# Patient Record
Sex: MALE | Race: WHITE | NOT HISPANIC OR LATINO | Employment: OTHER | URBAN - METROPOLITAN AREA
[De-identification: names, ages, dates, MRNs, and addresses within clinical notes are randomized per-mention and may not be internally consistent; named-entity substitution may affect disease eponyms.]

---

## 2017-01-11 RX ORDER — LISINOPRIL 10 MG/1
TABLET ORAL
Qty: 90 TAB | Refills: 0 | Status: SHIPPED | OUTPATIENT
Start: 2017-01-11 | End: 2017-01-13 | Stop reason: SDUPTHER

## 2017-01-11 NOTE — TELEPHONE ENCOUNTER
Last seen: 06/13/16 by Dr. Spears  Next appt: 04/16/17 with Dr. Spears    Was the patient seen in the last year in this department? Yes   Does patient have an active prescription for medications requested? No   Received Request Via: Pharmacy

## 2017-01-13 RX ORDER — LISINOPRIL 10 MG/1
TABLET ORAL
Qty: 90 TAB | Refills: 0 | Status: SHIPPED | OUTPATIENT
Start: 2017-01-13 | End: 2017-07-24 | Stop reason: SDUPTHER

## 2017-01-13 RX ORDER — LEVOTHYROXINE SODIUM 0.05 MG/1
TABLET ORAL
Qty: 90 TAB | Refills: 0 | Status: SHIPPED | OUTPATIENT
Start: 2017-01-13 | End: 2017-04-24 | Stop reason: SDUPTHER

## 2017-01-13 NOTE — TELEPHONE ENCOUNTER
Was the patient seen in the last year in this department? Yes 6/13/2016, next appt 4/6/2017    Does patient have an active prescription for medications requested? Yes     Received Request Via: Pharmacy

## 2017-01-23 RX ORDER — ATORVASTATIN CALCIUM 10 MG/1
TABLET, FILM COATED ORAL
Qty: 90 TAB | Refills: 0 | Status: SHIPPED | OUTPATIENT
Start: 2017-01-23 | End: 2017-04-25

## 2017-01-23 NOTE — TELEPHONE ENCOUNTER
Last seen: 06/13/16 by Dr. Spears  Next appt: 04/06/17 with Dr. Spears    Was the patient seen in the last year in this department? Yes   Does patient have an active prescription for medications requested? No   Received Request Via: Pharmacy

## 2017-03-13 ENCOUNTER — HOSPITAL ENCOUNTER (OUTPATIENT)
Dept: RADIOLOGY | Facility: MEDICAL CENTER | Age: 72
End: 2017-03-13
Attending: NEUROLOGICAL SURGERY | Admitting: NEUROLOGICAL SURGERY
Payer: MEDICARE

## 2017-03-13 DIAGNOSIS — Z01.810 PRE-OPERATIVE CARDIOVASCULAR EXAMINATION: ICD-10-CM

## 2017-03-13 DIAGNOSIS — Z01.812 PRE-PROCEDURAL LABORATORY EXAMINATION: ICD-10-CM

## 2017-03-13 DIAGNOSIS — Z01.811 PRE-OPERATIVE RESPIRATORY EXAMINATION: ICD-10-CM

## 2017-03-13 LAB
ANION GAP SERPL CALC-SCNC: 10 MMOL/L (ref 0–11.9)
APTT PPP: 27.4 SEC (ref 24.7–36)
BASOPHILS # BLD AUTO: 0.7 % (ref 0–1.8)
BASOPHILS # BLD: 0.04 K/UL (ref 0–0.12)
BUN SERPL-MCNC: 11 MG/DL (ref 8–22)
CALCIUM SERPL-MCNC: 9.8 MG/DL (ref 8.5–10.5)
CHLORIDE SERPL-SCNC: 103 MMOL/L (ref 96–112)
CO2 SERPL-SCNC: 27 MMOL/L (ref 20–33)
CREAT SERPL-MCNC: 0.91 MG/DL (ref 0.5–1.4)
EKG IMPRESSION: NORMAL
EOSINOPHIL # BLD AUTO: 0.45 K/UL (ref 0–0.51)
EOSINOPHIL NFR BLD: 7.9 % (ref 0–6.9)
ERYTHROCYTE [DISTWIDTH] IN BLOOD BY AUTOMATED COUNT: 43 FL (ref 35.9–50)
GFR SERPL CREATININE-BSD FRML MDRD: >60 ML/MIN/1.73 M 2
GLUCOSE SERPL-MCNC: 97 MG/DL (ref 65–99)
HCT VFR BLD AUTO: 45 % (ref 42–52)
HGB BLD-MCNC: 14.8 G/DL (ref 14–18)
IMM GRANULOCYTES # BLD AUTO: 0.03 K/UL (ref 0–0.11)
IMM GRANULOCYTES NFR BLD AUTO: 0.5 % (ref 0–0.9)
INR PPP: 0.99 (ref 0.87–1.13)
LYMPHOCYTES # BLD AUTO: 1.48 K/UL (ref 1–4.8)
LYMPHOCYTES NFR BLD: 26.1 % (ref 22–41)
MCH RBC QN AUTO: 29.2 PG (ref 27–33)
MCHC RBC AUTO-ENTMCNC: 32.9 G/DL (ref 33.7–35.3)
MCV RBC AUTO: 88.8 FL (ref 81.4–97.8)
MONOCYTES # BLD AUTO: 0.4 K/UL (ref 0–0.85)
MONOCYTES NFR BLD AUTO: 7 % (ref 0–13.4)
NEUTROPHILS # BLD AUTO: 3.28 K/UL (ref 1.82–7.42)
NEUTROPHILS NFR BLD: 57.8 % (ref 44–72)
NRBC # BLD AUTO: 0 K/UL
NRBC BLD AUTO-RTO: 0 /100 WBC
PLATELET # BLD AUTO: 182 K/UL (ref 164–446)
PMV BLD AUTO: 10.2 FL (ref 9–12.9)
POTASSIUM SERPL-SCNC: 4.4 MMOL/L (ref 3.6–5.5)
PROTHROMBIN TIME: 13.4 SEC (ref 12–14.6)
RBC # BLD AUTO: 5.07 M/UL (ref 4.7–6.1)
SODIUM SERPL-SCNC: 140 MMOL/L (ref 135–145)
WBC # BLD AUTO: 5.7 K/UL (ref 4.8–10.8)

## 2017-03-13 PROCEDURE — 36415 COLL VENOUS BLD VENIPUNCTURE: CPT

## 2017-03-13 PROCEDURE — 85730 THROMBOPLASTIN TIME PARTIAL: CPT

## 2017-03-13 PROCEDURE — 80048 BASIC METABOLIC PNL TOTAL CA: CPT

## 2017-03-13 PROCEDURE — 85610 PROTHROMBIN TIME: CPT

## 2017-03-13 PROCEDURE — 85025 COMPLETE CBC W/AUTO DIFF WBC: CPT

## 2017-03-13 PROCEDURE — 71020 DX-CHEST-2 VIEWS: CPT

## 2017-03-17 ENCOUNTER — APPOINTMENT (OUTPATIENT)
Dept: RADIOLOGY | Facility: MEDICAL CENTER | Age: 72
End: 2017-03-17
Attending: NEUROLOGICAL SURGERY
Payer: MEDICARE

## 2017-03-17 ENCOUNTER — HOSPITAL ENCOUNTER (OUTPATIENT)
Facility: MEDICAL CENTER | Age: 72
End: 2017-03-20
Attending: NEUROLOGICAL SURGERY | Admitting: NEUROLOGICAL SURGERY
Payer: MEDICARE

## 2017-03-17 PROCEDURE — 700102 HCHG RX REV CODE 250 W/ 637 OVERRIDE(OP)

## 2017-03-17 PROCEDURE — 502626 HCHG SURGIFLO HEMOSTATIC MATRIX 6ML: Performed by: NEUROLOGICAL SURGERY

## 2017-03-17 PROCEDURE — 500331 HCHG COTTONOID, SURG PATTIE: Performed by: NEUROLOGICAL SURGERY

## 2017-03-17 PROCEDURE — A6404 STERILE GAUZE > 48 SQ IN: HCPCS | Performed by: NEUROLOGICAL SURGERY

## 2017-03-17 PROCEDURE — A6222 GAUZE <=16 IN NO W/SAL W/O B: HCPCS | Performed by: NEUROLOGICAL SURGERY

## 2017-03-17 PROCEDURE — 160036 HCHG PACU - EA ADDL 30 MINS PHASE I: Performed by: NEUROLOGICAL SURGERY

## 2017-03-17 PROCEDURE — 500364 HCHG DISSECT TOOL, MIDAS: Performed by: NEUROLOGICAL SURGERY

## 2017-03-17 PROCEDURE — 700101 HCHG RX REV CODE 250

## 2017-03-17 PROCEDURE — 500367 HCHG DRAIN KIT, HEMOVAC: Performed by: NEUROLOGICAL SURGERY

## 2017-03-17 PROCEDURE — 500445 HCHG HEMOSTAT, SURGICEL 4X8: Performed by: NEUROLOGICAL SURGERY

## 2017-03-17 PROCEDURE — 502240 HCHG MISC OR SUPPLY RC 0272: Performed by: NEUROLOGICAL SURGERY

## 2017-03-17 PROCEDURE — 110371 HCHG SHELL REV 272: Performed by: NEUROLOGICAL SURGERY

## 2017-03-17 PROCEDURE — 501899 HCHG DURASEAL SEALANT SYSTEM 5ML: Performed by: NEUROLOGICAL SURGERY

## 2017-03-17 PROCEDURE — A9270 NON-COVERED ITEM OR SERVICE: HCPCS | Performed by: NEUROLOGICAL SURGERY

## 2017-03-17 PROCEDURE — 502000 HCHG MISC OR IMPLANTS RC 0278: Performed by: NEUROLOGICAL SURGERY

## 2017-03-17 PROCEDURE — 160002 HCHG RECOVERY MINUTES (STAT): Performed by: NEUROLOGICAL SURGERY

## 2017-03-17 PROCEDURE — 700102 HCHG RX REV CODE 250 W/ 637 OVERRIDE(OP): Performed by: NEUROLOGICAL SURGERY

## 2017-03-17 PROCEDURE — 700101 HCHG RX REV CODE 250: Performed by: NEUROLOGICAL SURGERY

## 2017-03-17 PROCEDURE — 72100 X-RAY EXAM L-S SPINE 2/3 VWS: CPT

## 2017-03-17 PROCEDURE — 160009 HCHG ANES TIME/MIN: Performed by: NEUROLOGICAL SURGERY

## 2017-03-17 PROCEDURE — 700111 HCHG RX REV CODE 636 W/ 250 OVERRIDE (IP)

## 2017-03-17 PROCEDURE — A4606 OXYGEN PROBE USED W OXIMETER: HCPCS | Performed by: NEUROLOGICAL SURGERY

## 2017-03-17 PROCEDURE — 500885 HCHG PACK, JACKSON TABLE: Performed by: NEUROLOGICAL SURGERY

## 2017-03-17 PROCEDURE — G0378 HOSPITAL OBSERVATION PER HR: HCPCS

## 2017-03-17 PROCEDURE — 96375 TX/PRO/DX INJ NEW DRUG ADDON: CPT | Mod: XU

## 2017-03-17 PROCEDURE — 110382 HCHG SHELL REV 271: Performed by: NEUROLOGICAL SURGERY

## 2017-03-17 PROCEDURE — 700112 HCHG RX REV CODE 229: Performed by: NEUROLOGICAL SURGERY

## 2017-03-17 PROCEDURE — C1713 ANCHOR/SCREW BN/BN,TIS/BN: HCPCS | Performed by: NEUROLOGICAL SURGERY

## 2017-03-17 PROCEDURE — 160035 HCHG PACU - 1ST 60 MINS PHASE I: Performed by: NEUROLOGICAL SURGERY

## 2017-03-17 PROCEDURE — 700111 HCHG RX REV CODE 636 W/ 250 OVERRIDE (IP): Performed by: NEUROLOGICAL SURGERY

## 2017-03-17 PROCEDURE — 501423 HCHG SPONGE, SURGIFOAM 12X7: Performed by: NEUROLOGICAL SURGERY

## 2017-03-17 PROCEDURE — 160042 HCHG SURGERY MINUTES - EA ADDL 1 MIN LEVEL 5: Performed by: NEUROLOGICAL SURGERY

## 2017-03-17 PROCEDURE — A9270 NON-COVERED ITEM OR SERVICE: HCPCS

## 2017-03-17 PROCEDURE — 500105 HCHG BONE MILL BM200: Performed by: NEUROLOGICAL SURGERY

## 2017-03-17 PROCEDURE — 96374 THER/PROPH/DIAG INJ IV PUSH: CPT | Mod: XU

## 2017-03-17 PROCEDURE — 501838 HCHG SUTURE GENERAL: Performed by: NEUROLOGICAL SURGERY

## 2017-03-17 PROCEDURE — 160048 HCHG OR STATISTICAL LEVEL 1-5: Performed by: NEUROLOGICAL SURGERY

## 2017-03-17 PROCEDURE — C1763 CONN TISS, NON-HUMAN: HCPCS | Performed by: NEUROLOGICAL SURGERY

## 2017-03-17 PROCEDURE — 160031 HCHG SURGERY MINUTES - 1ST 30 MINS LEVEL 5: Performed by: NEUROLOGICAL SURGERY

## 2017-03-17 DEVICE — MAGNIFUSE 1X5CM: Type: IMPLANTABLE DEVICE | Status: FUNCTIONAL

## 2017-03-17 DEVICE — SCREW LGCY SET - (3TX30=90): Type: IMPLANTABLE DEVICE | Status: FUNCTIONAL

## 2017-03-17 DEVICE — IMPLANTABLE DEVICE: Type: IMPLANTABLE DEVICE | Status: FUNCTIONAL

## 2017-03-17 DEVICE — DURASEAL SEALANT SYSTEM 5ML - (5/BX): Type: IMPLANTABLE DEVICE | Status: FUNCTIONAL

## 2017-03-17 RX ORDER — DOXYCYCLINE HYCLATE 100 MG
100 TABLET ORAL
COMMUNITY
End: 2017-04-06

## 2017-03-17 RX ORDER — ENEMA 19; 7 G/133ML; G/133ML
1 ENEMA RECTAL
Status: DISCONTINUED | OUTPATIENT
Start: 2017-03-17 | End: 2017-03-20 | Stop reason: HOSPADM

## 2017-03-17 RX ORDER — POLYVINYL ALCOHOL 14 MG/ML
1 SOLUTION/ DROPS OPHTHALMIC PRN
Status: DISCONTINUED | OUTPATIENT
Start: 2017-03-17 | End: 2017-03-20 | Stop reason: HOSPADM

## 2017-03-17 RX ORDER — POLYETHYLENE GLYCOL 3350 17 G/17G
1 POWDER, FOR SOLUTION ORAL 2 TIMES DAILY PRN
Status: DISCONTINUED | OUTPATIENT
Start: 2017-03-17 | End: 2017-03-20 | Stop reason: HOSPADM

## 2017-03-17 RX ORDER — FLUTICASONE PROPIONATE 50 MCG
1 SPRAY, SUSPENSION (ML) NASAL
Status: DISCONTINUED | OUTPATIENT
Start: 2017-03-17 | End: 2017-03-20 | Stop reason: HOSPADM

## 2017-03-17 RX ORDER — DIAZEPAM 5 MG/ML
5 INJECTION, SOLUTION INTRAMUSCULAR; INTRAVENOUS EVERY 6 HOURS PRN
Status: DISCONTINUED | OUTPATIENT
Start: 2017-03-17 | End: 2017-03-20 | Stop reason: HOSPADM

## 2017-03-17 RX ORDER — BUPIVACAINE HYDROCHLORIDE AND EPINEPHRINE 5; 5 MG/ML; UG/ML
INJECTION, SOLUTION EPIDURAL; INTRACAUDAL; PERINEURAL
Status: DISCONTINUED | OUTPATIENT
Start: 2017-03-17 | End: 2017-03-17 | Stop reason: HOSPADM

## 2017-03-17 RX ORDER — ONDANSETRON 2 MG/ML
4 INJECTION INTRAMUSCULAR; INTRAVENOUS EVERY 4 HOURS PRN
Status: DISCONTINUED | OUTPATIENT
Start: 2017-03-17 | End: 2017-03-20 | Stop reason: HOSPADM

## 2017-03-17 RX ORDER — BACITRACIN 50000 [IU]/1
INJECTION, POWDER, FOR SOLUTION INTRAMUSCULAR
Status: DISCONTINUED | OUTPATIENT
Start: 2017-03-17 | End: 2017-03-17 | Stop reason: HOSPADM

## 2017-03-17 RX ORDER — CETIRIZINE HYDROCHLORIDE 10 MG/1
5 TABLET ORAL DAILY
Status: DISCONTINUED | OUTPATIENT
Start: 2017-03-18 | End: 2017-03-20 | Stop reason: HOSPADM

## 2017-03-17 RX ORDER — ACETAMINOPHEN 500 MG
1000 TABLET ORAL EVERY 6 HOURS
Status: DISCONTINUED | OUTPATIENT
Start: 2017-03-17 | End: 2017-03-20 | Stop reason: HOSPADM

## 2017-03-17 RX ORDER — CETIRIZINE HYDROCHLORIDE, PSEUDOEPHEDRINE HYDROCHLORIDE 5; 120 MG/1; MG/1
1 TABLET, FILM COATED, EXTENDED RELEASE ORAL DAILY
Status: DISCONTINUED | OUTPATIENT
Start: 2017-03-17 | End: 2017-03-17

## 2017-03-17 RX ORDER — LISINOPRIL 10 MG/1
10 TABLET ORAL
Status: DISCONTINUED | OUTPATIENT
Start: 2017-03-17 | End: 2017-03-20 | Stop reason: HOSPADM

## 2017-03-17 RX ORDER — MAGNESIUM HYDROXIDE 1200 MG/15ML
LIQUID ORAL
Status: DISCONTINUED | OUTPATIENT
Start: 2017-03-17 | End: 2017-03-17 | Stop reason: HOSPADM

## 2017-03-17 RX ORDER — DOXYCYCLINE 100 MG/1
100 TABLET ORAL EVERY 12 HOURS
Status: DISCONTINUED | OUTPATIENT
Start: 2017-03-17 | End: 2017-03-20 | Stop reason: HOSPADM

## 2017-03-17 RX ORDER — LEVOTHYROXINE SODIUM 0.05 MG/1
50 TABLET ORAL
Status: DISCONTINUED | OUTPATIENT
Start: 2017-03-18 | End: 2017-03-20 | Stop reason: HOSPADM

## 2017-03-17 RX ORDER — DEXTROSE MONOHYDRATE, SODIUM CHLORIDE, AND POTASSIUM CHLORIDE 50; 1.49; 9 G/1000ML; G/1000ML; G/1000ML
INJECTION, SOLUTION INTRAVENOUS
Status: DISPENSED
Start: 2017-03-17 | End: 2017-03-18

## 2017-03-17 RX ORDER — ONDANSETRON 4 MG/1
4 TABLET, ORALLY DISINTEGRATING ORAL EVERY 4 HOURS PRN
Status: DISCONTINUED | OUTPATIENT
Start: 2017-03-17 | End: 2017-03-20 | Stop reason: HOSPADM

## 2017-03-17 RX ORDER — BISACODYL 10 MG
10 SUPPOSITORY, RECTAL RECTAL
Status: DISCONTINUED | OUTPATIENT
Start: 2017-03-17 | End: 2017-03-20 | Stop reason: HOSPADM

## 2017-03-17 RX ORDER — DIPHENHYDRAMINE HCL 25 MG
25 TABLET ORAL EVERY 6 HOURS PRN
Status: DISCONTINUED | OUTPATIENT
Start: 2017-03-17 | End: 2017-03-20 | Stop reason: HOSPADM

## 2017-03-17 RX ORDER — PSEUDOEPHEDRINE HCL 120 MG/1
120 TABLET, FILM COATED, EXTENDED RELEASE ORAL DAILY
Status: DISCONTINUED | OUTPATIENT
Start: 2017-03-18 | End: 2017-03-20 | Stop reason: HOSPADM

## 2017-03-17 RX ORDER — DIAZEPAM 2 MG/1
5 TABLET ORAL EVERY 6 HOURS PRN
Status: DISCONTINUED | OUTPATIENT
Start: 2017-03-17 | End: 2017-03-20 | Stop reason: HOSPADM

## 2017-03-17 RX ORDER — OMEPRAZOLE 20 MG/1
20 CAPSULE, DELAYED RELEASE ORAL DAILY
Status: DISCONTINUED | OUTPATIENT
Start: 2017-03-18 | End: 2017-03-20 | Stop reason: HOSPADM

## 2017-03-17 RX ORDER — AMOXICILLIN 250 MG
1 CAPSULE ORAL NIGHTLY
Status: DISCONTINUED | OUTPATIENT
Start: 2017-03-17 | End: 2017-03-20 | Stop reason: HOSPADM

## 2017-03-17 RX ORDER — OXYCODONE HCL 5 MG/5 ML
SOLUTION, ORAL ORAL
Status: COMPLETED
Start: 2017-03-17 | End: 2017-03-17

## 2017-03-17 RX ORDER — FLUTICASONE PROPIONATE 50 MCG
1 SPRAY, SUSPENSION (ML) NASAL
COMMUNITY
End: 2018-07-08 | Stop reason: SDUPTHER

## 2017-03-17 RX ORDER — DEXTROSE MONOHYDRATE, SODIUM CHLORIDE, AND POTASSIUM CHLORIDE 50; 1.49; 9 G/1000ML; G/1000ML; G/1000ML
INJECTION, SOLUTION INTRAVENOUS CONTINUOUS
Status: DISCONTINUED | OUTPATIENT
Start: 2017-03-17 | End: 2017-03-18

## 2017-03-17 RX ORDER — SODIUM CHLORIDE, SODIUM LACTATE, POTASSIUM CHLORIDE, CALCIUM CHLORIDE 600; 310; 30; 20 MG/100ML; MG/100ML; MG/100ML; MG/100ML
1000 INJECTION, SOLUTION INTRAVENOUS
Status: DISCONTINUED | OUTPATIENT
Start: 2017-03-17 | End: 2017-03-20 | Stop reason: HOSPADM

## 2017-03-17 RX ORDER — CEFAZOLIN SODIUM 2 G/100ML
2 INJECTION, SOLUTION INTRAVENOUS EVERY 8 HOURS
Status: COMPLETED | OUTPATIENT
Start: 2017-03-17 | End: 2017-03-18

## 2017-03-17 RX ORDER — DIPHENHYDRAMINE HYDROCHLORIDE 50 MG/ML
25 INJECTION INTRAMUSCULAR; INTRAVENOUS EVERY 6 HOURS PRN
Status: DISCONTINUED | OUTPATIENT
Start: 2017-03-17 | End: 2017-03-20 | Stop reason: HOSPADM

## 2017-03-17 RX ORDER — BUDESONIDE AND FORMOTEROL FUMARATE DIHYDRATE 80; 4.5 UG/1; UG/1
2 AEROSOL RESPIRATORY (INHALATION)
Status: DISCONTINUED | OUTPATIENT
Start: 2017-03-17 | End: 2017-03-20 | Stop reason: HOSPADM

## 2017-03-17 RX ORDER — ATORVASTATIN CALCIUM 10 MG/1
10 TABLET, FILM COATED ORAL DAILY
Status: DISCONTINUED | OUTPATIENT
Start: 2017-03-17 | End: 2017-03-20 | Stop reason: HOSPADM

## 2017-03-17 RX ORDER — DOCUSATE SODIUM 100 MG/1
100 CAPSULE, LIQUID FILLED ORAL 2 TIMES DAILY
Status: DISCONTINUED | OUTPATIENT
Start: 2017-03-17 | End: 2017-03-20 | Stop reason: HOSPADM

## 2017-03-17 RX ORDER — HYDROMORPHONE HYDROCHLORIDE 2 MG/ML
INJECTION, SOLUTION INTRAMUSCULAR; INTRAVENOUS; SUBCUTANEOUS
Status: COMPLETED
Start: 2017-03-17 | End: 2017-03-17

## 2017-03-17 RX ORDER — AMOXICILLIN 250 MG
1 CAPSULE ORAL
Status: DISCONTINUED | OUTPATIENT
Start: 2017-03-17 | End: 2017-03-20 | Stop reason: HOSPADM

## 2017-03-17 RX ADMIN — CEFAZOLIN SODIUM 2 G: 2 INJECTION, SOLUTION INTRAVENOUS at 20:57

## 2017-03-17 RX ADMIN — POTASSIUM CHLORIDE, DEXTROSE MONOHYDRATE AND SODIUM CHLORIDE: 150; 5; 900 INJECTION, SOLUTION INTRAVENOUS at 17:54

## 2017-03-17 RX ADMIN — FENTANYL CITRATE 50 MCG: 50 INJECTION, SOLUTION INTRAMUSCULAR; INTRAVENOUS at 14:59

## 2017-03-17 RX ADMIN — DOXYCYCLINE 100 MG: 100 TABLET ORAL at 20:28

## 2017-03-17 RX ADMIN — STANDARDIZED SENNA CONCENTRATE AND DOCUSATE SODIUM 1 TABLET: 8.6; 5 TABLET, FILM COATED ORAL at 20:28

## 2017-03-17 RX ADMIN — FENTANYL CITRATE 25 MCG: 50 INJECTION, SOLUTION INTRAMUSCULAR; INTRAVENOUS at 15:08

## 2017-03-17 RX ADMIN — ATORVASTATIN CALCIUM 10 MG: 10 TABLET, FILM COATED ORAL at 18:36

## 2017-03-17 RX ADMIN — DOCUSATE SODIUM 100 MG: 100 CAPSULE ORAL at 20:28

## 2017-03-17 RX ADMIN — ACETAMINOPHEN 1000 MG: 500 TABLET, FILM COATED ORAL at 18:35

## 2017-03-17 RX ADMIN — HYDROMORPHONE HYDROCHLORIDE 0.5 MG: 2 INJECTION INTRAMUSCULAR; INTRAVENOUS; SUBCUTANEOUS at 15:47

## 2017-03-17 RX ADMIN — THERA TABS 1 TABLET: TAB at 18:35

## 2017-03-17 RX ADMIN — POLYVINYL ALCOHOL 1 DROP: 14 SOLUTION/ DROPS OPHTHALMIC at 20:26

## 2017-03-17 RX ADMIN — HYDROMORPHONE HYDROCHLORIDE: 2 INJECTION INTRAMUSCULAR; INTRAVENOUS; SUBCUTANEOUS at 15:30

## 2017-03-17 RX ADMIN — BUDESONIDE AND FORMOTEROL FUMARATE DIHYDRATE 2 PUFF: 80; 4.5 AEROSOL RESPIRATORY (INHALATION) at 20:56

## 2017-03-17 RX ADMIN — FENTANYL CITRATE 25 MCG: 50 INJECTION, SOLUTION INTRAMUSCULAR; INTRAVENOUS at 14:55

## 2017-03-17 RX ADMIN — OXYCODONE HYDROCHLORIDE 10 MG: 5 SOLUTION ORAL at 15:00

## 2017-03-17 ASSESSMENT — LIFESTYLE VARIABLES
ON A TYPICAL DAY WHEN YOU DRINK ALCOHOL HOW MANY DRINKS DO YOU HAVE: 1
EVER_SMOKED: NEVER
HOW MANY TIMES IN THE PAST YEAR HAVE YOU HAD 5 OR MORE DRINKS IN A DAY: 0
HAVE YOU EVER FELT YOU SHOULD CUT DOWN ON YOUR DRINKING: NO
HAVE PEOPLE ANNOYED YOU BY CRITICIZING YOUR DRINKING: NO
TOTAL SCORE: 0
TOTAL SCORE: 0
DO YOU DRINK ALCOHOL: YES
EVER HAD A DRINK FIRST THING IN THE MORNING TO STEADY YOUR NERVES TO GET RID OF A HANGOVER: NO
CONSUMPTION TOTAL: NEGATIVE
TOTAL SCORE: 0
AVERAGE NUMBER OF DAYS PER WEEK YOU HAVE A DRINK CONTAINING ALCOHOL: 1
EVER FELT BAD OR GUILTY ABOUT YOUR DRINKING: NO

## 2017-03-17 ASSESSMENT — PAIN SCALES - GENERAL
PAINLEVEL_OUTOF10: 2
PAINLEVEL_OUTOF10: 3
PAINLEVEL_OUTOF10: 3
PAINLEVEL_OUTOF10: 4
PAINLEVEL_OUTOF10: 5
PAINLEVEL_OUTOF10: 5
PAINLEVEL_OUTOF10: 2
PAINLEVEL_OUTOF10: 4
PAINLEVEL_OUTOF10: 1
PAINLEVEL_OUTOF10: 3
PAINLEVEL_OUTOF10: 3
PAINLEVEL_OUTOF10: 4
PAINLEVEL_OUTOF10: 3
PAINLEVEL_OUTOF10: 7
PAINLEVEL_OUTOF10: 0
PAINLEVEL_OUTOF10: 4
PAINLEVEL_OUTOF10: 7

## 2017-03-17 NOTE — IP AVS SNAPSHOT
3/20/2017          Angelo Magaña  [address] [place] [id number]    Dear Angelo:    [facility] wants to ensure your discharge home is safe and you or your loved ones have had all your questions answered regarding your care after you leave the hospital.    You may receive a telephone call within two days of your discharge.  This call is to make certain you understand your discharge instructions as well as ensure we provided you with the best care possible during your stay with us.     The call will only last approximately 3-5 minutes and will be done by a nurse.    Once again, we want to ensure your discharge home is safe and that you have a clear understanding of any next steps in your care.  If you have any questions or concerns, please do not hesitate to contact us, we are here for you.  Thank you for choosing [facility] for your healthcare needs.    Sincerely,    John Fabian    [facility]

## 2017-03-17 NOTE — IP AVS SNAPSHOT
" <p align=\"LEFT\"><IMG SRC=\"//[email or web address]$/[email or web address]\" alt=\"Image\" WIDTH=\"50%\" HEIGHT=\"200\" BORDER=\"\"></p>                   Name:Angelo Magaña  Medical Record Number:[id number]  CSN: [id number]    YOB: 1945   Age: 72 y.o.  Sex: male  HT:1.727 m (5' 8\") WT: 115.5 kg (254 lb 10.1 oz)          Admit Date: 3/17/2017     Discharge Date:   Today's Date: 3/20/2017  Attending Doctor:  Juan Miguel Chakraborty M.D.                  Allergies:  Review of patient's allergies indicates no known allergies.          Your appointments     Apr 06, 2017  9:20 AM   Established Patient with Gale Spears M.D.   [facility] Med - Internal Medicine (--)    [facility] [address] [place] [address]  [address]  [facility] [address] [phone]           You will be receiving a confirmation call a few days before your appointment from our automated call confirmation system.              Follow-up Information     1. Follow up with Juan Miguel Chakraborty M.D.. Schedule an appointment as soon as possible for a visit in 2 weeks.    Specialty:  Neurosurgery    Contact information    [address] [facility]  [address]  [facility] [id number] [phone]           Medication List      Take these Medications        Instructions    ADVAIR DISKUS 100-50 MCG/DOSE Aepb   Generic drug:  fluticasone-salmeterol    TAKE 1 PUFF BY MOUTH TWICE A DAY       atorvastatin 10 MG Tabs   Commonly known as:  LIPITOR    TAKE 1 TABLET BY MOUTH EVERY DAY       doxycycline 100 MG Tabs   Commonly known as:  VIBRAMYCIN    Take 100 mg by mouth every 48 hours.   Dose:  100 mg       fluticasone 50 MCG/ACT nasal spray   Commonly known as:  FLONASE    Spray 1 Spray in nose 1 time daily as needed.   Dose:  1 Spray       levothyroxine 50 MCG Tabs   Commonly known as:  SYNTHROID    Doctor's comments:  Labs due   TAKE 1 TABLET BY MOUTH DAILY       lisinopril 10 MG Tabs   Commonly known as:  PRINIVIL    Doctor's comments:  Labs due   TAKE 1 TABLET BY MOUTH DAILY       MULTIVITAMIN PO    Take 2 Tabs " by mouth every day. Takes daily   Dose:  2 Tab       oxycodone-acetaminophen 5-325 MG Tabs   Commonly known as:  PERCOCET    Take 1-2 Tabs by mouth every 6 hours as needed.   Dose:  1-2 Tab       PRILOSEC 20 MG delayed-release capsule   Generic drug:  omeprazole    Take 20 mg by mouth every day. Takes daily   Dose:  20 mg       VITAMIN D (CHOLECALCIFEROL) PO    Take 3,000 Units by mouth every day.   Dose:  3000 Units       ZYRTEC-D 5-120 MG per tablet   Generic drug:  cetirizine-psuedoephedrine    Take 1 Tab by mouth every day. Takes daily   Dose:  1 Tab

## 2017-03-17 NOTE — IP AVS SNAPSHOT
" Home Care Instructions                                                                                                                  Name:Angelo Magaña  Medical Record Number:[id number]  CSN: [id number]    YOB: 1945   Age: 72 y.o.  Sex: male  HT:1.727 m (5' 8\") WT: 115.5 kg (254 lb 10.1 oz)          Admit Date: 3/17/2017     Discharge Date:   Today's Date: 3/20/2017  Attending Doctor:  Juan Miguel Chakraborty M.D.                  Allergies:  Review of patient's allergies indicates no known allergies.            Discharge Instructions       Discharge Instructions    Discharged to home by car with relative. Discharged via wheelchair, hospital escort: Yes.  Special equipment needed: TLSO and Walker    Be sure to schedule a follow-up appointment with your primary care doctor or any specialists as instructed.     Discharge Plan:   Influenza Vaccine Indication: Not indicated: Previously immunized this influenza season and > 8 years of age    I understand that a diet low in cholesterol, fat, and sodium is recommended for good health. Unless I have been given specific instructions below for another diet, I accept this instruction as my diet prescription.   Other diet: Regular    Special Instructions:   Ok to shower, pat incision dry, leave open to air- no dressing   No Aspirin or non-steroidal anti-inflammatory medications (aleve, motrin, ibuprofen, celebrex)   No driving for 2 weeks/ No driving while on narcotic medication   Over the counter stool softeners daily while on narcotics   No lifting greater than 15 pounds, no repetitive bending or twisting   Follow up at [facility] 2 weeks after surgery    · Is patient discharged on Warfarin / Coumadin?   No     · Is patient Post Blood Transfusion?  No    Depression / Suicide Risk    As you are discharged from this [facility] facility, it is important to learn how to keep safe from harming yourself.    Recognize the warning signs:  · Abrupt changes in " personality, positive or negative- including increase in energy   · Giving away possessions  · Change in eating patterns- significant weight changes-  positive or negative  · Change in sleeping patterns- unable to sleep or sleeping all the time   · Unwillingness or inability to communicate  · Depression  · Unusual sadness, discouragement and loneliness  · Talk of wanting to die  · Neglect of personal appearance   · Rebelliousness- reckless behavior  · Withdrawal from people/activities they love  · Confusion- inability to concentrate     If you or a loved one observes any of these behaviors or has concerns about self-harm, here's what you can do:  · Talk about it- your feelings and reasons for harming yourself  · Remove any means that you might use to hurt yourself (examples: pills, rope, extension cords, firearm)  · Get professional help from the community (Mental Health, Substance Abuse, psychological counseling)  · Do not be alone:Call your Safe Contact- someone whom you trust who will be there for you.  · Call your local CRISIS HOTLINE 606-7613 or 365-844-3911  · Call your local Children's Mobile Crisis Response Team [place] (394) 302-4532 or www.Ready Solar  · Call the toll free National Suicide Prevention Hotlines   · National Suicide Prevention Lifeline 695-026-QTEX (6107)  · National Hope Line Network 800-SUICIDE (618-0402)        Your appointments     Apr 06, 2017  9:20 AM   Established Patient with Gale Spears M.D.   [facility] Med - Internal Medicine (--)    [address] [place] [id number] [phone]           You will be receiving a confirmation call a few days before your appointment from our automated call confirmation system.              Follow-up Information     1. Follow up with Juan Miguel Chakraborty M.D.. Schedule an appointment as soon as possible for a visit in 2 weeks.    Specialty:  Neurosurgery    Contact information    [address]  [address]  [place] [address]  [phone]           Discharge Medication Instructions:    Below are the medications your physician expects you to take upon discharge:    Review all your home medications and newly ordered medications with your doctor and/or pharmacist. Follow medication instructions as directed by your doctor and/or pharmacist.    Please keep your medication list with you and share with your physician.               Medication List      START taking these medications        Instructions    oxycodone-acetaminophen 5-325 MG Tabs   Commonly known as:  PERCOCET    Take 1-2 Tabs by mouth every 6 hours as needed.   Dose:  1-2 Tab         CONTINUE taking these medications        Instructions    ADVAIR DISKUS 100-50 MCG/DOSE Aepb   Generic drug:  fluticasone-salmeterol    TAKE 1 PUFF BY MOUTH TWICE A DAY       atorvastatin 10 MG Tabs   Last time this was given:  10 mg on 3/20/2017  7:56 AM   Commonly known as:  LIPITOR    TAKE 1 TABLET BY MOUTH EVERY DAY       doxycycline 100 MG Tabs   Commonly known as:  VIBRAMYCIN    Take 100 mg by mouth every 48 hours.   Dose:  100 mg       fluticasone 50 MCG/ACT nasal spray   Commonly known as:  FLONASE    Spray 1 Spray in nose 1 time daily as needed.   Dose:  1 Spray       levothyroxine 50 MCG Tabs   Last time this was given:  50 mcg on 3/20/2017  5:57 AM   Commonly known as:  SYNTHROID    Doctor's comments:  Labs due   TAKE 1 TABLET BY MOUTH DAILY       lisinopril 10 MG Tabs   Last time this was given:  10 mg on 3/20/2017  7:56 AM   Commonly known as:  PRINIVIL    Doctor's comments:  Labs due   TAKE 1 TABLET BY MOUTH DAILY       MULTIVITAMIN PO    Take 2 Tabs by mouth every day. Takes daily   Dose:  2 Tab       PRILOSEC 20 MG delayed-release capsule   Last time this was given:  20 mg on 3/20/2017  7:56 AM   Generic drug:  omeprazole    Take 20 mg by mouth every day. Takes daily   Dose:  20 mg       VITAMIN D (CHOLECALCIFEROL) PO   Last time this was given:  3,000 Units on  3/20/2017  7:56 AM    Take 3,000 Units by mouth every day.   Dose:  3000 Units       ZYRTEC-D 5-120 MG per tablet   Generic drug:  cetirizine-psuedoephedrine    Take 1 Tab by mouth every day. Takes daily   Dose:  1 Tab         STOP taking these medications     aspirin 81 MG tablet               Instructions           Diet / Nutrition:    Follow any diet instructions given to you by your doctor or the dietician, including how much salt (sodium) you are allowed each day.    If you are overweight, talk to your doctor about a weight reduction plan.    Activity:    Remain physically active following your doctor's instructions about exercise and activity.    Rest often.     Any time you become even a little tired or short of breath, SIT DOWN and rest.    Worsening Symptoms:    Report any of the following signs and symptoms to the doctor's office immediately:    *Pain of jaw, arm, or neck  *Chest pain not relieved by medication                               *Dizziness or loss of consciousness  *Difficulty breathing even when at rest   *More tired than usual                                       *Bleeding drainage or swelling of surgical site  *Swelling of feet, ankles, legs or stomach                 *Fever (>100ºF)  *Pink or blood tinged sputum  *Weight gain (3lbs/day or 5lbs /week)           *Shock from internal defibrillator (if applicable)  *Palpitations or irregular heartbeats                *Cool and/or numb extremities    Stroke Awareness    Common Risk Factors for Stroke include:    Age  Atrial Fibrillation  Carotid Artery Stenosis  Diabetes Mellitus  Excessive alcohol consumption  High blood pressure  Overweight   Physical inactivity  Smoking    Warning signs and symptoms of a stroke include:    *Sudden numbness or weakness of the face, arm or leg (especially on one side of the body).  *Sudden confusion, trouble speaking or understanding.  *Sudden trouble seeing in one or both eyes.  *Sudden trouble walking,  dizziness, loss of balance or coordination.Sudden severe headache with no known cause.    It is very important to get treatment quickly when a stroke occurs. If you experience any of the above warning signs, call 911 immediately.                   Disclaimer         Quit Smoking / Tobacco Use:    I understand the use of any tobacco products increases my chance of suffering from future heart disease or stroke and could cause other illnesses which may shorten my life. Quitting the use of tobacco products is the single most important thing I can do to improve my health. For further information on smoking / tobacco cessation call a Toll Free Quit Line at 1-839.112.7939 (*National Cancer [place]) or 1-845.595.9720 (American Lung Association) or you can access the web based program at www.lungDealdrive.org.    [place] Tobacco Users Help Line:  (637) 164-9275       Toll Free: 1-463.149.5175  Quit Tobacco Program [facility] (941)632-8492    Crisis Hotline:    [place] Crisis Hotline:  3-576-CJAVOYX or 1-418.389.2131    [place] Crisis Hotline:    1-457.397.9307 or 538-340-0806    Discharge Survey:   Thank you for choosing [facility]. We hope we did everything we could to make your hospital stay a pleasant one. You may be receiving a phone survey and we would appreciate your time and participation in answering the questions. Your input is very valuable to us in our efforts to improve our service to our patients and their families.        My signature on this form indicates that:    1. I have reviewed and understand the above information.  2. My questions regarding this information have been answered to my satisfaction.  3. I have formulated a plan with my discharge nurse to obtain my prescribed medications for home.                  Disclaimer         __________________________________                     __________       ________                       Patient Signature                                                  Date                    Time

## 2017-03-17 NOTE — IP AVS SNAPSHOT
On Networks Access Code: Activation code not generated  Current On Networks Status: Patient Declined    Your email address is not on file at [facility].  Email Addresses are required for you to sign up for On Networks, please contact [phone] to verify your personal information and to provide your email address prior to attempting to register for On Networks.    Angelo Magaña  [address] [place] [address]    On Networks  A secure, online tool to manage your health information     [employer]’s On Networks® is a secure, online tool that connects you to your personalized health information from the privacy of your home -- day or night - making it very easy for you to manage your healthcare. Once the activation process is completed, you can even access your medical information using the On Networks danitza, which is available for free in the Apple Danitza store or Google Play store.     To learn more about On Networks, visit [email or web address]    There are two levels of access available (as shown below):   My Chart Features  [facility] Primary Care Doctor [facility]  Specialists [facility]  Urgent  Care Non-[facility] Primary Care Doctor   Email your healthcare team securely and privately 24/7 X X X    Manage appointments: schedule your next appointment; view details of past/upcoming appointments X      Request prescription refills. X      View recent personal medical records, including lab and immunizations X X X X   View health record, including health history, allergies, medications X X X X   Read reports about your outpatient visits, procedures, consult and ER notes X X X X   See your discharge summary, which is a recap of your hospital and/or ER visit that includes your diagnosis, lab results, and care plan X X  X     How to register for [employer]:  Once your e-mail address has been verified, follow the following steps to sign up for Wid[employer].     1. Go to  [email or web address]  2. Click on the Sign Up Now box, which takes you to the New  Member Sign Up page. You will need to provide the following information:  a. Enter your [employer] Access Code exactly as it appears at the top of this page. (You will not need to use this code after you’ve completed the sign-up process. If you do not sign up before the expiration date, you must request a new code.)   b. Enter your date of birth.   c. Enter your home email address.   d. Click Submit, and follow the next screen’s instructions.  3. Create a Done.t ID. This will be your Aventones login ID and cannot be changed, so think of one that is secure and easy to remember.  4. Create a A[employer] password. You can change your password at any time.  5. Enter your Password Reset Question and Answer. This can be used at a later time if you forget your password.   6. Enter your e-mail address. This allows you to receive e-mail notifications when new information is available in [employer].  7. Click Sign Up. You can now view your health information.    For assistance activating your [employer] account, call [phone]

## 2017-03-17 NOTE — OR NURSING
[facility]. Family at bedside.

## 2017-03-18 PROCEDURE — G8978 MOBILITY CURRENT STATUS: HCPCS | Mod: CI

## 2017-03-18 PROCEDURE — A9270 NON-COVERED ITEM OR SERVICE: HCPCS | Performed by: NEUROLOGICAL SURGERY

## 2017-03-18 PROCEDURE — G8980 MOBILITY D/C STATUS: HCPCS | Mod: CI

## 2017-03-18 PROCEDURE — G8987 SELF CARE CURRENT STATUS: HCPCS | Mod: CI

## 2017-03-18 PROCEDURE — 700102 HCHG RX REV CODE 250 W/ 637 OVERRIDE(OP): Performed by: PHYSICIAN ASSISTANT

## 2017-03-18 PROCEDURE — G8989 SELF CARE D/C STATUS: HCPCS | Mod: CI

## 2017-03-18 PROCEDURE — G8988 SELF CARE GOAL STATUS: HCPCS | Mod: CI

## 2017-03-18 PROCEDURE — 97165 OT EVAL LOW COMPLEX 30 MIN: CPT | Mod: XE

## 2017-03-18 PROCEDURE — 700111 HCHG RX REV CODE 636 W/ 250 OVERRIDE (IP): Performed by: NEUROLOGICAL SURGERY

## 2017-03-18 PROCEDURE — G0378 HOSPITAL OBSERVATION PER HR: HCPCS

## 2017-03-18 PROCEDURE — 97161 PT EVAL LOW COMPLEX 20 MIN: CPT

## 2017-03-18 PROCEDURE — 96376 TX/PRO/DX INJ SAME DRUG ADON: CPT

## 2017-03-18 PROCEDURE — 700102 HCHG RX REV CODE 250 W/ 637 OVERRIDE(OP): Performed by: NEUROLOGICAL SURGERY

## 2017-03-18 PROCEDURE — 700101 HCHG RX REV CODE 250: Performed by: NEUROLOGICAL SURGERY

## 2017-03-18 PROCEDURE — 700112 HCHG RX REV CODE 229: Performed by: NEUROLOGICAL SURGERY

## 2017-03-18 PROCEDURE — A9270 NON-COVERED ITEM OR SERVICE: HCPCS | Performed by: PHYSICIAN ASSISTANT

## 2017-03-18 PROCEDURE — G8979 MOBILITY GOAL STATUS: HCPCS | Mod: CI

## 2017-03-18 RX ORDER — OXYCODONE HYDROCHLORIDE 10 MG/1
10 TABLET ORAL EVERY 4 HOURS PRN
Status: DISCONTINUED | OUTPATIENT
Start: 2017-03-18 | End: 2017-03-20 | Stop reason: HOSPADM

## 2017-03-18 RX ORDER — OXYCODONE HYDROCHLORIDE 10 MG/1
5-10 TABLET ORAL EVERY 4 HOURS PRN
Status: DISCONTINUED | OUTPATIENT
Start: 2017-03-18 | End: 2017-03-18

## 2017-03-18 RX ORDER — OXYCODONE HYDROCHLORIDE 10 MG/1
5 TABLET ORAL EVERY 4 HOURS PRN
Status: DISCONTINUED | OUTPATIENT
Start: 2017-03-18 | End: 2017-03-20 | Stop reason: HOSPADM

## 2017-03-18 RX ADMIN — DOXYCYCLINE 100 MG: 100 TABLET ORAL at 21:01

## 2017-03-18 RX ADMIN — POLYETHYLENE GLYCOL 3350 1 PACKET: 17 POWDER, FOR SOLUTION ORAL at 10:44

## 2017-03-18 RX ADMIN — OMEPRAZOLE 20 MG: 20 CAPSULE, DELAYED RELEASE ORAL at 10:47

## 2017-03-18 RX ADMIN — POLYVINYL ALCOHOL 1 DROP: 14 SOLUTION/ DROPS OPHTHALMIC at 03:52

## 2017-03-18 RX ADMIN — THERA TABS 1 TABLET: TAB at 10:45

## 2017-03-18 RX ADMIN — BUDESONIDE AND FORMOTEROL FUMARATE DIHYDRATE 2 PUFF: 80; 4.5 AEROSOL RESPIRATORY (INHALATION) at 19:29

## 2017-03-18 RX ADMIN — OXYCODONE HYDROCHLORIDE 5 MG: 10 TABLET ORAL at 10:48

## 2017-03-18 RX ADMIN — BUDESONIDE AND FORMOTEROL FUMARATE DIHYDRATE 2 PUFF: 80; 4.5 AEROSOL RESPIRATORY (INHALATION) at 12:31

## 2017-03-18 RX ADMIN — CETIRIZINE HYDROCHLORIDE 5 MG: 10 TABLET, FILM COATED ORAL at 10:46

## 2017-03-18 RX ADMIN — CEFAZOLIN SODIUM 2 G: 2 INJECTION, SOLUTION INTRAVENOUS at 03:50

## 2017-03-18 RX ADMIN — STANDARDIZED SENNA CONCENTRATE AND DOCUSATE SODIUM 1 TABLET: 8.6; 5 TABLET, FILM COATED ORAL at 21:01

## 2017-03-18 RX ADMIN — LISINOPRIL 10 MG: 10 TABLET ORAL at 10:45

## 2017-03-18 RX ADMIN — ACETAMINOPHEN 1000 MG: 500 TABLET, FILM COATED ORAL at 05:10

## 2017-03-18 RX ADMIN — LEVOTHYROXINE SODIUM 50 MCG: 50 TABLET ORAL at 05:10

## 2017-03-18 RX ADMIN — ATORVASTATIN CALCIUM 10 MG: 10 TABLET, FILM COATED ORAL at 10:47

## 2017-03-18 RX ADMIN — POTASSIUM CHLORIDE, DEXTROSE MONOHYDRATE AND SODIUM CHLORIDE: 150; 5; 900 INJECTION, SOLUTION INTRAVENOUS at 03:54

## 2017-03-18 RX ADMIN — MAGNESIUM HYDROXIDE 30 ML: 400 SUSPENSION ORAL at 21:01

## 2017-03-18 RX ADMIN — DOCUSATE SODIUM 100 MG: 100 CAPSULE ORAL at 21:01

## 2017-03-18 RX ADMIN — CHOLECALCIFEROL TAB 25 MCG (1000 UNIT) 3000 UNITS: 25 TAB at 10:44

## 2017-03-18 RX ADMIN — ACETAMINOPHEN 1000 MG: 500 TABLET, FILM COATED ORAL at 01:27

## 2017-03-18 RX ADMIN — DOCUSATE SODIUM 100 MG: 100 CAPSULE ORAL at 10:46

## 2017-03-18 RX ADMIN — ACETAMINOPHEN 1000 MG: 500 TABLET, FILM COATED ORAL at 19:29

## 2017-03-18 RX ADMIN — ACETAMINOPHEN 1000 MG: 500 TABLET, FILM COATED ORAL at 12:30

## 2017-03-18 RX ADMIN — OXYCODONE HYDROCHLORIDE 5 MG: 10 TABLET ORAL at 16:31

## 2017-03-18 RX ADMIN — DOXYCYCLINE 100 MG: 100 TABLET ORAL at 10:46

## 2017-03-18 ASSESSMENT — PATIENT HEALTH QUESTIONNAIRE - PHQ9
SUM OF ALL RESPONSES TO PHQ9 QUESTIONS 1 AND 2: 0
2. FEELING DOWN, DEPRESSED, IRRITABLE, OR HOPELESS: NOT AT ALL
SUM OF ALL RESPONSES TO PHQ QUESTIONS 1-9: 0
1. LITTLE INTEREST OR PLEASURE IN DOING THINGS: NOT AT ALL

## 2017-03-18 ASSESSMENT — PAIN SCALES - GENERAL
PAINLEVEL_OUTOF10: 2
PAINLEVEL_OUTOF10: 0
PAINLEVEL_OUTOF10: 2
PAINLEVEL_OUTOF10: 1
PAINLEVEL_OUTOF10: 7
PAINLEVEL_OUTOF10: 2

## 2017-03-18 ASSESSMENT — GAIT ASSESSMENTS
DISTANCE (FEET): 150
ASSISTIVE DEVICE: OTHER (COMMENTS)
GAIT LEVEL OF ASSIST: SUPERVISED
DEVIATION: BRADYKINETIC

## 2017-03-18 ASSESSMENT — ACTIVITIES OF DAILY LIVING (ADL): TOILETING: INDEPENDENT

## 2017-03-18 NOTE — PROGRESS NOTES
"Angelo Magaña assessed after assuming care at beginning of shift . no signs of acute distress and appears to be in stable condition. Last documented VS: /74 mmHg  Pulse 89  Temp(Src) 37.1 °C (98.7 °F)  Resp 18  Ht 1.727 m (5' 8\")  Wt 115.5 kg (254 lb 10.1 oz)  BMI 38.73 kg/m2  SpO2 95%      Mobility: Patient in bed at this time    Treaded slippers on bed alarm on. Hourly rounding in place and explained to Angelo. Educated Angelo on call light use as well as phone instructions to call RN or CNA directly. Possessions within patient's reach, fall precautions in place.     Angelo educated on Pain, Position, Potty, Priorities and instructed to notify RN if anything additional can be done to make stay more comfortable including linen change and toiletries.     *addendum to follow below at end or throughout shift if significant events occurred: if blank n/a    OVERNIGHT SIGNIFICANT EVENTS:  n/a      "

## 2017-03-18 NOTE — THERAPY
"Occupational Therapy Evaluation completed.   Functional Status:  Pt s/p lumbar sx, demonstrates fair knowledge of spinal precautions and direct care during TLSO managment. Pt trained on TLSO s/u and techniques to prevent twisting, stated understanding. Pt performed toileting and h/g standing sink side with supervision, able to manage clothing over hips, requires asssistance with threading at baseline. Pt reports spouse can assist as needed, and verbalizes no further question's or concerns regarding basic self care at this time. Pt does not present the need for further acute skilled services.   Plan of Care: Patient with no further skilled OT needs in the acute care setting at this time  Discharge Recommendations:  Equipment: No Equipment Needed. Post-acute therapy recommended after discharged home.    See \"Rehab Therapy-Acute\" Patient Summary Report for complete documentation.    "

## 2017-03-18 NOTE — PROGRESS NOTES
"Neurosurgery  POD# 1  Ambulating  Voiding  Tolerating oral medications  Denies nausea, vomiting  Low grade headache, not positional  Pain controlled on current medication regimen  Leg symptoms improved    Objective:  Blood pressure 117/74, pulse 89, temperature 37.1 °C (98.7 °F), resp. rate 18, height 1.727 m (5' 8\"), weight 115.5 kg (254 lb 10.1 oz), SpO2 95 %.    Intake/Output Summary (Last 24 hours) at 03/18/17 0921  Last data filed at 03/18/17 0708   Gross per 24 hour   Intake    254 ml   Output    370 ml   Net   -116 ml           No results for input(s): SODIUM, POTASSIUM, CHLORIDE, CO2, GLUCOSE, BUN, CPKTOTAL in the last 72 hours.      VSS  Hemovac 25cc/8hr am, dark  Surgical dressing trace drainage at inferior portion, otherwise dry  Strength:  Lower extremities are 5/5 grossly  Otherwise neurologically intact    Assessment:  Active Hospital Problems    Diagnosis   • Low back pain [M54.5]         Plan:  1. Ambulate with PT/OT as tolerated  2. Advance diet as tolerated  3. D/c PCA, advance oral pain medications  4. Watch Hemovac if output remains low, may discontinue at 1400 if less than 40cc/8hr  5. Valium for muscle spasm  6. D/c IV fluids  7. Try for home Sunday/Monday    "

## 2017-03-18 NOTE — THERAPY
"Physical Therapy Evaluation completed.   Bed Mobility:  Supine to Sit: Supervised  Transfers: Sit to Stand: Supervised  Gait: Level Of Assist: Supervised with IV pole.      Plan of Care: Patient with no further skilled PT needs in the acute care setting at this time  Discharge Recommendations: Equipment: No Equipment Needed. Post-acute therapy recommended after discharged home.    See \"Rehab Therapy-Acute\" Patient Summary Report for complete documentation.   Pt presents with no significant functional limitations since his recent surgery. He had no acute losses of balance while ambulating on level ground or stairs. He is not likely to benefit from acute physical therapy at this time, however may benefit from outpatient physical therapy for higher level balance and spinal stability exercises when cleared by surgeon.  "

## 2017-03-18 NOTE — OP REPORT
DATE OF SERVICE:  03/17/2017    PRINCIPAL SURGEON:  Juan Miguel Chakraborty MD    FIRST SURGICAL ASSISTANT:  Johnson Weaver MD    PREOPERATIVE DIAGNOSES:  Severe lumbar stenosis L3-L4 with previous L3-4, L4-5   lumbar laminectomy and instrumented fusion L4-L5.    POSTOPERATIVE DIAGNOSES:  Severe lumbar stenosis L4 with previous L3-4, L4-5   lumbar laminectomy and instrumented fusion L4-L5.    PRINCIPAL PROCEDURES:  1.  Redo L3 and redo L4 lumbar laminectomy.  2.  Removal of posterior hardware, L4-L5.  3.  Placement of new pedicle screws bilaterally at L3 using the [employer]   Solera 6.5x45 mm screws.  4.  Arthrodesis, L3-L4.  5.  Intraoperative fluoroscopy.  6.  Bilateral L3-L4 foraminotomies.    ANESTHESIA:  The case was done under general anesthesia.    ESTIMATED BLOOD LOSS:  Less than 200 mL.    COMPLICATIONS:  There were no intraoperative complications.    BRIEF HISTORY:  The patient is a 72-year-old man who I operated on back in   1998 performing an L3-L4 and L4-L5 lumbar laminectomy and L4-L5 instrumented   fusion due to grade I spondylolisthesis.  He represented to my office about   3-4 months ago with complaints of bilateral leg pain.  He had undergone an MRI   scan of the lumbar spine, which demonstrated severe stenosis above the   terminus of the previous fusion and laminectomized segment that is to say at   L3-L4.    He returned to my office about a month ago complaining of now worsening leg   pain to the point that it was difficult for him to get around.  Risks and   benefits of the proposed operation as well as realistic expectations and   outcome of surgery discussed at length with the patient prior to operation.    OPERATIVE REPORT:  Informed consent was obtained.  The patient was taken to   the operating room where he underwent endotracheal intubation general   anesthesia.  Using a skin marker, I incorporated the previous scar and then   extended the khoa with a skin marker rostrally about 8 cm.  Approximately  10   mL of 0.5% Marcaine with epinephrine was instilled into the skin.  Linear skin   incision made with a 10-bladed knife.  Dissection was carried down the level   of the thoracolumbar fascia through a scar tissue and native tissue.  I could   palpate the existing spinous process of L2.  Then, using Bovie cautery took   down the paraspinal muscles overlying the lamina of L2 extending it laterally   to identify the lateral margins of the facet of L3 through L4 and L5.    We then dissected off adherent scar tissue from the pedicle screws and   connecting whitney at L4-L5.    We then removed the setscrews at L4-L5 and then removed the existing   connecting whitney between L4-L5.    I replaced the setscrew at L5 and left the pedicle screw at L5 in case he   should require another fusion at L5-S1.    Using Bovie cautery took down densely adherent scar tissue overlying the   previously laminectomized segment at L3 through L4.  I then used a high-speed   drill, cut a trough in the lamina farther out lateral from the mid position of   the pedicle of L3 through the mid position of the pedicle of L4.  We worked   very judiciously trying to peel off adherent scar tissue from the dura.  There   was a small durotomy made on the patient's right side at approximately the   level of the disk space between L3-L4.    We worked out lateral using #2 and #3 Kerrison punch, creating a virgin   dissection plane freeing up the dura from adherent scar tissue and then   performed bilateral foraminotomy at L3-L4, unroofing the exiting L3 nerve root   with a 2 Kerrison punch and then completed the decompression along the   exiting L4 nerve root as it traversed below the pedicle of L4.    A small durotomy was repaired primarily with 2 interrupted 4-0 Nurolon   sutures.  At the conclusion of the repair, the dura was tense without any   evidence of CSF leak.  I did make an application of Tisseel blue glue over the   durotomy.    We then worked out  lateral and took down the soft tissue lateral to the facets   and exposed the transverse process of L3 and L4 respectively.  I then used   the high-speed drill to decorticate the transverse processes as well as the   lateral margins of facet from the transverse process of L3 through L4.    Then, using the fluoroscopic imager for guidance, I placed bilateral pedicle   screws in the pedicle of L3, first by entering the pedicle with the high-speed   drill, then advancing through the pedicle into the vertebral body using the   gearshift tool.  We checked the pedicle with the pedicle probe.    I tapped the pedicle with a 5.5 mm tap and then inserted the Solera system   screws.  There were 6.5x45 mm screws bilaterally.  After placing the new pair   of screws, we obtained an AP projection was demonstrated good position of the   screws and good trajectory of the screws.    I then selected a 40 mm whitney bent in a lordotic fashion, dropped it into the   new pedicle screw at L3 and the existing pedicle screws at L4 and then secured   the rods with new set screws at L3 and L4 respectively.    Arthrodesis:  This previously described, we decorticated the transverse   processes from L3-L4 bilaterally.    All of the harvested bone from the lamina and facets that was taken down   during the bony decompression was denuded of soft tissue, morselized, and used   for autograft arthrodesis.  We packed the harvested bone from the transverse   process of L3-L4 on the patient's right side.  We had minimal bone to work   with, so I supplemented the autograft arthrodesis with allograft bone chips in   a _____ bag.  They were packed over the prepared area from L3-L4 for   arthrodesis.    The wound was reirrigated with normal saline, treated with antibiotics.  As   previous mentioned, there was no evidence of a CSF leak.  Hemostasis was   achieved with bipolar electrocautery.  A drain was placed in the epidural   space, tunneled through a  separate stab incision and externalized.    Thoracolumbar fascia was reapproximated with interrupted #1 Vicryl,   superficial fascia was closed with interrupted #1 Vicryl, skin was closed with   interrupted 2-0 Vicryl followed by staples and a sterile dressing.  Sponge   and needle count were correct at the end of the case.  Intraoperative   complication is durotomy.       ____________________________________     MD SONJA MURGUIA / BEA    DD:  03/17/2017 14:48:47  DT:  03/17/2017 17:10:36    D#:  [id number]  Job#:  [id number]

## 2017-03-18 NOTE — CARE PLAN
Problem: Safety  Goal: Will remain free from injury  Outcome: PROGRESSING AS EXPECTED  Pt able to turn self from side to side - no new skin breakdown noted.  HE ambulated about 200 feet with RN in afternoon/  He was much steadier using the walker.  He demonstrated place,ment of the TLSO brace with some assist from RN.  Using call light appropriately and bed alarm in place.    Problem: Pain Management  Goal: Pain level will decrease to patient’s comfort goal  Outcome: PROGRESSING AS EXPECTED  Requiring 5 mg oxycodone q 4-5 hours for pain.  Although he has minimal pain at rest, RN provides insight about need for maintenance dosing of pain medication to ensure increased activity and tolerance for ambulation.  Pt verbalized understanding and is very cooperative with plan of care.

## 2017-03-19 PROCEDURE — A9270 NON-COVERED ITEM OR SERVICE: HCPCS | Performed by: NEUROLOGICAL SURGERY

## 2017-03-19 PROCEDURE — G0378 HOSPITAL OBSERVATION PER HR: HCPCS

## 2017-03-19 PROCEDURE — 700102 HCHG RX REV CODE 250 W/ 637 OVERRIDE(OP): Performed by: NEUROLOGICAL SURGERY

## 2017-03-19 PROCEDURE — 700112 HCHG RX REV CODE 229: Performed by: NEUROLOGICAL SURGERY

## 2017-03-19 RX ADMIN — MAGNESIUM HYDROXIDE 30 ML: 400 SUSPENSION ORAL at 21:20

## 2017-03-19 RX ADMIN — OMEPRAZOLE 20 MG: 20 CAPSULE, DELAYED RELEASE ORAL at 10:00

## 2017-03-19 RX ADMIN — STANDARDIZED SENNA CONCENTRATE AND DOCUSATE SODIUM 1 TABLET: 8.6; 5 TABLET, FILM COATED ORAL at 21:20

## 2017-03-19 RX ADMIN — LISINOPRIL 10 MG: 10 TABLET ORAL at 10:01

## 2017-03-19 RX ADMIN — ACETAMINOPHEN 1000 MG: 500 TABLET, FILM COATED ORAL at 01:27

## 2017-03-19 RX ADMIN — BUDESONIDE AND FORMOTEROL FUMARATE DIHYDRATE 2 PUFF: 80; 4.5 AEROSOL RESPIRATORY (INHALATION) at 20:05

## 2017-03-19 RX ADMIN — CHOLECALCIFEROL TAB 25 MCG (1000 UNIT) 3000 UNITS: 25 TAB at 10:00

## 2017-03-19 RX ADMIN — DOXYCYCLINE 100 MG: 100 TABLET ORAL at 21:20

## 2017-03-19 RX ADMIN — BUDESONIDE AND FORMOTEROL FUMARATE DIHYDRATE 2 PUFF: 80; 4.5 AEROSOL RESPIRATORY (INHALATION) at 05:32

## 2017-03-19 RX ADMIN — PSEUDOEPHEDRINE HYDROCHLORIDE 120 MG: 120 TABLET, FILM COATED, EXTENDED RELEASE ORAL at 10:02

## 2017-03-19 RX ADMIN — ACETAMINOPHEN 1000 MG: 500 TABLET, FILM COATED ORAL at 05:32

## 2017-03-19 RX ADMIN — DOCUSATE SODIUM 100 MG: 100 CAPSULE ORAL at 10:01

## 2017-03-19 RX ADMIN — ACETAMINOPHEN 1000 MG: 500 TABLET, FILM COATED ORAL at 17:49

## 2017-03-19 RX ADMIN — THERA TABS 1 TABLET: TAB at 10:00

## 2017-03-19 RX ADMIN — DOXYCYCLINE 100 MG: 100 TABLET ORAL at 10:01

## 2017-03-19 RX ADMIN — DOCUSATE SODIUM 100 MG: 100 CAPSULE ORAL at 21:20

## 2017-03-19 RX ADMIN — CETIRIZINE HYDROCHLORIDE 5 MG: 10 TABLET, FILM COATED ORAL at 10:01

## 2017-03-19 RX ADMIN — ATORVASTATIN CALCIUM 10 MG: 10 TABLET, FILM COATED ORAL at 10:01

## 2017-03-19 RX ADMIN — ACETAMINOPHEN 1000 MG: 500 TABLET, FILM COATED ORAL at 13:18

## 2017-03-19 RX ADMIN — LEVOTHYROXINE SODIUM 50 MCG: 50 TABLET ORAL at 05:32

## 2017-03-19 ASSESSMENT — PAIN SCALES - GENERAL
PAINLEVEL_OUTOF10: 4
PAINLEVEL_OUTOF10: 4
PAINLEVEL_OUTOF10: 2
PAINLEVEL_OUTOF10: 4

## 2017-03-19 ASSESSMENT — COPD QUESTIONNAIRES: DURING THE PAST 4 WEEKS HOW MUCH DID YOU FEEL SHORT OF BREATH: NONE/LITTLE OF THE TIME

## 2017-03-19 ASSESSMENT — PATIENT HEALTH QUESTIONNAIRE - PHQ9
SUM OF ALL RESPONSES TO PHQ QUESTIONS 1-9: 0
SUM OF ALL RESPONSES TO PHQ9 QUESTIONS 1 AND 2: 0
2. FEELING DOWN, DEPRESSED, IRRITABLE, OR HOPELESS: NOT AT ALL
1. LITTLE INTEREST OR PLEASURE IN DOING THINGS: NOT AT ALL

## 2017-03-19 NOTE — PROGRESS NOTES
"Neurosurgery :   Pt states he developed fever last night and also is with a dull constant h/a, back sore, legs good.  Takes po, voids, drain 135cc last 12 hrs (total 185), dressing c&d, df/pf 5/5.         Blood pressure 104/60, pulse 83, temperature 37.4 °C (99.3 °F), resp. rate 18, height 1.727 m (5' 8\"), weight 115.5 kg (254 lb 10.1 oz), SpO2 95 %.        No results for input(s): SODIUM, POTASSIUM, CHLORIDE, CO2, GLUCOSE, BUN, CPKTOTAL in the last 72 hours.          Date 03/19/17 0700 - 03/20/17 0659   Shift [id number] [id number] [id number] 24 Hour Total   I  N  T  A  K  E   Shift Total       O  U  T  P  U  T   Urine 350   350      Void (ml) 350   350    Shift Total 350   350   NET -350   -350       Intake/Output Summary (Last 24 hours) at 03/19/17 0914  Last data filed at 03/19/17 0720   Gross per 24 hour   Intake    306 ml   Output   3485 ml   Net  -3179 ml         • oxycodone immediate-release  5 mg      Or   • oxycodone immediate-release  10 mg     • LR  1,000 mL     • budesonide-formoterol  2 Puff     • atorvastatin  10 mg     • doxycycline monohydrate  100 mg     • fluticasone  1 Spray     • levothyroxine  50 mcg     • lisinopril  10 mg     • multivitamin  1 Tab     • omeprazole  20 mg     • vitamin D  3,000 Units     • Pharmacy Consult Request  1 Each     • MD ALERT...Do not administer NSAIDS or ASPIRIN unless ORDERED By Neurosurgery  1 Each     • docusate sodium  100 mg     • senna-docusate  1 Tab     • senna-docusate  1 Tab     • polyethylene glycol/lytes  1 Packet     • magnesium hydroxide  30 mL     • bisacodyl  10 mg     • fleet  1 Each     • acetaminophen  1,000 mg     • diphenhydrAMINE  25 mg      Or   • diphenhydrAMINE  25 mg     • ondansetron  4 mg     • ondansetron  4 mg     • diazepam  5 mg      Or   • diazepam  5 mg     • cetirizine  5 mg      And   • pseudoephedrine SR  120 mg     • artificial tears  1 Drop           Assessment and Plan:  POD # 2 Redo L3/ 4 laminectomies with Instrumentation  NM " as above  Plan to continue to increase activity  Drain with fair amount out - monitor  Monitor for h/a's  Disposition - possibly home in am      Imaging results  [unfilled]

## 2017-03-19 NOTE — PROGRESS NOTES
Received patient at change of shift resting in bed awake, alert, oriented X 4.  IV to left wrist in place, clean, dry, intact.  SCD's, ewelina hose to bilateral lower extremities in use.  Hemovac in place, draining moderate amount serosanguineous fluid.  TLSO brace to use while out of bed.  Dressing to back clean, dry, intact.  Bed alarm activated.  Safety and comfort measures maintained.  Call light within reach.

## 2017-03-19 NOTE — CARE PLAN
Problem: Safety  Goal: Will remain free from falls  Intervention: Implement fall precautions  Safety and fall precautions in place.  Bed in low position, upper side rails X 2.  Bed alarm activated.  Call light within reach.

## 2017-03-19 NOTE — CARE PLAN
Problem: Pain Management  Goal: Pain level will decrease to patient’s comfort goal  Intervention: Follow pain managment plan developed in collaboration with patient and Interdisciplinary Team  Pain medicated with tylenol 1000 mg as scheduled. Ice pack given per request.  Observed on rounds resting comfortable.

## 2017-03-20 VITALS
BODY MASS INDEX: 38.59 KG/M2 | SYSTOLIC BLOOD PRESSURE: 131 MMHG | OXYGEN SATURATION: 94 % | DIASTOLIC BLOOD PRESSURE: 98 MMHG | HEIGHT: 68 IN | WEIGHT: 254.63 LBS | TEMPERATURE: 97.7 F | HEART RATE: 73 BPM | RESPIRATION RATE: 16 BRPM

## 2017-03-20 PROCEDURE — A9270 NON-COVERED ITEM OR SERVICE: HCPCS | Performed by: NEUROLOGICAL SURGERY

## 2017-03-20 PROCEDURE — 700112 HCHG RX REV CODE 229: Performed by: NEUROLOGICAL SURGERY

## 2017-03-20 PROCEDURE — A9270 NON-COVERED ITEM OR SERVICE: HCPCS | Performed by: PHYSICIAN ASSISTANT

## 2017-03-20 PROCEDURE — G0378 HOSPITAL OBSERVATION PER HR: HCPCS

## 2017-03-20 PROCEDURE — 700102 HCHG RX REV CODE 250 W/ 637 OVERRIDE(OP): Performed by: NEUROLOGICAL SURGERY

## 2017-03-20 PROCEDURE — 700102 HCHG RX REV CODE 250 W/ 637 OVERRIDE(OP): Performed by: PHYSICIAN ASSISTANT

## 2017-03-20 RX ORDER — OXYCODONE HYDROCHLORIDE AND ACETAMINOPHEN 5; 325 MG/1; MG/1
1-2 TABLET ORAL EVERY 6 HOURS PRN
Qty: 60 TAB | Refills: 0 | Status: SHIPPED | OUTPATIENT
Start: 2017-03-20 | End: 2017-10-11

## 2017-03-20 RX ADMIN — OMEPRAZOLE 20 MG: 20 CAPSULE, DELAYED RELEASE ORAL at 07:56

## 2017-03-20 RX ADMIN — CHOLECALCIFEROL TAB 25 MCG (1000 UNIT) 3000 UNITS: 25 TAB at 07:56

## 2017-03-20 RX ADMIN — LEVOTHYROXINE SODIUM 50 MCG: 50 TABLET ORAL at 05:57

## 2017-03-20 RX ADMIN — PSEUDOEPHEDRINE HYDROCHLORIDE 120 MG: 120 TABLET, FILM COATED, EXTENDED RELEASE ORAL at 09:11

## 2017-03-20 RX ADMIN — DOCUSATE SODIUM 100 MG: 100 CAPSULE ORAL at 07:56

## 2017-03-20 RX ADMIN — ACETAMINOPHEN 1000 MG: 500 TABLET, FILM COATED ORAL at 05:56

## 2017-03-20 RX ADMIN — OXYCODONE HYDROCHLORIDE 10 MG: 10 TABLET ORAL at 11:20

## 2017-03-20 RX ADMIN — DOXYCYCLINE 100 MG: 100 TABLET ORAL at 07:57

## 2017-03-20 RX ADMIN — POLYETHYLENE GLYCOL 3350 1 PACKET: 17 POWDER, FOR SOLUTION ORAL at 07:56

## 2017-03-20 RX ADMIN — ACETAMINOPHEN 1000 MG: 500 TABLET, FILM COATED ORAL at 11:20

## 2017-03-20 RX ADMIN — ATORVASTATIN CALCIUM 10 MG: 10 TABLET, FILM COATED ORAL at 07:56

## 2017-03-20 RX ADMIN — BUDESONIDE AND FORMOTEROL FUMARATE DIHYDRATE 2 PUFF: 80; 4.5 AEROSOL RESPIRATORY (INHALATION) at 05:56

## 2017-03-20 RX ADMIN — OXYCODONE HYDROCHLORIDE 10 MG: 10 TABLET ORAL at 06:33

## 2017-03-20 RX ADMIN — CETIRIZINE HYDROCHLORIDE 5 MG: 10 TABLET, FILM COATED ORAL at 07:56

## 2017-03-20 RX ADMIN — LISINOPRIL 10 MG: 10 TABLET ORAL at 07:56

## 2017-03-20 RX ADMIN — THERA TABS 1 TABLET: TAB at 07:56

## 2017-03-20 ASSESSMENT — PAIN SCALES - GENERAL
PAINLEVEL_OUTOF10: 3
PAINLEVEL_OUTOF10: 5

## 2017-03-20 NOTE — PROGRESS NOTES
Went over discharge instructions w/ pt, when to call the doctor, f/u appointments, medications, spinal precautions. Prescription and copy of discharge paperwork given to pt. Pt had no further questions, pt waiting for family to come pick him up.

## 2017-03-20 NOTE — CARE PLAN
Problem: Safety  Goal: Will remain free from falls  Intervention: Implement fall precautions  Safety and fall precautions in place. Bed in low position, upper side rails X 2, treaded socks applied.  Bed alarm activated.  Call light within reach.      Problem: Respiratory:  Goal: Respiratory status will improve  Intervention: Assess and monitor pulmonary status  Oxygen 2 liters via nasal cannula in use.  Patient is in full compliance with deep breathing and coughing exercises.

## 2017-03-20 NOTE — CARE PLAN
Problem: Discharge Barriers/Planning  Goal: Patient’s continuum of care needs will be met  Intervention: Explain discharge instructions and medication reconcilliation to patient and significant other/support system  Went over d/c instructions with pt      Problem: Mobility  Goal: Risk for activity intolerance will decrease  Intervention: Encourage patient to increase activity level in collaboration with Interdisciplinary Team  ambulated with steady gait this AM

## 2017-03-20 NOTE — PROGRESS NOTES
Received report from Sylvia MIGUEL. Pt is A&Ox4. Denies any n/v or n/t. States back pain of 2/10. Medicated appropriately per MAR w/positive results. Pt states he would only like to take scheduled doses of Tylenol to control pain. Pt updated on POC. All needs met at this time. Pt is instructed to call when in need of assistance. Bed in lowest and locked position. Call light within reach. Bed alarm and hourly rounding in place.

## 2017-03-20 NOTE — DISCHARGE INSTRUCTIONS
Discharge Instructions    Discharged to home by car with relative. Discharged via wheelchair, hospital escort: Yes.  Special equipment needed: TLSO and Walker    Be sure to schedule a follow-up appointment with your primary care doctor or any specialists as instructed.     Discharge Plan:   Influenza Vaccine Indication: Not indicated: Previously immunized this influenza season and > 8 years of age    I understand that a diet low in cholesterol, fat, and sodium is recommended for good health. Unless I have been given specific instructions below for another diet, I accept this instruction as my diet prescription.   Other diet: Regular    Special Instructions:   Ok to shower, pat incision dry, leave open to air- no dressing   No Aspirin or non-steroidal anti-inflammatory medications (aleve, motrin, ibuprofen, celebrex)   No driving for 2 weeks/ No driving while on narcotic medication   Over the counter stool softeners daily while on narcotics   No lifting greater than 15 pounds, no repetitive bending or twisting   Follow up at [facility] 2 weeks after surgery    · Is patient discharged on Warfarin / Coumadin?   No     · Is patient Post Blood Transfusion?  No    Depression / Suicide Risk    As you are discharged from this [facility], it is important to learn how to keep safe from harming yourself.    Recognize the warning signs:  · Abrupt changes in personality, positive or negative- including increase in energy   · Giving away possessions  · Change in eating patterns- significant weight changes-  positive or negative  · Change in sleeping patterns- unable to sleep or sleeping all the time   · Unwillingness or inability to communicate  · Depression  · Unusual sadness, discouragement and loneliness  · Talk of wanting to die  · Neglect of personal appearance   · Rebelliousness- reckless behavior  · Withdrawal from people/activities they love  · Confusion- inability to concentrate     If you or a loved  one observes any of these behaviors or has concerns about self-harm, here's what you can do:  · Talk about it- your feelings and reasons for harming yourself  · Remove any means that you might use to hurt yourself (examples: pills, rope, extension cords, firearm)  · Get professional help from the community (Mental Health, Substance Abuse, psychological counseling)  · Do not be alone:Call your Safe Contact- someone whom you trust who will be there for you.  · Call your local CRISIS HOTLINE 265-2737 or 050-701-8985  · Call your local Children's Mobile Crisis Response Team [place] (519) 833-3466 or www.[employer]  · Call the toll free National Suicide Prevention Hotlines   · National Suicide Prevention Lifeline 470-746-FKRV (4649)  · National Hope Line Network 800-SUICIDE (434-6804)

## 2017-03-20 NOTE — PROGRESS NOTES
"Neurosurgery :   HA improved. Low grade fever, no SS infection (cough, urinary difficulty).  Ambulating. Voiding. Eating.    Obj:  BLE strengthintact- incision not visualized. Upin brace        Blood pressure 131/98, pulse 73, temperature 36.5 °C (97.7 °F), resp. rate 16, height 1.727 m (5' 8\"), weight 115.5 kg (254 lb 10.1 oz), SpO2 94 %.        No results for input(s): SODIUM, POTASSIUM, CHLORIDE, CO2, GLUCOSE, BUN, CPKTOTAL in the last 72 hours.             Intake/Output Summary (Last 24 hours) at 03/20/17 0917  Last data filed at 03/20/17 0600   Gross per 24 hour   Intake      0 ml   Output   2500 ml   Net  -2500 ml         • oxycodone immediate-release  5 mg      Or   • oxycodone immediate-release  10 mg     • LR  1,000 mL     • budesonide-formoterol  2 Puff     • atorvastatin  10 mg     • doxycycline monohydrate  100 mg     • fluticasone  1 Spray     • levothyroxine  50 mcg     • lisinopril  10 mg     • multivitamin  1 Tab     • omeprazole  20 mg     • vitamin D  3,000 Units     • Pharmacy Consult Request  1 Each     • MD ALERT...Do not administer NSAIDS or ASPIRIN unless ORDERED By Neurosurgery  1 Each     • docusate sodium  100 mg     • senna-docusate  1 Tab     • senna-docusate  1 Tab     • polyethylene glycol/lytes  1 Packet     • magnesium hydroxide  30 mL     • bisacodyl  10 mg     • fleet  1 Each     • acetaminophen  1,000 mg     • diphenhydrAMINE  25 mg      Or   • diphenhydrAMINE  25 mg     • ondansetron  4 mg     • ondansetron  4 mg     • diazepam  5 mg      Or   • diazepam  5 mg     • cetirizine  5 mg      And   • pseudoephedrine SR  120 mg     • artificial tears  1 Drop           Assessment and Plan:  POD # 3 Redo L3/ 4 laminectomies with Instrumentation  DC home    "

## 2017-03-20 NOTE — PROGRESS NOTES
Pt aaox4. CORDON 5/5. Up w/ SBA, brace on when OOB, steady gait with FWW. Pt reports pain well controlled with PO pain meds PRN. +BS, +flatus. Miralax given for constipation. Voiding w/o difficulty. Reviewed poc with pt-verbalized understanding. Pt afebrile this AM- encouraged pt to use IS. Pt to d/c home today- pts wife will be here around noon to pick him up. Bed alarm in use. Call light in reach.

## 2017-03-20 NOTE — DISCHARGE SUMMARY
DATE OF ADMISSION:  03/17/2017    DATE OF DISCHARGE:  03/20/2017    OPERATION PERFORMED:  L3-4 laminectomies with fusion on 03/17/2017.    HOSPITAL COURSE:  On date of admission, patient was brought to the OR.  He was   prepped and draped in sterile fashion.  Operation was performed.  He did have   a CSF leak and was laid flat.  He did have a headache for a little while.  It   has improved today.  He is eating.  He is ambulating.  He is voiding.  He has   had a low-grade fever; however, no signs or symptoms of infection.  He would   like to discharge home.  His bilateral lower extremity strength is intact.    Incision was not visualized.  The patient was up in brace.    DISCHARGE INSTRUCTIONS:  Discharge instructions were given to patient in paper   format.    DISCHARGE MEDICATIONS:  1.  Percocet 5/325 one to two p.o. q. 4-6 hours p.r.n. pain, #60, no refills.  2.  Over-the-counter stool softener daily while taking pain pills.    ASSESSMENT AND PLAN:  1.  Status post above-delineated surgery with Dr. Chakraborty on 03/17/2017.  2.  Patient will wear TLSO when out of bed.  3.  Patient will follow up in our office at 2 and 6 weeks postoperatively.    Should the patient have further questions or concerns, they will not hesitate   to give our office a call.       ____________________________________     ASHLEIGH Grace / BEA    DD:  03/20/2017 09:25:04  DT:  03/20/2017 10:12:26    D#:  [id number]  Job#:  [id number]

## 2017-03-29 ENCOUNTER — APPOINTMENT (OUTPATIENT)
Dept: RADIOLOGY | Facility: MEDICAL CENTER | Age: 72
DRG: 857 | End: 2017-03-29
Attending: EMERGENCY MEDICINE
Payer: MEDICARE

## 2017-03-29 ENCOUNTER — HOSPITAL ENCOUNTER (OUTPATIENT)
Dept: RADIOLOGY | Facility: MEDICAL CENTER | Age: 72
DRG: 857 | End: 2017-03-29
Attending: PHYSICIAN ASSISTANT
Payer: MEDICARE

## 2017-03-29 ENCOUNTER — OFFICE VISIT (OUTPATIENT)
Dept: URGENT CARE | Facility: PHYSICIAN GROUP | Age: 72
End: 2017-03-29
Payer: MEDICARE

## 2017-03-29 ENCOUNTER — HOSPITAL ENCOUNTER (INPATIENT)
Facility: MEDICAL CENTER | Age: 72
LOS: 5 days | DRG: 857 | End: 2017-04-04
Attending: EMERGENCY MEDICINE | Admitting: HOSPITALIST
Payer: MEDICARE

## 2017-03-29 VITALS
TEMPERATURE: 100.1 F | RESPIRATION RATE: 16 BRPM | DIASTOLIC BLOOD PRESSURE: 72 MMHG | HEART RATE: 115 BPM | SYSTOLIC BLOOD PRESSURE: 112 MMHG | HEIGHT: 67 IN | OXYGEN SATURATION: 96 %

## 2017-03-29 DIAGNOSIS — R50.9 FEVER, UNSPECIFIED FEVER CAUSE: ICD-10-CM

## 2017-03-29 DIAGNOSIS — R93.89 ABNORMAL CT OF THE CHEST: ICD-10-CM

## 2017-03-29 DIAGNOSIS — D64.9 ANEMIA, UNSPECIFIED TYPE: ICD-10-CM

## 2017-03-29 DIAGNOSIS — M54.50 ACUTE MIDLINE LOW BACK PAIN WITHOUT SCIATICA: ICD-10-CM

## 2017-03-29 DIAGNOSIS — R65.10 SIRS (SYSTEMIC INFLAMMATORY RESPONSE SYNDROME) (HCC): ICD-10-CM

## 2017-03-29 DIAGNOSIS — Z98.890 PREVIOUS BACK SURGERY: ICD-10-CM

## 2017-03-29 DIAGNOSIS — D72.829 LEUKOCYTOSIS, UNSPECIFIED TYPE: ICD-10-CM

## 2017-03-29 DIAGNOSIS — Z98.890 S/P LUMBAR LAMINECTOMY: ICD-10-CM

## 2017-03-29 LAB
ALBUMIN SERPL BCP-MCNC: 4 G/DL (ref 3.2–4.9)
ALBUMIN/GLOB SERPL: 1.3 G/DL
ALP SERPL-CCNC: 77 U/L (ref 30–99)
ALT SERPL-CCNC: 24 U/L (ref 2–50)
ANION GAP SERPL CALC-SCNC: 12 MMOL/L (ref 0–11.9)
APPEARANCE UR: NORMAL
AST SERPL-CCNC: 25 U/L (ref 12–45)
BASOPHILS # BLD AUTO: 0.3 % (ref 0–1.8)
BASOPHILS # BLD: 0.04 K/UL (ref 0–0.12)
BILIRUB SERPL-MCNC: 1.1 MG/DL (ref 0.1–1.5)
BILIRUB UR STRIP-MCNC: NORMAL MG/DL
BUN SERPL-MCNC: 12 MG/DL (ref 8–22)
CALCIUM SERPL-MCNC: 9.9 MG/DL (ref 8.5–10.5)
CHLORIDE SERPL-SCNC: 98 MMOL/L (ref 96–112)
CO2 SERPL-SCNC: 25 MMOL/L (ref 20–33)
COLOR UR AUTO: NORMAL
CREAT SERPL-MCNC: 1.1 MG/DL (ref 0.5–1.4)
CRP SERPL HS-MCNC: 21.85 MG/DL (ref 0–0.75)
EOSINOPHIL # BLD AUTO: 0.08 K/UL (ref 0–0.51)
EOSINOPHIL NFR BLD: 0.5 % (ref 0–6.9)
ERYTHROCYTE [DISTWIDTH] IN BLOOD BY AUTOMATED COUNT: 42.5 FL (ref 35.9–50)
ERYTHROCYTE [SEDIMENTATION RATE] IN BLOOD BY WESTERGREN METHOD: 60 MM/HOUR (ref 0–20)
GFR SERPL CREATININE-BSD FRML MDRD: >60 ML/MIN/1.73 M 2
GLOBULIN SER CALC-MCNC: 3.1 G/DL (ref 1.9–3.5)
GLUCOSE SERPL-MCNC: 111 MG/DL (ref 65–99)
GLUCOSE UR STRIP.AUTO-MCNC: NORMAL MG/DL
HCT VFR BLD AUTO: 37.3 % (ref 42–52)
HGB BLD-MCNC: 12.2 G/DL (ref 14–18)
IMM GRANULOCYTES # BLD AUTO: 0.06 K/UL (ref 0–0.11)
IMM GRANULOCYTES NFR BLD AUTO: 0.4 % (ref 0–0.9)
KETONES UR STRIP.AUTO-MCNC: 15 MG/DL
LACTATE BLD-SCNC: 1.5 MMOL/L (ref 0.5–2)
LEUKOCYTE ESTERASE UR QL STRIP.AUTO: NORMAL
LYMPHOCYTES # BLD AUTO: 1.37 K/UL (ref 1–4.8)
LYMPHOCYTES NFR BLD: 8.9 % (ref 22–41)
MCH RBC QN AUTO: 28.8 PG (ref 27–33)
MCHC RBC AUTO-ENTMCNC: 32.7 G/DL (ref 33.7–35.3)
MCV RBC AUTO: 88.2 FL (ref 81.4–97.8)
MONOCYTES # BLD AUTO: 0.79 K/UL (ref 0–0.85)
MONOCYTES NFR BLD AUTO: 5.1 % (ref 0–13.4)
NEUTROPHILS # BLD AUTO: 13.04 K/UL (ref 1.82–7.42)
NEUTROPHILS NFR BLD: 84.8 % (ref 44–72)
NITRITE UR QL STRIP.AUTO: NORMAL
NRBC # BLD AUTO: 0 K/UL
NRBC BLD AUTO-RTO: 0 /100 WBC
PH UR STRIP.AUTO: 5 [PH] (ref 5–8)
PLATELET # BLD AUTO: 393 K/UL (ref 164–446)
PMV BLD AUTO: 9.3 FL (ref 9–12.9)
POTASSIUM SERPL-SCNC: 3.8 MMOL/L (ref 3.6–5.5)
PROT SERPL-MCNC: 7.1 G/DL (ref 6–8.2)
PROT UR QL STRIP: 30 MG/DL
RBC # BLD AUTO: 4.23 M/UL (ref 4.7–6.1)
RBC UR QL AUTO: NORMAL
SODIUM SERPL-SCNC: 135 MMOL/L (ref 135–145)
SP GR UR STRIP.AUTO: 1.2
UROBILINOGEN UR STRIP-MCNC: NORMAL MG/DL
WBC # BLD AUTO: 15.4 K/UL (ref 4.8–10.8)

## 2017-03-29 PROCEDURE — 80053 COMPREHEN METABOLIC PANEL: CPT

## 2017-03-29 PROCEDURE — 4040F PNEUMOC VAC/ADMIN/RCVD: CPT | Performed by: PHYSICIAN ASSISTANT

## 2017-03-29 PROCEDURE — G8432 DEP SCR NOT DOC, RNG: HCPCS | Performed by: PHYSICIAN ASSISTANT

## 2017-03-29 PROCEDURE — 700117 HCHG RX CONTRAST REV CODE 255: Performed by: EMERGENCY MEDICINE

## 2017-03-29 PROCEDURE — 99285 EMERGENCY DEPT VISIT HI MDM: CPT

## 2017-03-29 PROCEDURE — 86140 C-REACTIVE PROTEIN: CPT

## 2017-03-29 PROCEDURE — G8419 CALC BMI OUT NRM PARAM NOF/U: HCPCS | Performed by: PHYSICIAN ASSISTANT

## 2017-03-29 PROCEDURE — 700101 HCHG RX REV CODE 250

## 2017-03-29 PROCEDURE — 99214 OFFICE O/P EST MOD 30 MIN: CPT | Performed by: PHYSICIAN ASSISTANT

## 2017-03-29 PROCEDURE — 94760 N-INVAS EAR/PLS OXIMETRY 1: CPT

## 2017-03-29 PROCEDURE — 96375 TX/PRO/DX INJ NEW DRUG ADDON: CPT

## 2017-03-29 PROCEDURE — 81002 URINALYSIS NONAUTO W/O SCOPE: CPT | Performed by: PHYSICIAN ASSISTANT

## 2017-03-29 PROCEDURE — 3017F COLORECTAL CA SCREEN DOC REV: CPT | Mod: 8P | Performed by: PHYSICIAN ASSISTANT

## 2017-03-29 PROCEDURE — 72100 X-RAY EXAM L-S SPINE 2/3 VWS: CPT

## 2017-03-29 PROCEDURE — 700111 HCHG RX REV CODE 636 W/ 250 OVERRIDE (IP)

## 2017-03-29 PROCEDURE — 87040 BLOOD CULTURE FOR BACTERIA: CPT

## 2017-03-29 PROCEDURE — 1101F PT FALLS ASSESS-DOCD LE1/YR: CPT | Mod: 8P | Performed by: PHYSICIAN ASSISTANT

## 2017-03-29 PROCEDURE — 71275 CT ANGIOGRAPHY CHEST: CPT

## 2017-03-29 PROCEDURE — 85652 RBC SED RATE AUTOMATED: CPT

## 2017-03-29 PROCEDURE — 85025 COMPLETE CBC W/AUTO DIFF WBC: CPT

## 2017-03-29 PROCEDURE — 96361 HYDRATE IV INFUSION ADD-ON: CPT

## 2017-03-29 PROCEDURE — 96374 THER/PROPH/DIAG INJ IV PUSH: CPT

## 2017-03-29 PROCEDURE — 1036F TOBACCO NON-USER: CPT | Performed by: PHYSICIAN ASSISTANT

## 2017-03-29 PROCEDURE — 700111 HCHG RX REV CODE 636 W/ 250 OVERRIDE (IP): Performed by: EMERGENCY MEDICINE

## 2017-03-29 PROCEDURE — 83605 ASSAY OF LACTIC ACID: CPT

## 2017-03-29 PROCEDURE — G8482 FLU IMMUNIZE ORDER/ADMIN: HCPCS | Performed by: PHYSICIAN ASSISTANT

## 2017-03-29 RX ORDER — SODIUM CHLORIDE, SODIUM LACTATE, POTASSIUM CHLORIDE, CALCIUM CHLORIDE 600; 310; 30; 20 MG/100ML; MG/100ML; MG/100ML; MG/100ML
1000 INJECTION, SOLUTION INTRAVENOUS ONCE
Status: COMPLETED | OUTPATIENT
Start: 2017-03-29 | End: 2017-03-29

## 2017-03-29 RX ADMIN — IOHEXOL 110 ML: 350 INJECTION, SOLUTION INTRAVENOUS at 23:04

## 2017-03-29 RX ADMIN — HYDROMORPHONE HYDROCHLORIDE 1 MG: 1 INJECTION, SOLUTION INTRAMUSCULAR; INTRAVENOUS; SUBCUTANEOUS at 20:39

## 2017-03-29 RX ADMIN — SODIUM CHLORIDE, POTASSIUM CHLORIDE, SODIUM LACTATE AND CALCIUM CHLORIDE 1000 ML: 600; 310; 30; 20 INJECTION, SOLUTION INTRAVENOUS at 20:28

## 2017-03-29 ASSESSMENT — ENCOUNTER SYMPTOMS
CHILLS: 1
HEADACHES: 1
COUGH: 1
MYALGIAS: 1
FEVER: 1
WEIGHT LOSS: 0
WEAKNESS: 1

## 2017-03-29 ASSESSMENT — PAIN SCALES - GENERAL
PAINLEVEL_OUTOF10: 5
PAINLEVEL_OUTOF10: 5

## 2017-03-29 NOTE — IP AVS SNAPSHOT
" Home Care Instructions                                                                                                                  Name:Angelo Magaña  Medical Record Number:[id number]  CSN: [id number]    YOB: 1945   Age: 72 y.o.  Sex: male  HT:1.727 m (5' 8\") WT: 115.4 kg (254 lb 6.6 oz)          Admit Date: 3/29/2017     Discharge Date:   Today's Date: 4/4/2017  Attending Doctor:  Juan Miguel Dangelo M.D.                  Allergies:  Review of patient's allergies indicates no known allergies.            Discharge Instructions       Discharge Instructions    Discharged to home by car with relative. Discharged via wheelchair, hospital escort: Yes.  Special equipment needed: TLSO and Wheelchair    Be sure to schedule a follow-up appointment with your primary care doctor or any specialists as instructed.     Discharge Plan:   Diet Plan: Discussed  Activity Level: Discussed  Confirmed Follow up Appointment: Patient to Call and Schedule Appointment  Confirmed Symptoms Management: Discussed  Medication Reconciliation Updated: Yes  Influenza Vaccine Indication: Not indicated: Previously immunized this influenza season and > 8 years of age    I understand that a diet low in cholesterol, fat, and sodium is recommended for good health. Unless I have been given specific instructions below for another diet, I accept this instruction as my diet prescription.   Other diet: regular    Special Instructions: None    · Is patient discharged on Warfarin / Coumadin?   No     · Is patient Post Blood Transfusion?  No    Depression / Suicide Risk    As you are discharged from this [facility] facility, it is important to learn how to keep safe from harming yourself.    Recognize the warning signs:  · Abrupt changes in personality, positive or negative- including increase in energy   · Giving away possessions  · Change in eating patterns- significant weight changes-  positive or negative  · Change in sleeping patterns- " unable to sleep or sleeping all the time   · Unwillingness or inability to communicate  · Depression  · Unusual sadness, discouragement and loneliness  · Talk of wanting to die  · Neglect of personal appearance   · Rebelliousness- reckless behavior  · Withdrawal from people/activities they love  · Confusion- inability to concentrate     If you or a loved one observes any of these behaviors or has concerns about self-harm, here's what you can do:  · Talk about it- your feelings and reasons for harming yourself  · Remove any means that you might use to hurt yourself (examples: pills, rope, extension cords, firearm)  · Get professional help from the community (Mental Health, Substance Abuse, psychological counseling)  · Do not be alone:Call your Safe Contact- someone whom you trust who will be there for you.  · Call your local CRISIS HOTLINE 979-3782 or 685-451-5203  · Call your local Children's Mobile Crisis Response Team [place] (843) 716-8764 or www.Movli  · Call the toll free National Suicide Prevention Hotlines   · National Suicide Prevention Lifeline 622-261-DJAZ (2056)  · National Hope Line Network 800-SUICIDE (883-1342)        Your appointments     Apr 06, 2017  9:20 AM   Established Patient with Gale Spears M.D.   [facility] - Internal Medicine (--)    [address] [place] [id number] [phone]           You will be receiving a confirmation call a few days before your appointment from our automated call confirmation system.              Follow-up Information     1. Follow up with Gale Spears M.D.. Schedule an appointment as soon as possible for a visit in 1 week.    Specialty:  Internal Medicine    Contact information    [address] [address] [phone]          2. Schedule an appointment as soon as possible for a visit with Vascular surgery consult.    Why:  Discuss with PCP for referral, to discuss aortic  dilation on CT scan         Discharge Medication Instructions:    Below are the medications your physician expects you to take upon discharge:    Review all your home medications and newly ordered medications with your doctor and/or pharmacist. Follow medication instructions as directed by your doctor and/or pharmacist.    Please keep your medication list with you and share with your physician.               Medication List      START taking these medications        Instructions    NS SOLN 50 mL with daptomycin 500 MG SOLR 700 mg   Last time this was given:  700 mg on 4/4/2017  8:59 AM    700 mg by Intravenous route every 24 hours.   Dose:  700 mg         CONTINUE taking these medications        Instructions    ADVAIR DISKUS 100-50 MCG/DOSE Aepb   Generic drug:  fluticasone-salmeterol    TAKE 1 PUFF BY MOUTH TWICE A DAY       atorvastatin 10 MG Tabs   Last time this was given:  10 mg on 4/4/2017  8:43 AM   Commonly known as:  LIPITOR    TAKE 1 TABLET BY MOUTH EVERY DAY       doxycycline 100 MG Tabs   Commonly known as:  VIBRAMYCIN    Take 100 mg by mouth every 48 hours.   Dose:  100 mg       fluticasone 50 MCG/ACT nasal spray   Last time this was given:  50 mcg on 4/3/2017 10:24 PM   Commonly known as:  FLONASE    Spray 1 Spray in nose 1 time daily as needed.   Dose:  1 Spray       levothyroxine 50 MCG Tabs   Last time this was given:  50 mcg on 4/4/2017  6:39 AM   Commonly known as:  SYNTHROID    Doctor's comments:  Labs due   TAKE 1 TABLET BY MOUTH DAILY       lisinopril 10 MG Tabs   Last time this was given:  10 mg on 4/4/2017  8:42 AM   Commonly known as:  PRINIVIL    Doctor's comments:  Labs due   TAKE 1 TABLET BY MOUTH DAILY       MULTIVITAMIN PO    Take 2 Tabs by mouth every day. Takes daily   Dose:  2 Tab       oxycodone-acetaminophen 5-325 MG Tabs   Last time this was given:  2 Tabs on 3/30/2017  7:39 AM   Commonly known as:  PERCOCET    Take 1-2 Tabs by mouth every 6 hours as needed.   Dose:  1-2 Tab        PRILOSEC 20 MG delayed-release capsule   Last time this was given:  20 mg on 4/4/2017  8:43 AM   Generic drug:  omeprazole    Take 20 mg by mouth every day. Takes daily   Dose:  20 mg       VITAMIN D (CHOLECALCIFEROL) PO    Take 3,000 Units by mouth every day.   Dose:  3000 Units       ZYRTEC-D 5-120 MG per tablet   Generic drug:  cetirizine-psuedoephedrine    Take 1 Tab by mouth every day. Takes daily   Dose:  1 Tab               Instructions           Diet / Nutrition:    Follow any diet instructions given to you by your doctor or the dietician, including how much salt (sodium) you are allowed each day.    If you are overweight, talk to your doctor about a weight reduction plan.    Activity:    Remain physically active following your doctor's instructions about exercise and activity.    Rest often.     Any time you become even a little tired or short of breath, SIT DOWN and rest.    Worsening Symptoms:    Report any of the following signs and symptoms to the doctor's office immediately:    *Pain of jaw, arm, or neck  *Chest pain not relieved by medication                               *Dizziness or loss of consciousness  *Difficulty breathing even when at rest   *More tired than usual                                       *Bleeding drainage or swelling of surgical site  *Swelling of feet, ankles, legs or stomach                 *Fever (>100ºF)  *Pink or blood tinged sputum  *Weight gain (3lbs/day or 5lbs /week)           *Shock from internal defibrillator (if applicable)  *Palpitations or irregular heartbeats                *Cool and/or numb extremities    Stroke Awareness    Common Risk Factors for Stroke include:    Age  Atrial Fibrillation  Carotid Artery Stenosis  Diabetes Mellitus  Excessive alcohol consumption  High blood pressure  Overweight   Physical inactivity  Smoking    Warning signs and symptoms of a stroke include:    *Sudden numbness or weakness of the face, arm or leg (especially on one side of the  body).  *Sudden confusion, trouble speaking or understanding.  *Sudden trouble seeing in one or both eyes.  *Sudden trouble walking, dizziness, loss of balance or coordination.Sudden severe headache with no known cause.    It is very important to get treatment quickly when a stroke occurs. If you experience any of the above warning signs, call 911 immediately.                   Disclaimer         Quit Smoking / Tobacco Use:    I understand the use of any tobacco products increases my chance of suffering from future heart disease or stroke and could cause other illnesses which may shorten my life. Quitting the use of tobacco products is the single most important thing I can do to improve my health. For further information on smoking / tobacco cessation call a Toll Free Quit Line at 1-188.574.5659 (*National [employer] [place]) or 1-548.774.1007 (American Lung Association) or you can access the web based program at www.lungusa.org.    [place] Tobacco Users Help Line:  (528) 413-7918       Toll Free: 1-700.553.3519  Quit Tobacco Program [facility] (015)536-2213    Crisis Hotline:    [place] Crisis Hotline:  2-639-RJDFQVY or 1-962.223.6287    [place] Crisis Hotline:    1-296.721.5446 or 104-048-0257    Discharge Survey:   Thank you for choosing [facility]. We hope we did everything we could to make your hospital stay a pleasant one. You may be receiving a phone survey and we would appreciate your time and participation in answering the questions. Your input is very valuable to us in our efforts to improve our service to our patients and their families.        My signature on this form indicates that:    1. I have reviewed and understand the above information.  2. My questions regarding this information have been answered to my satisfaction.  3. I have formulated a plan with my discharge nurse to obtain my prescribed medications for home.                  Disclaimer              __________________________________                     __________       ________                       Patient Signature                                                 Date                    Time

## 2017-03-29 NOTE — PROGRESS NOTES
"Subjective:      Angelo Magaña is a 72 y.o. male who presents with Back Pain            HPI Comments: Subjective: Patient is a 72-year-old male who comes in for evaluation of fever, chills for the last 2 weeks. 2 weeks ago he was in the hospital for an L3-L5 fusion. He states at that time he had fevers has 102° in the hospital. He denies any history of knowing that he's been on antibiotics in the hospital. However patient is taking doxycycline currently. Patient was seen by his orthopedist today. Wound check was done and patient states was told the wound looks fine. Patient did relay his symptoms to physician who told him to come to the urgent care. Patient states she's had a slight cough over the last few weeks but nothing significant. He denies shortness of breath, chest pain chest pain and nasal congestion, sore throat. Patient denies abdominal pain, nauseousness or vomiting. He denies any urinary symptoms such as increased frequency, urge or hesitancy. Patient states that most the time his fever is low-grade around 100.4-100.6. He denies known sick contacts      Review of Systems   Constitutional: Positive for fever and chills. Negative for weight loss.   HENT: Negative for congestion.    Respiratory: Positive for cough.    Cardiovascular: Negative for chest pain and leg swelling.   Genitourinary: Negative for dysuria, urgency, frequency and hematuria.   Musculoskeletal: Positive for myalgias.   Skin: Negative for itching and rash.   Neurological: Positive for weakness and headaches.          Objective:     /72 mmHg  Pulse 116  Temp(Src) 38 °C (100.4 °F)  Resp 16  Ht 1.702 m (5' 7.01\")  SpO2 96%     Physical Exam       Gen.: Patient is A and O ×3, no acute distress, well-nourished well-hydrated  Vitals: Are listed and unremarkable  HEENT: Heads normocephalic, atraumatic, PERRLA, extraocular movements intact, TMs and oropharynx clear  Neck: Soft supple without cervical " lymphadenopathy  Cardiovascular: Tachycardic but regular rhythm  Lungs are clear to auscultation bilaterally. no wheezes rales or rhonchi  Abdomen is soft, nontender, nondistended with good bowel sounds, no hepatosplenomegaly  Skin: Shows a long vertical incision in the L3-L5 region with no surrounding erythema or tenderness. It is closed and there is no discharge or pus formation   Neurological:  cranial nerves II through XII were assessed and intact.  Musculoskeletal: Full range of motion, 5 out of 5 strength against resistance  Neurovascularly: Intact with a 2 second cap refill, good distal pulses    Urgent care course: Urinalysis was done and showed small amount ketones and trace protein. There were no leukocytes, nitrates or glucose. Bili was also negative. Chest x-ray was negative. Patient has 2 out of 4 criteria positive for Sirs. Patient's Wells score was a 3     Assessment/Plan:     1. Fever, unspecified fever cause    - POCT Urinalysis  - DX-CHEST-2 VIEWS; Future    2. Previous back surgery    - POCT Urinalysis  - DX-CHEST-2 VIEWS; Future    3.SIRS    Patient has 2 out of 4 criteria positive for Sirs. He has elevated heart rate at 116 and a temperature of 38°C. I am not able to determine if he has an elevated white blood count at this time. A CBC would need to be drawn for this. Because patient has negative urinalysis and a normal chest x-ray, one must also consider given his elevated heart rate and previous surgery less than one month ago risk factors for pulmonary embolus. This is also high on the differential. Patient's clinical Wells score is a 3. Therefore he would need to go straight to CT angiogram to rule this out. Because of the time of the day here at [place], I did discuss the case with Dr. Ward who agrees with current assessment plan, patient is going to be sent to [facility] for further evaluation. He needs CBC and chemistry as well as a CT angiogram to rule out a PE. Patient states he  understands and will drive himself there

## 2017-03-29 NOTE — PATIENT INSTRUCTIONS
Patient sent to the urgent care by his surgeon. He had recent L3-L5 fusion 2 weeks ago. Wound is healing nicely. However, patient's had chills and fevers. Today in the clinic he is at 100.4°. Urinalysis done here was unremarkable for signs of urinary tract infection. Chest x-ray was also unremarkable. However patient's Wells score was a 3 given his heart rate of 115 and recent surgery less than one month ago. Patient needs CT angiogram of the chest to rule out a PE. Patient being sent to [facility]. Recent kidney function as of March 13 was within normal limits. Patient states he understands directions and need to go to the hospital and will drive right there.

## 2017-03-29 NOTE — IP AVS SNAPSHOT
[email or web address] Access Code: Activation code not generated  Current [email or web address] Status: Patient Declined    Your email address is not on file at [facility].  Email Addresses are required for you to sign up for [email or web address], please contact [phone] to verify your personal information and to provide your email address prior to attempting to register for [email or web address].    Angelo Magaña  [address] [place] [address]    [email or web address]  A secure, online tool to manage your health information     [employer]’s ARTENCY.COM® is a secure, online tool that connects you to your personalized health information from the privacy of your home -- day or night - making it very easy for you to manage your healthcare. Once the activation process is completed, you can even access your medical information using the ARTENCY.COM danitza, which is available for free in the Apple Danitza store or Google Play store.     To learn more about [email or web address], visit [email or web address]    There are two levels of access available (as shown below):   My Chart Features  [facility] Primary Care Doctor [facility]  Specialists [facility]  Urgent  Care Non-[facility] Primary Care Doctor   Email your healthcare team securely and privately 24/7 X X X    Manage appointments: schedule your next appointment; view details of past/upcoming appointments X      Request prescription refills. X      View recent personal medical records, including lab and immunizations X X X X   View health record, including health history, allergies, medications X X X X   Read reports about your outpatient visits, procedures, consult and ER notes X X X X   See your discharge summary, which is a recap of your hospital and/or ER visit that includes your diagnosis, lab results, and care plan X X  X     How to register for Influitivet:  Once your e-mail address has been verified, follow the following steps to sign up for Influitivet.     1. Go to  [email or web address]  2. Click on the Sign Up Now box, which takes you to the New  Member Sign Up page. You will need to provide the following information:  a. Enter your [email or web address] Access Code exactly as it appears at the top of this page. (You will not need to use this code after you’ve completed the sign-up process. If you do not sign up before the expiration date, you must request a new code.)   b. Enter your date of birth.   c. Enter your home email address.   d. Click Submit, and follow the next screen’s instructions.  3. Create a [employer] ID. This will be your ivi.ru login ID and cannot be changed, so think of one that is secure and easy to remember.  4. Create a ivi.ru password. You can change your password at any time.  5. Enter your Password Reset Question and Answer. This can be used at a later time if you forget your password.   6. Enter your e-mail address. This allows you to receive e-mail notifications when new information is available in [email or web address].  7. Click Sign Up. You can now view your health information.    For assistance activating your ivi.ru account, call [phone]

## 2017-03-29 NOTE — MR AVS SNAPSHOT
"        Angelo Magaña   3/29/2017 2:45 PM   Office Visit   MRN: [id number]    Department:  [facility]   Dept Phone:  [phone]    Description:  Male : 1945   Provider:  Amber Jones PA-C           Reason for Visit     Back Pain back surgery x 2 weeks ago, running fevers up to 102. Dr. Wilkerson sent him here after his appointment today       Allergies as of 3/29/2017     No Known Allergies      You were diagnosed with     Fever, unspecified fever cause   [3905633]       Previous back surgery   [731355]       SIRS (systemic inflammatory response syndrome) (CMS-[facility])   [910365]         Vital Signs     Blood Pressure Pulse Temperature Respirations Height Oxygen Saturation    112/72 mmHg 115 37.8 °C (100.1 °F) 16 1.702 m (5' 7.01\") 96%    Smoking Status                   Never Smoker            Basic Information     Date Of Birth Sex Race Ethnicity Preferred Language    1945 Male White Non- English      Your appointments     2017  9:20 AM   Established Patient with Gale Spears M.D.   [facility] - Internal Medicine (--)    [address] [address] [phone]           You will be receiving a confirmation call a few days before your appointment from our automated call confirmation system.              Problem List              ICD-10-CM Priority Class Noted - Resolved    Preventative health care Z00.00   2016 - Present    Hypothyroidism E03.9   2016 - Present    Erectile dysfunction N52.9   2016 - Present    History of skin cancer Z85.828   2016 - Present    Right hip pain M25.551   2016 - Present    Rosacea L71.9   2016 - Present    Low back pain M54.5   2016 - Present    Asthma J45.909   2016 - Present    Seasonal allergies J30.2   2016 - Present    GERD (gastroesophageal reflux disease) K21.9   2016 - Present    Obesity E66.9   2016 - Present    Hyperlipidemia E78.5   2016 " - Present    HTN (hypertension) I10   6/8/2016 - Present      Health Maintenance        Date Due Completion Dates    IMM DTaP/Tdap/Td Vaccine (1 - Tdap) 2/27/1966 ---    COLONOSCOPY 2/27/1997 ---    IMM ZOSTER VACCINE 2/27/2007 ---    IMM PNEUMOCOCCAL 65+ (ADULT) LOW/MEDIUM RISK SERIES (2 of 2 - PPSV23) 10/1/2016 10/1/2015, 10/1/2010            Results     POCT Urinalysis      Component Value Standard Range & Units    POC Color orange Negative    POC Appearance cloudy Negative    POC Leukocyte Esterase neg Negative    POC Nitrites neg Negative    POC Urobiligen neg Negative (0.2) mg/dL    POC Protein 30 Negative mg/dL    POC Urine PH 5.0 5.0 - 8.0    POC Blood neg Negative    POC Specific Gravity 1.20 <1.005 - >1.030    POC Ketones 15 Negative mg/dL    POC Biliruben neg Negative mg/dL    POC Glucose neg Negative mg/dL                        Current Immunizations     13-VALENT PCV PREVNAR 10/1/2015, 10/1/2010    Influenza TIV (IM) 10/1/2016, 10/22/2012    Tuberculin Skin Test 1/14/2013  7:00 AM, 2/13/2012  8:24 AM, 2/6/2012  8:00 AM, 1/3/2011      Below and/or attached are the medications your provider expects you to take. Review all of your home medications and newly ordered medications with your provider and/or pharmacist. Follow medication instructions as directed by your provider and/or pharmacist. Please keep your medication list with you and share with your provider. Update the information when medications are discontinued, doses are changed, or new medications (including over-the-counter products) are added; and carry medication information at all times in the event of emergency situations     Allergies:  No Known Allergies          Medications  Valid as of: March 29, 2017 -  4:13 PM    Generic Name Brand Name Tablet Size Instructions for use    Atorvastatin Calcium (Tab) LIPITOR 10 MG TAKE 1 TABLET BY MOUTH EVERY DAY        Cetirizine-Pseudoephedrine (TABLET SR 12 HR) ZYRTEC-D 5-120 MG Take 1 Tab by mouth  every day. Takes daily         Cholecalciferol   Take 3,000 Units by mouth every day.        Doxycycline Hyclate (Tab) VIBRAMYCIN 100 MG Take 100 mg by mouth every 48 hours.        Fluticasone Propionate (Suspension) FLONASE 50 MCG/ACT Spray 1 Spray in nose 1 time daily as needed.        Fluticasone-Salmeterol (AEROSOL POWDER, BREATH ACTIVATED) ADVAIR DISKUS 100-50 MCG/DOSE TAKE 1 PUFF BY MOUTH TWICE A DAY        Levothyroxine Sodium (Tab) SYNTHROID 50 MCG TAKE 1 TABLET BY MOUTH DAILY        Lisinopril (Tab) PRINIVIL 10 MG TAKE 1 TABLET BY MOUTH DAILY        Multiple Vitamins-Minerals   Take 2 Tabs by mouth every day. Takes daily        Omeprazole (CAPSULE DELAYED RELEASE) PRILOSEC 20 MG Take 20 mg by mouth every day. Takes daily        Oxycodone-Acetaminophen (Tab) PERCOCET 5-325 MG Take 1-2 Tabs by mouth every 6 hours as needed.        .                 Medicines prescribed today were sent to:     [id number] - [place], [place] - [address]    [address] [place] [address]    Phone: [phone] Fax: [phone]    Open 24 Hours?: No      Medication refill instructions:       If your prescription bottle indicates you have medication refills left, it is not necessary to call your provider’s office. Please contact your pharmacy and they will refill your medication.    If your prescription bottle indicates you do not have any refills left, you may request refills at any time through one of the following ways: The online [employer] system (except Urgent Care), by calling your provider’s office, or by asking your pharmacy to contact your provider’s office with a refill request. Medication refills are processed only during regular business hours and may not be available until the next business day. Your provider may request additional information or to have a follow-up visit with you prior to refilling your medication.   *Please Note: Medication refills are assigned a new Rx number when refilled  electronically. Your pharmacy may indicate that no refills were authorized even though a new prescription for the same medication is available at the pharmacy. Please request the medicine by name with the pharmacy before contacting your provider for a refill.        Your To Do List     Future Labs/Procedures Complete By Expires    DX-CHEST-2 VIEWS  As directed 3/29/2018      Instructions    Patient sent to the urgent care by his surgeon. He had recent L3-L5 fusion 2 weeks ago. Wound is healing nicely. However, patient's had chills and fevers. Today in the clinic he is at 100.4°. Urinalysis done here was unremarkable for signs of urinary tract infection. Chest x-ray was also unremarkable. However patient's Wells score was a 3 given his heart rate of 115 and recent surgery less than one month ago. Patient needs CT angiogram of the chest to rule out a PE. Patient being sent to [facility]. Recent kidney function as of March 13 was within normal limits. Patient states he understands directions and need to go to the hospital and will drive right there.           MyChart Status: Patient Declined

## 2017-03-29 NOTE — IP AVS SNAPSHOT
" <p align=\"LEFT\"><IMG SRC=\"//[email or web address]$/[email or web address]\" alt=\"Image\" WIDTH=\"50%\" HEIGHT=\"200\" BORDER=\"\"></p>                   Name:Angelo Magaña  Medical Record Number:[id number]  CSN: [id number]    YOB: 1945   Age: 72 y.o.  Sex: male  HT:1.727 m (5' 8\") WT: 115.4 kg (254 lb 6.6 oz)          Admit Date: 3/29/2017     Discharge Date:   Today's Date: 4/4/2017  Attending Doctor:  Juan Miguel Dangelo M.D.                  Allergies:  Review of patient's allergies indicates no known allergies.          Your appointments     Apr 06, 2017  9:20 AM   Established Patient with Gale Spears M.D.   [facility] / [facility] - Internal Medicine (--)    [address] [place] [address] [phone]           You will be receiving a confirmation call a few days before your appointment from our automated call confirmation system.              Follow-up Information     1. Follow up with Gale Spears M.D.. Schedule an appointment as soon as possible for a visit in 1 week.    Specialty:  Internal Medicine    Contact information    [address] [place] [address] [phone]          2. Schedule an appointment as soon as possible for a visit with Vascular surgery consult.    Why:  Discuss with PCP for referral, to discuss aortic dilation on CT scan         Medication List      Take these Medications        Instructions    ADVAIR DISKUS 100-50 MCG/DOSE Aepb   Generic drug:  fluticasone-salmeterol    TAKE 1 PUFF BY MOUTH TWICE A DAY       atorvastatin 10 MG Tabs   Commonly known as:  LIPITOR    TAKE 1 TABLET BY MOUTH EVERY DAY       doxycycline 100 MG Tabs   Commonly known as:  VIBRAMYCIN    Take 100 mg by mouth every 48 hours.   Dose:  100 mg       fluticasone 50 MCG/ACT nasal spray   Commonly known as:  FLONASE    Spray 1 Spray in nose 1 time daily as needed.   Dose:  1 Spray       levothyroxine 50 MCG Tabs   Commonly known as:  SYNTHROID    Doctor's comments:  Labs due   "   TAKE 1 TABLET BY MOUTH DAILY       lisinopril 10 MG Tabs   Commonly known as:  PRINIVIL    Doctor's comments:  Labs due   TAKE 1 TABLET BY MOUTH DAILY       MULTIVITAMIN PO    Take 2 Tabs by mouth every day. Takes daily   Dose:  2 Tab       NS SOLN 50 mL with daptomycin 500 MG SOLR 700 mg    700 mg by Intravenous route every 24 hours.   Dose:  700 mg       oxycodone-acetaminophen 5-325 MG Tabs   Commonly known as:  PERCOCET    Take 1-2 Tabs by mouth every 6 hours as needed.   Dose:  1-2 Tab       PRILOSEC 20 MG delayed-release capsule   Generic drug:  omeprazole    Take 20 mg by mouth every day. Takes daily   Dose:  20 mg       VITAMIN D (CHOLECALCIFEROL) PO    Take 3,000 Units by mouth every day.   Dose:  3000 Units       ZYRTEC-D 5-120 MG per tablet   Generic drug:  cetirizine-psuedoephedrine    Take 1 Tab by mouth every day. Takes daily   Dose:  1 Tab

## 2017-03-29 NOTE — IP AVS SNAPSHOT
4/4/2017          Angelo Magaña  [address] [place] [id number]    Dear Angelo:    [facility] wants to ensure your discharge home is safe and you or your loved ones have had all your questions answered regarding your care after you leave the hospital.    You may receive a telephone call within two days of your discharge.  This call is to make certain you understand your discharge instructions as well as ensure we provided you with the best care possible during your stay with us.     The call will only last approximately 3-5 minutes and will be done by a nurse.    Once again, we want to ensure your discharge home is safe and that you have a clear understanding of any next steps in your care.  If you have any questions or concerns, please do not hesitate to contact us, we are here for you.  Thank you for choosing [facility] for your healthcare needs.    Sincerely,    John Fabian    [facility]

## 2017-03-30 ENCOUNTER — TELEPHONE (OUTPATIENT)
Dept: INTERNAL MEDICINE | Facility: MEDICAL CENTER | Age: 72
End: 2017-03-30

## 2017-03-30 ENCOUNTER — APPOINTMENT (OUTPATIENT)
Dept: RADIOLOGY | Facility: MEDICAL CENTER | Age: 72
DRG: 857 | End: 2017-03-30
Attending: EMERGENCY MEDICINE
Payer: MEDICARE

## 2017-03-30 ENCOUNTER — RESOLUTE PROFESSIONAL BILLING HOSPITAL PROF FEE (OUTPATIENT)
Dept: HOSPITALIST | Facility: MEDICAL CENTER | Age: 72
End: 2017-03-30
Payer: MEDICARE

## 2017-03-30 PROBLEM — R65.10 SIRS (SYSTEMIC INFLAMMATORY RESPONSE SYNDROME) (HCC): Status: ACTIVE | Noted: 2017-03-30

## 2017-03-30 PROBLEM — I77.810 ASCENDING AORTA DILATION (HCC): Status: ACTIVE | Noted: 2017-03-30

## 2017-03-30 PROBLEM — A41.9 SEPSIS (HCC): Status: ACTIVE | Noted: 2017-03-30

## 2017-03-30 PROBLEM — D64.9 NORMOCYTIC ANEMIA: Status: ACTIVE | Noted: 2017-03-30

## 2017-03-30 PROBLEM — T88.8XXA FLUID COLLECTION AT SURGICAL SITE: Status: ACTIVE | Noted: 2017-03-30

## 2017-03-30 LAB
ANION GAP SERPL CALC-SCNC: 9 MMOL/L (ref 0–11.9)
BUN SERPL-MCNC: 9 MG/DL (ref 8–22)
CALCIUM SERPL-MCNC: 9.3 MG/DL (ref 8.5–10.5)
CHLORIDE SERPL-SCNC: 103 MMOL/L (ref 96–112)
CO2 SERPL-SCNC: 25 MMOL/L (ref 20–33)
CREAT SERPL-MCNC: 0.87 MG/DL (ref 0.5–1.4)
ERYTHROCYTE [DISTWIDTH] IN BLOOD BY AUTOMATED COUNT: 42 FL (ref 35.9–50)
GFR SERPL CREATININE-BSD FRML MDRD: >60 ML/MIN/1.73 M 2
GLUCOSE SERPL-MCNC: 112 MG/DL (ref 65–99)
GRAM STN SPEC: NORMAL
GRAM STN SPEC: NORMAL
HCT VFR BLD AUTO: 28.9 % (ref 42–52)
HGB BLD-MCNC: 9.7 G/DL (ref 14–18)
MCH RBC QN AUTO: 29.4 PG (ref 27–33)
MCHC RBC AUTO-ENTMCNC: 33.6 G/DL (ref 33.7–35.3)
MCV RBC AUTO: 87.6 FL (ref 81.4–97.8)
PLATELET # BLD AUTO: 251 K/UL (ref 164–446)
PMV BLD AUTO: 9.3 FL (ref 9–12.9)
POTASSIUM SERPL-SCNC: 3.8 MMOL/L (ref 3.6–5.5)
RBC # BLD AUTO: 3.3 M/UL (ref 4.7–6.1)
SIGNIFICANT IND 70042: NORMAL
SIGNIFICANT IND 70042: NORMAL
SITE SITE: NORMAL
SITE SITE: NORMAL
SODIUM SERPL-SCNC: 137 MMOL/L (ref 135–145)
SOURCE SOURCE: NORMAL
SOURCE SOURCE: NORMAL
VANCOMYCIN TROUGH SERPL-MCNC: 2.6 UG/ML (ref 10–20)
WBC # BLD AUTO: 7.9 K/UL (ref 4.8–10.8)

## 2017-03-30 PROCEDURE — 87205 SMEAR GRAM STAIN: CPT

## 2017-03-30 PROCEDURE — 85027 COMPLETE CBC AUTOMATED: CPT

## 2017-03-30 PROCEDURE — A6404 STERILE GAUZE > 48 SQ IN: HCPCS | Performed by: NEUROLOGICAL SURGERY

## 2017-03-30 PROCEDURE — 700102 HCHG RX REV CODE 250 W/ 637 OVERRIDE(OP): Performed by: HOSPITALIST

## 2017-03-30 PROCEDURE — 700102 HCHG RX REV CODE 250 W/ 637 OVERRIDE(OP): Performed by: CLINICAL NURSE SPECIALIST

## 2017-03-30 PROCEDURE — 36415 COLL VENOUS BLD VENIPUNCTURE: CPT

## 2017-03-30 PROCEDURE — 700112 HCHG RX REV CODE 229: Performed by: CLINICAL NURSE SPECIALIST

## 2017-03-30 PROCEDURE — A9270 NON-COVERED ITEM OR SERVICE: HCPCS

## 2017-03-30 PROCEDURE — 700105 HCHG RX REV CODE 258: Performed by: HOSPITALIST

## 2017-03-30 PROCEDURE — A9270 NON-COVERED ITEM OR SERVICE: HCPCS | Performed by: CLINICAL NURSE SPECIALIST

## 2017-03-30 PROCEDURE — 110371 HCHG SHELL REV 272: Performed by: NEUROLOGICAL SURGERY

## 2017-03-30 PROCEDURE — 87015 SPECIMEN INFECT AGNT CONCNTJ: CPT

## 2017-03-30 PROCEDURE — A9270 NON-COVERED ITEM OR SERVICE: HCPCS | Performed by: HOSPITALIST

## 2017-03-30 PROCEDURE — 80202 ASSAY OF VANCOMYCIN: CPT

## 2017-03-30 PROCEDURE — 87186 SC STD MICRODIL/AGAR DIL: CPT | Mod: 91

## 2017-03-30 PROCEDURE — 87075 CULTR BACTERIA EXCEPT BLOOD: CPT

## 2017-03-30 PROCEDURE — 700111 HCHG RX REV CODE 636 W/ 250 OVERRIDE (IP): Performed by: HOSPITALIST

## 2017-03-30 PROCEDURE — 700105 HCHG RX REV CODE 258

## 2017-03-30 PROCEDURE — 700111 HCHG RX REV CODE 636 W/ 250 OVERRIDE (IP): Performed by: EMERGENCY MEDICINE

## 2017-03-30 PROCEDURE — 80048 BASIC METABOLIC PNL TOTAL CA: CPT

## 2017-03-30 PROCEDURE — A4606 OXYGEN PROBE USED W OXIMETER: HCPCS | Performed by: NEUROLOGICAL SURGERY

## 2017-03-30 PROCEDURE — 160035 HCHG PACU - 1ST 60 MINS PHASE I: Performed by: NEUROLOGICAL SURGERY

## 2017-03-30 PROCEDURE — 700101 HCHG RX REV CODE 250: Performed by: CLINICAL NURSE SPECIALIST

## 2017-03-30 PROCEDURE — 96365 THER/PROPH/DIAG IV INF INIT: CPT

## 2017-03-30 PROCEDURE — 96375 TX/PRO/DX INJ NEW DRUG ADDON: CPT

## 2017-03-30 PROCEDURE — 110382 HCHG SHELL REV 271: Performed by: NEUROLOGICAL SURGERY

## 2017-03-30 PROCEDURE — 160048 HCHG OR STATISTICAL LEVEL 1-5: Performed by: NEUROLOGICAL SURGERY

## 2017-03-30 PROCEDURE — 501423 HCHG SPONGE, SURGIFOAM 12X7: Performed by: NEUROLOGICAL SURGERY

## 2017-03-30 PROCEDURE — 160009 HCHG ANES TIME/MIN: Performed by: NEUROLOGICAL SURGERY

## 2017-03-30 PROCEDURE — 87070 CULTURE OTHR SPECIMN AEROBIC: CPT | Mod: 91

## 2017-03-30 PROCEDURE — 160002 HCHG RECOVERY MINUTES (STAT): Performed by: NEUROLOGICAL SURGERY

## 2017-03-30 PROCEDURE — 501838 HCHG SUTURE GENERAL: Performed by: NEUROLOGICAL SURGERY

## 2017-03-30 PROCEDURE — 770001 HCHG ROOM/CARE - MED/SURG/GYN PRIV*

## 2017-03-30 PROCEDURE — 700102 HCHG RX REV CODE 250 W/ 637 OVERRIDE(OP)

## 2017-03-30 PROCEDURE — 87077 CULTURE AEROBIC IDENTIFY: CPT

## 2017-03-30 PROCEDURE — 700111 HCHG RX REV CODE 636 W/ 250 OVERRIDE (IP)

## 2017-03-30 PROCEDURE — 96361 HYDRATE IV INFUSION ADD-ON: CPT

## 2017-03-30 PROCEDURE — 160029 HCHG SURGERY MINUTES - 1ST 30 MINS LEVEL 4: Performed by: NEUROLOGICAL SURGERY

## 2017-03-30 PROCEDURE — 700117 HCHG RX CONTRAST REV CODE 255: Performed by: EMERGENCY MEDICINE

## 2017-03-30 PROCEDURE — A9579 GAD-BASE MR CONTRAST NOS,1ML: HCPCS | Performed by: EMERGENCY MEDICINE

## 2017-03-30 PROCEDURE — 72158 MRI LUMBAR SPINE W/O & W/DYE: CPT

## 2017-03-30 PROCEDURE — 501486 HCHG STRYKER IRRIG SET HC W/TUBING: Performed by: NEUROLOGICAL SURGERY

## 2017-03-30 PROCEDURE — 700101 HCHG RX REV CODE 250

## 2017-03-30 PROCEDURE — 502240 HCHG MISC OR SUPPLY RC 0272: Performed by: NEUROLOGICAL SURGERY

## 2017-03-30 PROCEDURE — 99223 1ST HOSP IP/OBS HIGH 75: CPT | Performed by: HOSPITALIST

## 2017-03-30 PROCEDURE — 96376 TX/PRO/DX INJ SAME DRUG ADON: CPT

## 2017-03-30 PROCEDURE — 009U0ZX DRAINAGE OF SPINAL CANAL, OPEN APPROACH, DIAGNOSTIC: ICD-10-PCS | Performed by: NEUROLOGICAL SURGERY

## 2017-03-30 PROCEDURE — 500885 HCHG PACK, JACKSON TABLE: Performed by: NEUROLOGICAL SURGERY

## 2017-03-30 PROCEDURE — 500367 HCHG DRAIN KIT, HEMOVAC: Performed by: NEUROLOGICAL SURGERY

## 2017-03-30 PROCEDURE — 94760 N-INVAS EAR/PLS OXIMETRY 1: CPT

## 2017-03-30 PROCEDURE — 160041 HCHG SURGERY MINUTES - EA ADDL 1 MIN LEVEL 4: Performed by: NEUROLOGICAL SURGERY

## 2017-03-30 PROCEDURE — A4450 NON-WATERPROOF TAPE: HCPCS | Performed by: NEUROLOGICAL SURGERY

## 2017-03-30 PROCEDURE — 160036 HCHG PACU - EA ADDL 30 MINS PHASE I: Performed by: NEUROLOGICAL SURGERY

## 2017-03-30 RX ORDER — LISINOPRIL 10 MG/1
10 TABLET ORAL
Status: DISCONTINUED | OUTPATIENT
Start: 2017-03-30 | End: 2017-04-04 | Stop reason: HOSPADM

## 2017-03-30 RX ORDER — POLYETHYLENE GLYCOL 3350 17 G/17G
1 POWDER, FOR SOLUTION ORAL 2 TIMES DAILY PRN
Status: DISCONTINUED | OUTPATIENT
Start: 2017-03-30 | End: 2017-04-04 | Stop reason: HOSPADM

## 2017-03-30 RX ORDER — AMOXICILLIN 250 MG
1 CAPSULE ORAL
Status: DISCONTINUED | OUTPATIENT
Start: 2017-03-30 | End: 2017-04-04 | Stop reason: HOSPADM

## 2017-03-30 RX ORDER — MIDAZOLAM HYDROCHLORIDE 1 MG/ML
INJECTION INTRAMUSCULAR; INTRAVENOUS
Status: COMPLETED
Start: 2017-03-30 | End: 2017-03-30

## 2017-03-30 RX ORDER — ATORVASTATIN CALCIUM 10 MG/1
10 TABLET, FILM COATED ORAL
Status: DISCONTINUED | OUTPATIENT
Start: 2017-03-30 | End: 2017-04-04 | Stop reason: HOSPADM

## 2017-03-30 RX ORDER — BISACODYL 10 MG
10 SUPPOSITORY, RECTAL RECTAL
Status: DISCONTINUED | OUTPATIENT
Start: 2017-03-30 | End: 2017-04-04 | Stop reason: HOSPADM

## 2017-03-30 RX ORDER — LEVOTHYROXINE SODIUM 0.05 MG/1
50 TABLET ORAL
Status: DISCONTINUED | OUTPATIENT
Start: 2017-03-30 | End: 2017-04-04 | Stop reason: HOSPADM

## 2017-03-30 RX ORDER — SODIUM CHLORIDE AND POTASSIUM CHLORIDE 150; 900 MG/100ML; MG/100ML
INJECTION, SOLUTION INTRAVENOUS
Status: COMPLETED
Start: 2017-03-30 | End: 2017-03-30

## 2017-03-30 RX ORDER — L. ACIDOPHILUS/L.BULGARICUS 100MM CELL
1 GRANULES IN PACKET (EA) ORAL
Status: DISCONTINUED | OUTPATIENT
Start: 2017-03-30 | End: 2017-04-04 | Stop reason: HOSPADM

## 2017-03-30 RX ORDER — MEPERIDINE HYDROCHLORIDE 50 MG/ML
INJECTION INTRAMUSCULAR; INTRAVENOUS; SUBCUTANEOUS
Status: COMPLETED
Start: 2017-03-30 | End: 2017-03-30

## 2017-03-30 RX ORDER — DOCUSATE SODIUM 100 MG/1
100 CAPSULE, LIQUID FILLED ORAL 2 TIMES DAILY
Status: DISCONTINUED | OUTPATIENT
Start: 2017-03-30 | End: 2017-04-04 | Stop reason: HOSPADM

## 2017-03-30 RX ORDER — BUDESONIDE AND FORMOTEROL FUMARATE DIHYDRATE 80; 4.5 UG/1; UG/1
2 AEROSOL RESPIRATORY (INHALATION) 2 TIMES DAILY
Status: DISCONTINUED | OUTPATIENT
Start: 2017-03-30 | End: 2017-04-04 | Stop reason: HOSPADM

## 2017-03-30 RX ORDER — BISACODYL 10 MG
10 SUPPOSITORY, RECTAL RECTAL
Status: DISCONTINUED | OUTPATIENT
Start: 2017-03-30 | End: 2017-03-30

## 2017-03-30 RX ORDER — ACETAMINOPHEN 325 MG/1
650 TABLET ORAL EVERY 6 HOURS PRN
Status: DISCONTINUED | OUTPATIENT
Start: 2017-03-30 | End: 2017-03-30

## 2017-03-30 RX ORDER — FLUTICASONE PROPIONATE 50 MCG
1 SPRAY, SUSPENSION (ML) NASAL
Status: DISCONTINUED | OUTPATIENT
Start: 2017-03-30 | End: 2017-04-04 | Stop reason: HOSPADM

## 2017-03-30 RX ORDER — OXYCODONE HYDROCHLORIDE AND ACETAMINOPHEN 5; 325 MG/1; MG/1
1-2 TABLET ORAL EVERY 6 HOURS PRN
Status: DISCONTINUED | OUTPATIENT
Start: 2017-03-30 | End: 2017-03-30

## 2017-03-30 RX ORDER — SODIUM CHLORIDE 9 MG/ML
1000 INJECTION, SOLUTION INTRAVENOUS ONCE
Status: COMPLETED | OUTPATIENT
Start: 2017-03-30 | End: 2017-03-30

## 2017-03-30 RX ORDER — AMOXICILLIN 250 MG
2 CAPSULE ORAL 2 TIMES DAILY
Status: DISCONTINUED | OUTPATIENT
Start: 2017-03-30 | End: 2017-03-30

## 2017-03-30 RX ORDER — POLYETHYLENE GLYCOL 3350 17 G/17G
1 POWDER, FOR SOLUTION ORAL
Status: DISCONTINUED | OUTPATIENT
Start: 2017-03-30 | End: 2017-03-30

## 2017-03-30 RX ORDER — DIAZEPAM 5 MG/ML
5 INJECTION, SOLUTION INTRAMUSCULAR; INTRAVENOUS ONCE
Status: COMPLETED | OUTPATIENT
Start: 2017-03-30 | End: 2017-03-30

## 2017-03-30 RX ORDER — ENEMA 19; 7 G/133ML; G/133ML
1 ENEMA RECTAL
Status: DISCONTINUED | OUTPATIENT
Start: 2017-03-30 | End: 2017-04-04 | Stop reason: HOSPADM

## 2017-03-30 RX ORDER — AMOXICILLIN 250 MG
1 CAPSULE ORAL NIGHTLY
Status: DISCONTINUED | OUTPATIENT
Start: 2017-03-30 | End: 2017-04-04 | Stop reason: HOSPADM

## 2017-03-30 RX ORDER — SODIUM CHLORIDE AND POTASSIUM CHLORIDE 150; 900 MG/100ML; MG/100ML
INJECTION, SOLUTION INTRAVENOUS CONTINUOUS
Status: DISCONTINUED | OUTPATIENT
Start: 2017-03-30 | End: 2017-04-04 | Stop reason: HOSPADM

## 2017-03-30 RX ORDER — SODIUM CHLORIDE 9 MG/ML
500 INJECTION, SOLUTION INTRAVENOUS
Status: DISCONTINUED | OUTPATIENT
Start: 2017-03-30 | End: 2017-04-04 | Stop reason: HOSPADM

## 2017-03-30 RX ORDER — OMEPRAZOLE 20 MG/1
20 CAPSULE, DELAYED RELEASE ORAL DAILY
Status: DISCONTINUED | OUTPATIENT
Start: 2017-03-30 | End: 2017-04-04 | Stop reason: HOSPADM

## 2017-03-30 RX ORDER — TIZANIDINE 4 MG/1
2 TABLET ORAL 3 TIMES DAILY PRN
Status: DISCONTINUED | OUTPATIENT
Start: 2017-03-30 | End: 2017-04-04 | Stop reason: HOSPADM

## 2017-03-30 RX ORDER — OXYCODONE HCL 5 MG/5 ML
SOLUTION, ORAL ORAL
Status: COMPLETED
Start: 2017-03-30 | End: 2017-03-30

## 2017-03-30 RX ORDER — ACETAMINOPHEN 325 MG/1
650 TABLET ORAL EVERY 6 HOURS
Status: DISCONTINUED | OUTPATIENT
Start: 2017-03-30 | End: 2017-03-31

## 2017-03-30 RX ADMIN — STANDARDIZED SENNA CONCENTRATE AND DOCUSATE SODIUM 1 TABLET: 8.6; 5 TABLET, FILM COATED ORAL at 20:13

## 2017-03-30 RX ADMIN — BUDESONIDE AND FORMOTEROL FUMARATE DIHYDRATE 2 PUFF: 80; 4.5 AEROSOL RESPIRATORY (INHALATION) at 09:46

## 2017-03-30 RX ADMIN — SODIUM CHLORIDE 1000 ML: 9 INJECTION, SOLUTION INTRAVENOUS at 02:24

## 2017-03-30 RX ADMIN — VANCOMYCIN HYDROCHLORIDE 1700 MG: 100 INJECTION, POWDER, LYOPHILIZED, FOR SOLUTION INTRAVENOUS at 23:59

## 2017-03-30 RX ADMIN — LACTOBACILLUS ACIDOPHILUS / LACTOBACILLUS BULGARICUS 1 PACKET: 100 MILLION CFU STRENGTH GRANULES at 18:49

## 2017-03-30 RX ADMIN — BUDESONIDE AND FORMOTEROL FUMARATE DIHYDRATE 2 PUFF: 80; 4.5 AEROSOL RESPIRATORY (INHALATION) at 20:13

## 2017-03-30 RX ADMIN — LEVOTHYROXINE SODIUM 50 MCG: 50 TABLET ORAL at 06:51

## 2017-03-30 RX ADMIN — ACETAMINOPHEN 650 MG: 325 TABLET, FILM COATED ORAL at 18:49

## 2017-03-30 RX ADMIN — MIDAZOLAM 1 MG: 1 INJECTION INTRAMUSCULAR; INTRAVENOUS at 14:59

## 2017-03-30 RX ADMIN — OXYCODONE HYDROCHLORIDE 10 MG: 5 SOLUTION ORAL at 15:14

## 2017-03-30 RX ADMIN — HYDROMORPHONE HYDROCHLORIDE: 2 INJECTION INTRAMUSCULAR; INTRAVENOUS; SUBCUTANEOUS at 17:00

## 2017-03-30 RX ADMIN — HYDROMORPHONE HYDROCHLORIDE 0.5 MG: 1 INJECTION, SOLUTION INTRAMUSCULAR; INTRAVENOUS; SUBCUTANEOUS at 00:34

## 2017-03-30 RX ADMIN — MEPERIDINE HYDROCHLORIDE 25 MG: 50 INJECTION INTRAMUSCULAR; INTRAVENOUS; SUBCUTANEOUS at 13:22

## 2017-03-30 RX ADMIN — OXYCODONE HYDROCHLORIDE AND ACETAMINOPHEN 2 TABLET: 5; 325 TABLET ORAL at 07:39

## 2017-03-30 RX ADMIN — DIAZEPAM 5 MG: 5 INJECTION, SOLUTION INTRAMUSCULAR; INTRAVENOUS at 01:03

## 2017-03-30 RX ADMIN — MIDAZOLAM 1 MG: 1 INJECTION INTRAMUSCULAR; INTRAVENOUS at 13:22

## 2017-03-30 RX ADMIN — POTASSIUM CHLORIDE AND SODIUM CHLORIDE: 900; 150 INJECTION, SOLUTION INTRAVENOUS at 18:49

## 2017-03-30 RX ADMIN — MEPERIDINE HYDROCHLORIDE 25 MG: 50 INJECTION INTRAMUSCULAR; INTRAVENOUS; SUBCUTANEOUS at 14:58

## 2017-03-30 RX ADMIN — DOCUSATE SODIUM 100 MG: 100 CAPSULE ORAL at 20:12

## 2017-03-30 RX ADMIN — PIPERACILLIN AND TAZOBACTAM 4.5 G: 4; .5 INJECTION, POWDER, LYOPHILIZED, FOR SOLUTION INTRAVENOUS; PARENTERAL at 09:46

## 2017-03-30 RX ADMIN — POTASSIUM CHLORIDE AND SODIUM CHLORIDE 1000 ML: 900; 150 INJECTION, SOLUTION INTRAVENOUS at 17:00

## 2017-03-30 RX ADMIN — VANCOMYCIN HYDROCHLORIDE 2900 MG: 100 INJECTION, POWDER, LYOPHILIZED, FOR SOLUTION INTRAVENOUS at 11:15

## 2017-03-30 RX ADMIN — PIPERACILLIN AND TAZOBACTAM 4.5 G: 4; .5 INJECTION, POWDER, LYOPHILIZED, FOR SOLUTION INTRAVENOUS; PARENTERAL at 18:49

## 2017-03-30 RX ADMIN — GADODIAMIDE 20 ML: 287 INJECTION INTRAVENOUS at 01:46

## 2017-03-30 ASSESSMENT — LIFESTYLE VARIABLES
EVER HAD A DRINK FIRST THING IN THE MORNING TO STEADY YOUR NERVES TO GET RID OF A HANGOVER: NO
ON A TYPICAL DAY WHEN YOU DRINK ALCOHOL HOW MANY DRINKS DO YOU HAVE: 2
TOTAL SCORE: 0
HAVE YOU EVER FELT YOU SHOULD CUT DOWN ON YOUR DRINKING: NO
EVER_SMOKED: NEVER
EVER_SMOKED: NEVER
TOTAL SCORE: 0
HOW MANY TIMES IN THE PAST YEAR HAVE YOU HAD 5 OR MORE DRINKS IN A DAY: 0
AVERAGE NUMBER OF DAYS PER WEEK YOU HAVE A DRINK CONTAINING ALCOHOL: 1
ALCOHOL_USE: YES
HAVE PEOPLE ANNOYED YOU BY CRITICIZING YOUR DRINKING: NO
CONSUMPTION TOTAL: NEGATIVE
EVER FELT BAD OR GUILTY ABOUT YOUR DRINKING: NO
TOTAL SCORE: 0

## 2017-03-30 ASSESSMENT — PAIN SCALES - GENERAL
PAINLEVEL_OUTOF10: 5
PAINLEVEL_OUTOF10: 3
PAINLEVEL_OUTOF10: 4
PAINLEVEL_OUTOF10: 0
PAINLEVEL_OUTOF10: 3
PAINLEVEL_OUTOF10: ASSUMED PAIN PRESENT
PAINLEVEL_OUTOF10: 3
PAINLEVEL_OUTOF10: 7

## 2017-03-30 ASSESSMENT — COPD QUESTIONNAIRES
DURING THE PAST 4 WEEKS HOW MUCH DID YOU FEEL SHORT OF BREATH: NONE/LITTLE OF THE TIME
DO YOU EVER COUGH UP ANY MUCUS OR PHLEGM?: NO/ONLY WITH OCCASIONAL COLDS OR INFECTIONS
COPD SCREENING SCORE: 2
DO YOU EVER COUGH UP ANY MUCUS OR PHLEGM?: NO/ONLY WITH OCCASIONAL COLDS OR INFECTIONS
HAVE YOU SMOKED AT LEAST 100 CIGARETTES IN YOUR ENTIRE LIFE: NO/DON'T KNOW
COPD SCREENING SCORE: 2
DURING THE PAST 4 WEEKS HOW MUCH DID YOU FEEL SHORT OF BREATH: NONE/LITTLE OF THE TIME
HAVE YOU SMOKED AT LEAST 100 CIGARETTES IN YOUR ENTIRE LIFE: NO/DON'T KNOW

## 2017-03-30 NOTE — PROGRESS NOTES
Assessment done, Pt educated on plan of care. Patient resting in bed, no signs of distress,  no complains of pain at this time. Call light within reach,  side rails up, will monitor. Condition stable npo for possible surgery later today waiting on dr crooks

## 2017-03-30 NOTE — PROGRESS NOTES
Assessment completed per CDU,poc discussed verbalized understanding,back dressing cdi,cms intact denies n/t guero le,,tlso at bedside,rates pain 4/10,denies need for pain meds at his time,call button within reach.

## 2017-03-30 NOTE — OP REPORT
DATE OF SERVICE:  03/30/2017    PRINCIPAL SURGEON:  Juan Miguel Chakraborty MD    FIRST SURGICAL ASSISTANT:  ASHLEIGH Fernández, certified first surgical   assistant    PREOPERATIVE DIAGNOSIS:  Lumbar wound seroma versus hematoma versus infection.    POSTOPERATIVE DIAGNOSIS:  Lumbar wound seroma versus hematoma versus   infection.    PRINCIPAL PROCEDURE:  Incision and drainage of lumbar wound.    ANESTHESIA:  The case is done under general anesthesia.    ESTIMATED BLOOD LOSS:  Less than 25 mL    COMPLICATIONS:  There were no intraoperative complications.    BRIEF HISTORY:  The patient is a 72-year-old man who I operated on   approximately 2 weeks ago performing a L3-L4 lumbar laminectomy and extension   of fusion from L4-L3.    He was in my office yesterday for routine followup.  He was complaining of a   low-grade fever and in our office, his temperature was 100.4.  He told me that   several days before he had been febrile.    His lumbar incision looked fine.  I removed the staples.  There is no evidence   of purulent drainage or fluctuance under the wound.  I sent him to urgent   care.  He was ultimately sent to [facility] for further evaluation.  He was   admitted to the hospitalist service.  Overnight, he has been running a   low-grade fever of approximately 99.    His white count is 15,000.  He underwent an MRI scan of the lumbar spine,   which demonstrates a large fluid collection compressing the thecal sac over   the area at L3-L4.  Out of concerns of a wound infection, we decided to   proceed with an incision and drainage.    Risks and benefits of the proposed operation as well as realistic expectations   and outcome were discussed at length with the patient prior to surgery.    OPERATIVE REPORT:  Informed consent was obtained.  Patient was taken to the   operating room where he underwent endotracheal intubation general anesthesia   on the operating room cart.  He was then carefully rolled into the prone    position on the OSI spine table.  Pressure points including his face, elbows,   hands, knees, ankle, and feet were padded and checked.  The area of the lumbar   spine was shaved, prepped and draped in usual sterile fashion.  I first used   a surgical scrub prep and then gel prep on top of that.    The middle portion of the incision was opened.  Suture material was removed.    There was serosanguineous fluid that we collected in a Lukens trap that was   superficial to the thoracolumbar fascia.  Thoracolumbar fascia was opened.  We   found a combination of serosanguineous fluid as well as organized clot.  A   second Lukens trap was used to collect fluid and clot in the deep tissue plane   that was identified as a separate specimen.    I did not see any frankly purulent material at the time of the operation.    We used the Pulsavac wound irrigation system and irrigated out using a 1 liter   bag spiked with a bottle of bacitracin.    Small bleeders were coagulated.    Thoracolumbar fascia was reapproximated with interrupted #1 Vicryl,   superficial fascia was closed with interrupted #1 Vicryl, the skin was closed   with interrupted 2-0 Vicryl followed by staples and a sterile dressing.  The   sponge and needle counts were correct at the end of the case.  There were no   intraoperative complications.       ____________________________________     MD SONJA MURGUIA / BEA    DD:  03/30/2017 12:59:34  DT:  03/30/2017 14:19:58    D#:  [id number]  Job#:  [id number]

## 2017-03-30 NOTE — H&P
CHIEF COMPLAINT:  Fever.      PRIMARY MEDICAL PHYSICIAN:  Gale Spears MD    NEUROSURGEON:  Dr. Chakraborty.      HISTORY OF PRESENT ILLNESS:  This is a 72-year-old gentleman who underwent   lumbar surgery 2 weeks ago with Dr. Chakraborty and comes in with complaints of   persistent fevers since his discharge.  The patient states that he feels that   he was having fevers while he was admitted to the hospital.  These have   continued while at home.  He was unable to take his temperature at home   because he has an old thermometer from the 1940s, which was not working.  He has   had shaking chills and has woken up at night drenched in sweat.  He has had a   poor appetite, which is very unusual for him.  He denies any nausea, vomiting,   diarrhea, or dysuria.  He has been trying to ambulate as recommended by his   surgeon during his postoperative recovery period.  He was seen by Dr. Chakraborty   today for followup and he was referred to an urgent care who then directed him   to the ER for further treatment.      REVIEW OF SYSTEMS:  A full review of systems was completed and all pertinent   positives and negatives are included in the HPI above.      PAST MEDICAL HISTORY:    1.  Hypertension.    2.  Hypothyroidism.    3.  Hyperlipidemia.    4.  Back pain.      PAST SURGICAL HISTORY:    1.  Lumbar laminectomy, fusion and diskectomy.    2.  Total hip.    3.  Hernia repair.      SOCIAL HISTORY:  No tobacco or illicit drugs.  Moderate alcohol intake.      FAMILY HISTORY:  Mother and father both with hypertension.      ALLERGIES:  No known drug allergies.      HOME MEDICATIONS:    1.  Percocet 5/325 mg p.o. q. 6 hours p.r.n. severe pain.    2.  Doxycycline 100 mg p.o. q. 48 hours.    3.  Lipitor 10 mg p.o. daily.    4.  Synthroid 50 mcg p.o. daily.    5.  Advair Diskus 1 puff b.i.d.    6.  Vitamin D.    7.  Zyrtec over-the-counter.    8.  Multivitamin.    9.  Prilosec 20 mg p.o. daily.      PHYSICAL EXAMINATION:    VITAL SIGNS:   Temperature 100.2, heart rate 110, respirations 16, blood   pressure 113/64.  Patient is saturating at 95% on room air.    GENERAL:  This is a well-appearing older white male who is in no acute   distress.    HEENT:  Normocephalic, atraumatic.  EOMI, moist mucous membranes.    NECK:  Supple, there is no JVD.  There is no supraclavicular adenopathy.    CARDIOVASCULAR:  Positive S1, S2, regular rate and rhythm.  No murmurs, rubs,   or gallops appreciated.    PULMONARY:  Clear to auscultation bilaterally.  No wheezes, rubs, or rhonchi   heard.    GASTROINTESTINAL:  Abdomen is obese, but otherwise soft, nontender,   nondistended.  Positive bowel sounds.  No hepatosplenomegaly appreciated;   however, difficult to assess secondary to habitus.    EXTREMITIES:  Warm, well-perfused.  No clubbing, cyanosis, or edema.    Capillary refill less than 2 seconds.  Distal pulses are intact.    NEUROLOGIC:  A and O x3.  Cranial nerves II-XII grossly intact.      LABORATORY DATA:  WBC is 15.4, hemoglobin 12.2, hematocrit 37.3 and platelets   393.  Sodium 135, potassium 3.8, chloride 98, CO2 of 25, glucose is 111, BUN   12, creatinine 1.1.  GFR is greater than 60.  ESR is 60.  CRP is 21.8.  Lactic   acid is 1.5.      IMAGING:  CTA of the chest:  No CT evidence of pulmonary emboli, patchy left   basilar opacity, could represent atelectasis or consolidation.      ASSESSMENT AND PLAN:  This is a 72-year-old gentleman who is approximately 2   weeks postop from a lumbar surgery and comes in with complaints of persistent   fevers.    1.  Systemic inflammatory response syndrome:  The patient has mild fever as   well as leukocytosis.  He also has an elevated ESR and CRP.  At this point,   etiology would possibly be secondary to post-surgical infection or abnormality   seen on the CT of the lung.  His UA is otherwise negative.  Dr. Humphreys of   neurosurgery has been consulted and he has recommended that we withhold   antibiotic therapies for now  until he sees the patient.  He has also   recommended an MRI which has been ordered.  I will start him for now on   respiratory therapy protocol and symptomatic management.  I will keep him   n.p.o. in the event that neurosurgery would like to proceed to the OR for any   reason in the morning.    2.  Hypertension.  Continue home lisinopril.    3.  Hyperlipidemia.  Continue home Lipitor.    4.  Hypothyroidism.  Continue home Synthroid.      DISPOSITION:  Neurology.    PROPHYLAXIS:  Sequential compression devices for DVT prophylaxis, no PPI   indicated at this time, stool softeners ordered.      CODE:  Full.       ____________________________________     SARITA COLEMAN MD    DTC / NTS    DD:  03/30/2017 02:21:12  DT:  03/30/2017 04:32:27    D#:  [id number]  Job#:  [id number]

## 2017-03-30 NOTE — PROGRESS NOTES
NEUROSURGERY:    2 weeks post op lumbar fusion  Fever, leukocytosis  Large fluid collection on MRI compressing the thecal sac  No leg s/s at all  Does c/o urinary hesitancy    PLAN: OR today for I&D  NPO  Ok to start abx now

## 2017-03-30 NOTE — DISCHARGE PLANNING
Care Transition Team Assessment    Information Source  Orientation : Oriented x 4  Information Given By: Patient  Who is responsible for making decisions for patient? : Patient    Readmission Evaluation  Is this a readmission?: No    Elopement Risk  Legal Hold: No  Ambulatory or Self Mobile in Wheelchair: No-Not an Elopement Risk  Elopement Risk: Not at Risk for Elopement    Interdisciplinary Discharge Planning  Does Admitting Nurse Feel This Could be a Complex Discharge?: No  Primary Care Physician: DR DEAL  Lives with - Patient's Self Care Capacity: Spouse  Support Systems: Family Member(s)  Housing / Facility: [address]  Do You Take your Prescribed Medications Regularly: Yes  Able to Return to Previous ADL's: Other  Mobility Issues: Yes  Prior Services: Other (Comments)  Patient Expects to be Discharged to:: home  Assistance Needed: Unknown at this Time  Durable Medical Equipment: Walker    Discharge Preparedness  What is your plan after discharge?:  (Home)  What are your discharge supports?: Spouse  Prior Functional Level: Ambulatory, Uses Walker  Difficulity with ADLs: None  Difficulity with IADLs: None    Functional Assesment  Prior Functional Level: Ambulatory, Uses Walker    Finances  Financial Barriers to Discharge: No  Prescription Coverage: Yes    Vision / Hearing Impairment  Vision Impairment : Yes  Right Eye Vision: Wears Glasses  Left Eye Vision: Wears Glasses  Hearing Impairment : No         Advance Directive  Advance Directive?: POLST    Domestic Abuse  Have you ever been the victim of abuse or violence?: No    Psychological Assessment  History of Substance Abuse: None  History of Psychiatric Problems: No  Non-compliant with Treatment: No  Newly Diagnosed Illness: No    Discharge Risks or Barriers  Discharge risks or barriers?: No    Anticipated Discharge Information  Anticipated discharge disposition: Home  Discharge Address:  ([address] [place] [address])  Discharge Contact Phone Number:   (Emily Magaña  ([facility]) [phone])

## 2017-03-30 NOTE — TELEPHONE ENCOUNTER
1. Caller Name: Itz                      Call Back Number: [phone] (home)       2. Message: 03/29/2017-Pt called and l/m. Pt wanted to inform Dr Spears, he had back surgery 2 weeks ago with Dr Hennessy. Been Having a fever since surgery. Just saw Dr Hennessy today.  That he suggested pt to go to . Pt just wanted to let you know.     3. Patient approves office to leave a detailed voicemail/MyChart message: N\A

## 2017-03-30 NOTE — PROGRESS NOTES
"Pharmacy Kinetics 72 y.o. male on vancomycin day # 1 3/30/2017    Currently on Vancomycin new start    Indication for Treatment: SSTI/paraspinal fluid collection    Pertinent history per medical record: Admitted on 3/29/2017 for post-operative fevers.  Patient had recently been admitted for a L3-L5 fusion. After discharge, he continued to have night sweats and shaking chills.  MRI demonstrated \"large posterior paraspinal fluid collection at the level of the L3-L4 laminectomy casing severe compromise of the thecal sac.\"  Antibiotics initiated and neurosurgery consulted.     Other antibiotics: Zosyn 4.5 g IV q6h    Allergies: Review of patient's allergies indicates no known allergies.     List concerns for renal function: age, BMI 38, SIRS    Pertinent cultures to date:   3/29 PBC x 2: NGTD    Recent Labs      17   1818   WBC  15.4*   NEUTSPOLYS  84.80*     Recent Labs      17   1818   BUN  12   CREATININE  1.10   ALBUMIN  4.0     Blood pressure 117/60, pulse 83, temperature 37.3 °C (99.1 °F), temperature source Temporal, resp. rate 16, height 1.727 m (5' 8\"), weight 115.4 kg (254 lb 6.6 oz), SpO2 95 %. Temp (24hrs), Av.3 °C (99.2 °F), Min:36.7 °C (98 °F), Max:37.9 °C (100.2 °F)      A/P   1. Vancomycin dose change: initiate 15 mg/kg q12h   2. Next vancomycin level: 3/31 @ 1030  3. Goal trough: 18-22 mcg/mL  4. Comments: new start vancomycin for large paraspinal fluid collection Plan for OR with I&D today.  Would highly recommend cultures of fluid and antibiotic management by ID.  Will aim for higher trough for now and check a level prior to the 3rd total dose given significant concerns for renal insult and accumulation.  Renal function currently at baseline. Will continue to follow.    Elmira Cooper, PHARMD      "

## 2017-03-30 NOTE — ED NOTES
Pt to triage via wheelchair. Pt was sent from  for further testing    Pt had back surgery 2 weeks ago. Been spiking fevers up to 102 at home. Has been taking his oxycodone    Pt to senior lounge with wife in [place], informed of triage process, encouraged to let nurse know if symptoms worsen or change

## 2017-03-30 NOTE — ED PROVIDER NOTES
ED Provider Note    Scribed for Juan Davison D.O. by Rebecca Osborn. 3/29/2017  7:49 PM    Primary care provider: Gale Spears M.D.   History obtained from: Patient   History limited by: None     CHIEF COMPLAINT  Chief Complaint   Patient presents with   • Fever     states it was 102        HPI    Angelo Magaña is a 72 y.o. male who presents to the ED for an intermittent fever, onset two weeks ago. His fever has resolved. The patient states that his fever was 102F at home. Per patient, he has had intermittent fevers since he had back surgery two weeks ago. He states that he had a fever while he was in the hospital and was discharged home. Per patient, he has been taking his oxycodone at home with slight alleviation of his fever. His last dose was at 4:00PM. He states that the fevers occur more often at night. He endorses back pain since his surgery. He is able to ambulate with his walker but stated that he was unable to get out of bed yesterday. He has a mild cough. He complains of urinary urgency but denies any dysuria or hematuria. He denies any chest pain, shortness of breath, abdominal pain, diarrhea, or vomiting. Per patient, he has had normal bowel movements but they have been less often because he has not been eating. The patient states that he woke up last night and was diaphoretic. He was seen by his surgeon this morning and had his staples removed. Per patient, there were no issues with the incision site but he was sent to urgent care, who told him to go to the ED. The patient has a history of hypertension, asthma, and hyperlipidemia.    Patient had lumbar fusion and states that he started having a fever while he was in the hospital but was discharged anyway. He states that he continues to have on and off fever since discharge. He also continues to have pain in his back at the surgical site. He denies pain anywhere else or radiation of his pain. There is been no sensory change or weakness.  He states that he saw Dr. Chakraborty, his surgeon, this morning and he removed the staples and stated that everything looked fine. Patient states that he was sent to urgent care where they did a chest x-ray and UA, both were normal, so they sent pt to the ED for evaluation.      REVIEW OF SYSTEMS  Please see HPI for pertinent positives/negatives.  All other systems reviewed and are negative. C.    PAST MEDICAL HISTORY  Past Medical History   Diagnosis Date   • Heart burn    • Pain    • Hypertension    • ASTHMA    • Arthritis    • Preventative health care 6/8/2016   • Hypothyroidism 6/8/2016   • Erectile dysfunction 6/8/2016   • History of skin cancer 6/8/2016   • Right hip pain 6/8/2016   • Rosacea 6/8/2016   • Low back pain 6/8/2016   • Asthma 6/8/2016   • Seasonal allergies 6/8/2016   • GERD (gastroesophageal reflux disease) 6/8/2016   • Obesity 6/8/2016   • Hyperlipidemia 6/8/2016   • Indigestion    • Urinary bladder disorder    • Bronchitis         SURGICAL HISTORY  Past Surgical History   Procedure Laterality Date   • Hip arthroplasty total  1/21/2009     Performed by NAREN DANIELSON at SURGERY [facility]   • Knee arthroplasty total  5/26/2009     Performed by NAREN DANIELSON at SURGERY [facility]   • Inguinal hernia repair  6/10/08     Performed by MERLYN BECKER at SURGERY SAME DAY [facility]   • Inguinal hernia repair  9/2/08     Performed by EMRLYN BECKER at SURGERY SAME DAY [facility]   • Lumbar fusion posterior  9/9/2009     Performed by JUAN MIGUEL CHAKRABORTY at SURGERY [facility]   • Lumbar laminectomy diskectomy  9/9/2009     Performed by JUAN MIGUEL CHAKRABORTY at SURGERY [facility]   • Hip arthroplasty total  09   • Other       DISCECTOMY WITH HARWWARE   • Hernia repair  2008     HERNIA    • Other       GASTRIC BYPASS AT 28 YEARS   • Lumbar fusion posterior N/A 3/17/2017     Procedure: LUMBAR FUSION POSTERIOR L3-4 EXTENSION;  Surgeon: Juan Miguel Chakraborty M.D.;  Location: [facility];   Service:    • Lumbar laminectomy diskectomy N/A 3/17/2017     Procedure: LUMBAR LAMINECTOMY DISKECTOMY L3 & REDO L4;  Surgeon: Juan Miguel Chakraborty M.D.;  Location: SURGERY [facility];  Service:    • Hardware removal neuro N/A 3/17/2017     Procedure: HARDWARE REMOVAL NEURO L4-5;  Surgeon: Juan Miguel Chakraborty M.D.;  Location: SURGERY [facility];  Service:         SOCIAL HISTORY  Social History     Social History Main Topics   • Smoking status: Never Smoker    • Smokeless tobacco: Never Used   • Alcohol Use: Yes      Comment: 2 -3 drinks per week   • Drug Use: No        FAMILY HISTORY  Family History   Problem Relation Age of Onset   • Hypertension Mother    • Hypertension Father    • Lung Disease Father      asthma        CURRENT MEDICATIONS  No current facility-administered medications on file prior to encounter.     Current Outpatient Prescriptions on File Prior to Encounter   Medication Sig Dispense Refill   • oxycodone-acetaminophen (PERCOCET) 5-325 MG Tab Take 1-2 Tabs by mouth every 6 hours as needed. 60 Tab 0   • doxycycline (VIBRAMYCIN) 100 MG Tab Take 100 mg by mouth every 48 hours.     • fluticasone (FLONASE) 50 MCG/ACT nasal spray Spray 1 Spray in nose 1 time daily as needed.     • atorvastatin (LIPITOR) 10 MG Tab TAKE 1 TABLET BY MOUTH EVERY DAY 90 Tab 0   • levothyroxine (SYNTHROID) 50 MCG Tab TAKE 1 TABLET BY MOUTH DAILY 90 Tab 0   • lisinopril (PRINIVIL) 10 MG Tab TAKE 1 TABLET BY MOUTH DAILY 90 Tab 0   • ADVAIR DISKUS 100-50 MCG/DOSE AEROSOL POWDER, BREATH ACTIVATED TAKE 1 PUFF BY MOUTH TWICE A DAY 1 Inhaler 5   • VITAMIN D, CHOLECALCIFEROL, PO Take 3,000 Units by mouth every day.     • ZYRTEC-D 5-120 MG PO TB12 Take 1 Tab by mouth every day. Takes daily      • MULTIVITAMIN PO Take 2 Tabs by mouth every day. Takes daily     • PRILOSEC 20 MG PO CPDR Take 20 mg by mouth every day. Takes daily         ALLERGIES  No Known Allergies     PHYSICAL EXAM  VITAL SIGNS: /64 mmHg  Pulse 106  Temp(Src)  "37.9 °C (100.2 °F) (Temporal)  Resp 26  Ht 1.727 m (5' 8\")  Wt 113.399 kg (250 lb)  BMI 38.02 kg/m2  SpO2 98%     Pulse ox interpretation: 97% I interpret this pulse ox as normal     Constitutional: Well developed, well nourished, alert in no apparent distress, nontoxic appearance    HENT: No external signs of trauma, normocephalic, bilateral external ears normal, oropharynx moist and clear, nose normal    Eyes: PERRL, conjunctiva without erythema, no discharge, no icterus    Neck: Soft and supple, trachea midline, no stridor, no tenderness, no LAD, no JVD, good ROM    Cardiovascular: Regular rate and rhythm, no murmurs/rubs/gallops, strong distal pulses and good perfusion    Thorax & Lungs: No respiratory distress, CTAB, no chest tenderness    Abdomen: Soft, nontender, nondistended, no guarding, no rebound, normal BS    Back: Lumbar midline surgical site appears clean without any signs of infection including erythema/drainage/crepitus/warmth/fluctuance/streaking, tenderness along the surgical site, no CVA tenderness  Extremities: No clubbing, no cyanosis, no edema, no gross deformity, good ROM, no tenderness, intact distal pulses with brisk cap refill    Skin: Warm, dry, no pallor/cyanosis, no rash noted    Lymphatic: No lymphadenopathy noted    Neuro: A/O times 3, no focal deficits noted, 5 over 5 strength bilateral lower extremity, 0 out of 4 DTR bilateral lower extremity, sensation intact bilateral lower extremity  Psychiatric: Cooperative, normal mood and affect, normal judgement, appropriate for clinical situation          DIAGNOSTIC STUDIES / PROCEDURES    LABS  Results for orders placed or performed during the hospital encounter of 03/29/17   CBC WITH DIFFERENTIAL   Result Value Ref Range    WBC 15.4 (H) 4.8 - 10.8 K/uL    RBC 4.23 (L) 4.70 - 6.10 M/uL    Hemoglobin 12.2 (L) 14.0 - 18.0 g/dL    Hematocrit 37.3 (L) 42.0 - 52.0 %    MCV 88.2 81.4 - 97.8 fL    MCH 28.8 27.0 - 33.0 pg    MCHC 32.7 (L) 33.7 " - 35.3 g/dL    RDW 42.5 35.9 - 50.0 fL    Platelet Count 393 164 - 446 K/uL    MPV 9.3 9.0 - 12.9 fL    Neutrophils-Polys 84.80 (H) 44.00 - 72.00 %    Lymphocytes 8.90 (L) 22.00 - 41.00 %    Monocytes 5.10 0.00 - 13.40 %    Eosinophils 0.50 0.00 - 6.90 %    Basophils 0.30 0.00 - 1.80 %    Immature Granulocytes 0.40 0.00 - 0.90 %    Nucleated RBC 0.00 /100 WBC    Neutrophils (Absolute) 13.04 (H) 1.82 - 7.42 K/uL    Lymphs (Absolute) 1.37 1.00 - 4.80 K/uL    Monos (Absolute) 0.79 0.00 - 0.85 K/uL    Eos (Absolute) 0.08 0.00 - 0.51 K/uL    Baso (Absolute) 0.04 0.00 - 0.12 K/uL    Immature Granulocytes (abs) 0.06 0.00 - 0.11 K/uL    NRBC (Absolute) 0.00 K/uL   COMP METABOLIC PANEL   Result Value Ref Range    Sodium 135 135 - 145 mmol/L    Potassium 3.8 3.6 - 5.5 mmol/L    Chloride 98 96 - 112 mmol/L    Co2 25 20 - 33 mmol/L    Anion Gap 12.0 (H) 0.0 - 11.9    Glucose 111 (H) 65 - 99 mg/dL    Bun 12 8 - 22 mg/dL    Creatinine 1.10 0.50 - 1.40 mg/dL    Calcium 9.9 8.5 - 10.5 mg/dL    AST(SGOT) 25 12 - 45 U/L    ALT(SGPT) 24 2 - 50 U/L    Alkaline Phosphatase 77 30 - 99 U/L    Total Bilirubin 1.1 0.1 - 1.5 mg/dL    Albumin 4.0 3.2 - 4.9 g/dL    Total Protein 7.1 6.0 - 8.2 g/dL    Globulin 3.1 1.9 - 3.5 g/dL    A-G Ratio 1.3 g/dL   CRP QUANTITIVE (NON-CARDIAC)   Result Value Ref Range    Stat C-Reactive Protein 21.85 (H) 0.00 - 0.75 mg/dL   WESTERGREN SED RATE   Result Value Ref Range    Sed Rate Westergren 60 (H) 0 - 20 mm/hour   LACTIC ACID   Result Value Ref Range    Lactic Acid 1.5 0.5 - 2.0 mmol/L   ESTIMATED GFR   Result Value Ref Range    GFR If African American >60 >60 mL/min/1.73 m 2    GFR If Non African American >60 >60 mL/min/1.73 m 2   All labs reviewed by me.      RADIOLOGY  CT-CTA CHEST PULMONARY ARTERY W/ RECONS   Final Result         1. No CT evidence of pulmonary embolism.      2. Mild dilatation of the ascending aorta, measuring up to 4.0 cm. No evidence of dissection.      3. Patchy left basilar opacity  could relate to atelectasis or consolidation.      4. Unchanged although right basilar scarring      DX-LUMBAR SPINE-2 OR 3 VIEWS   Final Result         Postsurgical change from fixation and laminectomy at L3-4 as well as retaining screws at L5.      Unchanged moderate compression deformity of L2.      MR-LUMBAR SPINE-WITH & W/O    (Results Pending)   The radiologist's interpretation of all radiological studies have been reviewed by me.        COURSE & MEDICAL DECISION MAKING  Nursing notes, VS, PMSFHx reviewed in chart.     Review of past medical records shows the patient was seen in urgent care today where a chest x-ray was performed and was unremarkable and a UA did not show any signs of infection. There was concern for a possible PE so patient was sent to the ED for evaluation.    Differential diagnoses considered include but are not limited to: Strain/sprain, dissection/AAA, cauda equina syndrome, DDD, herniated disc, compression Fx, epidural abscess/mass, spinal hematoma, radiculopathy, sciatica, KS/renal colic, UTI/pyelo, cholecystitis/biliary colic, retrocecal appendicitis, postsurgical pain, pneumonia, atelectasis     7:50 PM Patient evaluated at bedside. DX lumbar spine, CBC with differential, CMP, blood culture, urinalysis, crp quantitive, and westergren sed rate was ordered.  Patient was treated with lactated ringers infusion. It was discussed with the patient that an x-ray of the back will be ordered to evaluate. He was informed that general lab work will be ordered to evaluate. He understood and verbalized agreement.     8:35 PM Ordered 1mg Dilaudid.    2350: D/W Dr. Humphreys, on call for Dr. Chakraborty, he would like us to order a MRI of the lumbar spine with and without contrast. He also would like patient admitted to hospitalist service. Discussed with him whether we should start antibiotics and he would like to hold off on starting antibiotics until they evaluate patient tomorrow.    Findings discussed  with patient. Patient reports pain was improved after Dilaudid but now is starting to wear off. Patient agreed to have MRI performed and also to be admitted. He requested that we give him Valium prior to his MRI since he is claustrophobic. Valium will be ordered.    0030: D/W Dr. Medrano, hospitalist, who agreed to admit pt. She is aware that MRI of the lumbar spine is pending.      FINAL IMPRESSION  1. Fever, unspecified fever cause    2. Acute midline low back pain without sciatica    3. S/P lumbar laminectomy    4. Leukocytosis, unspecified type    5. Anemia, unspecified type    6. Abnormal CT of the chest         DISPOSITION  Patient will be admitted to hospitalist service       IRebecca (Diana), am scribing for, and in the presence of, Juan Davison D.O..    Electronically signed by: Rebecca Osborn (Diana), 3/29/2017    Juan MAGALLON D.O. personally performed the services described in this documentation, as scribed by Rebecca Osborn in my presence, and it is both accurate and complete.      Portions of this record were made with voice recognition software and by scribes.  Despite my review, spelling/grammar/context errors may still remain.  Interpretation of this chart should be taken in this context.

## 2017-03-30 NOTE — PROGRESS NOTES
Neurosurgery    73 y/o s/p L3L4 laminectomy and fusion two weeks ago.  Presented to the office yesterday with a low grade fever.  Was sent to urgent care then admitted to [facility].  WBC = 15K, suspicious for infection.  MRI = large fluid collection in the operative site.  Discussed risks and benefits of an I&D with Itz, he wishes to proceed with operation.

## 2017-03-30 NOTE — ED NOTES
Pt resting on gurjax, states pain improved. Updated on POC, call light within reach. Awaiting CT.

## 2017-03-31 PROBLEM — B95.8 STAPH INFECTION: Status: ACTIVE | Noted: 2017-03-31

## 2017-03-31 LAB
ANION GAP SERPL CALC-SCNC: 5 MMOL/L (ref 0–11.9)
BASOPHILS # BLD AUTO: 0.2 % (ref 0–1.8)
BASOPHILS # BLD: 0.01 K/UL (ref 0–0.12)
BUN SERPL-MCNC: 8 MG/DL (ref 8–22)
CALCIUM SERPL-MCNC: 8.8 MG/DL (ref 8.5–10.5)
CHLORIDE SERPL-SCNC: 101 MMOL/L (ref 96–112)
CO2 SERPL-SCNC: 25 MMOL/L (ref 20–33)
CREAT SERPL-MCNC: 0.95 MG/DL (ref 0.5–1.4)
EOSINOPHIL # BLD AUTO: 0.33 K/UL (ref 0–0.51)
EOSINOPHIL NFR BLD: 5.6 % (ref 0–6.9)
ERYTHROCYTE [DISTWIDTH] IN BLOOD BY AUTOMATED COUNT: 42.6 FL (ref 35.9–50)
GFR SERPL CREATININE-BSD FRML MDRD: >60 ML/MIN/1.73 M 2
GLUCOSE SERPL-MCNC: 116 MG/DL (ref 65–99)
HCT VFR BLD AUTO: 28.4 % (ref 42–52)
HGB BLD-MCNC: 9.2 G/DL (ref 14–18)
IMM GRANULOCYTES # BLD AUTO: 0.02 K/UL (ref 0–0.11)
IMM GRANULOCYTES NFR BLD AUTO: 0.3 % (ref 0–0.9)
LYMPHOCYTES # BLD AUTO: 0.81 K/UL (ref 1–4.8)
LYMPHOCYTES NFR BLD: 13.8 % (ref 22–41)
MCH RBC QN AUTO: 29 PG (ref 27–33)
MCHC RBC AUTO-ENTMCNC: 32.4 G/DL (ref 33.7–35.3)
MCV RBC AUTO: 89.6 FL (ref 81.4–97.8)
MONOCYTES # BLD AUTO: 0.41 K/UL (ref 0–0.85)
MONOCYTES NFR BLD AUTO: 7 % (ref 0–13.4)
NEUTROPHILS # BLD AUTO: 4.31 K/UL (ref 1.82–7.42)
NEUTROPHILS NFR BLD: 73.1 % (ref 44–72)
NRBC # BLD AUTO: 0 K/UL
NRBC BLD AUTO-RTO: 0 /100 WBC
PLATELET # BLD AUTO: 247 K/UL (ref 164–446)
PMV BLD AUTO: 9.5 FL (ref 9–12.9)
POTASSIUM SERPL-SCNC: 4 MMOL/L (ref 3.6–5.5)
RBC # BLD AUTO: 3.17 M/UL (ref 4.7–6.1)
SODIUM SERPL-SCNC: 131 MMOL/L (ref 135–145)
VANCOMYCIN TROUGH SERPL-MCNC: 16.9 UG/ML (ref 10–20)
WBC # BLD AUTO: 5.9 K/UL (ref 4.8–10.8)

## 2017-03-31 PROCEDURE — A9270 NON-COVERED ITEM OR SERVICE: HCPCS | Performed by: HOSPITALIST

## 2017-03-31 PROCEDURE — 85025 COMPLETE CBC W/AUTO DIFF WBC: CPT

## 2017-03-31 PROCEDURE — 700102 HCHG RX REV CODE 250 W/ 637 OVERRIDE(OP): Performed by: HOSPITALIST

## 2017-03-31 PROCEDURE — A9270 NON-COVERED ITEM OR SERVICE: HCPCS | Performed by: CLINICAL NURSE SPECIALIST

## 2017-03-31 PROCEDURE — 99233 SBSQ HOSP IP/OBS HIGH 50: CPT | Performed by: INTERNAL MEDICINE

## 2017-03-31 PROCEDURE — 770001 HCHG ROOM/CARE - MED/SURG/GYN PRIV*

## 2017-03-31 PROCEDURE — 700105 HCHG RX REV CODE 258: Performed by: HOSPITALIST

## 2017-03-31 PROCEDURE — 700111 HCHG RX REV CODE 636 W/ 250 OVERRIDE (IP)

## 2017-03-31 PROCEDURE — 700102 HCHG RX REV CODE 250 W/ 637 OVERRIDE(OP): Performed by: CLINICAL NURSE SPECIALIST

## 2017-03-31 PROCEDURE — 700105 HCHG RX REV CODE 258

## 2017-03-31 PROCEDURE — 700111 HCHG RX REV CODE 636 W/ 250 OVERRIDE (IP): Performed by: HOSPITALIST

## 2017-03-31 PROCEDURE — 80048 BASIC METABOLIC PNL TOTAL CA: CPT

## 2017-03-31 PROCEDURE — 36415 COLL VENOUS BLD VENIPUNCTURE: CPT

## 2017-03-31 PROCEDURE — 700112 HCHG RX REV CODE 229: Performed by: CLINICAL NURSE SPECIALIST

## 2017-03-31 PROCEDURE — 80202 ASSAY OF VANCOMYCIN: CPT

## 2017-03-31 PROCEDURE — A9270 NON-COVERED ITEM OR SERVICE: HCPCS | Performed by: INTERNAL MEDICINE

## 2017-03-31 PROCEDURE — 700102 HCHG RX REV CODE 250 W/ 637 OVERRIDE(OP): Performed by: INTERNAL MEDICINE

## 2017-03-31 PROCEDURE — 700101 HCHG RX REV CODE 250: Performed by: CLINICAL NURSE SPECIALIST

## 2017-03-31 RX ORDER — OXYCODONE AND ACETAMINOPHEN 10; 325 MG/1; MG/1
1-2 TABLET ORAL EVERY 4 HOURS PRN
Status: DISCONTINUED | OUTPATIENT
Start: 2017-03-31 | End: 2017-04-04 | Stop reason: HOSPADM

## 2017-03-31 RX ORDER — CALCIUM CARBONATE 500 MG/1
1000 TABLET, CHEWABLE ORAL EVERY 4 HOURS PRN
Status: DISCONTINUED | OUTPATIENT
Start: 2017-03-31 | End: 2017-04-04 | Stop reason: HOSPADM

## 2017-03-31 RX ADMIN — PIPERACILLIN AND TAZOBACTAM 4.5 G: 4; .5 INJECTION, POWDER, LYOPHILIZED, FOR SOLUTION INTRAVENOUS; PARENTERAL at 02:00

## 2017-03-31 RX ADMIN — DOCUSATE SODIUM 100 MG: 100 CAPSULE ORAL at 07:54

## 2017-03-31 RX ADMIN — OXYCODONE HYDROCHLORIDE AND ACETAMINOPHEN 2 TABLET: 10; 325 TABLET ORAL at 12:57

## 2017-03-31 RX ADMIN — OXYCODONE HYDROCHLORIDE AND ACETAMINOPHEN 2 TABLET: 10; 325 TABLET ORAL at 22:08

## 2017-03-31 RX ADMIN — LACTOBACILLUS ACIDOPHILUS / LACTOBACILLUS BULGARICUS 1 PACKET: 100 MILLION CFU STRENGTH GRANULES at 07:54

## 2017-03-31 RX ADMIN — POTASSIUM CHLORIDE AND SODIUM CHLORIDE: 900; 150 INJECTION, SOLUTION INTRAVENOUS at 05:18

## 2017-03-31 RX ADMIN — OXYCODONE HYDROCHLORIDE AND ACETAMINOPHEN 2 TABLET: 10; 325 TABLET ORAL at 17:07

## 2017-03-31 RX ADMIN — BUDESONIDE AND FORMOTEROL FUMARATE DIHYDRATE 2 PUFF: 80; 4.5 AEROSOL RESPIRATORY (INHALATION) at 07:57

## 2017-03-31 RX ADMIN — LACTOBACILLUS ACIDOPHILUS / LACTOBACILLUS BULGARICUS 1 PACKET: 100 MILLION CFU STRENGTH GRANULES at 11:57

## 2017-03-31 RX ADMIN — ACETAMINOPHEN 650 MG: 325 TABLET, FILM COATED ORAL at 06:31

## 2017-03-31 RX ADMIN — STANDARDIZED SENNA CONCENTRATE AND DOCUSATE SODIUM 1 TABLET: 8.6; 5 TABLET, FILM COATED ORAL at 20:19

## 2017-03-31 RX ADMIN — DOCUSATE SODIUM 100 MG: 100 CAPSULE ORAL at 20:19

## 2017-03-31 RX ADMIN — PIPERACILLIN AND TAZOBACTAM 4.5 G: 4; .5 INJECTION, POWDER, LYOPHILIZED, FOR SOLUTION INTRAVENOUS; PARENTERAL at 11:08

## 2017-03-31 RX ADMIN — LISINOPRIL 10 MG: 10 TABLET ORAL at 07:55

## 2017-03-31 RX ADMIN — PIPERACILLIN AND TAZOBACTAM 4.5 G: 4; .5 INJECTION, POWDER, LYOPHILIZED, FOR SOLUTION INTRAVENOUS; PARENTERAL at 17:07

## 2017-03-31 RX ADMIN — BUDESONIDE AND FORMOTEROL FUMARATE DIHYDRATE 2 PUFF: 80; 4.5 AEROSOL RESPIRATORY (INHALATION) at 20:21

## 2017-03-31 RX ADMIN — LACTOBACILLUS ACIDOPHILUS / LACTOBACILLUS BULGARICUS 1 PACKET: 100 MILLION CFU STRENGTH GRANULES at 17:07

## 2017-03-31 RX ADMIN — ATORVASTATIN CALCIUM 10 MG: 10 TABLET, FILM COATED ORAL at 07:54

## 2017-03-31 RX ADMIN — ACETAMINOPHEN 650 MG: 325 TABLET, FILM COATED ORAL at 01:07

## 2017-03-31 RX ADMIN — ANTACID TABLETS 1000 MG: 500 TABLET, CHEWABLE ORAL at 10:21

## 2017-03-31 RX ADMIN — LEVOTHYROXINE SODIUM 50 MCG: 50 TABLET ORAL at 06:31

## 2017-03-31 RX ADMIN — PIPERACILLIN AND TAZOBACTAM 4.5 G: 4; .5 INJECTION, POWDER, LYOPHILIZED, FOR SOLUTION INTRAVENOUS; PARENTERAL at 22:42

## 2017-03-31 RX ADMIN — OMEPRAZOLE 20 MG: 20 CAPSULE, DELAYED RELEASE ORAL at 07:55

## 2017-03-31 RX ADMIN — VANCOMYCIN HYDROCHLORIDE 1700 MG: 100 INJECTION, POWDER, LYOPHILIZED, FOR SOLUTION INTRAVENOUS at 11:57

## 2017-03-31 ASSESSMENT — PAIN SCALES - GENERAL
PAINLEVEL_OUTOF10: 4
PAINLEVEL_OUTOF10: 4
PAINLEVEL_OUTOF10: 5
PAINLEVEL_OUTOF10: 6
PAINLEVEL_OUTOF10: 6

## 2017-03-31 ASSESSMENT — ENCOUNTER SYMPTOMS
HEARTBURN: 0
WEAKNESS: 0
FOCAL WEAKNESS: 0
COUGH: 0
DIARRHEA: 0
CLAUDICATION: 0
NERVOUS/ANXIOUS: 0
DIZZINESS: 0
DEPRESSION: 0
HEADACHES: 0
BACK PAIN: 1
SEIZURES: 0
PHOTOPHOBIA: 0
MYALGIAS: 0
ABDOMINAL PAIN: 0
INSOMNIA: 0
BLURRED VISION: 0
SHORTNESS OF BREATH: 0
FEVER: 0

## 2017-03-31 NOTE — THERAPY
"PT adrienne attempted. Pt reporting this is not his first surgery and has spouse to assist with brace/activity prn. Did not have questions regarding mobility and reported he had been up earlier with nursing walking in hallway. PT adrienne will be \"completed\".  "

## 2017-03-31 NOTE — PROGRESS NOTES
Patient arrived to floor via hospital bed, PCA running per MAR, hemovac to suction, bed alarm in place, call light within reach, bed locked and in lowest position. No complaints of n/t, pain 5/10. Updated on POC, patient agreeable, family at bedside, no other needs at this time.

## 2017-03-31 NOTE — PROGRESS NOTES
"Neurosurgery :     Exam:   C/o LBP, using PCA but forgot in noc, no leg s/s except some mild left quad which was present pre-op, str 5/5, amb in hw's, voiding, eating, inc c/d flat w/ [id number]    Blood pressure 111/63, pulse 84, temperature 36.9 °C (98.4 °F), temperature source Temporal, resp. rate 16, height 1.727 m (5' 8\"), weight 115.4 kg (254 lb 6.6 oz), SpO2 94 %.    Recent Labs      03/29/17   1818  03/30/17   0940  03/31/17   0352   WBC  15.4*  7.9  5.9   RBC  4.23*  3.30*  3.17*   HEMOGLOBIN  12.2*  9.7*  9.2*   HEMATOCRIT  37.3*  28.9*  28.4*   MCV  88.2  87.6  89.6   MCH  28.8  29.4  29.0   MCHC  32.7*  33.6*  32.4*   RDW  42.5  42.0  42.6   PLATELETCT  393  251  247   MPV  9.3  9.3  9.5     Recent Labs      03/29/17   1818 03/30/17   0940  03/31/17   0352   SODIUM  135  137  131*   POTASSIUM  3.8  3.8  4.0   CHLORIDE  98  103  101   CO2  25  25  25   GLUCOSE  111*  112*  116*   BUN  12  9  8                Intake/Output Summary (Last 24 hours) at 03/31/17 0807  Last data filed at 03/31/17 0616   Gross per 24 hour   Intake   2002 ml   Output   1630 ml   Net    372 ml         • omeprazole  20 mg     • lisinopril  10 mg     • levothyroxine  50 mcg     • fluticasone  1 Spray     • atorvastatin  10 mg     • Respiratory Care per Protocol       • budesonide-formoterol  2 Puff     • NS  500 mL     • MD ALERT... vancomycin       • piperacillin-tazobactam  4.5 g Stopped (03/31/17 0300)   • lactobacillus granules  1 Packet     • vancomycin (VANCOCIN) IVPB (maintenance dose)  15 mg/kg Stopped (03/31/17 0129)   • MD ALERT...Do not administer NSAIDS or ASPIRIN unless ORDERED By Neurosurgery  1 Each     • docusate sodium  100 mg     • senna-docusate  1 Tab     • senna-docusate  1 Tab     • polyethylene glycol/lytes  1 Packet     • magnesium hydroxide  30 mL     • bisacodyl  10 mg     • fleet  1 Each     • 0.9 % NaCl with KCl 20 mEq 1,000 mL   100 mL/hr at 03/31/17 0518   • HYDROmorphone       • tizanidine  2 mg     • " acetaminophen  650 mg     • Pharmacy Consult Request  1 Each           Assessment and Plan:  POD # 1 Incision and drainage of lumbar wound    S/p approx 2 weeks from lumbar fusion, admit w/ fever     Gram stain- few wbc's, final cx pending  Cont abx for now  Likely pt will need to remain in-house pending final cx  Afebrile  Keep hvac for now  D/c pca, begin percocet 10's  ambulate

## 2017-03-31 NOTE — THERAPY
Pt reports back to baseline, comfortable with brace management and spinal precautions with assist from spouse. Reports no OT needs, have been up with nursing w/o difficulties. Will complete the order for now, no eval indicated.    Keiry Saldaña MS OTR/L, pager # [phone]

## 2017-03-31 NOTE — PROGRESS NOTES
Hospital Medicine Progress Note, Adult, Complex               Author: Brittnee Finley Date & Time created: 3/31/2017  4:31 PM     Interval History:  Patient feeling okay this am, still on PCA when evaluated and planning to transition to oral pain medication later today.  He states the fevers have resolved and he is starting to feel stronger post op.  One of two cultures growing staph and ID consulted for recommendations.  Vanco/zosyn to continue until changed by ID.    Review of Systems:  Review of Systems   Constitutional: Negative for fever and malaise/fatigue.   HENT: Negative for congestion and nosebleeds.    Eyes: Negative for blurred vision and photophobia.   Respiratory: Negative for cough and shortness of breath.    Cardiovascular: Negative for chest pain, claudication and leg swelling.   Gastrointestinal: Negative for heartburn, abdominal pain and diarrhea.   Genitourinary: Positive for hematuria. Negative for dysuria.   Musculoskeletal: Positive for back pain (controlled currently). Negative for myalgias.   Skin: Negative for itching and rash.   Neurological: Negative for dizziness, focal weakness, seizures, weakness and headaches.   Psychiatric/Behavioral: Negative for depression. The patient is not nervous/anxious and does not have insomnia.        Physical Exam:  Physical Exam   Constitutional: He is oriented to person, place, and time. He appears well-developed and well-nourished. No distress.   HENT:   Head: Normocephalic and atraumatic.   Eyes: Conjunctivae are normal. No scleral icterus.   Neck: Neck supple. No JVD present.   Cardiovascular: Normal rate and regular rhythm.  Exam reveals no gallop and no friction rub.    No murmur heard.  Pulmonary/Chest: Effort normal and breath sounds normal. No respiratory distress. He exhibits no tenderness.   Abdominal: Soft. Bowel sounds are normal. He exhibits no distension and no mass. There is no tenderness.   Musculoskeletal: He exhibits no edema or  tenderness.   hemovac in place this am.     Neurological: He is alert and oriented to person, place, and time. No cranial nerve deficit.   Skin: Skin is warm and dry. He is not diaphoretic. No erythema.   Psychiatric: He has a normal mood and affect. His behavior is normal.   Nursing note and vitals reviewed.      Labs:        Invalid input(s): [id number]      Recent Labs      1740  17   0352   SODIUM  135  137  131*   POTASSIUM  3.8  3.8  4.0   CHLORIDE  98  103  101   CO2  25  25  25   BUN  12  9  8   CREATININE  1.10  0.87  0.95   CALCIUM  9.9  9.3  8.8     Recent Labs      1740  17   0352   ALTSGPT  24   --    --    ASTSGOT  25   --    --    ALKPHOSPHAT  77   --    --    TBILIRUBIN  1.1   --    --    GLUCOSE  111*  112*  116*     Recent Labs      1740  17   0352   RBC  4.23*  3.30*  3.17*   HEMOGLOBIN  12.2*  9.7*  9.2*   HEMATOCRIT  37.3*  28.9*  28.4*   PLATELETCT  393  251  247     Recent Labs      1740  17   0352   WBC  15.4*  7.9  5.9   NEUTSPOLYS  84.80*   --   73.10*   LYMPHOCYTES  8.90*   --   13.80*   MONOCYTES  5.10   --   7.00   EOSINOPHILS  0.50   --   5.60   BASOPHILS  0.30   --   0.20   ASTSGOT  25   --    --    ALTSGPT  24   --    --    ALKPHOSPHAT  77   --    --    TBILIRUBIN  1.1   --    --            Hemodynamics:  Temp (24hrs), Av °C (98.6 °F), Min:36.4 °C (97.6 °F), Max:37.6 °C (99.6 °F)  Temperature: 37.6 °C (99.6 °F)  Pulse  Av  Min: 64  Max: 112Heart Rate (Monitored): 89  Blood Pressure : 100/66 mmHg     Respiratory:    Respiration: 18, Pulse Oximetry: 95 %     Work Of Breathing / Effort: Mild  RUL Breath Sounds: Clear, RML Breath Sounds: Diminished, RLL Breath Sounds: Diminished, COLE Breath Sounds: Clear, LLL Breath Sounds: Diminished  Fluids:    Intake/Output Summary (Last 24 hours) at 17 1631  Last data filed at 17 1400   Gross  per 24 hour   Intake   1402 ml   Output   1685 ml   Net   -283 ml        GI/Nutrition:  Orders Placed This Encounter   Procedures   • DIET ORDER     Standing Status: Standing      Number of Occurrences: 1      Standing Expiration Date:      Order Specific Question:  Diet:     Answer:  Regular [1]     Medical Decision Making, by Problem:  Active Hospital Problems    Diagnosis   • *Sepsis (CMS-[facility]) [A41.9]resolved     • Fluid collection at surgical site, lumbar  [T88.8XXA]s/p I&D with Dr Chakraborty, cultures pending     • Normocytic anemia [D64.9]monitor     • Ascending aorta dilation (CMS-[facility]), mild, needs OP followup [I77.810]   • Staph infection [B95.8]ID consultation, vanco until MRSA proven negative     • SIRS (systemic inflammatory response syndrome) (CMS-[facility]) [R65.10]resolved     • GERD (gastroesophageal reflux disease) [K21.9]Tums per patient request, continue prilosec     • HTN (hypertension) [I10]lisinopril     • Hyperlipidemia [E78.5]statin     • Hypothyroidism [E03.9]synthroid       Labs reviewed, Radiology images reviewed and Medications reviewed  Gomes catheter: No Gomes        DVT prophylaxis - mechanical: SCDs      Assessed for rehab: Patient was assess for and/or received rehabilitation services during this hospitalization

## 2017-03-31 NOTE — H&P
REASON FOR STATUS CHANGE:  Concerned for post-surgical infection with sepsis.    HISTORY OF PRESENT ILLNESS:  The patient is a 72-year-old gentleman with   history of recent L3-L4 laminectomy.  Patient now presents with complaints of   back pain and fever.  Upon admission, the patient was evaluated and found to   have tachycardia along with leukocytosis and fever.  Because of recent   procedure.  The patient will be rolled into inpatient status and started on   antibiotics.  Surgical consultation from neurosurgery has been requested.    PAST MEDICAL HISTORY:  1.  Recent L3-L4 laminectomy.  2.  Obesity with BMI greater than 35.  3.  Hypertension.  4.  Hypothyroidism.  5.  Chronic back pain.    PAST SURGICAL HISTORY:  L spine laminectomy, total hip arthroplasty and   herniorrhaphy.    FAMILY HISTORY, SOCIAL HISTORY, ALLERGIES:  Unchanged from 03/29/2017.    MEDICATIONS:  1.  Acetaminophen 650 mg q. 6 hours p.r.n. for mild pain.  2.  Atorvastatin 10 mg daily.  3.  Symbicort 2 puffs b.i.d.  4.  Fluticasone 1 spray daily p.r.n.  5.  Dilaudid PCA.  6.  Lactobacillus granules 1 packet 3 times a day.  7.  Levothyroxine 50 mcg daily.  8.   Lisinopril 10 mg daily.  9.   Vancomycin per pharmacy.  10.  Meperidine per surgery protocol.  11.  Midazolam 500 mL p.r.n. per surgery protocol.  12.  Omeprazole 20 mg daily.  13.  Oxycodone 5 mL per surgery protocol.  14.   Zosyn 4.5 g every 6 hours, RT protocol regimen.          REVIEW OF SYSTEMS:  Unchanged from 03/29/2017.    PHYSICAL EXAMINATION:  GENERAL:  Obese white male in mild distress.  VITAL SIGNS:  Temp 37.3, heart rate 87, respiration 18, blood pressure 102/58,   saturating 93% on room air.  HEENT:  Normocephalic, atraumatic, pupils are equal, round and reactive to   light, anicteric sclerae, extraocular muscles intact.  NECK:  No cervical lymphadenopathy appreciated.  Oropharynx is small with   Mallampati score of 3-4.   Mucosa is moist, clear without ulcerations or    exudates.  PULMONARY:  Clear to auscultation bilaterally.  CARDIOVASCULAR:  Regular rate and rhythm without murmurs, rubs or gallops.  ABDOMEN:  Positive bowel sounds, soft, nontender, nondistended.  EXTREMITIES:  No clubbing, cyanosis or edema.  NEUROLOGIC:  Cranial nerves II-XII intact.  Nonfocal finding.  SKIN:  Patient has well healing L spine scar with some dried blood in his   shirt with slight inflammation in the distal portion.    LABORATORY:  WBC of 7.9, hemoglobin 9.7, platelets 251.  Sodium 137, potassium   3.9, chloride 103, bicarbonate 25, BUN 9, creatinine 0.87, glucose of 112.    ASSESSMENT AND PLAN:  A 72-year-old gentleman with recent L-spine surgery, now   with fevers and leukocytosis concerning for post-surgery infection.  1.  Presumptive postsurgery infection:  Continue with the vancomycin and   Zosyn.  We will follow fluid culture as well as blood cultures.  Neurosurgery   is on the case.  2.  Snoring history:  Recommend outpatient followup.  3.  Obesity:  Encourage dietary kilocalorie restrictions.  4.  Chronic back pain:  Provide judicious use of narcotics.  5.  Sepsis:  Patient's symptoms of tachycardia and leukocytosis seem to be   decreasing.  Continue with the treatment.  6.  Stable issues:  Medical history including hypothyroidism.  7.  Preventives:  Provide incentive spirometry, Pneumovax and Fluvax as   appropriate, stool softener and SCDs.    DISPOSITION:  Complex and guarded.       ____________________________________     MD MARISELA HERZOG / BEA    DD:  03/30/2017 14:40:03  DT:  03/30/2017 18:09:17    D#:  [id number]  Job#:  [id number]    cc: VARGHESE BRITT MD

## 2017-03-31 NOTE — PROGRESS NOTES
Patient updated on POC. Patient is AAOx4 and pleasant. Patient reports feeling improvement and hopes to go home soon. Patient used call light appropriately for assistance to bathroom; pt had first BM since 3/28. Wife is at bedside. Patient reports lower back pain at 4-5/10; describes it as throbbing and burning at the incision site. Incision site shows no signs of infection. Body systems are WDL except for diminished lower lungs sounds; patient instructed to use incentive spirometer 5-10 times per hour.

## 2017-03-31 NOTE — PROGRESS NOTES
"Pharmacy Kinetics 72 y.o. male on vancomycin day # 2 3/31/2017    Currently on Vancomycin 1700 mg iv q12hr (00, 12)    Indication for Treatment: SSTI/paraspinal fluid collection    Pertinent history per medical record: Admitted on 3/29/2017 for post-operative fevers.  Patient had recently been admitted for a L3-L5 fusion. After discharge, he continued to have night sweats and shaking chills.  MRI demonstrated \"large posterior paraspinal fluid collection at the level of the L3-L4 laminectomy casing severe compromise of the thecal sac.\"  Antibiotics initiated and neurosurgery consulted. 3/30/2017 I&D of operative site, removal of serosanguinous fluid and clot.  Cultures taken.    Other antibiotics: Pip/Tazo 4.5 g IV q6h    Allergies: Review of patient's allergies indicates no known allergies.     List concerns for renal function: age, BMI 38, SIRS    Pertinent cultures to date:   Results for AMOR RIVAS (MRN [id number]) as of 3/31/2017 12:08   3/29/2017 18:18 3/29/2017 20:32 3/30/2017 12:23 3/30/2017 12:49   Source BLD BLD WND TISS   Site PERIPHERAL PERIPHERAL Lumbar wound Deep Tissue Space   Gram Stain Result   Few WBCs.... Rare WBCs....   Significant Indicator NEG NEG . .       Recent Labs      17   0940  17   0352   WBC  15.4*  7.9  5.9   NEUTSPOLYS  84.80*   --   73.10*     Recent Labs      17   0940  17   0352   BUN  12  9  8   CREATININE  1.10  0.87  0.95   ALBUMIN  4.0   --    --      Recent Labs      17   0940  17   1038   [facility]  [facility]  16.9     Intake/Output Summary (Last 24 hours) at 17 1213  Last data filed at 17 0616   Gross per 24 hour   Intake   2002 ml   Output   1630 ml   Net    372 ml      Blood pressure 112/74, pulse 89, temperature 36.4 °C (97.6 °F), temperature source Temporal, resp. rate 18, height 1.727 m (5' 8\"), weight 115.4 kg (254 lb 6.6 oz), SpO2 95 %. Temp (24hrs), Av.8 °C (98.3 °F), Min:36.4 °C " (97.5 °F), Max:37.2 °C (99 °F)      A/P   1. Vancomycin dose change: not indicated  2. Next vancomycin level: will recheck in ~ 2 days  3. Goal trough: 16-20 mcg/mL  4. Comments: Urine output ok.  Renal labs ok.  Gram stain noted from wound.  No organisms seen.  Trough not at steady state.  Will continue with current dose for now.  Follow closely to avoid accumulation.    LAINE Abebe, PharmD, BCPS

## 2017-04-01 ENCOUNTER — APPOINTMENT (OUTPATIENT)
Dept: RADIOLOGY | Facility: MEDICAL CENTER | Age: 72
DRG: 857 | End: 2017-04-01
Attending: INTERNAL MEDICINE
Payer: MEDICARE

## 2017-04-01 PROCEDURE — A9270 NON-COVERED ITEM OR SERVICE: HCPCS | Performed by: CLINICAL NURSE SPECIALIST

## 2017-04-01 PROCEDURE — 700102 HCHG RX REV CODE 250 W/ 637 OVERRIDE(OP): Performed by: CLINICAL NURSE SPECIALIST

## 2017-04-01 PROCEDURE — 700102 HCHG RX REV CODE 250 W/ 637 OVERRIDE(OP): Performed by: HOSPITALIST

## 2017-04-01 PROCEDURE — 700101 HCHG RX REV CODE 250: Performed by: CLINICAL NURSE SPECIALIST

## 2017-04-01 PROCEDURE — 700105 HCHG RX REV CODE 258

## 2017-04-01 PROCEDURE — 36569 INSJ PICC 5 YR+ W/O IMAGING: CPT

## 2017-04-01 PROCEDURE — 700111 HCHG RX REV CODE 636 W/ 250 OVERRIDE (IP): Performed by: HOSPITALIST

## 2017-04-01 PROCEDURE — A9270 NON-COVERED ITEM OR SERVICE: HCPCS | Performed by: HOSPITALIST

## 2017-04-01 PROCEDURE — 700111 HCHG RX REV CODE 636 W/ 250 OVERRIDE (IP)

## 2017-04-01 PROCEDURE — 770001 HCHG ROOM/CARE - MED/SURG/GYN PRIV*

## 2017-04-01 PROCEDURE — 99233 SBSQ HOSP IP/OBS HIGH 50: CPT | Performed by: INTERNAL MEDICINE

## 2017-04-01 PROCEDURE — C1751 CATH, INF, PER/CENT/MIDLINE: HCPCS

## 2017-04-01 PROCEDURE — 700105 HCHG RX REV CODE 258: Performed by: HOSPITALIST

## 2017-04-01 PROCEDURE — 700112 HCHG RX REV CODE 229: Performed by: CLINICAL NURSE SPECIALIST

## 2017-04-01 RX ADMIN — VANCOMYCIN HYDROCHLORIDE 1700 MG: 100 INJECTION, POWDER, LYOPHILIZED, FOR SOLUTION INTRAVENOUS at 00:18

## 2017-04-01 RX ADMIN — OXYCODONE HYDROCHLORIDE AND ACETAMINOPHEN 1 TABLET: 10; 325 TABLET ORAL at 18:43

## 2017-04-01 RX ADMIN — BUDESONIDE AND FORMOTEROL FUMARATE DIHYDRATE 2 PUFF: 80; 4.5 AEROSOL RESPIRATORY (INHALATION) at 20:32

## 2017-04-01 RX ADMIN — DOCUSATE SODIUM 100 MG: 100 CAPSULE ORAL at 20:32

## 2017-04-01 RX ADMIN — OMEPRAZOLE 20 MG: 20 CAPSULE, DELAYED RELEASE ORAL at 10:04

## 2017-04-01 RX ADMIN — PIPERACILLIN AND TAZOBACTAM 4.5 G: 4; .5 INJECTION, POWDER, LYOPHILIZED, FOR SOLUTION INTRAVENOUS; PARENTERAL at 04:06

## 2017-04-01 RX ADMIN — LISINOPRIL 10 MG: 10 TABLET ORAL at 10:04

## 2017-04-01 RX ADMIN — LACTOBACILLUS ACIDOPHILUS / LACTOBACILLUS BULGARICUS 1 PACKET: 100 MILLION CFU STRENGTH GRANULES at 10:02

## 2017-04-01 RX ADMIN — OXYCODONE HYDROCHLORIDE AND ACETAMINOPHEN 2 TABLET: 10; 325 TABLET ORAL at 02:17

## 2017-04-01 RX ADMIN — VANCOMYCIN HYDROCHLORIDE 1700 MG: 100 INJECTION, POWDER, LYOPHILIZED, FOR SOLUTION INTRAVENOUS at 10:13

## 2017-04-01 RX ADMIN — TIZANIDINE 2 MG: 4 TABLET ORAL at 10:13

## 2017-04-01 RX ADMIN — LACTOBACILLUS ACIDOPHILUS / LACTOBACILLUS BULGARICUS 1 PACKET: 100 MILLION CFU STRENGTH GRANULES at 18:38

## 2017-04-01 RX ADMIN — LEVOTHYROXINE SODIUM 50 MCG: 50 TABLET ORAL at 05:50

## 2017-04-01 RX ADMIN — STANDARDIZED SENNA CONCENTRATE AND DOCUSATE SODIUM 1 TABLET: 8.6; 5 TABLET, FILM COATED ORAL at 20:32

## 2017-04-01 RX ADMIN — FLUTICASONE PROPIONATE 50 MCG: 50 SPRAY, METERED NASAL at 21:00

## 2017-04-01 RX ADMIN — DOCUSATE SODIUM 100 MG: 100 CAPSULE ORAL at 10:04

## 2017-04-01 RX ADMIN — OXYCODONE HYDROCHLORIDE AND ACETAMINOPHEN 1 TABLET: 10; 325 TABLET ORAL at 10:14

## 2017-04-01 RX ADMIN — ATORVASTATIN CALCIUM 10 MG: 10 TABLET, FILM COATED ORAL at 10:02

## 2017-04-01 RX ADMIN — POTASSIUM CHLORIDE AND SODIUM CHLORIDE: 900; 150 INJECTION, SOLUTION INTRAVENOUS at 15:50

## 2017-04-01 ASSESSMENT — ENCOUNTER SYMPTOMS
DIARRHEA: 0
BLURRED VISION: 0
DEPRESSION: 0
FEVER: 0
CLAUDICATION: 0
WEAKNESS: 0
COUGH: 0
FOCAL WEAKNESS: 0
ABDOMINAL PAIN: 0
DIZZINESS: 0
INSOMNIA: 0
MYALGIAS: 0
HEARTBURN: 0
HEADACHES: 0
NERVOUS/ANXIOUS: 0
PHOTOPHOBIA: 0
SHORTNESS OF BREATH: 0
SEIZURES: 0
BACK PAIN: 1

## 2017-04-01 ASSESSMENT — PAIN SCALES - GENERAL
PAINLEVEL_OUTOF10: 5
PAINLEVEL_OUTOF10: 4
PAINLEVEL_OUTOF10: 5
PAINLEVEL_OUTOF10: 4
PAINLEVEL_OUTOF10: 4

## 2017-04-01 NOTE — PROGRESS NOTES
PICC Insertion Final 3CG    Consents confirmed, vessel patency confirmed with ultrasound. Risks and benefits of procedure explained to patient/family and education regarding central line associated bloodstream infections provided. Questions answered.     PICC placed in RUE per MD order with ultrasound guidance. 4 Fr, single lumen PICC placed in basilic vein after one attempt(s). 3 cc's of 1% lidocaine injected intradermally, 21 gauge microintroducer needle and modified Seldinger technique used. 46 cm catheter inserted with good blood return. Secured at 0cm marker. Each lumen flushed without resistance with 10 mL 0.9% normal saline. PICC line secured with Biopatch and Tegaderm.    PICC placement is confirmed by nurse using 3CG technology. PICC line is appropriate for use at this time. Pt tolerated procedure well.  Patient condition relayed to unit RN or ordering physician via this post procedure note in the EMR.     PSafe Power PICC ref # [id number], Lot # [id number]

## 2017-04-01 NOTE — CONSULTS
DATE OF SERVICE:  04/01/2017    Patient of Dr. Chakraborty, consult requested by Dr. Chakraborty.    REASON FOR CONSULTATION:  Patient with lumbar wound.    HISTORY OF PRESENT ILLNESS:  The patient is a 72-year-old male who was   admitted to [facility] on March 29.  History is obtained   from chart review as well as discussed the case with the patient and KWESI Lou for Dr. Chakraborty.    The patient's history begins on March 17 when he underwent a redo operation by   Dr. Chakraborty.  The patient had previous lumbar surgery at L3-L4 with hardware.    Dr. Chakraborty took the patient back to surgery on March 17, where he performed a   redo L3-L4 lumbar laminectomy, removal of posterior hardware at L4-L5,   placement of pedicle screws bilaterally at L3, arthrodesis at L3-4 and   bilateral foraminotomies at L3-L4.  The patient does have hardware and whitney   placed at this time.    The patient states that he did have fever while in the hospital, but no other   problems.  The patient was discharged uneventfully.  While at home, the   patient continued to run problems with some low-grade fever.  Two days prior   to admission, the patient began to develop some problems with increasing back   pain.  On the day prior to admission, the patient developed drenching sweats   along with increasing back pain.  The patient followed with Dr. Chakraborty at a   scheduled visit that day in his office and was admitted to the hospital.    The patient was taken to surgery by Dr. Chakraborty on March 30, where he performed   an incision and drainage of the patient's lumbar wound.  Cultures were   obtained, both superficial and deep.  Blood cultures have grown coagulase   negative staph and for that reason, an infectious disease consult has been   requested.    The patient's history is as stated above.  The patient states his back pain is   improving at this time.  His fever are now gone.  The patient denies any   drainage from his back wound any time  following his surgery.  The patient   states he did well, at first following surgery was active, increasing his   activity until just a few days prior to surgery.  The patient denies any   significant trauma to his back.    The patient at this time denies any headache.  He did have slight headache   following the operation, but that is now gone.  He denies any earache or sore   throat.  He denies any new cough.  He does have chronic bronchiectasis and has   had this for years.  He does treat himself for that.  He has been doing   increasing pulmonary toilet for some plugs that he has had following the   surgery, but the patient denies any increased sputum production or chest pain.    He denies any shortness of breath.  He denies any nausea or vomiting.  He   denies any abdominal pain.  He denies any diarrhea.  He denies any frequency,   urgency or dysuria.  He denies any other sore joints, muscles, rashes or other   problems at this time.    PAST MEDICAL HISTORY:  ALLERGIES:  None.    ILLNESSES:  Patient carries the diagnoses of bronchiectasis, hypertension,   hypothyroidism, dyslipidemia, chronic back pain, and obesity with a BMI   reported greater than 35.    SURGERIES:  The patient is status post lumbar surgery in 1998 and surgeries   mentioned above.  He has also had a right hip arthroplasty performed in   01/21/2009.  He had a left knee arthroplasty on 05/26/2009.  He has had hernia   repair.  He has had cholecystectomy, tonsils and adenoids removed, and   appendectomy.    MEDICATIONS:  On admission, the patient was on acetaminophen.  He is on   atorvastatin 10 mg daily, Symbicort 2 puffs b.i.d., ____ 1 spray p.r.n.,   levothyroxine 50 mcg daily, lisinopril 10 mg daily, Advair Diskus 100/50,   doxycycline 100 mg every 48 hours, multivitamin, oxycodone for pain following   the surgery, Prilosec 20 mg daily, vitamin D ____ daily, and Zyrtec-D 5/120   daily.    FAMILY HISTORY:  Positive for coronary artery disease  and hypertension in   patient's father.    SOCIAL HISTORY:  The patient is .  He is a lifelong nonsmoker.  Uses   alcohol only socially.  He denies any IV drug or recreational drug use.  The   patient is a retired respiratory therapist.    REVIEW OF SYSTEMS:  Negative other than those listed above and below.  CENTRAL NERVOUS SYSTEM:  No history of head trauma, seizures, migraines,   cerebrovascular accidents, or TIAs.  PULMONARY:  Patient does have a history of bronchiectasis.  No history of   pulmonary emboli, hospitalization for bronchitis, pneumonia, tuberculosis or   other pulmonary disease.  No history of hematochezia or hematemesis.  CARDIAC:  No history of myocardial infarction or vascular disease.  He does   have history of hypertension.  GASTROINTESTINAL:  No history of peptic ulcer disease, gastroesophageal reflux   disease, hematochezia, hematemesis, irritable bowel, diverticulosis,   hepatitis or unexplained weight loss.  GENITOURINARY:  No history of kidney stones or bladder infection.  MUSCULOSKELETAL:  No history of gout, other arthropathies, or skin conditions.    PHYSICAL EXAMINATION:  VITAL SIGNS:  T-max past 24 hours is 99.6, most recent temperature is 98, even   respirations 18, pulse 87, blood pressure is 118/71.  The patient is 1.27   meters tall.  He weighs 115.4 kilograms.  His BMI is 38.68 kilograms/m2.  He   has BSA is 2.4 m2.  GENERAL:  The patient is normally developed, normally nourished appearing   obese male who appears in no acute distress at this time.  HEENT:  Head appears normocephalic.  Eyes:  Pupils are equal and round.  Nose   and mouth:  No acute sores or lesions are noted.  NECK:  Supple.  LUNGS:  Breath sounds are clear to auscultation bilaterally.  HEART:  Regular rate and rhythm without murmur.  BACK:  No spine or CVA tenderness other than for in his lower back, in the   area of this recent surgery.  The surgical incision is intact with staples at   this time.  There  was minimal drainage with Hemovac at this time.  The   drainage is serosanguineous.  ABDOMEN:  Soft without significant tenderness, guarding, rigidity or rebound.  GENITOURINARY:  No acute sores or lesions are noted.  EXTREMITIES:  No acute sores or lesions are seen.  CENTRAL NERVOUS SYSTEM:  Mental status:  The patient has been oriented x3.    Motor and sensory are grossly intact.    LABORATORY DATA:  From admission, the patient's CBC showed a white blood count   of 15.4, 84.8% segs, 8.9% lymphs, 5.1% monos, 0.5% eosinophils and ____%   basophils.  From yesterday, the patient's CBC shows a white blood cell count   of 5.9, 73.1% segs, 13.8% lymphs, 7.0% monos, 5.6% eosinophils and 0.2%   basophils.  Also, from yesterday, hemoglobin and hematocrit of 9.2 and 28.4   and platelet count is 247,000.  A Westergren sed rate from the 29th was 60   with 20 being the upper limit of normal.  From yesterday, the patient's sodium   is 131, potassium 4.0, chloride 101, bicarb 25, glucose 116, BUN 8,   creatinine 0.9 and calcium 8.8.  From the 29th, the chem panel shows similar   results with SGOT 25, SGPT 24, alkaline phosphatase 77, total bilirubin 1.1,   total protein 7.1, albumin 4.0 and globulin 3.1.  Lactic acid from the 29th   was 1.5.  A C-reactive protein from the March 29 was 21.85 with 0.75 being the   upper limit of normal.  Vancomycin trough from this morning was 16.9.    Urinalysis on admission was unremarkable.    IMAGING STUDY:  Imaging studies of this hospitalization, MRI of the lumbar   spine from the 30th shows a large posterior paraspinal fluid collection at   L3-L4 measuring 10 x 7 cm.  Other changes noted from surgery were present.    There is minimal epidural enhancement.  Also, from the 29th, the patient had   CT scan of his chest done, which showed no evidence for pulmonary emboli.  He   does have a 4 cm ascending aortic aneurysm.  There is some patchy left basilar   lung changes consistent with  atelectasis consolidation.  There was scarring   seen in the right base, which is unchanged as compared to a CT scan done from   08/01/2007.  Culture from the patient's deep and superficial wounds have grown   a coagulase negative staph, sensitivities will be available later today.    IMPRESSION:  1.  Lumbar wound infection with coagulase negative staph.  2.  Status post lumbar surgery with hardware placement on 03/17/2017.  3.  Leukocytosis.  4.  Essential hypertension.  5.  Bronchiectasis.  6.  Hypothyroidism.  7.  Dyslipidemia.  8.  Obesity.    DISCUSSION:  This patient appears unfortunately to havedevelop a deep   wound infection with coagulase negative staph.  He has hardware in place.    Dr. Chakraborty has appropriately I and D'd the patient's wound.  The patient is   on vancomycin and Zosyn.    The patient is only growing coagulase negative staph at this time.  I think it   is safe to go ahead and discontinue the patient's Zosyn.  We will leave the   patient on vancomycin for the time being.  His vancomycin trough is good at   this time.  We will begin the process of preparing this patient for outpatient   IV antibiotic therapy.  We will contact his insurance company on Monday for   approval to treat him as an outpatient.  The patient will ultimately be treated   with daptomycin as an outpatient as he will require 6 weeks of therapy.    The patient appears to be doing well at this time.  We will continue to   observe him with his neurosurgeons.  I will arrange for him to have a PICC   line placed this weekend.  The patient can increase his activity as directed   by neurosurgery.    PLAN:  1.  Discontinue Zosyn.  2.  Continue vancomycin at current dosing.  3.  Pharmacokinetic dosing vancomycin.  4.  PICC line.  5.  Close observation of patient's clinical course and progress.  6.  Increase activity as tolerated.  7.  Contact the patient's insurance company on Monday to obtain approval for   outpatient antibiotic  therapy.  8.  Encourage diet as appropriate.    Thank you for allowing me to see this interesting and complicated patient in   consultation and assist in his care.  I reviewed his current records as well   as old hospital records, discussed the case with neurosurgery as well as with   the patient.  I spent over 65 minutes performing exam on the patient,   preparing his report and review of these records.       ____________________________________     MD MARGIE ZAIDI / BEA    DD:  04/01/2017 09:17:54  DT:  04/01/2017 11:50:09    D#:  [id number]  Job#:  [id number]    cc: JOSIAH SOLITARIO MD

## 2017-04-01 NOTE — PROGRESS NOTES
Hospital Medicine Progress Note, Adult, Complex               Author: Brittnee Finley Date & Time created: 4/1/2017  10:46 AM     Interval History:  3/31 Patient feeling okay this am, still on PCA when evaluated and planning to transition to oral pain medication later today.  He states the fevers have resolved and he is starting to feel stronger post op.  One of two cultures growing staph and ID consulted for recommendations.  Vanco/zosyn to continue until changed by ID.  4/1 Patient feeling okay, anxious to get out of the hospital.  PICC pending placement today, OP abx infusion being coordinated on Monday by Dr Vasquez.  Coag [place] on cultures, final sensitivities pending.  Hemovac in place.  Patient in good spirits and pain well controlled.  Patient ambulating well.    Review of Systems:  Review of Systems   Constitutional: Negative for fever and malaise/fatigue.   HENT: Negative for congestion and nosebleeds.    Eyes: Negative for blurred vision and photophobia.   Respiratory: Negative for cough and shortness of breath.    Cardiovascular: Negative for chest pain, claudication and leg swelling.   Gastrointestinal: Negative for heartburn, abdominal pain and diarrhea.   Genitourinary: Negative for dysuria and hematuria.   Musculoskeletal: Positive for back pain (controlled currently). Negative for myalgias.   Skin: Negative for itching and rash.   Neurological: Negative for dizziness, focal weakness, seizures, weakness and headaches.   Psychiatric/Behavioral: Negative for depression. The patient is not nervous/anxious and does not have insomnia.        Physical Exam:  Physical Exam   Constitutional: He is oriented to person, place, and time. He appears well-developed and well-nourished. No distress.   HENT:   Head: Normocephalic and atraumatic.   Eyes: Conjunctivae are normal. No scleral icterus.   Neck: Neck supple. No JVD present.   Cardiovascular: Normal rate and regular rhythm.  Exam reveals no gallop and no  friction rub.    No murmur heard.  Pulmonary/Chest: Effort normal and breath sounds normal. No respiratory distress. He exhibits no tenderness.   Abdominal: Soft. Bowel sounds are normal. He exhibits no distension and no mass. There is no tenderness.   Musculoskeletal: He exhibits no edema or tenderness.   hemovac in place    Neurological: He is alert and oriented to person, place, and time. No cranial nerve deficit.   Skin: Skin is warm and dry. He is not diaphoretic. No erythema.   Psychiatric: He has a normal mood and affect. His behavior is normal.   Nursing note and vitals reviewed.      Labs:        Invalid input(s): [id number]      Recent Labs      17   0940  17   0352   SODIUM  135  137  131*   POTASSIUM  3.8  3.8  4.0   CHLORIDE  98  103  101   CO2  25  25  25   BUN  12  9  8   CREATININE  1.10  0.87  0.95   CALCIUM  9.9  9.3  8.8     Recent Labs      17   0940  17   0352   ALTSGPT  24   --    --    ASTSGOT  25   --    --    ALKPHOSPHAT  77   --    --    TBILIRUBIN  1.1   --    --    GLUCOSE  111*  112*  116*     Recent Labs      17   0940  17   0352   RBC  4.23*  3.30*  3.17*   HEMOGLOBIN  12.2*  9.7*  9.2*   HEMATOCRIT  37.3*  28.9*  28.4*   PLATELETCT  393  251  247     Recent Labs      1740  17   0352   WBC  15.4*  7.9  5.9   NEUTSPOLYS  84.80*   --   73.10*   LYMPHOCYTES  8.90*   --   13.80*   MONOCYTES  5.10   --   7.00   EOSINOPHILS  0.50   --   5.60   BASOPHILS  0.30   --   0.20   ASTSGOT  25   --    --    ALTSGPT  24   --    --    ALKPHOSPHAT  77   --    --    TBILIRUBIN  1.1   --    --            Hemodynamics:  Temp (24hrs), Av.9 °C (98.5 °F), Min:36.4 °C (97.6 °F), Max:37.6 °C (99.6 °F)  Temperature: 36.7 °C (98 °F)  Pulse  Av.7  Min: 64  Max: 112   Blood Pressure : 118/71 mmHg     Respiratory:    Respiration: 18, Pulse Oximetry: 97 %        RUL Breath Sounds:  Clear, RML Breath Sounds: Clear, RLL Breath Sounds: Diminished, COLE Breath Sounds: Clear, LLL Breath Sounds: Diminished  Fluids:    Intake/Output Summary (Last 24 hours) at 04/01/17 1046  Last data filed at 04/01/17 0600   Gross per 24 hour   Intake    500 ml   Output    630 ml   Net   -130 ml        GI/Nutrition:  Orders Placed This Encounter   Procedures   • DIET ORDER     Standing Status: Standing      Number of Occurrences: 1      Standing Expiration Date:      Order Specific Question:  Diet:     Answer:  Regular [1]     Medical Decision Making, by Problem:  Active Hospital Problems    Diagnosis   • *Sepsis (CMS-HCC) [A41.9]resolved     • Fluid collection at surgical site, lumbar  [T88.8XXA]s/p I&D with Dr Chakraborty, cultures coag neg staph     • Normocytic anemia [D64.9]monitor     • Ascending aorta dilation (CMS-HCC), mild, needs OP followup [I77.810]     • Staph infection [B95.8]ID consultation, vanco until MRSA proven negative, PICC 4/1/17     • SIRS (systemic inflammatory response syndrome) (CMS-HCC) [R65.10]resolved     • GERD (gastroesophageal reflux disease) [K21.9]Tums per patient request, continue prilosec     • HTN (hypertension) [I10]lisinopril     • Hyperlipidemia [E78.5]statin     • Hypothyroidism [E03.9]synthroid       Labs reviewed, Radiology images reviewed and Medications reviewed  Gomes catheter: No Gomes        DVT prophylaxis - mechanical: SCDs

## 2017-04-01 NOTE — CARE PLAN
Problem: Safety  Goal: Will remain free from injury  Intervention: Educate patient and significant other/support system about adaptive mobility strategies and safe transfers  Pt. Educated on using call light to call for assistance when getting up out of bed.       Problem: Venous Thromboembolism (VTW)/Deep Vein Thrombosis (DVT) Prevention:  Goal: Patient will participate in Venous Thrombosis (VTE)/Deep Vein Thrombosis (DVT)Prevention Measures  Intervention: Ensure patient wears graduated elastic stockings (SARAH hose) and/or SCDs, if ordered, when in bed or chair (Remove at least once per shift for skin check)  Pt. Was assessed to ensure that SCD's were in place and powered on. Pt. Was assessed for s/s of VTE/DVT, none were found at this time.

## 2017-04-01 NOTE — PROGRESS NOTES
Received report from  Pepper MIGUEL. Pt is A&Ox4. Denies any n/v or n/t. States back pain of 6/10. Medicated appropriately per MAR w/positive results. Pt ambulated w/standy by assist and the use of the front wheel walker. Pt updated on POC. All needs met at this time. Pt is instructed to call when in need of assistance. Bed in lowest and locked position. Call light within reach. Bed alarm and hourly rounding in place.

## 2017-04-01 NOTE — PROGRESS NOTES
"Neurosurgery :     Exam:   Pt awake alert, ambulated in hallways  LE str 5/5  Sens equal to light touch  Inc c/d/i with drsg/ hvac      Blood pressure 118/71, pulse 87, temperature 36.7 °C (98 °F), temperature source Temporal, resp. rate 18, height 1.727 m (5' 8\"), weight 115.4 kg (254 lb 6.6 oz), SpO2 97 %.    Recent Labs      03/29/17 1818 03/30/17   0940  03/31/17   0352   WBC  15.4*  7.9  5.9   RBC  4.23*  3.30*  3.17*   HEMOGLOBIN  12.2*  9.7*  9.2*   HEMATOCRIT  37.3*  28.9*  28.4*   MCV  88.2  87.6  89.6   MCH  28.8  29.4  29.0   MCHC  32.7*  33.6*  32.4*   RDW  42.5  42.0  42.6   PLATELETCT  393  251  247   MPV  9.3  9.3  9.5     Recent Labs      03/29/17 1818 03/30/17 0940  03/31/17   0352   SODIUM  135  137  131*   POTASSIUM  3.8  3.8  4.0   CHLORIDE  98  103  101   CO2  25  25  25   GLUCOSE  111*  112*  116*   BUN  12  9  8                Intake/Output Summary (Last 24 hours) at 04/01/17 1004  Last data filed at 04/01/17 0600   Gross per 24 hour   Intake    500 ml   Output    630 ml   Net   -130 ml         • oxycodone-acetaminophen  1-2 Tab     • calcium carbonate  1,000 mg     • omeprazole  20 mg     • lisinopril  10 mg     • levothyroxine  50 mcg     • fluticasone  1 Spray     • atorvastatin  10 mg     • Respiratory Care per Protocol       • budesonide-formoterol  2 Puff     • NS  500 mL     • MD ALERT... vancomycin       • lactobacillus granules  1 Packet     • vancomycin (VANCOCIN) IVPB (maintenance dose)  15 mg/kg Stopped (04/01/17 0148)   • MD ALERT...Do not administer NSAIDS or ASPIRIN unless ORDERED By Neurosurgery  1 Each     • docusate sodium  100 mg     • senna-docusate  1 Tab     • senna-docusate  1 Tab     • polyethylene glycol/lytes  1 Packet     • magnesium hydroxide  30 mL     • bisacodyl  10 mg     • fleet  1 Each     • 0.9 % NaCl with KCl 20 mEq 1,000 mL   100 mL/hr at 03/31/17 0518   • tizanidine  2 mg     • Pharmacy Consult Request  1 Each           Assessment and Plan:  POD # " 2 Incision and drainage of lumbar wound    S/p approx 2 weeks from lumbar fusion, admit w/ fever     ABX per ID - PICC pending today  Final cx pending +coag neg staph  Keep hvac for now - 30 cc/8hrs  Cont pain control  Ambulate in hallways at least 4x/day

## 2017-04-01 NOTE — CONSULTS
[facility] Infectious Diseases Consult    Patient seen and examined, note dictated. We will work on obtaining outpatient therapy for the patient on Monday.

## 2017-04-01 NOTE — PROGRESS NOTES
Pt A&Ox4, complaining of mild numbness in L upper thigh and 4/10 low back pain, RN helped reposition and will give Percocet when available. Normal S1 and S2 heart sounds, respirations are even and unlabored with clear/diminished breath sounds on RA, encouraging pt to use IS 10/hr. Abdomen is soft, nontender with positive bowel sounds. Skin is warm and dry with no breakdown and no edema noted. Hemovac to suction, dressing intact with old dry drainage noted. Pt voiding on own, ambulating in room and to bathroom, standby assist, tolerating well. No s/s of distress, bed alarm on, call light within reach, will continue to monitor.

## 2017-04-02 LAB
ANION GAP SERPL CALC-SCNC: 5 MMOL/L (ref 0–11.9)
BACTERIA SPEC ANAEROBE CULT: NORMAL
BACTERIA SPEC ANAEROBE CULT: NORMAL
BACTERIA WND AEROBE CULT: ABNORMAL
BACTERIA WND AEROBE CULT: ABNORMAL
BASOPHILS # BLD AUTO: 0.5 % (ref 0–1.8)
BASOPHILS # BLD: 0.02 K/UL (ref 0–0.12)
BUN SERPL-MCNC: 4 MG/DL (ref 8–22)
CALCIUM SERPL-MCNC: 9.1 MG/DL (ref 8.5–10.5)
CHLORIDE SERPL-SCNC: 108 MMOL/L (ref 96–112)
CO2 SERPL-SCNC: 25 MMOL/L (ref 20–33)
CREAT SERPL-MCNC: 0.72 MG/DL (ref 0.5–1.4)
EOSINOPHIL # BLD AUTO: 0.26 K/UL (ref 0–0.51)
EOSINOPHIL NFR BLD: 6.4 % (ref 0–6.9)
ERYTHROCYTE [DISTWIDTH] IN BLOOD BY AUTOMATED COUNT: 40.8 FL (ref 35.9–50)
GFR SERPL CREATININE-BSD FRML MDRD: >60 ML/MIN/1.73 M 2
GLUCOSE SERPL-MCNC: 110 MG/DL (ref 65–99)
GRAM STN SPEC: ABNORMAL
HCT VFR BLD AUTO: 29.4 % (ref 42–52)
HGB BLD-MCNC: 9.7 G/DL (ref 14–18)
IMM GRANULOCYTES # BLD AUTO: 0.01 K/UL (ref 0–0.11)
IMM GRANULOCYTES NFR BLD AUTO: 0.2 % (ref 0–0.9)
LYMPHOCYTES # BLD AUTO: 0.79 K/UL (ref 1–4.8)
LYMPHOCYTES NFR BLD: 19.6 % (ref 22–41)
MCH RBC QN AUTO: 28.7 PG (ref 27–33)
MCHC RBC AUTO-ENTMCNC: 33 G/DL (ref 33.7–35.3)
MCV RBC AUTO: 87 FL (ref 81.4–97.8)
MONOCYTES # BLD AUTO: 0.4 K/UL (ref 0–0.85)
MONOCYTES NFR BLD AUTO: 9.9 % (ref 0–13.4)
NEUTROPHILS # BLD AUTO: 2.56 K/UL (ref 1.82–7.42)
NEUTROPHILS NFR BLD: 63.4 % (ref 44–72)
NRBC # BLD AUTO: 0 K/UL
NRBC BLD AUTO-RTO: 0 /100 WBC
PLATELET # BLD AUTO: 277 K/UL (ref 164–446)
PMV BLD AUTO: 9.2 FL (ref 9–12.9)
POTASSIUM SERPL-SCNC: 3.9 MMOL/L (ref 3.6–5.5)
RBC # BLD AUTO: 3.38 M/UL (ref 4.7–6.1)
SIGNIFICANT IND 70042: ABNORMAL
SIGNIFICANT IND 70042: NORMAL
SIGNIFICANT IND 70042: NORMAL
SITE SITE: ABNORMAL
SITE SITE: NORMAL
SITE SITE: NORMAL
SODIUM SERPL-SCNC: 138 MMOL/L (ref 135–145)
SOURCE SOURCE: ABNORMAL
SOURCE SOURCE: NORMAL
SOURCE SOURCE: NORMAL
WBC # BLD AUTO: 4 K/UL (ref 4.8–10.8)

## 2017-04-02 PROCEDURE — 700105 HCHG RX REV CODE 258

## 2017-04-02 PROCEDURE — 700111 HCHG RX REV CODE 636 W/ 250 OVERRIDE (IP)

## 2017-04-02 PROCEDURE — 85025 COMPLETE CBC W/AUTO DIFF WBC: CPT

## 2017-04-02 PROCEDURE — 700111 HCHG RX REV CODE 636 W/ 250 OVERRIDE (IP): Performed by: INTERNAL MEDICINE

## 2017-04-02 PROCEDURE — A9270 NON-COVERED ITEM OR SERVICE: HCPCS | Performed by: HOSPITALIST

## 2017-04-02 PROCEDURE — 700102 HCHG RX REV CODE 250 W/ 637 OVERRIDE(OP): Performed by: HOSPITALIST

## 2017-04-02 PROCEDURE — 700101 HCHG RX REV CODE 250: Performed by: CLINICAL NURSE SPECIALIST

## 2017-04-02 PROCEDURE — 80048 BASIC METABOLIC PNL TOTAL CA: CPT

## 2017-04-02 PROCEDURE — 700102 HCHG RX REV CODE 250 W/ 637 OVERRIDE(OP): Performed by: CLINICAL NURSE SPECIALIST

## 2017-04-02 PROCEDURE — 770001 HCHG ROOM/CARE - MED/SURG/GYN PRIV*

## 2017-04-02 PROCEDURE — 99232 SBSQ HOSP IP/OBS MODERATE 35: CPT | Performed by: INTERNAL MEDICINE

## 2017-04-02 PROCEDURE — A9270 NON-COVERED ITEM OR SERVICE: HCPCS | Performed by: CLINICAL NURSE SPECIALIST

## 2017-04-02 PROCEDURE — 700105 HCHG RX REV CODE 258: Performed by: INTERNAL MEDICINE

## 2017-04-02 PROCEDURE — 700112 HCHG RX REV CODE 229: Performed by: CLINICAL NURSE SPECIALIST

## 2017-04-02 RX ADMIN — DOCUSATE SODIUM 100 MG: 100 CAPSULE ORAL at 08:05

## 2017-04-02 RX ADMIN — POTASSIUM CHLORIDE AND SODIUM CHLORIDE: 900; 150 INJECTION, SOLUTION INTRAVENOUS at 23:43

## 2017-04-02 RX ADMIN — OMEPRAZOLE 20 MG: 20 CAPSULE, DELAYED RELEASE ORAL at 08:05

## 2017-04-02 RX ADMIN — BUDESONIDE AND FORMOTEROL FUMARATE DIHYDRATE 2 PUFF: 80; 4.5 AEROSOL RESPIRATORY (INHALATION) at 08:06

## 2017-04-02 RX ADMIN — OXYCODONE HYDROCHLORIDE AND ACETAMINOPHEN 1 TABLET: 10; 325 TABLET ORAL at 00:49

## 2017-04-02 RX ADMIN — VANCOMYCIN HYDROCHLORIDE 1700 MG: 100 INJECTION, POWDER, LYOPHILIZED, FOR SOLUTION INTRAVENOUS at 23:41

## 2017-04-02 RX ADMIN — STANDARDIZED SENNA CONCENTRATE AND DOCUSATE SODIUM 1 TABLET: 8.6; 5 TABLET, FILM COATED ORAL at 20:31

## 2017-04-02 RX ADMIN — LISINOPRIL 10 MG: 10 TABLET ORAL at 08:05

## 2017-04-02 RX ADMIN — LACTOBACILLUS ACIDOPHILUS / LACTOBACILLUS BULGARICUS 1 PACKET: 100 MILLION CFU STRENGTH GRANULES at 12:19

## 2017-04-02 RX ADMIN — POTASSIUM CHLORIDE AND SODIUM CHLORIDE: 900; 150 INJECTION, SOLUTION INTRAVENOUS at 00:45

## 2017-04-02 RX ADMIN — OXYCODONE HYDROCHLORIDE AND ACETAMINOPHEN 1 TABLET: 10; 325 TABLET ORAL at 06:08

## 2017-04-02 RX ADMIN — DOCUSATE SODIUM 100 MG: 100 CAPSULE ORAL at 20:31

## 2017-04-02 RX ADMIN — BUDESONIDE AND FORMOTEROL FUMARATE DIHYDRATE 2 PUFF: 80; 4.5 AEROSOL RESPIRATORY (INHALATION) at 20:31

## 2017-04-02 RX ADMIN — VANCOMYCIN HYDROCHLORIDE 1700 MG: 100 INJECTION, POWDER, LYOPHILIZED, FOR SOLUTION INTRAVENOUS at 00:34

## 2017-04-02 RX ADMIN — ATORVASTATIN CALCIUM 10 MG: 10 TABLET, FILM COATED ORAL at 08:05

## 2017-04-02 RX ADMIN — LEVOTHYROXINE SODIUM 50 MCG: 50 TABLET ORAL at 06:08

## 2017-04-02 RX ADMIN — LACTOBACILLUS ACIDOPHILUS / LACTOBACILLUS BULGARICUS 1 PACKET: 100 MILLION CFU STRENGTH GRANULES at 08:05

## 2017-04-02 RX ADMIN — TIZANIDINE 2 MG: 4 TABLET ORAL at 08:05

## 2017-04-02 RX ADMIN — OXYCODONE HYDROCHLORIDE AND ACETAMINOPHEN 2 TABLET: 10; 325 TABLET ORAL at 17:07

## 2017-04-02 RX ADMIN — VANCOMYCIN HYDROCHLORIDE 1700 MG: 100 INJECTION, POWDER, LYOPHILIZED, FOR SOLUTION INTRAVENOUS at 12:19

## 2017-04-02 RX ADMIN — POTASSIUM CHLORIDE AND SODIUM CHLORIDE: 900; 150 INJECTION, SOLUTION INTRAVENOUS at 13:12

## 2017-04-02 ASSESSMENT — ENCOUNTER SYMPTOMS
DIARRHEA: 0
NAUSEA: 0
DIZZINESS: 0
HEADACHES: 0
CLAUDICATION: 0
WEAKNESS: 0
NERVOUS/ANXIOUS: 0
CHILLS: 0
BLURRED VISION: 0
FEVER: 0
BACK PAIN: 1
INSOMNIA: 0
SEIZURES: 0
ABDOMINAL PAIN: 0
DEPRESSION: 0
HEARTBURN: 0
MYALGIAS: 0
PHOTOPHOBIA: 0
FOCAL WEAKNESS: 0
SHORTNESS OF BREATH: 0
CONSTIPATION: 0
VOMITING: 0
COUGH: 0

## 2017-04-02 ASSESSMENT — PAIN SCALES - GENERAL
PAINLEVEL_OUTOF10: 5
PAINLEVEL_OUTOF10: 0
PAINLEVEL_OUTOF10: 5
PAINLEVEL_OUTOF10: 4
PAINLEVEL_OUTOF10: 5
PAINLEVEL_OUTOF10: 5

## 2017-04-02 NOTE — PROGRESS NOTES
Infectious Disease Progress Note    Author: Bandar Vasquez Date & Time created: 4/2/2017  7:53 AM  P/O Day #3    Interval History:  Past 24 hrs reviewed with RN.    Labs Reviewed, Medications Reviewed, Radiology Reviewed, Wound Reviewed, Fluids Reviewed and GI Nutrition Reviewed.    Review of Systems:  Review of Systems   Constitutional: Negative for fever and chills.   Respiratory: Negative for cough and shortness of breath.    Cardiovascular: Negative for chest pain.   Gastrointestinal: Negative for nausea, vomiting, abdominal pain, diarrhea and constipation.   Genitourinary: Negative for dysuria, urgency and frequency.   Musculoskeletal:        Back OK.   Skin: Negative for itching and rash.       Physical Exam:  Physical Exam   Constitutional: He appears well-developed and well-nourished.   HENT:   Head: Normocephalic and atraumatic.   Cardiovascular: Normal rate and regular rhythm.    Pulmonary/Chest: Effort normal. No respiratory distress. He has no wheezes.   Abdominal: Soft. He exhibits no distension. There is no tenderness. There is no guarding.   Musculoskeletal:   Back incision without erythema or external drainage. Drain with 45cc serosanguinous drainage over past 24 hrs.   Neurological: He is alert.   Skin: Skin is warm and dry.   Psychiatric: He has a normal mood and affect. His behavior is normal.   Nursing note and vitals reviewed.      Labs:  Recent Results (from the past 24 hour(s))   BASIC METABOLIC PANEL    Collection Time: 04/02/17 12:45 AM   Result Value Ref Range    Sodium 138 135 - 145 mmol/L    Potassium 3.9 3.6 - 5.5 mmol/L    Chloride 108 96 - 112 mmol/L    Co2 25 20 - 33 mmol/L    Glucose 110 (H) 65 - 99 mg/dL    Bun 4 (L) 8 - 22 mg/dL    Creatinine 0.72 0.50 - 1.40 mg/dL    Calcium 9.1 8.5 - 10.5 mg/dL    Anion Gap 5.0 0.0 - 11.9   CBC WITH DIFFERENTIAL    Collection Time: 04/02/17 12:45 AM   Result Value Ref Range    WBC 4.0 (L) 4.8 - 10.8 K/uL    RBC 3.38 (L) 4.70 - 6.10 M/uL     Hemoglobin 9.7 (L) 14.0 - 18.0 g/dL    Hematocrit 29.4 (L) 42.0 - 52.0 %    MCV 87.0 81.4 - 97.8 fL    MCH 28.7 27.0 - 33.0 pg    MCHC 33.0 (L) 33.7 - 35.3 g/dL    RDW 40.8 35.9 - 50.0 fL    Platelet Count 277 164 - 446 K/uL    MPV 9.2 9.0 - 12.9 fL    Neutrophils-Polys 63.40 44.00 - 72.00 %    Lymphocytes 19.60 (L) 22.00 - 41.00 %    Monocytes 9.90 0.00 - 13.40 %    Eosinophils 6.40 0.00 - 6.90 %    Basophils 0.50 0.00 - 1.80 %    Immature Granulocytes 0.20 0.00 - 0.90 %    Nucleated RBC 0.00 /100 WBC    Neutrophils (Absolute) 2.56 1.82 - 7.42 K/uL    Lymphs (Absolute) 0.79 (L) 1.00 - 4.80 K/uL    Monos (Absolute) 0.40 0.00 - 0.85 K/uL    Eos (Absolute) 0.26 0.00 - 0.51 K/uL    Baso (Absolute) 0.02 0.00 - 0.12 K/uL    Immature Granulocytes (abs) 0.01 0.00 - 0.11 K/uL    NRBC (Absolute) 0.00 K/uL   ESTIMATED GFR    Collection Time: 04/02/17 12:45 AM   Result Value Ref Range    GFR If African American >60 >60 mL/min/1.73 m 2    GFR If Non African American >60 >60 mL/min/1.73 m 2     Results     Procedure Component Value Units Date/Time    CULTURE TISSUE W/ GRM STAIN [[id number]]  (Abnormal) Collected:  03/30/17 1249    Order Status:  Completed Specimen Information:  Tissue Updated:  04/01/17 1438     Significant Indicator POS (POS)      Source TISS      Site Deep Tissue Space      Tissue Culture -- (A)      Gram Stain Result --      Result:        Rare WBCs.  No organisms seen.       Tissue Culture -- (A)      Result:        Coagulase-negative Staphylococcus species  Rare growth      ANAEROBIC CULTURE [[id number]] Collected:  03/30/17 1249    Order Status:  Completed Specimen Information:  Tissue Updated:  04/01/17 1438     Significant Indicator NEG      Source TISS      Site Deep Tissue Space      Anaerobic Culture, Culture Res Culture in progress.     CULTURE WOUND W/ GRAM STAIN [[id number]]  (Abnormal) Collected:  03/30/17 1223    Order Status:  Completed Specimen Information:  Wound Updated:  03/31/17 3007      "Significant Indicator POS (POS)      Source WND      Site Lumbar wound      Culture Result Wound -- (A)      Gram Stain Result --      Result:        Few WBCs.  No organisms seen.       Culture Result Wound -- (A)      Result:        Coagulase-negative Staphylococcus species  Light growth      ANAEROBIC CULTURE [[id number]] Collected:  03/30/17 1223    Order Status:  Completed Specimen Information:  Wound Updated:  03/31/17 1603     Significant Indicator NEG      Source WND      Site Lumbar wound      Anaerobic Culture, Culture Res Culture in progress.     GRAM STAIN [[id number]] Collected:  03/30/17 1249    Order Status:  Completed Specimen Information:  Tissue Updated:  03/30/17 1406     Significant Indicator .      Source TISS      Site Deep Tissue Space      Gram Stain Result --      Result:        Rare WBCs.  No organisms seen.      GRAM STAIN [[id number]] Collected:  03/30/17 1223    Order Status:  Completed Specimen Information:  Wound Updated:  03/30/17 1406     Significant Indicator .      Source WND      Site Lumbar wound      Gram Stain Result --      Result:        Few WBCs.  No organisms seen.      BLOOD CULTURE [[id number]] Collected:  03/29/17 1818    Order Status:  Completed Specimen Information:  Blood from Peripheral Updated:  03/30/17 0652     Significant Indicator NEG      Source BLD      Site PERIPHERAL      Blood Culture --      Result:        No Growth    Note: Blood cultures are incubated for 5 days and  are monitored continuously.Positive blood cultures  are called to the RN and reported as soon as  they are identified.      Narrative:      Per Hospital Policy: Only change Specimen Src: to \"Line\" if  specified by physician order.    BLOOD CULTURE [[id number]] Collected:  03/29/17 2032    Order Status:  Completed Specimen Information:  Blood from Peripheral Updated:  03/30/17 0652     Significant Indicator NEG      Source BLD      Site PERIPHERAL      Blood Culture --      Result:        No " "Growth    Note: Blood cultures are incubated for 5 days and  are monitored continuously.Positive blood cultures  are called to the RN and reported as soon as  they are identified.      Narrative:      Per Hospital Policy: Only change Specimen Src: to \"Line\" if  specified by physician order.    URINALYSIS CULTURE, IF INDICATED [[id number]]     Order Status:  Canceled Specimen Information:  Urine from Urine, Clean Catch             Hemodynamics:  Temp (24hrs), Av.1 °C (98.7 °F), Min:36.7 °C (98 °F), Max:37.3 °C (99.2 °F)  Temperature: 37.1 °C (98.8 °F)  Pulse  Av.7  Min: 64  Max: 112   Blood Pressure : 127/79 mmHg     Central Line Group Right;Basilic Single Lumen;PICC;Power Injection Catheter 4 (Active)   Line Length (cm) 46 cm 2017 12:00 PM   Line Secured Transparent 2017  8:00 PM   Patency and Function Check Performed at Beginning of Shift 2017  8:00 PM   Line Necessity Assessed Antibiotic Therapy Greater than 7 Days 2017  8:00 PM   Consider Removal of Femoral Line Not Applicable 2017  8:00 PM   Hand Washing / Gloves Prior to Every Access Yes 2017  8:00 PM   Next Daily Chlorhexidine Bath Due (Regional ONLY) 17  9:00 PM   Port Access  Scrub the Hub Prior to Access;Port Accessed;Blood Return  2017  8:00 PM   Site Condition / Description Assessed;Patent;Clean;Dry;Intact 2017  8:00 PM   Signs and Symptoms of Infection None Apparent at this Time 2017  8:00 PM   Dressing Type / Description Antimicrobial Patch (BioPatch);Transparent;Clean;Dry;Intact 2017  8:00 PM   Dressing Status Observed 2017  8:00 PM   Next Dressing Change  17  8:00 PM   Date Primary Tubing Changed 17  3:00 AM   Date Secondary Tubing Changed 17  3:00 AM   NEXT Primary Tubing Change  17  3:00 AM   NEXT Secondary Tubing Change  17  3:00 AM   Date IV Connector(s) Changed 17  3:00 AM   NEXT IV " Connector(s) Change Date 04/04/17 4/2/2017  3:00 AM       Wound:  Surgical Incision  Incision N/A Back (Active)   Wound Bed Pink 4/1/2017  8:00 PM   Drainage  None 4/1/2017  8:00 PM   Periwound Skin Pink 4/1/2017  8:00 PM   Daily - Wound Closure Staples 4/1/2017  8:00 PM   Dressing Options Island Dressing;Dry Gauze;Tape 4/1/2017  8:00 PM   Dressing Status / Change Clean;Dry;Intact;Changed 4/1/2017  8:00 PM   Daily - Dressing Change Changed 4/1/2017  8:00 PM   Dressing Change Frequency As Needed 3/17/2017 11:00 PM       Surgical Incision  Incision Back (Active)   Wound Bed Pink 4/1/2017  8:00 PM   Drainage  None 4/1/2017  8:00 PM   Periwound Skin Pink 4/1/2017  8:00 PM   Daily - Wound Closure Staples 4/1/2017  8:00 PM   Dressing Options Island Dressing 4/1/2017  8:00 PM   Dressing Status / Change Clean;Dry;Intact;Changed 4/1/2017  8:00 PM   Daily - Dressing Change Changed 4/1/2017  8:00 PM          Fluids:  Intake/Output       03/31/17 0700 - 04/01/17 0659 04/01/17 0700 - 04/02/17 0659 04/02/17 0700 - 04/03/17 0659      [id number] 8077-5608 Total 8324-2959 2886-9938 Total 1579-4333 2668-0026 Total       Intake    P.O.  500  -- 500  --  -- --  --  -- --    P.O. 500 -- 500 -- -- -- -- -- --    I.V.  --  -- --  --  443 913  --  -- --    IV Volume -- -- -- -- 913 913 -- -- --    Total Intake 500 -- 500 -- 913 913 -- -- --       Output    Urine  --  500 500  --  1650 1650  --  -- --    Number of Times Voided 1 x 5 x 6 x -- 1 x 1 x -- -- --    Void (ml) -- 500 500 -- 1650 1650 -- -- --    Drains  70  60 130  30  15 45  --  -- --    Hemovac 1 70 60 130 30 15 45 -- -- --    Stool  --  -- --  --  -- --  --  -- --    Number of Times Stooled 1 x 0 x 1 x -- -- -- -- -- --    Total Output 70 560 381 41 2547 1695 -- -- --       Net I/O     430 -560 -130 -30 -752 -782 -- -- --             GI/Nutrition:  Orders Placed This Encounter   Procedures   • DIET ORDER     Standing Status: Standing      Number of Occurrences: 1      Standing  Expiration Date:      Order Specific Question:  Diet:     Answer:  Regular [1]       Medications:  Current Facility-Administered Medications   Medication Last Dose   • MD ALERT... vancomycin per MD     • oxycodone-acetaminophen (PERCOCET-10)  MG per tablet 1-2 Tab 1 Tab at 04/02/17 0608   • calcium carbonate (TUMS) chewable tab 1,000 mg 1,000 mg at 03/31/17 1021   • omeprazole (PRILOSEC) capsule 20 mg 20 mg at 04/01/17 1004   • lisinopril (PRINIVIL) 10 MG tablet 10 mg 10 mg at 04/01/17 1004   • levothyroxine (SYNTHROID) tablet 50 mcg 50 mcg at 04/02/17 0608   • fluticasone (FLONASE) nasal spray 50 mcg 50 mcg at 04/01/17 2100   • atorvastatin (LIPITOR) tablet 10 mg 10 mg at 04/01/17 1002   • Respiratory Care per Protocol     • budesonide-formoterol (SYMBICORT) 80-4.5 MCG/ACT inhaler 2 Puff 2 Puff at 04/01/17 2032   • NS (BOLUS) infusion 500 mL     • lactobacillus granules (LACTINEX/FLORANEX) packet 1 Packet 1 Packet at 04/01/17 1838   • vancomycin 1,700 mg in  mL IVPB Stopped at 04/02/17 0204   • MD ALERT...Do not administer NSAIDS or ASPIRIN unless ORDERED By Neurosurgery 1 Each     • docusate sodium (COLACE) capsule 100 mg 100 mg at 04/01/17 2032   • senna-docusate (PERICOLACE or SENOKOT S) 8.6-50 MG per tablet 1 Tab 1 Tab at 04/01/17 2032   • senna-docusate (PERICOLACE or SENOKOT S) 8.6-50 MG per tablet 1 Tab     • polyethylene glycol/lytes (MIRALAX) PACKET 1 Packet     • magnesium hydroxide (MILK OF MAGNESIA) suspension 30 mL     • bisacodyl (DULCOLAX) suppository 10 mg     • fleet enema 133 mL     • 0.9 % NaCl with KCl 20 mEq infusion     • tizanidine (ZANAFLEX) tablet 2 mg 2 mg at 04/01/17 1013   • Pharmacy Consult Request ...Pain Management Review 1 Each         Medical Decision Making, by Problem:  Active Hospital Problems    Diagnosis   • *Sepsis (CMS-HCC) [A41.9]   • Fluid collection at surgical site, lumbar  [T88.8XXA]   • Normocytic anemia [D64.9]   • Ascending aorta dilation (CMS-HCC), mild,  needs OP followup [I77.810]   • Staph infection [B95.8]   • SIRS (systemic inflammatory response syndrome) (CMS-HCC) [R65.10]   • GERD (gastroesophageal reflux disease) [K21.9]   • HTN (hypertension) [I10]   • Hyperlipidemia [E78.5]   • Hypothyroidism [E03.9]       Plan:  He seems to be improving. If the trend continues and OK with NS then we will try to get him out of the hospital tomorrow for outpatient IV therapy. Case and treatment reviewed with patient, micro, office and RN ~ 30 min on floor in direct patient care/counseling and D/C planning today.

## 2017-04-02 NOTE — PROGRESS NOTES
Pt up walking around floor during first part of shift, vss, callbell within reach, family in to visit, PICC line in place and working great. No needs at this time, will monitor

## 2017-04-02 NOTE — CARE PLAN
Problem: Safety  Goal: Will remain free from falls  Intervention: Assess risk factors for falls  Pt. Refusing bed alarm but call light is within reach and pt. Calls appropriately.       Problem: Venous Thromboembolism (VTW)/Deep Vein Thrombosis (DVT) Prevention:  Goal: Patient will participate in Venous Thrombosis (VTE)/Deep Vein Thrombosis (DVT)Prevention Measures  Intervention: Ensure patient wears graduated elastic stockings (SARAH hose) and/or SCDs, if ordered, when in bed or chair (Remove at least once per shift for skin check)  Pt. Offered SCD's, pt. Refused, pt. Aware that they are for VTE prophylaxis.

## 2017-04-02 NOTE — PROGRESS NOTES
Pt. Is AAOx4  Pt. Moves all extremeties,  Denies numbness and tingling  Complains of pain at back  Prn medication given for pain  Pt. Up with standby assistance  PICC in Right arm Patent, site CDI  Bed alarm refused  SCD's refused  Treaded socks in place  Call light within reach

## 2017-04-02 NOTE — PROGRESS NOTES
Hospital Medicine Progress Note, Adult, Complex               Author: Brittnee Finley Date & Time created: 4/2/2017  10:35 AM     Interval History:  3/31 Patient feeling okay this am, still on PCA when evaluated and planning to transition to oral pain medication later today.  He states the fevers have resolved and he is starting to feel stronger post op.  One of two cultures growing staph and ID consulted for recommendations.  Vanco/zosyn to continue until changed by ID.  4/1 Patient feeling okay, anxious to get out of the hospital.  PICC pending placement today, OP abx infusion being coordinated on Monday by Dr Vasquez.  Coag ky hills on cultures, final sensitivities pending.  Hemovac in place.  Patient in good spirits and pain well controlled.  Patient ambulating well.  4/2 Patient states he is ready to leave at any time, ID coordinating OP infusion of abx starting tomorrow, anticipated dc tomorrow afternoon, once this coordinated.      Review of Systems:  Review of Systems   Constitutional: Negative for fever and malaise/fatigue.   HENT: Negative for congestion and nosebleeds.    Eyes: Negative for blurred vision and photophobia.   Respiratory: Negative for cough and shortness of breath.    Cardiovascular: Negative for chest pain, claudication and leg swelling.   Gastrointestinal: Negative for heartburn, abdominal pain and diarrhea.   Genitourinary: Negative for dysuria and hematuria.   Musculoskeletal: Positive for back pain (controlled currently). Negative for myalgias.   Skin: Negative for itching and rash.   Neurological: Negative for dizziness, focal weakness, seizures, weakness and headaches.   Psychiatric/Behavioral: Negative for depression. The patient is not nervous/anxious and does not have insomnia.        Physical Exam:  Physical Exam   Constitutional: He is oriented to person, place, and time. He appears well-developed and well-nourished. No distress.   HENT:   Head: Normocephalic and atraumatic.    Eyes: Conjunctivae are normal. No scleral icterus.   Neck: Neck supple. No JVD present.   Cardiovascular: Normal rate, regular rhythm, normal heart sounds and intact distal pulses.  Exam reveals no gallop and no friction rub.    No murmur heard.  Pulmonary/Chest: Effort normal and breath sounds normal. No respiratory distress. He exhibits no tenderness.   Abdominal: Soft. Bowel sounds are normal. He exhibits no distension and no mass. There is no tenderness.   Musculoskeletal: He exhibits no edema or tenderness.   hemovac in place    Neurological: He is alert and oriented to person, place, and time. No cranial nerve deficit.   Skin: Skin is warm and dry. He is not diaphoretic. No erythema.   Psychiatric: He has a normal mood and affect. His behavior is normal.   Nursing note and vitals reviewed.      Labs:        Invalid input(s): [id number]      Recent Labs      17   0045   SODIUM  131*  138   POTASSIUM  4.0  3.9   CHLORIDE  101  108   CO2  25  25   BUN  8  4*   CREATININE  0.95  0.72   CALCIUM  8.8  9.1     Recent Labs      17   0045   GLUCOSE  116*  110*     Recent Labs      17   0045   RBC  3.17*  3.38*   HEMOGLOBIN  9.2*  9.7*   HEMATOCRIT  28.4*  29.4*   PLATELETCT  247  277     Recent Labs      17   0045   WBC  5.9  4.0*   NEUTSPOLYS  73.10*  63.40   LYMPHOCYTES  13.80*  19.60*   MONOCYTES  7.00  9.90   EOSINOPHILS  5.60  6.40   BASOPHILS  0.20  0.50           Hemodynamics:  Temp (24hrs), Av.1 °C (98.7 °F), Min:36.8 °C (98.3 °F), Max:37.3 °C (99.2 °F)  Temperature: 36.8 °C (98.3 °F)  Pulse  Av.6  Min: 64  Max: 112   Blood Pressure : 140/82 mmHg     Respiratory:    Respiration: 18, Pulse Oximetry: 94 %        RUL Breath Sounds: Clear, RML Breath Sounds: Clear, RLL Breath Sounds: Diminished, COLE Breath Sounds: Clear, LLL Breath Sounds: Diminished  Fluids:    Intake/Output Summary (Last 24 hours) at  04/02/17 1035  Last data filed at 04/02/17 0600   Gross per 24 hour   Intake    913 ml   Output   1695 ml   Net   -782 ml        GI/Nutrition:  Orders Placed This Encounter   Procedures   • DIET ORDER     Standing Status: Standing      Number of Occurrences: 1      Standing Expiration Date:      Order Specific Question:  Diet:     Answer:  Regular [1]     Medical Decision Making, by Problem:  Active Hospital Problems    Diagnosis   • *Sepsis (CMS-HCC) [A41.9]resolved     • Fluid collection at surgical site, lumbar  [T88.8XXA]s/p I&D with Dr Chakraborty, tissue culture coag neg staph, staph epi on wound culture     • Normocytic anemia [D64.9]monitor     • Ascending aorta dilation (CMS-HCC), mild, needs OP followup [I77.810]     • Staph infection [B95.8]ID consultation, vanco until MRSA proven negative, PICC 4/1/17     • SIRS (systemic inflammatory response syndrome) (CMS-HCC) [R65.10]resolved     • GERD (gastroesophageal reflux disease) [K21.9]Tums per patient request, continue prilosec     • HTN (hypertension) [I10]lisinopril     • Hyperlipidemia [E78.5]statin     • Hypothyroidism [E03.9]synthroid       Labs reviewed, Radiology images reviewed and Medications reviewed  Gomes catheter: No Gomes        DVT prophylaxis - mechanical: SCDs

## 2017-04-02 NOTE — PROGRESS NOTES
"Neurosurgery :     Exam:   Pt awake alert, ambulated in hallways  LE str 5/5  Inc c/d/i with drsg, staples/ hvac-5ml  PICC placed      Blood pressure 140/82, pulse 84, temperature 36.8 °C (98.3 °F), temperature source Temporal, resp. rate 18, height 1.727 m (5' 8\"), weight 115.4 kg (254 lb 6.6 oz), SpO2 94 %.    Recent Labs      03/31/17   0352  04/02/17   0045   WBC  5.9  4.0*   RBC  3.17*  3.38*   HEMOGLOBIN  9.2*  9.7*   HEMATOCRIT  28.4*  29.4*   MCV  89.6  87.0   MCH  29.0  28.7   MCHC  32.4*  33.0*   RDW  42.6  40.8   PLATELETCT  247  277   MPV  9.5  9.2     Recent Labs      03/31/17   0352  04/02/17   0045   SODIUM  131*  138   POTASSIUM  4.0  3.9   CHLORIDE  101  108   CO2  25  25   GLUCOSE  116*  110*   BUN  8  4*                Intake/Output Summary (Last 24 hours) at 04/02/17 1023  Last data filed at 04/02/17 0600   Gross per 24 hour   Intake    913 ml   Output   1695 ml   Net   -782 ml         • [START ON 4/3/2017] DAPTOmycin  700 mg     • MD ALERT... vancomycin       • oxycodone-acetaminophen  1-2 Tab     • calcium carbonate  1,000 mg     • omeprazole  20 mg     • lisinopril  10 mg     • levothyroxine  50 mcg     • fluticasone  1 Spray     • atorvastatin  10 mg     • Respiratory Care per Protocol       • budesonide-formoterol  2 Puff     • NS  500 mL     • lactobacillus granules  1 Packet     • vancomycin (VANCOCIN) IVPB (maintenance dose)  15 mg/kg Stopped (04/02/17 0204)   • MD ALERT...Do not administer NSAIDS or ASPIRIN unless ORDERED By Neurosurgery  1 Each     • docusate sodium  100 mg     • senna-docusate  1 Tab     • senna-docusate  1 Tab     • polyethylene glycol/lytes  1 Packet     • magnesium hydroxide  30 mL     • bisacodyl  10 mg     • fleet  1 Each     • 0.9 % NaCl with KCl 20 mEq 1,000 mL   100 mL/hr at 04/02/17 0045   • tizanidine  2 mg     • Pharmacy Consult Request  1 Each           Assessment and Plan:  POD # 3 Incision and drainage of lumbar wound    S/p approx 2 weeks from lumbar " fusion, admit w/ fever     ABX per ID - PICC placed  Final cx pending +coag neg staph  Dc hvac  Cont pain control  Ambulate in hallways at least 4x/day  Ok to dc from Nsx perspective  Follow up in office in 2 weeks

## 2017-04-03 LAB
ALBUMIN SERPL BCP-MCNC: 2.9 G/DL (ref 3.2–4.9)
ALBUMIN/GLOB SERPL: 1.1 G/DL
ALP SERPL-CCNC: 57 U/L (ref 30–99)
ALT SERPL-CCNC: 23 U/L (ref 2–50)
ANION GAP SERPL CALC-SCNC: 6 MMOL/L (ref 0–11.9)
AST SERPL-CCNC: 25 U/L (ref 12–45)
BACTERIA BLD CULT: NORMAL
BACTERIA BLD CULT: NORMAL
BACTERIA TISS AEROBE CULT: ABNORMAL
BACTERIA TISS AEROBE CULT: ABNORMAL
BASOPHILS # BLD AUTO: 0.5 % (ref 0–1.8)
BASOPHILS # BLD: 0.02 K/UL (ref 0–0.12)
BILIRUB SERPL-MCNC: 0.5 MG/DL (ref 0.1–1.5)
BUN SERPL-MCNC: 4 MG/DL (ref 8–22)
CALCIUM SERPL-MCNC: 8.9 MG/DL (ref 8.5–10.5)
CHLORIDE SERPL-SCNC: 108 MMOL/L (ref 96–112)
CK SERPL-CCNC: 52 U/L (ref 0–154)
CO2 SERPL-SCNC: 25 MMOL/L (ref 20–33)
CREAT SERPL-MCNC: 0.69 MG/DL (ref 0.5–1.4)
EOSINOPHIL # BLD AUTO: 0.28 K/UL (ref 0–0.51)
EOSINOPHIL NFR BLD: 7.1 % (ref 0–6.9)
ERYTHROCYTE [DISTWIDTH] IN BLOOD BY AUTOMATED COUNT: 41.6 FL (ref 35.9–50)
GFR SERPL CREATININE-BSD FRML MDRD: >60 ML/MIN/1.73 M 2
GLOBULIN SER CALC-MCNC: 2.6 G/DL (ref 1.9–3.5)
GLUCOSE SERPL-MCNC: 101 MG/DL (ref 65–99)
GRAM STN SPEC: ABNORMAL
HCT VFR BLD AUTO: 28.7 % (ref 42–52)
HGB BLD-MCNC: 9.4 G/DL (ref 14–18)
IMM GRANULOCYTES # BLD AUTO: 0.03 K/UL (ref 0–0.11)
IMM GRANULOCYTES NFR BLD AUTO: 0.8 % (ref 0–0.9)
LYMPHOCYTES # BLD AUTO: 0.8 K/UL (ref 1–4.8)
LYMPHOCYTES NFR BLD: 20.4 % (ref 22–41)
MCH RBC QN AUTO: 28.7 PG (ref 27–33)
MCHC RBC AUTO-ENTMCNC: 32.8 G/DL (ref 33.7–35.3)
MCV RBC AUTO: 87.5 FL (ref 81.4–97.8)
MONOCYTES # BLD AUTO: 0.38 K/UL (ref 0–0.85)
MONOCYTES NFR BLD AUTO: 9.7 % (ref 0–13.4)
NEUTROPHILS # BLD AUTO: 2.41 K/UL (ref 1.82–7.42)
NEUTROPHILS NFR BLD: 61.5 % (ref 44–72)
NRBC # BLD AUTO: 0 K/UL
NRBC BLD AUTO-RTO: 0 /100 WBC
PLATELET # BLD AUTO: 288 K/UL (ref 164–446)
PMV BLD AUTO: 9.3 FL (ref 9–12.9)
POTASSIUM SERPL-SCNC: 3.9 MMOL/L (ref 3.6–5.5)
PROT SERPL-MCNC: 5.5 G/DL (ref 6–8.2)
RBC # BLD AUTO: 3.28 M/UL (ref 4.7–6.1)
SIGNIFICANT IND 70042: ABNORMAL
SIGNIFICANT IND 70042: NORMAL
SIGNIFICANT IND 70042: NORMAL
SITE SITE: ABNORMAL
SITE SITE: NORMAL
SITE SITE: NORMAL
SODIUM SERPL-SCNC: 139 MMOL/L (ref 135–145)
SOURCE SOURCE: ABNORMAL
SOURCE SOURCE: NORMAL
SOURCE SOURCE: NORMAL
WBC # BLD AUTO: 3.9 K/UL (ref 4.8–10.8)

## 2017-04-03 PROCEDURE — A9270 NON-COVERED ITEM OR SERVICE: HCPCS | Performed by: HOSPITALIST

## 2017-04-03 PROCEDURE — 85025 COMPLETE CBC W/AUTO DIFF WBC: CPT

## 2017-04-03 PROCEDURE — 80053 COMPREHEN METABOLIC PANEL: CPT

## 2017-04-03 PROCEDURE — 700102 HCHG RX REV CODE 250 W/ 637 OVERRIDE(OP): Performed by: CLINICAL NURSE SPECIALIST

## 2017-04-03 PROCEDURE — 99232 SBSQ HOSP IP/OBS MODERATE 35: CPT | Performed by: INTERNAL MEDICINE

## 2017-04-03 PROCEDURE — 700101 HCHG RX REV CODE 250: Performed by: CLINICAL NURSE SPECIALIST

## 2017-04-03 PROCEDURE — 770001 HCHG ROOM/CARE - MED/SURG/GYN PRIV*

## 2017-04-03 PROCEDURE — 700111 HCHG RX REV CODE 636 W/ 250 OVERRIDE (IP): Performed by: INTERNAL MEDICINE

## 2017-04-03 PROCEDURE — 700105 HCHG RX REV CODE 258: Performed by: INTERNAL MEDICINE

## 2017-04-03 PROCEDURE — A9270 NON-COVERED ITEM OR SERVICE: HCPCS | Performed by: CLINICAL NURSE SPECIALIST

## 2017-04-03 PROCEDURE — 700102 HCHG RX REV CODE 250 W/ 637 OVERRIDE(OP): Performed by: HOSPITALIST

## 2017-04-03 PROCEDURE — 82550 ASSAY OF CK (CPK): CPT

## 2017-04-03 PROCEDURE — 700112 HCHG RX REV CODE 229: Performed by: CLINICAL NURSE SPECIALIST

## 2017-04-03 RX ADMIN — BUDESONIDE AND FORMOTEROL FUMARATE DIHYDRATE 2 PUFF: 80; 4.5 AEROSOL RESPIRATORY (INHALATION) at 21:16

## 2017-04-03 RX ADMIN — OXYCODONE HYDROCHLORIDE AND ACETAMINOPHEN 2 TABLET: 10; 325 TABLET ORAL at 22:21

## 2017-04-03 RX ADMIN — ATORVASTATIN CALCIUM 10 MG: 10 TABLET, FILM COATED ORAL at 08:22

## 2017-04-03 RX ADMIN — DAPTOMYCIN 700 MG: 500 INJECTION, POWDER, LYOPHILIZED, FOR SOLUTION INTRAVENOUS at 10:00

## 2017-04-03 RX ADMIN — LISINOPRIL 10 MG: 10 TABLET ORAL at 08:22

## 2017-04-03 RX ADMIN — BUDESONIDE AND FORMOTEROL FUMARATE DIHYDRATE 2 PUFF: 80; 4.5 AEROSOL RESPIRATORY (INHALATION) at 08:22

## 2017-04-03 RX ADMIN — OMEPRAZOLE 20 MG: 20 CAPSULE, DELAYED RELEASE ORAL at 08:22

## 2017-04-03 RX ADMIN — FLUTICASONE PROPIONATE 50 MCG: 50 SPRAY, METERED NASAL at 22:24

## 2017-04-03 RX ADMIN — POTASSIUM CHLORIDE AND SODIUM CHLORIDE: 900; 150 INJECTION, SOLUTION INTRAVENOUS at 17:05

## 2017-04-03 RX ADMIN — DOCUSATE SODIUM 100 MG: 100 CAPSULE ORAL at 08:22

## 2017-04-03 RX ADMIN — OXYCODONE HYDROCHLORIDE AND ACETAMINOPHEN 2 TABLET: 10; 325 TABLET ORAL at 05:49

## 2017-04-03 RX ADMIN — LEVOTHYROXINE SODIUM 50 MCG: 50 TABLET ORAL at 05:44

## 2017-04-03 ASSESSMENT — ENCOUNTER SYMPTOMS
HEARTBURN: 0
FOCAL WEAKNESS: 0
NERVOUS/ANXIOUS: 0
CLAUDICATION: 0
VOMITING: 0
WEAKNESS: 0
SEIZURES: 0
PHOTOPHOBIA: 0
BLURRED VISION: 0
INSOMNIA: 0
FEVER: 0
COUGH: 0
DEPRESSION: 0
MYALGIAS: 0
HEADACHES: 0
CHILLS: 0
CONSTIPATION: 0
BACK PAIN: 0
DIARRHEA: 0
DIZZINESS: 0
ABDOMINAL PAIN: 0
SHORTNESS OF BREATH: 0
NAUSEA: 0

## 2017-04-03 ASSESSMENT — LIFESTYLE VARIABLES
CONSUMPTION TOTAL: NEGATIVE
TOTAL SCORE: 0
AVERAGE NUMBER OF DAYS PER WEEK YOU HAVE A DRINK CONTAINING ALCOHOL: 1
EVER HAD A DRINK FIRST THING IN THE MORNING TO STEADY YOUR NERVES TO GET RID OF A HANGOVER: NO
EVER HAD A DRINK FIRST THING IN THE MORNING TO STEADY YOUR NERVES TO GET RID OF A HANGOVER: NO
TOTAL SCORE: 0
AVERAGE NUMBER OF DAYS PER WEEK YOU HAVE A DRINK CONTAINING ALCOHOL: 1
TOTAL SCORE: 0
HAVE PEOPLE ANNOYED YOU BY CRITICIZING YOUR DRINKING: NO
ON A TYPICAL DAY WHEN YOU DRINK ALCOHOL HOW MANY DRINKS DO YOU HAVE: 2
EVER FELT BAD OR GUILTY ABOUT YOUR DRINKING: NO
HAVE YOU EVER FELT YOU SHOULD CUT DOWN ON YOUR DRINKING: NO
HAVE PEOPLE ANNOYED YOU BY CRITICIZING YOUR DRINKING: NO
CONSUMPTION TOTAL: NEGATIVE
TOTAL SCORE: 0
ON A TYPICAL DAY WHEN YOU DRINK ALCOHOL HOW MANY DRINKS DO YOU HAVE: 2
DO YOU DRINK ALCOHOL: YES
HOW MANY TIMES IN THE PAST YEAR HAVE YOU HAD 5 OR MORE DRINKS IN A DAY: 0
HOW MANY TIMES IN THE PAST YEAR HAVE YOU HAD 5 OR MORE DRINKS IN A DAY: 0
DO YOU DRINK ALCOHOL: YES
HAVE YOU EVER FELT YOU SHOULD CUT DOWN ON YOUR DRINKING: NO
EVER FELT BAD OR GUILTY ABOUT YOUR DRINKING: NO

## 2017-04-03 ASSESSMENT — COPD QUESTIONNAIRES
DURING THE PAST 4 WEEKS HOW MUCH DID YOU FEEL SHORT OF BREATH: NONE/LITTLE OF THE TIME
COPD SCREENING SCORE: 3
HAVE YOU SMOKED AT LEAST 100 CIGARETTES IN YOUR ENTIRE LIFE: NO/DON'T KNOW
DO YOU EVER COUGH UP ANY MUCUS OR PHLEGM?: YES, A FEW DAYS A WEEK OR MONTH

## 2017-04-03 ASSESSMENT — PAIN SCALES - GENERAL
PAINLEVEL_OUTOF10: 0
PAINLEVEL_OUTOF10: 0

## 2017-04-03 NOTE — PROGRESS NOTES
19:15 Received patient at change of shift sitting up in bed awake, alert, oriented X4. No complaints of pain voiced at this time.PICC line to right upper arm patent, infusing NS +20 KCL at 100 ml/hr.  Small dressing to right side of back clean, dry, intact, staples to lower back clean, dry, no drainage noted.  Patient ambulates in hallway and to bathroom with stand by assist using frontwheel walker.  Safety and comfort measures maintained.  Call light within reach.    Patient educated on fall precautions, despite education given refused bed alarm.

## 2017-04-03 NOTE — PROGRESS NOTES
Patient sitting on the edge of the bed at the time of assessment. VSS. Patient denied having any pain at this time. Patient alert and oriented x 4. Bed locked and in lowest position. Callbell w/in reach of patient at all times. Bed alarm not on. Patient educated on safety and patient verbalized understanding. Patient calls for staff assistance appropriately. Incision to midline back with staples ANNIE. No s/sx of infection noted. No drainage noted. Small gauze bandage covering old drain site. No drainage noted to dressing. TLSO when OOB. Plan of care discussed with patient and patient verbalized understanding. Will continue to monitor patient closely for any changes in condition.

## 2017-04-03 NOTE — PROGRESS NOTES
"Neurosurgery :     Exam:   Pain controlled, no leg s/s, str good, inc c/d flat w/ st, amb, voiding, eating    Blood pressure 143/83, pulse 82, temperature 36.6 °C (97.9 °F), temperature source Temporal, resp. rate 18, height 1.727 m (5' 8\"), weight 115.4 kg (254 lb 6.6 oz), SpO2 95 %.    Recent Labs      04/02/17   0045  04/03/17   0250   WBC  4.0*  3.9*   RBC  3.38*  3.28*   HEMOGLOBIN  9.7*  9.4*   HEMATOCRIT  29.4*  28.7*   MCV  87.0  87.5   MCH  28.7  28.7   MCHC  33.0*  32.8*   RDW  40.8  41.6   PLATELETCT  277  288   MPV  9.2  9.3     Recent Labs      04/02/17   0045  04/03/17   0250   SODIUM  138  139   POTASSIUM  3.9  3.9   CHLORIDE  108  108   CO2  25  25   GLUCOSE  110*  101*   BUN  4*  4*   CPKTOTAL   --   52             Date 04/03/17 0700 - 04/04/17 0659   Shift [id number] 24 Hour Total   I  N  T  A  K  E   P.O. 240   240      P.O. 240   240    Shift Total 240   240   O  U  T  P  U  T   Urine 525   525      Void (ml) 525   525    Stool          Number of Times Stooled 1 x   1 x    Shift Total 525   525   NET -285   -285       Intake/Output Summary (Last 24 hours) at 04/03/17 1338  Last data filed at 04/03/17 1000   Gross per 24 hour   Intake   1640 ml   Output    975 ml   Net    665 ml         • DAPTOmycin  700 mg 700 mg (04/03/17 1000)   • oxycodone-acetaminophen  1-2 Tab     • calcium carbonate  1,000 mg     • omeprazole  20 mg     • lisinopril  10 mg     • levothyroxine  50 mcg     • fluticasone  1 Spray     • atorvastatin  10 mg     • Respiratory Care per Protocol       • budesonide-formoterol  2 Puff     • NS  500 mL     • lactobacillus granules  1 Packet     • MD ALERT...Do not administer NSAIDS or ASPIRIN unless ORDERED By Neurosurgery  1 Each     • docusate sodium  100 mg     • senna-docusate  1 Tab     • senna-docusate  1 Tab     • polyethylene glycol/lytes  1 Packet     • magnesium hydroxide  30 mL     • bisacodyl  10 mg     • fleet  1 Each     • 0.9 % NaCl with KCl 20 mEq " 1,000 mL   100 mL/hr at 04/02/17 2264   • tizanidine  2 mg     • Pharmacy Consult Request  1 Each           Assessment and Plan:  POD # 4 Incision and drainage of lumbar wound    S/p approx 2 weeks from lumbar fusion, admit w/ fever   Staph epi lumbar infection    Doing well    Home when abx arranged

## 2017-04-03 NOTE — CARE PLAN
Problem: Safety  Goal: Will remain free from falls  Intervention: Implement fall precautions  Safety and fall precautions in place.  Bed in low position, upper side rails X 2, treaded socks applied.  Call light within reach.      Problem: Respiratory:  Goal: Respiratory status will improve  Intervention: Assess and monitor pulmonary status  Patient is on room air.  Deep breathing and coughing performed correctly.  Uses incentive spirometer while awake.

## 2017-04-04 VITALS
RESPIRATION RATE: 16 BRPM | SYSTOLIC BLOOD PRESSURE: 136 MMHG | WEIGHT: 254.41 LBS | HEART RATE: 81 BPM | BODY MASS INDEX: 38.56 KG/M2 | TEMPERATURE: 98.1 F | OXYGEN SATURATION: 94 % | DIASTOLIC BLOOD PRESSURE: 90 MMHG | HEIGHT: 68 IN

## 2017-04-04 PROCEDURE — 700101 HCHG RX REV CODE 250: Performed by: CLINICAL NURSE SPECIALIST

## 2017-04-04 PROCEDURE — A9270 NON-COVERED ITEM OR SERVICE: HCPCS | Performed by: CLINICAL NURSE SPECIALIST

## 2017-04-04 PROCEDURE — 700112 HCHG RX REV CODE 229: Performed by: CLINICAL NURSE SPECIALIST

## 2017-04-04 PROCEDURE — A9270 NON-COVERED ITEM OR SERVICE: HCPCS | Performed by: HOSPITALIST

## 2017-04-04 PROCEDURE — 700102 HCHG RX REV CODE 250 W/ 637 OVERRIDE(OP): Performed by: HOSPITALIST

## 2017-04-04 PROCEDURE — 99239 HOSP IP/OBS DSCHRG MGMT >30: CPT | Performed by: INTERNAL MEDICINE

## 2017-04-04 PROCEDURE — 700111 HCHG RX REV CODE 636 W/ 250 OVERRIDE (IP): Performed by: INTERNAL MEDICINE

## 2017-04-04 PROCEDURE — 700102 HCHG RX REV CODE 250 W/ 637 OVERRIDE(OP): Performed by: CLINICAL NURSE SPECIALIST

## 2017-04-04 PROCEDURE — 700105 HCHG RX REV CODE 258: Performed by: INTERNAL MEDICINE

## 2017-04-04 RX ADMIN — TIZANIDINE 2 MG: 4 TABLET ORAL at 04:10

## 2017-04-04 RX ADMIN — OXYCODONE HYDROCHLORIDE AND ACETAMINOPHEN 1 TABLET: 10; 325 TABLET ORAL at 08:59

## 2017-04-04 RX ADMIN — OXYCODONE HYDROCHLORIDE AND ACETAMINOPHEN 2 TABLET: 10; 325 TABLET ORAL at 13:45

## 2017-04-04 RX ADMIN — LISINOPRIL 10 MG: 10 TABLET ORAL at 08:42

## 2017-04-04 RX ADMIN — ATORVASTATIN CALCIUM 10 MG: 10 TABLET, FILM COATED ORAL at 08:43

## 2017-04-04 RX ADMIN — OMEPRAZOLE 20 MG: 20 CAPSULE, DELAYED RELEASE ORAL at 08:43

## 2017-04-04 RX ADMIN — POTASSIUM CHLORIDE AND SODIUM CHLORIDE: 900; 150 INJECTION, SOLUTION INTRAVENOUS at 02:49

## 2017-04-04 RX ADMIN — BUDESONIDE AND FORMOTEROL FUMARATE DIHYDRATE 2 PUFF: 80; 4.5 AEROSOL RESPIRATORY (INHALATION) at 08:43

## 2017-04-04 RX ADMIN — DAPTOMYCIN 700 MG: 500 INJECTION, POWDER, LYOPHILIZED, FOR SOLUTION INTRAVENOUS at 08:59

## 2017-04-04 RX ADMIN — LEVOTHYROXINE SODIUM 50 MCG: 50 TABLET ORAL at 06:39

## 2017-04-04 ASSESSMENT — COPD QUESTIONNAIRES
COPD SCREENING SCORE: 3
DO YOU EVER COUGH UP ANY MUCUS OR PHLEGM?: YES, A FEW DAYS A WEEK OR MONTH
DURING THE PAST 4 WEEKS HOW MUCH DID YOU FEEL SHORT OF BREATH: NONE/LITTLE OF THE TIME
HAVE YOU SMOKED AT LEAST 100 CIGARETTES IN YOUR ENTIRE LIFE: NO/DON'T KNOW

## 2017-04-04 ASSESSMENT — PAIN SCALES - GENERAL: PAINLEVEL_OUTOF10: 0

## 2017-04-04 ASSESSMENT — ENCOUNTER SYMPTOMS
VOMITING: 0
COUGH: 0
CHILLS: 0
ABDOMINAL PAIN: 0
MYALGIAS: 0
SHORTNESS OF BREATH: 0
CONSTIPATION: 0
FEVER: 0
DIARRHEA: 0
NAUSEA: 0

## 2017-04-04 ASSESSMENT — LIFESTYLE VARIABLES: EVER_SMOKED: NEVER

## 2017-04-04 NOTE — DISCHARGE PLANNING
Referral: Follow Up    Intervention: CATRACHO, Charge Nurse informed SW, pt is to discharge home with Outpatient Antibiotics arranged by Dr. Schulz's Office.  DOMINGA met with pt at bedside.  Pt states he prefers to discharge home with Home Infusion.  DOMINGA explained, the process for home infusion can take 24-48 hours.  DOMINGA requested RN clarifies discharge needs, DOMINGA explained, IV ABX and HHC Order are required to initiate referrals.    Plan: As Above.  Await RN Update.

## 2017-04-04 NOTE — PROGRESS NOTES
Hospital Medicine Progress Note, Adult, Complex               Author: Brittnee Finley Date & Time created: 4/3/2017  5:38 PM     Interval History:  3/31 Patient feeling okay this am, still on PCA when evaluated and planning to transition to oral pain medication later today.  He states the fevers have resolved and he is starting to feel stronger post op.  One of two cultures growing staph and ID consulted for recommendations.  Vanco/zosyn to continue until changed by ID.  4/1 Patient feeling okay, anxious to get out of the hospital.  PICC pending placement today, OP abx infusion being coordinated on Monday by Dr Vasquez.  Coag ky hills on cultures, final sensitivities pending.  Hemovac in place.  Patient in good spirits and pain well controlled.  Patient ambulating well.  4/2 Patient states he is ready to leave at any time, ID coordinating OP infusion of abx starting tomorrow, anticipated dc tomorrow afternoon, once this coordinated.    4/3 Patient feeling well, hoping to dc home today, ID working to get authorization for outpatient infusion for antibiotic either through clinic or at home.  Hemovac discontinued yesterday.  Patient cleared for dc by nsg and medicine once abx organized.    Review of Systems:  Review of Systems   Constitutional: Negative for fever and malaise/fatigue.   HENT: Negative for congestion and nosebleeds.    Eyes: Negative for blurred vision and photophobia.   Respiratory: Negative for cough and shortness of breath.    Cardiovascular: Negative for chest pain, claudication and leg swelling.   Gastrointestinal: Negative for heartburn, abdominal pain and diarrhea.   Genitourinary: Negative for dysuria and hematuria.   Musculoskeletal: Negative for myalgias and back pain.   Skin: Negative for itching and rash.   Neurological: Negative for dizziness, focal weakness, seizures, weakness and headaches.   Psychiatric/Behavioral: Negative for depression. The patient is not nervous/anxious and does not  have insomnia.        Physical Exam:  Physical Exam   Constitutional: He is oriented to person, place, and time. He appears well-developed and well-nourished. No distress.   HENT:   Head: Normocephalic and atraumatic.   Eyes: Conjunctivae are normal. No scleral icterus.   Neck: Neck supple. No JVD present.   Cardiovascular: Normal rate, regular rhythm, normal heart sounds and intact distal pulses.  Exam reveals no gallop and no friction rub.    No murmur heard.  Pulmonary/Chest: Effort normal and breath sounds normal. No respiratory distress. He exhibits no tenderness.   Abdominal: Soft. Bowel sounds are normal. He exhibits no distension and no mass. There is no tenderness.   Musculoskeletal: He exhibits no edema or tenderness.   hemovac in place    Neurological: He is alert and oriented to person, place, and time. No cranial nerve deficit.   Skin: Skin is warm and dry. He is not diaphoretic. No erythema.   Psychiatric: He has a normal mood and affect. His behavior is normal.   Nursing note and vitals reviewed.      Labs:        Invalid input(s): [id number]  Recent Labs      04/03/17   0250   CPKTOTAL  52     Recent Labs      04/02/17 0045 04/03/17   0250   SODIUM  138  139   POTASSIUM  3.9  3.9   CHLORIDE  108  108   CO2  25  25   BUN  4*  4*   CREATININE  0.72  0.69   CALCIUM  9.1  8.9     Recent Labs      04/02/17 0045 04/03/17   0250   ALTSGPT   --   23   ASTSGOT   --   25   ALKPHOSPHAT   --   57   TBILIRUBIN   --   0.5   GLUCOSE  110*  101*     Recent Labs      04/02/17 0045 04/03/17   0250   RBC  3.38*  3.28*   HEMOGLOBIN  9.7*  9.4*   HEMATOCRIT  29.4*  28.7*   PLATELETCT  277  288     Recent Labs      04/02/17 0045 04/03/17   0250   WBC  4.0*  3.9*   NEUTSPOLYS  63.40  61.50   LYMPHOCYTES  19.60*  20.40*   MONOCYTES  9.90  9.70   EOSINOPHILS  6.40  7.10*   BASOPHILS  0.50  0.50   ASTSGOT   --   25   ALTSGPT   --   23   ALKPHOSPHAT   --   57   TBILIRUBIN   --   0.5            Hemodynamics:  Temp (24hrs), Av.9 °C (98.4 °F), Min:36.6 °C (97.9 °F), Max:37.1 °C (98.8 °F)  Temperature: 37.1 °C (98.8 °F)  Pulse  Av.5  Min: 64  Max: 112   Blood Pressure : 134/83 mmHg     Respiratory:    Respiration: 18, Pulse Oximetry: 98 %     Work Of Breathing / Effort: Mild  RUL Breath Sounds: Clear, RML Breath Sounds: Clear, RLL Breath Sounds: Diminished, COLE Breath Sounds: Clear, LLL Breath Sounds: Diminished  Fluids:    Intake/Output Summary (Last 24 hours) at 17 1738  Last data filed at 17 1000   Gross per 24 hour   Intake   1640 ml   Output    975 ml   Net    665 ml        GI/Nutrition:  Orders Placed This Encounter   Procedures   • DIET ORDER     Standing Status: Standing      Number of Occurrences: 1      Standing Expiration Date:      Order Specific Question:  Diet:     Answer:  Regular [1]     Medical Decision Making, by Problem:  Active Hospital Problems    Diagnosis   • *Sepsis (CMS-[facility]) [A41.9]resolved     • Fluid collection at surgical site, lumbar  [T88.8XXA]s/p I&D with Dr Chakraborty, cultures show staph epi, sn to daptomycin     • Normocytic anemia [D64.9]monitor     • Ascending aorta dilation (CMS-[facility]), mild, needs OP followup [I77.810]     • Staph infection [B95.8]ID consultation, MRSA proven negative, PICC 17, Daptomycin     • SIRS (systemic inflammatory response syndrome) (CMS-[facility]) [R65.10]resolved     • GERD (gastroesophageal reflux disease) [K21.9]Tums per patient request, continue prilosec     • HTN (hypertension) [I10]lisinopril     • Hyperlipidemia [E78.5]statin     • Hypothyroidism [E03.9]synthroid       Labs reviewed, Radiology images reviewed and Medications reviewed  Gomes catheter: No Gomes        DVT prophylaxis - mechanical: SCDs

## 2017-04-04 NOTE — PROGRESS NOTES
"Infectious Disease Progress Note    Author: William Dotson M.D. Date & Time created: 4/4/2017  10:55 AM     P/O Day #5 on daptomycin now    Interval History:  Past 24 hrs reviewed with RN. Anxious to go home.    Labs Reviewed, Medications Reviewed, Radiology Reviewed, Wound Reviewed, Fluids Reviewed and GI Nutrition Reviewed.    Review of Systems:  Review of Systems   Constitutional: Negative for fever and chills.   Respiratory: Negative for cough and shortness of breath.    Cardiovascular: Negative for chest pain.   Gastrointestinal: Negative for nausea, vomiting, abdominal pain, diarrhea and constipation.   Genitourinary: Negative for dysuria, urgency and frequency.   Musculoskeletal: Negative for myalgias.   Skin: Negative for itching and rash.       Physical Exam:  Physical Exam   Constitutional: He is oriented to person, place, and time. He appears well-developed.   HENT:   Head: Normocephalic and atraumatic.   Cardiovascular: Normal rate and regular rhythm.    Pulmonary/Chest: Effort normal. No respiratory distress. He has no wheezes.   Abdominal: Soft. He exhibits no distension. There is no tenderness. There is no guarding.   Musculoskeletal:   Incision without erythema or drainage over his back.    Neurological: He is alert and oriented to person, place, and time.   Skin: Skin is warm and dry.   Psychiatric: He has a normal mood and affect. His behavior is normal.   Nursing note and vitals reviewed.      Labs:  No results found for this or any previous visit (from the past 24 hour(s)).  Results     Procedure Component Value Units Date/Time    BLOOD CULTURE [[id number]] Collected:  03/29/17 2032    Order Status:  Completed Specimen Information:  Blood from Peripheral Updated:  04/03/17 2300     Significant Indicator NEG      Source BLD      Site PERIPHERAL      Blood Culture No growth after 5 days of incubation.     Narrative:      Per Hospital Policy: Only change Specimen Src: to \"Line\" if  specified by " "physician order.    BLOOD CULTURE [[id number]] Collected:  03/29/17 1818    Order Status:  Completed Specimen Information:  Blood from Peripheral Updated:  04/03/17 2100     Significant Indicator NEG      Source BLD      Site PERIPHERAL      Blood Culture No growth after 5 days of incubation.     Narrative:      Per Hospital Policy: Only change Specimen Src: to \"Line\" if  specified by physician order.    ANAEROBIC CULTURE [[id number]] Collected:  03/30/17 1249    Order Status:  Completed Specimen Information:  Tissue Updated:  04/03/17 0956     Significant Indicator NEG      Source TISS      Site Deep Tissue Space      Anaerobic Culture, Culture Res No Anaerobes isolated.     CULTURE TISSUE W/ GRM STAIN [[id number]]  (Abnormal)  (Susceptibility) Collected:  03/30/17 1249    Order Status:  Completed Specimen Information:  Tissue Updated:  04/03/17 0956     Gram Stain Result --      Result:        Rare WBCs.  No organisms seen.       Significant Indicator POS (POS)      Source TISS      Site Deep Tissue Space      Tissue Culture -- (A)      Tissue Culture -- (A)      Result:        Staphylococcus epidermidis  Rare growth      Culture & Susceptibility     STAPHYLOCOCCUS EPIDERMIDIS     Antibiotic Sensitivity Microscan Unit Status    Ampicillin/sulbactam Sensitive <=8/4 mcg/mL Final    Clindamycin Sensitive <=0.5 mcg/mL Final    Daptomycin Sensitive <=0.5 mcg/mL Final    Erythromycin Sensitive <=0.5 mcg/mL Final    Moxifloxacin Sensitive <=0.5 mcg/mL Final    Oxacillin Sensitive <=0.25 mcg/mL Final    Penicillin Resistant 8 mcg/mL Final    Tetracycline Resistant >8 mcg/mL Final    Trimeth/Sulfa Sensitive <=0.5/9.5 mcg/mL Final    Vancomycin Sensitive 2 mcg/mL Final                       ANAEROBIC CULTURE [[id number]] Collected:  03/30/17 1223    Order Status:  Completed Specimen Information:  Wound Updated:  04/02/17 1011     Significant Indicator NEG      Source WND      Site Lumbar wound      Anaerobic Culture, Culture " Res No Anaerobes isolated.     CULTURE WOUND W/ GRAM STAIN [[id number]]  (Abnormal)  (Susceptibility) Collected:  17 1223    Order Status:  Completed Specimen Information:  Wound Updated:  17 1011     Significant Indicator POS (POS)      Source WND      Site Lumbar wound      Culture Result Wound -- (A)      Gram Stain Result --      Result:        Few WBCs.  No organisms seen.       Culture Result Wound -- (A)      Result:        Staphylococcus epidermidis  Light growth      Culture & Susceptibility     STAPHYLOCOCCUS EPIDERMIDIS     Antibiotic Sensitivity Microscan Unit Status    Ampicillin/sulbactam Sensitive <=8/4 mcg/mL Final    Clindamycin Sensitive <=0.5 mcg/mL Final    Daptomycin Sensitive <=0.5 mcg/mL Final    Erythromycin Sensitive <=0.5 mcg/mL Final    Moxifloxacin Sensitive <=0.5 mcg/mL Final    Oxacillin Sensitive <=0.25 mcg/mL Final    Penicillin Resistant 8 mcg/mL Final    Tetracycline Resistant >8 mcg/mL Final    Trimeth/Sulfa Sensitive <=0.5/9.5 mcg/mL Final    Vancomycin Sensitive 2 mcg/mL Final                       GRAM STAIN [[id number]] Collected:  17 1249    Order Status:  Completed Specimen Information:  Tissue Updated:  17 1406     Significant Indicator .      Source TISS      Site Deep Tissue Space      Gram Stain Result --      Result:        Rare WBCs.  No organisms seen.      GRAM STAIN [[id number]] Collected:  17 1223    Order Status:  Completed Specimen Information:  Wound Updated:  17 1406     Significant Indicator .      Source WND      Site Lumbar wound      Gram Stain Result --      Result:        Few WBCs.  No organisms seen.      URINALYSIS CULTURE, IF INDICATED [[id number]]     Order Status:  Canceled Specimen Information:  Urine from Urine, Clean Catch             Hemodynamics:  Temp (24hrs), Av.9 °C (98.4 °F), Min:36.4 °C (97.6 °F), Max:37.2 °C (98.9 °F)  Temperature: 36.7 °C (98.1 °F)  Pulse  Av.1  Min: 64  Max: 112   Blood Pressure :  136/90 mmHg     Central Line Group Right;Basilic Single Lumen;PICC;Power Injection Catheter 4 (Active)   Line Length (cm) 46 cm 4/1/2017 12:00 PM   Line Secured Transparent 4/4/2017  7:29 AM   Patency and Function Check Performed at Beginning of Shift 4/3/2017  8:00 PM   Line Necessity Assessed Antibiotic Therapy Greater than 7 Days 4/3/2017  8:00 PM   Consider Removal of Femoral Line Not Applicable 4/3/2017  8:00 PM   Hand Washing / Gloves Prior to Every Access Yes 4/3/2017  8:00 PM   Next Daily Chlorhexidine Bath Due (Regional ONLY) 04/04/17 4/3/2017  8:00 PM   Port Access  Scrub the Hub Prior to Access 4/3/2017  8:00 PM   Site Condition / Description Assessed;Patent;Clean;Dry;Intact 4/4/2017  7:29 AM   Signs and Symptoms of Infection None Apparent at this Time 4/3/2017  8:00 PM   Dressing Type / Description Antimicrobial Patch (BioPatch);Transparent;Clean;Dry;Intact 4/4/2017  7:29 AM   Dressing Status Observed 4/4/2017  7:29 AM   Next Dressing Change  04/08/17 4/3/2017  8:00 PM   Date Primary Tubing Changed 04/02/17 4/2/2017  3:00 AM   Date Secondary Tubing Changed 04/02/17 4/2/2017  3:00 AM   NEXT Primary Tubing Change  04/04/17 4/3/2017  8:00 PM   NEXT Secondary Tubing Change  04/04/17 4/3/2017  8:00 PM   Date IV Connector(s) Changed 04/02/17 4/2/2017  3:00 AM   NEXT IV Connector(s) Change Date 04/08/17 4/3/2017  8:00 PM       Wound:  Surgical Incision  Incision N/A Back (Active)   Wound Bed Pink 4/4/2017  7:29 AM   Drainage  None 4/4/2017  7:29 AM   Periwound Skin Pink 4/4/2017  7:29 AM   Daily - Wound Closure Staples 4/4/2017  7:29 AM   Dressing Options Island Dressing;Dry Gauze;Tape 4/4/2017  7:29 AM   Dressing Status / Change Clean;Dry;Intact;Changed 4/4/2017  7:29 AM   Daily - Dressing Change Changed 4/4/2017  7:29 AM   Dressing Change Frequency As Needed 4/4/2017  7:29 AM       Surgical Incision  Incision Back (Active)   Wound Bed Pink 4/4/2017  7:29 AM   Drainage  None 4/4/2017  7:29 AM   Periwound Skin  Pleasure Point 4/4/2017  7:29 AM   Daily - Wound Closure Staples 4/4/2017  7:29 AM   Dressing Options Dry Gauze 4/4/2017  7:29 AM   Dressing Status / Change Clean;Dry;Intact 4/4/2017  7:29 AM   Daily - Dressing Change Observed 4/4/2017  7:29 AM          Fluids:  Intake/Output       04/02/17 0700 - 04/03/17 0659 04/03/17 0700 - 04/04/17 0659 04/04/17 0700 - 04/05/17 0659      0700-1859 4694-5528 Total 0700-1859 9249-1996 Total 3437-0189 2036-7144 Total       Intake    P.O.  --  150 150  240  280 520  --  -- --    P.O. -- 150 150 240 280 520 -- -- --    I.V.  --  1250 1250  --  1200 1200  --  -- --    IV Volume -- 950 950 -- 1200 1200 -- -- --    IV Piggyback Volume -- 300 300 -- -- -- -- -- --    Total Intake -- 1400 3081 380 8628 1720 -- -- --       Output    Urine  --  450 450  525  1700 2225  400  -- 400    Number of Times Voided -- -- -- -- 1 x 1 x -- -- --    Void (ml) -- 450  2225 400 -- 400    Stool  --  -- --  --  -- --  --  -- --    Number of Times Stooled -- -- -- 1 x -- 1 x 0 x -- 0 x    Total Output -- 450  2225 400 -- 400       Net I/O     -- 950 950 -285 -220 -505 -400 -- -400             GI/Nutrition:  Orders Placed This Encounter   Procedures   • DIET ORDER     Standing Status: Standing      Number of Occurrences: 1      Standing Expiration Date:      Order Specific Question:  Diet:     Answer:  Regular [1]       Medications:  Current Facility-Administered Medications   Medication Last Dose   • daptomycin (CUBICIN) 700 mg in NS 50 mL IVPB 700 mg at 04/04/17 0859   • oxycodone-acetaminophen (PERCOCET-10)  MG per tablet 1-2 Tab 1 Tab at 04/04/17 0859   • calcium carbonate (TUMS) chewable tab 1,000 mg 1,000 mg at 03/31/17 1021   • omeprazole (PRILOSEC) capsule 20 mg 20 mg at 04/04/17 0843   • lisinopril (PRINIVIL) 10 MG tablet 10 mg 10 mg at 04/04/17 0842   • levothyroxine (SYNTHROID) tablet 50 mcg 50 mcg at 04/04/17 0639   • fluticasone (FLONASE) nasal spray 50 mcg 50 mcg at 04/03/17  2224   • atorvastatin (LIPITOR) tablet 10 mg 10 mg at 04/04/17 0843   • Respiratory Care per Protocol     • budesonide-formoterol (SYMBICORT) 80-4.5 MCG/ACT inhaler 2 Puff 2 Puff at 04/04/17 0843   • NS (BOLUS) infusion 500 mL     • lactobacillus granules (LACTINEX/FLORANEX) packet 1 Packet 1 Packet at 04/02/17 1219   • MD ALERT...Do not administer NSAIDS or ASPIRIN unless ORDERED By Neurosurgery 1 Each     • docusate sodium (COLACE) capsule 100 mg 100 mg at 04/03/17 0822   • senna-docusate (PERICOLACE or SENOKOT S) 8.6-50 MG per tablet 1 Tab 1 Tab at 04/02/17 2031   • senna-docusate (PERICOLACE or SENOKOT S) 8.6-50 MG per tablet 1 Tab     • polyethylene glycol/lytes (MIRALAX) PACKET 1 Packet     • magnesium hydroxide (MILK OF MAGNESIA) suspension 30 mL     • bisacodyl (DULCOLAX) suppository 10 mg     • fleet enema 133 mL     • 0.9 % NaCl with KCl 20 mEq infusion     • tizanidine (ZANAFLEX) tablet 2 mg 2 mg at 04/04/17 0410   • Pharmacy Consult Request ...Pain Management Review 1 Each         Medical Decision Making, by Problem:  Active Hospital Problems    Diagnosis   • *Sepsis (CMS-HCC) [A41.9]   • Fluid collection at surgical site, lumbar  [T88.8XXA]   • Normocytic anemia [D64.9]   • Ascending aorta dilation (CMS-HCC), mild, needs OP followup [I77.810]   • Staph infection [B95.8]   • SIRS (systemic inflammatory response syndrome) (CMS-[facility], located within [facility]) [R65.10]   • GERD (gastroesophageal reflux disease) [K21.9]   • HTN (hypertension) [I10]   • Hyperlipidemia [E78.5]   • Hypothyroidism [E03.9]       Plan:  The patient has been authorized to obtain OPAT through my office. He was given directions to my office to obtain teaching for home IV abx infusion and to collection Abx. My office will call him to [facility].   He is doing well ID wise and has his PICC. He is ok to discharge from an ID perspective with follow up today in my office for abx and teaching.   I have reviewed D/C instructions with him today.     Case reviewed with  patient, RN, office, his insurance company.     >/= 30+ min thus far in direct patient care/counseling/consulting and D/C planning today.    Dr Dotson

## 2017-04-04 NOTE — CARE PLAN
Problem: Safety  Goal: Will remain free from falls  Outcome: PROGRESSING AS EXPECTED  Safety and fall precautions in place.  Patient aware to use call light for assistance.  Call light within reach.    Problem: Venous Thromboembolism (VTW)/Deep Vein Thrombosis (DVT) Prevention:  Goal: Patient will participate in Venous Thrombosis (VTE)/Deep Vein Thrombosis (DVT)Prevention Measures  Outcome: PROGRESSING AS EXPECTED

## 2017-04-04 NOTE — DISCHARGE PLANNING
Referral: Update    Intervention: CATRACHO, Charge RN informed, per Dr. Greenberg, pt will discharge home today and follow up at their office for IV ABX, pt aware.    Plan: As Above.

## 2017-04-04 NOTE — PROGRESS NOTES
Received patient at change of shift resting in bed awake, alert, oriented X 4.  No complaints of pain voiced at this time. Surgical incision to mid back with staples ANNIE, clean, dry.  Dry gauze to drain site, clean, dry. Patient ambulates with stand by assist to bathroom and hallway, TLSO applied.  Safety and comfort measures maintained.  Call light within reach.    Patient educated on fall precautions, despite education given refused bed alarm.

## 2017-04-04 NOTE — PROGRESS NOTES
"Neurosurgery :     Exam:   Frustrated, wants to go home, otherwise Pain controlled, no leg s/s, str good, inc c/d flat w/ st, amb, voiding, eating    Blood pressure 136/90, pulse 81, temperature 36.7 °C (98.1 °F), temperature source Temporal, resp. rate 16, height 1.727 m (5' 8\"), weight 115.4 kg (254 lb 6.6 oz), SpO2 94 %.    Recent Labs      04/02/17   0045  04/03/17   0250   WBC  4.0*  3.9*   RBC  3.38*  3.28*   HEMOGLOBIN  9.7*  9.4*   HEMATOCRIT  29.4*  28.7*   MCV  87.0  87.5   MCH  28.7  28.7   MCHC  33.0*  32.8*   RDW  40.8  41.6   PLATELETCT  277  288   MPV  9.2  9.3     Recent Labs      04/02/17   0045  04/03/17   0250   SODIUM  138  139   POTASSIUM  3.9  3.9   CHLORIDE  108  108   CO2  25  25   GLUCOSE  110*  101*   BUN  4*  4*   CPKTOTAL   --   52             Date 04/04/17 0700 - 04/05/17 0659   Shift 0093-8438 1105-3921 9202-9665 24 Hour Total   I  N  T  A  K  E   Shift Total       O  U  T  P  U  T   Urine 400   400      Void (ml) 400   400    Stool          Number of Times Stooled 0 x   0 x    Shift Total 400   400   NET -400   -400       Intake/Output Summary (Last 24 hours) at 04/04/17 0836  Last data filed at 04/04/17 0800   Gross per 24 hour   Intake   1480 ml   Output   2325 ml   Net   -845 ml         • DAPTOmycin  700 mg 700 mg (04/03/17 1000)   • oxycodone-acetaminophen  1-2 Tab     • calcium carbonate  1,000 mg     • omeprazole  20 mg     • lisinopril  10 mg     • levothyroxine  50 mcg     • fluticasone  1 Spray     • atorvastatin  10 mg     • Respiratory Care per Protocol       • budesonide-formoterol  2 Puff     • NS  500 mL     • lactobacillus granules  1 Packet     • MD ALERT...Do not administer NSAIDS or ASPIRIN unless ORDERED By Neurosurgery  1 Each     • docusate sodium  100 mg     • senna-docusate  1 Tab     • senna-docusate  1 Tab     • polyethylene glycol/lytes  1 Packet     • magnesium hydroxide  30 mL     • bisacodyl  10 mg     • fleet  1 Each     • 0.9 % NaCl with KCl 20 mEq 1,000 " mL   100 mL/hr at 04/04/17 0249   • tizanidine  2 mg     • Pharmacy Consult Request  1 Each           Assessment and Plan:  POD # 5 Incision and drainage of lumbar wound    S/p approx 2 weeks from lumbar fusion, admit w/ fever   Staph epi lumbar infection    Doing well    Home when abx arranged

## 2017-04-04 NOTE — PROGRESS NOTES
Patient sitting up in the bed eating dinner. Patient denied any pain at this time. VSS. Patient watching television and stated he will be glad when he can go home. Will monitor patient for any changes.

## 2017-04-04 NOTE — DISCHARGE INSTRUCTIONS
Discharge Instructions    Discharged to home by car with relative. Discharged via wheelchair, hospital escort: Yes.  Special equipment needed: TLSO and Wheelchair    Be sure to schedule a follow-up appointment with your primary care doctor or any specialists as instructed.     Discharge Plan:   Diet Plan: Discussed  Activity Level: Discussed  Confirmed Follow up Appointment: Patient to Call and Schedule Appointment  Confirmed Symptoms Management: Discussed  Medication Reconciliation Updated: Yes  Influenza Vaccine Indication: Not indicated: Previously immunized this influenza season and > 8 years of age    I understand that a diet low in cholesterol, fat, and sodium is recommended for good health. Unless I have been given specific instructions below for another diet, I accept this instruction as my diet prescription.   Other diet: regular    Special Instructions: None    · Is patient discharged on Warfarin / Coumadin?   No     · Is patient Post Blood Transfusion?  No    Depression / Suicide Risk    As you are discharged from this [facility] facility, it is important to learn how to keep safe from harming yourself.    Recognize the warning signs:  · Abrupt changes in personality, positive or negative- including increase in energy   · Giving away possessions  · Change in eating patterns- significant weight changes-  positive or negative  · Change in sleeping patterns- unable to sleep or sleeping all the time   · Unwillingness or inability to communicate  · Depression  · Unusual sadness, discouragement and loneliness  · Talk of wanting to die  · Neglect of personal appearance   · Rebelliousness- reckless behavior  · Withdrawal from people/activities they love  · Confusion- inability to concentrate     If you or a loved one observes any of these behaviors or has concerns about self-harm, here's what you can do:  · Talk about it- your feelings and reasons for harming yourself  · Remove any means that you might use to  hurt yourself (examples: pills, rope, extension cords, firearm)  · Get professional help from the community (Mental Health, Substance Abuse, psychological counseling)  · Do not be alone:Call your Safe Contact- someone whom you trust who will be there for you.  · Call your local CRISIS HOTLINE 473-9093 or 446-583-1366  · Call your local Children's Mobile Crisis Response Team [place] (051) 732-2771 or www.Instapagar  · Call the toll free National Suicide Prevention Hotlines   · National Suicide Prevention Lifeline 764-193-LXRE (9268)  · National Hope Line Network 800-SUICIDE (744-3730)

## 2017-04-05 ENCOUNTER — HOSPITAL ENCOUNTER (OUTPATIENT)
Dept: LAB | Facility: MEDICAL CENTER | Age: 72
End: 2017-04-05
Attending: INTERNAL MEDICINE
Payer: MEDICARE

## 2017-04-05 DIAGNOSIS — Z00.00 PREVENTATIVE HEALTH CARE: ICD-10-CM

## 2017-04-05 PROBLEM — M46.20 PARASPINAL ABSCESS (HCC): Status: ACTIVE | Noted: 2017-03-30

## 2017-04-05 LAB
ALBUMIN SERPL BCP-MCNC: 3.9 G/DL (ref 3.2–4.9)
ALBUMIN/GLOB SERPL: 1.1 G/DL
ALP SERPL-CCNC: 79 U/L (ref 30–99)
ALT SERPL-CCNC: 37 U/L (ref 2–50)
ANION GAP SERPL CALC-SCNC: 11 MMOL/L (ref 0–11.9)
AST SERPL-CCNC: 32 U/L (ref 12–45)
BASOPHILS # BLD AUTO: 0.4 % (ref 0–1.8)
BASOPHILS # BLD: 0.03 K/UL (ref 0–0.12)
BILIRUB SERPL-MCNC: 0.7 MG/DL (ref 0.1–1.5)
BUN SERPL-MCNC: 6 MG/DL (ref 8–22)
CALCIUM SERPL-MCNC: 10 MG/DL (ref 8.5–10.5)
CHLORIDE SERPL-SCNC: 105 MMOL/L (ref 96–112)
CO2 SERPL-SCNC: 23 MMOL/L (ref 20–33)
CREAT SERPL-MCNC: 0.8 MG/DL (ref 0.5–1.4)
EOSINOPHIL # BLD AUTO: 0.22 K/UL (ref 0–0.51)
EOSINOPHIL NFR BLD: 3.2 % (ref 0–6.9)
ERYTHROCYTE [DISTWIDTH] IN BLOOD BY AUTOMATED COUNT: 43.1 FL (ref 35.9–50)
GFR SERPL CREATININE-BSD FRML MDRD: >60 ML/MIN/1.73 M 2
GLOBULIN SER CALC-MCNC: 3.4 G/DL (ref 1.9–3.5)
GLUCOSE SERPL-MCNC: 93 MG/DL (ref 65–99)
HCT VFR BLD AUTO: 38 % (ref 42–52)
HGB BLD-MCNC: 12.1 G/DL (ref 14–18)
IMM GRANULOCYTES # BLD AUTO: 0.02 K/UL (ref 0–0.11)
IMM GRANULOCYTES NFR BLD AUTO: 0.3 % (ref 0–0.9)
LYMPHOCYTES # BLD AUTO: 0.66 K/UL (ref 1–4.8)
LYMPHOCYTES NFR BLD: 9.6 % (ref 22–41)
MCH RBC QN AUTO: 28.3 PG (ref 27–33)
MCHC RBC AUTO-ENTMCNC: 31.8 G/DL (ref 33.7–35.3)
MCV RBC AUTO: 89 FL (ref 81.4–97.8)
MONOCYTES # BLD AUTO: 0.45 K/UL (ref 0–0.85)
MONOCYTES NFR BLD AUTO: 6.5 % (ref 0–13.4)
NEUTROPHILS # BLD AUTO: 5.53 K/UL (ref 1.82–7.42)
NEUTROPHILS NFR BLD: 80 % (ref 44–72)
NRBC # BLD AUTO: 0 K/UL
NRBC BLD AUTO-RTO: 0 /100 WBC
PLATELET # BLD AUTO: 426 K/UL (ref 164–446)
PMV BLD AUTO: 9.9 FL (ref 9–12.9)
POTASSIUM SERPL-SCNC: 3.6 MMOL/L (ref 3.6–5.5)
PROT SERPL-MCNC: 7.3 G/DL (ref 6–8.2)
RBC # BLD AUTO: 4.27 M/UL (ref 4.7–6.1)
SODIUM SERPL-SCNC: 139 MMOL/L (ref 135–145)
TSH SERPL DL<=0.005 MIU/L-ACNC: 1.2 UIU/ML (ref 0.3–3.7)
WBC # BLD AUTO: 6.9 K/UL (ref 4.8–10.8)

## 2017-04-05 PROCEDURE — 80053 COMPREHEN METABOLIC PANEL: CPT

## 2017-04-05 PROCEDURE — 36415 COLL VENOUS BLD VENIPUNCTURE: CPT

## 2017-04-05 PROCEDURE — 85025 COMPLETE CBC W/AUTO DIFF WBC: CPT

## 2017-04-05 PROCEDURE — 84443 ASSAY THYROID STIM HORMONE: CPT

## 2017-04-05 NOTE — DISCHARGE SUMMARY
DATE OF ADMISSION:  03/29/2017    DATE OF DISCHARGE:  04/04/2017    FINAL DIAGNOSES:  1.  Paraspinal abscess L3-L4 postop, growing coagulase-negative staph, on   daptomycin per Dr. Jace Dotson, Infectious Disease specialist for currently   unspecified course.  2.  Status post incision and drainage of an infected seroma, wound culture   growing staph epidermidis, resistant to penicillin and tetracycline, but   otherwise pansensitive.  3.  L3-L4 laminectomy by Dr. Juan Miguel Chakraborty on 03/17/2017.  4.  Obesity.  5.  Hypertension.  6.  Hypothyroidism.  7.  Chronic back pain.  8.  Dyslipidemia.  9.  Anemia of chronic disease.    HISTORY OF PRESENT ILLNESS:  The patient is a 72-year-old gentleman who   underwent a lumbar decompression with chronic back pain and nerve impingement   on 03/17/2017 by Dr. Juan Miguel Chakraborty.  He returns with severe back pain and fever   with leukocytosis, concern for infection.  MRI demonstrated a large posterior   paraspinous fluid collection at L3-L4 with severe compromise of the thecal   sac.    HOSPITAL COURSE:  He underwent I and D by Dr. Juan Miguel Chakraborty on 03/30/2017.    Wound cultures have grown Staphylococcus epidermidis.  Infectious Disease   specialist, Dr. Jace Dotson and his colleagues have been consulted.  His   daptomycin has been initiated.  This will be provided at their infusion   center.  Arrangements have been made.  He has an appointment today at 1:00.    His pain is markedly improved.  He will be discharged to home to follow up for   his 1:00 appointment.    DISCHARGE CONDITION:  Guarded, but stable with close followup.    DISCHARGE DIET:  Heart healthy.    DISCHARGE ACTIVITY:  As able.    DISCHARGE MEDICATIONS:  1.  Oxycodone 5/325 mg one every 6 hours as needed.  2.  Advair 100/50 mcg one puff twice daily.  3.  Lipitor 10 mg nightly.  4.  Lisinopril 10 mg daily.  5.  Zyrtec as needed.  6.  Fluticasone nasal spray 1 squirt to each nostril on an as needed basis for   rhinitis.  7.   Daptomycin 700 mg daily, to be provided at the infusion center, per   instructions from Dr. Jace Dotson and his colleagues.  8.  Multivitamin daily.  9.  Doxycycline 100 mg every other day.  10.  Synthroid 50 mcg daily.  11.  Prilosec 20 mg daily.  12.  Vitamin D supplement.    LABORATORY DATA:  Wound cultures grew staph epidermidis, resistant to   penicillin and tetracycline, otherwise pansensitive.  Blood cultures are   negative.  Blood counts on presentation measured 15, this has normalized.    Hemoglobin measured 9.4, this is stable.  Electrolytes with liver function   tests within normal limits.  Urinalysis is negative.    STUDIES:  MRI of the lumbar spine notes large posterior paraspinal fluid   collection at L3-L4 with severe compromise of the thecal sac.  CT angiogram to   rule out pulmonary embolism is negative.    CONSULTS:  Dr. Josiah Solitario, neurosurgeon; Dr. Bandar Vasquez, infectious   disease.    PROCEDURES:  Irrigation and debridement of the paraspinous abscess on   03/30/2017 by Dr. Josiah Solitario.    FOLLOWUP:  He will followup for infusions daily.  He has an 1:00 appointment.    Followup within the next week with Dr. Jace Dotson and Dr. Josiah Solitario.  He   will also see primary care provider, Dr. Gale Spears within the next 1   week.    Discharge time today was 35 minutes.       ____________________________________     MD KATELYN MOORE / BEA    DD:  04/04/2017 12:31:21  DT:  04/04/2017 18:31:58    D#:  [id number]  Job#:  [id number]    cc: JOSIAH SOLITARIO MD, GALE SPEARS MD, BANDAR VASQUEZ MD, Jace Dotson MD

## 2017-04-06 ENCOUNTER — OFFICE VISIT (OUTPATIENT)
Dept: INTERNAL MEDICINE | Facility: MEDICAL CENTER | Age: 72
End: 2017-04-06
Payer: MEDICARE

## 2017-04-06 VITALS
WEIGHT: 247.6 LBS | TEMPERATURE: 98.5 F | HEIGHT: 67 IN | BODY MASS INDEX: 38.86 KG/M2 | SYSTOLIC BLOOD PRESSURE: 120 MMHG | HEART RATE: 101 BPM | DIASTOLIC BLOOD PRESSURE: 88 MMHG | OXYGEN SATURATION: 94 %

## 2017-04-06 DIAGNOSIS — J45.20 MILD INTERMITTENT ASTHMA WITHOUT COMPLICATION: ICD-10-CM

## 2017-04-06 DIAGNOSIS — G89.29 CHRONIC LOW BACK PAIN, UNSPECIFIED BACK PAIN LATERALITY, WITH SCIATICA PRESENCE UNSPECIFIED: ICD-10-CM

## 2017-04-06 DIAGNOSIS — Z00.00 HEALTHCARE MAINTENANCE: ICD-10-CM

## 2017-04-06 DIAGNOSIS — Z85.828 HISTORY OF SKIN CANCER: ICD-10-CM

## 2017-04-06 DIAGNOSIS — Z98.890 SPINAL SURGERY IN PRIOR 3 MONTHS: ICD-10-CM

## 2017-04-06 DIAGNOSIS — M46.20 PARASPINAL ABSCESS (HCC): ICD-10-CM

## 2017-04-06 DIAGNOSIS — K21.9 GASTROESOPHAGEAL REFLUX DISEASE WITHOUT ESOPHAGITIS: ICD-10-CM

## 2017-04-06 DIAGNOSIS — I10 ESSENTIAL HYPERTENSION: ICD-10-CM

## 2017-04-06 DIAGNOSIS — E66.9 OBESITY (BMI 35.0-39.9 WITHOUT COMORBIDITY): ICD-10-CM

## 2017-04-06 DIAGNOSIS — I77.810 ASCENDING AORTA DILATION (HCC): ICD-10-CM

## 2017-04-06 DIAGNOSIS — M54.5 CHRONIC LOW BACK PAIN, UNSPECIFIED BACK PAIN LATERALITY, WITH SCIATICA PRESENCE UNSPECIFIED: ICD-10-CM

## 2017-04-06 DIAGNOSIS — B95.8 STAPH INFECTION: ICD-10-CM

## 2017-04-06 DIAGNOSIS — M25.551 RIGHT HIP PAIN: ICD-10-CM

## 2017-04-06 DIAGNOSIS — E03.9 ACQUIRED HYPOTHYROIDISM: ICD-10-CM

## 2017-04-06 DIAGNOSIS — E78.5 DYSLIPIDEMIA: ICD-10-CM

## 2017-04-06 DIAGNOSIS — M25.511 CHRONIC RIGHT SHOULDER PAIN: ICD-10-CM

## 2017-04-06 DIAGNOSIS — J30.2 SEASONAL ALLERGIC RHINITIS, UNSPECIFIED ALLERGIC RHINITIS TRIGGER: ICD-10-CM

## 2017-04-06 DIAGNOSIS — F43.9 STRESS: ICD-10-CM

## 2017-04-06 DIAGNOSIS — G89.29 CHRONIC RIGHT SHOULDER PAIN: ICD-10-CM

## 2017-04-06 DIAGNOSIS — D64.9 NORMOCYTIC ANEMIA: ICD-10-CM

## 2017-04-06 PROBLEM — R65.10 SIRS (SYSTEMIC INFLAMMATORY RESPONSE SYNDROME) (HCC): Status: RESOLVED | Noted: 2017-03-30 | Resolved: 2017-04-06

## 2017-04-06 PROBLEM — A41.9 SEPSIS (HCC): Status: RESOLVED | Noted: 2017-03-30 | Resolved: 2017-04-06

## 2017-04-06 PROCEDURE — 99214 OFFICE O/P EST MOD 30 MIN: CPT | Performed by: INTERNAL MEDICINE

## 2017-04-06 RX ORDER — SODIUM FLUORIDE 0.9 MG/ML
MOUTHWASH DENTAL
Refills: 6 | COMMUNITY
Start: 2017-03-06 | End: 2017-04-06

## 2017-04-06 ASSESSMENT — PATIENT HEALTH QUESTIONNAIRE - PHQ9: CLINICAL INTERPRETATION OF PHQ2 SCORE: 1

## 2017-04-06 NOTE — PROGRESS NOTES
Established Patient    Angelo Magaña is a 72 y.o. male who presents today with the following:    CC: Hospital follow-up, follow-up of chronic medical problems, stress, worried about CT findings    HPI: See with wife    Staph infection  Doing home IV antibx- with reg f/u with nuring and IDs.  Still occas sweat, no fevers. But no sweating like with diagnosis.     Spinal surgery in prior 3 months  Fevers since surgery- was home- trying to walk, etc- finally inpt- abscess>sepsis>SIRs- out of hosp x 2 days now.     Chronic right shoulder pain  Injected 12/16>>resolved-    Normocytic anemia  Last labs improved.     Paraspinal abscess (CMS-HCC)  Repeat surgery last week. Now on IV antibx.     Healthcare maintenance  Sees eye doc/dentist    Acquired hypothyroidism  Patient is feeling well on their current thyroid dose. The most recent thyroid function tests were normal. No unusual fatigue, skin changes, depression, constipation. Patient is compliant with medication. Takes medication as directed on an empty stomach.        History of skin cancer  Nothing acutely- reg derm f/u.     Low back pain  Still with pain- but s/p two recent surgeries.     Asthma  Stable on current meds.     Seasonal allergies  Stable on current    Gastroesophageal reflux disease without esophagitis  Patient denies any current problems with heartburn\reflux. Denies bloody or tarry stools. Denies chest pain.  Patient does note that stools are darker since on his new recent medication regimen for treatment of a staph infection. Blood stools are not loose. Having a formed stool about every other day. Recent blood counts are improved      Dyslipidemia  Patient denies any signs/symptoms of cardiovascular disease. Denies chest pain/pressure; denies numbness/weakness or difficulty with speech/swallowing or sudden change in vision.       Essential hypertension  The patient denies any symptoms referable to her elevated blood pressure. Specifically denies  chest pain, palpitations, dyspnea, orthopnea, PND or peripheral edema. Current medication regimen is as listed below. Patient denies any side effects of medication.        Obesity (BMI 35.0-39.9 without comorbidity) ([facility])  Trying to resume walking, aware needs to be better about exercise.     Ascending aorta dilation (CMS-HCC), mild, needs OP followup  Mild ascending- incidental finding- no hx- never any pain.     Right hip pain  bursitis- comes and goes- most symptoms d/t back    Stress  Surgery/complications, and worry re wife- memory/depression/coping isses.       Patient Active Problem List    Diagnosis Date Noted   • Paraspinal abscess (CMS-HCC) 03/30/2017     Priority: Medium   • Normocytic anemia 03/30/2017     Priority: Medium   • Ascending aorta dilation (CMS-[facility]), mild, needs OP followup 03/30/2017     Priority: Low   • Obesity (BMI 35.0-39.9 without comorbidity) ([facility]) 04/06/2017   • Spinal surgery in prior 3 months 04/06/2017   • Chronic right shoulder pain 04/06/2017   • Stress 04/06/2017   • Staph infection 03/31/2017   • Healthcare maintenance 06/08/2016   • Acquired hypothyroidism 06/08/2016   • Erectile dysfunction 06/08/2016   • History of skin cancer 06/08/2016   • Right hip pain 06/08/2016   • Rosacea 06/08/2016   • Low back pain 06/08/2016   • Asthma 06/08/2016   • Seasonal allergies 06/08/2016   • Gastroesophageal reflux disease without esophagitis 06/08/2016   • Dyslipidemia 06/08/2016   • Essential hypertension 06/08/2016       Current Outpatient Prescriptions   Medication Sig Dispense Refill   • NS SOLN 50 mL with daptomycin 500 MG SOLR 700 mg 700 mg by Intravenous route every 24 hours.     • oxycodone-acetaminophen (PERCOCET) 5-325 MG Tab Take 1-2 Tabs by mouth every 6 hours as needed. 60 Tab 0   • fluticasone (FLONASE) 50 MCG/ACT nasal spray Spray 1 Spray in nose 1 time daily as needed.     • atorvastatin (LIPITOR) 10 MG Tab TAKE 1 TABLET BY MOUTH EVERY DAY 90 Tab 0   • levothyroxine  "(SYNTHROID) 50 MCG Tab TAKE 1 TABLET BY MOUTH DAILY 90 Tab 0   • lisinopril (PRINIVIL) 10 MG Tab TAKE 1 TABLET BY MOUTH DAILY 90 Tab 0   • ADVAIR DISKUS 100-50 MCG/DOSE AEROSOL POWDER, BREATH ACTIVATED TAKE 1 PUFF BY MOUTH TWICE A DAY 1 Inhaler 5   • VITAMIN D, CHOLECALCIFEROL, PO Take 3,000 Units by mouth every day.     • ZYRTEC-D 5-120 MG PO TB12 Take 1 Tab by mouth every day. Takes daily      • MULTIVITAMIN PO Take 2 Tabs by mouth every day. Takes daily     • PRILOSEC 20 MG PO CPDR Take 20 mg by mouth every day. Takes daily       No current facility-administered medications for this visit.       Social History     Social History   • Marital Status:      Spouse Name: N/A   • Number of Children: N/A   • Years of Education: N/A     Occupational History   • Not on file.     Social History Main Topics   • Smoking status: Never Smoker    • Smokeless tobacco: Never Used   • Alcohol Use: Yes      Comment: 2 -3 drinks per week   • Drug Use: No   • Sexual Activity: Not on file     Other Topics Concern   • Not on file     Social History Narrative       Family History   Problem Relation Age of Onset   • Hypertension Mother    • Hypertension Father    • Lung Disease Father      asthma       ROS: As per HPI. Additional pertinent symptoms as noted below.    ROS:  Constitutional ROS: No unexpected change in weight, No weakness, Positive for fatigue , sweats but not the soaking that he had when diagnosed with his infection  Eye ROS: No recent significant change in vision  Pulmonary ROS: No shortness of breath, No recent change in breathing  Cardiovascular ROS: No chest pain, No edema, No palpitations, No syncope  Gastrointestinal ROS: No abdominal pain, No nausea, vomiting, diarrhea, or constipation, Positive for dark stools  Psychiatric ROS: No depression, No anxiety but stressed    /88 mmHg  Pulse 101  Temp(Src) 36.9 °C (98.5 °F)  Ht 1.695 m (5' 6.75\")  Wt 112.311 kg (247 lb 9.6 oz)  BMI 39.09 kg/m2  SpO2 " 94%    Physical Exam  General:  Alert and oriented, No apparent distress.    Eyes: Pupils equal and reactive. No scleral icterus..    Neck: Supple. No lymphadenopathy noted. Thyroid not enlarged.    Lungs: Clear to auscultation and percussion bilaterally.    Cardiovascular: Regular rate and rhythm. No murmurs, rubs or gallops.    Abdomen:  Benign. No rebound or guarding noted.    Extremities: No clubbing, cyanosis, edema.    Skin: Clear. No rash or suspicious skin lesions noted.     Other: Back with long midline incision staples in place only slight erythema around the staples , but no cellulitis and no drainage  Neuro: Grossly intact ambulating without assistance    Assessment and Plan    1. Obesity (BMI 35.0-39.9 without comorbidity) ([facility])  Not paying attention to diet. In part because of wife's increased challenges with cooking. Lots of microwavable meals. Is trying to do some walking following his back surgery.  - Patient identified as having weight management issue.  Appropriate orders and counseling given.    2. Staph infection  Doing home IV antibiotics. Plan for 6 week course. Has regular follow-up with neurosurgery and infectious disease as well as home health nurse visits.  Wound looks clean patient doing okay. Still with some sweating but minimal  - CBC WITH DIFFERENTIAL; Future    3. Spinal surgery in prior 3 months  Surgery 3/17/2017. Had fever and chills when last Hospital. Ultimately had repeat surgery for a staph infection with sepsis and SIRS. Wearing a hard turtle shell which she can easily get in and out of on his own. And grossly neurologically intact today    4. Chronic right shoulder pain  Improved after injection    5. Normocytic anemia  Recent CBC showed improved hemoglobin. Patient reports formed dark stools every other day does not sound like any blood loss. But we will have him repeat CBC with his next routine labs    6. Paraspinal abscess (CMS-[facility])  Fluid collection was surgically  drained    7. Healthcare maintenance  Routine screening is up-to-date.  Patient is due for follow-up with his urologist which she will schedule  Patient reminded that his colonoscopy is due this year.    8. Acquired hypothyroidism  Thyroid labs are within range. Patient is taking meds as prescribed first thing in morning without food. And has no signs or symptoms of lower hypothyroid.        9. History of skin cancer  No current worrisome lesions    10. Chronic low back pain, unspecified back pain laterality, with sciatica presence unspecified  Seemingly improved with his surgery.    11. Mild intermittent asthma without complication  Doing well on current regimen    12. Seasonal allergic rhinitis, unspecified allergic rhinitis trigger  Stable on current regimen    13. Gastroesophageal reflux disease without esophagitis  Patient is currently stable. With no signs or symptoms of bleeding. Discussed that medications are not without side effect. And there is some concern regarding absorption of some vitamins and minerals. There is some concern about impact on kidney function, osteoporosis, and possibly even memory.        14. Dyslipidemia  Lipids are in reasonable range. The patient to continue paying attention to diet and exercise. Patient aware to go to emergency department or call 911 if any signs of cardiovascular disease- chest pain or pressure, sudden numbness or weakness in an arm or leg, sudden loss of ability to talk or swallow.      - COMP METABOLIC PANEL; Future  - LIPID PROFILE; Future    15. Essential hypertension  Near/at goal No change in treatment. Discussed diet, exercise and salt restriction.          16. Ascending aorta dilation (CMS-HCC), mild, needs OP followup  New diagnosis. Explained where this aneurysm is and that it is just mild at this point in time. We'll just need to be followed periodically. Also explained typical signs and symptoms of a dissecting aneurysm. Will refer to Med per  patient request.  - REFERRAL TO CARDIOLOGY    17. Right hip pain  Some intermittent bursitis. Overall symptoms are improved with back surgery    18. Stress  Worried about his wife's stress level. Her memory issues. And possible depression. We discussed this at length with both of them today. She has been referred to neurology and and started on antidepressant medication. They have good support      Return in about 3 months (around 7/6/2017).    Signed by: Gale Spears M.D.      This note was created using voice recognition software. There may be unintended errors in spelling, grammar or content.

## 2017-04-06 NOTE — ASSESSMENT & PLAN NOTE
Fevers since surgery- was home- trying to walk, etc- finally inpt- abscess>sepsis>SIRs- out of hosp x 2 days now.

## 2017-04-06 NOTE — ASSESSMENT & PLAN NOTE
Patient denies any current problems with heartburn\reflux. Denies bloody or tarry stools. Denies chest pain.  Patient does note that stools are darker since on his new recent medication regimen for treatment of a staph infection. Blood stools are not loose. Having a formed stool about every other day. Recent blood counts are improved

## 2017-04-06 NOTE — MR AVS SNAPSHOT
"        Angelo Ornelas Archana   2017 9:20 AM   Office Visit   MRN: [id number]    Department:  [facility] Med - Internal Med   Dept Phone:  [phone]    Description:  Male : 1945   Provider:  Gale Spears M.D.           Reason for Visit     Hospital Follow-up Discuss CT/Vascular surgeon.       Allergies as of 2017     No Known Allergies      You were diagnosed with     Obesity (BMI 35.0-39.9 without comorbidity) (HCC)   [764111]       Staph infection   [500772]       Spinal surgery in prior 3 months   [2816575]       Chronic right shoulder pain   [805155]       Normocytic anemia   [428152]       Paraspinal abscess (CMS-HCC)   [049485]       Healthcare maintenance   [843632]       Acquired hypothyroidism   [8508804]       History of skin cancer   [138271]       Chronic low back pain, unspecified back pain laterality, with sciatica presence unspecified   [1854351]       Mild intermittent asthma without complication   [659108]       Seasonal allergic rhinitis, unspecified allergic rhinitis trigger   [8335495]       Gastroesophageal reflux disease without esophagitis   [730162]       Dyslipidemia   [390839]       Essential hypertension   [1981129]       Ascending aorta dilation (CMS-HCC)   [234136]       Right hip pain   [300917]       Stress   [758167]         Vital Signs     Blood Pressure Pulse Temperature Height Weight Body Mass Index    120/88 mmHg 101 36.9 °C (98.5 °F) 1.695 m (5' 6.75\") 112.311 kg (247 lb 9.6 oz) 39.09 kg/m2    Oxygen Saturation Smoking Status                94% Never Smoker           Basic Information     Date Of Birth Sex Race Ethnicity Preferred Language    1945 Male White Non- English      Your appointments     Aug 22, 2017  1:40 PM   Established Patient with Gale Spears M.D.   [facility] Med - Internal Medicine (--)    [address]  [address] [place] [id number] [phone]           You will be receiving a confirmation call a few " days before your appointment from our automated call confirmation system.              Problem List              ICD-10-CM Priority Class Noted - Resolved    Healthcare maintenance Z00.00   6/8/2016 - Present    Acquired hypothyroidism E03.9   6/8/2016 - Present    Erectile dysfunction N52.9   6/8/2016 - Present    History of skin cancer Z85.828   6/8/2016 - Present    Right hip pain M25.551   6/8/2016 - Present    Rosacea L71.9   6/8/2016 - Present    Low back pain M54.5   6/8/2016 - Present    Asthma J45.909   6/8/2016 - Present    Seasonal allergies J30.2   6/8/2016 - Present    Gastroesophageal reflux disease without esophagitis K21.9   6/8/2016 - Present    Dyslipidemia E78.5   6/8/2016 - Present    Essential hypertension I10   6/8/2016 - Present    Ascending aorta dilation (CMS-HCC), mild, needs OP followup I77.810 Low  3/30/2017 - Present    Paraspinal abscess (CMS-HCC) M46.20 Medium  3/30/2017 - Present    Normocytic anemia D64.9 Medium  3/30/2017 - Present    Staph infection B95.8   3/31/2017 - Present    Obesity (BMI 35.0-39.9 without comorbidity) (HCC) E66.9   4/6/2017 - Present    Spinal surgery in prior 3 months Z98.890   4/6/2017 - Present    Chronic right shoulder pain M25.511, G89.29   4/6/2017 - Present    Stress F43.9   4/6/2017 - Present      Health Maintenance        Date Due Completion Dates    IMM DTaP/Tdap/Td Vaccine (1 - Tdap) 2/27/1966 ---    IMM ZOSTER VACCINE 2/27/2007 ---    IMM PNEUMOCOCCAL 65+ (ADULT) LOW/MEDIUM RISK SERIES (2 of 2 - PPSV23) 10/1/2016 10/1/2015, 10/1/2010    COLONOSCOPY 10/8/2017 10/8/2007            Current Immunizations     13-VALENT PCV PREVNAR 10/1/2015, 10/1/2010    Influenza TIV (IM) 10/1/2016, 10/22/2012    Tuberculin Skin Test 1/14/2013  7:00 AM, 2/13/2012  8:24 AM, 2/6/2012  8:00 AM, 1/3/2011      Below and/or attached are the medications your provider expects you to take. Review all of your home medications and newly ordered medications with your provider  and/or pharmacist. Follow medication instructions as directed by your provider and/or pharmacist. Please keep your medication list with you and share with your provider. Update the information when medications are discontinued, doses are changed, or new medications (including over-the-counter products) are added; and carry medication information at all times in the event of emergency situations     Allergies:  No Known Allergies          Medications  Valid as of: April 06, 2017 - 10:43 AM    Generic Name Brand Name Tablet Size Instructions for use    Atorvastatin Calcium (Tab) LIPITOR 10 MG TAKE 1 TABLET BY MOUTH EVERY DAY        Cetirizine-Pseudoephedrine (TABLET SR 12 HR) ZYRTEC-D 5-120 MG Take 1 Tab by mouth every day. Takes daily         Cholecalciferol   Take 3,000 Units by mouth every day.        Fluticasone Propionate (Suspension) FLONASE 50 MCG/ACT Spray 1 Spray in nose 1 time daily as needed.        Fluticasone-Salmeterol (AEROSOL POWDER, BREATH ACTIVATED) ADVAIR DISKUS 100-50 MCG/DOSE TAKE 1 PUFF BY MOUTH TWICE A DAY        Levothyroxine Sodium (Tab) SYNTHROID 50 MCG TAKE 1 TABLET BY MOUTH DAILY        Lisinopril (Tab) PRINIVIL 10 MG TAKE 1 TABLET BY MOUTH DAILY        Multiple Vitamins-Minerals   Take 2 Tabs by mouth every day. Takes daily        NS SOLN 50 mL with daptomycin 500 MG SOLR 700 mg   700 mg by Intravenous route every 24 hours.        Omeprazole (CAPSULE DELAYED RELEASE) PRILOSEC 20 MG Take 20 mg by mouth every day. Takes daily        Oxycodone-Acetaminophen (Tab) PERCOCET 5-325 MG Take 1-2 Tabs by mouth every 6 hours as needed.        .                 Medicines prescribed today were sent to:     [id number] - [place], [place] - [address]    [address] [place] [address]    Phone: [phone] Fax: [phone]    Open 24 Hours?: No      Medication refill instructions:       If your prescription bottle indicates you have medication refills left, it is not necessary to call your  provider’s office. Please contact your pharmacy and they will refill your medication.    If your prescription bottle indicates you do not have any refills left, you may request refills at any time through one of the following ways: The online [email or web address] system (except Urgent Care), by calling your provider’s office, or by asking your pharmacy to contact your provider’s office with a refill request. Medication refills are processed only during regular business hours and may not be available until the next business day. Your provider may request additional information or to have a follow-up visit with you prior to refilling your medication.   *Please Note: Medication refills are assigned a new Rx number when refilled electronically. Your pharmacy may indicate that no refills were authorized even though a new prescription for the same medication is available at the pharmacy. Please request the medicine by name with the pharmacy before contacting your provider for a refill.        Your To Do List     Future Labs/Procedures Complete By Expires    CBC WITH DIFFERENTIAL  As directed 4/7/2018    COMP METABOLIC PANEL  As directed 4/7/2018    LIPID PROFILE  As directed 4/7/2018      Referral     A referral request has been sent to our patient care coordination department. Please allow 3-5 business days for us to process this request and contact you either by phone or mail. If you do not hear from us by the 5th business day, please call us at [phone].        Instructions    Good luck with spine issues          [email or web address] Status: Patient Declined

## 2017-04-06 NOTE — ASSESSMENT & PLAN NOTE
Doing home IV antibx- with reg f/u with nuring and IDs.  Still occas sweat, no fevers. But no sweating like with diagnosis.

## 2017-04-06 NOTE — ASSESSMENT & PLAN NOTE
The patient denies any symptoms referable to her elevated blood pressure. Specifically denies chest pain, palpitations, dyspnea, orthopnea, PND or peripheral edema. Current medication regimen is as listed below. Patient denies any side effects of medication.

## 2017-04-10 ENCOUNTER — HOSPITAL ENCOUNTER (OUTPATIENT)
Facility: MEDICAL CENTER | Age: 72
End: 2017-04-10
Attending: INTERNAL MEDICINE
Payer: MEDICARE

## 2017-04-10 LAB
ALBUMIN SERPL BCP-MCNC: 3.9 G/DL (ref 3.2–4.9)
ALBUMIN/GLOB SERPL: 1.6 G/DL
ALP SERPL-CCNC: 84 U/L (ref 30–99)
ALT SERPL-CCNC: 33 U/L (ref 2–50)
ANION GAP SERPL CALC-SCNC: 10 MMOL/L (ref 0–11.9)
AST SERPL-CCNC: 32 U/L (ref 12–45)
BASOPHILS # BLD AUTO: 0.7 % (ref 0–1.8)
BASOPHILS # BLD: 0.04 K/UL (ref 0–0.12)
BILIRUB SERPL-MCNC: 0.6 MG/DL (ref 0.1–1.5)
BUN SERPL-MCNC: 8 MG/DL (ref 8–22)
CALCIUM SERPL-MCNC: 9.9 MG/DL (ref 8.5–10.5)
CHLORIDE SERPL-SCNC: 106 MMOL/L (ref 96–112)
CK SERPL-CCNC: 68 U/L (ref 0–154)
CO2 SERPL-SCNC: 25 MMOL/L (ref 20–33)
CREAT SERPL-MCNC: 0.84 MG/DL (ref 0.5–1.4)
CRP SERPL HS-MCNC: 0.42 MG/DL (ref 0–0.75)
EOSINOPHIL # BLD AUTO: 0.3 K/UL (ref 0–0.51)
EOSINOPHIL NFR BLD: 5 % (ref 0–6.9)
ERYTHROCYTE [DISTWIDTH] IN BLOOD BY AUTOMATED COUNT: 45.3 FL (ref 35.9–50)
ERYTHROCYTE [SEDIMENTATION RATE] IN BLOOD BY WESTERGREN METHOD: 3 MM/HOUR (ref 0–20)
GFR SERPL CREATININE-BSD FRML MDRD: >60 ML/MIN/1.73 M 2
GLOBULIN SER CALC-MCNC: 2.4 G/DL (ref 1.9–3.5)
GLUCOSE SERPL-MCNC: 127 MG/DL (ref 65–99)
HCT VFR BLD AUTO: 38.8 % (ref 42–52)
HGB BLD-MCNC: 12 G/DL (ref 14–18)
IMM GRANULOCYTES # BLD AUTO: 0.02 K/UL (ref 0–0.11)
IMM GRANULOCYTES NFR BLD AUTO: 0.3 % (ref 0–0.9)
LYMPHOCYTES # BLD AUTO: 1.08 K/UL (ref 1–4.8)
LYMPHOCYTES NFR BLD: 17.9 % (ref 22–41)
MCH RBC QN AUTO: 27.8 PG (ref 27–33)
MCHC RBC AUTO-ENTMCNC: 30.9 G/DL (ref 33.7–35.3)
MCV RBC AUTO: 89.8 FL (ref 81.4–97.8)
MONOCYTES # BLD AUTO: 0.34 K/UL (ref 0–0.85)
MONOCYTES NFR BLD AUTO: 5.6 % (ref 0–13.4)
NEUTROPHILS # BLD AUTO: 4.26 K/UL (ref 1.82–7.42)
NEUTROPHILS NFR BLD: 70.5 % (ref 44–72)
NRBC # BLD AUTO: 0 K/UL
NRBC BLD AUTO-RTO: 0 /100 WBC
PLATELET # BLD AUTO: 336 K/UL (ref 164–446)
PMV BLD AUTO: 9.9 FL (ref 9–12.9)
POTASSIUM SERPL-SCNC: 4 MMOL/L (ref 3.6–5.5)
PROT SERPL-MCNC: 6.3 G/DL (ref 6–8.2)
RBC # BLD AUTO: 4.32 M/UL (ref 4.7–6.1)
SODIUM SERPL-SCNC: 141 MMOL/L (ref 135–145)
WBC # BLD AUTO: 6 K/UL (ref 4.8–10.8)

## 2017-04-10 PROCEDURE — 80053 COMPREHEN METABOLIC PANEL: CPT

## 2017-04-10 PROCEDURE — 85025 COMPLETE CBC W/AUTO DIFF WBC: CPT

## 2017-04-10 PROCEDURE — 86140 C-REACTIVE PROTEIN: CPT

## 2017-04-10 PROCEDURE — 85652 RBC SED RATE AUTOMATED: CPT

## 2017-04-10 PROCEDURE — 82550 ASSAY OF CK (CPK): CPT

## 2017-04-17 ENCOUNTER — HOSPITAL ENCOUNTER (OUTPATIENT)
Facility: MEDICAL CENTER | Age: 72
End: 2017-04-17
Attending: INTERNAL MEDICINE
Payer: MEDICARE

## 2017-04-17 LAB
ALBUMIN SERPL BCP-MCNC: 3.8 G/DL (ref 3.2–4.9)
ALBUMIN/GLOB SERPL: 1.5 G/DL
ALP SERPL-CCNC: 80 U/L (ref 30–99)
ALT SERPL-CCNC: 29 U/L (ref 2–50)
ANION GAP SERPL CALC-SCNC: 7 MMOL/L (ref 0–11.9)
AST SERPL-CCNC: 34 U/L (ref 12–45)
BASOPHILS # BLD AUTO: 0.7 % (ref 0–1.8)
BASOPHILS # BLD: 0.04 K/UL (ref 0–0.12)
BILIRUB SERPL-MCNC: 0.6 MG/DL (ref 0.1–1.5)
BUN SERPL-MCNC: 7 MG/DL (ref 8–22)
CALCIUM SERPL-MCNC: 9.7 MG/DL (ref 8.5–10.5)
CHLORIDE SERPL-SCNC: 106 MMOL/L (ref 96–112)
CK SERPL-CCNC: 262 U/L (ref 0–154)
CO2 SERPL-SCNC: 25 MMOL/L (ref 20–33)
CREAT SERPL-MCNC: 0.82 MG/DL (ref 0.5–1.4)
CRP SERPL HS-MCNC: 1.15 MG/DL (ref 0–0.75)
EOSINOPHIL # BLD AUTO: 0.2 K/UL (ref 0–0.51)
EOSINOPHIL NFR BLD: 3.5 % (ref 0–6.9)
ERYTHROCYTE [DISTWIDTH] IN BLOOD BY AUTOMATED COUNT: 45.6 FL (ref 35.9–50)
ERYTHROCYTE [SEDIMENTATION RATE] IN BLOOD BY WESTERGREN METHOD: 11 MM/HOUR (ref 0–20)
GFR SERPL CREATININE-BSD FRML MDRD: >60 ML/MIN/1.73 M 2
GLOBULIN SER CALC-MCNC: 2.6 G/DL (ref 1.9–3.5)
GLUCOSE SERPL-MCNC: 90 MG/DL (ref 65–99)
HCT VFR BLD AUTO: 39.1 % (ref 42–52)
HGB BLD-MCNC: 12.1 G/DL (ref 14–18)
IMM GRANULOCYTES # BLD AUTO: 0.01 K/UL (ref 0–0.11)
IMM GRANULOCYTES NFR BLD AUTO: 0.2 % (ref 0–0.9)
LYMPHOCYTES # BLD AUTO: 1.25 K/UL (ref 1–4.8)
LYMPHOCYTES NFR BLD: 21.7 % (ref 22–41)
MCH RBC QN AUTO: 27.5 PG (ref 27–33)
MCHC RBC AUTO-ENTMCNC: 30.9 G/DL (ref 33.7–35.3)
MCV RBC AUTO: 88.9 FL (ref 81.4–97.8)
MONOCYTES # BLD AUTO: 0.52 K/UL (ref 0–0.85)
MONOCYTES NFR BLD AUTO: 9 % (ref 0–13.4)
NEUTROPHILS # BLD AUTO: 3.73 K/UL (ref 1.82–7.42)
NEUTROPHILS NFR BLD: 64.9 % (ref 44–72)
NRBC # BLD AUTO: 0 K/UL
NRBC BLD AUTO-RTO: 0 /100 WBC
PLATELET # BLD AUTO: 270 K/UL (ref 164–446)
PMV BLD AUTO: 10.4 FL (ref 9–12.9)
POTASSIUM SERPL-SCNC: 4.3 MMOL/L (ref 3.6–5.5)
PROT SERPL-MCNC: 6.4 G/DL (ref 6–8.2)
RBC # BLD AUTO: 4.4 M/UL (ref 4.7–6.1)
SODIUM SERPL-SCNC: 138 MMOL/L (ref 135–145)
WBC # BLD AUTO: 5.8 K/UL (ref 4.8–10.8)

## 2017-04-17 PROCEDURE — 80053 COMPREHEN METABOLIC PANEL: CPT

## 2017-04-17 PROCEDURE — 86140 C-REACTIVE PROTEIN: CPT

## 2017-04-17 PROCEDURE — 85025 COMPLETE CBC W/AUTO DIFF WBC: CPT

## 2017-04-17 PROCEDURE — 82550 ASSAY OF CK (CPK): CPT

## 2017-04-17 PROCEDURE — 85652 RBC SED RATE AUTOMATED: CPT

## 2017-04-24 ENCOUNTER — HOSPITAL ENCOUNTER (OUTPATIENT)
Facility: MEDICAL CENTER | Age: 72
End: 2017-04-24
Attending: INTERNAL MEDICINE
Payer: MEDICARE

## 2017-04-24 LAB
ALBUMIN SERPL BCP-MCNC: 3.9 G/DL (ref 3.2–4.9)
ALBUMIN/GLOB SERPL: 1.7 G/DL
ALP SERPL-CCNC: 86 U/L (ref 30–99)
ALT SERPL-CCNC: 65 U/L (ref 2–50)
ANION GAP SERPL CALC-SCNC: 10 MMOL/L (ref 0–11.9)
AST SERPL-CCNC: 50 U/L (ref 12–45)
BASOPHILS # BLD AUTO: 0.4 % (ref 0–1.8)
BASOPHILS # BLD: 0.02 K/UL (ref 0–0.12)
BILIRUB SERPL-MCNC: 0.7 MG/DL (ref 0.1–1.5)
BUN SERPL-MCNC: 6 MG/DL (ref 8–22)
CALCIUM SERPL-MCNC: 9.9 MG/DL (ref 8.5–10.5)
CHLORIDE SERPL-SCNC: 105 MMOL/L (ref 96–112)
CK SERPL-CCNC: 107 U/L (ref 0–154)
CO2 SERPL-SCNC: 24 MMOL/L (ref 20–33)
CREAT SERPL-MCNC: 0.78 MG/DL (ref 0.5–1.4)
CRP SERPL HS-MCNC: 0.21 MG/DL (ref 0–0.75)
EOSINOPHIL # BLD AUTO: 0.26 K/UL (ref 0–0.51)
EOSINOPHIL NFR BLD: 5.6 % (ref 0–6.9)
ERYTHROCYTE [DISTWIDTH] IN BLOOD BY AUTOMATED COUNT: 46.8 FL (ref 35.9–50)
ERYTHROCYTE [SEDIMENTATION RATE] IN BLOOD BY WESTERGREN METHOD: 11 MM/HOUR (ref 0–20)
GFR SERPL CREATININE-BSD FRML MDRD: >60 ML/MIN/1.73 M 2
GLOBULIN SER CALC-MCNC: 2.3 G/DL (ref 1.9–3.5)
GLUCOSE SERPL-MCNC: 80 MG/DL (ref 65–99)
HCT VFR BLD AUTO: 39.5 % (ref 42–52)
HGB BLD-MCNC: 12.2 G/DL (ref 14–18)
IMM GRANULOCYTES # BLD AUTO: 0.01 K/UL (ref 0–0.11)
IMM GRANULOCYTES NFR BLD AUTO: 0.2 % (ref 0–0.9)
LYMPHOCYTES # BLD AUTO: 1.19 K/UL (ref 1–4.8)
LYMPHOCYTES NFR BLD: 25.6 % (ref 22–41)
MCH RBC QN AUTO: 27.7 PG (ref 27–33)
MCHC RBC AUTO-ENTMCNC: 30.9 G/DL (ref 33.7–35.3)
MCV RBC AUTO: 89.8 FL (ref 81.4–97.8)
MONOCYTES # BLD AUTO: 0.27 K/UL (ref 0–0.85)
MONOCYTES NFR BLD AUTO: 5.8 % (ref 0–13.4)
NEUTROPHILS # BLD AUTO: 2.89 K/UL (ref 1.82–7.42)
NEUTROPHILS NFR BLD: 62.4 % (ref 44–72)
NRBC # BLD AUTO: 0 K/UL
NRBC BLD AUTO-RTO: 0 /100 WBC
PLATELET # BLD AUTO: 231 K/UL (ref 164–446)
PMV BLD AUTO: 10.4 FL (ref 9–12.9)
POTASSIUM SERPL-SCNC: 4.1 MMOL/L (ref 3.6–5.5)
PROT SERPL-MCNC: 6.2 G/DL (ref 6–8.2)
RBC # BLD AUTO: 4.4 M/UL (ref 4.7–6.1)
SODIUM SERPL-SCNC: 139 MMOL/L (ref 135–145)
WBC # BLD AUTO: 4.6 K/UL (ref 4.8–10.8)

## 2017-04-24 PROCEDURE — 85025 COMPLETE CBC W/AUTO DIFF WBC: CPT

## 2017-04-24 PROCEDURE — 86140 C-REACTIVE PROTEIN: CPT

## 2017-04-24 PROCEDURE — 82550 ASSAY OF CK (CPK): CPT

## 2017-04-24 PROCEDURE — 80053 COMPREHEN METABOLIC PANEL: CPT

## 2017-04-24 PROCEDURE — 85652 RBC SED RATE AUTOMATED: CPT

## 2017-04-24 RX ORDER — LEVOTHYROXINE SODIUM 0.05 MG/1
TABLET ORAL
Qty: 90 TAB | Refills: 0 | Status: SHIPPED | OUTPATIENT
Start: 2017-04-24 | End: 2017-08-03 | Stop reason: SDUPTHER

## 2017-04-24 NOTE — TELEPHONE ENCOUNTER
Last seen: 04/06/17 by Dr. Spears  Next appt: 08/22/17 with Dr. Spears    Was the patient seen in the last year in this department? Yes   Does patient have an active prescription for medications requested? No   Received Request Via: Pharmacy

## 2017-04-25 ENCOUNTER — OFFICE VISIT (OUTPATIENT)
Dept: CARDIOLOGY | Facility: MEDICAL CENTER | Age: 72
End: 2017-04-25
Payer: MEDICARE

## 2017-04-25 VITALS
DIASTOLIC BLOOD PRESSURE: 64 MMHG | OXYGEN SATURATION: 95 % | HEART RATE: 84 BPM | WEIGHT: 248 LBS | HEIGHT: 67 IN | BODY MASS INDEX: 38.92 KG/M2 | SYSTOLIC BLOOD PRESSURE: 110 MMHG

## 2017-04-25 DIAGNOSIS — M46.20 PARASPINAL ABSCESS (HCC): ICD-10-CM

## 2017-04-25 DIAGNOSIS — I77.810 ASCENDING AORTA DILATION (HCC): Primary | ICD-10-CM

## 2017-04-25 DIAGNOSIS — E78.5 HYPERLIPIDEMIA LDL GOAL <100: ICD-10-CM

## 2017-04-25 DIAGNOSIS — I10 ESSENTIAL HYPERTENSION: ICD-10-CM

## 2017-04-25 DIAGNOSIS — I25.84 CORONARY ARTERY CALCIFICATION: ICD-10-CM

## 2017-04-25 DIAGNOSIS — I25.10 CORONARY ARTERY CALCIFICATION: ICD-10-CM

## 2017-04-25 LAB — EKG IMPRESSION: NORMAL

## 2017-04-25 PROCEDURE — G8432 DEP SCR NOT DOC, RNG: HCPCS | Performed by: INTERNAL MEDICINE

## 2017-04-25 PROCEDURE — 3017F COLORECTAL CA SCREEN DOC REV: CPT | Mod: 8P | Performed by: INTERNAL MEDICINE

## 2017-04-25 PROCEDURE — 4040F PNEUMOC VAC/ADMIN/RCVD: CPT | Performed by: INTERNAL MEDICINE

## 2017-04-25 PROCEDURE — 1036F TOBACCO NON-USER: CPT | Performed by: INTERNAL MEDICINE

## 2017-04-25 PROCEDURE — G8598 ASA/ANTIPLAT THER USED: HCPCS | Performed by: INTERNAL MEDICINE

## 2017-04-25 PROCEDURE — 1101F PT FALLS ASSESS-DOCD LE1/YR: CPT | Performed by: INTERNAL MEDICINE

## 2017-04-25 PROCEDURE — 93000 ELECTROCARDIOGRAM COMPLETE: CPT | Performed by: INTERNAL MEDICINE

## 2017-04-25 PROCEDURE — 99204 OFFICE O/P NEW MOD 45 MIN: CPT | Performed by: INTERNAL MEDICINE

## 2017-04-25 PROCEDURE — 1111F DSCHRG MED/CURRENT MED MERGE: CPT | Performed by: INTERNAL MEDICINE

## 2017-04-25 PROCEDURE — G8419 CALC BMI OUT NRM PARAM NOF/U: HCPCS | Performed by: INTERNAL MEDICINE

## 2017-04-25 RX ORDER — ATORVASTATIN CALCIUM 40 MG/1
40 TABLET, FILM COATED ORAL DAILY
Qty: 30 TAB | Refills: 3 | Status: SHIPPED | OUTPATIENT
Start: 2017-04-25 | End: 2017-07-12 | Stop reason: SDUPTHER

## 2017-04-25 RX ORDER — ONDANSETRON 4 MG/1
TABLET, FILM COATED ORAL
Refills: 0 | COMMUNITY
Start: 2017-04-14 | End: 2017-10-11

## 2017-04-25 NOTE — PROGRESS NOTES
Arrhythmia Clinic Note (New patient)     DOS: 4/25/2017    Referring physician: Amber Spears    Chief complaint/Reason for consult: TAA    HPI:  Patient is a 73 yo male with history of recent spine surgery complicated by infection on extended home IV abx who presents for dilated aorta seen incidentally on chest CT. He had a CTA after he had fever and pain after surgery, PE protocol. Incidentally this found a ascending aorta of 4.0 cm along with significant coronary calcificiation. He is otherwise asymptomatic from a cardiac standpoint. Denies chest pain/palpitations/HF symptoms. He was already on a statin (lipitor) albeit at a low dose at 10 which he was tolerating well. He is currently not taking this because of the ?potential interaction with daptomycin while he is on it.     ROS (+ highlighted in red):  Constitutional: Fevers/chills/fatigue/weightloss  HEENT: Blurry vision/eye pain/sore throat/hearing loss  Respiratory: Shortness of breath/cough  Cardiovascular: Chest pain/palpitations/edema/orthopnea/syncope  GI: Nausea/vomitting/diarrhea  MSK: Arthralgias/myagias/muscle weakness  Skin: Rash/sores  Neurological: Numbness/tremors/vertigo  Endocrine: Excessive thirst/polyuria/cold intolerance/heat intolerance  Psych: Depression/anxiety    Past Medical History   Diagnosis Date   • Heart burn    • Pain    • Hypertension    • ASTHMA    • Arthritis    • Preventative health care 6/8/2016   • Hypothyroidism 6/8/2016   • Erectile dysfunction 6/8/2016   • History of skin cancer 6/8/2016   • Right hip pain 6/8/2016   • Rosacea 6/8/2016   • Low back pain 6/8/2016   • Asthma 6/8/2016   • Seasonal allergies 6/8/2016   • GERD (gastroesophageal reflux disease) 6/8/2016   • Obesity 6/8/2016   • Hyperlipidemia 6/8/2016   • Indigestion    • Urinary bladder disorder    • Bronchitis        Past Surgical History   Procedure Laterality Date   • Hip arthroplasty total  1/21/2009     Performed by NAREN DANIELSON at [facility]   • Knee arthroplasty total  5/26/2009     Performed by NAREN DANIELSON at SURGERY [facility]   • Inguinal hernia repair  6/10/08     Performed by MERLYN BECKER at SURGERY SAME DAY [facility] ORS   • Inguinal hernia repair  9/2/08     Performed by MERLYN BECKER at SURGERY SAME DAY [facility] ORS   • Lumbar fusion posterior  9/9/2009     Performed by JUAN MIGUEL CHAKRABORTY at SURGERY [facility] ORS   • Lumbar laminectomy diskectomy  9/9/2009     Performed by JUAN MIGUEL CHAKRABORTY at SURGERY [facility] ORS   • Hip arthroplasty total  09   • Other       DISCECTOMY WITH HARWWARE   • Hernia repair  2008     HERNIA    • Other       GASTRIC BYPASS AT 28 YEARS   • Lumbar fusion posterior N/A 3/17/2017     Procedure: LUMBAR FUSION POSTERIOR L3-4 EXTENSION;  Surgeon: Juan Miguel Chakraborty M.D.;  Location: SURGERY [facility];  Service:    • Lumbar laminectomy diskectomy N/A 3/17/2017     Procedure: LUMBAR LAMINECTOMY DISKECTOMY L3 & REDO L4;  Surgeon: Juan Miguel Chakraborty M.D.;  Location: SURGERY [facility];  Service:    • Hardware removal neuro N/A 3/17/2017     Procedure: HARDWARE REMOVAL NEURO L4-5;  Surgeon: Juan Miguel Chakraborty M.D.;  Location: SURGERY [facility];  Service:    • Irrigation & debridement neuro  3/30/2017     Procedure: IRRIGATION & DEBRIDEMENT NEURO-LUMBAR WOUND;  Surgeon: Juan Miguel Chakraborty M.D.;  Location: SURGERY [facility];  Service:        Social History     Social History   • Marital Status:      Spouse Name: N/A   • Number of Children: N/A   • Years of Education: N/A     Occupational History   • Not on file.     Social History Main Topics   • Smoking status: Never Smoker    • Smokeless tobacco: Never Used   • Alcohol Use: Yes      Comment: 2 -3 drinks per week   • Drug Use: No   • Sexual Activity: Not on file     Other Topics Concern   • Not on file     Social History Narrative       Family History   Problem Relation Age of Onset   • Hypertension Mother    • Hypertension Father    • Lung  "Disease Father      asthma       No Known Allergies    Current Outpatient Prescriptions   Medication Sig Dispense Refill   • ondansetron (ZOFRAN) 4 MG Tab tablet TAKE 1 TAB BY MOUTH EVERY 8 HOURS AS NEEDED  0   • levothyroxine (SYNTHROID) 50 MCG Tab TAKE 1 TABLET BY MOUTH DAILY 90 Tab 0   • NS SOLN 50 mL with daptomycin 500 MG SOLR 700 mg 700 mg by Intravenous route every 24 hours.     • oxycodone-acetaminophen (PERCOCET) 5-325 MG Tab Take 1-2 Tabs by mouth every 6 hours as needed. 60 Tab 0   • fluticasone (FLONASE) 50 MCG/ACT nasal spray Spray 1 Spray in nose 1 time daily as needed.     • atorvastatin (LIPITOR) 10 MG Tab TAKE 1 TABLET BY MOUTH EVERY DAY 90 Tab 0   • lisinopril (PRINIVIL) 10 MG Tab TAKE 1 TABLET BY MOUTH DAILY 90 Tab 0   • ADVAIR DISKUS 100-50 MCG/DOSE AEROSOL POWDER, BREATH ACTIVATED TAKE 1 PUFF BY MOUTH TWICE A DAY 1 Inhaler 5   • VITAMIN D, CHOLECALCIFEROL, PO Take 3,000 Units by mouth every day.     • ZYRTEC-D 5-120 MG PO TB12 Take 1 Tab by mouth every day. Takes daily      • MULTIVITAMIN PO Take 2 Tabs by mouth every day. Takes daily     • PRILOSEC 20 MG PO CPDR Take 20 mg by mouth every day. Takes daily       No current facility-administered medications for this visit.       Physical Exam:  Filed Vitals:    04/25/17 1012   BP: 110/64   Pulse: 84   Height: 1.695 m (5' 6.73\")   Weight: 112.492 kg (248 lb)   SpO2: 95%     General appearance: NAD, conversant   Eyes: anicteric sclerae, moist conjunctivae; no lid-lag; PERRLA  HENT: Atraumatic; oropharynx clear with moist mucous membranes and no mucosal ulcerations; normal hard and soft palate  Neck: Trachea midline; FROM, supple, no thyromegaly or lymphadenopathy  Lungs: CTA, with normal respiratory effort and no intercostal retractions  CV: RRR, no MRGs, no JVD   Abdomen: Soft, non-tender; no masses or HSM  Extremities: No peripheral edema or extremity lymphadenopathy  Skin: Normal temperature, turgor and texture; no rash, ulcers or subcutaneous " nodules  Psych: Appropriate affect, alert and oriented to person, place and time    Data:  Outside records reviewed    CT scan reviewed    EKG interpreted by me:  NSR    Impression/Plan:  1. Ascending aorta dilation (CMS-HCC), mild, needs OP followup     2. Coronary artery calcification     3. Hyperlipidemia LDL goal <100     4. Essential hypertension  EKG    atorvastatin (LIPITOR) 40 MG Tab   5. Paraspinal abscess (CMS-HCC)       -surveillance CT in one year  -He should have aggressive risk factor modification   -His BP is well controlled  -Will increase his atorvastatin to 40, start after his IV abx course done  -Continue daily ASA 81  -RTC in one year    Lyssa Jung MD

## 2017-04-25 NOTE — MR AVS SNAPSHOT
"        Angelo Magaña   2017 10:20 AM   Office Visit   MRN: [id number]    Department:  [facility]   Dept Phone:  [phone]    Description:  Male : 1945   Provider:  Lyssa Jung M.D.           Reason for Visit     New Patient           Allergies as of 2017     No Known Allergies      You were diagnosed with     Essential hypertension   [[id number]]         Vital Signs     Blood Pressure Pulse Height Weight Body Mass Index Oxygen Saturation    110/64 mmHg 84 1.695 m (5' 6.73\") 112.492 kg (248 lb) 39.15 kg/m2 95%    Smoking Status                   Never Smoker            Basic Information     Date Of Birth Sex Race Ethnicity Preferred Language    1945 Male White Non- English      Your appointments     Aug 22, 2017  1:40 PM   Established Patient with Gale Spears M.D.   [facility] - Internal Medicine (--)    [address] [place] [id number] [phone]           You will be receiving a confirmation call a few days before your appointment from our automated call confirmation system.              Problem List              ICD-10-CM Priority Class Noted - Resolved    Healthcare maintenance Z00.00   2016 - Present    Acquired hypothyroidism E03.9   2016 - Present    Erectile dysfunction N52.9   2016 - Present    History of skin cancer Z85.828   2016 - Present    Right hip pain M25.551   2016 - Present    Rosacea L71.9   2016 - Present    Low back pain M54.5   2016 - Present    Asthma J45.909   2016 - Present    Seasonal allergies J30.2   2016 - Present    Gastroesophageal reflux disease without esophagitis K21.9   2016 - Present    Dyslipidemia E78.5   2016 - Present    Essential hypertension I10   2016 - Present    Ascending aorta dilation (CMS-HCC), mild, needs OP followup I77.810 Low  3/30/2017 - Present    Paraspinal abscess (CMS-HCC) M46.20 Medium  3/30/2017 - Present    Normocytic " anemia D64.9 Medium  3/30/2017 - Present    Staph infection B95.8   3/31/2017 - Present    Obesity (BMI 35.0-39.9 without comorbidity) (HCC) E66.9   4/6/2017 - Present    Spinal surgery in prior 3 months Z98.890   4/6/2017 - Present    Chronic right shoulder pain M25.511, G89.29   4/6/2017 - Present    Stress F43.9   4/6/2017 - Present      Health Maintenance        Date Due Completion Dates    IMM DTaP/Tdap/Td Vaccine (1 - Tdap) 2/27/1966 ---    IMM ZOSTER VACCINE 2/27/2007 ---    IMM PNEUMOCOCCAL 65+ (ADULT) LOW/MEDIUM RISK SERIES (2 of 2 - PPSV23) 10/1/2016 10/1/2015, 10/1/2010    COLONOSCOPY 10/8/2017 10/8/2007            Results       Current Immunizations     13-VALENT PCV PREVNAR 10/1/2015, 10/1/2010    Influenza TIV (IM) 10/1/2016, 10/22/2012    Tuberculin Skin Test 1/14/2013  7:00 AM, 2/13/2012  8:24 AM, 2/6/2012  8:00 AM, 1/3/2011      Below and/or attached are the medications your provider expects you to take. Review all of your home medications and newly ordered medications with your provider and/or pharmacist. Follow medication instructions as directed by your provider and/or pharmacist. Please keep your medication list with you and share with your provider. Update the information when medications are discontinued, doses are changed, or new medications (including over-the-counter products) are added; and carry medication information at all times in the event of emergency situations     Allergies:  No Known Allergies          Medications  Valid as of: April 25, 2017 - 10:59 AM    Generic Name Brand Name Tablet Size Instructions for use    Atorvastatin Calcium (Tab) LIPITOR 40 MG Take 1 Tab by mouth every day.        Cetirizine-Pseudoephedrine (TABLET SR 12 HR) ZYRTEC-D 5-120 MG Take 1 Tab by mouth every day. Takes daily         Cholecalciferol   Take 3,000 Units by mouth every day.        Fluticasone Propionate (Suspension) FLONASE 50 MCG/ACT Spray 1 Spray in nose 1 time daily as needed.         Fluticasone-Salmeterol (AEROSOL POWDER, BREATH ACTIVATED) ADVAIR DISKUS 100-50 MCG/DOSE TAKE 1 PUFF BY MOUTH TWICE A DAY        Levothyroxine Sodium (Tab) SYNTHROID 50 MCG TAKE 1 TABLET BY MOUTH DAILY        Lisinopril (Tab) PRINIVIL 10 MG TAKE 1 TABLET BY MOUTH DAILY        Multiple Vitamins-Minerals   Take 2 Tabs by mouth every day. Takes daily        NS SOLN 50 mL with daptomycin 500 MG SOLR 700 mg   700 mg by Intravenous route every 24 hours.        Omeprazole (CAPSULE DELAYED RELEASE) PRILOSEC 20 MG Take 20 mg by mouth every day. Takes daily        Ondansetron HCl (Tab) ZOFRAN 4 MG TAKE 1 TAB BY MOUTH EVERY 8 HOURS AS NEEDED        Oxycodone-Acetaminophen (Tab) PERCOCET 5-325 MG Take 1-2 Tabs by mouth every 6 hours as needed.        .                 Medicines prescribed today were sent to:     [id number] - [place], [place] - [address] [place] [address]    [address] [place] [address]    Phone: [phone] Fax: [phone]    Open 24 Hours?: No      Medication refill instructions:       If your prescription bottle indicates you have medication refills left, it is not necessary to call your provider’s office. Please contact your pharmacy and they will refill your medication.    If your prescription bottle indicates you do not have any refills left, you may request refills at any time through one of the following ways: The online [employer] system (except Urgent Care), by calling your provider’s office, or by asking your pharmacy to contact your provider’s office with a refill request. Medication refills are processed only during regular business hours and may not be available until the next business day. Your provider may request additional information or to have a follow-up visit with you prior to refilling your medication.   *Please Note: Medication refills are assigned a new Rx number when refilled electronically. Your pharmacy may indicate that no refills were authorized even though a new prescription for the  same medication is available at the pharmacy. Please request the medicine by name with the pharmacy before contacting your provider for a refill.           MyChart Status: Patient Declined

## 2017-04-28 ENCOUNTER — HOSPITAL ENCOUNTER (OUTPATIENT)
Facility: MEDICAL CENTER | Age: 72
End: 2017-04-28
Attending: INTERNAL MEDICINE
Payer: MEDICARE

## 2017-04-28 LAB
ALBUMIN SERPL BCP-MCNC: 3.9 G/DL (ref 3.2–4.9)
ALBUMIN/GLOB SERPL: 1.6 G/DL
ALP SERPL-CCNC: 94 U/L (ref 30–99)
ALT SERPL-CCNC: 40 U/L (ref 2–50)
ANION GAP SERPL CALC-SCNC: 11 MMOL/L (ref 0–11.9)
APPEARANCE UR: ABNORMAL
AST SERPL-CCNC: 31 U/L (ref 12–45)
BACTERIA #/AREA URNS HPF: ABNORMAL /HPF
BASOPHILS # BLD AUTO: 0.2 % (ref 0–1.8)
BASOPHILS # BLD: 0.02 K/UL (ref 0–0.12)
BILIRUB SERPL-MCNC: 1.1 MG/DL (ref 0.1–1.5)
BILIRUB UR QL STRIP.AUTO: NEGATIVE
BUN SERPL-MCNC: 7 MG/DL (ref 8–22)
CALCIUM SERPL-MCNC: 9.5 MG/DL (ref 8.5–10.5)
CAOX CRY #/AREA URNS HPF: ABNORMAL /HPF
CHLORIDE SERPL-SCNC: 102 MMOL/L (ref 96–112)
CK SERPL-CCNC: 70 U/L (ref 0–154)
CO2 SERPL-SCNC: 23 MMOL/L (ref 20–33)
COLOR UR: YELLOW
CREAT SERPL-MCNC: 0.82 MG/DL (ref 0.5–1.4)
CRP SERPL HS-MCNC: 4.66 MG/DL (ref 0–0.75)
CULTURE IF INDICATED INDCX: YES UA CULTURE
EOSINOPHIL # BLD AUTO: 0.1 K/UL (ref 0–0.51)
EOSINOPHIL NFR BLD: 1.1 % (ref 0–6.9)
EPI CELLS #/AREA URNS HPF: ABNORMAL /HPF
ERYTHROCYTE [DISTWIDTH] IN BLOOD BY AUTOMATED COUNT: 44.7 FL (ref 35.9–50)
ERYTHROCYTE [SEDIMENTATION RATE] IN BLOOD BY WESTERGREN METHOD: 16 MM/HOUR (ref 0–20)
GFR SERPL CREATININE-BSD FRML MDRD: >60 ML/MIN/1.73 M 2
GLOBULIN SER CALC-MCNC: 2.4 G/DL (ref 1.9–3.5)
GLUCOSE SERPL-MCNC: 80 MG/DL (ref 65–99)
GLUCOSE UR STRIP.AUTO-MCNC: NEGATIVE MG/DL
HCT VFR BLD AUTO: 38.9 % (ref 42–52)
HGB BLD-MCNC: 12.3 G/DL (ref 14–18)
IMM GRANULOCYTES # BLD AUTO: 0.03 K/UL (ref 0–0.11)
IMM GRANULOCYTES NFR BLD AUTO: 0.3 % (ref 0–0.9)
KETONES UR STRIP.AUTO-MCNC: ABNORMAL MG/DL
LEUKOCYTE ESTERASE UR QL STRIP.AUTO: ABNORMAL
LYMPHOCYTES # BLD AUTO: 1.66 K/UL (ref 1–4.8)
LYMPHOCYTES NFR BLD: 17.4 % (ref 22–41)
MCH RBC QN AUTO: 27.4 PG (ref 27–33)
MCHC RBC AUTO-ENTMCNC: 31.6 G/DL (ref 33.7–35.3)
MCV RBC AUTO: 86.6 FL (ref 81.4–97.8)
MICRO URNS: ABNORMAL
MONOCYTES # BLD AUTO: 0.84 K/UL (ref 0–0.85)
MONOCYTES NFR BLD AUTO: 8.8 % (ref 0–13.4)
MUCOUS THREADS #/AREA URNS HPF: ABNORMAL /HPF
NEUTROPHILS # BLD AUTO: 6.87 K/UL (ref 1.82–7.42)
NEUTROPHILS NFR BLD: 72.2 % (ref 44–72)
NITRITE UR QL STRIP.AUTO: POSITIVE
NRBC # BLD AUTO: 0 K/UL
NRBC BLD AUTO-RTO: 0 /100 WBC
PH UR STRIP.AUTO: 6 [PH]
PLATELET # BLD AUTO: 230 K/UL (ref 164–446)
PMV BLD AUTO: 10.5 FL (ref 9–12.9)
POTASSIUM SERPL-SCNC: 4.2 MMOL/L (ref 3.6–5.5)
PROT SERPL-MCNC: 6.3 G/DL (ref 6–8.2)
PROT UR QL STRIP: NEGATIVE MG/DL
RBC # BLD AUTO: 4.49 M/UL (ref 4.7–6.1)
RBC # URNS HPF: ABNORMAL /HPF
RBC UR QL AUTO: NEGATIVE
SODIUM SERPL-SCNC: 136 MMOL/L (ref 135–145)
SP GR UR STRIP.AUTO: 1.01
WBC # BLD AUTO: 9.5 K/UL (ref 4.8–10.8)
WBC #/AREA URNS HPF: ABNORMAL /HPF

## 2017-04-28 PROCEDURE — 85025 COMPLETE CBC W/AUTO DIFF WBC: CPT

## 2017-04-28 PROCEDURE — 80053 COMPREHEN METABOLIC PANEL: CPT

## 2017-04-28 PROCEDURE — 86140 C-REACTIVE PROTEIN: CPT

## 2017-04-28 PROCEDURE — 85652 RBC SED RATE AUTOMATED: CPT

## 2017-04-28 PROCEDURE — 82550 ASSAY OF CK (CPK): CPT

## 2017-05-01 ENCOUNTER — HOSPITAL ENCOUNTER (OUTPATIENT)
Facility: MEDICAL CENTER | Age: 72
End: 2017-05-01
Attending: INTERNAL MEDICINE
Payer: MEDICARE

## 2017-05-01 LAB
ALBUMIN SERPL BCP-MCNC: 3.7 G/DL (ref 3.2–4.9)
ALBUMIN/GLOB SERPL: 1.4 G/DL
ALP SERPL-CCNC: 88 U/L (ref 30–99)
ALT SERPL-CCNC: 25 U/L (ref 2–50)
ANION GAP SERPL CALC-SCNC: 12 MMOL/L (ref 0–11.9)
AST SERPL-CCNC: 21 U/L (ref 12–45)
BACTERIA UR CULT: ABNORMAL
BASOPHILS # BLD AUTO: 0.7 % (ref 0–1.8)
BASOPHILS # BLD: 0.04 K/UL (ref 0–0.12)
BILIRUB SERPL-MCNC: 0.8 MG/DL (ref 0.1–1.5)
BUN SERPL-MCNC: 8 MG/DL (ref 8–22)
CALCIUM SERPL-MCNC: 9.6 MG/DL (ref 8.5–10.5)
CHLORIDE SERPL-SCNC: 102 MMOL/L (ref 96–112)
CK SERPL-CCNC: 62 U/L (ref 0–154)
CO2 SERPL-SCNC: 23 MMOL/L (ref 20–33)
CREAT SERPL-MCNC: 0.73 MG/DL (ref 0.5–1.4)
CRP SERPL HS-MCNC: 4.8 MG/DL (ref 0–0.75)
EOSINOPHIL # BLD AUTO: 0.17 K/UL (ref 0–0.51)
EOSINOPHIL NFR BLD: 2.8 % (ref 0–6.9)
ERYTHROCYTE [DISTWIDTH] IN BLOOD BY AUTOMATED COUNT: 43.9 FL (ref 35.9–50)
ERYTHROCYTE [SEDIMENTATION RATE] IN BLOOD BY WESTERGREN METHOD: 23 MM/HOUR (ref 0–20)
GFR SERPL CREATININE-BSD FRML MDRD: >60 ML/MIN/1.73 M 2
GLOBULIN SER CALC-MCNC: 2.7 G/DL (ref 1.9–3.5)
GLUCOSE SERPL-MCNC: 76 MG/DL (ref 65–99)
HCT VFR BLD AUTO: 37.3 % (ref 42–52)
HGB BLD-MCNC: 11.8 G/DL (ref 14–18)
IMM GRANULOCYTES # BLD AUTO: 0.02 K/UL (ref 0–0.11)
IMM GRANULOCYTES NFR BLD AUTO: 0.3 % (ref 0–0.9)
LYMPHOCYTES # BLD AUTO: 1.16 K/UL (ref 1–4.8)
LYMPHOCYTES NFR BLD: 19 % (ref 22–41)
MCH RBC QN AUTO: 27.1 PG (ref 27–33)
MCHC RBC AUTO-ENTMCNC: 31.6 G/DL (ref 33.7–35.3)
MCV RBC AUTO: 85.6 FL (ref 81.4–97.8)
MONOCYTES # BLD AUTO: 0.51 K/UL (ref 0–0.85)
MONOCYTES NFR BLD AUTO: 8.3 % (ref 0–13.4)
NEUTROPHILS # BLD AUTO: 4.22 K/UL (ref 1.82–7.42)
NEUTROPHILS NFR BLD: 68.9 % (ref 44–72)
NRBC # BLD AUTO: 0 K/UL
NRBC BLD AUTO-RTO: 0 /100 WBC
PLATELET # BLD AUTO: 251 K/UL (ref 164–446)
PMV BLD AUTO: 10.5 FL (ref 9–12.9)
POTASSIUM SERPL-SCNC: 4.3 MMOL/L (ref 3.6–5.5)
PROT SERPL-MCNC: 6.4 G/DL (ref 6–8.2)
RBC # BLD AUTO: 4.36 M/UL (ref 4.7–6.1)
SIGNIFICANT IND 70042: ABNORMAL
SODIUM SERPL-SCNC: 137 MMOL/L (ref 135–145)
SOURCE SOURCE: ABNORMAL
WBC # BLD AUTO: 6.1 K/UL (ref 4.8–10.8)

## 2017-05-01 PROCEDURE — 85652 RBC SED RATE AUTOMATED: CPT

## 2017-05-01 PROCEDURE — 85025 COMPLETE CBC W/AUTO DIFF WBC: CPT

## 2017-05-01 PROCEDURE — 86140 C-REACTIVE PROTEIN: CPT

## 2017-05-01 PROCEDURE — 80053 COMPREHEN METABOLIC PANEL: CPT

## 2017-05-01 PROCEDURE — 82550 ASSAY OF CK (CPK): CPT

## 2017-05-03 LAB
BACTERIA BLD CULT: NORMAL
BACTERIA BLD CULT: NORMAL
SIGNIFICANT IND 70042: NORMAL
SIGNIFICANT IND 70042: NORMAL
SITE SITE: NORMAL
SITE SITE: NORMAL
SOURCE SOURCE: NORMAL
SOURCE SOURCE: NORMAL

## 2017-05-05 ENCOUNTER — HOSPITAL ENCOUNTER (OUTPATIENT)
Dept: RADIOLOGY | Facility: MEDICAL CENTER | Age: 72
End: 2017-05-05
Attending: NEUROLOGICAL SURGERY
Payer: MEDICARE

## 2017-05-05 DIAGNOSIS — M48.061 SPINAL STENOSIS, LUMBAR REGION, WITHOUT NEUROGENIC CLAUDICATION: ICD-10-CM

## 2017-05-05 PROCEDURE — 72110 X-RAY EXAM L-2 SPINE 4/>VWS: CPT

## 2017-05-08 ENCOUNTER — HOSPITAL ENCOUNTER (OUTPATIENT)
Facility: MEDICAL CENTER | Age: 72
End: 2017-05-08
Attending: INTERNAL MEDICINE
Payer: MEDICARE

## 2017-05-08 LAB
ALBUMIN SERPL BCP-MCNC: 3.9 G/DL (ref 3.2–4.9)
ALBUMIN/GLOB SERPL: 1.4 G/DL
ALP SERPL-CCNC: 87 U/L (ref 30–99)
ALT SERPL-CCNC: 30 U/L (ref 2–50)
ANION GAP SERPL CALC-SCNC: 11 MMOL/L (ref 0–11.9)
AST SERPL-CCNC: 35 U/L (ref 12–45)
BASOPHILS # BLD AUTO: 0.8 % (ref 0–1.8)
BASOPHILS # BLD: 0.04 K/UL (ref 0–0.12)
BILIRUB SERPL-MCNC: 0.5 MG/DL (ref 0.1–1.5)
BUN SERPL-MCNC: 9 MG/DL (ref 8–22)
CALCIUM SERPL-MCNC: 9.9 MG/DL (ref 8.5–10.5)
CHLORIDE SERPL-SCNC: 108 MMOL/L (ref 96–112)
CK SERPL-CCNC: 168 U/L (ref 0–154)
CO2 SERPL-SCNC: 21 MMOL/L (ref 20–33)
CREAT SERPL-MCNC: 0.8 MG/DL (ref 0.5–1.4)
CRP SERPL HS-MCNC: 0.31 MG/DL (ref 0–0.75)
EOSINOPHIL # BLD AUTO: 0.25 K/UL (ref 0–0.51)
EOSINOPHIL NFR BLD: 5.1 % (ref 0–6.9)
ERYTHROCYTE [DISTWIDTH] IN BLOOD BY AUTOMATED COUNT: 44.4 FL (ref 35.9–50)
ERYTHROCYTE [SEDIMENTATION RATE] IN BLOOD BY WESTERGREN METHOD: 5 MM/HOUR (ref 0–20)
GFR SERPL CREATININE-BSD FRML MDRD: >60 ML/MIN/1.73 M 2
GLOBULIN SER CALC-MCNC: 2.7 G/DL (ref 1.9–3.5)
GLUCOSE SERPL-MCNC: 83 MG/DL (ref 65–99)
HCT VFR BLD AUTO: 40.7 % (ref 42–52)
HGB BLD-MCNC: 12.8 G/DL (ref 14–18)
IMM GRANULOCYTES # BLD AUTO: 0.02 K/UL (ref 0–0.11)
IMM GRANULOCYTES NFR BLD AUTO: 0.4 % (ref 0–0.9)
LYMPHOCYTES # BLD AUTO: 1.3 K/UL (ref 1–4.8)
LYMPHOCYTES NFR BLD: 26.7 % (ref 22–41)
MCH RBC QN AUTO: 27.2 PG (ref 27–33)
MCHC RBC AUTO-ENTMCNC: 31.4 G/DL (ref 33.7–35.3)
MCV RBC AUTO: 86.4 FL (ref 81.4–97.8)
MONOCYTES # BLD AUTO: 0.3 K/UL (ref 0–0.85)
MONOCYTES NFR BLD AUTO: 6.2 % (ref 0–13.4)
NEUTROPHILS # BLD AUTO: 2.96 K/UL (ref 1.82–7.42)
NEUTROPHILS NFR BLD: 60.8 % (ref 44–72)
NRBC # BLD AUTO: 0 K/UL
NRBC BLD AUTO-RTO: 0 /100 WBC
PLATELET # BLD AUTO: 298 K/UL (ref 164–446)
PMV BLD AUTO: 10.6 FL (ref 9–12.9)
POTASSIUM SERPL-SCNC: 4.3 MMOL/L (ref 3.6–5.5)
PROT SERPL-MCNC: 6.6 G/DL (ref 6–8.2)
RBC # BLD AUTO: 4.71 M/UL (ref 4.7–6.1)
SODIUM SERPL-SCNC: 140 MMOL/L (ref 135–145)
WBC # BLD AUTO: 4.9 K/UL (ref 4.8–10.8)

## 2017-05-08 PROCEDURE — 80053 COMPREHEN METABOLIC PANEL: CPT

## 2017-05-08 PROCEDURE — 85025 COMPLETE CBC W/AUTO DIFF WBC: CPT

## 2017-05-08 PROCEDURE — 82550 ASSAY OF CK (CPK): CPT

## 2017-05-08 PROCEDURE — 86140 C-REACTIVE PROTEIN: CPT

## 2017-05-08 PROCEDURE — 85652 RBC SED RATE AUTOMATED: CPT

## 2017-05-11 ENCOUNTER — APPOINTMENT (OUTPATIENT)
Dept: RADIOLOGY | Facility: MEDICAL CENTER | Age: 72
End: 2017-05-11
Attending: INTERNAL MEDICINE
Payer: MEDICARE

## 2017-05-11 DIAGNOSIS — G06.1 INTRASPINAL ABSCESS: ICD-10-CM

## 2017-05-11 PROCEDURE — 72158 MRI LUMBAR SPINE W/O & W/DYE: CPT

## 2017-05-11 PROCEDURE — A9579 GAD-BASE MR CONTRAST NOS,1ML: HCPCS | Performed by: INTERNAL MEDICINE

## 2017-05-11 PROCEDURE — 700117 HCHG RX CONTRAST REV CODE 255: Performed by: INTERNAL MEDICINE

## 2017-05-11 RX ADMIN — GADODIAMIDE 20 ML: 287 INJECTION INTRAVENOUS at 11:43

## 2017-05-15 ENCOUNTER — HOSPITAL ENCOUNTER (OUTPATIENT)
Facility: MEDICAL CENTER | Age: 72
End: 2017-05-15
Attending: INTERNAL MEDICINE
Payer: MEDICARE

## 2017-05-15 LAB
ALBUMIN SERPL BCP-MCNC: 3.9 G/DL (ref 3.2–4.9)
ALBUMIN/GLOB SERPL: 1.6 G/DL
ALP SERPL-CCNC: 81 U/L (ref 30–99)
ALT SERPL-CCNC: 33 U/L (ref 2–50)
ANION GAP SERPL CALC-SCNC: 8 MMOL/L (ref 0–11.9)
AST SERPL-CCNC: 32 U/L (ref 12–45)
BASOPHILS # BLD AUTO: 0.7 % (ref 0–1.8)
BASOPHILS # BLD: 0.03 K/UL (ref 0–0.12)
BILIRUB SERPL-MCNC: 0.6 MG/DL (ref 0.1–1.5)
BUN SERPL-MCNC: 7 MG/DL (ref 8–22)
CALCIUM SERPL-MCNC: 9.8 MG/DL (ref 8.5–10.5)
CHLORIDE SERPL-SCNC: 107 MMOL/L (ref 96–112)
CK SERPL-CCNC: 114 U/L (ref 0–154)
CO2 SERPL-SCNC: 23 MMOL/L (ref 20–33)
CREAT SERPL-MCNC: 0.86 MG/DL (ref 0.5–1.4)
CRP SERPL HS-MCNC: 0.22 MG/DL (ref 0–0.75)
EOSINOPHIL # BLD AUTO: 0.23 K/UL (ref 0–0.51)
EOSINOPHIL NFR BLD: 5.2 % (ref 0–6.9)
ERYTHROCYTE [DISTWIDTH] IN BLOOD BY AUTOMATED COUNT: 45.9 FL (ref 35.9–50)
ERYTHROCYTE [SEDIMENTATION RATE] IN BLOOD BY WESTERGREN METHOD: 5 MM/HOUR (ref 0–20)
GFR SERPL CREATININE-BSD FRML MDRD: >60 ML/MIN/1.73 M 2
GLOBULIN SER CALC-MCNC: 2.5 G/DL (ref 1.9–3.5)
GLUCOSE SERPL-MCNC: 110 MG/DL (ref 65–99)
HCT VFR BLD AUTO: 40.2 % (ref 42–52)
HGB BLD-MCNC: 12.5 G/DL (ref 14–18)
IMM GRANULOCYTES # BLD AUTO: 0.01 K/UL (ref 0–0.11)
IMM GRANULOCYTES NFR BLD AUTO: 0.2 % (ref 0–0.9)
LYMPHOCYTES # BLD AUTO: 1.34 K/UL (ref 1–4.8)
LYMPHOCYTES NFR BLD: 30.5 % (ref 22–41)
MCH RBC QN AUTO: 26.9 PG (ref 27–33)
MCHC RBC AUTO-ENTMCNC: 31.1 G/DL (ref 33.7–35.3)
MCV RBC AUTO: 86.6 FL (ref 81.4–97.8)
MONOCYTES # BLD AUTO: 0.27 K/UL (ref 0–0.85)
MONOCYTES NFR BLD AUTO: 6.2 % (ref 0–13.4)
NEUTROPHILS # BLD AUTO: 2.51 K/UL (ref 1.82–7.42)
NEUTROPHILS NFR BLD: 57.2 % (ref 44–72)
NRBC # BLD AUTO: 0 K/UL
NRBC BLD AUTO-RTO: 0 /100 WBC
PLATELET # BLD AUTO: 234 K/UL (ref 164–446)
PMV BLD AUTO: 10.5 FL (ref 9–12.9)
POTASSIUM SERPL-SCNC: 4.1 MMOL/L (ref 3.6–5.5)
PROT SERPL-MCNC: 6.4 G/DL (ref 6–8.2)
RBC # BLD AUTO: 4.64 M/UL (ref 4.7–6.1)
SODIUM SERPL-SCNC: 138 MMOL/L (ref 135–145)
WBC # BLD AUTO: 4.4 K/UL (ref 4.8–10.8)

## 2017-05-15 PROCEDURE — 85025 COMPLETE CBC W/AUTO DIFF WBC: CPT

## 2017-05-15 PROCEDURE — 80053 COMPREHEN METABOLIC PANEL: CPT

## 2017-05-15 PROCEDURE — 86140 C-REACTIVE PROTEIN: CPT

## 2017-05-15 PROCEDURE — 85652 RBC SED RATE AUTOMATED: CPT

## 2017-05-15 PROCEDURE — 82550 ASSAY OF CK (CPK): CPT

## 2017-05-22 ENCOUNTER — HOSPITAL ENCOUNTER (OUTPATIENT)
Facility: MEDICAL CENTER | Age: 72
End: 2017-05-22
Attending: INTERNAL MEDICINE
Payer: MEDICARE

## 2017-05-22 LAB
ALBUMIN SERPL BCP-MCNC: 4 G/DL (ref 3.2–4.9)
ALBUMIN/GLOB SERPL: 1.6 G/DL
ALP SERPL-CCNC: 88 U/L (ref 30–99)
ALT SERPL-CCNC: 37 U/L (ref 2–50)
ANION GAP SERPL CALC-SCNC: 9 MMOL/L (ref 0–11.9)
AST SERPL-CCNC: 36 U/L (ref 12–45)
BASOPHILS # BLD AUTO: 0.5 % (ref 0–1.8)
BASOPHILS # BLD: 0.02 K/UL (ref 0–0.12)
BILIRUB SERPL-MCNC: 0.7 MG/DL (ref 0.1–1.5)
BUN SERPL-MCNC: 5 MG/DL (ref 8–22)
CALCIUM SERPL-MCNC: 10.1 MG/DL (ref 8.5–10.5)
CHLORIDE SERPL-SCNC: 105 MMOL/L (ref 96–112)
CK SERPL-CCNC: 103 U/L (ref 0–154)
CO2 SERPL-SCNC: 24 MMOL/L (ref 20–33)
CREAT SERPL-MCNC: 0.84 MG/DL (ref 0.5–1.4)
CRP SERPL HS-MCNC: 0.09 MG/DL (ref 0–0.75)
EOSINOPHIL # BLD AUTO: 0.15 K/UL (ref 0–0.51)
EOSINOPHIL NFR BLD: 3.5 % (ref 0–6.9)
ERYTHROCYTE [DISTWIDTH] IN BLOOD BY AUTOMATED COUNT: 46.7 FL (ref 35.9–50)
ERYTHROCYTE [SEDIMENTATION RATE] IN BLOOD BY WESTERGREN METHOD: 4 MM/HOUR (ref 0–20)
GFR SERPL CREATININE-BSD FRML MDRD: >60 ML/MIN/1.73 M 2
GLOBULIN SER CALC-MCNC: 2.5 G/DL (ref 1.9–3.5)
GLUCOSE SERPL-MCNC: 75 MG/DL (ref 65–99)
HCT VFR BLD AUTO: 43 % (ref 42–52)
HGB BLD-MCNC: 13.3 G/DL (ref 14–18)
IMM GRANULOCYTES # BLD AUTO: 0.01 K/UL (ref 0–0.11)
IMM GRANULOCYTES NFR BLD AUTO: 0.2 % (ref 0–0.9)
LYMPHOCYTES # BLD AUTO: 1.45 K/UL (ref 1–4.8)
LYMPHOCYTES NFR BLD: 34 % (ref 22–41)
MCH RBC QN AUTO: 27 PG (ref 27–33)
MCHC RBC AUTO-ENTMCNC: 30.9 G/DL (ref 33.7–35.3)
MCV RBC AUTO: 87.4 FL (ref 81.4–97.8)
MONOCYTES # BLD AUTO: 0.35 K/UL (ref 0–0.85)
MONOCYTES NFR BLD AUTO: 8.2 % (ref 0–13.4)
NEUTROPHILS # BLD AUTO: 2.29 K/UL (ref 1.82–7.42)
NEUTROPHILS NFR BLD: 53.6 % (ref 44–72)
NRBC # BLD AUTO: 0 K/UL
NRBC BLD AUTO-RTO: 0 /100 WBC
PLATELET # BLD AUTO: 210 K/UL (ref 164–446)
PMV BLD AUTO: 10.7 FL (ref 9–12.9)
POTASSIUM SERPL-SCNC: 4.4 MMOL/L (ref 3.6–5.5)
PROT SERPL-MCNC: 6.5 G/DL (ref 6–8.2)
RBC # BLD AUTO: 4.92 M/UL (ref 4.7–6.1)
SODIUM SERPL-SCNC: 138 MMOL/L (ref 135–145)
WBC # BLD AUTO: 4.3 K/UL (ref 4.8–10.8)

## 2017-05-22 PROCEDURE — 85025 COMPLETE CBC W/AUTO DIFF WBC: CPT

## 2017-05-22 PROCEDURE — 85652 RBC SED RATE AUTOMATED: CPT

## 2017-05-22 PROCEDURE — 86140 C-REACTIVE PROTEIN: CPT

## 2017-05-22 PROCEDURE — 80053 COMPREHEN METABOLIC PANEL: CPT

## 2017-05-22 PROCEDURE — 82550 ASSAY OF CK (CPK): CPT

## 2017-06-01 ENCOUNTER — TELEPHONE (OUTPATIENT)
Dept: INTERNAL MEDICINE | Facility: MEDICAL CENTER | Age: 72
End: 2017-06-01

## 2017-06-01 DIAGNOSIS — M25.511 CHRONIC RIGHT SHOULDER PAIN: ICD-10-CM

## 2017-06-01 DIAGNOSIS — M25.551 RIGHT HIP PAIN: ICD-10-CM

## 2017-06-01 DIAGNOSIS — G89.29 CHRONIC RIGHT SHOULDER PAIN: ICD-10-CM

## 2017-06-01 NOTE — TELEPHONE ENCOUNTER
1. Caller Name: Angelo Magaña                      Call Back Number: [phone] (home)     2. Message: Requesting referrals for Dr. Teague for f/u on shoulder & Dr. Giang for hip pian.     3. Patient approves office to leave a detailed voicemail/MyChart message: yes

## 2017-06-19 ENCOUNTER — HOSPITAL ENCOUNTER (OUTPATIENT)
Dept: RADIOLOGY | Facility: MEDICAL CENTER | Age: 72
End: 2017-06-19
Attending: INTERNAL MEDICINE
Payer: MEDICARE

## 2017-06-19 ENCOUNTER — HOSPITAL ENCOUNTER (OUTPATIENT)
Dept: LAB | Facility: MEDICAL CENTER | Age: 72
End: 2017-06-19
Attending: INTERNAL MEDICINE
Payer: MEDICARE

## 2017-06-19 LAB
ALBUMIN SERPL BCP-MCNC: 4.1 G/DL (ref 3.2–4.9)
ALBUMIN/GLOB SERPL: 1.6 G/DL
ALP SERPL-CCNC: 83 U/L (ref 30–99)
ALT SERPL-CCNC: 25 U/L (ref 2–50)
ANION GAP SERPL CALC-SCNC: 6 MMOL/L (ref 0–11.9)
AST SERPL-CCNC: 27 U/L (ref 12–45)
BASOPHILS # BLD AUTO: 0.7 % (ref 0–1.8)
BASOPHILS # BLD: 0.03 K/UL (ref 0–0.12)
BILIRUB SERPL-MCNC: 0.5 MG/DL (ref 0.1–1.5)
BUN SERPL-MCNC: 7 MG/DL (ref 8–22)
CALCIUM SERPL-MCNC: 9.6 MG/DL (ref 8.5–10.5)
CHLORIDE SERPL-SCNC: 105 MMOL/L (ref 96–112)
CO2 SERPL-SCNC: 24 MMOL/L (ref 20–33)
CREAT SERPL-MCNC: 1.1 MG/DL (ref 0.5–1.4)
CRP SERPL HS-MCNC: 0.04 MG/DL (ref 0–0.75)
EOSINOPHIL # BLD AUTO: 0.14 K/UL (ref 0–0.51)
EOSINOPHIL NFR BLD: 3.4 % (ref 0–6.9)
ERYTHROCYTE [DISTWIDTH] IN BLOOD BY AUTOMATED COUNT: 45.6 FL (ref 35.9–50)
ERYTHROCYTE [SEDIMENTATION RATE] IN BLOOD BY WESTERGREN METHOD: 4 MM/HOUR (ref 0–20)
GFR SERPL CREATININE-BSD FRML MDRD: >60 ML/MIN/1.73 M 2
GLOBULIN SER CALC-MCNC: 2.5 G/DL (ref 1.9–3.5)
GLUCOSE SERPL-MCNC: 93 MG/DL (ref 65–99)
HCT VFR BLD AUTO: 42 % (ref 42–52)
HGB BLD-MCNC: 13.3 G/DL (ref 14–18)
IMM GRANULOCYTES # BLD AUTO: 0.01 K/UL (ref 0–0.11)
IMM GRANULOCYTES NFR BLD AUTO: 0.2 % (ref 0–0.9)
LYMPHOCYTES # BLD AUTO: 0.87 K/UL (ref 1–4.8)
LYMPHOCYTES NFR BLD: 21.3 % (ref 22–41)
MCH RBC QN AUTO: 26.5 PG (ref 27–33)
MCHC RBC AUTO-ENTMCNC: 31.7 G/DL (ref 33.7–35.3)
MCV RBC AUTO: 83.7 FL (ref 81.4–97.8)
MONOCYTES # BLD AUTO: 0.28 K/UL (ref 0–0.85)
MONOCYTES NFR BLD AUTO: 6.8 % (ref 0–13.4)
NEUTROPHILS # BLD AUTO: 2.76 K/UL (ref 1.82–7.42)
NEUTROPHILS NFR BLD: 67.6 % (ref 44–72)
NRBC # BLD AUTO: 0 K/UL
NRBC BLD AUTO-RTO: 0 /100 WBC
PLATELET # BLD AUTO: 207 K/UL (ref 164–446)
PMV BLD AUTO: 10.1 FL (ref 9–12.9)
POTASSIUM SERPL-SCNC: 4.6 MMOL/L (ref 3.6–5.5)
PROT SERPL-MCNC: 6.6 G/DL (ref 6–8.2)
RBC # BLD AUTO: 5.02 M/UL (ref 4.7–6.1)
SODIUM SERPL-SCNC: 135 MMOL/L (ref 135–145)
WBC # BLD AUTO: 4.1 K/UL (ref 4.8–10.8)

## 2017-06-19 PROCEDURE — 700117 HCHG RX CONTRAST REV CODE 255: Performed by: INTERNAL MEDICINE

## 2017-06-19 PROCEDURE — A9579 GAD-BASE MR CONTRAST NOS,1ML: HCPCS | Performed by: INTERNAL MEDICINE

## 2017-06-19 PROCEDURE — 72158 MRI LUMBAR SPINE W/O & W/DYE: CPT

## 2017-06-19 RX ADMIN — GADODIAMIDE 20 ML: 287 INJECTION INTRAVENOUS at 10:36

## 2017-07-12 DIAGNOSIS — I10 ESSENTIAL HYPERTENSION: ICD-10-CM

## 2017-07-12 RX ORDER — ATORVASTATIN CALCIUM 40 MG/1
40 TABLET, FILM COATED ORAL DAILY
Qty: 90 TAB | Refills: 0 | Status: SHIPPED | OUTPATIENT
Start: 2017-07-12 | End: 2017-10-07 | Stop reason: SDUPTHER

## 2017-07-18 ENCOUNTER — HOSPITAL ENCOUNTER (OUTPATIENT)
Dept: LAB | Facility: MEDICAL CENTER | Age: 72
End: 2017-07-18
Attending: NURSE PRACTITIONER
Payer: MEDICARE

## 2017-07-18 LAB
ALBUMIN SERPL BCP-MCNC: 4.1 G/DL (ref 3.2–4.9)
ALBUMIN/GLOB SERPL: 1.7 G/DL
ALP SERPL-CCNC: 95 U/L (ref 30–99)
ALT SERPL-CCNC: 25 U/L (ref 2–50)
ANION GAP SERPL CALC-SCNC: 7 MMOL/L (ref 0–11.9)
AST SERPL-CCNC: 31 U/L (ref 12–45)
BASOPHILS # BLD AUTO: 0.8 % (ref 0–1.8)
BASOPHILS # BLD: 0.03 K/UL (ref 0–0.12)
BILIRUB SERPL-MCNC: 0.6 MG/DL (ref 0.1–1.5)
BUN SERPL-MCNC: 9 MG/DL (ref 8–22)
CALCIUM SERPL-MCNC: 9.7 MG/DL (ref 8.5–10.5)
CHLORIDE SERPL-SCNC: 105 MMOL/L (ref 96–112)
CO2 SERPL-SCNC: 24 MMOL/L (ref 20–33)
CREAT SERPL-MCNC: 1.12 MG/DL (ref 0.5–1.4)
CRP SERPL HS-MCNC: 0.05 MG/DL (ref 0–0.75)
EOSINOPHIL # BLD AUTO: 0.09 K/UL (ref 0–0.51)
EOSINOPHIL NFR BLD: 2.4 % (ref 0–6.9)
ERYTHROCYTE [DISTWIDTH] IN BLOOD BY AUTOMATED COUNT: 50.7 FL (ref 35.9–50)
ERYTHROCYTE [SEDIMENTATION RATE] IN BLOOD BY WESTERGREN METHOD: 5 MM/HOUR (ref 0–20)
GFR SERPL CREATININE-BSD FRML MDRD: >60 ML/MIN/1.73 M 2
GLOBULIN SER CALC-MCNC: 2.4 G/DL (ref 1.9–3.5)
GLUCOSE SERPL-MCNC: 86 MG/DL (ref 65–99)
HCT VFR BLD AUTO: 42.4 % (ref 42–52)
HGB BLD-MCNC: 13.6 G/DL (ref 14–18)
IMM GRANULOCYTES # BLD AUTO: 0.02 K/UL (ref 0–0.11)
IMM GRANULOCYTES NFR BLD AUTO: 0.5 % (ref 0–0.9)
LYMPHOCYTES # BLD AUTO: 0.91 K/UL (ref 1–4.8)
LYMPHOCYTES NFR BLD: 23.8 % (ref 22–41)
MCH RBC QN AUTO: 27.2 PG (ref 27–33)
MCHC RBC AUTO-ENTMCNC: 32.1 G/DL (ref 33.7–35.3)
MCV RBC AUTO: 84.8 FL (ref 81.4–97.8)
MONOCYTES # BLD AUTO: 0.33 K/UL (ref 0–0.85)
MONOCYTES NFR BLD AUTO: 8.6 % (ref 0–13.4)
NEUTROPHILS # BLD AUTO: 2.44 K/UL (ref 1.82–7.42)
NEUTROPHILS NFR BLD: 63.9 % (ref 44–72)
NRBC # BLD AUTO: 0 K/UL
NRBC BLD AUTO-RTO: 0 /100 WBC
PLATELET # BLD AUTO: 198 K/UL (ref 164–446)
PMV BLD AUTO: 10.2 FL (ref 9–12.9)
POTASSIUM SERPL-SCNC: 4.9 MMOL/L (ref 3.6–5.5)
PROT SERPL-MCNC: 6.5 G/DL (ref 6–8.2)
RBC # BLD AUTO: 5 M/UL (ref 4.7–6.1)
SODIUM SERPL-SCNC: 136 MMOL/L (ref 135–145)
WBC # BLD AUTO: 3.8 K/UL (ref 4.8–10.8)

## 2017-07-18 PROCEDURE — 85025 COMPLETE CBC W/AUTO DIFF WBC: CPT

## 2017-07-18 PROCEDURE — 86140 C-REACTIVE PROTEIN: CPT

## 2017-07-18 PROCEDURE — 85652 RBC SED RATE AUTOMATED: CPT

## 2017-07-18 PROCEDURE — 36415 COLL VENOUS BLD VENIPUNCTURE: CPT

## 2017-07-18 PROCEDURE — 80053 COMPREHEN METABOLIC PANEL: CPT

## 2017-07-24 ENCOUNTER — TELEPHONE (OUTPATIENT)
Dept: INTERNAL MEDICINE | Facility: MEDICAL CENTER | Age: 72
End: 2017-07-24

## 2017-07-24 RX ORDER — LISINOPRIL 10 MG/1
TABLET ORAL
Qty: 90 TAB | Refills: 0 | Status: SHIPPED | OUTPATIENT
Start: 2017-07-24 | End: 2017-10-19 | Stop reason: SDUPTHER

## 2017-07-24 NOTE — TELEPHONE ENCOUNTER
This patient would be a good candidate to change to our geriatrician. Please explain that I am doing more teaching and have less availability in my private clinics. Also that geriatric physicians are best suited to deal with older patients and the complications that may arise over time.  Please recommend that this patient schedule at the next available visit with geriatrics.

## 2017-08-02 ENCOUNTER — PATIENT OUTREACH (OUTPATIENT)
Dept: HEALTH INFORMATION MANAGEMENT | Facility: OTHER | Age: 72
End: 2017-08-02

## 2017-08-02 NOTE — PROGRESS NOTES
Angelo Magaña was admitted to [facility] on 3/29/2017 and discharged on 4/4/2017 with a diagnosis of Paraspinal Abscess, Status post incision and drainage of an infected seroma, Obesity, Hepertension, Hypothyroidism, Chronic Back Pain and more. The patient was discharged with instructions to follow up with his PCP, see below for appointment details. The patient was also discharged with a Back Brace and wheelchair. Upon discharge [facility]HD made several attempts to contact patient including outreach calls to emergency contacts, Physicians’ offices for alternate numbers and any other authorized phone numbers provided in [facility]. [facility] Patient Advocate was able to reach patient after many attempts whereby the patient ultimately declined IHD services.   • Amber Spears, PCP 4/6/2017 - Appointment Kept

## 2017-08-04 RX ORDER — LEVOTHYROXINE SODIUM 0.05 MG/1
TABLET ORAL
Qty: 90 TAB | Refills: 2 | Status: SHIPPED | OUTPATIENT
Start: 2017-08-04 | End: 2018-04-30 | Stop reason: SDUPTHER

## 2017-08-09 ENCOUNTER — HOSPITAL ENCOUNTER (OUTPATIENT)
Dept: RADIOLOGY | Facility: MEDICAL CENTER | Age: 72
End: 2017-08-09
Attending: NEUROLOGICAL SURGERY
Payer: MEDICARE

## 2017-08-09 DIAGNOSIS — M48.061 LUMBAR STENOSIS: ICD-10-CM

## 2017-08-09 PROCEDURE — 72110 X-RAY EXAM L-2 SPINE 4/>VWS: CPT

## 2017-08-14 ENCOUNTER — TELEPHONE (OUTPATIENT)
Dept: INTERNAL MEDICINE | Facility: MEDICAL CENTER | Age: 72
End: 2017-08-14

## 2017-08-14 ENCOUNTER — HOSPITAL ENCOUNTER (OUTPATIENT)
Dept: LAB | Facility: MEDICAL CENTER | Age: 72
End: 2017-08-14
Attending: INTERNAL MEDICINE
Payer: MEDICARE

## 2017-08-14 LAB
ALBUMIN SERPL BCP-MCNC: 3.4 G/DL (ref 3.2–4.9)
ALBUMIN/GLOB SERPL: 1.3 G/DL
ALP SERPL-CCNC: 85 U/L (ref 30–99)
ALT SERPL-CCNC: 21 U/L (ref 2–50)
ANION GAP SERPL CALC-SCNC: 5 MMOL/L (ref 0–11.9)
AST SERPL-CCNC: 25 U/L (ref 12–45)
BASOPHILS # BLD AUTO: 0.8 % (ref 0–1.8)
BASOPHILS # BLD: 0.04 K/UL (ref 0–0.12)
BILIRUB SERPL-MCNC: 0.8 MG/DL (ref 0.1–1.5)
BUN SERPL-MCNC: 8 MG/DL (ref 8–22)
CALCIUM SERPL-MCNC: 9.3 MG/DL (ref 8.5–10.5)
CHLORIDE SERPL-SCNC: 106 MMOL/L (ref 96–112)
CO2 SERPL-SCNC: 27 MMOL/L (ref 20–33)
CREAT SERPL-MCNC: 0.93 MG/DL (ref 0.5–1.4)
CRP SERPL HS-MCNC: 0.02 MG/DL (ref 0–0.75)
EOSINOPHIL # BLD AUTO: 0.3 K/UL (ref 0–0.51)
EOSINOPHIL NFR BLD: 6.2 % (ref 0–6.9)
ERYTHROCYTE [DISTWIDTH] IN BLOOD BY AUTOMATED COUNT: 50.4 FL (ref 35.9–50)
ERYTHROCYTE [SEDIMENTATION RATE] IN BLOOD BY WESTERGREN METHOD: 6 MM/HOUR (ref 0–20)
GFR SERPL CREATININE-BSD FRML MDRD: >60 ML/MIN/1.73 M 2
GLOBULIN SER CALC-MCNC: 2.7 G/DL (ref 1.9–3.5)
GLUCOSE SERPL-MCNC: 92 MG/DL (ref 65–99)
HCT VFR BLD AUTO: 41 % (ref 42–52)
HGB BLD-MCNC: 13.2 G/DL (ref 14–18)
IMM GRANULOCYTES # BLD AUTO: 0.01 K/UL (ref 0–0.11)
IMM GRANULOCYTES NFR BLD AUTO: 0.2 % (ref 0–0.9)
LYMPHOCYTES # BLD AUTO: 1.18 K/UL (ref 1–4.8)
LYMPHOCYTES NFR BLD: 24.5 % (ref 22–41)
MCH RBC QN AUTO: 27.7 PG (ref 27–33)
MCHC RBC AUTO-ENTMCNC: 32.2 G/DL (ref 33.7–35.3)
MCV RBC AUTO: 86 FL (ref 81.4–97.8)
MONOCYTES # BLD AUTO: 0.31 K/UL (ref 0–0.85)
MONOCYTES NFR BLD AUTO: 6.4 % (ref 0–13.4)
NEUTROPHILS # BLD AUTO: 2.97 K/UL (ref 1.82–7.42)
NEUTROPHILS NFR BLD: 61.9 % (ref 44–72)
NRBC # BLD AUTO: 0 K/UL
NRBC BLD AUTO-RTO: 0 /100 WBC
PLATELET # BLD AUTO: 203 K/UL (ref 164–446)
PMV BLD AUTO: 10.5 FL (ref 9–12.9)
POTASSIUM SERPL-SCNC: 4.6 MMOL/L (ref 3.6–5.5)
PROT SERPL-MCNC: 6.1 G/DL (ref 6–8.2)
RBC # BLD AUTO: 4.77 M/UL (ref 4.7–6.1)
SODIUM SERPL-SCNC: 138 MMOL/L (ref 135–145)
WBC # BLD AUTO: 4.8 K/UL (ref 4.8–10.8)

## 2017-08-14 PROCEDURE — 36415 COLL VENOUS BLD VENIPUNCTURE: CPT

## 2017-08-14 PROCEDURE — 85025 COMPLETE CBC W/AUTO DIFF WBC: CPT

## 2017-08-14 PROCEDURE — 85652 RBC SED RATE AUTOMATED: CPT

## 2017-08-14 PROCEDURE — 86140 C-REACTIVE PROTEIN: CPT

## 2017-08-14 PROCEDURE — 80053 COMPREHEN METABOLIC PANEL: CPT

## 2017-08-14 NOTE — TELEPHONE ENCOUNTER
1. Caller Name: Angelo Magaña                      Call Back Number: [phone] (home)     2. Message: Pt calling wanting to cancel his and Emily's appt's for Aug 22nd. Says they are both going to specialists and having testing and it's just too much. I explained that if they cancel, your next opening is Feb 2nd. I offered a change to Dr. Gutierrez and notified him that for rx's, they still need to be seen q 6 months by someone. He understands and still opted to cancel their appt's, says he'll call in a couple of weeks with a decision going forward.     3. Patient approves office to leave a detailed voicemail/MyChart message: yes

## 2017-10-07 DIAGNOSIS — I10 ESSENTIAL HYPERTENSION: ICD-10-CM

## 2017-10-09 RX ORDER — ATORVASTATIN CALCIUM 40 MG/1
40 TABLET, FILM COATED ORAL DAILY
Qty: 30 TAB | Refills: 0 | Status: SHIPPED | OUTPATIENT
Start: 2017-10-09 | End: 2017-11-07 | Stop reason: SDUPTHER

## 2017-10-10 PROBLEM — B95.8 STAPH INFECTION: Status: RESOLVED | Noted: 2017-03-31 | Resolved: 2017-10-10

## 2017-10-10 PROBLEM — Z98.890: Status: RESOLVED | Noted: 2017-04-06 | Resolved: 2017-10-10

## 2017-10-11 ENCOUNTER — OFFICE VISIT (OUTPATIENT)
Dept: INTERNAL MEDICINE | Facility: MEDICAL CENTER | Age: 72
End: 2017-10-11
Payer: MEDICARE

## 2017-10-11 VITALS
BODY MASS INDEX: 36.22 KG/M2 | OXYGEN SATURATION: 97 % | SYSTOLIC BLOOD PRESSURE: 108 MMHG | WEIGHT: 230.8 LBS | DIASTOLIC BLOOD PRESSURE: 82 MMHG | TEMPERATURE: 98.2 F | HEART RATE: 71 BPM | HEIGHT: 67 IN

## 2017-10-11 DIAGNOSIS — Z00.00 HEALTHCARE MAINTENANCE: Chronic | ICD-10-CM

## 2017-10-11 DIAGNOSIS — J30.2 CHRONIC SEASONAL ALLERGIC RHINITIS, UNSPECIFIED TRIGGER: ICD-10-CM

## 2017-10-11 DIAGNOSIS — Z85.828 HISTORY OF SKIN CANCER: ICD-10-CM

## 2017-10-11 DIAGNOSIS — K21.9 GASTROESOPHAGEAL REFLUX DISEASE WITHOUT ESOPHAGITIS: ICD-10-CM

## 2017-10-11 DIAGNOSIS — D64.9 NORMOCYTIC ANEMIA: ICD-10-CM

## 2017-10-11 DIAGNOSIS — E66.9 OBESITY (BMI 35.0-39.9 WITHOUT COMORBIDITY): ICD-10-CM

## 2017-10-11 DIAGNOSIS — E03.9 ACQUIRED HYPOTHYROIDISM: ICD-10-CM

## 2017-10-11 DIAGNOSIS — L71.9 ROSACEA: ICD-10-CM

## 2017-10-11 DIAGNOSIS — G89.29 CHRONIC RIGHT SHOULDER PAIN: ICD-10-CM

## 2017-10-11 DIAGNOSIS — M54.50 CHRONIC MIDLINE LOW BACK PAIN WITHOUT SCIATICA: ICD-10-CM

## 2017-10-11 DIAGNOSIS — M46.20 PARASPINAL ABSCESS (HCC): ICD-10-CM

## 2017-10-11 DIAGNOSIS — M25.511 CHRONIC RIGHT SHOULDER PAIN: ICD-10-CM

## 2017-10-11 DIAGNOSIS — F43.9 STRESS: ICD-10-CM

## 2017-10-11 DIAGNOSIS — I10 ESSENTIAL HYPERTENSION: ICD-10-CM

## 2017-10-11 DIAGNOSIS — M25.551 RIGHT HIP PAIN: ICD-10-CM

## 2017-10-11 DIAGNOSIS — E78.5 DYSLIPIDEMIA: ICD-10-CM

## 2017-10-11 DIAGNOSIS — J45.909 MILD ASTHMA WITHOUT COMPLICATION, UNSPECIFIED WHETHER PERSISTENT: ICD-10-CM

## 2017-10-11 DIAGNOSIS — G89.29 CHRONIC MIDLINE LOW BACK PAIN WITHOUT SCIATICA: ICD-10-CM

## 2017-10-11 PROCEDURE — 99214 OFFICE O/P EST MOD 30 MIN: CPT | Performed by: INTERNAL MEDICINE

## 2017-10-11 RX ORDER — SODIUM FLUORIDE 0.9 MG/ML
MOUTHWASH DENTAL
Refills: 6 | COMMUNITY
Start: 2017-08-18 | End: 2018-03-03

## 2017-10-11 RX ORDER — SULFAMETHOXAZOLE AND TRIMETHOPRIM 800; 160 MG/1; MG/1
TABLET ORAL
Refills: 6 | COMMUNITY
Start: 2017-08-17 | End: 2017-10-11

## 2017-10-11 NOTE — ASSESSMENT & PLAN NOTE
Just went hunting (got an antelope)- but no surgery planned for at least a year.   Off all antibx since 9/17- ID monitoring blood work.

## 2017-10-11 NOTE — ASSESSMENT & PLAN NOTE
Coping Ok with wife's issues- daughter involved.  Sees eye doc/dentist  Not going to gym- but plans  Eating OK

## 2017-10-11 NOTE — ASSESSMENT & PLAN NOTE
Patient denies any current problems with heartburn\reflux. Denies bloody or tarry stools. Denies chest pain.

## 2017-10-11 NOTE — PATIENT INSTRUCTIONS
Try stopping Prilosec- use otc Zantac or Pepcid at bedtime instead to bridge.  If home stress gets more difficult- call if wanna try Zoloft

## 2017-10-11 NOTE — PROGRESS NOTES
Established Patient    Angelo Magaña is a 72 y.o. male who presents today with the following:    CC: Follow-up back surgery/infection, lipids, hypertension, thyroid    HPI:    Chronic midline low back pain without sciatica  Just went hunting (got an antelope)- but no surgery planned for at least a year.   Off all antibx since 9/17- ID monitoring blood work.     Acquired hypothyroidism  Patient is feeling well on their current thyroid dose. The most recent thyroid function tests were normal. No unusual fatigue, skin changes, depression, constipation. Patient is compliant with medication. Takes medication as directed on an empty stomach.        Paraspinal abscess (CMS-HCC)  Off all antibx x one month. Has f/u labs and with ID    Dyslipidemia  Patient denies any signs/symptoms of cardiovascular disease. Denies chest pain/pressure; denies numbness/weakness or difficulty with speech/swallowing or sudden change in vision.       Essential hypertension  The patient denies any symptoms referable to her elevated blood pressure. Specifically denies chest pain, palpitations, dyspnea, orthopnea, PND or peripheral edema. Current medication regimen is as listed below. Patient denies any side effects of medication.        Right hip pain  occas screaming pain- but told d/t back.     Healthcare maintenance  Coping Ok with wife's issues- daughter involved.  Sees eye doc/dentist  Not going to gym- but plans  Eating OK    Normocytic anemia  Energy OK- repeat labs pending.    History of skin cancer  Reg f/u with derm- nothing worrisome curently.     Rosacea  No meds currently.     Asthma  Stable on MDI    Seasonal allergies  Controlled on flonase    Gastroesophageal reflux disease without esophagitis  Patient denies any current problems with heartburn\reflux. Denies bloody or tarry stools. Denies chest pain.      Obesity (BMI 35.0-39.9 without comorbidity) (Formerly [facility])  Trying to pay attn to diet. Had been less active- plans to  increase again.     Chronic right shoulder pain  Gradually increased pain>>gets injection>>better- no surgery planned.     Stress  Coping OK now.         ROS: As per HPI. Additional pertinent symptoms as noted below. All other systems reviewed and are negative.    ROS:  Constitutional ROS: No unexpected change in weight, No weakness, No fatigue  Eye ROS: No recent significant change in vision  Pulmonary ROS: No shortness of breath, No recent change in breathing  Psychiatric ROS: No depression, No anxiety    Patient Active Problem List    Diagnosis Date Noted   • Paraspinal abscess (CMS-HCC) 03/30/2017     Priority: Medium   • Normocytic anemia 03/30/2017     Priority: Medium   • Ascending aorta dilation (CMS-HCC), mild, needs OP followup 03/30/2017     Priority: Low   • Obesity (BMI 35.0-39.9 without comorbidity) 04/06/2017   • Chronic right shoulder pain 04/06/2017   • Stress 04/06/2017   • Healthcare maintenance 06/08/2016   • Acquired hypothyroidism 06/08/2016   • Erectile dysfunction 06/08/2016   • History of skin cancer 06/08/2016   • Right hip pain 06/08/2016   • Rosacea 06/08/2016   • Chronic midline low back pain without sciatica 06/08/2016   • Asthma 06/08/2016   • Seasonal allergies 06/08/2016   • Gastroesophageal reflux disease without esophagitis 06/08/2016   • Dyslipidemia 06/08/2016   • Essential hypertension 06/08/2016       Social History   Substance Use Topics   • Smoking status: Never Smoker   • Smokeless tobacco: Never Used   • Alcohol use Yes      Comment: 1/week        Current Outpatient Prescriptions   Medication Sig Dispense Refill   • Probiotic Product (PROBIOTIC ADVANCED PO) Take  by mouth.     • Omega-3 Fatty Acids (FISH OIL BURP-LESS PO) Take  by mouth.     • atorvastatin (LIPITOR) 40 MG Tab TAKE 1 TAB BY MOUTH EVERY DAY. 30 Tab 0   • levothyroxine (SYNTHROID) 50 MCG Tab TAKE 1 TABLET BY MOUTH DAILY 90 Tab 2   • lisinopril (PRINIVIL) 10 MG Tab TAKE 1 TABLET BY MOUTH DAILY 90 Tab 0   •  "ADVAIR DISKUS 100-50 MCG/DOSE AEROSOL POWDER, BREATH ACTIVATED TAKE 1 PUFF BY MOUTH TWICE A DAY 1 Inhaler 5   • VITAMIN D, CHOLECALCIFEROL, PO Take 3,000 Units by mouth every day.     • ZYRTEC-D 5-120 MG PO TB12 Take 1 Tab by mouth every day. Takes daily      • MULTIVITAMIN PO Take 2 Tabs by mouth every day. Takes daily     • PRILOSEC 20 MG PO CPDR Take 20 mg by mouth every day. Takes daily     • PREVIDENT 0.2 % Solution USE ONCE PER WEEK OR AS OFTEN AS DIRECTED. RINSE WITH 10 ML FOR 1 MINUTE. DO NOT SWALLOW  6   • fluticasone (FLONASE) 50 MCG/ACT nasal spray Spray 1 Spray in nose 1 time daily as needed.       No current facility-administered medications for this visit.        /82   Pulse 71   Temp 36.8 °C (98.2 °F)   Ht 1.695 m (5' 6.75\")   Wt 104.7 kg (230 lb 12.8 oz)   SpO2 97%   BMI 36.42 kg/m²     Physical Exam  General: Well developed, well nourished male, in no distress., Obese  Eyes: Conjuntiva without any obvious injection or erythema.   Ears- canals and TMs clear  Neck- no significant adenopathy  Mouth- mucous membranes moist, no erythema  Cardiovascular: Heart is regular with no murmurs, no bruits  Lungs: Clear to auscultation bilaterally. No wheezes, rhonci or crackles heard. Respiratory effort is normal.  Abd: Soft, non-tender  Ext: No edema  Other: Patient in and out of chair, on and off exam table without problem or assistance.          Assessment and Plan    Recent labs were reviewed.  Recent imaging was reviewed  Consultant notes since last visit here were reviewed.       1. Chronic midline low back pain without sciatica  There is talk of another surgery. The patient is actually doing quite well. Has been out hunting and fishing. No surgery planned at this time. Infectious disease is waiting to see how he does office antibiotics. He's been off one month and so far doing well    2. Acquired hypothyroidism  Thyroid labs are within range. Patient is taking meds as prescribed first thing in " morning without food. And has no signs or symptoms of lower hypothyroid.      - TSH WITH REFLEX TO FT4; Future    3. Paraspinal abscess (CMS-HCC)  See above    4. Dyslipidemia  Lipids are in reasonable range. The patient to continue paying attention to diet and exercise. Patient aware to go to emergency department or call 911 if any signs of cardiovascular disease- chest pain or pressure, sudden numbness or weakness in an arm or leg, sudden loss of ability to talk or swallow.        5. Essential hypertension  Near/at goal No change in treatment. Discussed diet, exercise and salt restriction.          6. Right hip pain  Seems to be more due to back. Follow up with his assorted specialists    7. Healthcare maintenance  Routine screening is up-to-date  Discussed eye doctor and dentist    8. Normocytic anemia  Repeat CBC with next labs    9. History of skin cancer  No worrisome lesions currently    10. Rosacea  N doxycycline for years now off all antibiotics. Skin looks good hope to not have to resume    11. Mild asthma without complication, unspecified whether persistent  No need for rescue inhaler doing well    12. Chronic seasonal allergic rhinitis, unspecified trigger  Controlled on Flonase    13. Gastroesophageal reflux disease without esophagitis  Discussed current concerns regarding PPIs. We'll try to switch to H2 blocker. If remains asymptomatic will wean off of that as well. He has lost some weight and that may help control his symptoms as well. Also off the antibiotics he may be better    14. Obesity (BMI 35.0-39.9 without comorbidity)  Encouraged not to regain the weight that he has lost recently. Encourage increased exercise and attention to diet  - Patient identified as having weight management issue.  Appropriate orders and counseling given.    15. Chronic right shoulder pain  Intermittent symptoms. See Shonardfor injection when necessary    16. Stress  Wife with memory issues. Has support. Discussed  antidepressants if he needs help with coping. So for he's doing okay.      Return in about 6 months (around 4/11/2018). With geriatrician  Signed by:       Gale Spears M.D.    This note was created using voice recognition software. There may be unintended errors in spelling, grammar or content.

## 2017-11-07 DIAGNOSIS — I10 ESSENTIAL HYPERTENSION: ICD-10-CM

## 2017-11-07 RX ORDER — ATORVASTATIN CALCIUM 40 MG/1
TABLET, FILM COATED ORAL
Qty: 90 TAB | Refills: 0 | Status: SHIPPED | OUTPATIENT
Start: 2017-11-07 | End: 2018-02-03 | Stop reason: SDUPTHER

## 2017-11-07 NOTE — TELEPHONE ENCOUNTER
Last seen: 10/11/17 by Dr. Spears  Next appt: 04/24/18 with Dr. Samson    Was the patient seen in the last year in this department? Yes   Does patient have an active prescription for medications requested? No   Received Request Via: Pharmacy

## 2017-11-14 ENCOUNTER — HOSPITAL ENCOUNTER (OUTPATIENT)
Dept: LAB | Facility: MEDICAL CENTER | Age: 72
End: 2017-11-14
Attending: NURSE PRACTITIONER
Payer: MEDICARE

## 2017-11-14 ENCOUNTER — HOSPITAL ENCOUNTER (OUTPATIENT)
Dept: LAB | Facility: MEDICAL CENTER | Age: 72
End: 2017-11-14
Attending: INTERNAL MEDICINE
Payer: MEDICARE

## 2017-11-14 DIAGNOSIS — B95.8 STAPH INFECTION: ICD-10-CM

## 2017-11-14 DIAGNOSIS — E78.5 DYSLIPIDEMIA: ICD-10-CM

## 2017-11-14 DIAGNOSIS — E03.9 ACQUIRED HYPOTHYROIDISM: ICD-10-CM

## 2017-11-14 LAB
ALBUMIN SERPL BCP-MCNC: 4 G/DL (ref 3.2–4.9)
ALBUMIN SERPL BCP-MCNC: 4.1 G/DL (ref 3.2–4.9)
ALBUMIN/GLOB SERPL: 1.7 G/DL
ALBUMIN/GLOB SERPL: 1.7 G/DL
ALP SERPL-CCNC: 94 U/L (ref 30–99)
ALP SERPL-CCNC: 96 U/L (ref 30–99)
ALT SERPL-CCNC: 26 U/L (ref 2–50)
ALT SERPL-CCNC: 26 U/L (ref 2–50)
ANION GAP SERPL CALC-SCNC: 11 MMOL/L (ref 0–11.9)
ANION GAP SERPL CALC-SCNC: 5 MMOL/L (ref 0–11.9)
AST SERPL-CCNC: 26 U/L (ref 12–45)
AST SERPL-CCNC: 29 U/L (ref 12–45)
BASOPHILS # BLD AUTO: 0.4 % (ref 0–1.8)
BASOPHILS # BLD AUTO: 0.4 % (ref 0–1.8)
BASOPHILS # BLD: 0.02 K/UL (ref 0–0.12)
BASOPHILS # BLD: 0.02 K/UL (ref 0–0.12)
BILIRUB SERPL-MCNC: 0.9 MG/DL (ref 0.1–1.5)
BILIRUB SERPL-MCNC: 0.9 MG/DL (ref 0.1–1.5)
BUN SERPL-MCNC: 9 MG/DL (ref 8–22)
BUN SERPL-MCNC: 9 MG/DL (ref 8–22)
CALCIUM SERPL-MCNC: 9.2 MG/DL (ref 8.5–10.5)
CALCIUM SERPL-MCNC: 9.3 MG/DL (ref 8.5–10.5)
CHLORIDE SERPL-SCNC: 105 MMOL/L (ref 96–112)
CHLORIDE SERPL-SCNC: 107 MMOL/L (ref 96–112)
CHOLEST SERPL-MCNC: 107 MG/DL (ref 100–199)
CO2 SERPL-SCNC: 25 MMOL/L (ref 20–33)
CO2 SERPL-SCNC: 28 MMOL/L (ref 20–33)
CREAT SERPL-MCNC: 0.87 MG/DL (ref 0.5–1.4)
CREAT SERPL-MCNC: 0.88 MG/DL (ref 0.5–1.4)
CRP SERPL HS-MCNC: 0.08 MG/DL (ref 0–0.75)
EOSINOPHIL # BLD AUTO: 0.08 K/UL (ref 0–0.51)
EOSINOPHIL # BLD AUTO: 0.1 K/UL (ref 0–0.51)
EOSINOPHIL NFR BLD: 1.6 % (ref 0–6.9)
EOSINOPHIL NFR BLD: 1.9 % (ref 0–6.9)
ERYTHROCYTE [DISTWIDTH] IN BLOOD BY AUTOMATED COUNT: 42.1 FL (ref 35.9–50)
ERYTHROCYTE [DISTWIDTH] IN BLOOD BY AUTOMATED COUNT: 42.5 FL (ref 35.9–50)
ERYTHROCYTE [SEDIMENTATION RATE] IN BLOOD BY WESTERGREN METHOD: 3 MM/HOUR (ref 0–20)
GFR SERPL CREATININE-BSD FRML MDRD: >60 ML/MIN/1.73 M 2
GFR SERPL CREATININE-BSD FRML MDRD: >60 ML/MIN/1.73 M 2
GLOBULIN SER CALC-MCNC: 2.4 G/DL (ref 1.9–3.5)
GLOBULIN SER CALC-MCNC: 2.4 G/DL (ref 1.9–3.5)
GLUCOSE SERPL-MCNC: 91 MG/DL (ref 65–99)
GLUCOSE SERPL-MCNC: 92 MG/DL (ref 65–99)
HCT VFR BLD AUTO: 44.3 % (ref 42–52)
HCT VFR BLD AUTO: 44.6 % (ref 42–52)
HDLC SERPL-MCNC: 64 MG/DL
HGB BLD-MCNC: 14.5 G/DL (ref 14–18)
HGB BLD-MCNC: 14.6 G/DL (ref 14–18)
IMM GRANULOCYTES # BLD AUTO: 0.01 K/UL (ref 0–0.11)
IMM GRANULOCYTES # BLD AUTO: 0.02 K/UL (ref 0–0.11)
IMM GRANULOCYTES NFR BLD AUTO: 0.2 % (ref 0–0.9)
IMM GRANULOCYTES NFR BLD AUTO: 0.4 % (ref 0–0.9)
LDLC SERPL CALC-MCNC: 35 MG/DL
LYMPHOCYTES # BLD AUTO: 1.21 K/UL (ref 1–4.8)
LYMPHOCYTES # BLD AUTO: 1.27 K/UL (ref 1–4.8)
LYMPHOCYTES NFR BLD: 24.4 % (ref 22–41)
LYMPHOCYTES NFR BLD: 24.7 % (ref 22–41)
MCH RBC QN AUTO: 29.2 PG (ref 27–33)
MCH RBC QN AUTO: 29.2 PG (ref 27–33)
MCHC RBC AUTO-ENTMCNC: 32.7 G/DL (ref 33.7–35.3)
MCHC RBC AUTO-ENTMCNC: 32.7 G/DL (ref 33.7–35.3)
MCV RBC AUTO: 89.2 FL (ref 81.4–97.8)
MCV RBC AUTO: 89.3 FL (ref 81.4–97.8)
MONOCYTES # BLD AUTO: 0.28 K/UL (ref 0–0.85)
MONOCYTES # BLD AUTO: 0.3 K/UL (ref 0–0.85)
MONOCYTES NFR BLD AUTO: 5.4 % (ref 0–13.4)
MONOCYTES NFR BLD AUTO: 6.1 % (ref 0–13.4)
NEUTROPHILS # BLD AUTO: 3.28 K/UL (ref 1.82–7.42)
NEUTROPHILS # BLD AUTO: 3.52 K/UL (ref 1.82–7.42)
NEUTROPHILS NFR BLD: 67 % (ref 44–72)
NEUTROPHILS NFR BLD: 67.5 % (ref 44–72)
NRBC # BLD AUTO: 0 K/UL
NRBC # BLD AUTO: 0 K/UL
NRBC BLD AUTO-RTO: 0 /100 WBC
NRBC BLD AUTO-RTO: 0 /100 WBC
PLATELET # BLD AUTO: 191 K/UL (ref 164–446)
PLATELET # BLD AUTO: 194 K/UL (ref 164–446)
PMV BLD AUTO: 10.1 FL (ref 9–12.9)
PMV BLD AUTO: 9.6 FL (ref 9–12.9)
POTASSIUM SERPL-SCNC: 4.2 MMOL/L (ref 3.6–5.5)
POTASSIUM SERPL-SCNC: 4.3 MMOL/L (ref 3.6–5.5)
PROT SERPL-MCNC: 6.4 G/DL (ref 6–8.2)
PROT SERPL-MCNC: 6.5 G/DL (ref 6–8.2)
RBC # BLD AUTO: 4.96 M/UL (ref 4.7–6.1)
RBC # BLD AUTO: 5 M/UL (ref 4.7–6.1)
SODIUM SERPL-SCNC: 140 MMOL/L (ref 135–145)
SODIUM SERPL-SCNC: 141 MMOL/L (ref 135–145)
TRIGL SERPL-MCNC: 38 MG/DL (ref 0–149)
TSH SERPL DL<=0.005 MIU/L-ACNC: 1.56 UIU/ML (ref 0.3–3.7)
WBC # BLD AUTO: 4.9 K/UL (ref 4.8–10.8)
WBC # BLD AUTO: 5.2 K/UL (ref 4.8–10.8)

## 2017-11-14 PROCEDURE — 85025 COMPLETE CBC W/AUTO DIFF WBC: CPT

## 2017-11-14 PROCEDURE — 36415 COLL VENOUS BLD VENIPUNCTURE: CPT

## 2017-11-14 PROCEDURE — 86140 C-REACTIVE PROTEIN: CPT

## 2017-11-14 PROCEDURE — 80053 COMPREHEN METABOLIC PANEL: CPT | Mod: 91

## 2017-11-14 PROCEDURE — 85025 COMPLETE CBC W/AUTO DIFF WBC: CPT | Mod: 91

## 2017-11-14 PROCEDURE — 84443 ASSAY THYROID STIM HORMONE: CPT

## 2017-11-14 PROCEDURE — 80053 COMPREHEN METABOLIC PANEL: CPT

## 2017-11-14 PROCEDURE — 85652 RBC SED RATE AUTOMATED: CPT

## 2017-11-14 PROCEDURE — 80061 LIPID PANEL: CPT

## 2017-11-30 ENCOUNTER — HOSPITAL ENCOUNTER (OUTPATIENT)
Facility: MEDICAL CENTER | Age: 72
End: 2017-11-30
Attending: PHYSICIAN ASSISTANT
Payer: MEDICARE

## 2017-11-30 LAB — PSA SERPL-MCNC: 0.43 NG/ML (ref 0–4)

## 2017-11-30 PROCEDURE — 84153 ASSAY OF PSA TOTAL: CPT

## 2018-01-16 ENCOUNTER — HOSPITAL ENCOUNTER (OUTPATIENT)
Dept: RADIOLOGY | Facility: MEDICAL CENTER | Age: 73
End: 2018-01-16
Attending: NEUROLOGICAL SURGERY
Payer: MEDICARE

## 2018-01-16 VITALS — HEIGHT: 69 IN | WEIGHT: 230 LBS | BODY MASS INDEX: 34.07 KG/M2

## 2018-01-16 DIAGNOSIS — M54.5 LOW BACK PAIN, UNSPECIFIED BACK PAIN LATERALITY, UNSPECIFIED CHRONICITY, WITH SCIATICA PRESENCE UNSPECIFIED: ICD-10-CM

## 2018-01-16 PROCEDURE — 72110 X-RAY EXAM L-2 SPINE 4/>VWS: CPT

## 2018-01-16 PROCEDURE — A9270 NON-COVERED ITEM OR SERVICE: HCPCS | Performed by: RADIOLOGY

## 2018-01-16 PROCEDURE — 700102 HCHG RX REV CODE 250 W/ 637 OVERRIDE(OP): Performed by: RADIOLOGY

## 2018-01-16 PROCEDURE — 72148 MRI LUMBAR SPINE W/O DYE: CPT

## 2018-01-16 RX ORDER — DIAZEPAM 5 MG/1
10 TABLET ORAL
Status: COMPLETED | OUTPATIENT
Start: 2018-01-16 | End: 2018-01-16

## 2018-01-16 RX ADMIN — DIAZEPAM 10 MG: 5 TABLET ORAL at 11:35

## 2018-01-16 ASSESSMENT — PAIN SCALES - GENERAL
PAINLEVEL_OUTOF10: 0
PAINLEVEL_OUTOF10: 0

## 2018-01-16 NOTE — DISCHARGE INSTRUCTIONS
MRI ADULT DISCHARGE INSTRUCTIONS    You have been medicated today for your scan. Please follow the instructions below to ensure your safe recovery. If you have any questions or problems, feel free to call us at [phone] or [phone].     1.   Have someone stay with you to assist you as needed.    2.   Do not drive or operate any mechanical devices.    3.   Do not perform any activity that requires concentration. Make no major decisions over the next 24 hours.     4.   Be careful changing positions from laying down to sitting or standing, as you may become dizzy.     5.   Do not drink alcohol for 48 hours.    6.   There are no restrictions for eating your normal meals. Drink fluids.    7.   You may continue your usual medications for pain, tranquilizers, muscle relaxants or sedatives when awake.     8.   Tomorrow, you may continue your normal daily activities.     9.   Pressure dressing on 10 - 15 minutes. If swelling or bleeding occurs when removed, continue placing direct pressure on injection site for another 5 minutes, or until bleeding stops.   Diazepam (VALIUM) Oral solution  What is this medicine?  You were prescribed DIAZEPAM (dye AZ e axel) for the procedure you had today. This medication is a benzodiazepine. It is used to treat anxiety and nervousness. It also can help treat alcohol withdrawal, relax muscles, and treat certain types of seizures.  This medicine may be used for other purposes; ask your health care provider or pharmacist if you have questions.  What side effects may I notice from receiving this medicine?  Side effects that you should report to your doctor or health care professional as soon as possible:  • allergic reactions like skin rash, itching or hives, swelling of the face, lips, or tongue  • angry, confused, depressed, other mood changes  • breathing problems  • feeling faint or lightheaded, falls  • muscle cramps  • problems with balance, talking,  walking  • restlessness  • tremors  • trouble passing urine or change in the amount of urine  • unusually weak or tired  Side effects that usually do not require medical attention (report to your doctor or health care professional if they continue or are bothersome):  • difficulty sleeping, nightmares  • dizziness, drowsiness, clumsiness, or unsteadiness, a hangover effect  • headache  • nausea, vomiting  This list may not describe all possible side effects. Call your doctor for medical advice about side effects. You may report side effects to FDA at 1-962-CDC-3505.    I have been informed of and understand the above discharge instructions.

## 2018-02-03 DIAGNOSIS — I10 ESSENTIAL HYPERTENSION: ICD-10-CM

## 2018-02-05 RX ORDER — ATORVASTATIN CALCIUM 40 MG/1
TABLET, FILM COATED ORAL
Qty: 90 TAB | Refills: 0 | Status: SHIPPED | OUTPATIENT
Start: 2018-02-05 | End: 2018-05-10 | Stop reason: SDUPTHER

## 2018-02-12 ENCOUNTER — OFFICE VISIT (OUTPATIENT)
Dept: URGENT CARE | Facility: PHYSICIAN GROUP | Age: 73
End: 2018-02-12
Payer: MEDICARE

## 2018-02-12 VITALS
WEIGHT: 240 LBS | TEMPERATURE: 99.6 F | DIASTOLIC BLOOD PRESSURE: 86 MMHG | RESPIRATION RATE: 16 BRPM | SYSTOLIC BLOOD PRESSURE: 132 MMHG | HEIGHT: 68 IN | HEART RATE: 87 BPM | BODY MASS INDEX: 36.37 KG/M2 | OXYGEN SATURATION: 94 %

## 2018-02-12 DIAGNOSIS — R05.9 COUGH: ICD-10-CM

## 2018-02-12 DIAGNOSIS — J22 LRTI (LOWER RESPIRATORY TRACT INFECTION): ICD-10-CM

## 2018-02-12 PROCEDURE — 99214 OFFICE O/P EST MOD 30 MIN: CPT | Performed by: NURSE PRACTITIONER

## 2018-02-12 RX ORDER — CODEINE PHOSPHATE AND GUAIFENESIN 10; 100 MG/5ML; MG/5ML
5 SOLUTION ORAL EVERY 4 HOURS PRN
Qty: 75 ML | Refills: 0 | Status: SHIPPED | OUTPATIENT
Start: 2018-02-12 | End: 2018-02-17

## 2018-02-12 RX ORDER — DOXYCYCLINE HYCLATE 100 MG
100 TABLET ORAL 2 TIMES DAILY
Qty: 12 TAB | Refills: 0 | Status: SHIPPED | OUTPATIENT
Start: 2018-02-12 | End: 2018-02-18

## 2018-02-12 ASSESSMENT — ENCOUNTER SYMPTOMS
CHILLS: 1
DIZZINESS: 0
SHORTNESS OF BREATH: 0
NAUSEA: 0
VOMITING: 0
SORE THROAT: 0
WHEEZING: 0
SPUTUM PRODUCTION: 1
COUGH: 1
EYE PAIN: 0
FEVER: 0
MYALGIAS: 1

## 2018-02-12 ASSESSMENT — COPD QUESTIONNAIRES: COPD: 1

## 2018-02-12 NOTE — PROGRESS NOTES
Subjective:     Angelo Magaña is a 72 y.o. male who presents for Cough   Patient presents to clinic with complaints of a cough, malaise, fatigue. Patient has started himself on doxycycline and which he had a prescription from previous illness. Patient has taken one day of antibiotics. Patient has been taking over-the-counter cough medicine with minimal relief.     Cough   This is a new problem. Episode onset: 3 days. The problem has been unchanged. The problem occurs constantly. The cough is productive of sputum. Associated symptoms include chills, ear congestion, myalgias and nasal congestion. Pertinent negatives include no chest pain, fever, rash, sore throat, shortness of breath or wheezing. Nothing aggravates the symptoms. He has tried OTC cough suppressant and steroid inhaler (1 day of doxycycline) for the symptoms. The treatment provided mild relief. His past medical history is significant for bronchitis, COPD and pneumonia.     Past Medical History:   Diagnosis Date   • Arthritis    • ASTHMA    • Asthma 6/8/2016   • Bronchitis    • Erectile dysfunction 6/8/2016   • GERD (gastroesophageal reflux disease) 6/8/2016   • Heart burn    • History of skin cancer 6/8/2016   • Hyperlipidemia 6/8/2016   • Hypertension    • Hypothyroidism 6/8/2016   • Indigestion    • Low back pain 6/8/2016   • Obesity 6/8/2016   • Pain    • Preventative health care 6/8/2016   • Right hip pain 6/8/2016   • Rosacea 6/8/2016   • Seasonal allergies 6/8/2016   • Urinary bladder disorder      Past Surgical History:   Procedure Laterality Date   • IRRIGATION & DEBRIDEMENT NEURO  3/30/2017    Procedure: IRRIGATION & DEBRIDEMENT NEURO-LUMBAR WOUND;  Surgeon: Juan Miguel Chakraborty M.D.;  Location: SURGERY [facility];  Service:    • LUMBAR FUSION POSTERIOR N/A 3/17/2017    Procedure: LUMBAR FUSION POSTERIOR L3-4 EXTENSION;  Surgeon: Juan Miguel Chakraborty M.D.;  Location: SURGERY [facility];  Service:    • LUMBAR LAMINECTOMY DISKECTOMY N/A  3/17/2017    Procedure: LUMBAR LAMINECTOMY DISKECTOMY L3 & REDO L4;  Surgeon: Juan Miguel Chakraborty M.D.;  Location: SURGERY [facility];  Service:    • HARDWARE REMOVAL NEURO N/A 3/17/2017    Procedure: HARDWARE REMOVAL NEURO L4-5;  Surgeon: Juan Miguel Chakraborty M.D.;  Location: SURGERY [facility];  Service:    • LUMBAR FUSION POSTERIOR  9/9/2009    Performed by JUAN MIGUEL CHAKRABORTY at SURGERY [facility] ORS   • LUMBAR LAMINECTOMY DISKECTOMY  9/9/2009    Performed by JUAN MIGUEL CHAKRABORTY at SURGERY [facility] ORS   • KNEE ARTHROPLASTY TOTAL  5/26/2009    Performed by NAREN DANIELSON at SURGERY [facility] ORS   • HIP ARTHROPLASTY TOTAL  1/21/2009    Performed by NAREN DANIELSON at SURGERY [facility] ORS   • HIP ARTHROPLASTY TOTAL  09   • INGUINAL HERNIA REPAIR  9/2/08    Performed by MERLYN BECKER at SURGERY SAME DAY [facility] ORS   • INGUINAL HERNIA REPAIR  6/10/08    Performed by MERLYN BECKER at SURGERY SAME DAY [facility] ORS   • HERNIA REPAIR  2008    HERNIA    • OTHER      DISCECTOMY WITH HARWWARE   • OTHER      GASTRIC BYPASS AT 30 YEARS     Social History     Social History   • Marital status:      Spouse name: N/A   • Number of children: N/A   • Years of education: N/A     Occupational History   • Not on file.     Social History Main Topics   • Smoking status: Never Smoker   • Smokeless tobacco: Never Used   • Alcohol use Yes      Comment: 1/week    • Drug use: No   • Sexual activity: Not on file     Other Topics Concern   • Not on file     Social History Narrative   • No narrative on file      Family History   Problem Relation Age of Onset   • Hypertension Mother    • Hypertension Father    • Lung Disease Father      asthma    Review of Systems   Constitutional: Positive for chills and malaise/fatigue. Negative for fever.   HENT: Negative for sore throat.    Eyes: Negative for pain.   Respiratory: Positive for cough and sputum production. Negative for shortness of breath and wheezing.    Cardiovascular:  "Negative for chest pain.   Gastrointestinal: Negative for nausea and vomiting.   Genitourinary: Negative for hematuria.   Musculoskeletal: Positive for myalgias.   Skin: Negative for rash.   Neurological: Negative for dizziness.   No Known Allergies   Objective:   /86   Pulse 87   Temp 37.6 °C (99.6 °F)   Resp 16   Ht 1.727 m (5' 8\")   Wt 108.9 kg (240 lb)   SpO2 94%   BMI 36.49 kg/m²   Physical Exam   Constitutional: He is oriented to person, place, and time. He appears well-developed and well-nourished. No distress.   HENT:   Head: Normocephalic and atraumatic.   Right Ear: Tympanic membrane normal.   Left Ear: Tympanic membrane normal.   Nose: Nose normal. Right sinus exhibits no maxillary sinus tenderness and no frontal sinus tenderness. Left sinus exhibits no maxillary sinus tenderness and no frontal sinus tenderness.   Mouth/Throat: Uvula is midline, oropharynx is clear and moist and mucous membranes are normal. No posterior oropharyngeal edema, posterior oropharyngeal erythema or tonsillar abscesses. No tonsillar exudate.   Eyes: Conjunctivae and EOM are normal. Pupils are equal, round, and reactive to light. Right eye exhibits no discharge. Left eye exhibits no discharge.   Cardiovascular: Normal rate and regular rhythm.    No murmur heard.  Pulmonary/Chest: Effort normal and breath sounds normal. No respiratory distress. He has no decreased breath sounds. He has no wheezes. He has no rhonchi. He has no rales.   Abdominal: Soft. He exhibits no distension. There is no tenderness.   Neurological: He is alert and oriented to person, place, and time. He has normal reflexes. No sensory deficit.   Skin: Skin is warm, dry and intact.   Psychiatric: He has a normal mood and affect.   Vitals reviewed.        Assessment/Plan:   Assessment    1. LRTI (lower respiratory tract infection)  - doxycycline (VIBRAMYCIN) 100 MG Tab; Take 1 Tab by mouth 2 times a day for 6 days.  Dispense: 12 Tab; Refill: 0  - " guaifenesin-codeine (CHERATUSSIN AC) Solution oral solution; Take 5 mL by mouth every four hours as needed for Cough for up to 5 days.  Dispense: 75 mL; Refill: 0    2. Cough  - doxycycline (VIBRAMYCIN) 100 MG Tab; Take 1 Tab by mouth 2 times a day for 6 days.  Dispense: 12 Tab; Refill: 0  - guaifenesin-codeine (CHERATUSSIN AC) Solution oral solution; Take 5 mL by mouth every four hours as needed for Cough for up to 5 days.  Dispense: 75 mL; Refill: 0  Differential diagnosis, natural history, supportive care, and indications for immediate follow-up discussed.    Patient lung sounds clear to auscultation bilaterally, patient without fevers. Treat with 6 more days of oral antibiotics since patient has been on one day of antibiotics. Advised patient to return to clinic if symptoms worsen and if not better in 3-4 days for  chest x-ray.  Compumatrix query was performed to review the . The patient appears appropriate to receive medication as prescribed.      Advised to continue supportive care with Tylenol and/or ibuprofen for fevers and discomfort. Increased fluids and electrolytes.    Patient given precautionary s/sx that mandate immediate follow up and evaluation in the ED. Advised of risks of not doing so.    DDX, Supportive care, and indications for immediate follow-up discussed with patient.    Instructed to return to clinic or nearest emergency department if we are not available for any change in condition, further concerns, or worsening of symptoms.    The patient demonstrated a good understanding and agreed with the treatment plan.

## 2018-02-15 RX ORDER — DOXYCYCLINE HYCLATE 100 MG/1
CAPSULE ORAL
Qty: 90 EACH | Refills: 0 | Status: SHIPPED | OUTPATIENT
Start: 2018-02-15 | End: 2018-03-03

## 2018-02-18 ENCOUNTER — OFFICE VISIT (OUTPATIENT)
Dept: URGENT CARE | Facility: PHYSICIAN GROUP | Age: 73
End: 2018-02-18
Payer: MEDICARE

## 2018-02-18 ENCOUNTER — HOSPITAL ENCOUNTER (OUTPATIENT)
Dept: RADIOLOGY | Facility: MEDICAL CENTER | Age: 73
End: 2018-02-18
Attending: EMERGENCY MEDICINE
Payer: MEDICARE

## 2018-02-18 VITALS
BODY MASS INDEX: 36.37 KG/M2 | DIASTOLIC BLOOD PRESSURE: 72 MMHG | OXYGEN SATURATION: 95 % | HEIGHT: 68 IN | WEIGHT: 240 LBS | TEMPERATURE: 97.8 F | HEART RATE: 88 BPM | SYSTOLIC BLOOD PRESSURE: 110 MMHG

## 2018-02-18 DIAGNOSIS — S20.212A CONTUSION OF LEFT CHEST WALL, INITIAL ENCOUNTER: ICD-10-CM

## 2018-02-18 PROCEDURE — 99214 OFFICE O/P EST MOD 30 MIN: CPT | Performed by: EMERGENCY MEDICINE

## 2018-02-18 PROCEDURE — 71046 X-RAY EXAM CHEST 2 VIEWS: CPT

## 2018-02-18 RX ORDER — CODEINE PHOSPHATE AND GUAIFENESIN 10; 100 MG/5ML; MG/5ML
5 SOLUTION ORAL EVERY 4 HOURS PRN
Qty: 200 ML | Refills: 0 | Status: SHIPPED | OUTPATIENT
Start: 2018-02-18 | End: 2018-02-28

## 2018-02-18 RX ORDER — GABAPENTIN 300 MG/1
CAPSULE ORAL
Refills: 0 | COMMUNITY
Start: 2018-01-31 | End: 2018-03-03

## 2018-02-18 RX ORDER — MELOXICAM 15 MG/1
TABLET ORAL
Refills: 0 | COMMUNITY
Start: 2018-01-31 | End: 2018-04-19

## 2018-02-18 ASSESSMENT — ENCOUNTER SYMPTOMS
EYE DISCHARGE: 0
NERVOUS/ANXIOUS: 0
SENSORY CHANGE: 0
FOCAL WEAKNESS: 0
DIARRHEA: 0
SPEECH CHANGE: 0
FEVER: 0
SPUTUM PRODUCTION: 0
SINUS PAIN: 0
NECK PAIN: 0
STRIDOR: 0
CONSTIPATION: 0
CHILLS: 0
BACK PAIN: 0
COUGH: 1
PALPITATIONS: 0

## 2018-02-18 NOTE — PROGRESS NOTES
Subjective:      Angelo Magaña is a 72 y.o. male who presents with No chief complaint on file.            HPI  Patient's a 72-year-old male complaining URI symptoms was seen recently in urgent care put on Vibramycin as well as Cheratussin cough medications. His back is also causing problems on his left side of his rib cage because of continued symptoms of coughing. Patient has a rib belt that he brought with him. .    No Known Allergies   Social History     Social History   • Marital status:      Spouse name: N/A   • Number of children: N/A   • Years of education: N/A     Occupational History   • Not on file.     Social History Main Topics   • Smoking status: Never Smoker   • Smokeless tobacco: Never Used   • Alcohol use Yes      Comment: 1/week    • Drug use: No   • Sexual activity: Not on file     Other Topics Concern   • Not on file     Social History Narrative   • No narrative on file     Past Medical History:   Diagnosis Date   • Arthritis    • ASTHMA    • Asthma 6/8/2016   • Bronchitis    • Erectile dysfunction 6/8/2016   • GERD (gastroesophageal reflux disease) 6/8/2016   • Heart burn    • History of skin cancer 6/8/2016   • Hyperlipidemia 6/8/2016   • Hypertension    • Hypothyroidism 6/8/2016   • Indigestion    • Low back pain 6/8/2016   • Obesity 6/8/2016   • Pain    • Preventative health care 6/8/2016   • Right hip pain 6/8/2016   • Rosacea 6/8/2016   • Seasonal allergies 6/8/2016   • Urinary bladder disorder      Family History   Problem Relation Age of Onset   • Hypertension Mother    • Hypertension Father    • Lung Disease Father      asthma     Review of Systems   Constitutional: Negative for chills and fever.   HENT: Positive for congestion. Negative for sinus pain.    Eyes: Negative for discharge.   Respiratory: Positive for cough. Negative for sputum production and stridor.    Cardiovascular: Negative for chest pain and palpitations.   Gastrointestinal: Negative for constipation and  "diarrhea.   Genitourinary: Negative for dysuria, frequency and urgency.   Musculoskeletal: Negative for back pain, joint pain and neck pain.   Skin: Negative for rash.   Neurological: Negative for sensory change, speech change and focal weakness.   Psychiatric/Behavioral: The patient is not nervous/anxious.           Objective:     Blood pressure 110/72, pulse 88, temperature 36.6 °C (97.8 °F), height 1.727 m (5' 8\"), weight 108.9 kg (240 lb), SpO2 95 %.      Physical Exam   Constitutional: He appears well-developed and well-nourished. No distress.       HENT:   Head: Normocephalic and atraumatic.   Right Ear: External ear normal.   Left Ear: External ear normal.   Mouth/Throat: Oropharynx is clear and moist. No oropharyngeal exudate.   Eyes: Conjunctivae are normal. Right eye exhibits no discharge. Left eye exhibits no discharge.   Neck: Normal range of motion.   Cardiovascular: Normal rate and regular rhythm.    Pulmonary/Chest: Effort normal and breath sounds normal. No stridor. No respiratory distress. He exhibits tenderness.   Abdominal: He exhibits no distension. There is no tenderness.   Musculoskeletal: Normal range of motion. He exhibits no edema or deformity.   Patient has tenderness over his left mid chest wall anterior axillary line. See did diagram. Chest is clear no wheezing no decreased breath sounds no crepitus.   Neurological: He is alert. Coordination normal.   Skin: Skin is warm. Rash noted. He is not diaphoretic. No erythema.   Psychiatric: He has a normal mood and affect. His behavior is normal.   Nursing note and vitals reviewed.            Chest x-ray: No signs of left-sided pneumothorax or hemothorax. Patient has right-sided chest wall contusion abnormalities.  Assessment/Plan:     Diagnosis: Acute URI cough.                       Acute chest wall pain on the left.          I am recommending the patient initiate/ continue hydration efforts including the use of a vaporizer/humidifier/ netti " pot. I also recommend the pt, initiate Mucinex (if older than 4) Sudafed or Dayquil if not hypertensive. In addition the patient will initiate the prescribed prescription medication/s: Cheratussin. Patient will avoid the use of a rib belt and carries multiple round to splint his left chest wall when he sneezes or coughs. If the patient's condition exacerbates with worsening dysphagia, shortness of breath, uncontrolled fever, headache or chest pressure he/she will return immediately to the urgent care or go to  the emergency department for further evaluation.    Oscar Denson

## 2018-03-03 ENCOUNTER — OFFICE VISIT (OUTPATIENT)
Dept: URGENT CARE | Facility: PHYSICIAN GROUP | Age: 73
End: 2018-03-03
Payer: MEDICARE

## 2018-03-03 ENCOUNTER — HOSPITAL ENCOUNTER (EMERGENCY)
Facility: MEDICAL CENTER | Age: 73
End: 2018-03-03
Attending: EMERGENCY MEDICINE
Payer: MEDICARE

## 2018-03-03 ENCOUNTER — HOSPITAL ENCOUNTER (OUTPATIENT)
Dept: RADIOLOGY | Facility: MEDICAL CENTER | Age: 73
End: 2018-03-03
Attending: PHYSICIAN ASSISTANT
Payer: MEDICARE

## 2018-03-03 VITALS
HEIGHT: 68 IN | OXYGEN SATURATION: 95 % | TEMPERATURE: 99 F | BODY MASS INDEX: 37.62 KG/M2 | HEART RATE: 66 BPM | DIASTOLIC BLOOD PRESSURE: 78 MMHG | WEIGHT: 248.2 LBS | RESPIRATION RATE: 12 BRPM | SYSTOLIC BLOOD PRESSURE: 126 MMHG

## 2018-03-03 VITALS
WEIGHT: 247.14 LBS | SYSTOLIC BLOOD PRESSURE: 133 MMHG | RESPIRATION RATE: 18 BRPM | OXYGEN SATURATION: 97 % | DIASTOLIC BLOOD PRESSURE: 89 MMHG | BODY MASS INDEX: 37.46 KG/M2 | HEIGHT: 68 IN | HEART RATE: 65 BPM | TEMPERATURE: 98.9 F

## 2018-03-03 DIAGNOSIS — R19.04 ABDOMINAL MASS, LEFT LOWER QUADRANT: ICD-10-CM

## 2018-03-03 DIAGNOSIS — S22.32XA CLOSED FRACTURE OF ONE RIB OF LEFT SIDE, INITIAL ENCOUNTER: ICD-10-CM

## 2018-03-03 DIAGNOSIS — R10.32 LLQ PAIN: ICD-10-CM

## 2018-03-03 DIAGNOSIS — K43.9 HERNIA OF ABDOMINAL WALL: ICD-10-CM

## 2018-03-03 LAB
ALBUMIN SERPL BCP-MCNC: 4.2 G/DL (ref 3.2–4.9)
ALBUMIN/GLOB SERPL: 1.5 G/DL
ALP SERPL-CCNC: 94 U/L (ref 30–99)
ALT SERPL-CCNC: 46 U/L (ref 2–50)
ANION GAP SERPL CALC-SCNC: 6 MMOL/L (ref 0–11.9)
AST SERPL-CCNC: 36 U/L (ref 12–45)
BASOPHILS # BLD AUTO: 0.4 % (ref 0–1.8)
BASOPHILS # BLD: 0.03 K/UL (ref 0–0.12)
BILIRUB SERPL-MCNC: 0.9 MG/DL (ref 0.1–1.5)
BUN SERPL-MCNC: 9 MG/DL (ref 8–22)
CALCIUM SERPL-MCNC: 9.1 MG/DL (ref 8.4–10.2)
CHLORIDE SERPL-SCNC: 104 MMOL/L (ref 96–112)
CO2 SERPL-SCNC: 26 MMOL/L (ref 20–33)
CREAT SERPL-MCNC: 0.87 MG/DL (ref 0.5–1.4)
EOSINOPHIL # BLD AUTO: 0.2 K/UL (ref 0–0.51)
EOSINOPHIL NFR BLD: 2.8 % (ref 0–6.9)
ERYTHROCYTE [DISTWIDTH] IN BLOOD BY AUTOMATED COUNT: 38.8 FL (ref 35.9–50)
GLOBULIN SER CALC-MCNC: 2.8 G/DL (ref 1.9–3.5)
GLUCOSE SERPL-MCNC: 95 MG/DL (ref 65–99)
HCT VFR BLD AUTO: 43 % (ref 42–52)
HGB BLD-MCNC: 14.4 G/DL (ref 14–18)
IMM GRANULOCYTES # BLD AUTO: 0.02 K/UL (ref 0–0.11)
IMM GRANULOCYTES NFR BLD AUTO: 0.3 % (ref 0–0.9)
INR PPP: 0.98 (ref 0.87–1.13)
LYMPHOCYTES # BLD AUTO: 1.9 K/UL (ref 1–4.8)
LYMPHOCYTES NFR BLD: 26.2 % (ref 22–41)
MCH RBC QN AUTO: 28.5 PG (ref 27–33)
MCHC RBC AUTO-ENTMCNC: 33.5 G/DL (ref 33.7–35.3)
MCV RBC AUTO: 85 FL (ref 81.4–97.8)
MONOCYTES # BLD AUTO: 0.45 K/UL (ref 0–0.85)
MONOCYTES NFR BLD AUTO: 6.2 % (ref 0–13.4)
NEUTROPHILS # BLD AUTO: 4.64 K/UL (ref 1.82–7.42)
NEUTROPHILS NFR BLD: 64.1 % (ref 44–72)
NRBC # BLD AUTO: 0 K/UL
NRBC BLD-RTO: 0 /100 WBC
PLATELET # BLD AUTO: 277 K/UL (ref 164–446)
PMV BLD AUTO: 9.2 FL (ref 9–12.9)
POTASSIUM SERPL-SCNC: 4 MMOL/L (ref 3.6–5.5)
PROT SERPL-MCNC: 7 G/DL (ref 6–8.2)
PROTHROMBIN TIME: 12.6 SEC (ref 12–14.6)
RBC # BLD AUTO: 5.06 M/UL (ref 4.7–6.1)
SODIUM SERPL-SCNC: 136 MMOL/L (ref 135–145)
WBC # BLD AUTO: 7.2 K/UL (ref 4.8–10.8)

## 2018-03-03 PROCEDURE — 80053 COMPREHEN METABOLIC PANEL: CPT

## 2018-03-03 PROCEDURE — 85610 PROTHROMBIN TIME: CPT

## 2018-03-03 PROCEDURE — 99284 EMERGENCY DEPT VISIT MOD MDM: CPT

## 2018-03-03 PROCEDURE — 36415 COLL VENOUS BLD VENIPUNCTURE: CPT

## 2018-03-03 PROCEDURE — 85025 COMPLETE CBC W/AUTO DIFF WBC: CPT

## 2018-03-03 PROCEDURE — 74176 CT ABD & PELVIS W/O CONTRAST: CPT

## 2018-03-03 PROCEDURE — 99205 OFFICE O/P NEW HI 60 MIN: CPT | Performed by: PHYSICIAN ASSISTANT

## 2018-03-03 RX ORDER — GABAPENTIN 300 MG/1
300 CAPSULE ORAL 2 TIMES DAILY
Status: SHIPPED | COMMUNITY
End: 2018-04-19

## 2018-03-03 RX ORDER — CETIRIZINE HYDROCHLORIDE 10 MG/1
10 TABLET ORAL DAILY
Status: SHIPPED | COMMUNITY
End: 2019-05-14

## 2018-03-03 RX ORDER — DOXYCYCLINE HYCLATE 100 MG
100 TABLET ORAL 2 TIMES DAILY
Status: SHIPPED | COMMUNITY
Start: 2018-02-12 | End: 2018-04-19

## 2018-03-03 ASSESSMENT — ENCOUNTER SYMPTOMS
COUGH: 1
SHORTNESS OF BREATH: 0
ABDOMINAL PAIN: 1
NAUSEA: 1
VOMITING: 1
CARDIOVASCULAR NEGATIVE: 1
MUSCULOSKELETAL NEGATIVE: 1
CHILLS: 0
NEUROLOGICAL NEGATIVE: 1
SPUTUM PRODUCTION: 1
BLOOD IN STOOL: 0
DIARRHEA: 0
FEVER: 0
WHEEZING: 0

## 2018-03-03 ASSESSMENT — PAIN SCALES - GENERAL
PAINLEVEL: 8=MODERATE-SEVERE PAIN
PAINLEVEL_OUTOF10: 4

## 2018-03-03 NOTE — ED NOTES
"Pt reports seen at [facility] this am at urgent care and sent here for further evaluation of \"hernia\" states dx with hernia and rib fracture sec to coughing over the last couple weeks. States concerned \"they doctor told me it could put pressure on my spleen too\" denies n/v/d. Skinpwd. resp nonlabored llq and left flank tender to palpation. Dark bruising noted over the area.   "

## 2018-03-03 NOTE — ED NOTES
Pt resting in stretcher with family at bedside. Updated on plan of care. Denies any needs at this time. Will continue to monitor.

## 2018-03-03 NOTE — ED PROVIDER NOTES
ED Provider Note  CHIEF COMPLAINT  Chief Complaint   Patient presents with   • Sent from Urgent Care   • Abdominal Pain       HPI  Angelo Magaña is a 73 y.o. male who presents for evaluation of abdominal wall hernia. The patient was seen today at urgent care with a complaint of bruising and and mass to his left lateral abdominal wall after vigorous coughing spells. A CT was done which showed evidence of hernia with protrusion of the spleen. The patient denies being lightheaded he has no nausea or vomiting he recently finished a course of antibiotics for bronchitis I believe. He has numerous medical problems which are outlined below. He has a lot of pain with coughing.    REVIEW OF SYSTEMS  See HPI for further details. He denies fever chills blurred vision or HEENT complaints has chronic lower back pain and all other systems are reviewed and negative except as noted above    PAST MEDICAL HISTORY  Past Medical History:   Diagnosis Date   • Arthritis    • ASTHMA    • Asthma 6/8/2016   • Bronchitis    • Erectile dysfunction 6/8/2016   • GERD (gastroesophageal reflux disease) 6/8/2016   • Heart burn    • History of skin cancer 6/8/2016   • Hyperlipidemia 6/8/2016   • Hypertension    • Hypothyroidism 6/8/2016   • Indigestion    • Low back pain 6/8/2016   • Obesity 6/8/2016   • Pain    • Preventative health care 6/8/2016   • Right hip pain 6/8/2016   • Rosacea 6/8/2016   • Seasonal allergies 6/8/2016   • Urinary bladder disorder        FAMILY HISTORY  Family History   Problem Relation Age of Onset   • Hypertension Mother    • Hypertension Father    • Lung Disease Father      asthma       SOCIAL HISTORY  Social History     Social History   • Marital status:      Spouse name: N/A   • Number of children: N/A   • Years of education: N/A     Social History Main Topics   • Smoking status: Never Smoker   • Smokeless tobacco: Never Used   • Alcohol use Yes      Comment: 1/week    • Drug use: No   • Sexual activity:  Not on file     Other Topics Concern   • Not on file     Social History Narrative   • No narrative on file       SURGICAL HISTORY  Past Surgical History:   Procedure Laterality Date   • IRRIGATION & DEBRIDEMENT NEURO  3/30/2017    Procedure: IRRIGATION & DEBRIDEMENT NEURO-LUMBAR WOUND;  Surgeon: Josiah Chakraborty M.D.;  Location: SURGERY [facility];  Service:    • LUMBAR FUSION POSTERIOR N/A 3/17/2017    Procedure: LUMBAR FUSION POSTERIOR L3-4 EXTENSION;  Surgeon: Josiah Chakraborty M.D.;  Location: SURGERY [facility];  Service:    • LUMBAR LAMINECTOMY DISKECTOMY N/A 3/17/2017    Procedure: LUMBAR LAMINECTOMY DISKECTOMY L3 & REDO L4;  Surgeon: Josiah Chakraborty M.D.;  Location: SURGERY [facility];  Service:    • HARDWARE REMOVAL NEURO N/A 3/17/2017    Procedure: HARDWARE REMOVAL NEURO L4-5;  Surgeon: Josiah Chakraborty M.D.;  Location: SURGERY [facility];  Service:    • LUMBAR FUSION POSTERIOR  9/9/2009    Performed by JOSIAH CHAKRABORTY at SURGERY [facility]   • LUMBAR LAMINECTOMY DISKECTOMY  9/9/2009    Performed by JOSIAH CHAKRABORTY at SURGERY [facility]S   • KNEE ARTHROPLASTY TOTAL  5/26/2009    Performed by NAREN DANIELSON at SURGERY [facility] ORS   • HIP ARTHROPLASTY TOTAL  1/21/2009    Performed by NAREN DANIELSON at SURGERY [facility] ORS   • HIP ARTHROPLASTY TOTAL  09   • INGUINAL HERNIA REPAIR  9/2/08    Performed by MERLYN BECKER at SURGERY [facility] ORS   • INGUINAL HERNIA REPAIR  6/10/08    Performed by MERLYN BECKER at SURGERY [facility] ORS   • HERNIA REPAIR  2008    HERNIA    • OTHER      DISCECTOMY WITH HARWWARE   • OTHER      GASTRIC BYPASS AT 30 YEARS       CURRENT MEDICATIONS  Home Medications     Reviewed by Eleanor Tobar, Pharmacy Intern (Pharmacy Intern) on 03/03/18 at 1429  Med List Status: Complete   Medication Last Dose Status   atorvastatin (LIPITOR) 40 MG Tab 3/2/2018 Active   cetirizine (ZYRTEC) 10 MG Tab 3/3/2018 Active   doxycycline (VIBRAMYCIN) 100  "MG Tab 2/17/2018 Active   fluticasone (FLONASE) 50 MCG/ACT nasal spray 3/3/2018 Active   fluticasone-salmeterol (ADVAIR) 100-50 MCG/DOSE AEROSOL POWDER, BREATH ACTIVATED 3/3/2018 Active   gabapentin (NEURONTIN) 300 MG Cap 3/3/2018 Active   levothyroxine (SYNTHROID) 50 MCG Tab 3/3/2018 Active   lisinopril (PRINIVIL) 10 MG Tab 3/3/2018 Active   meloxicam (MOBIC) 15 MG tablet 3/3/2018 Active   MULTIVITAMIN PO 3/3/2018 Active   Omega-3 Fatty Acids (FISH OIL BURP-LESS PO) 3/3/2018 Active   PRILOSEC 20 MG PO CPDR 3/3/2018 Active   Probiotic Product (PROBIOTIC ADVANCED PO) 3/3/2018 Active   VITAMIN D, CHOLECALCIFEROL, PO 3/3/2018 Active                 ALLERGIES  No Known Allergies    PHYSICAL EXAM  VITAL SIGNS: /99   Pulse 68   Temp 37.2 °C (98.9 °F)   Resp 20   Ht 1.727 m (5' 8\")   Wt 112.1 kg (247 lb 2.2 oz)   SpO2 98%   BMI 37.58 kg/m²   Constitutional :  Well developed, Well nourished, No acute distress, Non-toxic appearance.   HENT: Head is atraumatic normocephalic moist mucous membranes  Eyes: Normal-appearing nonicteric no evidence of infection  Neck: Normal range of motion, No tenderness, Supple, No stridor.   Lymphatic: No cervical or supraclavicular adenopathy.   Cardiovascular: Normal heart rate, Normal rhythm, No murmurs, No rubs, No gallops.   Thorax & Lungs: Equal breath sounds bilaterally no rales rhonchi  Skin: Warm, Dry, No erythema, No rash.   Abdomen is tender as left lateral abdominal wall bruising abdominal wall defect noted   Extremities no cyanosis or edema  Neurologically he is awake alert he has no focal neurological findings  Psychiatric appropriate mood and affect no impairment of judgment    Results for orders placed or performed during the hospital encounter of 03/03/18   CBC WITH DIFFERENTIAL   Result Value Ref Range    WBC 7.2 4.8 - 10.8 K/uL    RBC 5.06 4.70 - 6.10 M/uL    Hemoglobin 14.4 14.0 - 18.0 g/dL    Hematocrit 43.0 42.0 - 52.0 %    MCV 85.0 81.4 - 97.8 fL    MCH 28.5 " 27.0 - 33.0 pg    MCHC 33.5 (L) 33.7 - 35.3 g/dL    RDW 38.8 35.9 - 50.0 fL    Platelet Count 277 164 - 446 K/uL    MPV 9.2 9.0 - 12.9 fL    Neutrophils-Polys 64.10 44.00 - 72.00 %    Lymphocytes 26.20 22.00 - 41.00 %    Monocytes 6.20 0.00 - 13.40 %    Eosinophils 2.80 0.00 - 6.90 %    Basophils 0.40 0.00 - 1.80 %    Immature Granulocytes 0.30 0.00 - 0.90 %    Nucleated RBC 0.00 /100 WBC    Neutrophils (Absolute) 4.64 1.82 - 7.42 K/uL    Lymphs (Absolute) 1.90 1.00 - 4.80 K/uL    Monos (Absolute) 0.45 0.00 - 0.85 K/uL    Eos (Absolute) 0.20 0.00 - 0.51 K/uL    Baso (Absolute) 0.03 0.00 - 0.12 K/uL    Immature Granulocytes (abs) 0.02 0.00 - 0.11 K/uL    NRBC (Absolute) 0.00 K/uL   COMP METABOLIC PANEL   Result Value Ref Range    Sodium 136 135 - 145 mmol/L    Potassium 4.0 3.6 - 5.5 mmol/L    Chloride 104 96 - 112 mmol/L    Co2 26 20 - 33 mmol/L    Anion Gap 6.0 0.0 - 11.9    Glucose 95 65 - 99 mg/dL    Bun 9 8 - 22 mg/dL    Creatinine 0.87 0.50 - 1.40 mg/dL    Calcium 9.1 8.4 - 10.2 mg/dL    AST(SGOT) 36 12 - 45 U/L    ALT(SGPT) 46 2 - 50 U/L    Alkaline Phosphatase 94 30 - 99 U/L    Total Bilirubin 0.9 0.1 - 1.5 mg/dL    Albumin 4.2 3.2 - 4.9 g/dL    Total Protein 7.0 6.0 - 8.2 g/dL    Globulin 2.8 1.9 - 3.5 g/dL    A-G Ratio 1.5 g/dL   PT/INR   Result Value Ref Range    PT 12.6 12.0 - 14.6 sec    INR 0.98 0.87 - 1.13   ESTIMATED GFR   Result Value Ref Range    GFR If African American >60 >60 mL/min/1.73 m 2    GFR If Non African American >60 >60 mL/min/1.73 m 2     COURSE & MEDICAL DECISION MAKING  Pertinent Labs & Imaging studies reviewed. (See chart for details)  The patient is presenting for evaluation of an abdominal wall defect discovered today CT imaging. The patient was referred to the emergency department for surgical consultation the patient is hemodynamically stable. He has no significant lab abnormalities. The patient's case has been reviewed with the general surgeon on-call Dr. fajardo who will  evaluate the patient in the emergency department. The patient was evaluated by the surgeon who requested the patient be discharged home and he will follow the patient in the office. Patient will take ibuprofen and Tylenol for pain and is discharged in stable condition    FINAL IMPRESSION  1. Abdominal wall defect  2.   3.      Electronically signed by: Arjun Mcmillan, 3/3/2018

## 2018-03-03 NOTE — ED NOTES
Pt bib family with c/o left sided abd pain. Pt states he was seen at  in [place] and dx with a hernia. Pt states he was advised to be seen in the [facility] ED for further evaluation.

## 2018-03-03 NOTE — ED NOTES
Med rec completed by interview with patient at bedside  Pt finished 6 day course of doxycyline 100 mg BID which started on 2/12/18  NKDA confirmed

## 2018-03-03 NOTE — PROGRESS NOTES
Subjective:      Angelo Magaña is a 73 y.o. male who presents with Cough (Productive cough, Pain in rib, Lt lower abdomen bruising [id number] )            Cough   This is a new problem. The current episode started 1 to 4 weeks ago. The problem has been gradually worsening. The problem occurs every few minutes. The cough is productive of sputum. Pertinent negatives include no chills, fever, shortness of breath or wheezing. He has tried OTC cough suppressant for the symptoms. The treatment provided mild relief. His past medical history is significant for asthma and bronchitis.   Patient has had a productive cough for over one month. Usually with doxycycline which improved the infectious symptoms. He then returned to the urgent care because he was having left lateral chest wall tenderness and pain. He had a chest x-ray which was completed which showed no fracture or acute cardiopulmonary pathology. He returns to the urgent care 2 weeks later with continued left sided chest wall tenderness and a large mass with ecchymosis.    PMH:  has a past medical history of Arthritis; ASTHMA; Asthma (6/8/2016); Bronchitis; Erectile dysfunction (6/8/2016); GERD (gastroesophageal reflux disease) (6/8/2016); Heart burn; History of skin cancer (6/8/2016); Hyperlipidemia (6/8/2016); Hypertension; Hypothyroidism (6/8/2016); Indigestion; Low back pain (6/8/2016); Obesity (6/8/2016); Pain; Preventative health care (6/8/2016); Right hip pain (6/8/2016); Rosacea (6/8/2016); Seasonal allergies (6/8/2016); and Urinary bladder disorder.  MEDS:   Current Outpatient Prescriptions:   •  gabapentin (NEURONTIN) 300 MG Cap, 1 CAP BY AT BEDTIME X3 DAYS IF NO SIDE EFFECTS TAKE 1 CAP TWICE A DAY X3 DAYS, 1 CAP 3 TIMES DAILY, Disp: , Rfl: 0  •  meloxicam (MOBIC) 15 MG tablet, TAKE 1 ORAL TABLET ONCE A DAY, Disp: , Rfl: 0  •  doxycycline (VIBRAMYCIN) 100 MG Cap, TAKE ONE CAPSULE BY MOUTH EVERY DAY, Disp: 90 Each, Rfl: 0  •  atorvastatin (LIPITOR) 40 MG  Tab, TAKE 1 TABLET BY MOUTH EVERY DAY, Disp: 90 Tab, Rfl: 0  •  ADVAIR DISKUS 100-50 MCG/DOSE AEROSOL POWDER, BREATH ACTIVATED, INHALE 1 DOSE BY MOUTH TWICE DAILY. RINSE MOUTH AFTER USE, Disp: 3 Inhaler, Rfl: 0  •  lisinopril (PRINIVIL) 10 MG Tab, TAKE 1 TABLET BY MOUTH EVERY DAY, Disp: 90 Tab, Rfl: 1  •  PREVIDENT 0.2 % Solution, USE ONCE PER WEEK OR AS OFTEN AS DIRECTED. RINSE WITH 10 ML FOR 1 MINUTE. DO NOT SWALLOW, Disp: , Rfl: 6  •  Probiotic Product (PROBIOTIC ADVANCED PO), Take  by mouth., Disp: , Rfl:   •  Omega-3 Fatty Acids (FISH OIL BURP-LESS PO), Take  by mouth., Disp: , Rfl:   •  levothyroxine (SYNTHROID) 50 MCG Tab, TAKE 1 TABLET BY MOUTH DAILY, Disp: 90 Tab, Rfl: 2  •  fluticasone (FLONASE) 50 MCG/ACT nasal spray, Spray 1 Spray in nose 1 time daily as needed., Disp: , Rfl:   •  VITAMIN D, CHOLECALCIFEROL, PO, Take 3,000 Units by mouth every day., Disp: , Rfl:   •  ZYRTEC-D 5-120 MG PO TB12, Take 1 Tab by mouth every day. Takes daily , Disp: , Rfl:   •  MULTIVITAMIN PO, Take 2 Tabs by mouth every day. Takes daily, Disp: , Rfl:   •  PRILOSEC 20 MG PO CPDR, Take 20 mg by mouth every day. Takes daily, Disp: , Rfl:   ALLERGIES: No Known Allergies  SURGHX:   Past Surgical History:   Procedure Laterality Date   • IRRIGATION & DEBRIDEMENT NEURO  3/30/2017    Procedure: IRRIGATION & DEBRIDEMENT NEURO-LUMBAR WOUND;  Surgeon: Juan Miguel Chakraborty M.D.;  Location: [facility];  Service:    • LUMBAR FUSION POSTERIOR N/A 3/17/2017    Procedure: LUMBAR FUSION POSTERIOR L3-4 EXTENSION;  Surgeon: Juan Miguel Chakraborty M.D.;  Location: SURGERY [facility];  Service:    • LUMBAR LAMINECTOMY DISKECTOMY N/A 3/17/2017    Procedure: LUMBAR LAMINECTOMY DISKECTOMY L3 & REDO L4;  Surgeon: Juan Miguel Chakraborty M.D.;  Location: [facility];  Service:    • HARDWARE REMOVAL NEURO N/A 3/17/2017    Procedure: HARDWARE REMOVAL NEURO L4-5;  Surgeon: Juan Miguel Chakraborty M.D.;  Location: SURGERY [facility];  Service:    •  "LUMBAR FUSION POSTERIOR  9/9/2009    Performed by JOSIAH SOLITARIO at SURGERY [facility]S   • LUMBAR LAMINECTOMY DISKECTOMY  9/9/2009    Performed by JOSIAH SOLITARIO at SURGERY [facility]S   • KNEE ARTHROPLASTY TOTAL  5/26/2009    Performed by NAREN DANIELSON at SURGERY [facility]S   • HIP ARTHROPLASTY TOTAL  1/21/2009    Performed by NAREN DANIELSON at SURGERY [facility]S   • HIP ARTHROPLASTY TOTAL  09   • INGUINAL HERNIA REPAIR  9/2/08    Performed by MERLYN BECKER at SURGERY SAME DAY [facility] ORS   • INGUINAL HERNIA REPAIR  6/10/08    Performed by MERLYN BECKER at SURGERY SAME DAY [facility] ORS   • HERNIA REPAIR  2008    HERNIA    • OTHER      DISCECTOMY WITH HARWWARE   • OTHER      GASTRIC BYPASS AT 30 YEARS     SOCHX:  reports that he has never smoked. He has never used smokeless tobacco. He reports that he drinks alcohol. He reports that he does not use drugs.  FH: family history includes Hypertension in his father and mother; Lung Disease in his father.      Review of Systems   Constitutional: Positive for malaise/fatigue. Negative for chills and fever.   HENT: Negative.    Respiratory: Positive for cough and sputum production. Negative for shortness of breath and wheezing.    Cardiovascular: Negative.    Gastrointestinal: Positive for abdominal pain, nausea and vomiting. Negative for blood in stool, diarrhea and melena.        Left-sided abdominal pain, mass, bruising.   Genitourinary: Negative.    Musculoskeletal: Negative.         Left chest wall pain   Neurological: Negative.    All other systems reviewed and are negative.      Medications, Allergies, and current problem list reviewed today in Epic     Objective:     /78   Pulse 66   Temp 37.2 °C (99 °F)   Resp 12   Ht 1.727 m (5' 8\")   Wt 112.6 kg (248 lb 3.2 oz)   SpO2 95%   BMI 37.74 kg/m²      Physical Exam   Constitutional: He is oriented to person, place, and time. He appears well-developed and well-nourished. No distress. "   HENT:   Head: Normocephalic and atraumatic.   Right Ear: External ear normal.   Left Ear: External ear normal.   Eyes: Conjunctivae and EOM are normal.   Neck: Normal range of motion. Neck supple.   Cardiovascular: Normal rate, regular rhythm and normal heart sounds.    No murmur heard.  Pulmonary/Chest: Effort normal and breath sounds normal. No respiratory distress. He has no wheezes. He has no rales. He exhibits tenderness (left lateral inferior).   Abdominal: Soft. There is tenderness in the left upper quadrant and left lower quadrant. There is no rigidity, no rebound, no guarding, no CVA tenderness, no tenderness at McBurney's point and negative Redding's sign. A hernia is present. Hernia confirmed positive in the ventral area (large reducible abdominal wall hernia).       Large area of ecchymosis over the left upper quadrant/splenic region.   Neurological: He is alert and oriented to person, place, and time.   Skin: Skin is warm and dry. He is not diaphoretic.   Psychiatric: He has a normal mood and affect. His behavior is normal. Judgment and thought content normal.   Nursing note and vitals reviewed.         3/3/2018 11:36 AM    HISTORY/REASON FOR EXAM:  Left side pain, mass, bruising. Hernia?.  TECHNIQUE/EXAM DESCRIPTION:  CT abdomen/pelvis without contrast.    5 mm helical images of the abdomen and pelvis were obtained from the diaphragmatic domes through the pubic symphysis.    Low dose optimization technique was utilized for this CT exam including automated exposure control and adjustment of the mA and/or kV according to patient size.    COMPARISON: 5/19/2010.    FINDINGS:  There is an acute left 11th posterolateral rib fracture which is displaced three fourths of a shaft width posteriorly and is rotated posteriorly as well. There is an associated new left lateral (lateral to the typical superior lumbar Grynfeltt hernia)   with colon and part of the spleen extending into the defect. The diaphragm  appears to be intact.    There is no evidence of bowel compromise. No obstruction.    There is posttraumatic deformity of the right hemithorax with chronic fractures, some nonunited and adjacent pulmonary scarring as before    There has been a gastric bypass with dense hardware causing artifact. This limits the study mildly.    There is right hip arthroplasty and lumbar posterior fixation hardware which obscures soft tissue details as well    Moderate colonic diverticulosis    Lung bases show no consolidation.    Lack of IV contrast decreases sensitivity of the study in evaluation of solid organs and bowel but is optimized for detection of urolithiasis.    No urolithiasis is seen. There is no hydroureteronephrosis. Bladder is unremarkable.    Noncontrasted appearance of the liver, spleen, pancreas, and adrenal glands are normal.    Clinical statement clips    No free fluid.    No free air.    No bony destructive process.   Impression       1.  Acute posterolateral left 11th rib fracture is displaced and has an associated new lateral abdominal wall hernia which contains a portion of colon and spleen. No evidence of bowel compromise.    2.  Right hip arthropathy, lumbar laminectomies, cholecystectomy, gastric bypass    3.  Remote right hemithorax post traumatic change with some prominent scarring as before.    4.  Severe coronary artery disease     Assessment/Plan:     1. LLQ pain  CT-RENAL COLIC EVALUATION(A/P W/O)   2. Abdominal mass, left lower quadrant  CT-RENAL COLIC EVALUATION(A/P W/O)   3. Hernia of abdominal wall     4. Closed fracture of one rib of left side, initial encounter       CT scan showed abdominal hernia with both colon and spleen protrusion. No signs of incarceration. His vital signs are normal. He does have mild pain but is nontoxic. Given that he is having a large area of ecchymosis around the spleen, I am concerned for internal damage. I believe he needs a general surgery consult today. His  daughter will take him to the [facility] by private vehicle now.    Please note that this dictation was created using voice recognition software. I have made every reasonable attempt to correct obvious errors, but I expect that there are errors of grammar and possibly content that I did not discover before finalizing the note.

## 2018-03-04 ENCOUNTER — PATIENT OUTREACH (OUTPATIENT)
Dept: HEALTH INFORMATION MANAGEMENT | Facility: OTHER | Age: 73
End: 2018-03-04

## 2018-03-04 NOTE — CONSULTS
DATE OF SERVICE:  03/03/2018    PHYSICIAN REQUESTING CONSULTATION:  Arjun Mcmillan MD, ER physician at   [facility].    REASON FOR CONSULTATION:  Left flank hernia.    HISTORY OF PRESENT ILLNESS:  The patient is a 73-year-old morbidly obese male   who has had longstanding chronic bronchitis.  He has had a flare of his   bronchitis in the last 3 weeks and has had significant coughing.  Three weeks   ago, he noted bruising in his left flank.  He also noted significant pain in   his left flank.  He presented to urgent care and was given cough medication.    This did not help, but last night he finally took Nyquil, which did help   significantly.  He feels much better today, but again noted bruising and   presented to an urgent care.  A CT scan was done there, which was read as an   acute posterior lateral left rib fracture, displaced with a new lateral   abdominal wall hernia, which contains portions of the colon and spleen.  There   was noted to be no evidence of bowel compromise.  He was also noted to have   severe coronary artery disease and remote appearing post-traumatic deformity   of the right hemithorax with chronic fractures, some non-united with adjacent   pulmonary scarring.  He states that he continues to pass flatus and has had no   nausea, vomiting, and had a bowel movement this morning.  He has noted no   abdominal distention.  It was recommended to him that he presented to the ER   and so he did and with these findings, a general surgery consultation was   obtained.    PAST MEDICAL HISTORY:  1.  Obesity.  2.  Chronic back pain.  3.  Hypertension.  4.  Hypothyroidism.  5.  Hyperlipidemia.  6.  Chronic bronchitis.    PAST SURGICAL HISTORY:  1.  He has had a lumbar laminectomy, fusion, and diskectomy.  2.  Incision and drainage of lumbar wound after his laminectomy.  3.  Total right hip replacement.  4.  Right inguinal hernia repair performed by Dr. Phoenix Medina.  This has   recurred  and he underwent a second right inguinal hernia repair, also   performed by Dr. Phoenix Medina.  5.  Cholecystectomy, open.  6.  Gastric bypass.  7.  Appendectomy.  8.  Tonsillectomy.  9.  Epidural placement 3 weeks ago.    ALLERGIES:  No known drug allergies.    HOME MEDICATIONS:  Lipitor 40 mg 1 p.o. daily, Zyrtec 10 mg 1 p.o. daily,   Flonase spray 1 spray in the nose daily as needed, Advair inhaler 1 puff by   mouth every day, Neurontin 300 mg tablets 1 p.o. daily, Synthroid 50 mcg 1   p.o. daily, lisinopril 10 mg 1 p.o. daily, Meloxicam 15 mg 1 p.o. daily,   multivitamin daily, omega-3 fatty acids 1 p.o. daily, Prilosec 20 mg 1 p.o.   daily, probiotic daily, and vitamin D daily.    SOCIAL HISTORY:  He denies tobacco or drug use.  He drinks 1-2 beers per week.    He has never been a heavy drinker.  He is accompanied by his wife.  He is   retired.    REVIEW OF SYSTEMS:  A 16-point review of systems is negative except as noted   in the HPI.    PHYSICAL EXAMINATION:  VITAL SIGNS:  Weight 122 kilograms, heart rate 68, temperature 37.2,   respirations 20, blood pressure 141/99.  GENERAL:  He is in no acute distress and appears stated age.  HEENT:  Pupils equal, round, and reactive to light.  No scleral icterus.    Extraocular movements intact.  He has moist oral mucosa and adequate upper and   lower dentition.  NECK:  Supple.  No JVD, no lymphadenopathy, no thyromegaly.  LUNGS:  Clear to auscultation bilaterally with normal bilateral chest rise.  HEART:  Has a regular rate and rhythm, no murmurs, gallops, or rubs.  No   carotid bruits are appreciated.  ABDOMEN:  Obese, has positive bowel sounds, soft, and nontender.  He has   multiple well-healed surgical scars without incisional hernias.  He does have   bruising in the lateral left flank and he has a reducible hernia versus   eventration in the upper left flank just under his rib.  RECTAL:  Deferred.  PELVIC:  Deferred.  EXTREMITIES:  1+ pulses in all 4 extremities.   No clubbing, cyanosis, or   edema.  NEUROLOGIC:  Cranial nerves II-XII are grossly intact.  He has no focal   deficits.  His gait is not examined.    LABORATORY DATA:  CBC is completely within normal limits.  A comprehensive   metabolic panel is also within normal limits.  His INR is equal to 0.98.    IMAGING:  The CT scan is as noted in the HPI.    ASSESSMENT:  A 73-year-old male with:  1.  Obesity.  2.  Chronic back pain.  3.  Hypertension.  4.  Hypothyroidism.  5.  Hyperlipidemia.  6.  Chronic bronchitis.  7.  New left flank hernia.  This is not an incarcerated hernia.  He has no   evidence of strangulation of bowel.  His bowel function is unchanged and he   has no reason for emergent surgical intervention.  8.  New left rib fracture.  No evidence of pneumothorax.    RECOMMENDATIONS:  At this time, I have recommended that this patient be   discharged from the hospital.  We will have him follow up with me in the   office and we will discuss possible surgery.  I did discuss with him that any   surgical intervention in this area would be painful for the rest of his life   and that it may not be able to be done.  I have recommended that weight loss   would be the first issue.  I have also recommended that if he begins   experiencing more pain in that region, has any obstructive symptoms of his   bowels, which include abdominal distention, nausea, vomiting, inability to   pass flatus or have bowel movements that he should present immediately to any   ER.  He states he understands this and agrees.    PLAN:  He will be discharged from the hospital with pain medication and cough   medicine as per the ER doctor and follow up with me as an outpatient.       ____________________________________     MD RAYMOND Magaña / BEA    DD:  03/03/2018 16:59:05  DT:  03/03/2018 17:24:39    D#:  [id number]  Job#:  [id number]

## 2018-03-04 NOTE — DISCHARGE INSTRUCTIONS
Rib Fracture  A rib fracture is a break or crack in one of the bones of the ribs. The ribs are a group of long, curved bones that wrap around your chest and attach to your spine. They protect your lungs and other organs in the chest cavity. A broken or cracked rib is often painful, but most do not cause other problems. Most rib fractures heal on their own over time. However, rib fractures can be more serious if multiple ribs are broken or if broken ribs move out of place and push against other structures.  What are the causes?  · A direct blow to the chest. For example, this could happen during contact sports, a car accident, or a fall against a hard object.  · Repetitive movements with high force, such as pitching a baseball or having severe coughing spells.  What are the signs or symptoms?  · Pain when you breathe in or cough.  · Pain when someone presses on the injured area.  How is this diagnosed?  Your caregiver will perform a physical exam. Various imaging tests may be ordered to confirm the diagnosis and to look for related injuries. These tests may include a chest X-ray, computed tomography (CT), magnetic resonance imaging (MRI), or a bone scan.  How is this treated?  Rib fractures usually heal on their own in 1-3 months. The longer healing period is often associated with a continued cough or other aggravating activities. During the healing period, pain control is very important. Medication is usually given to control pain. Hospitalization or surgery may be needed for more severe injuries, such as those in which multiple ribs are broken or the ribs have moved out of place.  Follow these instructions at home:  · Avoid strenuous activity and any activities or movements that cause pain. Be careful during activities and avoid bumping the injured rib.  · Gradually increase activity as directed by your caregiver.  · Only take over-the-counter or prescription medications as directed by your caregiver. Do not take  other medications without asking your caregiver first.  · Apply ice to the injured area for the first 1-2 days after you have been treated or as directed by your caregiver. Applying ice helps to reduce inflammation and pain.  ¨ Put ice in a plastic bag.  ¨ Place a towel between your skin and the bag.  ¨ Leave the ice on for 15-20 minutes at a time, every 2 hours while you are awake.  · Perform deep breathing as directed by your caregiver. This will help prevent pneumonia, which is a common complication of a broken rib. Your caregiver may instruct you to:  ¨ Take deep breaths several times a day.  ¨ Try to cough several times a day, holding a pillow against the injured area.  ¨ Use a device called an incentive spirometer to practice deep breathing several times a day.  · Drink enough fluids to keep your urine clear or pale yellow. This will help you avoid constipation.  · Do not wear a rib belt or binder. These restrict breathing, which can lead to pneumonia.  Get help right away if:  · You have a fever.  · You have difficulty breathing or shortness of breath.  · You develop a continual cough, or you cough up thick or bloody sputum.  · You feel sick to your stomach (nausea), throw up (vomit), or have abdominal pain.  · You have worsening pain not controlled with medications.  This information is not intended to replace advice given to you by your health care provider. Make sure you discuss any questions you have with your health care provider.  Document Released: 12/18/2006 Document Revised: 05/25/2017 Document Reviewed: 02/19/2014  [employer] Interactive Patient Education © 2017 [employer]

## 2018-03-04 NOTE — ED NOTES
Written and verbal discharge instructions reviewed with pt and family, verbalized understanding. Vital signs WNL. Pt ambulated without difficulty accompanied by significant other to discharge

## 2018-04-10 ENCOUNTER — TELEPHONE (OUTPATIENT)
Dept: INTERNAL MEDICINE | Facility: MEDICAL CENTER | Age: 73
End: 2018-04-10

## 2018-04-10 NOTE — TELEPHONE ENCOUNTER
1. Caller Name: Pt                      Call Back Number: [phone] (home)     2. Message: Patient called and left a message stating he went to the ED and was told he has an abdominal wall hernia. Will be having an appt with Dr. Medina for possible surgery removal. He was just making the call to notify Dr. Spears as well as Dr. Samson since he will be coming to establish with Dr. Samson.    3. Patient approves office to leave a detailed voicemail/MyChart message: N\A

## 2018-04-19 ENCOUNTER — HOSPITAL ENCOUNTER (OUTPATIENT)
Dept: RADIOLOGY | Facility: MEDICAL CENTER | Age: 73
DRG: 517 | End: 2018-04-19
Attending: SURGERY | Admitting: SURGERY
Payer: MEDICARE

## 2018-04-19 DIAGNOSIS — Z01.812 PRE-OPERATIVE LABORATORY EXAMINATION: ICD-10-CM

## 2018-04-19 DIAGNOSIS — Z01.811 PRE-OPERATIVE RESPIRATORY EXAMINATION: ICD-10-CM

## 2018-04-19 DIAGNOSIS — Z01.810 PRE-OPERATIVE CARDIOVASCULAR EXAMINATION: ICD-10-CM

## 2018-04-19 LAB
ALBUMIN SERPL BCP-MCNC: 4.1 G/DL (ref 3.2–4.9)
ALBUMIN/GLOB SERPL: 1.8 G/DL
ALP SERPL-CCNC: 95 U/L (ref 30–99)
ALT SERPL-CCNC: 30 U/L (ref 2–50)
ANION GAP SERPL CALC-SCNC: 5 MMOL/L (ref 0–11.9)
AST SERPL-CCNC: 27 U/L (ref 12–45)
BASOPHILS # BLD AUTO: 0.6 % (ref 0–1.8)
BASOPHILS # BLD: 0.03 K/UL (ref 0–0.12)
BILIRUB SERPL-MCNC: 0.7 MG/DL (ref 0.1–1.5)
BUN SERPL-MCNC: 9 MG/DL (ref 8–22)
CALCIUM SERPL-MCNC: 9.4 MG/DL (ref 8.5–10.5)
CHLORIDE SERPL-SCNC: 105 MMOL/L (ref 96–112)
CO2 SERPL-SCNC: 27 MMOL/L (ref 20–33)
CREAT SERPL-MCNC: 1.02 MG/DL (ref 0.5–1.4)
EKG IMPRESSION: NORMAL
EOSINOPHIL # BLD AUTO: 0.14 K/UL (ref 0–0.51)
EOSINOPHIL NFR BLD: 2.9 % (ref 0–6.9)
ERYTHROCYTE [DISTWIDTH] IN BLOOD BY AUTOMATED COUNT: 44 FL (ref 35.9–50)
GLOBULIN SER CALC-MCNC: 2.3 G/DL (ref 1.9–3.5)
GLUCOSE SERPL-MCNC: 95 MG/DL (ref 65–99)
HCT VFR BLD AUTO: 44.3 % (ref 42–52)
HGB BLD-MCNC: 14.4 G/DL (ref 14–18)
IMM GRANULOCYTES # BLD AUTO: 0.01 K/UL (ref 0–0.11)
IMM GRANULOCYTES NFR BLD AUTO: 0.2 % (ref 0–0.9)
LYMPHOCYTES # BLD AUTO: 1.61 K/UL (ref 1–4.8)
LYMPHOCYTES NFR BLD: 33.2 % (ref 22–41)
MCH RBC QN AUTO: 28.3 PG (ref 27–33)
MCHC RBC AUTO-ENTMCNC: 32.5 G/DL (ref 33.7–35.3)
MCV RBC AUTO: 87 FL (ref 81.4–97.8)
MONOCYTES # BLD AUTO: 0.36 K/UL (ref 0–0.85)
MONOCYTES NFR BLD AUTO: 7.4 % (ref 0–13.4)
NEUTROPHILS # BLD AUTO: 2.7 K/UL (ref 1.82–7.42)
NEUTROPHILS NFR BLD: 55.7 % (ref 44–72)
NRBC # BLD AUTO: 0 K/UL
NRBC BLD-RTO: 0 /100 WBC
PLATELET # BLD AUTO: 190 K/UL (ref 164–446)
PMV BLD AUTO: 9.4 FL (ref 9–12.9)
POTASSIUM SERPL-SCNC: 4.3 MMOL/L (ref 3.6–5.5)
PROT SERPL-MCNC: 6.4 G/DL (ref 6–8.2)
RBC # BLD AUTO: 5.09 M/UL (ref 4.7–6.1)
SODIUM SERPL-SCNC: 137 MMOL/L (ref 135–145)
WBC # BLD AUTO: 4.9 K/UL (ref 4.8–10.8)

## 2018-04-19 PROCEDURE — 80053 COMPREHEN METABOLIC PANEL: CPT

## 2018-04-19 PROCEDURE — 71046 X-RAY EXAM CHEST 2 VIEWS: CPT

## 2018-04-19 PROCEDURE — 85025 COMPLETE CBC W/AUTO DIFF WBC: CPT

## 2018-04-19 PROCEDURE — 93005 ELECTROCARDIOGRAM TRACING: CPT

## 2018-04-19 PROCEDURE — 36415 COLL VENOUS BLD VENIPUNCTURE: CPT

## 2018-04-19 PROCEDURE — 93010 ELECTROCARDIOGRAM REPORT: CPT | Performed by: INTERNAL MEDICINE

## 2018-04-20 RX ORDER — LISINOPRIL 10 MG/1
TABLET ORAL
Qty: 90 TAB | Refills: 0 | Status: SHIPPED | OUTPATIENT
Start: 2018-04-20 | End: 2018-07-18 | Stop reason: SDUPTHER

## 2018-04-24 ENCOUNTER — OFFICE VISIT (OUTPATIENT)
Dept: INTERNAL MEDICINE | Facility: MEDICAL CENTER | Age: 73
End: 2018-04-24
Payer: MEDICARE

## 2018-04-24 VITALS
DIASTOLIC BLOOD PRESSURE: 82 MMHG | SYSTOLIC BLOOD PRESSURE: 112 MMHG | HEART RATE: 91 BPM | OXYGEN SATURATION: 93 % | HEIGHT: 67 IN | TEMPERATURE: 98.5 F | WEIGHT: 241.38 LBS | BODY MASS INDEX: 37.89 KG/M2

## 2018-04-24 DIAGNOSIS — K45.8 HERNIA OF FLANK: ICD-10-CM

## 2018-04-24 DIAGNOSIS — I10 ESSENTIAL HYPERTENSION: ICD-10-CM

## 2018-04-24 DIAGNOSIS — Z63.6 CAREGIVER BURDEN: ICD-10-CM

## 2018-04-24 DIAGNOSIS — E78.5 DYSLIPIDEMIA: ICD-10-CM

## 2018-04-24 DIAGNOSIS — J30.2 CHRONIC SEASONAL ALLERGIC RHINITIS, UNSPECIFIED TRIGGER: ICD-10-CM

## 2018-04-24 DIAGNOSIS — E66.9 OBESITY (BMI 35.0-39.9 WITHOUT COMORBIDITY): ICD-10-CM

## 2018-04-24 DIAGNOSIS — K21.9 GASTROESOPHAGEAL REFLUX DISEASE WITHOUT ESOPHAGITIS: ICD-10-CM

## 2018-04-24 PROCEDURE — 99214 OFFICE O/P EST MOD 30 MIN: CPT | Performed by: INTERNAL MEDICINE

## 2018-04-24 RX ORDER — DOCUSATE SODIUM 100 MG/1
2 CAPSULE, LIQUID FILLED ORAL 2 TIMES DAILY PRN
Refills: 0 | Status: ON HOLD | COMMUNITY
Start: 2018-04-11 | End: 2018-05-03

## 2018-04-24 RX ORDER — SENNOSIDES 8.6 MG
1 TABLET ORAL 2 TIMES DAILY PRN
Qty: 60 TAB | Refills: 0 | Status: SHIPPED | OUTPATIENT
Start: 2018-04-24 | End: 2018-11-06

## 2018-04-24 SDOH — SOCIAL STABILITY - SOCIAL INSECURITY: DEPENDENT RELATIVE NEEDING CARE AT HOME: Z63.6

## 2018-04-24 ASSESSMENT — PATIENT HEALTH QUESTIONNAIRE - PHQ9: CLINICAL INTERPRETATION OF PHQ2 SCORE: 0

## 2018-04-24 NOTE — PROGRESS NOTES
"Geriatric Clinic Note  Patient: Angelo Magaña  Age: 73 y.o.   Gender: male  Author: Jeovany Samson M.D.  PCP: Jeovany Samson M.D.    Today's date: 04/24/18  Chief Complaint   Patient presents with   • Establish Care   • Procedure     Next Tuesday. Hernia.       HPI:  History obtained from patient, medical chart.    Coming in to establish care. He is present with his wife however he provides his own history.    Abdominal Wall Hernia/flank hernia  He had a recent episode of acute  Bronchitis. His coughing became so bad that he ended up having some left upper flank pain. Upon going to the ED he was found to have an 11th rib fracture and a lateral wall abdominal hernia which contained portions of colon and spleen. He continues to have pain at that site when he coughs. He is planning to undergo surgery on Tuesday with Dr. Phoenix Medina, [facility]. His surgeon requested he take five day of ibuprofen 1200 mg daily and 650 mg APAP daily. nO issues with GERD or melena.  History  prescribed docusate for concern for opioid-induced constipation after his surgery.    He Gets winded with one flight of stairs. Has poor conditioning due to mutlple back surgeries. Did well until 2016. Had a repeat back surgery in 2017 and complicated by paraspinal abscess. Had a PICC for two months. Had epidural first of February which worked until his recent flareup of bronchitis and now fractured rib.    Obesity  History of gastric stapling and a great deal amount of weight loss afterwards. If subsequently gaining the weight back.Starting on \"optiva\" diet. Started yesterday.    GERD  -Stable on PPI, as long as he takes it.     HTN  -on lisinopril    History of compression fracture/multiple back surgeries (disectomy and lumbar fusions)  -On 3000 units vitamin D daily    Asthma  -On one puff of Advair daily. No issues with thrush. He says he was previously on larger doses and that he and Dr. Jackson and weaned down to this " dose.    Seasonal allergies  -On Cetirizine and Flonase nasal spray    Concern for memory  -Taking fish oil for this. He started taking it when he started eating giving it to his wife.    ROS:  A 14 point ROS is negative, other than as stated above.     Past Medical History:   Diagnosis Date   • ASTHMA    • Bronchitis 02/2018   • Erectile dysfunction 6/8/2016   • GERD (gastroesophageal reflux disease) 6/8/2016   • Heart burn    • History of skin cancer 6/8/2016   • Hyperlipidemia 6/8/2016   • Hypertension    • Hypothyroidism 6/8/2016   • Indigestion    • Obesity 6/8/2016   • Pain 04/19/2018    chronic back pain, 0/10   • Preventative health care 6/8/2016   • Rosacea 6/8/2016   • Seasonal allergies 6/8/2016     Social History     Social History   • Marital status:      Spouse name: N/A   • Number of children: N/A   • Years of education: N/A     Occupational History   • Not on file.     Social History Main Topics   • Smoking status: Never Smoker   • Smokeless tobacco: Never Used   • Alcohol use Yes      Comment: 1-2/week    • Drug use: No   • Sexual activity: Not on file     Other Topics Concern   • Not on file     Social History Narrative   • No narrative on file     Family History   Problem Relation Age of Onset   • Hypertension Mother    • Hypertension Father    • Lung Disease Father      asthma     Allergies: Patient has no known allergies.  Current Outpatient Prescriptions on File Prior to Visit   Medication Sig Dispense Refill   • lisinopril (PRINIVIL) 10 MG Tab TAKE 1 TABLET BY MOUTH EVERY DAY 90 Tab 0   • Omega-3 Fatty Acids (FISH OIL PO) Take 1 Cap by mouth 2 Times a Day.     • aspirin 81 MG tablet Take 81 mg by mouth every day.     • fluticasone-salmeterol (ADVAIR) 100-50 MCG/DOSE AEROSOL POWDER, BREATH ACTIVATED Inhale 1 Puff by mouth every morning.     • cetirizine (ZYRTEC) 10 MG Tab Take 10 mg by mouth every day.     • atorvastatin (LIPITOR) 40 MG Tab TAKE 1 TABLET BY MOUTH EVERY DAY 90 Tab 0   •  "Probiotic Product (PROBIOTIC ADVANCED PO) Take 1 Tab by mouth every day.     • levothyroxine (SYNTHROID) 50 MCG Tab TAKE 1 TABLET BY MOUTH DAILY 90 Tab 2   • fluticasone (FLONASE) 50 MCG/ACT nasal spray Spray 1 Spray in nose as needed.     • VITAMIN D, CHOLECALCIFEROL, PO Take 3,000 Units by mouth every day.     • MULTIVITAMIN PO Take 1 Tab by mouth every day.     • PRILOSEC 20 MG PO CPDR Take 20 mg by mouth every day. Takes daily       No current facility-administered medications on file prior to visit.        Physical Exam:  /82   Pulse 91   Temp 36.9 °C (98.5 °F)   Ht 1.689 m (5' 6.5\")   Wt 109.5 kg (241 lb 6 oz)   SpO2 93%   BMI 38.38 kg/m² Body mass index is 38.38 kg/m².    Gen: No acute distress, pleasant  HEENT: Neck supple  CV:Regular rate and rhythm 2+ radial pulses bilaterally  Lungs: Clear to auscultation bilaterally  Abd: Obese abdomen soft  Trunk: Some pain to palpation near left latissimus dorsi  Ext: No lower extremity edema  Neuro: Nonfocal  Psych: Does not appear to be responding to internal stimuli    Labs: Reviewed recent CBC, CMP    EKG: Reviewed most recent EKG    Imaging: Reviewed recent CT abdomen and pelvis    Assessment: 73-year-old male coming in to establish care    Plan:  1. Caregiver burden  I think this is the biggest risk to the patient at this point. 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 I offered him referral to the [facility] [employer] however he refused as he feels he is too much on his plate.    2. Obesity (BMI 35.0-39.9 without comorbidity)  He is currently on a diet and lose weight this way. His back pain limits the amount he can exercise.   3. Essential hypertension  The blood pressure was stable. We will continue his lisinopril 10 mg daily.    4. Gastroesophageal reflux disease without esophagitis  Symptoms are stable on Prilosec 20 mg daily.    5. Dyslipidemia  Continue atorvastatin 40 mg recheck lipid panel in 6 months    6. Chronic seasonal " allergic rhinitis, unspecified trigger/asthma Continue Advair 100-50 dosing, surgery seen (   7. Hernia of flank  I discussed with him how his decreased exercise capacity could Causes issues with the surgery. He did not want take a closer look at his heart. He felt that his quality of life and functionality was severely limiting due to the pain. I prescribed him senna for any potential for opioid-induced constipation after his surgery.    Hypothyroidism -  continue levothyroxine; recheck TSH in six months  Patient's personal concern for memory - he is taking omega-3 Fish oil for this.    Limited exertional capacity - underwent shared decision making and discussed with patient my concern about the potential of MI during his surgery. As above.  -we will need to address this from a pulmonary and or cardiac standpoint after he recovers from surgery  History of right hip eplacement- on vitamin D supplementation    Polypharmacy - potential medicines to discontinue would include multivitamin probiotic omega-3 fatty acids.    Health Maintenance: Requested that patient get pneumovax 23.    Return in about 6 months (around 10/24/2018) for Long, general followup.     Jeovany Samson M.D.  Geriatric Physician    This note was partially dictated with voice recognition software, for any confusion please do not hesitate to contact me.

## 2018-04-24 NOTE — PATIENT INSTRUCTIONS
Try the senna for constipation caused by your pain meds after your surgery, it will likely work better than docusate. It may be cheaper to buy over the counter.     Good luck with surgery on Tuesday.     Get the pneumovax 23 vaccine at any pharmacy. You also need the tetanus shot.

## 2018-04-25 PROBLEM — Z63.6 CAREGIVER BURDEN: Status: ACTIVE | Noted: 2018-04-25

## 2018-04-30 RX ORDER — LEVOTHYROXINE SODIUM 0.05 MG/1
TABLET ORAL
Qty: 90 TAB | Refills: 0 | Status: SHIPPED | OUTPATIENT
Start: 2018-04-30 | End: 2018-07-27 | Stop reason: SDUPTHER

## 2018-04-30 NOTE — TELEPHONE ENCOUNTER
Last seen: 04/24/18 by Dr. Samson  Next appt:None    Was the patient seen in the last year in this department? Yes   Does patient have an active prescription for medications requested? No   Received Request Via: Pharmacy

## 2018-05-03 ENCOUNTER — HOSPITAL ENCOUNTER (INPATIENT)
Facility: MEDICAL CENTER | Age: 73
LOS: 1 days | DRG: 517 | End: 2018-05-04
Attending: SURGERY | Admitting: SURGERY
Payer: MEDICARE

## 2018-05-03 DIAGNOSIS — S29.9XXA INJURY OF CHEST WALL, INITIAL ENCOUNTER: ICD-10-CM

## 2018-05-03 LAB
ABO GROUP BLD: NORMAL
ALBUMIN SERPL BCP-MCNC: 4.4 G/DL (ref 3.2–4.9)
ALBUMIN/GLOB SERPL: 1.8 G/DL
ALP SERPL-CCNC: 96 U/L (ref 30–99)
ALT SERPL-CCNC: 32 U/L (ref 2–50)
ANION GAP SERPL CALC-SCNC: 8 MMOL/L (ref 0–11.9)
AST SERPL-CCNC: 30 U/L (ref 12–45)
BASOPHILS # BLD AUTO: 0.5 % (ref 0–1.8)
BASOPHILS # BLD: 0.02 K/UL (ref 0–0.12)
BILIRUB SERPL-MCNC: 1 MG/DL (ref 0.1–1.5)
BLD GP AB SCN SERPL QL: NORMAL
BUN SERPL-MCNC: 12 MG/DL (ref 8–22)
CALCIUM SERPL-MCNC: 9.7 MG/DL (ref 8.5–10.5)
CHLORIDE SERPL-SCNC: 104 MMOL/L (ref 96–112)
CO2 SERPL-SCNC: 26 MMOL/L (ref 20–33)
CREAT SERPL-MCNC: 0.99 MG/DL (ref 0.5–1.4)
EOSINOPHIL # BLD AUTO: 0.05 K/UL (ref 0–0.51)
EOSINOPHIL NFR BLD: 1.2 % (ref 0–6.9)
ERYTHROCYTE [DISTWIDTH] IN BLOOD BY AUTOMATED COUNT: 43.8 FL (ref 35.9–50)
GLOBULIN SER CALC-MCNC: 2.4 G/DL (ref 1.9–3.5)
GLUCOSE SERPL-MCNC: 94 MG/DL (ref 65–99)
HCT VFR BLD AUTO: 43.4 % (ref 42–52)
HGB BLD-MCNC: 14.4 G/DL (ref 14–18)
IMM GRANULOCYTES # BLD AUTO: 0.01 K/UL (ref 0–0.11)
IMM GRANULOCYTES NFR BLD AUTO: 0.2 % (ref 0–0.9)
LYMPHOCYTES # BLD AUTO: 1.16 K/UL (ref 1–4.8)
LYMPHOCYTES NFR BLD: 26.8 % (ref 22–41)
MCH RBC QN AUTO: 28.6 PG (ref 27–33)
MCHC RBC AUTO-ENTMCNC: 33.2 G/DL (ref 33.7–35.3)
MCV RBC AUTO: 86.3 FL (ref 81.4–97.8)
MONOCYTES # BLD AUTO: 0.32 K/UL (ref 0–0.85)
MONOCYTES NFR BLD AUTO: 7.4 % (ref 0–13.4)
NEUTROPHILS # BLD AUTO: 2.77 K/UL (ref 1.82–7.42)
NEUTROPHILS NFR BLD: 63.9 % (ref 44–72)
NRBC # BLD AUTO: 0 K/UL
NRBC BLD-RTO: 0 /100 WBC
PLATELET # BLD AUTO: 193 K/UL (ref 164–446)
PMV BLD AUTO: 9.2 FL (ref 9–12.9)
POTASSIUM SERPL-SCNC: 4.5 MMOL/L (ref 3.6–5.5)
PROT SERPL-MCNC: 6.8 G/DL (ref 6–8.2)
RBC # BLD AUTO: 5.03 M/UL (ref 4.7–6.1)
RH BLD: NORMAL
SODIUM SERPL-SCNC: 138 MMOL/L (ref 135–145)
WBC # BLD AUTO: 4.3 K/UL (ref 4.8–10.8)

## 2018-05-03 PROCEDURE — 0PS10ZZ REPOSITION 1 TO 2 RIBS, OPEN APPROACH: ICD-10-PCS | Performed by: SURGERY

## 2018-05-03 PROCEDURE — 500424 HCHG DRESSING, AIRSTRIP: Performed by: SURGERY

## 2018-05-03 PROCEDURE — 80053 COMPREHEN METABOLIC PANEL: CPT

## 2018-05-03 PROCEDURE — 501445 HCHG STAPLER, SKIN DISP: Performed by: SURGERY

## 2018-05-03 PROCEDURE — 86850 RBC ANTIBODY SCREEN: CPT

## 2018-05-03 PROCEDURE — 160027 HCHG SURGERY MINUTES - 1ST 30 MINS LEVEL 2: Performed by: SURGERY

## 2018-05-03 PROCEDURE — 160002 HCHG RECOVERY MINUTES (STAT): Performed by: SURGERY

## 2018-05-03 PROCEDURE — 501411 HCHG SPONGE, BABY LAP W/O RINGS: Performed by: SURGERY

## 2018-05-03 PROCEDURE — 160048 HCHG OR STATISTICAL LEVEL 1-5: Performed by: SURGERY

## 2018-05-03 PROCEDURE — 0PS00ZZ REPOSITION STERNUM, OPEN APPROACH: ICD-10-PCS | Performed by: SURGERY

## 2018-05-03 PROCEDURE — 85025 COMPLETE CBC W/AUTO DIFF WBC: CPT

## 2018-05-03 PROCEDURE — 500257: Performed by: SURGERY

## 2018-05-03 PROCEDURE — 94760 N-INVAS EAR/PLS OXIMETRY 1: CPT

## 2018-05-03 PROCEDURE — 160038 HCHG SURGERY MINUTES - EA ADDL 1 MIN LEVEL 2: Performed by: SURGERY

## 2018-05-03 PROCEDURE — 700111 HCHG RX REV CODE 636 W/ 250 OVERRIDE (IP)

## 2018-05-03 PROCEDURE — 0WU80JZ SUPPLEMENT CHEST WALL WITH SYNTHETIC SUBSTITUTE, OPEN APPROACH: ICD-10-PCS | Performed by: SURGERY

## 2018-05-03 PROCEDURE — 700102 HCHG RX REV CODE 250 W/ 637 OVERRIDE(OP)

## 2018-05-03 PROCEDURE — 86901 BLOOD TYPING SEROLOGIC RH(D): CPT

## 2018-05-03 PROCEDURE — 700102 HCHG RX REV CODE 250 W/ 637 OVERRIDE(OP): Performed by: SURGERY

## 2018-05-03 PROCEDURE — 86900 BLOOD TYPING SEROLOGIC ABO: CPT

## 2018-05-03 PROCEDURE — 160009 HCHG ANES TIME/MIN: Performed by: SURGERY

## 2018-05-03 PROCEDURE — 700112 HCHG RX REV CODE 229: Performed by: SURGERY

## 2018-05-03 PROCEDURE — 160036 HCHG PACU - EA ADDL 30 MINS PHASE I: Performed by: SURGERY

## 2018-05-03 PROCEDURE — 770006 HCHG ROOM/CARE - MED/SURG/GYN SEMI*

## 2018-05-03 PROCEDURE — 160035 HCHG PACU - 1ST 60 MINS PHASE I: Performed by: SURGERY

## 2018-05-03 PROCEDURE — A9270 NON-COVERED ITEM OR SERVICE: HCPCS

## 2018-05-03 PROCEDURE — 500515 HCHG ENDOCLOSE SUTURING DEVICE: Performed by: SURGERY

## 2018-05-03 PROCEDURE — C1781 MESH (IMPLANTABLE): HCPCS | Performed by: SURGERY

## 2018-05-03 PROCEDURE — A9270 NON-COVERED ITEM OR SERVICE: HCPCS | Performed by: SURGERY

## 2018-05-03 PROCEDURE — 700101 HCHG RX REV CODE 250

## 2018-05-03 DEVICE — MESH VENTRALEX ST W/STRAP - 8.0CM LARGE (1EA/CA): Type: IMPLANTABLE DEVICE | Status: FUNCTIONAL

## 2018-05-03 RX ORDER — ATORVASTATIN CALCIUM 40 MG/1
40 TABLET, FILM COATED ORAL DAILY
Status: DISCONTINUED | OUTPATIENT
Start: 2018-05-03 | End: 2018-05-04 | Stop reason: HOSPADM

## 2018-05-03 RX ORDER — MORPHINE SULFATE 4 MG/ML
4 INJECTION, SOLUTION INTRAMUSCULAR; INTRAVENOUS
Status: DISCONTINUED | OUTPATIENT
Start: 2018-05-03 | End: 2018-05-04 | Stop reason: HOSPADM

## 2018-05-03 RX ORDER — DEXTROSE MONOHYDRATE 25 G/50ML
25 INJECTION, SOLUTION INTRAVENOUS
Status: DISCONTINUED | OUTPATIENT
Start: 2018-05-03 | End: 2018-05-03

## 2018-05-03 RX ORDER — AMOXICILLIN 250 MG
1 CAPSULE ORAL
Status: DISCONTINUED | OUTPATIENT
Start: 2018-05-03 | End: 2018-05-04 | Stop reason: HOSPADM

## 2018-05-03 RX ORDER — ENEMA 19; 7 G/133ML; G/133ML
1 ENEMA RECTAL
Status: DISCONTINUED | OUTPATIENT
Start: 2018-05-03 | End: 2018-05-04 | Stop reason: HOSPADM

## 2018-05-03 RX ORDER — POLYETHYLENE GLYCOL 3350 17 G/17G
1 POWDER, FOR SOLUTION ORAL 2 TIMES DAILY
Status: DISCONTINUED | OUTPATIENT
Start: 2018-05-03 | End: 2018-05-04 | Stop reason: HOSPADM

## 2018-05-03 RX ORDER — BUDESONIDE AND FORMOTEROL FUMARATE DIHYDRATE 80; 4.5 UG/1; UG/1
2 AEROSOL RESPIRATORY (INHALATION)
Status: DISCONTINUED | OUTPATIENT
Start: 2018-05-03 | End: 2018-05-04

## 2018-05-03 RX ORDER — OXYCODONE HYDROCHLORIDE 10 MG/1
10 TABLET ORAL
Status: DISCONTINUED | OUTPATIENT
Start: 2018-05-03 | End: 2018-05-04 | Stop reason: HOSPADM

## 2018-05-03 RX ORDER — FLUTICASONE PROPIONATE 50 MCG
1 SPRAY, SUSPENSION (ML) NASAL
Status: DISCONTINUED | OUTPATIENT
Start: 2018-05-03 | End: 2018-05-04 | Stop reason: HOSPADM

## 2018-05-03 RX ORDER — CETIRIZINE HYDROCHLORIDE 10 MG/1
10 TABLET ORAL DAILY
Status: DISCONTINUED | OUTPATIENT
Start: 2018-05-03 | End: 2018-05-04 | Stop reason: HOSPADM

## 2018-05-03 RX ORDER — CHLORHEXIDINE GLUCONATE ORAL RINSE 1.2 MG/ML
15 SOLUTION DENTAL EVERY 12 HOURS
Status: DISCONTINUED | OUTPATIENT
Start: 2018-05-03 | End: 2018-05-03

## 2018-05-03 RX ORDER — SODIUM CHLORIDE, SODIUM LACTATE, POTASSIUM CHLORIDE, CALCIUM CHLORIDE 600; 310; 30; 20 MG/100ML; MG/100ML; MG/100ML; MG/100ML
INJECTION, SOLUTION INTRAVENOUS CONTINUOUS
Status: DISCONTINUED | OUTPATIENT
Start: 2018-05-03 | End: 2018-05-03

## 2018-05-03 RX ORDER — GLUCOSAMINE HCL 500 MG
3000 TABLET ORAL DAILY
Status: SHIPPED | COMMUNITY
End: 2019-05-14

## 2018-05-03 RX ORDER — SODIUM CHLORIDE, SODIUM LACTATE, POTASSIUM CHLORIDE, CALCIUM CHLORIDE 600; 310; 30; 20 MG/100ML; MG/100ML; MG/100ML; MG/100ML
INJECTION, SOLUTION INTRAVENOUS CONTINUOUS
Status: DISCONTINUED | OUTPATIENT
Start: 2018-05-03 | End: 2018-05-04

## 2018-05-03 RX ORDER — BISACODYL 10 MG
10 SUPPOSITORY, RECTAL RECTAL
Status: DISCONTINUED | OUTPATIENT
Start: 2018-05-03 | End: 2018-05-04 | Stop reason: HOSPADM

## 2018-05-03 RX ORDER — ACETAMINOPHEN 500 MG
1000 TABLET ORAL EVERY 6 HOURS
Status: DISCONTINUED | OUTPATIENT
Start: 2018-05-03 | End: 2018-05-04 | Stop reason: HOSPADM

## 2018-05-03 RX ORDER — CELECOXIB 200 MG/1
200 CAPSULE ORAL 2 TIMES DAILY WITH MEALS
Status: DISCONTINUED | OUTPATIENT
Start: 2018-05-03 | End: 2018-05-04 | Stop reason: HOSPADM

## 2018-05-03 RX ORDER — DOCUSATE SODIUM 100 MG/1
100 CAPSULE, LIQUID FILLED ORAL 2 TIMES DAILY
Status: DISCONTINUED | OUTPATIENT
Start: 2018-05-03 | End: 2018-05-04 | Stop reason: HOSPADM

## 2018-05-03 RX ORDER — ACETAMINOPHEN 500 MG
TABLET ORAL
Status: DISPENSED
Start: 2018-05-03 | End: 2018-05-03

## 2018-05-03 RX ORDER — ONDANSETRON 2 MG/ML
4 INJECTION INTRAMUSCULAR; INTRAVENOUS EVERY 4 HOURS PRN
Status: DISCONTINUED | OUTPATIENT
Start: 2018-05-03 | End: 2018-05-04 | Stop reason: HOSPADM

## 2018-05-03 RX ORDER — LEVOTHYROXINE SODIUM 0.05 MG/1
50 TABLET ORAL
Status: DISCONTINUED | OUTPATIENT
Start: 2018-05-04 | End: 2018-05-04 | Stop reason: HOSPADM

## 2018-05-03 RX ORDER — OXYCODONE HYDROCHLORIDE 5 MG/1
5 TABLET ORAL
Status: DISCONTINUED | OUTPATIENT
Start: 2018-05-03 | End: 2018-05-04 | Stop reason: HOSPADM

## 2018-05-03 RX ORDER — LISINOPRIL 10 MG/1
10 TABLET ORAL DAILY
Status: DISCONTINUED | OUTPATIENT
Start: 2018-05-03 | End: 2018-05-04 | Stop reason: HOSPADM

## 2018-05-03 RX ORDER — OMEPRAZOLE 20 MG/1
20 CAPSULE, DELAYED RELEASE ORAL DAILY
Status: DISCONTINUED | OUTPATIENT
Start: 2018-05-03 | End: 2018-05-04 | Stop reason: HOSPADM

## 2018-05-03 RX ORDER — AMOXICILLIN 250 MG
1 CAPSULE ORAL NIGHTLY
Status: DISCONTINUED | OUTPATIENT
Start: 2018-05-03 | End: 2018-05-04 | Stop reason: HOSPADM

## 2018-05-03 RX ORDER — BUPIVACAINE HYDROCHLORIDE AND EPINEPHRINE 5; 5 MG/ML; UG/ML
INJECTION, SOLUTION PERINEURAL
Status: DISCONTINUED | OUTPATIENT
Start: 2018-05-03 | End: 2018-05-03 | Stop reason: HOSPADM

## 2018-05-03 RX ADMIN — DOCUSATE SODIUM 100 MG: 100 CAPSULE ORAL at 20:50

## 2018-05-03 RX ADMIN — OXYCODONE HYDROCHLORIDE 10 MG: 10 TABLET ORAL at 20:50

## 2018-05-03 RX ADMIN — LISINOPRIL 10 MG: 10 TABLET ORAL at 15:17

## 2018-05-03 RX ADMIN — CELECOXIB 200 MG: 200 CAPSULE ORAL at 17:41

## 2018-05-03 RX ADMIN — DOCUSATE SODIUM 100 MG: 100 CAPSULE ORAL at 15:17

## 2018-05-03 RX ADMIN — HYDROMORPHONE HYDROCHLORIDE 0.3 MG: 10 INJECTION, SOLUTION INTRAMUSCULAR; INTRAVENOUS; SUBCUTANEOUS at 13:10

## 2018-05-03 RX ADMIN — OXYCODONE HYDROCHLORIDE 10 MG: 10 TABLET ORAL at 15:39

## 2018-05-03 RX ADMIN — BUDESONIDE AND FORMOTEROL FUMARATE DIHYDRATE 2 PUFF: 80; 4.5 AEROSOL RESPIRATORY (INHALATION) at 21:03

## 2018-05-03 RX ADMIN — ACETAMINOPHEN 1000 MG: 500 TABLET ORAL at 17:40

## 2018-05-03 RX ADMIN — STANDARDIZED SENNA CONCENTRATE AND DOCUSATE SODIUM 1 TABLET: 8.6; 5 TABLET, FILM COATED ORAL at 20:51

## 2018-05-03 RX ADMIN — HYDROMORPHONE HYDROCHLORIDE 0.5 MG: 10 INJECTION, SOLUTION INTRAMUSCULAR; INTRAVENOUS; SUBCUTANEOUS at 13:20

## 2018-05-03 RX ADMIN — HYDROMORPHONE HYDROCHLORIDE 0.5 MG: 10 INJECTION, SOLUTION INTRAMUSCULAR; INTRAVENOUS; SUBCUTANEOUS at 14:00

## 2018-05-03 RX ADMIN — POLYETHYLENE GLYCOL (3350) 1 PACKET: 17 POWDER, FOR SOLUTION ORAL at 20:51

## 2018-05-03 RX ADMIN — HYDROMORPHONE HYDROCHLORIDE 0.5 MG: 10 INJECTION, SOLUTION INTRAMUSCULAR; INTRAVENOUS; SUBCUTANEOUS at 14:20

## 2018-05-03 RX ADMIN — OMEPRAZOLE 20 MG: 20 CAPSULE, DELAYED RELEASE ORAL at 15:17

## 2018-05-03 RX ADMIN — ATORVASTATIN CALCIUM 40 MG: 40 TABLET, FILM COATED ORAL at 15:17

## 2018-05-03 RX ADMIN — VITAMIN D, TAB 1000IU (100/BT) 3000 UNITS: 25 TAB at 15:18

## 2018-05-03 RX ADMIN — CETIRIZINE HYDROCHLORIDE 10 MG: 10 TABLET, FILM COATED ORAL at 15:17

## 2018-05-03 RX ADMIN — SODIUM CHLORIDE, SODIUM LACTATE, POTASSIUM CHLORIDE, CALCIUM CHLORIDE: 600; 310; 30; 20 INJECTION, SOLUTION INTRAVENOUS at 09:34

## 2018-05-03 ASSESSMENT — PAIN SCALES - GENERAL
PAINLEVEL_OUTOF10: 0
PAINLEVEL_OUTOF10: 6
PAINLEVEL_OUTOF10: 2
PAINLEVEL_OUTOF10: 5

## 2018-05-03 ASSESSMENT — LIFESTYLE VARIABLES
TOTAL SCORE: 0
HAVE PEOPLE ANNOYED YOU BY CRITICIZING YOUR DRINKING: NO
EVER FELT BAD OR GUILTY ABOUT YOUR DRINKING: NO
CONSUMPTION TOTAL: NEGATIVE
EVER HAD A DRINK FIRST THING IN THE MORNING TO STEADY YOUR NERVES TO GET RID OF A HANGOVER: NO
HOW MANY TIMES IN THE PAST YEAR HAVE YOU HAD 5 OR MORE DRINKS IN A DAY: 0
AVERAGE NUMBER OF DAYS PER WEEK YOU HAVE A DRINK CONTAINING ALCOHOL: 1
HAVE YOU EVER FELT YOU SHOULD CUT DOWN ON YOUR DRINKING: NO
ALCOHOL_USE: YES
EVER_SMOKED: NEVER
ON A TYPICAL DAY WHEN YOU DRINK ALCOHOL HOW MANY DRINKS DO YOU HAVE: 2
EVER_SMOKED: NEVER

## 2018-05-03 ASSESSMENT — COPD QUESTIONNAIRES
HAVE YOU SMOKED AT LEAST 100 CIGARETTES IN YOUR ENTIRE LIFE: NO/DON'T KNOW
DO YOU EVER COUGH UP ANY MUCUS OR PHLEGM?: NO/ONLY WITH OCCASIONAL COLDS OR INFECTIONS
DURING THE PAST 4 WEEKS HOW MUCH DID YOU FEEL SHORT OF BREATH: NONE/LITTLE OF THE TIME
COPD SCREENING SCORE: 2

## 2018-05-03 ASSESSMENT — PATIENT HEALTH QUESTIONNAIRE - PHQ9
1. LITTLE INTEREST OR PLEASURE IN DOING THINGS: NOT AT ALL
SUM OF ALL RESPONSES TO PHQ9 QUESTIONS 1 AND 2: 0
2. FEELING DOWN, DEPRESSED, IRRITABLE, OR HOPELESS: NOT AT ALL

## 2018-05-03 NOTE — OP REPORT
DATE OF OPERATION: 5/3/2018    PREOPERATIVE DIAGNOSIS:  Left superior triangle lumbar hernia ([place]). 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 Flank pain.    POSTOPERATIVE DIAGNOSIS:  Same.    PROCEDURE PERFORMED:  1.  Left mini thoracotomy  2.  Reduction and repair of left lung hernia.  3.  Reconstruction of left chest wall defect.    SURGEON:  Phoenix Medina M.D.    ASSISTANT: Julian Hanson M.D.    ANESTHESIOLOGIST: Mae Machado M.D.    ANESTHESIA:  General endotracheal anesthesia. Left chest wall and flank TAP blocks.    ASA CLASSIFICATION: III.    INDICATION:  The patient is a 73 y.o. male with an increasingly bothersome left lateral lumbar hernia. Preoperative imaging demonstrated both chest and abdominal involvement. He is taken to the operating room for repair.    FINDINGS: 15 cm defect between the 10th and 11th ribs. Callus formation and healing of old 11th rib fracture. No evidence of diaphragm injury. Apparent reduction and repair of lumbar hernia following apposition and approximation of 10th and 11th ribs.    WOUND CLASSIFICATION: Class I, Clean.    SPECIMEN:  None.    ESTIMATED BLOOD LOSS:  Less than 20 mL.    DESCRIPTION OF PROCEDURE:  Following informed consent, the patient was properly identified, taken to the operating room, and placed in a supine position where general endotracheal anesthesia was administered.  Intravenous antibiotics were administered by the anesthesiologist in the correct time interval.  Sequential compression devices were employed. The patient was repositioned into a lateral decubitus position with careful attention to padding of the extremities and placement of an axillary roll. The left chest wall and flank was prepped and draped into a sterile field. TAP blocks were administered by the anesthesiologist.    A left posterolateral thoracotomy incision was made in the 11th intercostal space. The soft tissue layers were divided with electrocautery. The  fractured rib was identified. The left thoracic cavity was entered in the left or thoracic cavity inspected with findings detailed above. Of note is no evidence of diaphragm injury.    The ribs were sequentially approximated from lateral to medial with multiple interrupted #5 TiCron sutures. The remaining central defect was repaired with mesh. An 8 cm Ventralex™ ST Hernia Patch mesh was passed on to the operative field.  Three radial 0 Ethibond sutures were placed along the anteromedial lateral margins of the mesh. The mesh was appropriately positioned.  Transfixing sutures were placed through lateral radially oriented sites above and below the 10th and 11th ribs wounds using an Endoclose device under direct vision.  The radial sutures were secured.    The remaining redundant hernia sac was excised using electrocautery. The soft tissues were closed in layers with running 2-0 Vicryl sutures and the skin was approximated with staples. Sterile dressing was applied.     The patient tolerated the procedure well, and there were no apparent complications.  All sponge, needle, and instrument counts were correct on two separate occasions. He was awakened, extubated, and transferred to the recovery room in satisfactory condition.   ____________________________________  Phoenix Medina M.D.    DD:  5/3/2018  9:34 AM

## 2018-05-03 NOTE — CARE PLAN
Problem: Safety  Goal: Will remain free from falls  Outcome: PROGRESSING AS EXPECTED      Problem: Pain Management  Goal: Pain level will decrease to patient's comfort goal    Intervention: Follow pain managment plan developed in collaboration with patient and Interdisciplinary Team  Pt tolerating PO pain medication at this time

## 2018-05-03 NOTE — PROGRESS NOTES
The Medication Reconciliation process has been completed by interviewing the patient    Allergies have been reviewed  Antibiotic use in 30 days - none    Home Pharmacy:  [facility] [address]

## 2018-05-03 NOTE — PROGRESS NOTES
Pt arrived to the floor via tonny, A&O x's 4, VSS, pain is a 5-6 per pain scale, medicated per MAR. Pt tolerating PO at this time, SL per active order. Dressing to L chest wall with old drainage, no indications of current drainage. Pt's last BM was 5/2 PTA, hypoactive BS, -N/V, pt tolerating diet at this time. Pt is on 0.5 L NC with O2 saturations in the mid 90's, pt denies any SOB or difficulty breathing. POC discussed with pt and family, admit complete, all questions and concerns have been addressed, SCD's on, call light and personal items within reach. Bed in locked/lowest position, pt calls appropriately for assistance, family at bedside.     2 RN skin check complete, skin intact other than surgical incision.

## 2018-05-03 NOTE — OR NURSING
Pt arrived to pacu on 6L Simple mask, A & O x 4, able to verbally interact appropriately. Patient was able to move all extremities  Progressed from face mask to 2 L NC within first 30 minutes  able to tolerated sips and chips, family updated.  Medicated analgesics for complaints of pain,   VSS, NSR on monitor, ASPAN criteria for Phase I Recovery met and patient able to progress forward.All [place]. Report given to family Sonia on route to room.

## 2018-05-04 VITALS
TEMPERATURE: 98.6 F | HEIGHT: 67 IN | RESPIRATION RATE: 18 BRPM | OXYGEN SATURATION: 95 % | WEIGHT: 237.22 LBS | BODY MASS INDEX: 37.23 KG/M2 | HEART RATE: 86 BPM | SYSTOLIC BLOOD PRESSURE: 115 MMHG | DIASTOLIC BLOOD PRESSURE: 81 MMHG

## 2018-05-04 PROBLEM — S29.9XXA CHEST WALL INJURY: Status: ACTIVE | Noted: 2018-05-04

## 2018-05-04 PROCEDURE — 700112 HCHG RX REV CODE 229: Performed by: SURGERY

## 2018-05-04 PROCEDURE — A9270 NON-COVERED ITEM OR SERVICE: HCPCS | Performed by: SURGERY

## 2018-05-04 PROCEDURE — 700102 HCHG RX REV CODE 250 W/ 637 OVERRIDE(OP): Performed by: SURGERY

## 2018-05-04 RX ORDER — OXYCODONE HYDROCHLORIDE 5 MG/1
5-10 TABLET ORAL
Qty: 20 TAB | Refills: 0 | Status: SHIPPED | OUTPATIENT
Start: 2018-05-04 | End: 2018-05-11

## 2018-05-04 RX ORDER — BUDESONIDE AND FORMOTEROL FUMARATE DIHYDRATE 80; 4.5 UG/1; UG/1
2 AEROSOL RESPIRATORY (INHALATION) 2 TIMES DAILY
Status: DISCONTINUED | OUTPATIENT
Start: 2018-05-04 | End: 2018-05-04 | Stop reason: HOSPADM

## 2018-05-04 RX ORDER — CELECOXIB 200 MG/1
200 CAPSULE ORAL 2 TIMES DAILY WITH MEALS
Qty: 10 CAP | Refills: 0 | Status: SHIPPED | OUTPATIENT
Start: 2018-05-04 | End: 2018-05-04

## 2018-05-04 RX ORDER — CELECOXIB 200 MG/1
200 CAPSULE ORAL 2 TIMES DAILY WITH MEALS
Qty: 10 CAP | Refills: 0 | Status: SHIPPED | OUTPATIENT
Start: 2018-05-04 | End: 2018-11-05

## 2018-05-04 RX ADMIN — VITAMIN D, TAB 1000IU (100/BT) 3000 UNITS: 25 TAB at 09:47

## 2018-05-04 RX ADMIN — ACETAMINOPHEN 1000 MG: 500 TABLET ORAL at 11:55

## 2018-05-04 RX ADMIN — CETIRIZINE HYDROCHLORIDE 10 MG: 10 TABLET, FILM COATED ORAL at 09:46

## 2018-05-04 RX ADMIN — ATORVASTATIN CALCIUM 40 MG: 40 TABLET, FILM COATED ORAL at 09:48

## 2018-05-04 RX ADMIN — BUDESONIDE AND FORMOTEROL FUMARATE DIHYDRATE 2 PUFF: 80; 4.5 AEROSOL RESPIRATORY (INHALATION) at 09:47

## 2018-05-04 RX ADMIN — DOCUSATE SODIUM 100 MG: 100 CAPSULE ORAL at 09:49

## 2018-05-04 RX ADMIN — ACETAMINOPHEN 1000 MG: 500 TABLET ORAL at 05:54

## 2018-05-04 RX ADMIN — POLYETHYLENE GLYCOL (3350) 1 PACKET: 17 POWDER, FOR SOLUTION ORAL at 09:49

## 2018-05-04 RX ADMIN — ACETAMINOPHEN 1000 MG: 500 TABLET ORAL at 00:09

## 2018-05-04 RX ADMIN — LISINOPRIL 10 MG: 10 TABLET ORAL at 09:47

## 2018-05-04 RX ADMIN — OMEPRAZOLE 20 MG: 20 CAPSULE, DELAYED RELEASE ORAL at 09:47

## 2018-05-04 RX ADMIN — CELECOXIB 200 MG: 200 CAPSULE ORAL at 09:46

## 2018-05-04 RX ADMIN — MAGNESIUM HYDROXIDE 30 ML: 400 SUSPENSION ORAL at 09:49

## 2018-05-04 RX ADMIN — LEVOTHYROXINE SODIUM 50 MCG: 50 TABLET ORAL at 05:54

## 2018-05-04 ASSESSMENT — PAIN SCALES - GENERAL
PAINLEVEL_OUTOF10: 4
PAINLEVEL_OUTOF10: 1
PAINLEVEL_OUTOF10: 1

## 2018-05-04 NOTE — DISCHARGE INSTRUCTIONS
Discharge Instructions    Discharged to home by car with relative. Discharged via wheelchair, hospital escort: Yes.  Special equipment needed: Not Applicable    Be sure to schedule a follow-up appointment with your primary care doctor or any specialists as instructed.     Discharge Plan:   Influenza Vaccine Indication: Not indicated: Previously immunized this influenza season and > 8 years of age    I understand that a diet low in cholesterol, fat, and sodium is recommended for good health. Unless I have been given specific instructions below for another diet, I accept this instruction as my diet prescription.   Other diet: Regular    Special Instructions:     - Call or seek medical attention for questions or concerns   - Follow up with Dr. Medina in 1 weeks time   - Follow up with primary care provider within one weeks time   - Resume regular diet   - May take over the counter acetaminophen as needed for pain   - Resume ASA once off Celebrex   - Continue daily over the counter stool softener while on narcotics   - No operation of machinery or motorized vehicles while under the influence of narcotics   - No alcohol use while under the influence of narcotics   - No swimming, hot tubs, baths or wound submersion until cleared by outpatient provider. May shower   - No contact sports, strenuous activities, or heavy lifting until cleared by outpatient provider   - If respiratory decompensation, increased pain, or signs or symptoms of infection occur seek medical attention    · Is patient discharged on Warfarin / Coumadin?   No       Hernia Repair  Care After    These instructions give you information on caring for yourself after your procedure. Your doctor may also give you more specific instructions. Call your doctor if you have any problems or questions after your procedure.    HOME CARE   · You may have changes in your poops (bowel movements).  · You may have loose or watery poop (diarrhea).  · You may be not able to  poop.  · Your bowels will slowly get back to normal.  · Do not eat any food that makes you sick to your stomach (nauseous). Eat small meals 4 to 6 times a day instead of 3 large ones.  · Do not drink pop. It will give you gas.  · Do not drink alcohol.  · Do not lift anything heavier than 10 pounds. This is about the weight of a gallon of milk.  · Do not do anything that makes you very tired for at least 6 weeks.  · Do not get your wound wet for 2 days.  · You may take a sponge bath during this time.  · After 2 days you may take a shower. Gently pat your surgical cut (incision) dry with a towel. Do not rub it.  · For men: You may have been given an athletic supporter (scrotal support) before you left the hospital. It holds your scrotum and testicles closer to your body so there is no strain on your wound. Wear the supporter until your doctor tells you that you do not need it anymore.  ·   GET HELP RIGHT AWAY IF:  · You have watery poop, or cannot poop for more than 3 days.  · You feel sick to your stomach or throw up (vomit) more than 2 or 3 times.  · You have temperature by mouth above 102° F (38.9° C).  · You see redness or puffiness (swelling) around your wound.  · You see yellowish white fluid (pus) coming from your wound.  · You see a bulge or bump in your lower belly (abdomen) or near your groin.  · You develop a rash, trouble breathing, or any other symptoms from medicines taken.  ·   MAKE SURE YOU:  · Understand these instructions.  · Will watch your condition.  · Will get help right away if your are not doing well or get worse.  Document Released: 11/30/2009 Document Revised: 03/11/2013 Document Reviewed: 11/30/2009  ExitCare® Patient Information ©2014 [employer].    Hernia    Introduction  A hernia happens when an organ or tissue inside your body pushes out through a weak spot in the belly (abdomen).    Follow these instructions at home:  · Avoid stretching or overusing (straining) the muscles near the  hernia.  · Do not lift anything heavier than 10 lb (4.5 kg).  · Use the muscles in your leg when you lift something up. Do not use the muscles in your back.  · When you cough, try to cough gently.  · Eat a diet that has a lot of fiber. Eat lots of fruits and vegetables.  · Drink enough fluids to keep your pee (urine) clear or pale yellow. Try to drink 6-8 glasses of water a day.  · Take medicines to make your poop soft (stool softeners) as told by your doctor.  · Lose weight, if you are overweight.  · Do not use any tobacco products, including cigarettes, chewing tobacco, or electronic cigarettes. If you need help quitting, ask your doctor.  · Keep all follow-up visits as told by your doctor. This is important.  ·   Contact a doctor if:  · The skin by the hernia gets puffy (swollen) or red.  · The hernia is painful.  ·   Get help right away if:  · You have a fever.  · You have belly pain that is getting worse.  · You feel sick to your stomach (nauseous) or you throw up (vomit).  · You cannot push the hernia back in place by gently pressing on it while you are lying down.  · The hernia:  ¨ Changes in shape or size.  ¨ Is stuck outside your belly.  ¨ Changes color.  ¨ Feels hard or tender.  This information is not intended to replace advice given to you by your health care provider. Make sure you discuss any questions you have with your health care provider.  Document Released: 06/07/2011 Document Revised: 05/25/2017 Document Reviewed: 10/28/2015  © 2017 Elsevier      Depression / Suicide Risk    As you are discharged from this [facility] facility, it is important to learn how to keep safe from harming yourself.    Recognize the warning signs:  · Abrupt changes in personality, positive or negative- including increase in energy   · Giving away possessions  · Change in eating patterns- significant weight changes-  positive or negative  · Change in sleeping patterns- unable to sleep or sleeping all the  time   · Unwillingness or inability to communicate  · Depression  · Unusual sadness, discouragement and loneliness  · Talk of wanting to die  · Neglect of personal appearance   · Rebelliousness- reckless behavior  · Withdrawal from people/activities they love  · Confusion- inability to concentrate     If you or a loved one observes any of these behaviors or has concerns about self-harm, here's what you can do:  · Talk about it- your feelings and reasons for harming yourself  · Remove any means that you might use to hurt yourself (examples: pills, rope, extension cords, firearm)  · Get professional help from the community (Mental Health, Substance Abuse, psychological counseling)  · Do not be alone:Call your Safe Contact- someone whom you trust who will be there for you.  · Call your local CRISIS HOTLINE 595-2920 or 746-166-9426  · Call your local Children's Mobile Crisis Response Team [place] (012) 717-1705 or www.Birdi  · Call the toll free National Suicide Prevention Hotlines   · National Suicide Prevention Lifeline 679-173-CUUG (6296)  · National Hope Line Network 800-SUICIDE (889-0131)

## 2018-05-04 NOTE — CARE PLAN
Problem: Safety  Goal: Will remain free from falls  Outcome: PROGRESSING AS EXPECTED  Treaded slipper socks worn. Bed locked and in lowest position.    Problem: Pain Management  Goal: Pain level will decrease to patient's comfort goal  Outcome: PROGRESSING AS EXPECTED  Pain controlled with scheduled and PRN PO meds at this time.

## 2018-05-04 NOTE — PROGRESS NOTES
72 yo F  Full Code  NKA  Admit 5/3- lumbar hernia    Assessment done    A+Ox4    RA    Pt states pain 4/10- declines medication    Hypoactive BS x4  Distended abdomen  - flatus  LBM 5/2 PTA  Regular diet- tolerating well    R AC 18g PIV CDI, saline locked    SCDs on    Bed low and locked, call light and belongings in reach, hourly rounding in place, pt calls appropriately for assistance    No fall risk per robinson perkins assessment    Discussed POC    All needs met at this rian       Patient mother is requesting a letter that is safe for patient to return childcare. She states that all of his symptoms are not gone yet 100%. Mom was told to call us back.  Please advise.

## 2018-05-04 NOTE — PROGRESS NOTES
Discharge teaching done.  Rx x2 provided to pt  PIV removed    Discharging Patient home per physician order.      Discharged with family      Demonstrated understanding of discharge instructions, follow up appointments, home medications, prescriptions, home care for surgical wound.      Ambulating without assistance, voiding without difficulty, pain well controlled, tolerating oral medications, oxygen saturation greater than 90% , tolerating diet.      Educational handouts given and discussed.      Verbalized understanding of discharge instructions and educational handouts.      All questions answered.      Belongings with patient at time of discharge.

## 2018-05-04 NOTE — PROGRESS NOTES
Assessment completed.  AA&Ox4. VSS on RA. Denies SOB.  Reporting 5/10 pain. Medicated per MAR.   Surgical incision to left lateral chest with dressing intact. Old drainage noted.  Tolerating regular diet. Denies N/V.  + void. LBM PTA.   All needs met at this time. Call light within reach. Pt calls appropriately.

## 2018-05-07 ENCOUNTER — PATIENT OUTREACH (OUTPATIENT)
Dept: HEALTH INFORMATION MANAGEMENT | Facility: OTHER | Age: 73
End: 2018-05-07

## 2018-05-09 ENCOUNTER — PATIENT OUTREACH (OUTPATIENT)
Dept: HEALTH INFORMATION MANAGEMENT | Facility: OTHER | Age: 73
End: 2018-05-09

## 2018-05-09 NOTE — PROGRESS NOTES
Attempt #:FINAL   WebIZ Checked & Epic Updated: no  HealthConnect Verified: yes  Verify PCP: yes    Communication Preference Obtained: yes     Review Care Team: yes    Annual Wellness Visit Scheduling  1. Scheduling Status:Not Scheduled. Patient states they are not interested          Care Gap Scheduling (Attempt to Schedule EACH Overdue Care Gap!)  Health Maintenance Due   Topic Date Due   • Annual Wellness Visit  1945 WILL SPEAK TO PCP    • IMM DTaP/Tdap/Td Vaccine (1 - Tdap) 02/27/1966   • IMM PNEUMOCOCCAL 65+ (ADULT) LOW/MEDIUM RISK SERIES (2 of 2 - PPSV23) 10/01/2016           MyChart Activation: declined

## 2018-05-10 DIAGNOSIS — I10 ESSENTIAL HYPERTENSION: ICD-10-CM

## 2018-05-10 RX ORDER — ATORVASTATIN CALCIUM 40 MG/1
TABLET, FILM COATED ORAL
Qty: 90 TAB | Refills: 0 | Status: SHIPPED | OUTPATIENT
Start: 2018-05-10 | End: 2018-08-07 | Stop reason: SDUPTHER

## 2018-05-10 NOTE — TELEPHONE ENCOUNTER
Last seen: 04/24/18 by Dr. Samson  Next appt: None     Was the patient seen in the last year in this department? Yes   Does patient have an active prescription for medications requested? No   Received Request Via: Pharmacy

## 2018-06-04 ENCOUNTER — PATIENT OUTREACH (OUTPATIENT)
Dept: HEALTH INFORMATION MANAGEMENT | Facility: OTHER | Age: 73
End: 2018-06-04

## 2018-06-14 NOTE — PROGRESS NOTES
Patient Angelo Magaña admitted for a hernia repair on 5/3/18, and was discharged on 5/4/18. [facility] Patient Advocate confirmed patient kept two post-op appointment with Dr. Phoenix Medina. Patient declined assistance with scheduled a hospital follow-up with PCP Dr. Jeovany Samson. Patient has no future appointments scheduled at this time. Patient was initially engaged with IHD services, but later declined any future calls during the second week of the program. PPS score is 100%.

## 2018-06-15 ENCOUNTER — TELEPHONE (OUTPATIENT)
Dept: INTERNAL MEDICINE | Facility: MEDICAL CENTER | Age: 73
End: 2018-06-15

## 2018-06-15 NOTE — TELEPHONE ENCOUNTER
1. Caller Name: Pt                      Call Back Number: [phone] (home)     2. Message: Patient asking if he needs to follow up any time soon since he just had his surgery.    3. Patient approves office to leave a detailed voicemail/MyChart message: N\A

## 2018-06-15 NOTE — TELEPHONE ENCOUNTER
Patient can come in if he has concerns or would like to discuss any matter, such as medications. If he feels he is doing well, he can follow up in October as planned with follow up as planned with the surgeons.

## 2018-06-15 NOTE — TELEPHONE ENCOUNTER
Called patient and left a detailed message in regards to both his mom Adamaris and himself. If he would like to discuss anything in particular he can call scheduling to schedule in appt if we not we can see him in October as Dr. Samson had discussed with him

## 2018-06-19 ENCOUNTER — APPOINTMENT (RX ONLY)
Dept: URBAN - METROPOLITAN AREA CLINIC 4 | Facility: CLINIC | Age: 73
Setting detail: DERMATOLOGY
End: 2018-06-19

## 2018-06-19 DIAGNOSIS — D18.0 HEMANGIOMA: ICD-10-CM

## 2018-06-19 DIAGNOSIS — L82.1 OTHER SEBORRHEIC KERATOSIS: ICD-10-CM

## 2018-06-19 DIAGNOSIS — L81.4 OTHER MELANIN HYPERPIGMENTATION: ICD-10-CM

## 2018-06-19 DIAGNOSIS — D22 MELANOCYTIC NEVI: ICD-10-CM

## 2018-06-19 PROBLEM — D18.01 HEMANGIOMA OF SKIN AND SUBCUTANEOUS TISSUE: Status: ACTIVE | Noted: 2018-06-19

## 2018-06-19 PROBLEM — D22.5 MELANOCYTIC NEVI OF TRUNK: Status: ACTIVE | Noted: 2018-06-19

## 2018-06-19 PROBLEM — D22.39 MELANOCYTIC NEVI OF OTHER PARTS OF FACE: Status: ACTIVE | Noted: 2018-06-19

## 2018-06-19 PROCEDURE — 99213 OFFICE O/P EST LOW 20 MIN: CPT

## 2018-06-19 PROCEDURE — ? COUNSELING

## 2018-06-19 ASSESSMENT — LOCATION DETAILED DESCRIPTION DERM
LOCATION DETAILED: LEFT FOREHEAD
LOCATION DETAILED: LEFT ANTERIOR PROXIMAL THIGH
LOCATION DETAILED: LEFT MEDIAL SUPERIOR CHEST
LOCATION DETAILED: SUPERIOR THORACIC SPINE
LOCATION DETAILED: LEFT INFERIOR MEDIAL FOREHEAD
LOCATION DETAILED: RIGHT DISTAL POSTERIOR THIGH
LOCATION DETAILED: LEFT DISTAL POSTERIOR THIGH
LOCATION DETAILED: HAIR
LOCATION DETAILED: RIGHT INFERIOR MEDIAL UPPER BACK
LOCATION DETAILED: RIGHT SUPERIOR MEDIAL UPPER BACK
LOCATION DETAILED: LEFT DISTAL POSTERIOR UPPER ARM
LOCATION DETAILED: SUPERIOR LUMBAR SPINE
LOCATION DETAILED: LEFT FOREHEAD
LOCATION DETAILED: RIGHT ANTERIOR DISTAL THIGH
LOCATION DETAILED: RIGHT POSTERIOR SHOULDER
LOCATION DETAILED: LEFT PROXIMAL POSTERIOR UPPER ARM
LOCATION DETAILED: LEFT INFERIOR LATERAL MALAR CHEEK
LOCATION DETAILED: EPIGASTRIC SKIN
LOCATION DETAILED: RIGHT DISTAL POSTERIOR UPPER ARM

## 2018-06-19 ASSESSMENT — LOCATION ZONE DERM
LOCATION ZONE: FACE
LOCATION ZONE: FACE
LOCATION ZONE: SCALP
LOCATION ZONE: LEG
LOCATION ZONE: ARM
LOCATION ZONE: TRUNK

## 2018-06-19 ASSESSMENT — LOCATION SIMPLE DESCRIPTION DERM
LOCATION SIMPLE: RIGHT UPPER ARM
LOCATION SIMPLE: RIGHT POSTERIOR THIGH
LOCATION SIMPLE: LOWER BACK
LOCATION SIMPLE: RIGHT UPPER BACK
LOCATION SIMPLE: LEFT THIGH
LOCATION SIMPLE: HAIR
LOCATION SIMPLE: CHEST
LOCATION SIMPLE: ABDOMEN
LOCATION SIMPLE: RIGHT SHOULDER
LOCATION SIMPLE: LEFT FOREHEAD
LOCATION SIMPLE: LEFT FOREHEAD
LOCATION SIMPLE: LEFT POSTERIOR THIGH
LOCATION SIMPLE: UPPER BACK
LOCATION SIMPLE: LEFT UPPER ARM
LOCATION SIMPLE: RIGHT THIGH
LOCATION SIMPLE: LEFT CHEEK

## 2018-07-09 RX ORDER — FLUTICASONE PROPIONATE 50 MCG
SPRAY, SUSPENSION (ML) NASAL
Qty: 3 BOTTLE | Refills: 0 | Status: SHIPPED | OUTPATIENT
Start: 2018-07-09 | End: 2018-10-03 | Stop reason: SDUPTHER

## 2018-07-27 RX ORDER — LEVOTHYROXINE SODIUM 0.05 MG/1
TABLET ORAL
Qty: 90 TAB | Refills: 0 | Status: SHIPPED | OUTPATIENT
Start: 2018-07-27 | End: 2018-11-03 | Stop reason: SDUPTHER

## 2018-08-07 DIAGNOSIS — I10 ESSENTIAL HYPERTENSION: ICD-10-CM

## 2018-08-07 RX ORDER — ATORVASTATIN CALCIUM 40 MG/1
TABLET, FILM COATED ORAL
Qty: 90 TAB | Refills: 0 | Status: SHIPPED | OUTPATIENT
Start: 2018-08-07 | End: 2018-11-03 | Stop reason: SDUPTHER

## 2018-09-18 ENCOUNTER — HOSPITAL ENCOUNTER (OUTPATIENT)
Dept: LAB | Facility: MEDICAL CENTER | Age: 73
End: 2018-09-18
Attending: NURSE PRACTITIONER
Payer: MEDICARE

## 2018-09-18 LAB
ANION GAP SERPL CALC-SCNC: 5 MMOL/L (ref 0–11.9)
BUN SERPL-MCNC: 8 MG/DL (ref 8–22)
CALCIUM SERPL-MCNC: 9 MG/DL (ref 8.5–10.5)
CHLORIDE SERPL-SCNC: 104 MMOL/L (ref 96–112)
CO2 SERPL-SCNC: 27 MMOL/L (ref 20–33)
CREAT SERPL-MCNC: 1.04 MG/DL (ref 0.5–1.4)
GLUCOSE SERPL-MCNC: 89 MG/DL (ref 65–99)
POTASSIUM SERPL-SCNC: 4.6 MMOL/L (ref 3.6–5.5)
SODIUM SERPL-SCNC: 136 MMOL/L (ref 135–145)

## 2018-09-18 PROCEDURE — 36415 COLL VENOUS BLD VENIPUNCTURE: CPT

## 2018-09-18 PROCEDURE — 80048 BASIC METABOLIC PNL TOTAL CA: CPT

## 2018-09-20 ENCOUNTER — APPOINTMENT (OUTPATIENT)
Dept: RADIOLOGY | Facility: MEDICAL CENTER | Age: 73
End: 2018-09-20
Attending: NURSE PRACTITIONER
Payer: MEDICARE

## 2018-09-20 DIAGNOSIS — M54.16 LUMBAR RADICULOPATHY: ICD-10-CM

## 2018-09-20 PROCEDURE — 700117 HCHG RX CONTRAST REV CODE 255: Performed by: NURSE PRACTITIONER

## 2018-09-20 PROCEDURE — 72158 MRI LUMBAR SPINE W/O & W/DYE: CPT

## 2018-09-20 PROCEDURE — A9585 GADOBUTROL INJECTION: HCPCS | Performed by: NURSE PRACTITIONER

## 2018-09-20 RX ORDER — GADOBUTROL 604.72 MG/ML
10 INJECTION INTRAVENOUS ONCE
Status: COMPLETED | OUTPATIENT
Start: 2018-09-20 | End: 2018-09-20

## 2018-09-20 RX ADMIN — GADOBUTROL 10 ML: 604.72 INJECTION INTRAVENOUS at 14:30

## 2018-09-26 ENCOUNTER — HOSPITAL ENCOUNTER (OUTPATIENT)
Dept: RADIOLOGY | Facility: MEDICAL CENTER | Age: 73
End: 2018-09-26
Attending: NEUROLOGICAL SURGERY
Payer: MEDICARE

## 2018-09-26 DIAGNOSIS — M50.30 DEGENERATIVE CERVICAL DISC: ICD-10-CM

## 2018-09-26 PROCEDURE — 72110 X-RAY EXAM L-2 SPINE 4/>VWS: CPT

## 2018-10-03 RX ORDER — FLUTICASONE PROPIONATE 50 MCG
SPRAY, SUSPENSION (ML) NASAL
Qty: 3 BOTTLE | Refills: 0 | Status: ON HOLD | OUTPATIENT
Start: 2018-10-03 | End: 2018-11-19

## 2018-11-03 DIAGNOSIS — I10 ESSENTIAL HYPERTENSION: ICD-10-CM

## 2018-11-05 ENCOUNTER — OFFICE VISIT (OUTPATIENT)
Dept: INTERNAL MEDICINE | Facility: MEDICAL CENTER | Age: 73
End: 2018-11-05
Payer: MEDICARE

## 2018-11-05 VITALS
DIASTOLIC BLOOD PRESSURE: 78 MMHG | HEART RATE: 72 BPM | HEIGHT: 67 IN | WEIGHT: 243.6 LBS | TEMPERATURE: 97.6 F | SYSTOLIC BLOOD PRESSURE: 96 MMHG | BODY MASS INDEX: 38.24 KG/M2 | OXYGEN SATURATION: 94 %

## 2018-11-05 DIAGNOSIS — E03.9 ACQUIRED HYPOTHYROIDISM: ICD-10-CM

## 2018-11-05 PROCEDURE — 99214 OFFICE O/P EST MOD 30 MIN: CPT | Performed by: INTERNAL MEDICINE

## 2018-11-05 RX ORDER — M-VIT,TX,IRON,MINS/CALC/FOLIC 27MG-0.4MG
1 TABLET ORAL DAILY
Status: SHIPPED | COMMUNITY
End: 2019-05-14

## 2018-11-05 RX ORDER — ATORVASTATIN CALCIUM 40 MG/1
TABLET, FILM COATED ORAL
Qty: 90 TAB | Refills: 2 | Status: ON HOLD | OUTPATIENT
Start: 2018-11-05 | End: 2018-11-19

## 2018-11-05 RX ORDER — CHLORAL HYDRATE 500 MG
1000 CAPSULE ORAL 2 TIMES DAILY
COMMUNITY
End: 2019-01-17

## 2018-11-05 NOTE — PROGRESS NOTES
Geriatric Clinic Note  Patient: Angelo Magaña  Age: 73 y.o.   Gender: male  Author: Jeovany Samson M.D.  PCP: Jeovany Samson M.D.    Today's date: 11/05/18  Chief Complaint   Patient presents with   • Procedure     Surgery clearance for back        HPI:  History obtained from patient, medical chart.    Patient is coming in today for preoperative evaluation.  Patient is planning on having a back surgery on November 20 due to pain and resultant sciatica down the right side.  He is being evaluated by neurosurgery and I reviewed Dr. YON OROSCO's note on 9/26/2018.  It is noted the plan surgery is L2-L3 transforaminal lumbar interbody fusion, L3-4 hardware removal, L3 screening repositioning, L2 kyphoplasty.    Patient has a past medical history of multiple back surgeries.  He has had a complication from his back surgeries including infection.  Patient had an episode a few months ago which severely exacerbated his back pain.  He has had 2 step epidural steroid injections without any improvement.  His functionality is severely limited.  He says that he does not want to consider that TENS device.  He says the pain is too bad and he is really hoping to improve his functionality with his back surgery.  He appears to have understanding about worse case scenario.  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  He says that his daughter lives in town and that she is out of town right now but when she returns that she will be available to help care and substitute for the duties that he will be unable to accomplish    Does me that he still able to go on the recumbent bicycle for 40 minutes able to climb a flight of stairs.  He is mostly limited by pain and ever by shortness of breath or chest pain.    He denies any history of MI or stroke or diabetes.    The patient tells me he is going in tomorrow for EKG and labs for [facility] assessment.    Patient does not check his blood pressure at home.   "He denies any recent falls denies any dizziness with standing.  He does at times he feels that he is in a fall due to pain to his back.     assessment: 73-year-old male coming in for follow-up    Plan:  Preoperative assessment  -Patient's limited functionality makes determining his cardiovascular lung status questionable.  He has a history of bronchitis in which he is stable and uses Advair daily.  He has no other history of cardiovascular/pulmonary disease that I am aware of.  His obesity and age are his main risk factors however it is unclear exactly what type of wrist they play on this patient.  Overall he is likely intermediate risk but will wait results of EKG and labs prior to sending him his medical assessment for surgery  -I discussed with the patient that I was concerned about him going to surgery and the whole that would lead with the care for his wife and mother.  We discussed how the goal is functionality however we also discussed potential \"worst-case scenarios\".  He seemed to understand this and appeared to have appropriate apprehension about the surgery however he appears to evaluate his functionality a lot.  Due to his goals functionality and current limitations it seems reasonable to pursue surgery however I did stress that he should consider other conservative measures such as the TENS device.    Hypertension  -Patient on 10 mg lisinopril daily.  Denies any orthostatic symptoms with less dizziness with standing or recent falls.  I requested he start taking his blood pressure more at home and he likely we can likely cut back on his lisinopril however due to his close upcoming surgery do not want to make too many adjustments.  I requested he hold his lisinopril prior to his surgery due to his relatively low blood pressure on it.    Hypothyroidism  -We will recheck his TSH with T4    Health Maintenance: Patient has had his flu shot this year  Return in about 6 weeks (around 12/17/2018) for Long; post " surgery follow up. .     ROS:  A 14 point ROS is negative, other than as stated above.     Past Medical History:   Diagnosis Date   • ASTHMA     scheduled inhaler use   • Bronchitis 02/2018   • Erectile dysfunction 6/8/2016   • GERD (gastroesophageal reflux disease) 6/8/2016   • Heart burn    • History of skin cancer 6/8/2016   • Hyperlipidemia 6/8/2016   • Hypertension    • Hypothyroidism 6/8/2016   • Indigestion    • Obesity 6/8/2016   • Pain 04/19/2018    chronic back pain, 0/10   • Preventative health care 6/8/2016   • Rosacea 6/8/2016   • Seasonal allergies 6/8/2016     Social History     Social History   • Marital status:      Spouse name: N/A   • Number of children: N/A   • Years of education: N/A     Occupational History   • Not on file.     Social History Main Topics   • Smoking status: Never Smoker   • Smokeless tobacco: Never Used   • Alcohol use Yes      Comment: 1/week    • Drug use: No   • Sexual activity: Not on file     Other Topics Concern   • Not on file     Social History Narrative   • No narrative on file     Family History   Problem Relation Age of Onset   • Hypertension Mother    • Hypertension Father    • Lung Disease Father         asthma     Allergies: Patient has no known allergies.  Current Outpatient Prescriptions on File Prior to Visit   Medication Sig Dispense Refill   • ADVAIR DISKUS 100-50 MCG/DOSE AEROSOL POWDER, BREATH ACTIVATED INHALE 1 PUFF BY MOUTH TWICE A DAY *RINSE MOUTH AFTER USE* 3 Inhaler 0   • fluticasone (FLONASE) 50 MCG/ACT nasal spray SPRAY 1 SPRAY IN EACH NOSTRI EVERY DAY 3 Bottle 0   • atorvastatin (LIPITOR) 40 MG Tab TAKE 1 TABLET BY MOUTH EVERY DAY 90 Tab 0   • levothyroxine (SYNTHROID) 50 MCG Tab TAKE ONE TABLET BY MOUTH DAILY 90 Tab 0   • lisinopril (PRINIVIL) 10 MG Tab TAKE 1 TABLET BY MOUTH EVERY DAY 90 Tab 1   • aspirin 81 MG tablet Take 1 Tab by mouth every day. 100 Tab    • Cholecalciferol (VITAMIN D3) 3000 units Tab Take 3,000 Units by mouth every day.  "    • cetirizine (ZYRTEC) 10 MG Tab Take 10 mg by mouth every day.     • Probiotic Product (PROBIOTIC ADVANCED PO) Take 1 Tab by mouth every day.     • PRILOSEC 20 MG PO CPDR Take 20 mg by mouth every day. Takes daily     • celecoxib (CELEBREX) 200 MG Cap Take 1 Cap by mouth 2 times a day, with meals. (Patient not taking: Reported on 11/5/2018) 10 Cap 0   • Sennosides (SENNA) 8.6 MG Tab Take 1 Tab by mouth 2 times a day as needed. (Patient not taking: Reported on 11/5/2018) 60 Tab 0     No current facility-administered medications on file prior to visit.        Physical Exam:  BP (!) 94/70 (BP Location: Left arm, Patient Position: Sitting, BP Cuff Size: Large adult long)   Pulse 72   Temp 36.4 °C (97.6 °F)   Ht 1.695 m (5' 6.75\")   Wt 110.5 kg (243 lb 9.6 oz)   SpO2 94%   BMI 38.44 kg/m² Body mass index is 38.44 kg/m².  Repeat BP: 96/78  Gen: No acute distress, alert, oriented to person and situation  HEENT: Neck supple  CV: Regular rate and rhythm, 2+ radial pulses bilaterally  Lungs: Clear to auscultation bilaterally, normal left  Abd: Obese  Psych: Does not appear to be responding to internal stimuli    Labs: Reviewed recent labs    2012 nuclear medicine stress test did not suggest any acute cardiac issues    Results of MRI lumbar spine reviewed    Jeovany Samson M.D.  Geriatric Physician    This note was partially dictated with voice recognition software, for any confusion please do not hesitate to contact me.     "

## 2018-11-05 NOTE — TELEPHONE ENCOUNTER
Was the patient seen in the last year in this department? Yes   Last seen: 11/05/18 by Dr. Samson  Next appt: NONE      Does patient have an active prescription for medications requested? No     Received Request Via: Pharmacy

## 2018-11-06 ENCOUNTER — HOSPITAL ENCOUNTER (OUTPATIENT)
Dept: RADIOLOGY | Facility: MEDICAL CENTER | Age: 73
DRG: 454 | End: 2018-11-06
Attending: NEUROLOGICAL SURGERY | Admitting: NEUROLOGICAL SURGERY
Payer: MEDICARE

## 2018-11-06 ENCOUNTER — HOSPITAL ENCOUNTER (OUTPATIENT)
Facility: MEDICAL CENTER | Age: 73
End: 2018-11-06
Attending: INTERNAL MEDICINE
Payer: MEDICARE

## 2018-11-06 DIAGNOSIS — E03.9 ACQUIRED HYPOTHYROIDISM: ICD-10-CM

## 2018-11-06 DIAGNOSIS — Z01.812 PRE-OPERATIVE LABORATORY EXAMINATION: ICD-10-CM

## 2018-11-06 DIAGNOSIS — Z01.811 PRE-OPERATIVE RESPIRATORY EXAMINATION: ICD-10-CM

## 2018-11-06 DIAGNOSIS — Z01.810 PRE-OPERATIVE CARDIOVASCULAR EXAMINATION: ICD-10-CM

## 2018-11-06 LAB
ANION GAP SERPL CALC-SCNC: 10 MMOL/L (ref 0–11.9)
APTT PPP: 28.4 SEC (ref 24.7–36)
BASOPHILS # BLD AUTO: 0.8 % (ref 0–1.8)
BASOPHILS # BLD: 0.04 K/UL (ref 0–0.12)
BUN SERPL-MCNC: 10 MG/DL (ref 8–22)
CALCIUM SERPL-MCNC: 9.9 MG/DL (ref 8.5–10.5)
CHLORIDE SERPL-SCNC: 103 MMOL/L (ref 96–112)
CO2 SERPL-SCNC: 26 MMOL/L (ref 20–33)
CREAT SERPL-MCNC: 1.03 MG/DL (ref 0.5–1.4)
EKG IMPRESSION: NORMAL
EOSINOPHIL # BLD AUTO: 0.24 K/UL (ref 0–0.51)
EOSINOPHIL NFR BLD: 4.7 % (ref 0–6.9)
ERYTHROCYTE [DISTWIDTH] IN BLOOD BY AUTOMATED COUNT: 42.7 FL (ref 35.9–50)
GLUCOSE SERPL-MCNC: 92 MG/DL (ref 65–99)
HCT VFR BLD AUTO: 44.8 % (ref 42–52)
HGB BLD-MCNC: 14.9 G/DL (ref 14–18)
IMM GRANULOCYTES # BLD AUTO: 0.01 K/UL (ref 0–0.11)
IMM GRANULOCYTES NFR BLD AUTO: 0.2 % (ref 0–0.9)
INR PPP: 1.08 (ref 0.87–1.13)
LYMPHOCYTES # BLD AUTO: 1.4 K/UL (ref 1–4.8)
LYMPHOCYTES NFR BLD: 27.3 % (ref 22–41)
MCH RBC QN AUTO: 29.5 PG (ref 27–33)
MCHC RBC AUTO-ENTMCNC: 33.3 G/DL (ref 33.7–35.3)
MCV RBC AUTO: 88.7 FL (ref 81.4–97.8)
MONOCYTES # BLD AUTO: 0.39 K/UL (ref 0–0.85)
MONOCYTES NFR BLD AUTO: 7.6 % (ref 0–13.4)
NEUTROPHILS # BLD AUTO: 3.04 K/UL (ref 1.82–7.42)
NEUTROPHILS NFR BLD: 59.4 % (ref 44–72)
NRBC # BLD AUTO: 0 K/UL
NRBC BLD-RTO: 0 /100 WBC
PLATELET # BLD AUTO: 200 K/UL (ref 164–446)
PMV BLD AUTO: 9.5 FL (ref 9–12.9)
POTASSIUM SERPL-SCNC: 4.3 MMOL/L (ref 3.6–5.5)
PROTHROMBIN TIME: 14.1 SEC (ref 12–14.6)
RBC # BLD AUTO: 5.05 M/UL (ref 4.7–6.1)
SODIUM SERPL-SCNC: 139 MMOL/L (ref 135–145)
T4 FREE SERPL-MCNC: 0.89 NG/DL (ref 0.53–1.43)
TSH SERPL DL<=0.005 MIU/L-ACNC: 18.57 UIU/ML (ref 0.38–5.33)
WBC # BLD AUTO: 5.1 K/UL (ref 4.8–10.8)

## 2018-11-06 PROCEDURE — 36415 COLL VENOUS BLD VENIPUNCTURE: CPT

## 2018-11-06 PROCEDURE — 85730 THROMBOPLASTIN TIME PARTIAL: CPT

## 2018-11-06 PROCEDURE — 85025 COMPLETE CBC W/AUTO DIFF WBC: CPT

## 2018-11-06 PROCEDURE — 71046 X-RAY EXAM CHEST 2 VIEWS: CPT

## 2018-11-06 PROCEDURE — 85610 PROTHROMBIN TIME: CPT

## 2018-11-06 PROCEDURE — 80048 BASIC METABOLIC PNL TOTAL CA: CPT

## 2018-11-06 PROCEDURE — 93005 ELECTROCARDIOGRAM TRACING: CPT

## 2018-11-06 PROCEDURE — 84439 ASSAY OF FREE THYROXINE: CPT

## 2018-11-06 PROCEDURE — 84443 ASSAY THYROID STIM HORMONE: CPT

## 2018-11-06 PROCEDURE — 93010 ELECTROCARDIOGRAM REPORT: CPT | Performed by: INTERNAL MEDICINE

## 2018-11-07 ENCOUNTER — TELEPHONE (OUTPATIENT)
Dept: INTERNAL MEDICINE | Facility: MEDICAL CENTER | Age: 73
End: 2018-11-07

## 2018-11-07 DIAGNOSIS — E03.9 HYPOTHYROIDISM, UNSPECIFIED TYPE: ICD-10-CM

## 2018-11-07 NOTE — TELEPHONE ENCOUNTER
I called the patient.  I discussed that his labs and EKG looked overall fine.  His thyroid should not limit him from having his surgery and he will modify the way he is taking it as he was previously taking it with other pills and food.  He will now take it in the morning before taking any other pills or food.  See results note    I signed his paper that he is intermediate risk for surgery and will have it faxed over to his surgeon.

## 2018-11-19 ENCOUNTER — HOSPITAL ENCOUNTER (INPATIENT)
Facility: MEDICAL CENTER | Age: 73
LOS: 7 days | DRG: 454 | End: 2018-11-26
Attending: NEUROLOGICAL SURGERY | Admitting: NEUROLOGICAL SURGERY
Payer: MEDICARE

## 2018-11-19 ENCOUNTER — APPOINTMENT (OUTPATIENT)
Dept: RADIOLOGY | Facility: MEDICAL CENTER | Age: 73
DRG: 454 | End: 2018-11-19
Attending: NEUROLOGICAL SURGERY
Payer: MEDICARE

## 2018-11-19 DIAGNOSIS — M48.062 LUMBAR STENOSIS WITH NEUROGENIC CLAUDICATION: ICD-10-CM

## 2018-11-19 DIAGNOSIS — R52 PAIN: ICD-10-CM

## 2018-11-19 DIAGNOSIS — Z98.890 S/P LUMBAR LAMINECTOMY: ICD-10-CM

## 2018-11-19 LAB
ABO GROUP BLD: NORMAL
BLD GP AB SCN SERPL QL: NORMAL
ERYTHROCYTE [DISTWIDTH] IN BLOOD BY AUTOMATED COUNT: 43.3 FL (ref 35.9–50)
ERYTHROCYTE [DISTWIDTH] IN BLOOD BY AUTOMATED COUNT: 44.2 FL (ref 35.9–50)
EXPOSED MRN EXMRN: NORMAL
HBV SURFACE AG SER QL: NEGATIVE
HCT VFR BLD AUTO: 29.5 % (ref 42–52)
HCT VFR BLD AUTO: 40.8 % (ref 42–52)
HCV AB SER QL: NEGATIVE
HGB BLD-MCNC: 13.5 G/DL (ref 14–18)
HGB BLD-MCNC: 9.8 G/DL (ref 14–18)
HIV 1+2 AB+HIV1 P24 AG SERPL QL IA: NON REACTIVE
MCH RBC QN AUTO: 29.5 PG (ref 27–33)
MCH RBC QN AUTO: 29.9 PG (ref 27–33)
MCHC RBC AUTO-ENTMCNC: 33.1 G/DL (ref 33.7–35.3)
MCHC RBC AUTO-ENTMCNC: 33.2 G/DL (ref 33.7–35.3)
MCV RBC AUTO: 89.1 FL (ref 81.4–97.8)
MCV RBC AUTO: 89.9 FL (ref 81.4–97.8)
PLATELET # BLD AUTO: 159 K/UL (ref 164–446)
PLATELET # BLD AUTO: 260 K/UL (ref 164–446)
PMV BLD AUTO: 10.8 FL (ref 9–12.9)
PMV BLD AUTO: 9.5 FL (ref 9–12.9)
RBC # BLD AUTO: 3.28 M/UL (ref 4.7–6.1)
RBC # BLD AUTO: 4.58 M/UL (ref 4.7–6.1)
RH BLD: NORMAL
WBC # BLD AUTO: 7.9 K/UL (ref 4.8–10.8)
WBC # BLD AUTO: 7.9 K/UL (ref 4.8–10.8)

## 2018-11-19 PROCEDURE — 500864 HCHG NEEDLE, SPINAL 18G: Performed by: NEUROLOGICAL SURGERY

## 2018-11-19 PROCEDURE — 502240 HCHG MISC OR SUPPLY RC 0272: Performed by: NEUROLOGICAL SURGERY

## 2018-11-19 PROCEDURE — 07DR3ZZ EXTRACTION OF ILIAC BONE MARROW, PERCUTANEOUS APPROACH: ICD-10-PCS | Performed by: NEUROLOGICAL SURGERY

## 2018-11-19 PROCEDURE — 110454 HCHG SHELL REV 250: Performed by: NEUROLOGICAL SURGERY

## 2018-11-19 PROCEDURE — A4314 CATH W/DRAINAGE 2-WAY LATEX: HCPCS | Performed by: NEUROLOGICAL SURGERY

## 2018-11-19 PROCEDURE — 86850 RBC ANTIBODY SCREEN: CPT

## 2018-11-19 PROCEDURE — 95925 SOMATOSENSORY TESTING: CPT | Performed by: NEUROLOGICAL SURGERY

## 2018-11-19 PROCEDURE — 501445 HCHG STAPLER, SKIN DISP: Performed by: NEUROLOGICAL SURGERY

## 2018-11-19 PROCEDURE — 160041 HCHG SURGERY MINUTES - EA ADDL 1 MIN LEVEL 4: Performed by: NEUROLOGICAL SURGERY

## 2018-11-19 PROCEDURE — 160009 HCHG ANES TIME/MIN: Performed by: NEUROLOGICAL SURGERY

## 2018-11-19 PROCEDURE — 160036 HCHG PACU - EA ADDL 30 MINS PHASE I: Performed by: NEUROLOGICAL SURGERY

## 2018-11-19 PROCEDURE — 86901 BLOOD TYPING SEROLOGIC RH(D): CPT

## 2018-11-19 PROCEDURE — 95861 NEEDLE EMG 2 EXTREMITIES: CPT | Performed by: NEUROLOGICAL SURGERY

## 2018-11-19 PROCEDURE — 500512 HCHG ENDO PEANUT: Performed by: NEUROLOGICAL SURGERY

## 2018-11-19 PROCEDURE — 0ST20ZZ RESECTION OF LUMBAR VERTEBRAL DISC, OPEN APPROACH: ICD-10-PCS | Performed by: NEUROLOGICAL SURGERY

## 2018-11-19 PROCEDURE — 160035 HCHG PACU - 1ST 60 MINS PHASE I: Performed by: NEUROLOGICAL SURGERY

## 2018-11-19 PROCEDURE — 700102 HCHG RX REV CODE 250 W/ 637 OVERRIDE(OP): Performed by: ANESTHESIOLOGY

## 2018-11-19 PROCEDURE — 160002 HCHG RECOVERY MINUTES (STAT): Performed by: NEUROLOGICAL SURGERY

## 2018-11-19 PROCEDURE — 85027 COMPLETE CBC AUTOMATED: CPT

## 2018-11-19 PROCEDURE — A9270 NON-COVERED ITEM OR SERVICE: HCPCS | Performed by: PHYSICIAN ASSISTANT

## 2018-11-19 PROCEDURE — 86900 BLOOD TYPING SEROLOGIC ABO: CPT

## 2018-11-19 PROCEDURE — 160029 HCHG SURGERY MINUTES - 1ST 30 MINS LEVEL 4: Performed by: NEUROLOGICAL SURGERY

## 2018-11-19 PROCEDURE — 500331 HCHG COTTONOID, SURG PATTIE: Performed by: NEUROLOGICAL SURGERY

## 2018-11-19 PROCEDURE — 700111 HCHG RX REV CODE 636 W/ 250 OVERRIDE (IP)

## 2018-11-19 PROCEDURE — 501838 HCHG SUTURE GENERAL: Performed by: NEUROLOGICAL SURGERY

## 2018-11-19 PROCEDURE — 4A11X4G MONITORING OF PERIPHERAL NERVOUS ELECTRICAL ACTIVITY, INTRAOPERATIVE, EXTERNAL APPROACH: ICD-10-PCS | Performed by: NEUROLOGICAL SURGERY

## 2018-11-19 PROCEDURE — 95940 IONM IN OPERATNG ROOM 15 MIN: CPT | Performed by: NEUROLOGICAL SURGERY

## 2018-11-19 PROCEDURE — 700102 HCHG RX REV CODE 250 W/ 637 OVERRIDE(OP): Performed by: PHYSICIAN ASSISTANT

## 2018-11-19 PROCEDURE — 500885 HCHG PACK, JACKSON TABLE: Performed by: NEUROLOGICAL SURGERY

## 2018-11-19 PROCEDURE — 500444 HCHG HEMOSTAT, SURGICEL 2X3: Performed by: NEUROLOGICAL SURGERY

## 2018-11-19 PROCEDURE — 0QP004Z REMOVAL OF INTERNAL FIXATION DEVICE FROM LUMBAR VERTEBRA, OPEN APPROACH: ICD-10-PCS | Performed by: NEUROLOGICAL SURGERY

## 2018-11-19 PROCEDURE — 160048 HCHG OR STATISTICAL LEVEL 1-5: Performed by: NEUROLOGICAL SURGERY

## 2018-11-19 PROCEDURE — 110371 HCHG SHELL REV 272: Performed by: NEUROLOGICAL SURGERY

## 2018-11-19 PROCEDURE — 502000 HCHG MISC OR IMPLANTS RC 0278: Performed by: NEUROLOGICAL SURGERY

## 2018-11-19 PROCEDURE — 72100 X-RAY EXAM L-S SPINE 2/3 VWS: CPT

## 2018-11-19 PROCEDURE — 95937 NEUROMUSCULAR JUNCTION TEST: CPT | Performed by: NEUROLOGICAL SURGERY

## 2018-11-19 PROCEDURE — 700111 HCHG RX REV CODE 636 W/ 250 OVERRIDE (IP): Performed by: PHYSICIAN ASSISTANT

## 2018-11-19 PROCEDURE — 700101 HCHG RX REV CODE 250: Performed by: PHYSICIAN ASSISTANT

## 2018-11-19 PROCEDURE — C1713 ANCHOR/SCREW BN/BN,TIS/BN: HCPCS | Performed by: NEUROLOGICAL SURGERY

## 2018-11-19 PROCEDURE — 500367 HCHG DRAIN KIT, HEMOVAC: Performed by: NEUROLOGICAL SURGERY

## 2018-11-19 PROCEDURE — 700101 HCHG RX REV CODE 250

## 2018-11-19 PROCEDURE — 0SG0071 FUSION OF LUMBAR VERTEBRAL JOINT WITH AUTOLOGOUS TISSUE SUBSTITUTE, POSTERIOR APPROACH, POSTERIOR COLUMN, OPEN APPROACH: ICD-10-PCS | Performed by: NEUROLOGICAL SURGERY

## 2018-11-19 PROCEDURE — 770022 HCHG ROOM/CARE - ICU (200)

## 2018-11-19 PROCEDURE — A9270 NON-COVERED ITEM OR SERVICE: HCPCS | Performed by: ANESTHESIOLOGY

## 2018-11-19 PROCEDURE — 0SG00AJ FUSION OF LUMBAR VERTEBRAL JOINT WITH INTERBODY FUSION DEVICE, POSTERIOR APPROACH, ANTERIOR COLUMN, OPEN APPROACH: ICD-10-PCS | Performed by: NEUROLOGICAL SURGERY

## 2018-11-19 DEVICE — SCREW SOLERA SET SCREW (1TCX40+3TCX21+2TCX10=123): Type: IMPLANTABLE DEVICE | Site: BACK | Status: FUNCTIONAL

## 2018-11-19 DEVICE — ROD PREBENT TITANIUM 5.5 X 40MM (2TCONX2=4): Type: IMPLANTABLE DEVICE | Site: BACK | Status: FUNCTIONAL

## 2018-11-19 DEVICE — SCREW MAS SOLERA 7.5 X 50MM (1TCX8+3TCX8=32): Type: IMPLANTABLE DEVICE | Site: BACK | Status: FUNCTIONAL

## 2018-11-19 DEVICE — SCREW MAS  SOLERA 6.5 X 50MM (1TCX16+3TCX8=40): Type: IMPLANTABLE DEVICE | Site: BACK | Status: FUNCTIONAL

## 2018-11-19 DEVICE — IMPLANTABLE DEVICE: Type: IMPLANTABLE DEVICE | Site: BACK | Status: FUNCTIONAL

## 2018-11-19 RX ORDER — AMOXICILLIN 250 MG
1 CAPSULE ORAL
Status: DISCONTINUED | OUTPATIENT
Start: 2018-11-19 | End: 2018-11-26 | Stop reason: HOSPADM

## 2018-11-19 RX ORDER — GABAPENTIN 300 MG/1
300 CAPSULE ORAL ONCE
Status: COMPLETED | OUTPATIENT
Start: 2018-11-19 | End: 2018-11-19

## 2018-11-19 RX ORDER — DOCUSATE SODIUM 100 MG/1
100 CAPSULE, LIQUID FILLED ORAL 2 TIMES DAILY
Status: DISCONTINUED | OUTPATIENT
Start: 2018-11-19 | End: 2018-11-26 | Stop reason: HOSPADM

## 2018-11-19 RX ORDER — HYDROMORPHONE HYDROCHLORIDE 1 MG/ML
0.5 INJECTION, SOLUTION INTRAMUSCULAR; INTRAVENOUS; SUBCUTANEOUS
Status: DISCONTINUED | OUTPATIENT
Start: 2018-11-19 | End: 2018-11-19 | Stop reason: HOSPADM

## 2018-11-19 RX ORDER — LEVOTHYROXINE SODIUM 0.05 MG/1
50 TABLET ORAL
Status: DISCONTINUED | OUTPATIENT
Start: 2018-11-20 | End: 2018-11-26 | Stop reason: HOSPADM

## 2018-11-19 RX ORDER — BISACODYL 10 MG
10 SUPPOSITORY, RECTAL RECTAL
Status: DISCONTINUED | OUTPATIENT
Start: 2018-11-19 | End: 2018-11-24

## 2018-11-19 RX ORDER — CETIRIZINE HYDROCHLORIDE 10 MG/1
10 TABLET ORAL DAILY
Status: DISCONTINUED | OUTPATIENT
Start: 2018-11-19 | End: 2018-11-26 | Stop reason: HOSPADM

## 2018-11-19 RX ORDER — POLYETHYLENE GLYCOL 3350 17 G/17G
1 POWDER, FOR SOLUTION ORAL 2 TIMES DAILY PRN
Status: DISCONTINUED | OUTPATIENT
Start: 2018-11-19 | End: 2018-11-21

## 2018-11-19 RX ORDER — ATORVASTATIN CALCIUM 40 MG/1
40 TABLET, FILM COATED ORAL NIGHTLY
Status: DISCONTINUED | OUTPATIENT
Start: 2018-11-19 | End: 2018-11-26 | Stop reason: HOSPADM

## 2018-11-19 RX ORDER — VANCOMYCIN HYDROCHLORIDE 500 MG/10ML
INJECTION, POWDER, LYOPHILIZED, FOR SOLUTION INTRAVENOUS
Status: COMPLETED | OUTPATIENT
Start: 2018-11-19 | End: 2018-11-19

## 2018-11-19 RX ORDER — BACITRACIN 50000 [IU]/1
INJECTION, POWDER, FOR SOLUTION INTRAMUSCULAR
Status: DISCONTINUED | OUTPATIENT
Start: 2018-11-19 | End: 2018-11-19 | Stop reason: HOSPADM

## 2018-11-19 RX ORDER — AMOXICILLIN 250 MG
1 CAPSULE ORAL NIGHTLY
Status: DISCONTINUED | OUTPATIENT
Start: 2018-11-19 | End: 2018-11-26 | Stop reason: HOSPADM

## 2018-11-19 RX ORDER — HYDROMORPHONE HYDROCHLORIDE 1 MG/ML
0.2 INJECTION, SOLUTION INTRAMUSCULAR; INTRAVENOUS; SUBCUTANEOUS
Status: DISCONTINUED | OUTPATIENT
Start: 2018-11-19 | End: 2018-11-19 | Stop reason: HOSPADM

## 2018-11-19 RX ORDER — ACETAMINOPHEN 500 MG
500 TABLET ORAL EVERY 6 HOURS PRN
Status: DISCONTINUED | OUTPATIENT
Start: 2018-11-19 | End: 2018-11-21

## 2018-11-19 RX ORDER — BUPIVACAINE HYDROCHLORIDE AND EPINEPHRINE 5; 5 MG/ML; UG/ML
INJECTION, SOLUTION EPIDURAL; INTRACAUDAL; PERINEURAL
Status: DISCONTINUED | OUTPATIENT
Start: 2018-11-19 | End: 2018-11-19 | Stop reason: HOSPADM

## 2018-11-19 RX ORDER — MORPHINE SULFATE 10 MG/ML
2 INJECTION, SOLUTION INTRAMUSCULAR; INTRAVENOUS EVERY 4 HOURS PRN
Status: DISCONTINUED | OUTPATIENT
Start: 2018-11-19 | End: 2018-11-20

## 2018-11-19 RX ORDER — CYCLOBENZAPRINE HCL 10 MG
10 TABLET ORAL EVERY 8 HOURS PRN
Status: DISCONTINUED | OUTPATIENT
Start: 2018-11-19 | End: 2018-11-20

## 2018-11-19 RX ORDER — LABETALOL HYDROCHLORIDE 5 MG/ML
5 INJECTION, SOLUTION INTRAVENOUS
Status: DISCONTINUED | OUTPATIENT
Start: 2018-11-19 | End: 2018-11-19 | Stop reason: HOSPADM

## 2018-11-19 RX ORDER — ONDANSETRON 2 MG/ML
4 INJECTION INTRAMUSCULAR; INTRAVENOUS
Status: DISCONTINUED | OUTPATIENT
Start: 2018-11-19 | End: 2018-11-19 | Stop reason: HOSPADM

## 2018-11-19 RX ORDER — SODIUM CHLORIDE, SODIUM LACTATE, POTASSIUM CHLORIDE, AND CALCIUM CHLORIDE .6; .31; .03; .02 G/100ML; G/100ML; G/100ML; G/100ML
IRRIGANT IRRIGATION
Status: DISCONTINUED | OUTPATIENT
Start: 2018-11-19 | End: 2018-11-19 | Stop reason: HOSPADM

## 2018-11-19 RX ORDER — HALOPERIDOL 5 MG/ML
1 INJECTION INTRAMUSCULAR
Status: DISCONTINUED | OUTPATIENT
Start: 2018-11-19 | End: 2018-11-19 | Stop reason: HOSPADM

## 2018-11-19 RX ORDER — LORAZEPAM 2 MG/ML
0.5 INJECTION INTRAMUSCULAR
Status: DISCONTINUED | OUTPATIENT
Start: 2018-11-19 | End: 2018-11-19 | Stop reason: HOSPADM

## 2018-11-19 RX ORDER — DIPHENHYDRAMINE HYDROCHLORIDE 50 MG/ML
12.5 INJECTION INTRAMUSCULAR; INTRAVENOUS
Status: DISCONTINUED | OUTPATIENT
Start: 2018-11-19 | End: 2018-11-19 | Stop reason: HOSPADM

## 2018-11-19 RX ORDER — CALCIUM CHLORIDE 100 MG/ML
INJECTION INTRAVENOUS; INTRAVENTRICULAR
Status: DISCONTINUED | OUTPATIENT
Start: 2018-11-19 | End: 2018-11-19 | Stop reason: HOSPADM

## 2018-11-19 RX ORDER — OXYCODONE HCL 5 MG/5 ML
5 SOLUTION, ORAL ORAL
Status: DISCONTINUED | OUTPATIENT
Start: 2018-11-19 | End: 2018-11-19 | Stop reason: HOSPADM

## 2018-11-19 RX ORDER — ATORVASTATIN CALCIUM 40 MG/1
40 TABLET, FILM COATED ORAL NIGHTLY
COMMUNITY
End: 2020-03-10 | Stop reason: SDUPTHER

## 2018-11-19 RX ORDER — OXYCODONE HYDROCHLORIDE 5 MG/1
10 TABLET ORAL
Status: DISCONTINUED | OUTPATIENT
Start: 2018-11-19 | End: 2018-11-19 | Stop reason: HOSPADM

## 2018-11-19 RX ORDER — MEPERIDINE HYDROCHLORIDE 25 MG/ML
12.5 INJECTION INTRAMUSCULAR; INTRAVENOUS; SUBCUTANEOUS
Status: DISCONTINUED | OUTPATIENT
Start: 2018-11-19 | End: 2018-11-19 | Stop reason: HOSPADM

## 2018-11-19 RX ORDER — HYDROMORPHONE HYDROCHLORIDE 1 MG/ML
0.4 INJECTION, SOLUTION INTRAMUSCULAR; INTRAVENOUS; SUBCUTANEOUS
Status: DISCONTINUED | OUTPATIENT
Start: 2018-11-19 | End: 2018-11-19 | Stop reason: HOSPADM

## 2018-11-19 RX ORDER — BUDESONIDE AND FORMOTEROL FUMARATE DIHYDRATE 80; 4.5 UG/1; UG/1
2 AEROSOL RESPIRATORY (INHALATION)
Status: DISCONTINUED | OUTPATIENT
Start: 2018-11-19 | End: 2018-11-21

## 2018-11-19 RX ORDER — LISINOPRIL 10 MG/1
10 TABLET ORAL DAILY
Status: DISCONTINUED | OUTPATIENT
Start: 2018-11-20 | End: 2018-11-26 | Stop reason: HOSPADM

## 2018-11-19 RX ORDER — SODIUM CHLORIDE AND POTASSIUM CHLORIDE 150; 900 MG/100ML; MG/100ML
INJECTION, SOLUTION INTRAVENOUS CONTINUOUS
Status: DISCONTINUED | OUTPATIENT
Start: 2018-11-19 | End: 2018-11-20

## 2018-11-19 RX ORDER — ACETAMINOPHEN 500 MG
1000 TABLET ORAL ONCE
Status: COMPLETED | OUTPATIENT
Start: 2018-11-19 | End: 2018-11-19

## 2018-11-19 RX ORDER — OXYCODONE HYDROCHLORIDE 5 MG/1
5 TABLET ORAL
Status: DISCONTINUED | OUTPATIENT
Start: 2018-11-19 | End: 2018-11-19 | Stop reason: HOSPADM

## 2018-11-19 RX ORDER — OXYCODONE AND ACETAMINOPHEN 10; 325 MG/1; MG/1
1-2 TABLET ORAL EVERY 6 HOURS PRN
Status: DISCONTINUED | OUTPATIENT
Start: 2018-11-19 | End: 2018-11-20

## 2018-11-19 RX ORDER — SODIUM CHLORIDE, SODIUM LACTATE, POTASSIUM CHLORIDE, CALCIUM CHLORIDE 600; 310; 30; 20 MG/100ML; MG/100ML; MG/100ML; MG/100ML
INJECTION, SOLUTION INTRAVENOUS CONTINUOUS
Status: DISCONTINUED | OUTPATIENT
Start: 2018-11-19 | End: 2018-11-19 | Stop reason: HOSPADM

## 2018-11-19 RX ORDER — HYDROCODONE BITARTRATE AND ACETAMINOPHEN 10; 325 MG/1; MG/1
1 TABLET ORAL EVERY 4 HOURS PRN
Status: DISCONTINUED | OUTPATIENT
Start: 2018-11-19 | End: 2018-11-21

## 2018-11-19 RX ORDER — ENEMA 19; 7 G/133ML; G/133ML
1 ENEMA RECTAL
Status: DISCONTINUED | OUTPATIENT
Start: 2018-11-19 | End: 2018-11-26 | Stop reason: HOSPADM

## 2018-11-19 RX ORDER — CLONIDINE HYDROCHLORIDE 0.1 MG/1
0.1 TABLET ORAL EVERY 4 HOURS PRN
Status: DISCONTINUED | OUTPATIENT
Start: 2018-11-19 | End: 2018-11-26 | Stop reason: HOSPADM

## 2018-11-19 RX ORDER — OXYCODONE HCL 5 MG/5 ML
10 SOLUTION, ORAL ORAL
Status: DISCONTINUED | OUTPATIENT
Start: 2018-11-19 | End: 2018-11-19 | Stop reason: HOSPADM

## 2018-11-19 RX ORDER — OMEPRAZOLE 20 MG/1
20 CAPSULE, DELAYED RELEASE ORAL DAILY
Status: DISCONTINUED | OUTPATIENT
Start: 2018-11-19 | End: 2018-11-20

## 2018-11-19 RX ORDER — SODIUM CHLORIDE, SODIUM LACTATE, POTASSIUM CHLORIDE, CALCIUM CHLORIDE 600; 310; 30; 20 MG/100ML; MG/100ML; MG/100ML; MG/100ML
INJECTION, SOLUTION INTRAVENOUS ONCE
Status: COMPLETED | OUTPATIENT
Start: 2018-11-19 | End: 2018-11-19

## 2018-11-19 RX ORDER — HYDRALAZINE HYDROCHLORIDE 20 MG/ML
5 INJECTION INTRAMUSCULAR; INTRAVENOUS
Status: DISCONTINUED | OUTPATIENT
Start: 2018-11-19 | End: 2018-11-19 | Stop reason: HOSPADM

## 2018-11-19 RX ADMIN — ATORVASTATIN CALCIUM 40 MG: 40 TABLET, FILM COATED ORAL at 20:26

## 2018-11-19 RX ADMIN — ACETAMINOPHEN 1000 MG: 500 TABLET, FILM COATED ORAL at 10:54

## 2018-11-19 RX ADMIN — HYDROCODONE BITARTRATE AND ACETAMINOPHEN 1 TABLET: 10; 325 TABLET ORAL at 20:26

## 2018-11-19 RX ADMIN — STANDARDIZED SENNA CONCENTRATE AND DOCUSATE SODIUM 1 TABLET: 8.6; 5 TABLET, FILM COATED ORAL at 21:00

## 2018-11-19 RX ADMIN — OMEPRAZOLE 20 MG: 20 CAPSULE, DELAYED RELEASE ORAL at 20:26

## 2018-11-19 RX ADMIN — MORPHINE SULFATE 2 MG: 10 INJECTION INTRAVENOUS at 22:54

## 2018-11-19 RX ADMIN — GABAPENTIN 300 MG: 300 CAPSULE ORAL at 10:54

## 2018-11-19 RX ADMIN — STANDARDIZED SENNA CONCENTRATE AND DOCUSATE SODIUM 1 TABLET: 8.6; 5 TABLET, FILM COATED ORAL at 20:26

## 2018-11-19 RX ADMIN — SODIUM CHLORIDE, SODIUM LACTATE, POTASSIUM CHLORIDE, CALCIUM CHLORIDE: 600; 310; 30; 20 INJECTION, SOLUTION INTRAVENOUS at 11:07

## 2018-11-19 RX ADMIN — POTASSIUM CHLORIDE AND SODIUM CHLORIDE: 900; 150 INJECTION, SOLUTION INTRAVENOUS at 22:00

## 2018-11-19 ASSESSMENT — PAIN SCALES - GENERAL
PAINLEVEL_OUTOF10: 2
PAINLEVEL_OUTOF10: 4
PAINLEVEL_OUTOF10: 0
PAINLEVEL_OUTOF10: 0
PAINLEVEL_OUTOF10: 7
PAINLEVEL_OUTOF10: 4
PAINLEVEL_OUTOF10: 0
PAINLEVEL_OUTOF10: 5
PAINLEVEL_OUTOF10: 4
PAINLEVEL_OUTOF10: 10
PAINLEVEL_OUTOF10: 5
PAINLEVEL_OUTOF10: 4
PAINLEVEL_OUTOF10: 0

## 2018-11-19 NOTE — OR NURSING
Pre op assessment completed. Pt and wife educated on surgical POC. All questions answered. Bed in low position and locked. Educated on call light use and call light in reach. Hourly rounding in place.

## 2018-11-20 PROBLEM — Z98.890 S/P LUMBAR LAMINECTOMY: Status: ACTIVE | Noted: 2018-11-20

## 2018-11-20 LAB
ANION GAP SERPL CALC-SCNC: 5 MMOL/L (ref 0–11.9)
ANION GAP SERPL CALC-SCNC: 7 MMOL/L (ref 0–11.9)
BUN SERPL-MCNC: 10 MG/DL (ref 8–22)
BUN SERPL-MCNC: 11 MG/DL (ref 8–22)
CALCIUM SERPL-MCNC: 8 MG/DL (ref 8.5–10.5)
CALCIUM SERPL-MCNC: 8.4 MG/DL (ref 8.5–10.5)
CHLORIDE SERPL-SCNC: 106 MMOL/L (ref 96–112)
CHLORIDE SERPL-SCNC: 108 MMOL/L (ref 96–112)
CO2 SERPL-SCNC: 23 MMOL/L (ref 20–33)
CO2 SERPL-SCNC: 25 MMOL/L (ref 20–33)
CREAT SERPL-MCNC: 0.79 MG/DL (ref 0.5–1.4)
CREAT SERPL-MCNC: 0.82 MG/DL (ref 0.5–1.4)
ERYTHROCYTE [DISTWIDTH] IN BLOOD BY AUTOMATED COUNT: 43.7 FL (ref 35.9–50)
GLUCOSE SERPL-MCNC: 110 MG/DL (ref 65–99)
GLUCOSE SERPL-MCNC: 125 MG/DL (ref 65–99)
HCT VFR BLD AUTO: 25.7 % (ref 42–52)
HCT VFR BLD AUTO: 28.8 % (ref 42–52)
HCT VFR BLD AUTO: 29.4 % (ref 42–52)
HGB BLD-MCNC: 8.5 G/DL (ref 14–18)
HGB BLD-MCNC: 9.3 G/DL (ref 14–18)
HGB BLD-MCNC: 9.7 G/DL (ref 14–18)
MCH RBC QN AUTO: 29.5 PG (ref 27–33)
MCHC RBC AUTO-ENTMCNC: 32.3 G/DL (ref 33.7–35.3)
MCV RBC AUTO: 91.4 FL (ref 81.4–97.8)
PLATELET # BLD AUTO: 140 K/UL (ref 164–446)
PMV BLD AUTO: 9.6 FL (ref 9–12.9)
POTASSIUM SERPL-SCNC: 4.8 MMOL/L (ref 3.6–5.5)
POTASSIUM SERPL-SCNC: 5.1 MMOL/L (ref 3.6–5.5)
RBC # BLD AUTO: 3.15 M/UL (ref 4.7–6.1)
SODIUM SERPL-SCNC: 136 MMOL/L (ref 135–145)
SODIUM SERPL-SCNC: 138 MMOL/L (ref 135–145)
TROPONIN I SERPL-MCNC: 0.02 NG/ML (ref 0–0.04)
WBC # BLD AUTO: 8 K/UL (ref 4.8–10.8)

## 2018-11-20 PROCEDURE — 700111 HCHG RX REV CODE 636 W/ 250 OVERRIDE (IP): Performed by: NURSE PRACTITIONER

## 2018-11-20 PROCEDURE — 85027 COMPLETE CBC AUTOMATED: CPT

## 2018-11-20 PROCEDURE — G8979 MOBILITY GOAL STATUS: HCPCS | Mod: CI

## 2018-11-20 PROCEDURE — 700105 HCHG RX REV CODE 258: Performed by: NURSE PRACTITIONER

## 2018-11-20 PROCEDURE — 85018 HEMOGLOBIN: CPT

## 2018-11-20 PROCEDURE — 85014 HEMATOCRIT: CPT

## 2018-11-20 PROCEDURE — 700112 HCHG RX REV CODE 229: Performed by: PHYSICIAN ASSISTANT

## 2018-11-20 PROCEDURE — 97163 PT EVAL HIGH COMPLEX 45 MIN: CPT

## 2018-11-20 PROCEDURE — 700102 HCHG RX REV CODE 250 W/ 637 OVERRIDE(OP): Performed by: PHYSICIAN ASSISTANT

## 2018-11-20 PROCEDURE — 700111 HCHG RX REV CODE 636 W/ 250 OVERRIDE (IP): Performed by: PHYSICIAN ASSISTANT

## 2018-11-20 PROCEDURE — A9270 NON-COVERED ITEM OR SERVICE: HCPCS | Performed by: NURSE PRACTITIONER

## 2018-11-20 PROCEDURE — A9270 NON-COVERED ITEM OR SERVICE: HCPCS | Performed by: PHYSICIAN ASSISTANT

## 2018-11-20 PROCEDURE — 700102 HCHG RX REV CODE 250 W/ 637 OVERRIDE(OP): Performed by: HOSPITALIST

## 2018-11-20 PROCEDURE — 80048 BASIC METABOLIC PNL TOTAL CA: CPT

## 2018-11-20 PROCEDURE — 770022 HCHG ROOM/CARE - ICU (200)

## 2018-11-20 PROCEDURE — 99222 1ST HOSP IP/OBS MODERATE 55: CPT | Performed by: HOSPITALIST

## 2018-11-20 PROCEDURE — G8988 SELF CARE GOAL STATUS: HCPCS | Mod: CI

## 2018-11-20 PROCEDURE — 700101 HCHG RX REV CODE 250: Performed by: PHYSICIAN ASSISTANT

## 2018-11-20 PROCEDURE — 84484 ASSAY OF TROPONIN QUANT: CPT

## 2018-11-20 PROCEDURE — 700102 HCHG RX REV CODE 250 W/ 637 OVERRIDE(OP): Performed by: NURSE PRACTITIONER

## 2018-11-20 PROCEDURE — A9270 NON-COVERED ITEM OR SERVICE: HCPCS | Performed by: HOSPITALIST

## 2018-11-20 PROCEDURE — G8987 SELF CARE CURRENT STATUS: HCPCS | Mod: CL

## 2018-11-20 PROCEDURE — G8978 MOBILITY CURRENT STATUS: HCPCS | Mod: CL

## 2018-11-20 PROCEDURE — 97165 OT EVAL LOW COMPLEX 30 MIN: CPT

## 2018-11-20 RX ORDER — OXYCODONE HYDROCHLORIDE 10 MG/1
20 TABLET ORAL
Status: DISCONTINUED | OUTPATIENT
Start: 2018-11-20 | End: 2018-11-25

## 2018-11-20 RX ORDER — OMEPRAZOLE 20 MG/1
20 CAPSULE, DELAYED RELEASE ORAL 2 TIMES DAILY
Status: DISCONTINUED | OUTPATIENT
Start: 2018-11-20 | End: 2018-11-26 | Stop reason: HOSPADM

## 2018-11-20 RX ORDER — OMEPRAZOLE 20 MG/1
20 CAPSULE, DELAYED RELEASE ORAL 2 TIMES DAILY
Status: DISCONTINUED | OUTPATIENT
Start: 2018-11-21 | End: 2018-11-20

## 2018-11-20 RX ORDER — OXYCODONE HYDROCHLORIDE 10 MG/1
10 TABLET ORAL
Status: DISCONTINUED | OUTPATIENT
Start: 2018-11-20 | End: 2018-11-25

## 2018-11-20 RX ORDER — SODIUM CHLORIDE 9 MG/ML
INJECTION, SOLUTION INTRAVENOUS
Status: ACTIVE
Start: 2018-11-20 | End: 2018-11-21

## 2018-11-20 RX ORDER — SODIUM CHLORIDE 9 MG/ML
INJECTION, SOLUTION INTRAVENOUS CONTINUOUS
Status: DISCONTINUED | OUTPATIENT
Start: 2018-11-20 | End: 2018-11-21

## 2018-11-20 RX ORDER — MORPHINE SULFATE 10 MG/ML
2-5 INJECTION, SOLUTION INTRAMUSCULAR; INTRAVENOUS
Status: DISCONTINUED | OUTPATIENT
Start: 2018-11-20 | End: 2018-11-26 | Stop reason: HOSPADM

## 2018-11-20 RX ADMIN — OMEPRAZOLE 20 MG: 20 CAPSULE, DELAYED RELEASE ORAL at 22:38

## 2018-11-20 RX ADMIN — MORPHINE SULFATE 2 MG: 10 INJECTION INTRAVENOUS at 14:52

## 2018-11-20 RX ADMIN — DOCUSATE SODIUM 100 MG: 100 CAPSULE, LIQUID FILLED ORAL at 18:14

## 2018-11-20 RX ADMIN — ATORVASTATIN CALCIUM 40 MG: 40 TABLET, FILM COATED ORAL at 19:57

## 2018-11-20 RX ADMIN — HYDROCODONE BITARTRATE AND ACETAMINOPHEN 1 TABLET: 10; 325 TABLET ORAL at 02:46

## 2018-11-20 RX ADMIN — STANDARDIZED SENNA CONCENTRATE AND DOCUSATE SODIUM 1 TABLET: 8.6; 5 TABLET, FILM COATED ORAL at 19:57

## 2018-11-20 RX ADMIN — OMEPRAZOLE 20 MG: 20 CAPSULE, DELAYED RELEASE ORAL at 06:44

## 2018-11-20 RX ADMIN — MORPHINE SULFATE 3 MG: 10 INJECTION INTRAVENOUS at 17:04

## 2018-11-20 RX ADMIN — POTASSIUM CHLORIDE AND SODIUM CHLORIDE: 900; 150 INJECTION, SOLUTION INTRAVENOUS at 07:45

## 2018-11-20 RX ADMIN — BUDESONIDE AND FORMOTEROL FUMARATE DIHYDRATE 2 PUFF: 80; 4.5 AEROSOL RESPIRATORY (INHALATION) at 23:02

## 2018-11-20 RX ADMIN — CETIRIZINE HYDROCHLORIDE 10 MG: 10 TABLET, FILM COATED ORAL at 06:43

## 2018-11-20 RX ADMIN — LEVOTHYROXINE SODIUM 50 MCG: 50 TABLET ORAL at 06:44

## 2018-11-20 RX ADMIN — SODIUM CHLORIDE: 9 INJECTION, SOLUTION INTRAVENOUS at 14:50

## 2018-11-20 RX ADMIN — METHOCARBAMOL 1000 MG: 100 INJECTION INTRAMUSCULAR; INTRAVENOUS at 20:05

## 2018-11-20 RX ADMIN — OXYCODONE HYDROCHLORIDE AND ACETAMINOPHEN 1 TABLET: 10; 325 TABLET ORAL at 07:45

## 2018-11-20 RX ADMIN — OXYCODONE HYDROCHLORIDE 10 MG: 10 TABLET ORAL at 20:05

## 2018-11-20 RX ADMIN — OXYCODONE HYDROCHLORIDE AND ACETAMINOPHEN 1 TABLET: 10; 325 TABLET ORAL at 14:15

## 2018-11-20 RX ADMIN — DOCUSATE SODIUM 100 MG: 100 CAPSULE, LIQUID FILLED ORAL at 06:44

## 2018-11-20 RX ADMIN — MORPHINE SULFATE 3 MG: 10 INJECTION INTRAVENOUS at 22:07

## 2018-11-20 ASSESSMENT — COGNITIVE AND FUNCTIONAL STATUS - GENERAL
CLIMB 3 TO 5 STEPS WITH RAILING: TOTAL
MOVING FROM LYING ON BACK TO SITTING ON SIDE OF FLAT BED: A LITTLE
WALKING IN HOSPITAL ROOM: A LITTLE
SUGGESTED CMS G CODE MODIFIER DAILY ACTIVITY: CK
TURNING FROM BACK TO SIDE WHILE IN FLAT BAD: A LOT
DAILY ACTIVITIY SCORE: 18
CLIMB 3 TO 5 STEPS WITH RAILING: A LITTLE
SUGGESTED CMS G CODE MODIFIER MOBILITY: CL
DAILY ACTIVITIY SCORE: 18
DRESSING REGULAR UPPER BODY CLOTHING: A LITTLE
STANDING UP FROM CHAIR USING ARMS: A LITTLE
SUGGESTED CMS G CODE MODIFIER MOBILITY: CK
PERSONAL GROOMING: A LITTLE
DRESSING REGULAR LOWER BODY CLOTHING: A LITTLE
MOVING TO AND FROM BED TO CHAIR: A LITTLE
MOBILITY SCORE: 11
MOVING TO AND FROM BED TO CHAIR: UNABLE
DRESSING REGULAR LOWER BODY CLOTHING: A LOT
TOILETING: A LOT
WALKING IN HOSPITAL ROOM: A LITTLE
MOBILITY SCORE: 18
TURNING FROM BACK TO SIDE WHILE IN FLAT BAD: A LITTLE
HELP NEEDED FOR BATHING: A LITTLE
HELP NEEDED FOR BATHING: A LITTLE
MOVING FROM LYING ON BACK TO SITTING ON SIDE OF FLAT BED: UNABLE
TOILETING: A LITTLE
STANDING UP FROM CHAIR USING ARMS: A LITTLE
DRESSING REGULAR UPPER BODY CLOTHING: A LITTLE
EATING MEALS: A LITTLE
SUGGESTED CMS G CODE MODIFIER DAILY ACTIVITY: CK

## 2018-11-20 ASSESSMENT — PAIN SCALES - GENERAL
PAINLEVEL_OUTOF10: 10
PAINLEVEL_OUTOF10: 8
PAINLEVEL_OUTOF10: 3
PAINLEVEL_OUTOF10: 3
PAINLEVEL_OUTOF10: 0
PAINLEVEL_OUTOF10: 4
PAINLEVEL_OUTOF10: 5
PAINLEVEL_OUTOF10: 8
PAINLEVEL_OUTOF10: 7
PAINLEVEL_OUTOF10: 5
PAINLEVEL_OUTOF10: 6
PAINLEVEL_OUTOF10: 7
PAINLEVEL_OUTOF10: 8
PAINLEVEL_OUTOF10: 6
PAINLEVEL_OUTOF10: 8
PAINLEVEL_OUTOF10: 5

## 2018-11-20 ASSESSMENT — LIFESTYLE VARIABLES
TOTAL SCORE: 0
TOTAL SCORE: 0
HAVE PEOPLE ANNOYED YOU BY CRITICIZING YOUR DRINKING: NO
ON A TYPICAL DAY WHEN YOU DRINK ALCOHOL HOW MANY DRINKS DO YOU HAVE: 1
CONSUMPTION TOTAL: POSITIVE
EVER FELT BAD OR GUILTY ABOUT YOUR DRINKING: NO
EVER HAD A DRINK FIRST THING IN THE MORNING TO STEADY YOUR NERVES TO GET RID OF A HANGOVER: NO
EVER_SMOKED: NEVER
AVERAGE NUMBER OF DAYS PER WEEK YOU HAVE A DRINK CONTAINING ALCOHOL: 1
HAVE YOU EVER FELT YOU SHOULD CUT DOWN ON YOUR DRINKING: NO
TOTAL SCORE: 0
HOW MANY TIMES IN THE PAST YEAR HAVE YOU HAD 5 OR MORE DRINKS IN A DAY: 1
ALCOHOL_USE: YES

## 2018-11-20 ASSESSMENT — ENCOUNTER SYMPTOMS
FEVER: 0
SENSORY CHANGE: 0
NAUSEA: 0
NERVOUS/ANXIOUS: 0
DIZZINESS: 0
BLURRED VISION: 0
SORE THROAT: 1
SHORTNESS OF BREATH: 0
BACK PAIN: 1
PALPITATIONS: 0
ABDOMINAL PAIN: 0
HEARTBURN: 1

## 2018-11-20 ASSESSMENT — PATIENT HEALTH QUESTIONNAIRE - PHQ9
2. FEELING DOWN, DEPRESSED, IRRITABLE, OR HOPELESS: NOT AT ALL
2. FEELING DOWN, DEPRESSED, IRRITABLE, OR HOPELESS: NOT AT ALL
SUM OF ALL RESPONSES TO PHQ9 QUESTIONS 1 AND 2: 0
1. LITTLE INTEREST OR PLEASURE IN DOING THINGS: NOT AT ALL
1. LITTLE INTEREST OR PLEASURE IN DOING THINGS: NOT AT ALL
SUM OF ALL RESPONSES TO PHQ9 QUESTIONS 1 AND 2: 0

## 2018-11-20 ASSESSMENT — GAIT ASSESSMENTS
DEVIATION: BRADYKINETIC;SHUFFLED GAIT
GAIT LEVEL OF ASSIST: MINIMAL ASSIST
DISTANCE (FEET): 2
ASSISTIVE DEVICE: HAND HELD ASSIST

## 2018-11-20 ASSESSMENT — ACTIVITIES OF DAILY LIVING (ADL): TOILETING: INDEPENDENT

## 2018-11-20 NOTE — THERAPY
"Occupational Therapy Evaluation completed.   Functional Status:  Mod/max A with ADLs and txfs  Plan of Care: Will benefit from Occupational Therapy 4 times per week  Discharge Recommendations:  Equipment: Will Continue to Assess for Equipment Needs. Post-acute therapy Discharge to a transitional care facility for continued therapy services.    See \"Rehab Therapy-Acute\" Patient Summary Report for complete documentation.    "

## 2018-11-20 NOTE — OR NURSING
CONTACTED DR NEUMANN.  CONFIRMED THAT PT TO GO TO ICU FOR Q 2 HOUR VS AND Q 2 HOUR NEURO CHECKS.  ORDER ENTERED INTO EPIC

## 2018-11-20 NOTE — CARE PLAN
Problem: Safety  Goal: Will remain free from injury    Intervention: Educate patient and significant other/support system about adaptive mobility strategies and safe transfers  Ordered proper brace for patient to start with mobility

## 2018-11-20 NOTE — OR SURGEON
Immediate Post OP Note    PreOp Diagnosis: L2,3 severe, critical stenosis, L1-2 schmorl's node into L2 vb, L2 compression fracture that is chronic, prior posterior instrumentation and fusion, misdirected - medialized right L3 screw into canal    PostOp Diagnosis: same    Procedure(s):  LUMBAR FUSION O-ARM-  POSTERIOR L2-3 TLIF, - Wound Class: Clean  LUMBAR LAMINECTOMY DISKECTOMY-  L2-3 LAMI - Wound Class: Clean  HARDWARE REMOVAL NEURO-  L4-5, L3 SCREW REPOSITIONING - Wound Class: Clean  KYPHOPLASTY-  L2 - Wound Class: Clean    Surgeon(s):  Brandon Fowler M.D.    Anesthesiologist/Type of Anesthesia:  Anesthesiologist: Rigo Vu M.D./General    Surgical Staff:  Assistant: Brittny Escalera P.A.-C.  Circulator: Jodee Michelle R.N.; Rupa Tadeo R.N.  Relief Scrub: Landry Beal  Scrub Person: Oscar Kendall  Radiology Technologist: Jasmin Melgar  Stejennifer : Sonia Hoff R.N.    Specimens removed if any:  * No specimens in log *    Estimated Blood Loss: up to 1 liter    Findings: severe stenosis relieved, stable ssep's, emg's    Complications: none        11/19/2018 4:47 PM Brandon Fowler M.D.

## 2018-11-20 NOTE — OR NURSING
PT TRANSPORTED TO T 611  BEDSIDE REPORT TO CHRISSIE MIGUEL    IN ICU FOR HYPOTENSION. 80'S SYSTOLIC.   MAP HAS BEEN 60'S.  1300 CC BLOOD LOST IN SURGERY  4000 LR IN. HEWITT OUTPUT 130 CC. . HEMOVAC 125 CC.     CBC ORDERED FOR 2100.    GAVE HER DR PITTS 'S PAGER NUMBER.  PT IS CLIENT OF DR NEUMANN- NEUROSURGERY.   OK TO CALL EITHER FOR CONCERNS.    PT HAD LUMBAR FUSION, LAMINECTOMY, DISCECTOMY L2- L3. SILVER MEPILEX TO BACK. C/D/I    PT REPORTS FATIGUE. REPORTS PAIN 4-5 1/0 WHICH IS BASELINE FOR HIM AT THIS TIME.    ORDER FOR BACK BRACE  IF OOB FOR > 5 MINUTES.

## 2018-11-20 NOTE — PROGRESS NOTES
Informed MD Ortiz that AM potassium is 5.1, NS w/ 20K stopped at 1000 am d/t k level and peaked t waves on the monitor.  Orders received for stat repeat BMP and ok to switch fluids to NS.

## 2018-11-20 NOTE — DISCHARGE PLANNING
Care Transition Team Assessment    11/20/18  Met with patient for discharge planning. Patient lives with wife who has Alzheimer's but is independent. Patient owns walker and is independent with self care and mobility. May benefit from home health care at discharge.    Information Source  Orientation : Oriented x 4  Information Given By: Patient  Who is responsible for making decisions for patient? : Patient    Readmission Evaluation  Is this a readmission?: No    Elopement Risk  Legal Hold: No  Ambulatory or Self Mobile in Wheelchair: Yes  Disoriented: No  Psychiatric Symptoms: None  History of Wandering: No  Elopement this Admit: No  Vocalizing Wanting to Leave: No  Displays Behaviors, Body Language Wanting to Leave: No-Not at Risk for Elopement  Elopement Risk: Not at Risk for Elopement    Interdisciplinary Discharge Planning  Does Admitting Nurse Feel This Could be a Complex Discharge?: No  Primary Care Physician: Dr. Samson  Lives with - Patient's Self Care Capacity: Spouse  Support Systems: Family Member(s)  Housing / Facility: [address]  Do You Take your Prescribed Medications Regularly: Yes  Able to Return to Previous ADL's: Future Time w/Therapy  Mobility Issues: Yes  Prior Services: None  Patient Expects to be Discharged to::  (Home)  Assistance Needed: No  Durable Medical Equipment: Walker    Discharge Preparedness  What is your plan after discharge?: Home health care  What are your discharge supports?: Spouse  Prior Functional Level: Ambulatory, Drives Self, Independent with Activities of Daily Living, Independent with Medication Management, Uses Walker  Difficulity with ADLs: Walking  Difficulity with IADLs: None    Functional Assesment  Prior Functional Level: Ambulatory, Drives Self, Independent with Activities of Daily Living, Independent with Medication Management, Uses Walker    Finances  Financial Barriers to Discharge: No  Prescription Coverage: Yes    Vision / Hearing Impairment  Vision  Impairment : Yes  Right Eye Vision: Wears Glasses  Left Eye Vision: Wears Glasses  Hearing Impairment : No    Values / Beliefs / Concerns  Cultural Requests During Hospitalization: no, but Im prodestant  Spiritual Requests During Hospitalization: No    Advance Directive  Advance Directive?: None    Domestic Abuse  Have you ever been the victim of abuse or violence?: No  Physical Abuse or Sexual Abuse: No  Verbal Abuse or Emotional Abuse: No  Possible Abuse Reported to:: Not Applicable    Psychological Assessment  History of Substance Abuse: None  History of Psychiatric Problems: No  Non-compliant with Treatment: No  Newly Diagnosed Illness: No         Anticipated Discharge Information  Anticipated discharge disposition: [facility]  Discharge Address: [address]  Discharge Contact Phone Number:  ([phone])

## 2018-11-20 NOTE — PROGRESS NOTES
Updated Brittny Escalera regarding pt's hypotension with a SBP of 70s-80s with MAPs in the 50s as well as output from drain. Orders received to send a stat H&H and COD then call with results.

## 2018-11-20 NOTE — OR NURSING
DAUGHTER CONTACTED   UPDATE  PT GOING TO ICU ROOM  T 611  CAN VISIT AFTER 1930 DUE TO SHIFT CHANGE

## 2018-11-20 NOTE — PROGRESS NOTES
Neurosurgery Progress Note    Subjective:  Severe acute postop pain controlled, denies any anginal symptoms, SOB    Exam:  HVAC 250cc/4 hrs, 125/4hrs, currently about 80 cc, light serosang, no HA with HOB at 40 degrees  NM: 5/5  Cardiopulm/Gi WNL; no tachypnea, tachycardia, no LE edema    BP  Min: 134/84  Max: 134/84  Pulse  Av.5  Min: 63  Max: 93  Resp  Avg: 15.7  Min: 9  Max: 22  Temp  Av.5 °C (97.7 °F)  Min: 36.2 °C (97.2 °F)  Max: 36.9 °C (98.4 °F)  SpO2  Av.6 %  Min: 96 %  Max: 100 %    No Data Recorded    Recent Labs      18   1738  18   2103  18   0300   WBC  7.9  7.9  8.0   RBC  4.58*  3.28*  3.15*   HEMOGLOBIN  13.5*  9.8*  9.3*   HEMATOCRIT  40.8*  29.5*  28.8*   MCV  89.1  89.9  91.4   MCH  29.5  29.9  29.5   MCHC  33.1*  33.2*  32.3*   RDW  43.3  44.2  43.7   PLATELETCT  260  159*  140*   MPV  10.8  9.5  9.6     Recent Labs      18   0300   SODIUM  138   POTASSIUM  5.1   CHLORIDE  108   CO2  23   GLUCOSE  125*   BUN  10   CREATININE  0.82   CALCIUM  8.0*               Intake/Output       18 0700 - 18 0659 18 07 - 18 0659       Total  Total       Intake    I.V.  4000  1000 5000  --  -- --    Crystalloid Intake 4000 -- 4000 -- -- --    Volume (mL) (lactated ringers infusion) -- 1000 1000 -- -- --    Total Intake 4000 1000 5000 -- -- --       Output    Urine  225  370 595  --  -- --    Output (mL) (Urethral Catheter Latex 16 Fr.) 225 370 595 -- -- --    Drains  --  500 500  --  -- --    Output (mL) (Closed/Suction Drain Back Hemovac) -- 500 500 -- -- --    Blood  1200  -- 1200  --  -- --    Est. Blood Loss (mL) 1200 -- 1200 -- -- --    Total Output 1149 652 5357 -- -- --       Net I/O     2575 130 6735 -- -- --            Intake/Output Summary (Last 24 hours) at 18 0852  Last data filed at 18 0300   Gross per 24 hour   Intake             5000 ml   Output             2295 ml   Net              2705 ml            • atorvastatin  40 mg Nightly   • cetirizine  10 mg DAILY   • levothyroxine  50 mcg QAM AC   • lisinopril  10 mg DAILY   • omeprazole  20 mg DAILY   • Pharmacy Consult Request  1 Each PRN   • MD ALERT...DO NOT ADMINISTER NSAIDS or ASPIRIN unless ORDERED By Neurosurgery  1 Each PRN   • docusate sodium  100 mg BID   • senna-docusate  1 Tab Nightly   • senna-docusate  1 Tab Q24HRS PRN   • polyethylene glycol/lytes  1 Packet BID PRN   • magnesium hydroxide  30 mL QDAY PRN   • bisacodyl  10 mg Q24HRS PRN   • fleet  1 Each Once PRN   • 0.9 % NaCl with KCl 20 mEq 1,000 mL   Continuous   • acetaminophen  500 mg Q6HRS PRN   • cyclobenzaprine  10 mg Q8HRS PRN   • cloNIDine  0.1 mg Q4HRS PRN   • HYDROcodone/acetaminophen  1 Tab Q4HRS PRN   • oxyCODONE-acetaminophen  1-2 Tab Q6HRS PRN   • morphine injection  2 mg Q4HRS PRN   • budesonide-formoterol  2 Puff BID (RT)       Assessment and Plan:    POD # 1 L2-L3 transforaminal lumbar interbody fusion with L3-L4   posterior hardware removal and L2-L3 hardware re-instrumentation.    Prophylactic anticoagulation: no         Start date/time:   Postop hypotension: hypovolemic> remains on IV fluids, EBL: 1000 cc, HVAc output of 500 cc, intensivist consult prn  CV: SR per monitor on inferior leads and V5, no acute changes appreciated, check Troponin x 1  Acute anemia r/t intraop blood loss; continue to monitor q 6 hrs H & H    D/w RN     D/w Dr. Fowler

## 2018-11-20 NOTE — PROGRESS NOTES
Pratik Escalera and updated her with pt's H&H results. Orders received to recheck the H&H q6h and to administer one unit pRBCs if HGB is 8 or less. Also clarified orders for blood pressure. Per Brittny Escalera and Dr. Fwoler, they are ok with hypotension as long as the patient remains asymptomatic and they do not wish to start pressors at this time.

## 2018-11-20 NOTE — PROGRESS NOTES
Order for LSO has been submitted to [email or web address]. If any questions you can contact [email or web address] at ext # [phone]

## 2018-11-21 LAB
ANION GAP SERPL CALC-SCNC: 3 MMOL/L (ref 0–11.9)
BUN SERPL-MCNC: 10 MG/DL (ref 8–22)
CALCIUM SERPL-MCNC: 8.5 MG/DL (ref 8.5–10.5)
CHLORIDE SERPL-SCNC: 105 MMOL/L (ref 96–112)
CO2 SERPL-SCNC: 27 MMOL/L (ref 20–33)
CREAT SERPL-MCNC: 0.76 MG/DL (ref 0.5–1.4)
ERYTHROCYTE [DISTWIDTH] IN BLOOD BY AUTOMATED COUNT: 45.1 FL (ref 35.9–50)
GLUCOSE SERPL-MCNC: 105 MG/DL (ref 65–99)
HCT VFR BLD AUTO: 25.7 % (ref 42–52)
HGB BLD-MCNC: 8.5 G/DL (ref 14–18)
MCH RBC QN AUTO: 29.8 PG (ref 27–33)
MCHC RBC AUTO-ENTMCNC: 33.1 G/DL (ref 33.7–35.3)
MCV RBC AUTO: 90.2 FL (ref 81.4–97.8)
PLATELET # BLD AUTO: 134 K/UL (ref 164–446)
PMV BLD AUTO: 9.2 FL (ref 9–12.9)
POTASSIUM SERPL-SCNC: 4.2 MMOL/L (ref 3.6–5.5)
RBC # BLD AUTO: 2.85 M/UL (ref 4.7–6.1)
SODIUM SERPL-SCNC: 135 MMOL/L (ref 135–145)
WBC # BLD AUTO: 7 K/UL (ref 4.8–10.8)

## 2018-11-21 PROCEDURE — A9270 NON-COVERED ITEM OR SERVICE: HCPCS | Performed by: HOSPITALIST

## 2018-11-21 PROCEDURE — 700102 HCHG RX REV CODE 250 W/ 637 OVERRIDE(OP): Performed by: NURSE PRACTITIONER

## 2018-11-21 PROCEDURE — A9270 NON-COVERED ITEM OR SERVICE: HCPCS | Performed by: PHYSICIAN ASSISTANT

## 2018-11-21 PROCEDURE — 80048 BASIC METABOLIC PNL TOTAL CA: CPT

## 2018-11-21 PROCEDURE — 700112 HCHG RX REV CODE 229: Performed by: PHYSICIAN ASSISTANT

## 2018-11-21 PROCEDURE — 700111 HCHG RX REV CODE 636 W/ 250 OVERRIDE (IP): Performed by: HOSPITALIST

## 2018-11-21 PROCEDURE — A9270 NON-COVERED ITEM OR SERVICE: HCPCS | Performed by: NURSE PRACTITIONER

## 2018-11-21 PROCEDURE — 700111 HCHG RX REV CODE 636 W/ 250 OVERRIDE (IP): Performed by: NURSE PRACTITIONER

## 2018-11-21 PROCEDURE — 700102 HCHG RX REV CODE 250 W/ 637 OVERRIDE(OP): Performed by: PHYSICIAN ASSISTANT

## 2018-11-21 PROCEDURE — 770001 HCHG ROOM/CARE - MED/SURG/GYN PRIV*

## 2018-11-21 PROCEDURE — 700105 HCHG RX REV CODE 258: Performed by: NURSE PRACTITIONER

## 2018-11-21 PROCEDURE — 99232 SBSQ HOSP IP/OBS MODERATE 35: CPT | Performed by: HOSPITALIST

## 2018-11-21 PROCEDURE — 700102 HCHG RX REV CODE 250 W/ 637 OVERRIDE(OP): Performed by: HOSPITALIST

## 2018-11-21 PROCEDURE — 85027 COMPLETE CBC AUTOMATED: CPT

## 2018-11-21 RX ORDER — MORPHINE SULFATE 15 MG/1
15 TABLET, FILM COATED, EXTENDED RELEASE ORAL EVERY 12 HOURS
Status: DISCONTINUED | OUTPATIENT
Start: 2018-11-21 | End: 2018-11-24

## 2018-11-21 RX ORDER — ONDANSETRON 2 MG/ML
4 INJECTION INTRAMUSCULAR; INTRAVENOUS EVERY 6 HOURS PRN
Status: DISCONTINUED | OUTPATIENT
Start: 2018-11-21 | End: 2018-11-26 | Stop reason: HOSPADM

## 2018-11-21 RX ORDER — ONDANSETRON 2 MG/ML
INJECTION INTRAMUSCULAR; INTRAVENOUS
Status: ACTIVE
Start: 2018-11-21 | End: 2018-11-21

## 2018-11-21 RX ORDER — BUDESONIDE AND FORMOTEROL FUMARATE DIHYDRATE 80; 4.5 UG/1; UG/1
2 AEROSOL RESPIRATORY (INHALATION) 2 TIMES DAILY
Status: DISCONTINUED | OUTPATIENT
Start: 2018-11-21 | End: 2018-11-26 | Stop reason: HOSPADM

## 2018-11-21 RX ORDER — ACETAMINOPHEN 500 MG
1000 TABLET ORAL EVERY 6 HOURS
Status: DISCONTINUED | OUTPATIENT
Start: 2018-11-21 | End: 2018-11-26 | Stop reason: HOSPADM

## 2018-11-21 RX ORDER — M-VIT,TX,IRON,MINS/CALC/FOLIC 27MG-0.4MG
1 TABLET ORAL DAILY
Status: DISCONTINUED | OUTPATIENT
Start: 2018-11-21 | End: 2018-11-26 | Stop reason: HOSPADM

## 2018-11-21 RX ORDER — POLYETHYLENE GLYCOL 3350 17 G/17G
1 POWDER, FOR SOLUTION ORAL DAILY
Status: DISCONTINUED | OUTPATIENT
Start: 2018-11-21 | End: 2018-11-26 | Stop reason: HOSPADM

## 2018-11-21 RX ADMIN — MORPHINE SULFATE 15 MG: 15 TABLET, EXTENDED RELEASE ORAL at 17:40

## 2018-11-21 RX ADMIN — ACETAMINOPHEN 1000 MG: 500 TABLET ORAL at 09:32

## 2018-11-21 RX ADMIN — OXYCODONE HYDROCHLORIDE 10 MG: 10 TABLET ORAL at 02:11

## 2018-11-21 RX ADMIN — ONDANSETRON HYDROCHLORIDE 4 MG: 2 SOLUTION INTRAMUSCULAR; INTRAVENOUS at 06:10

## 2018-11-21 RX ADMIN — METHOCARBAMOL 1000 MG: 100 INJECTION INTRAMUSCULAR; INTRAVENOUS at 20:30

## 2018-11-21 RX ADMIN — OMEPRAZOLE 20 MG: 20 CAPSULE, DELAYED RELEASE ORAL at 05:36

## 2018-11-21 RX ADMIN — OXYCODONE HYDROCHLORIDE 20 MG: 10 TABLET ORAL at 22:58

## 2018-11-21 RX ADMIN — MORPHINE SULFATE 3 MG: 10 INJECTION INTRAVENOUS at 06:18

## 2018-11-21 RX ADMIN — DOCUSATE SODIUM 100 MG: 100 CAPSULE, LIQUID FILLED ORAL at 17:40

## 2018-11-21 RX ADMIN — MORPHINE SULFATE 5 MG: 10 INJECTION INTRAVENOUS at 19:09

## 2018-11-21 RX ADMIN — CETIRIZINE HYDROCHLORIDE 10 MG: 10 TABLET, FILM COATED ORAL at 05:37

## 2018-11-21 RX ADMIN — DOCUSATE SODIUM 100 MG: 100 CAPSULE, LIQUID FILLED ORAL at 05:36

## 2018-11-21 RX ADMIN — LEVOTHYROXINE SODIUM 50 MCG: 50 TABLET ORAL at 05:36

## 2018-11-21 RX ADMIN — STANDARDIZED SENNA CONCENTRATE AND DOCUSATE SODIUM 1 TABLET: 8.6; 5 TABLET, FILM COATED ORAL at 20:30

## 2018-11-21 RX ADMIN — MORPHINE SULFATE 5 MG: 10 INJECTION INTRAVENOUS at 20:29

## 2018-11-21 RX ADMIN — METHOCARBAMOL 1000 MG: 100 INJECTION INTRAMUSCULAR; INTRAVENOUS at 04:03

## 2018-11-21 RX ADMIN — MORPHINE SULFATE 5 MG: 10 INJECTION INTRAVENOUS at 16:43

## 2018-11-21 RX ADMIN — MORPHINE SULFATE 5 MG: 10 INJECTION INTRAVENOUS at 15:34

## 2018-11-21 RX ADMIN — ACETAMINOPHEN 1000 MG: 500 TABLET ORAL at 20:29

## 2018-11-21 RX ADMIN — ATORVASTATIN CALCIUM 40 MG: 40 TABLET, FILM COATED ORAL at 20:30

## 2018-11-21 RX ADMIN — POLYETHYLENE GLYCOL 3350 1 PACKET: 17 POWDER, FOR SOLUTION ORAL at 09:32

## 2018-11-21 RX ADMIN — BUDESONIDE AND FORMOTEROL FUMARATE DIHYDRATE 2 PUFF: 80; 4.5 AEROSOL RESPIRATORY (INHALATION) at 17:41

## 2018-11-21 RX ADMIN — MAGNESIUM HYDROXIDE 30 ML: 400 SUSPENSION ORAL at 09:32

## 2018-11-21 RX ADMIN — MORPHINE SULFATE 3 MG: 10 INJECTION INTRAVENOUS at 14:33

## 2018-11-21 RX ADMIN — MULTIPLE VITAMINS W/ MINERALS TAB 1 TABLET: TAB at 12:00

## 2018-11-21 RX ADMIN — OMEPRAZOLE 20 MG: 20 CAPSULE, DELAYED RELEASE ORAL at 17:40

## 2018-11-21 RX ADMIN — OXYCODONE HYDROCHLORIDE 10 MG: 10 TABLET ORAL at 13:15

## 2018-11-21 RX ADMIN — ACETAMINOPHEN 1000 MG: 500 TABLET ORAL at 14:33

## 2018-11-21 RX ADMIN — METHOCARBAMOL 1000 MG: 100 INJECTION INTRAMUSCULAR; INTRAVENOUS at 12:00

## 2018-11-21 ASSESSMENT — PAIN SCALES - GENERAL
PAINLEVEL_OUTOF10: 10
PAINLEVEL_OUTOF10: 3
PAINLEVEL_OUTOF10: 7
PAINLEVEL_OUTOF10: 5
PAINLEVEL_OUTOF10: 7
PAINLEVEL_OUTOF10: 7
PAINLEVEL_OUTOF10: 9
PAINLEVEL_OUTOF10: 2
PAINLEVEL_OUTOF10: 9
PAINLEVEL_OUTOF10: 8
PAINLEVEL_OUTOF10: 5
PAINLEVEL_OUTOF10: 6
PAINLEVEL_OUTOF10: 8
PAINLEVEL_OUTOF10: 4
PAINLEVEL_OUTOF10: 5
PAINLEVEL_OUTOF10: 6
PAINLEVEL_OUTOF10: 10
PAINLEVEL_OUTOF10: 5

## 2018-11-21 ASSESSMENT — ENCOUNTER SYMPTOMS
HEADACHES: 0
ABDOMINAL PAIN: 0
CHILLS: 0
LOSS OF CONSCIOUSNESS: 0
FEVER: 0
DIARRHEA: 0
SHORTNESS OF BREATH: 0
DIZZINESS: 0
NAUSEA: 0
COUGH: 0
VOMITING: 0
BACK PAIN: 1

## 2018-11-21 NOTE — PROGRESS NOTES
APRN called and requested this RN to assess HVAc tubing for clots, updated her on 1000 output of 170cc, 1430 output 95cc.  No clots intubing. Change fluids to NS d/t K 5.1.

## 2018-11-21 NOTE — PROGRESS NOTES
Neurosurgery Progress Note    Subjective:  C/o some incisional pain.  Denies leg pain, tingling or numbness, or weakness.    Exam:  Nonfocal exam.    Pulse  Av.7  Min: 68  Max: 94  Resp  Av.3  Min: 9  Max: 24  Temp  Av.6 °C (97.8 °F)  Min: 35.9 °C (96.6 °F)  Max: 36.9 °C (98.4 °F)  SpO2  Av.9 %  Min: 90 %  Max: 99 %    No Data Recorded    Recent Labs      18   2103  18   0300  18   1030  18   2158  18   0445   WBC  7.9  8.0   --    --   7.0   RBC  3.28*  3.15*   --    --   2.85*   HEMOGLOBIN  9.8*  9.3*  9.7*  8.5*  8.5*   HEMATOCRIT  29.5*  28.8*  29.4*  25.7*  25.7*   MCV  89.9  91.4   --    --   90.2   MCH  29.9  29.5   --    --   29.8   MCHC  33.2*  32.3*   --    --   33.1*   RDW  44.2  43.7   --    --   45.1   PLATELETCT  159*  140*   --    --   134*   MPV  9.5  9.6   --    --   9.2     Recent Labs      18   0300  18   1334  18   0445   SODIUM  138  136  135   POTASSIUM  5.1  4.8  4.2   CHLORIDE  108  106  105   CO2  23  25  27   GLUCOSE  125*  110*  105*   BUN  10  11  10   CREATININE  0.82  0.79  0.76   CALCIUM  8.0*  8.4*  8.5               Intake/Output       18 07 - 18 0659 18 - 18 0659       Total  Total       Intake    P.O.  60  -- 60  --  -- --    P.O. 60 -- 60 -- -- --    I.V.  568.3  600 1168.3  --  -- --    Volume (mL) (0.9 % NaCl with KCl 20 mEq infusion) 400 -- 400 -- -- --    Volume (mL) (NS infusion) 168.3 600 768.3 -- -- --    Other  --  50 50  --  -- --    Medications (PO/Enteral Liquids) -- 50 50 -- -- --    IV Piggyback  --  200 200  --  -- --    Volume (mL) (methocarbamol (ROBAXIN) 1,000 mg in  mL IVPB) -- 200 200 -- -- --    Total Intake 628.3 850 1478.3 -- -- --       Output    Urine  400  1000 1400  --  -- --    Number of Times Voided -- 4 x 4 x -- -- --    Urine Void (mL) -- 1000 1000 -- -- --    Output (mL) ([REMOVED] Urethral Catheter Latex 16  Fr.) 400 -- 400 -- -- --    Drains  435  225 660  --  -- --    Output (mL) (Closed/Suction Drain Back Hemovac) 435 225 660 -- -- --    Total Output 835 1225 2060 -- -- --       Net I/O     -206.7 -375 -581.7 -- -- --            Intake/Output Summary (Last 24 hours) at 11/21/18 0657  Last data filed at 11/21/18 0600   Gross per 24 hour   Intake          1478.33 ml   Output             2060 ml   Net          -581.67 ml            • ondansetron      • ondansetron  4 mg Q6HRS PRN   • NS   Continuous   • morphine injection  2-5 mg Q HOUR PRN   • oxyCODONE immediate-release  20 mg Q3HRS PRN    Or   • oxyCODONE immediate-release  10 mg Q3HRS PRN   • Methocarbamol IVPB  1,000 mg Q8HRS   • omeprazole  20 mg BID   • atorvastatin  40 mg Nightly   • cetirizine  10 mg DAILY   • levothyroxine  50 mcg QAM AC   • lisinopril  10 mg DAILY   • MD ALERT...DO NOT ADMINISTER NSAIDS or ASPIRIN unless ORDERED By Neurosurgery  1 Each PRN   • docusate sodium  100 mg BID   • senna-docusate  1 Tab Nightly   • senna-docusate  1 Tab Q24HRS PRN   • polyethylene glycol/lytes  1 Packet BID PRN   • magnesium hydroxide  30 mL QDAY PRN   • bisacodyl  10 mg Q24HRS PRN   • fleet  1 Each Once PRN   • acetaminophen  500 mg Q6HRS PRN   • cloNIDine  0.1 mg Q4HRS PRN   • HYDROcodone/acetaminophen  1 Tab Q4HRS PRN   • budesonide-formoterol  2 Puff BID (RT)       Assessment and Plan:  Hospital day #3  POD #2  Prophylactic anticoagulation: no         Start date/time: tbd    Patient looks good.  Will give general diet.    Will DC hemovac.    Postop anemia related to blood loss and hemodilution secondary to fluid boluses.    Increase therapies.    Transfer to neuro floor.    DC planning.    Hopefully we can get patient home for thanksgiving.

## 2018-11-21 NOTE — CARE PLAN
Problem: Pain Management  Goal: Pain level will decrease to patient's comfort goal  Outcome: PROGRESSING AS EXPECTED  Back/incisional pain w/ turns.  Pain relieved w/ PRN meds.    Problem: Mobility  Goal: Risk for activity intolerance will decrease  Outcome: PROGRESSING AS EXPECTED  Pt mobilized to EOB and stand x2 this shift w/ 2 person assist. Back brace at bedside for pt to wear if mobilizing >5 min.

## 2018-11-21 NOTE — THERAPY
"Physical Therapy Evaluation completed.   Bed Mobility:  Supine to Sit: Minimal Assist  Transfers: Sit to Stand: Moderate Assist  Gait: Level Of Assist: Minimal Assist with hand held assist       Plan of Care: Will benefit from Physical Therapy 5 times per week  Discharge Recommendations: Equipment: Will Continue to Assess for Equipment Needs. Post-acute therapy Recommend outpatient or home [facility] transitional care services for continued physical therapy services.    Mr. Magaña is a 72 y/o male who presents to acute seocndary to posterior TLIF L2-3 and kyphoplasty L2. Post-op hypotension and hypovolemia thus transferred to ICU. RN present throughout eval to monitor for hypotension. Discussed with patient spinal precautions and log roll technique prior to mobility. No headache or dizziness once EOB. Pain in L glute primarily. Once standing able to WB and take small lateral steps. BP after standing 110/71 mmHg. Pt very motivated to participate. Discussed glute sets and ankle pumps to perform in bed. Pt demonstrated good understanding. Anticipate with additional acute physical therapy services he will achieve a supervision level for mobility to allow for home health mangaement of his deficits. If this does not occur during his acute stay recommend post acute placement.     See \"Rehab Therapy-Acute\" Patient Summary Report for complete documentation.     "

## 2018-11-21 NOTE — PROGRESS NOTES
APRN called to notify this RN to decrease fluids to 50cc/hr and to reduce HVAc to half suction.  Orders implemented.

## 2018-11-21 NOTE — CONSULTS
Hospital Medicine Consultation    Date of Service  11/20/2018    Referring Physician  Brandon Fowler M.D.    Consulting Physician  Juan Miguel Ortiz D.O.    Reason for Consultation  Medication evaluation and management    History of Presenting Illness  73 y.o. male w/ recurrent back pain who presented 11/19/2018 with acute on chronic back pain.  He is post op day #1 and currently he states his pain is under control.  He denies any numbness or lack of sensory.  He states being able to move his legs.  He states he does have pain with moving his back.    Review of Systems  Review of Systems   Constitutional: Negative for fever and malaise/fatigue.   HENT: Positive for sore throat (from endotracheal tube). Negative for congestion.    Eyes: Negative for blurred vision.   Respiratory: Negative for shortness of breath.    Cardiovascular: Negative for palpitations.   Gastrointestinal: Positive for heartburn. Negative for abdominal pain and nausea.   Genitourinary: Negative for dysuria.   Musculoskeletal: Positive for back pain (with movement).   Neurological: Negative for dizziness and sensory change.   Psychiatric/Behavioral: The patient is not nervous/anxious.        Past Medical History   has a past medical history of ASTHMA; Bronchitis (02/2018); Erectile dysfunction (6/8/2016); GERD (gastroesophageal reflux disease) (6/8/2016); Heart burn; History of skin cancer (6/8/2016); Hyperlipidemia (6/8/2016); Hypertension; Hypothyroidism (6/8/2016); Indigestion; Obesity (6/8/2016); Pain (04/19/2018); Preventative health care (6/8/2016); Rosacea (6/8/2016); and Seasonal allergies (6/8/2016).    Surgical History   has a past surgical history that includes hip arthroplasty total (1/21/2009); knee arthroplasty total (5/26/2009); inguinal hernia repair (6/10/08); inguinal hernia repair (9/2/08); lumbar fusion posterior (9/9/2009); lumbar laminectomy diskectomy (9/9/2009); other; hernia repair (2008); other; lumbar fusion posterior  (N/A, 3/17/2017); lumbar laminectomy diskectomy (N/A, 3/17/2017); hardware removal neuro (N/A, 3/17/2017); irrigation & debridement neuro (3/30/2017); appendectomy (1953); nancy by laparoscopy (1982); tonsillectomy (1951); ventral hernia repair (Left, 5/3/2018); lumbar fusion o-arm (N/A, 11/19/2018); lumbar laminectomy diskectomy (N/A, 11/19/2018); hardware removal neuro (N/A, 11/19/2018); and kyphoplasty (N/A, 11/19/2018).    Family History  family history includes Hypertension in his father and mother; Lung Disease in his father.    Social History   reports that he has never smoked. He has never used smokeless tobacco. He reports that he drinks alcohol. He reports that he does not use drugs.    Medications  Prior to Admission Medications   Prescriptions Last Dose Informant Patient Reported? Taking?   ADVAIR DISKUS 100-50 MCG/DOSE AEROSOL POWDER, BREATH ACTIVATED 11/19/2018 at 0800 Patient No No   Sig: INHALE 1 PUFF BY MOUTH TWICE A DAY *RINSE MOUTH AFTER USE*   Cholecalciferol (VITAMIN D3) 3000 units Tab 11/12/2018 at afternoon Patient Yes No   Sig: Take 3,000 Units by mouth every day.   Omega-3 Fatty Acids (FISH OIL) 1000 MG Cap capsule 11/5/2018 at Unknown time Patient Yes No   Sig: Take 1,000 mg by mouth 2 Times a Day.   PRILOSEC 20 MG PO CPDR 11/18/2018 at 0800 Patient Yes No   Sig: Take 20 mg by mouth every day.   Probiotic Product (PROBIOTIC ADVANCED PO) 11/5/2018 at 0800 Patient Yes No   Sig: Take 1 Tab by mouth every day.   aspirin 81 MG tablet 11/5/2018 at 0800 Patient Yes No   Sig: Take 1 Tab by mouth every day.   atorvastatin (LIPITOR) 40 MG Tab 11/18/2018 at 2200 Patient Yes Yes   Sig: Take 40 mg by mouth every evening.   cetirizine (ZYRTEC) 10 MG Tab 11/18/2018 at 0700 Patient Yes No   Sig: Take 10 mg by mouth every day.   levothyroxine (SYNTHROID) 50 MCG Tab 11/19/2018 at 0800 Patient No No   Sig: TAKE 1 TABLET BY MOUTH EVERY DAY   lisinopril (PRINIVIL) 10 MG Tab 11/19/2018 at 0800 Patient No No    Sig: TAKE 1 TABLET BY MOUTH EVERY DAY   therapeutic multivitamin-minerals (THERAGRAN-M) Tab 11/5/2018 at afternoon Patient Yes No   Sig: Take 1 Tab by mouth every day.      Facility-Administered Medications: None       Allergies  No Known Allergies    Physical Exam  Temp:  [35.9 °C (96.6 °F)-36.9 °C (98.4 °F)] 36.9 °C (98.4 °F)  Pulse:  [63-94] 94  Resp:  [9-21] 18    Physical Exam   Constitutional: He is oriented to person, place, and time. He appears well-developed. No distress.   HENT:   Head: Normocephalic and atraumatic.   Nose: Nose normal.   Mouth/Throat: No oropharyngeal exudate.   Eyes: Conjunctivae and EOM are normal. Right eye exhibits no discharge. Left eye exhibits no discharge. No scleral icterus.   Neck: No tracheal deviation present.   Cardiovascular: Normal rate, regular rhythm, normal heart sounds and intact distal pulses.    No murmur heard.  Pulses:       Radial pulses are 2+ on the right side, and 2+ on the left side.   Pulmonary/Chest: Effort normal and breath sounds normal. No stridor. No respiratory distress. He has no wheezes.   Abdominal: Soft. Bowel sounds are normal. He exhibits no distension.   Musculoskeletal: He exhibits no edema.   Neurological: He is alert and oriented to person, place, and time. No cranial nerve deficit.   Skin: Skin is warm. He is not diaphoretic.   Psychiatric: He has a normal mood and affect. His behavior is normal.   Vitals reviewed.      Fluids  Date 11/20/18 0700 - 11/21/18 0659   Shift 3057-8059 7026-5613 6629-6409 24 Hour Total   I  N  T  A  K  E   P.O. 60   60    I.V. 400 168.3  568.3    Other  50  50    Shift Total 460 218.3  678.3   O  U  T  P  U  T   Urine 400 150  550    Drains 265 220  485    Shift Total 665 370  1035   Weight (kg) 110.2 110.2 110.2 110.2         Laboratory  Recent Labs      11/19/18   1738  11/19/18   2103  11/20/18   0300  11/20/18   1030  11/20/18   2158   WBC  7.9  7.9  8.0   --    --    RBC  4.58*  3.28*  3.15*   --    --     HEMOGLOBIN  13.5*  9.8*  9.3*  9.7*  8.5*   HEMATOCRIT  40.8*  29.5*  28.8*  29.4*  25.7*   MCV  89.1  89.9  91.4   --    --    MCH  29.5  29.9  29.5   --    --    MCHC  33.1*  33.2*  32.3*   --    --    RDW  43.3  44.2  43.7   --    --    PLATELETCT  260  159*  140*   --    --    MPV  10.8  9.5  9.6   --    --      Recent Labs      11/20/18   0300  11/20/18   1334   SODIUM  138  136   POTASSIUM  5.1  4.8   CHLORIDE  108  106   CO2  23  25   GLUCOSE  125*  110*   BUN  10  11   CREATININE  0.82  0.79   CALCIUM  8.0*  8.4*                     Imaging  DX-PORTABLE FLUORO > 1 HOUR   Final Result         Portable fluoroscopy utilized for 24 seconds.         DX-LUMBAR SPINE-2 OR 3 VIEWS   Final Result      Intraoperative fluoroscopy spot images as described above.      DX-O-ARM   Final Result      Portable O-arm utilized for 3.93 seconds.         INTERPRETING LOCATION: [address] [place] [address], [id number]          Assessment/Plan  S/P lumbar laminectomy   Assessment & Plan    Had lumbar surgery by Dr Fowler, 11/19/18  Pain management  Wound care  Monitor output from hemovac  Watch for signs of infection  Wound care  PT/OT     Obesity (BMI 35.0-39.9 without comorbidity)- (present on admission)   Assessment & Plan    Body mass index is 36.94 kg/m².  Hx of gastric bypass in distant past     Essential hypertension- (present on admission)   Assessment & Plan    Lisinopril and clonidine  Monitor vitals  Pain control     Dyslipidemia- (present on admission)   Assessment & Plan    Continue statin therapy for ongoing active therapy     GERD (gastroesophageal reflux disease)- (present on admission)   Assessment & Plan    Omeprazole to be continued.     Acquired hypothyroidism- (present on admission)   Assessment & Plan    Continue synthroid 50mcg daily  TSH was elevated at 18.5 just two weeks ago.  Normal Free thyroxine two weeks ago.     Ascending aorta dilation (CMS-HCC), mild, needs OP followup- (present on admission)   Assessment &  Plan    No acute abdominal pain  Monitor vitals to control BP  Continue clonidine and lisinopril  Recommend serial outpatient U/S monitoring

## 2018-11-21 NOTE — ASSESSMENT & PLAN NOTE
No acute abdominal pain  Monitor vitals to control BP  Continue clonidine and lisinopril  Recommend serial outpatient U/S monitoring

## 2018-11-21 NOTE — PROGRESS NOTES
[facility] Hospitalist Progress Note    Date of Service: 2018    Chief Complaint  73 y.o. male admitted 2018 for planned L spine Lami by Dr Fowler.  Hospitalist service consulted for assistance with medical management     PMHx: HLD, Hypothyroid, HTN, GERD    Interval Problem Update  Pt states pain while he is in bed is minimal however becomes much more intense when he stands and walks.  No numbness.  Some pain to thigh  Sleepy from pain meds  Sinus  -140s  Hemovac out this am  AFebrile  UOP 1,000ml/12hrs    Consultants/Specialty  NeuroSurgery    Disposition  OK to Neuro floor        Review of Systems   Constitutional: Negative for chills and fever.   Respiratory: Negative for cough and shortness of breath.    Cardiovascular: Negative for chest pain.   Gastrointestinal: Negative for abdominal pain, diarrhea, nausea and vomiting.   Musculoskeletal: Positive for back pain.   Skin: Negative for rash.   Neurological: Negative for dizziness, loss of consciousness and headaches.      Physical Exam  Laboratory/Imaging   Hemodynamics  Temp (24hrs), Av.6 °C (97.8 °F), Min:35.9 °C (96.6 °F), Max:36.9 °C (98.4 °F)   Temperature: 36.7 °C (98 °F)  Pulse  Av.9  Min: 63  Max: 94 Heart Rate (Monitored): 72  NIBP: (!) 92/53      Respiratory      Respiration: 12, Pulse Oximetry: 95 %     Work Of Breathing / Effort: Mild  RUL Breath Sounds: Clear, RML Breath Sounds: Clear, RLL Breath Sounds: Diminished, COLE Breath Sounds: Clear, LLL Breath Sounds: Diminished    Fluids    Intake/Output Summary (Last 24 hours) at 18 0548  Last data filed at 18 0500   Gross per 24 hour   Intake           678.33 ml   Output             2060 ml   Net         -1381.67 ml       Nutrition  Orders Placed This Encounter   Procedures   • Diet NPO     Standing Status:   Standing     Number of Occurrences:   1     Order Specific Question:   Restrict to:     Answer:   Sips with Medications [3]     Physical Exam   Constitutional: He  is oriented to person, place, and time. He appears well-developed and well-nourished. No distress.   HENT:   Head: Normocephalic and atraumatic.   Eyes: Conjunctivae are normal.   Neck: No JVD present.   Cardiovascular: Normal rate.  Exam reveals no gallop.    No murmur heard.  Pulmonary/Chest: Effort normal. No stridor. No respiratory distress. He has no wheezes. He has no rales.   Abdominal: Soft. There is no tenderness. There is no rebound and no guarding.   Musculoskeletal: He exhibits edema.   Some edema over surgical site.  No drainage, erythema, induration or fluctuance.  Drain is out   Neurological: He is oriented to person, place, and time.   Skin: Skin is warm and dry. No rash noted. He is not diaphoretic.   Psychiatric: He has a normal mood and affect. Thought content normal.   Nursing note and vitals reviewed.      Recent Labs      11/19/18   2103  11/20/18   0300  11/20/18   1030  11/20/18   2158  11/21/18   0445   WBC  7.9  8.0   --    --   7.0   RBC  3.28*  3.15*   --    --   2.85*   HEMOGLOBIN  9.8*  9.3*  9.7*  8.5*  8.5*   HEMATOCRIT  29.5*  28.8*  29.4*  25.7*  25.7*   MCV  89.9  91.4   --    --   90.2   MCH  29.9  29.5   --    --   29.8   MCHC  33.2*  32.3*   --    --   33.1*   RDW  44.2  43.7   --    --   45.1   PLATELETCT  159*  140*   --    --   134*   MPV  9.5  9.6   --    --   9.2     Recent Labs      11/20/18   0300  11/20/18   1334  11/21/18   0445   SODIUM  138  136  135   POTASSIUM  5.1  4.8  4.2   CHLORIDE  108  106  105   CO2  23  25  27   GLUCOSE  125*  110*  105*   BUN  10  11  10   CREATININE  0.82  0.79  0.76   CALCIUM  8.0*  8.4*  8.5                      Assessment/Plan     S/P lumbar laminectomy   Assessment & Plan    Had lumbar surgery by Dr Fowler, 11/19/18  Pain management  Wound care  Hemovac is out  Watch for signs of infection  Wound care  PT/OT     Obesity (BMI 35.0-39.9 without comorbidity)- (present on admission)   Assessment & Plan    Body mass index is 36.94 kg/m².  Hx  of gastric bypass in distant past     Essential hypertension- (present on admission)   Assessment & Plan    Lisinopril and clonidine  Monitor vitals  Pain control     Dyslipidemia- (present on admission)   Assessment & Plan    Continue statin therapy for ongoing active therapy     GERD (gastroesophageal reflux disease)- (present on admission)   Assessment & Plan    Omeprazole to be continued.     Acquired hypothyroidism- (present on admission)   Assessment & Plan    Continue synthroid 50mcg daily  TSH was elevated at 18.5 just two weeks ago.  Normal Free thyroxine two weeks ago.     Ascending aorta dilation (CMS-HCC), mild, needs OP followup- (present on admission)   Assessment & Plan    No acute abdominal pain  Monitor vitals to control BP  Continue clonidine and lisinopril  Recommend serial outpatient U/S monitoring       Quality-Core Measures   Reviewed items::  EKG reviewed, Labs reviewed, Medications reviewed and Radiology images reviewed  DVT prophylaxis - mechanical:  SCDs  Ulcer Prophylaxis::  Not indicated

## 2018-11-21 NOTE — ASSESSMENT & PLAN NOTE
Had lumbar surgery by Dr Fowler, 11/19/18  Pain management  Wound care  Wound care  PT/OT   Initial plan for DC home w/ home health tomorrow per NSG on 11/23 held give fever spike

## 2018-11-21 NOTE — CARE PLAN
Problem: Pain Management  Goal: Pain level will decrease to patient's comfort goal  Outcome: PROGRESSING AS EXPECTED  Patient's pain assessed and treated as needed. Pt reports that his pain is much more controlled than it has been.     Problem: Skin Integrity  Goal: Risk for impaired skin integrity will decrease  Outcome: PROGRESSING AS EXPECTED  Patient demonstrates ability to shift hips and turn self in bed. Occasionally providing assistance with pillow placement.

## 2018-11-21 NOTE — PROGRESS NOTES
Discussed POC w/ Neuro Sx APRN Ellen Selby, will mobilize pt and monitor for headache when upright.  Recheck H&H and troponin.

## 2018-11-21 NOTE — PROGRESS NOTES
1504: This RN administered 3mg Morphine IV (10mg/1mL) 0.3mL given-- 0.7mL wasted and witnessed by Maylin MIGUEL.

## 2018-11-21 NOTE — PROGRESS NOTES
1700: This RN called ASHLEIGH Selby to update her on pt status, small headache/neck ache w/ mobilizing EOB and standing, not relieved when sitting down.  Orders received to d/c PO percocet and switch to roxicodone IR, modify d/c current morphine order and switch to 2-5mg morphine IV push q 1h for breakthrough and severe pain. Also add robaxin 1g IVPB q 8h. Orders to trend H&H q 12h.  All orders read back and confirmed with ASHLEIGH.

## 2018-11-21 NOTE — ASSESSMENT & PLAN NOTE
Continue synthroid 50mcg daily  TSH was elevated at 18.5 just two weeks ago.  Normal Free thyroxine two weeks ago.

## 2018-11-22 LAB
HCT VFR BLD AUTO: 26.6 % (ref 42–52)
HGB BLD-MCNC: 8.5 G/DL (ref 14–18)

## 2018-11-22 PROCEDURE — 700102 HCHG RX REV CODE 250 W/ 637 OVERRIDE(OP): Performed by: HOSPITALIST

## 2018-11-22 PROCEDURE — 99232 SBSQ HOSP IP/OBS MODERATE 35: CPT | Performed by: HOSPITALIST

## 2018-11-22 PROCEDURE — 700112 HCHG RX REV CODE 229: Performed by: PHYSICIAN ASSISTANT

## 2018-11-22 PROCEDURE — A9270 NON-COVERED ITEM OR SERVICE: HCPCS | Performed by: PHYSICIAN ASSISTANT

## 2018-11-22 PROCEDURE — 700105 HCHG RX REV CODE 258: Performed by: NURSE PRACTITIONER

## 2018-11-22 PROCEDURE — 700102 HCHG RX REV CODE 250 W/ 637 OVERRIDE(OP): Performed by: NURSE PRACTITIONER

## 2018-11-22 PROCEDURE — A9270 NON-COVERED ITEM OR SERVICE: HCPCS | Performed by: NURSE PRACTITIONER

## 2018-11-22 PROCEDURE — A9270 NON-COVERED ITEM OR SERVICE: HCPCS | Performed by: HOSPITALIST

## 2018-11-22 PROCEDURE — 85018 HEMOGLOBIN: CPT

## 2018-11-22 PROCEDURE — 85014 HEMATOCRIT: CPT

## 2018-11-22 PROCEDURE — 770001 HCHG ROOM/CARE - MED/SURG/GYN PRIV*

## 2018-11-22 PROCEDURE — 700102 HCHG RX REV CODE 250 W/ 637 OVERRIDE(OP): Performed by: PHYSICIAN ASSISTANT

## 2018-11-22 PROCEDURE — 36415 COLL VENOUS BLD VENIPUNCTURE: CPT

## 2018-11-22 PROCEDURE — 700111 HCHG RX REV CODE 636 W/ 250 OVERRIDE (IP): Performed by: NURSE PRACTITIONER

## 2018-11-22 RX ORDER — TIZANIDINE 4 MG/1
4 TABLET ORAL EVERY 6 HOURS PRN
Status: DISCONTINUED | OUTPATIENT
Start: 2018-11-22 | End: 2018-11-26 | Stop reason: HOSPADM

## 2018-11-22 RX ADMIN — OMEPRAZOLE 20 MG: 20 CAPSULE, DELAYED RELEASE ORAL at 16:35

## 2018-11-22 RX ADMIN — LEVOTHYROXINE SODIUM 50 MCG: 50 TABLET ORAL at 05:06

## 2018-11-22 RX ADMIN — DOCUSATE SODIUM 100 MG: 100 CAPSULE, LIQUID FILLED ORAL at 16:36

## 2018-11-22 RX ADMIN — OXYCODONE HYDROCHLORIDE 20 MG: 10 TABLET ORAL at 08:30

## 2018-11-22 RX ADMIN — METHOCARBAMOL 1000 MG: 100 INJECTION INTRAMUSCULAR; INTRAVENOUS at 05:13

## 2018-11-22 RX ADMIN — MAGNESIUM HYDROXIDE 30 ML: 400 SUSPENSION ORAL at 08:30

## 2018-11-22 RX ADMIN — ACETAMINOPHEN 1000 MG: 500 TABLET ORAL at 02:52

## 2018-11-22 RX ADMIN — ACETAMINOPHEN 1000 MG: 500 TABLET ORAL at 19:57

## 2018-11-22 RX ADMIN — ATORVASTATIN CALCIUM 40 MG: 40 TABLET, FILM COATED ORAL at 19:57

## 2018-11-22 RX ADMIN — BUDESONIDE AND FORMOTEROL FUMARATE DIHYDRATE 2 PUFF: 80; 4.5 AEROSOL RESPIRATORY (INHALATION) at 05:07

## 2018-11-22 RX ADMIN — CETIRIZINE HYDROCHLORIDE 10 MG: 10 TABLET, FILM COATED ORAL at 05:06

## 2018-11-22 RX ADMIN — MORPHINE SULFATE 15 MG: 15 TABLET, EXTENDED RELEASE ORAL at 05:06

## 2018-11-22 RX ADMIN — ACETAMINOPHEN 1000 MG: 500 TABLET ORAL at 16:35

## 2018-11-22 RX ADMIN — OXYCODONE HYDROCHLORIDE 20 MG: 10 TABLET ORAL at 12:18

## 2018-11-22 RX ADMIN — STANDARDIZED SENNA CONCENTRATE AND DOCUSATE SODIUM 1 TABLET: 8.6; 5 TABLET, FILM COATED ORAL at 19:57

## 2018-11-22 RX ADMIN — POLYETHYLENE GLYCOL 3350 1 PACKET: 17 POWDER, FOR SOLUTION ORAL at 05:06

## 2018-11-22 RX ADMIN — OMEPRAZOLE 20 MG: 20 CAPSULE, DELAYED RELEASE ORAL at 05:06

## 2018-11-22 RX ADMIN — TIZANIDINE 4 MG: 4 TABLET ORAL at 18:46

## 2018-11-22 RX ADMIN — LISINOPRIL 10 MG: 10 TABLET ORAL at 05:06

## 2018-11-22 RX ADMIN — DOCUSATE SODIUM 100 MG: 100 CAPSULE, LIQUID FILLED ORAL at 05:06

## 2018-11-22 RX ADMIN — OXYCODONE HYDROCHLORIDE 10 MG: 10 TABLET ORAL at 02:52

## 2018-11-22 RX ADMIN — BUDESONIDE AND FORMOTEROL FUMARATE DIHYDRATE 2 PUFF: 80; 4.5 AEROSOL RESPIRATORY (INHALATION) at 16:36

## 2018-11-22 RX ADMIN — MORPHINE SULFATE 15 MG: 15 TABLET, EXTENDED RELEASE ORAL at 16:35

## 2018-11-22 RX ADMIN — MULTIPLE VITAMINS W/ MINERALS TAB 1 TABLET: TAB at 05:06

## 2018-11-22 ASSESSMENT — PAIN SCALES - GENERAL
PAINLEVEL_OUTOF10: 7
PAINLEVEL_OUTOF10: 2
PAINLEVEL_OUTOF10: 3
PAINLEVEL_OUTOF10: 6
PAINLEVEL_OUTOF10: 4

## 2018-11-22 ASSESSMENT — ENCOUNTER SYMPTOMS
COUGH: 1
BACK PAIN: 1
VOMITING: 0
NAUSEA: 0
LOSS OF CONSCIOUSNESS: 0
CHILLS: 0
FEVER: 0
ABDOMINAL PAIN: 0
SHORTNESS OF BREATH: 0
DIZZINESS: 0
DIARRHEA: 0
HEADACHES: 0

## 2018-11-22 NOTE — ASSESSMENT & PLAN NOTE
iniitally thought Likely from postoperative  H/h stable in last couple days until this morning, hb 7.0  Work up ordered.  Recheck hb later, transfuse per primary  Would recommend to hold in hospital for another day to monitor  Consider Gi consult if stool occult positive.   ctm

## 2018-11-22 NOTE — PROGRESS NOTES
[facility] Hospitalist Progress Note    Date of Service: 2018    Chief Complaint  73 y.o. male admitted 2018 for planned L spine Lami by Dr Fowler.  Hospitalist service consulted for assistance with medical management     PMHx: HLD, Hypothyroid, HTN, GERD    Interval Problem Update  Pt doing well.  Pt appears stressed from home stressors however clinically doing well.  Pain controlled.  NO fevers, chills, n/v/sob, chest pain.  Discussed possibility of discharge tomorrow per nsx, pt in agreement.       Consultants/Specialty  NeuroSurgery    Disposition  OK to Neuro floor        Review of Systems   Constitutional: Negative for chills and fever.   Respiratory: Positive for cough. Negative for shortness of breath.    Cardiovascular: Negative for chest pain.   Gastrointestinal: Negative for abdominal pain, diarrhea, nausea and vomiting.   Musculoskeletal: Positive for back pain.   Skin: Negative for rash.   Neurological: Negative for dizziness, loss of consciousness and headaches.      Physical Exam  Laboratory/Imaging   Hemodynamics  Temp (24hrs), Av.1 °C (98.7 °F), Min:36.7 °C (98 °F), Max:37.8 °C (100 °F)   Temperature: 37.2 °C (99 °F)  Pulse  Av  Min: 63  Max: 120 Heart Rate (Monitored): (!) 108  Blood Pressure : 101/63, NIBP: 109/68      Respiratory      Respiration: 16, Pulse Oximetry: 93 %     Work Of Breathing / Effort: Mild  RUL Breath Sounds: Clear, RML Breath Sounds: Clear, RLL Breath Sounds: Diminished, COLE Breath Sounds: Clear, LLL Breath Sounds: Diminished    Fluids    Intake/Output Summary (Last 24 hours) at 18 1436  Last data filed at 18 1100   Gross per 24 hour   Intake                0 ml   Output              925 ml   Net             -925 ml       Nutrition  Orders Placed This Encounter   Procedures   • Diet Order Regular     Standing Status:   Standing     Number of Occurrences:   1     Order Specific Question:   Diet:     Answer:   Regular [1]     Physical Exam    Constitutional: He is oriented to person, place, and time. He appears well-developed and well-nourished. No distress.   HENT:   Head: Normocephalic and atraumatic.   Eyes: Pupils are equal, round, and reactive to light. Conjunctivae are normal.   Neck: No JVD present.   Cardiovascular: Normal rate.  Exam reveals no gallop.    No murmur heard.  Pulmonary/Chest: Effort normal. No stridor. No respiratory distress. He has no wheezes. He has no rales.   Abdominal: Soft. There is no tenderness. There is no rebound and no guarding.   Musculoskeletal: He exhibits edema.   Some edema over surgical site.  No drainage, erythema, induration or fluctuance.  Drain is out   Neurological: He is alert and oriented to person, place, and time.   BRACE   Skin: Skin is warm and dry. No rash noted. He is not diaphoretic.   Psychiatric: He has a normal mood and affect. His behavior is normal. Thought content normal.   Nursing note and vitals reviewed.      Recent Labs      11/19/18 2103 11/20/18   0300   11/20/18   2158  11/21/18   0445  11/22/18   0902   WBC  7.9  8.0   --    --   7.0   --    RBC  3.28*  3.15*   --    --   2.85*   --    HEMOGLOBIN  9.8*  9.3*   < >  8.5*  8.5*  8.5*   HEMATOCRIT  29.5*  28.8*   < >  25.7*  25.7*  26.6*   MCV  89.9  91.4   --    --   90.2   --    MCH  29.9  29.5   --    --   29.8   --    MCHC  33.2*  32.3*   --    --   33.1*   --    RDW  44.2  43.7   --    --   45.1   --    PLATELETCT  159*  140*   --    --   134*   --    MPV  9.5  9.6   --    --   9.2   --     < > = values in this interval not displayed.     Recent Labs      11/20/18   0300  11/20/18   1334  11/21/18   0445   SODIUM  138  136  135   POTASSIUM  5.1  4.8  4.2   CHLORIDE  108  106  105   CO2  23  25  27   GLUCOSE  125*  110*  105*   BUN  10  11  10   CREATININE  0.82  0.79  0.76   CALCIUM  8.0*  8.4*  8.5                      Assessment/Plan     Anemia- (present on admission)   Assessment & Plan    Likely from postoperative  H/h stable  in last couple days  ctm     S/P lumbar laminectomy   Assessment & Plan    Had lumbar surgery by Dr Fowler, 11/19/18  Pain management  Wound care  Hemovac is out  Watch for signs of infection  Wound care  PT/OT   Planned for DC home w/ home health tomorrow per NSG     Obesity (BMI 35.0-39.9 without comorbidity)- (present on admission)   Assessment & Plan    Body mass index is 36.94 kg/m².  Hx of gastric bypass in distant past     Essential hypertension- (present on admission)   Assessment & Plan    Lisinopril and clonidine  Monitor vitals  Pain control     Dyslipidemia- (present on admission)   Assessment & Plan    Continue statin therapy for ongoing active therapy     GERD (gastroesophageal reflux disease)- (present on admission)   Assessment & Plan    Omeprazole to be continued.     Acquired hypothyroidism- (present on admission)   Assessment & Plan    Continue synthroid 50mcg daily  TSH was elevated at 18.5 just two weeks ago.  Normal Free thyroxine two weeks ago.     Ascending aorta dilation (CMS-HCC), mild, needs OP followup- (present on admission)   Assessment & Plan    No acute abdominal pain  Monitor vitals to control BP  Continue clonidine and lisinopril  Recommend serial outpatient U/S monitoring       PT PLANNED FOR HOME DISCHARGE TOMORROW WITH HOME HEALTH, PER NSX NOTED.  HOSPITALIST MEDICINE WILL SIGN OFF.    Quality-Core Measures   Reviewed items::  EKG reviewed, Labs reviewed, Medications reviewed and Radiology images reviewed  DVT prophylaxis - mechanical:  SCDs  Ulcer Prophylaxis::  Not indicated

## 2018-11-22 NOTE — PROGRESS NOTES
Assumed pt care at 1850. Received report from Jyoti MIGUEL. Assessment completed. Pt A&Ox4. Pain 9/10. Bed in lowest position, locked, and bed alarm is on. Pt calls appropriately. Treaded socks in place, call light within reach and staff numbers provided. Pt needs met at this time.

## 2018-11-22 NOTE — PROGRESS NOTES
Pt Arrived to unit, pt A&Ox4 pain 8/10 given IV morphine, pt oriented to room and call light system, Assessment congruent with previous RN, 2 RN skin Check Done- lumbar incision noted skin pink, warm, dry, and intact, all questions answered, call light within reach, bed alarm on, bed locked and in lowest position, hourly rounding in place.

## 2018-11-22 NOTE — FACE TO FACE
Face to Face Supporting Documentation - Home Health    The encounter with this patient was in whole or in part the primary reason for home health admission.    Date of encounter:   Patient:                    MRN:                       YOB: 2016  Angelo Magaña  [id number]  1945     Home health to see patient for:  Skilled Nursing care for assessment, interventions & education, Home health aide, Physical Therapy evaluation and treatment and Occupational therapy evaluation and treatment    Skilled need for:  Surgical Aftercare lumbar fusion    Skilled nursing interventions to include:  Wound Care    Homebound status evidenced by:  Need the aid of supportive devices such as crutches, canes, wheelchairs or walkers or Needs the assistance of another person in order to leave the home. Leaving home requires a considerable and taxing effort. There is a normal inability to leave the home.    Community Physician to provide follow up care: Jeovany Samson M.D.     Optional Interventions? No      I certify the face to face encounter for this home health care referral meets the CMS requirements and the encounter/clinical assessment with the patient was, in whole, or in part, for the medical condition(s) listed above, which is the primary reason for home health care. Based on my clinical findings: the service(s) are medically necessary, support the need for home health care, and the homebound criteria are met.  I certify that this patient has had a face to face encounter by myself.  Brittny Escalera P.A.-C. - NPI: [id number]

## 2018-11-22 NOTE — PROGRESS NOTES
Neurosurgery Progress Note    Subjective:  C/o some incisional pain.    Denies leg pain, tingling or numbness, or weakness.  Drain removed, no HA  VSS, SBP between 100-120s  Antihypertensives held   Hgb stable, low platelets     Exam:  VSS  A&Ox4, NAD  NM: 5/5 hip flexion, knee extension, knee flexion, plantar flexion, dorsiflexion, EHL  Sensation intact and equal throughout all four extremities.   Abdomen: soft, non-tender  Non-labored breathing on 2L NC   Capillary refill in all four extremities <2 seconds  No LE edema, erythema, cyanosis, clubbing  Calves non-tender to compression bilat  Incision CDI, no halo sign        BP  Min: 108/69  Max: 136/84  Pulse  Av  Min: 88  Max: 120  Resp  Avg: 15.3  Min: 12  Max: 16  Temp  Av.1 °C (98.7 °F)  Min: 36.7 °C (98 °F)  Max: 37.8 °C (100 °F)  SpO2  Av.8 %  Min: 91 %  Max: 99 %    No Data Recorded    Recent Labs      18   2158  18   0445  18   0902   WBC  7.9  8.0   --    --   7.0   --    RBC  3.28*  3.15*   --    --   2.85*   --    HEMOGLOBIN  9.8*  9.3*   < >  8.5*  8.5*  8.5*   HEMATOCRIT  29.5*  28.8*   < >  25.7*  25.7*  26.6*   MCV  89.9  91.4   --    --   90.2   --    MCH  29.9  29.5   --    --   29.8   --    MCHC  33.2*  32.3*   --    --   33.1*   --    RDW  44.2  43.7   --    --   45.1   --    PLATELETCT  159*  140*   --    --   134*   --    MPV  9.5  9.6   --    --   9.2   --     < > = values in this interval not displayed.     Recent Labs      18   0300  18   1334  18   0445   SODIUM  138  136  135   POTASSIUM  5.1  4.8  4.2   CHLORIDE  108  106  105   CO2  23  25  27   GLUCOSE  125*  110*  105*   BUN  10  11  10   CREATININE  0.82  0.79  0.76   CALCIUM  8.0*  8.4*  8.5               Intake/Output       18 - 1859 18 - 1859      1900-0659 Total 1900-0659 Total       Intake    P.O.  600  -- 600  --  -- --    P.O. 600 --  600 -- -- --    I.V.  125  -- 125  --  -- --    Volume (mL) (NS infusion) 125 -- 125 -- -- --    Total Intake 725 -- 725 -- -- --       Output    Urine  1600  175 1775  100  -- 100    Number of Times Voided 7 x 1 x 8 x -- -- --    Urine Void (mL) 2213 811 3140 100 -- 100    Stool  --  -- --  --  -- --    Number of Times Stooled 0 x -- 0 x -- -- --    Total Output 4990 557 2157 100 -- 100       Net I/O     -875 -175 -1050 -100 -- -100            Intake/Output Summary (Last 24 hours) at 11/22/18 1023  Last data filed at 11/22/18 0803   Gross per 24 hour   Intake              240 ml   Output             1775 ml   Net            -1535 ml            • ondansetron  4 mg Q6HRS PRN   • polyethylene glycol/lytes  1 Packet DAILY   • magnesium hydroxide  30 mL AFTER BREAKFAST   • morphine ER  15 mg Q12HRS   • acetaminophen  1,000 mg Q6HR   • therapeutic multivitamin-minerals  1 Tab DAILY   • budesonide-formoterol  2 Puff BID   • morphine injection  2-5 mg Q HOUR PRN   • oxyCODONE immediate-release  20 mg Q3HRS PRN    Or   • oxyCODONE immediate-release  10 mg Q3HRS PRN   • Methocarbamol IVPB  1,000 mg Q8HRS   • omeprazole  20 mg BID   • atorvastatin  40 mg Nightly   • cetirizine  10 mg DAILY   • levothyroxine  50 mcg QAM AC   • lisinopril  10 mg DAILY   • MD ALERT...DO NOT ADMINISTER NSAIDS or ASPIRIN unless ORDERED By Neurosurgery  1 Each PRN   • docusate sodium  100 mg BID   • senna-docusate  1 Tab Nightly   • senna-docusate  1 Tab Q24HRS PRN   • magnesium hydroxide  30 mL QDAY PRN   • bisacodyl  10 mg Q24HRS PRN   • fleet  1 Each Once PRN   • cloNIDine  0.1 mg Q4HRS PRN       Assessment and Plan:  POD #3  Prophylactic anticoagulation: no         Start date/time: tbd, will hold for low platelets.   Continue diet, Hgb daily  Robaxin PO from IV   Needs IS  Plan to d/c to home with HH tomorrow   Would appreciate updated OT recs for d/c planning.

## 2018-11-22 NOTE — CARE PLAN
Problem: Safety  Goal: Will remain free from falls  Outcome: PROGRESSING AS EXPECTED  Educated pt to call before getting up. Bed alarm on and call light within reach.    Problem: Venous Thromboembolism (VTW)/Deep Vein Thrombosis (DVT) Prevention:  Goal: Patient will participate in Venous Thrombosis (VTE)/Deep Vein Thrombosis (DVT)Prevention Measures  Outcome: PROGRESSING AS EXPECTED

## 2018-11-23 ENCOUNTER — APPOINTMENT (OUTPATIENT)
Dept: RADIOLOGY | Facility: MEDICAL CENTER | Age: 73
DRG: 454 | End: 2018-11-23
Attending: HOSPITALIST
Payer: MEDICARE

## 2018-11-23 ENCOUNTER — APPOINTMENT (OUTPATIENT)
Dept: RADIOLOGY | Facility: MEDICAL CENTER | Age: 73
DRG: 454 | End: 2018-11-23
Attending: PHYSICIAN ASSISTANT
Payer: MEDICARE

## 2018-11-23 LAB
APPEARANCE UR: CLEAR
BASOPHILS # BLD AUTO: 0.2 % (ref 0–1.8)
BASOPHILS # BLD AUTO: 0.3 % (ref 0–1.8)
BASOPHILS # BLD: 0.01 K/UL (ref 0–0.12)
BASOPHILS # BLD: 0.02 K/UL (ref 0–0.12)
BILIRUB UR QL STRIP.AUTO: NEGATIVE
COLOR UR: YELLOW
EOSINOPHIL # BLD AUTO: 0.22 K/UL (ref 0–0.51)
EOSINOPHIL # BLD AUTO: 0.24 K/UL (ref 0–0.51)
EOSINOPHIL NFR BLD: 3.7 % (ref 0–6.9)
EOSINOPHIL NFR BLD: 4.4 % (ref 0–6.9)
ERYTHROCYTE [DISTWIDTH] IN BLOOD BY AUTOMATED COUNT: 44.8 FL (ref 35.9–50)
ERYTHROCYTE [DISTWIDTH] IN BLOOD BY AUTOMATED COUNT: 45 FL (ref 35.9–50)
GLUCOSE UR STRIP.AUTO-MCNC: NEGATIVE MG/DL
HCT VFR BLD AUTO: 23.5 % (ref 42–52)
HCT VFR BLD AUTO: 24.3 % (ref 42–52)
HGB BLD-MCNC: 7.8 G/DL (ref 14–18)
HGB BLD-MCNC: 8.2 G/DL (ref 14–18)
IMM GRANULOCYTES # BLD AUTO: 0.02 K/UL (ref 0–0.11)
IMM GRANULOCYTES # BLD AUTO: 0.03 K/UL (ref 0–0.11)
IMM GRANULOCYTES NFR BLD AUTO: 0.4 % (ref 0–0.9)
IMM GRANULOCYTES NFR BLD AUTO: 0.5 % (ref 0–0.9)
KETONES UR STRIP.AUTO-MCNC: ABNORMAL MG/DL
LEUKOCYTE ESTERASE UR QL STRIP.AUTO: NEGATIVE
LYMPHOCYTES # BLD AUTO: 0.93 K/UL (ref 1–4.8)
LYMPHOCYTES # BLD AUTO: 0.98 K/UL (ref 1–4.8)
LYMPHOCYTES NFR BLD: 15.6 % (ref 22–41)
LYMPHOCYTES NFR BLD: 18.1 % (ref 22–41)
MCH RBC QN AUTO: 29.8 PG (ref 27–33)
MCH RBC QN AUTO: 30 PG (ref 27–33)
MCHC RBC AUTO-ENTMCNC: 33.2 G/DL (ref 33.7–35.3)
MCHC RBC AUTO-ENTMCNC: 33.7 G/DL (ref 33.7–35.3)
MCV RBC AUTO: 89 FL (ref 81.4–97.8)
MCV RBC AUTO: 89.7 FL (ref 81.4–97.8)
MICRO URNS: ABNORMAL
MONOCYTES # BLD AUTO: 0.45 K/UL (ref 0–0.85)
MONOCYTES # BLD AUTO: 0.51 K/UL (ref 0–0.85)
MONOCYTES NFR BLD AUTO: 7.5 % (ref 0–13.4)
MONOCYTES NFR BLD AUTO: 9.4 % (ref 0–13.4)
NEUTROPHILS # BLD AUTO: 3.65 K/UL (ref 1.82–7.42)
NEUTROPHILS # BLD AUTO: 4.33 K/UL (ref 1.82–7.42)
NEUTROPHILS NFR BLD: 67.5 % (ref 44–72)
NEUTROPHILS NFR BLD: 72.4 % (ref 44–72)
NITRITE UR QL STRIP.AUTO: NEGATIVE
NRBC # BLD AUTO: 0 K/UL
NRBC # BLD AUTO: 0 K/UL
NRBC BLD-RTO: 0 /100 WBC
NRBC BLD-RTO: 0 /100 WBC
PH UR STRIP.AUTO: 6 [PH]
PLATELET # BLD AUTO: 146 K/UL (ref 164–446)
PLATELET # BLD AUTO: 156 K/UL (ref 164–446)
PMV BLD AUTO: 9.4 FL (ref 9–12.9)
PMV BLD AUTO: 9.5 FL (ref 9–12.9)
PROT UR QL STRIP: NEGATIVE MG/DL
RBC # BLD AUTO: 2.62 M/UL (ref 4.7–6.1)
RBC # BLD AUTO: 2.73 M/UL (ref 4.7–6.1)
RBC UR QL AUTO: NEGATIVE
SP GR UR STRIP.AUTO: 1.03
TROPONIN I SERPL-MCNC: 0.02 NG/ML (ref 0–0.04)
TROPONIN I SERPL-MCNC: 0.02 NG/ML (ref 0–0.04)
UROBILINOGEN UR STRIP.AUTO-MCNC: 1 MG/DL
WBC # BLD AUTO: 5.4 K/UL (ref 4.8–10.8)
WBC # BLD AUTO: 6 K/UL (ref 4.8–10.8)

## 2018-11-23 PROCEDURE — 700117 HCHG RX CONTRAST REV CODE 255: Performed by: HOSPITALIST

## 2018-11-23 PROCEDURE — 700102 HCHG RX REV CODE 250 W/ 637 OVERRIDE(OP): Performed by: PHYSICIAN ASSISTANT

## 2018-11-23 PROCEDURE — 700111 HCHG RX REV CODE 636 W/ 250 OVERRIDE (IP): Performed by: NURSE PRACTITIONER

## 2018-11-23 PROCEDURE — 51798 US URINE CAPACITY MEASURE: CPT

## 2018-11-23 PROCEDURE — A9270 NON-COVERED ITEM OR SERVICE: HCPCS | Performed by: NURSE PRACTITIONER

## 2018-11-23 PROCEDURE — 700111 HCHG RX REV CODE 636 W/ 250 OVERRIDE (IP): Performed by: PHYSICIAN ASSISTANT

## 2018-11-23 PROCEDURE — 700102 HCHG RX REV CODE 250 W/ 637 OVERRIDE(OP): Performed by: HOSPITALIST

## 2018-11-23 PROCEDURE — 93010 ELECTROCARDIOGRAM REPORT: CPT | Performed by: INTERNAL MEDICINE

## 2018-11-23 PROCEDURE — 93005 ELECTROCARDIOGRAM TRACING: CPT | Performed by: PHYSICIAN ASSISTANT

## 2018-11-23 PROCEDURE — 84484 ASSAY OF TROPONIN QUANT: CPT

## 2018-11-23 PROCEDURE — 71275 CT ANGIOGRAPHY CHEST: CPT

## 2018-11-23 PROCEDURE — A9270 NON-COVERED ITEM OR SERVICE: HCPCS | Performed by: PHYSICIAN ASSISTANT

## 2018-11-23 PROCEDURE — 36415 COLL VENOUS BLD VENIPUNCTURE: CPT

## 2018-11-23 PROCEDURE — 700102 HCHG RX REV CODE 250 W/ 637 OVERRIDE(OP): Performed by: NURSE PRACTITIONER

## 2018-11-23 PROCEDURE — A9270 NON-COVERED ITEM OR SERVICE: HCPCS | Performed by: HOSPITALIST

## 2018-11-23 PROCEDURE — 81003 URINALYSIS AUTO W/O SCOPE: CPT

## 2018-11-23 PROCEDURE — 71045 X-RAY EXAM CHEST 1 VIEW: CPT

## 2018-11-23 PROCEDURE — 770020 HCHG ROOM/CARE - TELE (206)

## 2018-11-23 PROCEDURE — 85025 COMPLETE CBC W/AUTO DIFF WBC: CPT

## 2018-11-23 PROCEDURE — 700112 HCHG RX REV CODE 229: Performed by: PHYSICIAN ASSISTANT

## 2018-11-23 RX ORDER — TIZANIDINE 4 MG/1
4 TABLET ORAL EVERY 6 HOURS PRN
Qty: 60 TAB | Refills: 0 | Status: SHIPPED | OUTPATIENT
Start: 2018-11-23 | End: 2020-02-06

## 2018-11-23 RX ORDER — MORPHINE SULFATE 15 MG/1
15 TABLET, FILM COATED, EXTENDED RELEASE ORAL EVERY 12 HOURS
Qty: 21 TAB | Refills: 0 | Status: SHIPPED | OUTPATIENT
Start: 2018-11-23 | End: 2018-12-07

## 2018-11-23 RX ORDER — OXYCODONE HYDROCHLORIDE 10 MG/1
10 TABLET ORAL EVERY 4 HOURS PRN
Qty: 75 TAB | Refills: 0 | Status: SHIPPED | OUTPATIENT
Start: 2018-11-23 | End: 2018-12-07

## 2018-11-23 RX ADMIN — LEVOTHYROXINE SODIUM 50 MCG: 50 TABLET ORAL at 05:42

## 2018-11-23 RX ADMIN — OMEPRAZOLE 20 MG: 20 CAPSULE, DELAYED RELEASE ORAL at 17:24

## 2018-11-23 RX ADMIN — CETIRIZINE HYDROCHLORIDE 10 MG: 10 TABLET, FILM COATED ORAL at 05:42

## 2018-11-23 RX ADMIN — OXYCODONE HYDROCHLORIDE 10 MG: 10 TABLET ORAL at 03:12

## 2018-11-23 RX ADMIN — OMEPRAZOLE 20 MG: 20 CAPSULE, DELAYED RELEASE ORAL at 05:43

## 2018-11-23 RX ADMIN — MAGNESIUM HYDROXIDE 30 ML: 400 SUSPENSION ORAL at 08:17

## 2018-11-23 RX ADMIN — ENOXAPARIN SODIUM 30 MG: 100 INJECTION SUBCUTANEOUS at 17:23

## 2018-11-23 RX ADMIN — IOHEXOL 75 ML: 350 INJECTION, SOLUTION INTRAVENOUS at 20:53

## 2018-11-23 RX ADMIN — ACETAMINOPHEN 1000 MG: 500 TABLET ORAL at 08:17

## 2018-11-23 RX ADMIN — ACETAMINOPHEN 1000 MG: 500 TABLET ORAL at 03:12

## 2018-11-23 RX ADMIN — DOCUSATE SODIUM 100 MG: 100 CAPSULE, LIQUID FILLED ORAL at 05:42

## 2018-11-23 RX ADMIN — OXYCODONE HYDROCHLORIDE 10 MG: 10 TABLET ORAL at 08:18

## 2018-11-23 RX ADMIN — TIZANIDINE 4 MG: 4 TABLET ORAL at 13:51

## 2018-11-23 RX ADMIN — MULTIPLE VITAMINS W/ MINERALS TAB 1 TABLET: TAB at 05:42

## 2018-11-23 RX ADMIN — MORPHINE SULFATE 15 MG: 15 TABLET, EXTENDED RELEASE ORAL at 17:24

## 2018-11-23 RX ADMIN — ACETAMINOPHEN 1000 MG: 500 TABLET ORAL at 17:24

## 2018-11-23 RX ADMIN — DOCUSATE SODIUM 100 MG: 100 CAPSULE, LIQUID FILLED ORAL at 17:24

## 2018-11-23 RX ADMIN — MORPHINE SULFATE 5 MG: 10 INJECTION INTRAVENOUS at 20:02

## 2018-11-23 RX ADMIN — ATORVASTATIN CALCIUM 40 MG: 40 TABLET, FILM COATED ORAL at 21:50

## 2018-11-23 RX ADMIN — OXYCODONE HYDROCHLORIDE 10 MG: 10 TABLET ORAL at 13:52

## 2018-11-23 RX ADMIN — STANDARDIZED SENNA CONCENTRATE AND DOCUSATE SODIUM 1 TABLET: 8.6; 5 TABLET, FILM COATED ORAL at 21:50

## 2018-11-23 RX ADMIN — OXYCODONE HYDROCHLORIDE 10 MG: 10 TABLET ORAL at 17:24

## 2018-11-23 RX ADMIN — BUDESONIDE AND FORMOTEROL FUMARATE DIHYDRATE 2 PUFF: 80; 4.5 AEROSOL RESPIRATORY (INHALATION) at 05:45

## 2018-11-23 RX ADMIN — MORPHINE SULFATE 15 MG: 15 TABLET, EXTENDED RELEASE ORAL at 05:42

## 2018-11-23 RX ADMIN — LISINOPRIL 10 MG: 10 TABLET ORAL at 05:42

## 2018-11-23 RX ADMIN — TIZANIDINE 4 MG: 4 TABLET ORAL at 05:42

## 2018-11-23 ASSESSMENT — PAIN SCALES - GENERAL
PAINLEVEL_OUTOF10: 10
PAINLEVEL_OUTOF10: 7
PAINLEVEL_OUTOF10: 8
PAINLEVEL_OUTOF10: 6
PAINLEVEL_OUTOF10: 8
PAINLEVEL_OUTOF10: 7

## 2018-11-23 ASSESSMENT — PATIENT HEALTH QUESTIONNAIRE - PHQ9
1. LITTLE INTEREST OR PLEASURE IN DOING THINGS: NOT AT ALL
2. FEELING DOWN, DEPRESSED, IRRITABLE, OR HOPELESS: NOT AT ALL
SUM OF ALL RESPONSES TO PHQ9 QUESTIONS 1 AND 2: 0

## 2018-11-23 NOTE — CARE PLAN
Problem: Safety  Goal: Will remain free from falls  Outcome: PROGRESSING AS EXPECTED  Educated pt to call before getting up. Bed alarm on and call light with in reach.    Problem: Venous Thromboembolism (VTW)/Deep Vein Thrombosis (DVT) Prevention:  Goal: Patient will participate in Venous Thrombosis (VTE)/Deep Vein Thrombosis (DVT)Prevention Measures  Outcome: PROGRESSING AS EXPECTED

## 2018-11-23 NOTE — PROGRESS NOTES
0845Report received, plan of care reviewed and discussed, assessment complete, oriented to room, bed alarm on, nonskid socks applied, advised to call for assistance.   1045 Discussed plan of care, encouraged and answered all questions, will continue to monitor.  1245 Complaints of pain, medication administered per MAR, ambulating in the ham with one assist, will continue to monitor.  1445 Resting, all needs met at this time.  1645 Febrile and tachycardic, MD notified, orders given, will continue to monitor.  1815 Dr Baker paged 3 times per ROSE Hough's request, awaiting call back.  1830 Discussed plan of care with both neurosurgeon PA and hospitalist, orders received.  1845 Report given to next shift.

## 2018-11-23 NOTE — DISCHARGE PLANNING
Anticipated Discharge Disposition:   Home  Pt refused homehealth    Action:   Met with pt to discuss HH and he refused . He said that this is his 5th surgery and does not want anymore homehealth because he knows how to manage. He is not on home O2 so pt will need to be weaned off. Pt otherwise does not have any discharge concerns.   IMM letter given. Explained to pt, pt signed, copy to pt and to chart.      Barriers to Discharge:   none    Plan:   Dc home.

## 2018-11-24 ENCOUNTER — APPOINTMENT (OUTPATIENT)
Dept: RADIOLOGY | Facility: MEDICAL CENTER | Age: 73
DRG: 454 | End: 2018-11-24
Attending: PHYSICIAN ASSISTANT
Payer: MEDICARE

## 2018-11-24 LAB
ANION GAP SERPL CALC-SCNC: 6 MMOL/L (ref 0–11.9)
BASOPHILS # BLD AUTO: 0.5 % (ref 0–1.8)
BASOPHILS # BLD: 0.03 K/UL (ref 0–0.12)
BUN SERPL-MCNC: 11 MG/DL (ref 8–22)
CALCIUM SERPL-MCNC: 8.9 MG/DL (ref 8.5–10.5)
CHLORIDE SERPL-SCNC: 99 MMOL/L (ref 96–112)
CO2 SERPL-SCNC: 29 MMOL/L (ref 20–33)
CREAT SERPL-MCNC: 0.83 MG/DL (ref 0.5–1.4)
EKG IMPRESSION: NORMAL
EOSINOPHIL # BLD AUTO: 0.22 K/UL (ref 0–0.51)
EOSINOPHIL NFR BLD: 3.5 % (ref 0–6.9)
ERYTHROCYTE [DISTWIDTH] IN BLOOD BY AUTOMATED COUNT: 45.9 FL (ref 35.9–50)
GLUCOSE SERPL-MCNC: 116 MG/DL (ref 65–99)
HCT VFR BLD AUTO: 24.4 % (ref 42–52)
HGB BLD-MCNC: 8 G/DL (ref 14–18)
IMM GRANULOCYTES # BLD AUTO: 0.02 K/UL (ref 0–0.11)
IMM GRANULOCYTES NFR BLD AUTO: 0.3 % (ref 0–0.9)
LYMPHOCYTES # BLD AUTO: 1.11 K/UL (ref 1–4.8)
LYMPHOCYTES NFR BLD: 17.8 % (ref 22–41)
MCH RBC QN AUTO: 29.6 PG (ref 27–33)
MCHC RBC AUTO-ENTMCNC: 32.8 G/DL (ref 33.7–35.3)
MCV RBC AUTO: 90.4 FL (ref 81.4–97.8)
MONOCYTES # BLD AUTO: 0.57 K/UL (ref 0–0.85)
MONOCYTES NFR BLD AUTO: 9.1 % (ref 0–13.4)
NEUTROPHILS # BLD AUTO: 4.29 K/UL (ref 1.82–7.42)
NEUTROPHILS NFR BLD: 68.8 % (ref 44–72)
NRBC # BLD AUTO: 0 K/UL
NRBC BLD-RTO: 0 /100 WBC
PLATELET # BLD AUTO: 207 K/UL (ref 164–446)
PMV BLD AUTO: 9.5 FL (ref 9–12.9)
POTASSIUM SERPL-SCNC: 3.8 MMOL/L (ref 3.6–5.5)
RBC # BLD AUTO: 2.7 M/UL (ref 4.7–6.1)
SODIUM SERPL-SCNC: 134 MMOL/L (ref 135–145)
TROPONIN I SERPL-MCNC: 0.02 NG/ML (ref 0–0.04)
WBC # BLD AUTO: 6.2 K/UL (ref 4.8–10.8)

## 2018-11-24 PROCEDURE — 700111 HCHG RX REV CODE 636 W/ 250 OVERRIDE (IP): Performed by: NURSE PRACTITIONER

## 2018-11-24 PROCEDURE — A9270 NON-COVERED ITEM OR SERVICE: HCPCS | Performed by: PHYSICIAN ASSISTANT

## 2018-11-24 PROCEDURE — 84484 ASSAY OF TROPONIN QUANT: CPT

## 2018-11-24 PROCEDURE — 700102 HCHG RX REV CODE 250 W/ 637 OVERRIDE(OP): Performed by: HOSPITALIST

## 2018-11-24 PROCEDURE — 700102 HCHG RX REV CODE 250 W/ 637 OVERRIDE(OP): Performed by: NURSE PRACTITIONER

## 2018-11-24 PROCEDURE — 700105 HCHG RX REV CODE 258: Performed by: PHYSICIAN ASSISTANT

## 2018-11-24 PROCEDURE — A9270 NON-COVERED ITEM OR SERVICE: HCPCS | Performed by: HOSPITALIST

## 2018-11-24 PROCEDURE — 770020 HCHG ROOM/CARE - TELE (206)

## 2018-11-24 PROCEDURE — 36415 COLL VENOUS BLD VENIPUNCTURE: CPT

## 2018-11-24 PROCEDURE — 85025 COMPLETE CBC W/AUTO DIFF WBC: CPT

## 2018-11-24 PROCEDURE — A9270 NON-COVERED ITEM OR SERVICE: HCPCS | Performed by: NURSE PRACTITIONER

## 2018-11-24 PROCEDURE — 80048 BASIC METABOLIC PNL TOTAL CA: CPT

## 2018-11-24 PROCEDURE — 700111 HCHG RX REV CODE 636 W/ 250 OVERRIDE (IP): Performed by: PHYSICIAN ASSISTANT

## 2018-11-24 PROCEDURE — 700112 HCHG RX REV CODE 229: Performed by: PHYSICIAN ASSISTANT

## 2018-11-24 PROCEDURE — 72100 X-RAY EXAM L-S SPINE 2/3 VWS: CPT

## 2018-11-24 PROCEDURE — 99232 SBSQ HOSP IP/OBS MODERATE 35: CPT | Performed by: HOSPITALIST

## 2018-11-24 PROCEDURE — 70450 CT HEAD/BRAIN W/O DYE: CPT

## 2018-11-24 PROCEDURE — 700102 HCHG RX REV CODE 250 W/ 637 OVERRIDE(OP): Performed by: PHYSICIAN ASSISTANT

## 2018-11-24 PROCEDURE — 87040 BLOOD CULTURE FOR BACTERIA: CPT | Mod: 91

## 2018-11-24 RX ORDER — MORPHINE SULFATE 15 MG/1
15 TABLET, FILM COATED, EXTENDED RELEASE ORAL
Status: DISCONTINUED | OUTPATIENT
Start: 2018-11-24 | End: 2018-11-25

## 2018-11-24 RX ORDER — BISACODYL 10 MG
10 SUPPOSITORY, RECTAL RECTAL ONCE
Status: COMPLETED | OUTPATIENT
Start: 2018-11-24 | End: 2018-11-24

## 2018-11-24 RX ORDER — TRAMADOL HYDROCHLORIDE 50 MG/1
100 TABLET ORAL EVERY 6 HOURS PRN
Status: DISCONTINUED | OUTPATIENT
Start: 2018-11-24 | End: 2018-11-26 | Stop reason: HOSPADM

## 2018-11-24 RX ORDER — TAMSULOSIN HYDROCHLORIDE 0.4 MG/1
0.4 CAPSULE ORAL
Status: DISCONTINUED | OUTPATIENT
Start: 2018-11-24 | End: 2018-11-26 | Stop reason: HOSPADM

## 2018-11-24 RX ORDER — SODIUM CHLORIDE 9 MG/ML
1000 INJECTION, SOLUTION INTRAVENOUS ONCE
Status: COMPLETED | OUTPATIENT
Start: 2018-11-24 | End: 2018-11-24

## 2018-11-24 RX ADMIN — BUDESONIDE AND FORMOTEROL FUMARATE DIHYDRATE 2 PUFF: 80; 4.5 AEROSOL RESPIRATORY (INHALATION) at 04:54

## 2018-11-24 RX ADMIN — ACETAMINOPHEN 1000 MG: 500 TABLET ORAL at 10:34

## 2018-11-24 RX ADMIN — MAGNESIUM HYDROXIDE 30 ML: 400 SUSPENSION ORAL at 11:44

## 2018-11-24 RX ADMIN — ACETAMINOPHEN 1000 MG: 500 TABLET ORAL at 15:25

## 2018-11-24 RX ADMIN — CETIRIZINE HYDROCHLORIDE 10 MG: 10 TABLET, FILM COATED ORAL at 04:51

## 2018-11-24 RX ADMIN — LEVOTHYROXINE SODIUM 50 MCG: 50 TABLET ORAL at 05:02

## 2018-11-24 RX ADMIN — MORPHINE SULFATE 15 MG: 15 TABLET, EXTENDED RELEASE ORAL at 04:50

## 2018-11-24 RX ADMIN — MAGNESIUM HYDROXIDE 30 ML: 400 SUSPENSION ORAL at 04:54

## 2018-11-24 RX ADMIN — BISACODYL 10 MG: 10 SUPPOSITORY RECTAL at 11:49

## 2018-11-24 RX ADMIN — ATORVASTATIN CALCIUM 40 MG: 40 TABLET, FILM COATED ORAL at 21:48

## 2018-11-24 RX ADMIN — OMEPRAZOLE 20 MG: 20 CAPSULE, DELAYED RELEASE ORAL at 04:51

## 2018-11-24 RX ADMIN — MORPHINE SULFATE 3 MG: 10 INJECTION INTRAVENOUS at 10:40

## 2018-11-24 RX ADMIN — MULTIPLE VITAMINS W/ MINERALS TAB 1 TABLET: TAB at 04:51

## 2018-11-24 RX ADMIN — BUDESONIDE AND FORMOTEROL FUMARATE DIHYDRATE 2 PUFF: 80; 4.5 AEROSOL RESPIRATORY (INHALATION) at 17:29

## 2018-11-24 RX ADMIN — TAMSULOSIN HYDROCHLORIDE 0.4 MG: 0.4 CAPSULE ORAL at 15:24

## 2018-11-24 RX ADMIN — LISINOPRIL 10 MG: 10 TABLET ORAL at 04:50

## 2018-11-24 RX ADMIN — TRAMADOL HYDROCHLORIDE 100 MG: 50 TABLET, FILM COATED ORAL at 15:24

## 2018-11-24 RX ADMIN — OXYCODONE HYDROCHLORIDE 10 MG: 10 TABLET ORAL at 10:34

## 2018-11-24 RX ADMIN — STANDARDIZED SENNA CONCENTRATE AND DOCUSATE SODIUM 1 TABLET: 8.6; 5 TABLET, FILM COATED ORAL at 21:48

## 2018-11-24 RX ADMIN — ACETAMINOPHEN 1000 MG: 500 TABLET ORAL at 21:48

## 2018-11-24 RX ADMIN — SODIUM CHLORIDE 1000 ML: 9 INJECTION, SOLUTION INTRAVENOUS at 11:47

## 2018-11-24 RX ADMIN — ENOXAPARIN SODIUM 30 MG: 100 INJECTION SUBCUTANEOUS at 17:28

## 2018-11-24 RX ADMIN — DOCUSATE SODIUM 100 MG: 100 CAPSULE, LIQUID FILLED ORAL at 17:28

## 2018-11-24 RX ADMIN — ACETAMINOPHEN 1000 MG: 500 TABLET ORAL at 04:04

## 2018-11-24 RX ADMIN — MORPHINE SULFATE 15 MG: 15 TABLET, EXTENDED RELEASE ORAL at 21:47

## 2018-11-24 RX ADMIN — OMEPRAZOLE 20 MG: 20 CAPSULE, DELAYED RELEASE ORAL at 17:29

## 2018-11-24 RX ADMIN — ENOXAPARIN SODIUM 30 MG: 100 INJECTION SUBCUTANEOUS at 04:54

## 2018-11-24 RX ADMIN — DOCUSATE SODIUM 100 MG: 100 CAPSULE, LIQUID FILLED ORAL at 04:51

## 2018-11-24 ASSESSMENT — ENCOUNTER SYMPTOMS
HEADACHES: 0
CHILLS: 0
VOMITING: 0
SHORTNESS OF BREATH: 0
BACK PAIN: 1
NAUSEA: 0
ABDOMINAL PAIN: 0
LOSS OF CONSCIOUSNESS: 0
DIARRHEA: 0
DIZZINESS: 0
FEVER: 0
COUGH: 1

## 2018-11-24 ASSESSMENT — PAIN SCALES - GENERAL
PAINLEVEL_OUTOF10: 0
PAINLEVEL_OUTOF10: 4

## 2018-11-24 NOTE — PROGRESS NOTES
Neurosurgery Progress Note    Subjective:  C/o some incisional pain, left buttock pain   + Febrile last night   Denies leg pain, tingling or numbness, or weakness.  Passing gas, minimal void, + Bladder retention 150 PVR   Pain well controlled on oral medications  Denies HA, positional HA, N/V, dizziness, chest pain, SOB, difficulty breathing  Troponins, CTPA, negative thus far, d/w hospitalist yesterday   Currently on Tele,   Hgb 7.8, platelets WNL, awaiting AM draw for CBC/BMP    Exam:  VSS  A&Ox4, NAD  NM: 5/5 hip flexion, knee extension, knee flexion, plantar flexion, dorsiflexion, EHL  Sensation intact and equal throughout all four extremities.   Abdomen: soft, non-tender  Non-labored breathing on 2L NC, Pulse O2 not set up in room   Capillary refill in all four extremities <2 seconds  No LE erythema, cyanosis, clubbing  Calves non-tender to compression bilat  Incision CDI, margins well-approximated no signs of infection      BP  Min: 104/68  Max: 118/67  Pulse  Av  Min: 87  Max: 116  Resp  Avg: 15.6  Min: 12  Max: 18  Temp  Av.1 °C (100.5 °F)  Min: 36.8 °C (98.2 °F)  Max: 39.3 °C (102.8 °F)  SpO2  Av.8 %  Min: 91 %  Max: 98 %    No Data Recorded    Recent Labs      18   0902  18   0346  18   1740   WBC   --   6.0  5.4   RBC   --   2.62*  2.73*   HEMOGLOBIN  8.5*  7.8*  8.2*   HEMATOCRIT  26.6*  23.5*  24.3*   MCV   --   89.7  89.0   MCH   --   29.8  30.0   MCHC   --   33.2*  33.7   RDW   --   44.8  45.0   PLATELETCT   --   146*  156*   MPV   --   9.4  9.5                   Intake/Output       18 0700 - 18 0659 18 0700 - 18 0659      [id number] 5552-8524 Total [id number] 5460-5650 Total       Intake    P.O.  --  120 120  --  -- --    P.O. -- 120 120 -- -- --    Total Intake -- 120 120 -- -- --       Output    Urine  --  -- --  --  -- --    Number of Times Voided -- 0 x 0 x -- -- --    Total Output -- -- -- -- -- --       Net I/O     -- 120 120 -- --  --            Intake/Output Summary (Last 24 hours) at 11/24/18 0847  Last data filed at 11/24/18 0500   Gross per 24 hour   Intake              120 ml   Output                0 ml   Net              120 ml       $ Bladder Scan Results (mL): 135    • enoxaparin (LOVENOX) injection  30 mg Q12HRS   • tizanidine  4 mg Q6HRS PRN   • ondansetron  4 mg Q6HRS PRN   • polyethylene glycol/lytes  1 Packet DAILY   • magnesium hydroxide  30 mL AFTER BREAKFAST   • morphine ER  15 mg Q12HRS   • acetaminophen  1,000 mg Q6HR   • therapeutic multivitamin-minerals  1 Tab DAILY   • budesonide-formoterol  2 Puff BID   • morphine injection  2-5 mg Q HOUR PRN   • oxyCODONE immediate-release  20 mg Q3HRS PRN    Or   • oxyCODONE immediate-release  10 mg Q3HRS PRN   • omeprazole  20 mg BID   • atorvastatin  40 mg Nightly   • cetirizine  10 mg DAILY   • levothyroxine  50 mcg QAM AC   • lisinopril  10 mg DAILY   • MD ALERT...DO NOT ADMINISTER NSAIDS or ASPIRIN unless ORDERED By Neurosurgery  1 Each PRN   • docusate sodium  100 mg BID   • senna-docusate  1 Tab Nightly   • senna-docusate  1 Tab Q24HRS PRN   • magnesium hydroxide  30 mL QDAY PRN   • bisacodyl  10 mg Q24HRS PRN   • fleet  1 Each Once PRN   • cloNIDine  0.1 mg Q4HRS PRN       Assessment and Plan:  POD #5 L2-L3 TLIF, L3 screw repositioning,L3-L4 WILL   Prophylactic anticoagulation: yes  Daily CBC to trend Hgb.   Post op Fever - will trend CBC, temperature, currently pt WNL. UA negative, Cx NTD. Will give patient fluids  Will order CT Head and XR Lumbar spine for ongoing complaints of back pain, confusion   Minimize narcotics  Needs to be hooked up to continuous O2 monitoring at all times, continue telemetry   Monitor I&O strict    Plan: continue inpatient observation  Nursing, please call with questions/updates

## 2018-11-24 NOTE — PROGRESS NOTES
Neurosurgery Progress Note    Subjective:  C/o some incisional pain.    Denies leg pain, tingling or numbness, or weakness.  Drain removed, no HA  VSS, SBP between 100-120s  Hgb 7.8, platelets WNL    Exam:  VSS  A&Ox4, NAD  NM: 5/5 hip flexion, knee extension, knee flexion, plantar flexion, dorsiflexion, EHL  Sensation intact and equal throughout all four extremities.   Abdomen: soft, non-tender  Non-labored breathing on 2L NC   Capillary refill in all four extremities <2 seconds  No LE erythema, cyanosis, clubbing  Calves non-tender to compression bilat  Incision CDI, margins well-approximated no signs of infection        BP  Min: 115/68  Max: 133/81  Pulse  Av  Min: 92  Max: 95  Resp  Av  Min: 16  Max: 16  Temp  Av.2 °C (98.9 °F)  Min: 37.1 °C (98.7 °F)  Max: 37.4 °C (99.4 °F)  SpO2  Av.3 %  Min: 95 %  Max: 100 %    No Data Recorded    Recent Labs      18   0445  18   0902  18   0346   WBC  7.0   --   6.0   RBC  2.85*   --   2.62*   HEMOGLOBIN  8.5*  8.5*  7.8*   HEMATOCRIT  25.7*  26.6*  23.5*   MCV  90.2   --   89.7   MCH  29.8   --   29.8   MCHC  33.1*   --   33.2*   RDW  45.1   --   44.8   PLATELETCT  134*   --   146*   MPV  9.2   --   9.4     Recent Labs      18   0445   SODIUM  135   POTASSIUM  4.2   CHLORIDE  105   CO2  27   GLUCOSE  105*   BUN  10   CREATININE  0.76   CALCIUM  8.5               Intake/Output       18 07 - 18 0659 18 - 18 0659       Total  Total       Intake    Total Intake -- -- -- -- -- --       Output    Urine  600  275 875  --  -- --    Urine Void (mL) 600 275 875 -- -- --    Total Output 600 275 875 -- -- --       Net I/O     -600 -275 -875 -- -- --            Intake/Output Summary (Last 24 hours) at 18 1735  Last data filed at 18   Gross per 24 hour   Intake                0 ml   Output              275 ml   Net             -275 ml       $ Bladder Scan  Results (mL): 150    • enoxaparin (LOVENOX) injection  30 mg Q12HRS   • tizanidine  4 mg Q6HRS PRN   • ondansetron  4 mg Q6HRS PRN   • polyethylene glycol/lytes  1 Packet DAILY   • magnesium hydroxide  30 mL AFTER BREAKFAST   • morphine ER  15 mg Q12HRS   • acetaminophen  1,000 mg Q6HR   • therapeutic multivitamin-minerals  1 Tab DAILY   • budesonide-formoterol  2 Puff BID   • morphine injection  2-5 mg Q HOUR PRN   • oxyCODONE immediate-release  20 mg Q3HRS PRN    Or   • oxyCODONE immediate-release  10 mg Q3HRS PRN   • omeprazole  20 mg BID   • atorvastatin  40 mg Nightly   • cetirizine  10 mg DAILY   • levothyroxine  50 mcg QAM AC   • lisinopril  10 mg DAILY   • MD ALERT...DO NOT ADMINISTER NSAIDS or ASPIRIN unless ORDERED By Neurosurgery  1 Each PRN   • docusate sodium  100 mg BID   • senna-docusate  1 Tab Nightly   • senna-docusate  1 Tab Q24HRS PRN   • magnesium hydroxide  30 mL QDAY PRN   • bisacodyl  10 mg Q24HRS PRN   • fleet  1 Each Once PRN   • cloNIDine  0.1 mg Q4HRS PRN       Assessment and Plan:  POD #4  Prophylactic anticoagulation: yes, OK for lovenox drain d/c >48 hours ago and platelets WNL  Daily CBC to trend Hgb. Will transfuse if patient has change in vital signs, currently appears hemodynamically stable    Plan  To d/c to home today pending bladder scan, therapies

## 2018-11-24 NOTE — PROGRESS NOTES
[facility] Hospitalist Progress Note    Date of Service: 2018    Chief Complaint  73 y.o. male admitted 2018 for planned L spine [place] by Dr Fowler.  Hospitalist service consulted for assistance with medical management     PMHx: HLD, Hypothyroid, HTN, GERD    Interval Problem Update  Overnight no fevers.  Workup unrevealing of fever from previous evening.  Hb low this morning, hb 7.0 (double checked) no obvious bleeding. No dizziness, sob, chest pain.        Consultants/Specialty  NeuroSurgery    Disposition  Pending primary        Review of Systems   Constitutional: Negative for chills and fever.   Respiratory: Positive for cough. Negative for shortness of breath.    Cardiovascular: Negative for chest pain.   Gastrointestinal: Negative for abdominal pain, diarrhea, nausea and vomiting.   Musculoskeletal: Positive for back pain.   Skin: Negative for rash.   Neurological: Negative for dizziness, loss of consciousness and headaches.      Physical Exam  Laboratory/Imaging   Hemodynamics  Temp (24hrs), Av.2 °C (98.9 °F), Min:36.7 °C (98.1 °F), Max:37.5 °C (99.5 °F)   Temperature: 37.1 °C (98.8 °F)  Pulse  Av.6  Min: 63  Max: 120   Blood Pressure : (!) 95/57      Respiratory      Respiration: 17, Pulse Oximetry: 99 %     Work Of Breathing / Effort: Mild  RUL Breath Sounds: Clear, RML Breath Sounds: Clear, RLL Breath Sounds: Diminished, COLE Breath Sounds: Clear, LLL Breath Sounds: Diminished    Fluids    Intake/Output Summary (Last 24 hours) at 18 0919  Last data filed at 18 1800   Gross per 24 hour   Intake             1060 ml   Output             1000 ml   Net               60 ml       Nutrition  Orders Placed This Encounter   Procedures   • Diet Order Regular     Standing Status:   Standing     Number of Occurrences:   1     Order Specific Question:   Diet:     Answer:   Regular [1]     Physical Exam   Constitutional: He is oriented to person, place, and time. He appears well-developed and  well-nourished. No distress.   HENT:   Head: Normocephalic and atraumatic.   Eyes: Pupils are equal, round, and reactive to light. Conjunctivae are normal.   Neck: Normal range of motion. No JVD present.   Cardiovascular: Normal rate.  Exam reveals no gallop.    No murmur heard.  Pulmonary/Chest: Effort normal and breath sounds normal. No stridor. No respiratory distress. He has no wheezes. He has no rales.   Abdominal: Soft. Bowel sounds are normal. He exhibits no distension. There is no tenderness. There is no rebound and no guarding.   Musculoskeletal: He exhibits edema. He exhibits no deformity.   Neurological: He is alert and oriented to person, place, and time. No cranial nerve deficit. He exhibits normal muscle tone.   BRACE   Skin: Skin is warm and dry. No rash noted. He is not diaphoretic.   Psychiatric: He has a normal mood and affect. His behavior is normal. Thought content normal.   Nursing note and vitals reviewed.      Recent Labs      11/23/18   1740  11/24/18   1141  11/25/18   0312  11/25/18   0708   WBC  5.4  6.2  4.5*   --    RBC  2.73*  2.70*  2.26*   --    HEMOGLOBIN  8.2*  8.0*  6.6*  6.7*  7.0*   HEMATOCRIT  24.3*  24.4*  21.3*  20.4*   --    MCV  89.0  90.4  90.3   --    MCH  30.0  29.6  29.6   --    MCHC  33.7  32.8*  32.8*   --    RDW  45.0  45.9  46.5   --    PLATELETCT  156*  207  159*   --    MPV  9.5  9.5  9.4   --      Recent Labs      11/24/18   1141   SODIUM  134*   POTASSIUM  3.8   CHLORIDE  99   CO2  29   GLUCOSE  116*   BUN  11   CREATININE  0.83   CALCIUM  8.9                      Assessment/Plan     SIRS (systemic inflammatory response syndrome) (HCC)- (present on admission)   Assessment & Plan    Spiked 102 T on 11/23.  Afebrile over past 24hours  Urine looks ok  Blood cultures pending  CTA chest negative for PE or new pulmonary changes  CT head unremarkable  Lumbar xray unremarkable.   ctm     Anemia- (present on admission)   Assessment & Plan    iniitally thought Likely  from postoperative  H/h stable in last couple days until this morning, hb 7.0  Work up ordered.  Recheck hb later, transfuse per primary  Would recommend to hold in hospital for another day to monitor  Consider Gi consult if stool occult positive.   ctm     S/P lumbar laminectomy   Assessment & Plan    Had lumbar surgery by Dr Fowler, 11/19/18  Pain management  Wound care  Wound care  PT/OT   Initial plan for DC home w/ home health tomorrow per NSG on 11/23 held give fever spike     Obesity (BMI 35.0-39.9 without comorbidity)- (present on admission)   Assessment & Plan    Body mass index is 36.94 kg/m².  Hx of gastric bypass in distant past     Essential hypertension- (present on admission)   Assessment & Plan    Lisinopril and clonidine  Monitor vitals  Pain control     Dyslipidemia- (present on admission)   Assessment & Plan    Continue statin therapy for ongoing active therapy     GERD (gastroesophageal reflux disease)- (present on admission)   Assessment & Plan    Omeprazole to be continued.     Acquired hypothyroidism- (present on admission)   Assessment & Plan    Continue synthroid 50mcg daily  TSH was elevated at 18.5 just two weeks ago.  Normal Free thyroxine two weeks ago.     Ascending aorta dilation (CMS-HCC), mild, needs OP followup   Assessment & Plan    No acute abdominal pain  Monitor vitals to control BP  Continue clonidine and lisinopril  Recommend serial outpatient U/S monitoring       PT PLANNED FOR HOME DISCHARGE TOMORROW WITH HOME HEALTH, PER NSX NOTED.  HOSPITALIST MEDICINE WILL SIGN OFF.    Quality-Core Measures   Reviewed items::  EKG reviewed, Labs reviewed, Medications reviewed and Radiology images reviewed  DVT prophylaxis - mechanical:  SCDs  Ulcer Prophylaxis::  Not indicated

## 2018-11-24 NOTE — PROGRESS NOTES
Alert and oriented  to person,place and time with some confusion. on room air.Complained of back and neck pain 10/10 pain at this time. Reposition and medication given per MAR. POC discussed ,call light at reach . Advised to call for assistance..

## 2018-11-24 NOTE — PROGRESS NOTES
Bedside report given to  Jessica MIGUEL.Pt  Resting in the bed.Safety precautions in place and all the lines remain patent.

## 2018-11-24 NOTE — PROGRESS NOTES
Monitor Summary: SR/ST , MT.16, QRS.10, QT.34 with an episode of tachycardia to 120 per strip from monitor room.

## 2018-11-24 NOTE — ASSESSMENT & PLAN NOTE
Spiked 102 T on 11/23.  Afebrile over past 24hours  Urine looks ok  Blood cultures pending  CTA chest negative for PE or new pulmonary changes  CT head unremarkable  Lumbar xray unremarkable.   ctm

## 2018-11-25 ENCOUNTER — HOME HEALTH ADMISSION (OUTPATIENT)
Dept: HOME HEALTH SERVICES | Facility: HOME HEALTHCARE | Age: 73
End: 2018-11-25
Payer: MEDICARE

## 2018-11-25 LAB
ABO GROUP BLD: NORMAL
BARCODED ABORH UBTYP: 9500
BARCODED PRD CODE UBPRD: NORMAL
BARCODED UNIT NUM UBUNT: NORMAL
BASOPHILS # BLD AUTO: 0.2 % (ref 0–1.8)
BASOPHILS # BLD: 0.01 K/UL (ref 0–0.12)
BLD GP AB SCN SERPL QL: NORMAL
COMPONENT R 8504R: NORMAL
EOSINOPHIL # BLD AUTO: 0.3 K/UL (ref 0–0.51)
EOSINOPHIL NFR BLD: 6.6 % (ref 0–6.9)
ERYTHROCYTE [DISTWIDTH] IN BLOOD BY AUTOMATED COUNT: 46.5 FL (ref 35.9–50)
FERRITIN SERPL-MCNC: 20.4 NG/ML (ref 22–322)
FOLATE SERPL-MCNC: >23.9 NG/ML
HCT VFR BLD AUTO: 20.4 % (ref 42–52)
HCT VFR BLD AUTO: 21.3 % (ref 42–52)
HCT VFR BLD AUTO: 25.3 % (ref 42–52)
HEMOCCULT STL QL: NEGATIVE
HGB BLD-MCNC: 6.6 G/DL (ref 14–18)
HGB BLD-MCNC: 6.7 G/DL (ref 14–18)
HGB BLD-MCNC: 7 G/DL (ref 14–18)
HGB BLD-MCNC: 8.3 G/DL (ref 14–18)
HGB RETIC QN AUTO: 25 PG/CELL (ref 29–35)
IMM GRANULOCYTES # BLD AUTO: 0.03 K/UL (ref 0–0.11)
IMM GRANULOCYTES NFR BLD AUTO: 0.7 % (ref 0–0.9)
IMM RETICS NFR: 28.2 % (ref 9.3–17.4)
IRON SATN MFR SERPL: ABNORMAL % (ref 15–55)
IRON SERPL-MCNC: <10 UG/DL (ref 50–180)
LDH SERPL L TO P-CCNC: 175 U/L (ref 107–266)
LYMPHOCYTES # BLD AUTO: 0.96 K/UL (ref 1–4.8)
LYMPHOCYTES NFR BLD: 21.2 % (ref 22–41)
MCH RBC QN AUTO: 29.6 PG (ref 27–33)
MCHC RBC AUTO-ENTMCNC: 32.8 G/DL (ref 33.7–35.3)
MCV RBC AUTO: 90.3 FL (ref 81.4–97.8)
MONOCYTES # BLD AUTO: 0.37 K/UL (ref 0–0.85)
MONOCYTES NFR BLD AUTO: 8.2 % (ref 0–13.4)
NEUTROPHILS # BLD AUTO: 2.85 K/UL (ref 1.82–7.42)
NEUTROPHILS NFR BLD: 63.1 % (ref 44–72)
NRBC # BLD AUTO: 0 K/UL
NRBC BLD-RTO: 0 /100 WBC
PLATELET # BLD AUTO: 159 K/UL (ref 164–446)
PMV BLD AUTO: 9.4 FL (ref 9–12.9)
PRODUCT TYPE UPROD: NORMAL
RBC # BLD AUTO: 2.26 M/UL (ref 4.7–6.1)
RETICS # AUTO: 0.08 M/UL (ref 0.04–0.06)
RETICS/RBC NFR: 3.7 % (ref 0.8–2.1)
RH BLD: NORMAL
TIBC SERPL-MCNC: 284 UG/DL (ref 250–450)
UNIT STATUS USTAT: NORMAL
VIT B12 SERPL-MCNC: 567 PG/ML (ref 211–911)
WBC # BLD AUTO: 4.5 K/UL (ref 4.8–10.8)

## 2018-11-25 PROCEDURE — 83615 LACTATE (LD) (LDH) ENZYME: CPT

## 2018-11-25 PROCEDURE — A9270 NON-COVERED ITEM OR SERVICE: HCPCS | Performed by: PHYSICIAN ASSISTANT

## 2018-11-25 PROCEDURE — 36430 TRANSFUSION BLD/BLD COMPNT: CPT

## 2018-11-25 PROCEDURE — 700102 HCHG RX REV CODE 250 W/ 637 OVERRIDE(OP): Performed by: HOSPITALIST

## 2018-11-25 PROCEDURE — 36415 COLL VENOUS BLD VENIPUNCTURE: CPT

## 2018-11-25 PROCEDURE — 97116 GAIT TRAINING THERAPY: CPT

## 2018-11-25 PROCEDURE — 86901 BLOOD TYPING SEROLOGIC RH(D): CPT

## 2018-11-25 PROCEDURE — 85014 HEMATOCRIT: CPT

## 2018-11-25 PROCEDURE — 700111 HCHG RX REV CODE 636 W/ 250 OVERRIDE (IP): Performed by: PHYSICIAN ASSISTANT

## 2018-11-25 PROCEDURE — 85046 RETICYTE/HGB CONCENTRATE: CPT

## 2018-11-25 PROCEDURE — 86923 COMPATIBILITY TEST ELECTRIC: CPT

## 2018-11-25 PROCEDURE — 97530 THERAPEUTIC ACTIVITIES: CPT

## 2018-11-25 PROCEDURE — 86900 BLOOD TYPING SEROLOGIC ABO: CPT

## 2018-11-25 PROCEDURE — P9016 RBC LEUKOCYTES REDUCED: HCPCS

## 2018-11-25 PROCEDURE — 85018 HEMOGLOBIN: CPT

## 2018-11-25 PROCEDURE — 85025 COMPLETE CBC W/AUTO DIFF WBC: CPT

## 2018-11-25 PROCEDURE — 700102 HCHG RX REV CODE 250 W/ 637 OVERRIDE(OP): Performed by: PHYSICIAN ASSISTANT

## 2018-11-25 PROCEDURE — 700111 HCHG RX REV CODE 636 W/ 250 OVERRIDE (IP): Performed by: HOSPITALIST

## 2018-11-25 PROCEDURE — 82607 VITAMIN B-12: CPT

## 2018-11-25 PROCEDURE — 83550 IRON BINDING TEST: CPT

## 2018-11-25 PROCEDURE — A9270 NON-COVERED ITEM OR SERVICE: HCPCS | Performed by: NURSE PRACTITIONER

## 2018-11-25 PROCEDURE — 86850 RBC ANTIBODY SCREEN: CPT

## 2018-11-25 PROCEDURE — 770020 HCHG ROOM/CARE - TELE (206)

## 2018-11-25 PROCEDURE — A9270 NON-COVERED ITEM OR SERVICE: HCPCS | Performed by: HOSPITALIST

## 2018-11-25 PROCEDURE — 83540 ASSAY OF IRON: CPT

## 2018-11-25 PROCEDURE — 700105 HCHG RX REV CODE 258

## 2018-11-25 PROCEDURE — 82272 OCCULT BLD FECES 1-3 TESTS: CPT

## 2018-11-25 PROCEDURE — 82728 ASSAY OF FERRITIN: CPT

## 2018-11-25 PROCEDURE — 97535 SELF CARE MNGMENT TRAINING: CPT

## 2018-11-25 PROCEDURE — 700102 HCHG RX REV CODE 250 W/ 637 OVERRIDE(OP): Performed by: NURSE PRACTITIONER

## 2018-11-25 PROCEDURE — 82746 ASSAY OF FOLIC ACID SERUM: CPT

## 2018-11-25 PROCEDURE — 97110 THERAPEUTIC EXERCISES: CPT

## 2018-11-25 RX ORDER — OXYCODONE HYDROCHLORIDE 10 MG/1
10 TABLET ORAL EVERY 4 HOURS PRN
Status: DISCONTINUED | OUTPATIENT
Start: 2018-11-25 | End: 2018-11-26 | Stop reason: HOSPADM

## 2018-11-25 RX ORDER — SODIUM CHLORIDE 9 MG/ML
INJECTION, SOLUTION INTRAVENOUS
Status: COMPLETED
Start: 2018-11-25 | End: 2018-11-25

## 2018-11-25 RX ORDER — OXYCODONE HYDROCHLORIDE 5 MG/1
5 TABLET ORAL EVERY 4 HOURS PRN
Status: DISCONTINUED | OUTPATIENT
Start: 2018-11-25 | End: 2018-11-26 | Stop reason: HOSPADM

## 2018-11-25 RX ADMIN — BUDESONIDE AND FORMOTEROL FUMARATE DIHYDRATE 2 PUFF: 80; 4.5 AEROSOL RESPIRATORY (INHALATION) at 18:00

## 2018-11-25 RX ADMIN — TRAMADOL HYDROCHLORIDE 100 MG: 50 TABLET, FILM COATED ORAL at 08:16

## 2018-11-25 RX ADMIN — TIZANIDINE 4 MG: 4 TABLET ORAL at 20:50

## 2018-11-25 RX ADMIN — CETIRIZINE HYDROCHLORIDE 10 MG: 10 TABLET, FILM COATED ORAL at 04:54

## 2018-11-25 RX ADMIN — LEVOTHYROXINE SODIUM 50 MCG: 50 TABLET ORAL at 05:00

## 2018-11-25 RX ADMIN — ACETAMINOPHEN 1000 MG: 500 TABLET ORAL at 20:50

## 2018-11-25 RX ADMIN — OMEPRAZOLE 20 MG: 20 CAPSULE, DELAYED RELEASE ORAL at 16:16

## 2018-11-25 RX ADMIN — OXYCODONE HYDROCHLORIDE 10 MG: 10 TABLET ORAL at 11:52

## 2018-11-25 RX ADMIN — TRAMADOL HYDROCHLORIDE 100 MG: 50 TABLET, FILM COATED ORAL at 14:12

## 2018-11-25 RX ADMIN — IRON SUCROSE 200 MG: 20 INJECTION, SOLUTION INTRAVENOUS at 16:31

## 2018-11-25 RX ADMIN — ATORVASTATIN CALCIUM 40 MG: 40 TABLET, FILM COATED ORAL at 20:50

## 2018-11-25 RX ADMIN — BUDESONIDE AND FORMOTEROL FUMARATE DIHYDRATE 2 PUFF: 80; 4.5 AEROSOL RESPIRATORY (INHALATION) at 05:00

## 2018-11-25 RX ADMIN — MULTIPLE VITAMINS W/ MINERALS TAB 1 TABLET: TAB at 04:55

## 2018-11-25 RX ADMIN — ENOXAPARIN SODIUM 30 MG: 100 INJECTION SUBCUTANEOUS at 05:01

## 2018-11-25 RX ADMIN — ENOXAPARIN SODIUM 30 MG: 100 INJECTION SUBCUTANEOUS at 16:31

## 2018-11-25 RX ADMIN — OMEPRAZOLE 20 MG: 20 CAPSULE, DELAYED RELEASE ORAL at 04:45

## 2018-11-25 RX ADMIN — TAMSULOSIN HYDROCHLORIDE 0.4 MG: 0.4 CAPSULE ORAL at 08:17

## 2018-11-25 RX ADMIN — ACETAMINOPHEN 1000 MG: 500 TABLET ORAL at 04:16

## 2018-11-25 RX ADMIN — ACETAMINOPHEN 1000 MG: 500 TABLET ORAL at 14:13

## 2018-11-25 RX ADMIN — SODIUM CHLORIDE 1000 ML: 9 INJECTION, SOLUTION INTRAVENOUS at 11:00

## 2018-11-25 ASSESSMENT — COGNITIVE AND FUNCTIONAL STATUS - GENERAL
DAILY ACTIVITIY SCORE: 18
TOILETING: A LITTLE
DRESSING REGULAR UPPER BODY CLOTHING: A LITTLE
SUGGESTED CMS G CODE MODIFIER MOBILITY: CK
STANDING UP FROM CHAIR USING ARMS: A LITTLE
SUGGESTED CMS G CODE MODIFIER DAILY ACTIVITY: CK
MOVING FROM LYING ON BACK TO SITTING ON SIDE OF FLAT BED: A LITTLE
HELP NEEDED FOR BATHING: A LITTLE
CLIMB 3 TO 5 STEPS WITH RAILING: A LITTLE
WALKING IN HOSPITAL ROOM: A LITTLE
MOBILITY SCORE: 17
MOVING TO AND FROM BED TO CHAIR: A LOT
DRESSING REGULAR LOWER BODY CLOTHING: A LOT
PERSONAL GROOMING: A LITTLE
TURNING FROM BACK TO SIDE WHILE IN FLAT BAD: A LITTLE

## 2018-11-25 ASSESSMENT — PAIN SCALES - GENERAL
PAINLEVEL_OUTOF10: 5
PAINLEVEL_OUTOF10: 1
PAINLEVEL_OUTOF10: 3
PAINLEVEL_OUTOF10: 8
PAINLEVEL_OUTOF10: 0
PAINLEVEL_OUTOF10: 4
PAINLEVEL_OUTOF10: 5
PAINLEVEL_OUTOF10: 0

## 2018-11-25 ASSESSMENT — GAIT ASSESSMENTS
DEVIATION: BRADYKINETIC;SHUFFLED GAIT;OTHER (COMMENT)
GAIT LEVEL OF ASSIST: STAND BY ASSIST
ASSISTIVE DEVICE: FRONT WHEEL WALKER
DISTANCE (FEET): 150

## 2018-11-25 NOTE — PROGRESS NOTES
Assessment completed,alert and oriented  to person,place and time. Continuous pulse ox, 2l 0f O2 sat 93%. Complained of neck and back pain 4/10 at this time.medication given per MAR. Ambulated,States understanding of POC. Advised to call for assistance.

## 2018-11-25 NOTE — THERAPY
"Physical Therapy Treatment completed.   Bed Mobility:  Supine to Sit: Minimal Assist  Transfers: Sit to Stand: Contact Guard Assist  Gait: Level Of Assist: Stand by Assist with Front-Wheel Walker       Plan of Care: Will benefit from Physical Therapy 5 times per week  Discharge Recommendations: Equipment: No Equipment Needed.     See \"Rehab Therapy-Acute\" Patient Summary Report for complete documentation.     Pt presenting w/ improved functional mobility from previous tx. Pt was able to follow precautions throughout mobility. He was able to don/doff his brace independently. Pt is at a SBA-CGA level for all mobility. He declined bed mobility stating that he will be sleeping in the recliner. Pt was able to ambulate 150 feet w/ no noted LOB. He did require a couple standing rest breaks to self correct his posture. Pt currently is at a level in which he can DC home w/ HH. Recommend pt continue to ambulate w/ nursing staff.  "

## 2018-11-25 NOTE — PROGRESS NOTES
0700 Bedside rounding complete. Patient a & o x 4. Bed alarm on, upper side rails up, bed locked and in lowest position. Gomes draining clear, yellow urine. Call bell and belongings within reach. Communication board updated and plan of care discussed with the patient. Patient educated on hourly rounding.     0820 Spoke with Dr. Baker and Brittny ROSE about repeat hemoglobin 7. New labs ordered.

## 2018-11-25 NOTE — PROGRESS NOTES
Bedside rounding complete. Patient a & o x 4 with intermittent confusion per night shift. The patient also complains of confusion at times. LSO on when out of bed. Pt complains of back pain. Pt straight cath for 600ml per night shift. Bed alarm on, upper side rails up, bed locked and in lowest position. Call bell and belongings within reach. Communication board updated and plan of care discussed with the patient. Patient educated on hourly rounding.

## 2018-11-25 NOTE — DISCHARGE PLANNING
Received Choice form at 0218  Agency/Facility Name: [facility]  Referral sent per Choice form @ 7006

## 2018-11-25 NOTE — PROGRESS NOTES
Pt had ruiz placed without difficulty. Pt had 900ml OP. Pt a & o x4. Pt declining confusion. Pt had a suppository with a large BM. Pt afebrile. Brittny ROSE notified of patient's CT and xray results. Pt 1 assist to chair. Gait unsteady.

## 2018-11-25 NOTE — CARE PLAN
Problem: Bowel/Gastric:  Goal: Will not experience complications related to bowel motility    Intervention: Assess baseline bowel pattern  Pt having loose stool. Held MOM. Pt had MOM and suppository yesterday. Occult stool obtained and negative.      Problem: Mobility  Goal: Risk for activity intolerance will decrease    Intervention: Assess and monitor signs of activity intolerance  PT/OT re-evaluated the patient. Pt agreeable to [facility]. Pt ambulated with one assist and walker. Pt fatigued. Pt uses correct spine precautions.

## 2018-11-25 NOTE — DISCHARGE PLANNING
Aware of PMR referral from Brittny Escalera PA-C. POD 6 s/p lumbar laminectomy diskectomy, hardware removal, screw repositioning. Would appreciate updated PT/OT notes to reflect current functional/mobility status. No Physiatry consult ordered per protocol at this time. TCC following for therapy recommendations.

## 2018-11-25 NOTE — PROGRESS NOTES
Neurosurgery Progress Note    Subjective:  C/o some incisional pain, left buttock pain   Denies leg pain, tingling or numbness, or weakness.  Workup negative thus far  Cx negative to date   Tmax 99.5  + BMs  Gomes in, flomax started   Pain well controlled on oral medications  Denies HA, positional HA, N/V, dizziness, chest pain, SOB, difficulty breathing  Troponins, CTPA, negative thus far, d/w hospitalist yesterday   VSS overnight, asymptomatic hypotension at midnight, continues on Tele   Hgb 7.0, likely dilutional this AM   No blood in stool     Exam:  VSS  A&Ox4, NAD  NM: 5/5 hip flexion, knee extension, knee flexion, plantar flexion, dorsiflexion, EHL  Sensation intact and equal throughout all four extremities.   Abdomen: soft, non-tender  Non-labored breathing on room air, 96%  Capillary refill in all four extremities <2 seconds  No LE erythema, cyanosis, clubbing  Calves non-tender to compression bilat  Incision CDI, margins well-approximated no signs of infection  Gomes in       BP  Min: 93/52  Max: 113/69  Pulse  Av.4  Min: 84  Max: 100  Resp  Av.4  Min: 16  Max: 18  Temp  Av.2 °C (99 °F)  Min: 36.7 °C (98.1 °F)  Max: 37.5 °C (99.5 °F)  SpO2  Av.5 %  Min: 92 %  Max: 93 %    No Data Recorded    Recent Labs      18   1740  18   1141  18   0312  18   0708   WBC  5.4  6.2  4.5*   --    RBC  2.73*  2.70*  2.26*   --    HEMOGLOBIN  8.2*  8.0*  6.7*  7.0*   HEMATOCRIT  24.3*  24.4*  20.4*   --    MCV  89.0  90.4  90.3   --    MCH  30.0  29.6  29.6   --    MCHC  33.7  32.8*  32.8*   --    RDW  45.0  45.9  46.5   --    PLATELETCT  156*  207  159*   --    MPV  9.5  9.5  9.4   --      Recent Labs      18   1141   SODIUM  134*   POTASSIUM  3.8   CHLORIDE  99   CO2  29   GLUCOSE  116*   BUN  11   CREATININE  0.83   CALCIUM  8.9               Intake/Output       18 - 18 - 18 Total   Total       Intake    P.O.  1180  -- 1180  --  -- --    P.O. 1180 -- 1180 -- -- --    Other  320  -- 320  --  -- --    Medications (PO/Enteral Liquids) 320 -- 320 -- -- --    Total Intake 1500 -- 1500 -- -- --       Output    Urine  1000  -- 1000  --  -- --    Urine Void (mL) 1000 -- 1000 -- -- --    Stool  --  -- --  --  -- --    Number of Times Stooled 1 x -- 1 x -- -- --    Total Output 1000 -- 1000 -- -- --       Net I/O     500 -- 500 -- -- --            Intake/Output Summary (Last 24 hours) at 11/25/18 0813  Last data filed at 11/24/18 1800   Gross per 24 hour   Intake             1060 ml   Output             1000 ml   Net               60 ml            • morphine ER  15 mg QHS   • tramadol  100 mg Q6HRS PRN   • tamsulosin  0.4 mg AFTER BREAKFAST   • pneumococcal vaccine  0.5 mL Once   • enoxaparin (LOVENOX) injection  30 mg Q12HRS   • tizanidine  4 mg Q6HRS PRN   • ondansetron  4 mg Q6HRS PRN   • polyethylene glycol/lytes  1 Packet DAILY   • magnesium hydroxide  30 mL AFTER BREAKFAST   • acetaminophen  1,000 mg Q6HR   • therapeutic multivitamin-minerals  1 Tab DAILY   • budesonide-formoterol  2 Puff BID   • morphine injection  2-5 mg Q HOUR PRN   • oxyCODONE immediate-release  20 mg Q3HRS PRN    Or   • oxyCODONE immediate-release  10 mg Q3HRS PRN   • omeprazole  20 mg BID   • atorvastatin  40 mg Nightly   • cetirizine  10 mg DAILY   • levothyroxine  50 mcg QAM AC   • lisinopril  10 mg DAILY   • MD ALERT...DO NOT ADMINISTER NSAIDS or ASPIRIN unless ORDERED By Neurosurgery  1 Each PRN   • docusate sodium  100 mg BID   • senna-docusate  1 Tab Nightly   • senna-docusate  1 Tab Q24HRS PRN   • magnesium hydroxide  30 mL QDAY PRN   • fleet  1 Each Once PRN   • cloNIDine  0.1 mg Q4HRS PRN       Assessment and Plan:  POD #6 L2-L3 TLIF, L3 screw repositioning,L3-L4 WILL   Prophylactic anticoagulation: yes  Daily CBC to trend Hgb.   Post op Fever - will trend CBC, temperature, currently pt WNL. UA negative, Cx NTD. Will  give patient fluids. Afebrile > 24 hours at this point   CT Head/lumbar reviewed  Wean narcotics   Continue Tele, monitor VS  Strict I&O   Rehab vs SNF order placed, recommend repeat PT/OT evaluation for d/c planning     Appreciate all teams involved in patient's care  Plan: continue inpatient observation  Nursing, please call with questions/updates

## 2018-11-25 NOTE — PROGRESS NOTES
Pt receiving first unit of blood. 2 RN check. Venefor verified by pharmacy and scheduled for 1800. No transfusion reaction noted. Pt educated on s/s of blood transfusion reaction. Occult stool sent to lab.

## 2018-11-25 NOTE — DISCHARGE PLANNING
Anticipated Discharge Disposition: Home w/ HH     Action: LSW spoke with bedside RN. HH is being recommended. LSW met with pt at bedside. Pt is agreeable to HH services and chose [facility]. LSW faxed HH choice form to [facility].     Barriers: None identified.     Plan: Await HH acceptance w/ possible discharge tomorrow.

## 2018-11-25 NOTE — PROGRESS NOTES
1517 Page sent Brittny to inform her of negative occult stool and HHC in process.     1530 Brittny ROSE called back and is notified of patient's confusion, HHC pending and negative occult stool. Pt received Oxycodone and Tramadol. Brittny ROSE changed Oxycodone to 5-10mg. BETI ruiz.     1541 Ruiz dc'd without difficulty.

## 2018-11-25 NOTE — DISCHARGE PLANNING
ATTN: Case Management  RE: Referral for Home Health                We would like to take this opportunity to thank you for submitting a referral for your patient to continue care with [facility]. Our skilled team is dedicated to helping all patients recover and gain independence in the home setting.            As of 11/25/18, we have accepted the above patient into our service. A [facility] clinician will be out to see the patient within 48 hours to conduct our initial visit. If you have any questions or concerns regarding the patient’s transition to Home Health, please do not hesitate to contact us. We are open for referrals 7 days a week from 8AM to 5PM at [phone].      We look forward to collaborating with you,  [facility] Team

## 2018-11-25 NOTE — PROGRESS NOTES
Patient Hemoglobin 6.7, asymptomatic, vital sings WNL, yesterday's Hemoglobin 8.0, no S/S of bleed, Dr Brandon pitts's team page, Brittny Escalera requested for lab redraw, lab redraw in process, patient in stable condition, will continue to monitor.

## 2018-11-26 VITALS
BODY MASS INDEX: 36.82 KG/M2 | HEIGHT: 68 IN | HEART RATE: 94 BPM | DIASTOLIC BLOOD PRESSURE: 76 MMHG | OXYGEN SATURATION: 95 % | WEIGHT: 242.95 LBS | TEMPERATURE: 99.5 F | SYSTOLIC BLOOD PRESSURE: 130 MMHG | RESPIRATION RATE: 18 BRPM

## 2018-11-26 LAB
BASOPHILS # BLD AUTO: 0.4 % (ref 0–1.8)
BASOPHILS # BLD: 0.02 K/UL (ref 0–0.12)
EOSINOPHIL # BLD AUTO: 0.36 K/UL (ref 0–0.51)
EOSINOPHIL NFR BLD: 6.7 % (ref 0–6.9)
ERYTHROCYTE [DISTWIDTH] IN BLOOD BY AUTOMATED COUNT: 46.3 FL (ref 35.9–50)
HCT VFR BLD AUTO: 24.5 % (ref 42–52)
HGB BLD-MCNC: 7.9 G/DL (ref 14–18)
IMM GRANULOCYTES # BLD AUTO: 0.03 K/UL (ref 0–0.11)
IMM GRANULOCYTES NFR BLD AUTO: 0.6 % (ref 0–0.9)
LYMPHOCYTES # BLD AUTO: 0.77 K/UL (ref 1–4.8)
LYMPHOCYTES NFR BLD: 14.4 % (ref 22–41)
MCH RBC QN AUTO: 29 PG (ref 27–33)
MCHC RBC AUTO-ENTMCNC: 32.2 G/DL (ref 33.7–35.3)
MCV RBC AUTO: 90.1 FL (ref 81.4–97.8)
MONOCYTES # BLD AUTO: 0.46 K/UL (ref 0–0.85)
MONOCYTES NFR BLD AUTO: 8.6 % (ref 0–13.4)
NEUTROPHILS # BLD AUTO: 3.7 K/UL (ref 1.82–7.42)
NEUTROPHILS NFR BLD: 69.3 % (ref 44–72)
NRBC # BLD AUTO: 0 K/UL
NRBC BLD-RTO: 0 /100 WBC
PLATELET # BLD AUTO: 222 K/UL (ref 164–446)
PMV BLD AUTO: 9.8 FL (ref 9–12.9)
RBC # BLD AUTO: 2.72 M/UL (ref 4.7–6.1)
WBC # BLD AUTO: 5.3 K/UL (ref 4.8–10.8)

## 2018-11-26 PROCEDURE — 700102 HCHG RX REV CODE 250 W/ 637 OVERRIDE(OP): Performed by: NURSE PRACTITIONER

## 2018-11-26 PROCEDURE — 700102 HCHG RX REV CODE 250 W/ 637 OVERRIDE(OP): Performed by: PHYSICIAN ASSISTANT

## 2018-11-26 PROCEDURE — 700111 HCHG RX REV CODE 636 W/ 250 OVERRIDE (IP): Performed by: PHYSICIAN ASSISTANT

## 2018-11-26 PROCEDURE — A9270 NON-COVERED ITEM OR SERVICE: HCPCS | Performed by: HOSPITALIST

## 2018-11-26 PROCEDURE — A9270 NON-COVERED ITEM OR SERVICE: HCPCS | Performed by: NURSE PRACTITIONER

## 2018-11-26 PROCEDURE — 700102 HCHG RX REV CODE 250 W/ 637 OVERRIDE(OP): Performed by: HOSPITALIST

## 2018-11-26 PROCEDURE — 36415 COLL VENOUS BLD VENIPUNCTURE: CPT

## 2018-11-26 PROCEDURE — 85025 COMPLETE CBC W/AUTO DIFF WBC: CPT

## 2018-11-26 PROCEDURE — 97535 SELF CARE MNGMENT TRAINING: CPT

## 2018-11-26 PROCEDURE — A9270 NON-COVERED ITEM OR SERVICE: HCPCS | Performed by: PHYSICIAN ASSISTANT

## 2018-11-26 RX ORDER — TRAMADOL HYDROCHLORIDE 50 MG/1
100 TABLET ORAL EVERY 6 HOURS PRN
Qty: 75 TAB | Refills: 0 | Status: SHIPPED | OUTPATIENT
Start: 2018-11-26 | End: 2018-12-10

## 2018-11-26 RX ORDER — ACETAMINOPHEN 500 MG
500 TABLET ORAL EVERY 6 HOURS PRN
Qty: 60 TAB | Refills: 0 | Status: SHIPPED | OUTPATIENT
Start: 2018-11-26 | End: 2019-01-17

## 2018-11-26 RX ORDER — TAMSULOSIN HYDROCHLORIDE 0.4 MG/1
0.4 CAPSULE ORAL
Qty: 30 CAP | Refills: 0 | Status: SHIPPED | OUTPATIENT
Start: 2018-11-27 | End: 2019-06-24 | Stop reason: SDUPTHER

## 2018-11-26 RX ORDER — OXYCODONE HYDROCHLORIDE 5 MG/1
5 TABLET ORAL EVERY 4 HOURS PRN
Qty: 50 TAB | Refills: 0 | Status: SHIPPED | OUTPATIENT
Start: 2018-11-26 | End: 2018-12-06

## 2018-11-26 RX ORDER — FERROUS SULFATE 325(65) MG
325 TABLET ORAL DAILY
Qty: 30 TAB | Refills: 2 | Status: SHIPPED | OUTPATIENT
Start: 2018-11-26 | End: 2019-01-17

## 2018-11-26 RX ORDER — POLYETHYLENE GLYCOL 3350 17 G/17G
17 POWDER, FOR SOLUTION ORAL DAILY
Qty: 15 EACH | Refills: 0 | Status: SHIPPED | OUTPATIENT
Start: 2018-11-27 | End: 2020-02-06

## 2018-11-26 RX ADMIN — MAGNESIUM HYDROXIDE 30 ML: 400 SUSPENSION ORAL at 08:21

## 2018-11-26 RX ADMIN — OMEPRAZOLE 20 MG: 20 CAPSULE, DELAYED RELEASE ORAL at 05:22

## 2018-11-26 RX ADMIN — LEVOTHYROXINE SODIUM 50 MCG: 50 TABLET ORAL at 05:22

## 2018-11-26 RX ADMIN — TRAMADOL HYDROCHLORIDE 100 MG: 50 TABLET, FILM COATED ORAL at 12:13

## 2018-11-26 RX ADMIN — TIZANIDINE 4 MG: 4 TABLET ORAL at 12:13

## 2018-11-26 RX ADMIN — CETIRIZINE HYDROCHLORIDE 10 MG: 10 TABLET, FILM COATED ORAL at 05:22

## 2018-11-26 RX ADMIN — TRAMADOL HYDROCHLORIDE 100 MG: 50 TABLET, FILM COATED ORAL at 00:30

## 2018-11-26 RX ADMIN — TIZANIDINE 4 MG: 4 TABLET ORAL at 05:22

## 2018-11-26 RX ADMIN — MULTIPLE VITAMINS W/ MINERALS TAB 1 TABLET: TAB at 05:21

## 2018-11-26 RX ADMIN — ACETAMINOPHEN 1000 MG: 500 TABLET ORAL at 08:20

## 2018-11-26 RX ADMIN — LISINOPRIL 10 MG: 10 TABLET ORAL at 05:21

## 2018-11-26 RX ADMIN — BUDESONIDE AND FORMOTEROL FUMARATE DIHYDRATE 2 PUFF: 80; 4.5 AEROSOL RESPIRATORY (INHALATION) at 05:28

## 2018-11-26 RX ADMIN — ACETAMINOPHEN 1000 MG: 500 TABLET ORAL at 01:42

## 2018-11-26 RX ADMIN — ENOXAPARIN SODIUM 30 MG: 100 INJECTION SUBCUTANEOUS at 05:28

## 2018-11-26 RX ADMIN — TAMSULOSIN HYDROCHLORIDE 0.4 MG: 0.4 CAPSULE ORAL at 08:20

## 2018-11-26 ASSESSMENT — PAIN SCALES - GENERAL
PAINLEVEL_OUTOF10: 6
PAINLEVEL_OUTOF10: 5
PAINLEVEL_OUTOF10: 0
PAINLEVEL_OUTOF10: 0

## 2018-11-26 NOTE — PROGRESS NOTES
Pt wheeled out to [place] with family who has all belongings and scripts. Pt A & O x 4. All parties verbalize understand of discharge instructions.

## 2018-11-26 NOTE — PROGRESS NOTES
Assessment completed, alert and confused, easily reoriented, Continuous pulse ox, on room air sat 95%.Complained of lower back pain 3/10 at this time.Due medication given per MAR. States understanding of POC. Call light at reach.

## 2018-11-26 NOTE — FACE TO FACE
Face to Face Note  -  Durable Medical Equipment    Brittny Escalera P.A.-C. - NPI: [id number]  I certify that this patient is under my care and that they had a durable medical equipment(DME)face to face encounter by myself that meets the physician DME face-to-face encounter requirements with this patient on:    Date of encounter:   Patient:                    MRN:                       YOB: 2016  Angelo Magaña  [id number]  1945     The encounter with the patient was in whole, or in part, for the following medical condition, which is the primary reason for durable medical equipment:  Post-Op Surgery    I certify that, based on my findings, the following durable medical equipment is medically necessary:  Other DME Equipment - Toilet Chair raiser.    HOME O2 Saturation Measurements:(Values must be present for Home Oxygen orders)         ,     ,         My Clinical findings support the need for the above equipment due to:  Abnormal Gait    Supporting Symptoms: per PT eval

## 2018-11-26 NOTE — DISCHARGE INSTRUCTIONS
Discharge Instructions    Discharged to home by car with friend. Discharged via wheelchair, hospital escort: Yes.  Special equipment needed: Commode    Be sure to schedule a follow-up appointment with your primary care doctor or any specialists as instructed.     Discharge Plan:   Influenza Vaccine Indication: Not indicated: Previously immunized this influenza season and > 8 years of age    I understand that a diet low in cholesterol, fat, and sodium is recommended for good health. Unless I have been given specific instructions below for another diet, I accept this instruction as my diet prescription.   Other diet: Regular    Special Instructions: None    · Is patient discharged on Warfarin / Coumadin?   No     Depression / Suicide Risk    As you are discharged from this [facility] facility, it is important to learn how to keep safe from harming yourself.    Recognize the warning signs:  · Abrupt changes in personality, positive or negative- including increase in energy   · Giving away possessions  · Change in eating patterns- significant weight changes-  positive or negative  · Change in sleeping patterns- unable to sleep or sleeping all the time   · Unwillingness or inability to communicate  · Depression  · Unusual sadness, discouragement and loneliness  · Talk of wanting to die  · Neglect of personal appearance   · Rebelliousness- reckless behavior  · Withdrawal from people/activities they love  · Confusion- inability to concentrate     If you or a loved one observes any of these behaviors or has concerns about self-harm, here's what you can do:  · Talk about it- your feelings and reasons for harming yourself  · Remove any means that you might use to hurt yourself (examples: pills, rope, extension cords, firearm)  · Get professional help from the community (Mental Health, Substance Abuse, psychological counseling)  · Do not be alone:Call your Safe Contact- someone whom you trust who will be there for  you.  · Call your local CRISIS HOTLINE 089-8297 or 134-008-8561  · Call your local Children's Mobile Crisis Response Team [place] (631) 846-8885 or www.Abeelo  · Call the toll free National Suicide Prevention Hotlines   · National Suicide Prevention Lifeline 204-344-CSWK (3312)  · [employer] [place] Line Network 800-SUICIDE (016-2485)    Laminectomy  Laminectomy is a surgery to remove:  · Small pieces of bone in the spine (lamina).  · Tough, cord-like tissues that connect bones to other bones (ligaments) underneath the lamina. These ligaments connect the bones in the spine (vertebrae).  · Parts of joints in the spine (facet joints) that have grown too large.  The goals of this surgery are:  · To reduce pressure on nerves and the spinal cord.  · To reduce pain, numbness, and discomfort.  You may need this procedure if you have narrowing of the spinal canal (spinal stenosis) or if you have a spinal tumor, spinal injury, or Paget disease of bone.  Tell a health care provider about:  · Any allergies you have.  · All medicines you are taking, including vitamins, herbs, eye drops, creams, and over-the-counter medicines.  · Any problems you or family members have had with anesthetic medicines.  · Any blood disorders you have.  · Any surgeries you have had.  · Any medical conditions you have, including a cold or any infections.  · Whether you are pregnant or may be pregnant.  What are the risks?  Generally, this is a safe procedure. However, problems may occur, including:  · Infection.  · Bleeding.  · Allergic reactions to medicines or dyes.  · Damage to other structures or organs, such as nerves. Nerve damage can cause pain, weakness, or numbness.  · Leaking of spinal fluid.  · A blood clot in a leg. The clot can move to the lungs, which can be very serious.  · Inability to control when you urinate (urinary incontinence) or when you have bowel movements (fecal incontinence). This is rare.  What happens before  the procedure?  Staying hydrated   Follow instructions from your health care provider about hydration, which may include:  · Up to 2 hours before the procedure - you may continue to drink clear liquids, such as water, clear fruit juice, black coffee, and plain tea.  Eating and drinking restrictions   Follow instructions from your health care provider about eating and drinking, which may include:  · 24 hours before the procedure - stop drinking alcohol.  · 8 hours before the procedure - stop eating heavy meals or foods such as meat, fried foods, or fatty foods.  · 6 hours before the procedure - stop eating light meals or foods, such as toast or cereal.  · 6 hours before the procedure - stop drinking milk or drinks that contain milk.  · 2 hours before the procedure - stop drinking clear liquids.  Medicines  · Ask your health care provider about:  ¨ Changing or stopping your regular medicines. This is especially important if you are taking diabetes medicines or blood thinners. If your health care provider asks you to keep taking some medicines, take them with a sip of water.  ¨ Taking medicines such as aspirin and ibuprofen. These medicines can thin your blood. Do not take these medicines before your procedure if your health care provider instructs you not to.  · You may be given antibiotic medicine to help prevent infection.  General instructions  · Do not use any products that contain nicotine or tobacco, such as cigarettes and e-cigarettes, for at least 2 weeks before the procedure. If you need help quitting, ask your health care provider.  · Ask your health care provider how your surgical site will be marked or identified.  · You may have tests done, such as blood tests or an electrocardiogram (ECG).  · If you will be going home right after the procedure, plan to have someone with you for 24 hours.  · Plan to have someone:  ¨ Take you home from the hospital or clinic.  ¨ Help you at home for the first week after  the procedure.  What happens during the procedure?  · To reduce your risk of infection, your health care team will wash or sanitize their hands.  · Small monitors will be placed on your body to check your heart rate, blood pressure, and oxygen level.  · An IV tube will be inserted into one of your veins.  · You will be given a medicine to make you fall asleep (general anesthetic). You may also be given a medicine to help you relax (sedative).  · A breathing tube will be placed into your lungs.  · Your back will be cleaned with a germ-killing solution.  · An incision will be made in your back. The incision may be 2-5 inches (5-13 cm) long, depending on how many vertebrae are being operated on.  · Muscles in your back will be moved away from the vertebrae and pulled to the side.  · Pieces of lamina will be removed.  · Ligaments and thickened facet joints will be removed. How much tissue and bone is removed depends on how much of the tissue is putting pressure on your nerves.  · Your nerves will be identified and evaluated to check for excessive tightness.  · Your back muscles will be moved back into their normal position.  · The area under your skin will be closed with small, dissolvable stitches (sutures).  · Your skin will be closed with small, absorbable sutures or staples.  · A bandage (dressing) will be placed over your incision.  The procedure may vary among health care providers and hospitals.  What happens after the procedure?  · Your blood pressure, heart rate, breathing rate, and blood oxygen level will be monitored until the medicines you were given have worn off.  · You may continue receive medicines and fluids through an IV tube.  · You will have some back pain. You will be given pain medicine as needed.  · It is important to be up and moving as soon as possible after this procedure. Physical therapists will help you start walking.  · To prevent blood clots in your legs:  ¨ You may have to wear  compression stockings. These stockings also reduce swelling in your legs.  ¨ You may need to take medicines.  · You may need to do breathing exercises to help prevent lung infection.  · Do not drive for 24 hours if you received a sedative.  Summary  · Laminectomy is a procedure that is done to reduce pressure on nerves and the spinal cord and to reduce pain, numbness, and discomfort.  · If you will be going home right after the procedure, plan to have someone with you for 24 hours.  · You will have some back pain. You will be given pain medicine as needed.  · It is important to be up and moving as soon as possible after this procedure. Physical therapists will help you start walking.  This information is not intended to replace advice given to you by your health care provider. Make sure you discuss any questions you have with your health care provider.  Document Released: 12/06/2010 Document Revised: 08/03/2017 Document Reviewed: 06/04/2017  [email or web address] Patient Education © 2017 [employer]

## 2018-11-26 NOTE — PROGRESS NOTES
Bedside report given to  Tammy MIGUEL.Pt  Resting in the bed.Safety precautions in place and all the lines remain patent.

## 2018-11-26 NOTE — PROGRESS NOTES
Neurosurgery Progress Note    Subjective:  C/o some incisional pain, left buttock pain   Denies leg pain, tingling or numbness, or weakness.  Workup negative thus far  Cx negative to date   Tmax 99.9  + BMs  Gomes out, intermittent voiding   Pain well controlled on oral medications  Denies HA, positional HA, N/V, dizziness, chest pain, SOB, difficulty breathing  Fecal occult negative     Exam:  VSS  A&Ox4, NAD  NM: 5/5 hip flexion, knee extension, knee flexion, plantar flexion, dorsiflexion, EHL  Sensation intact and equal throughout all four extremities.   Abdomen: soft, non-tender  Non-labored breathing on room air, 96%  Capillary refill in all four extremities <2 seconds  No LE erythema, cyanosis, clubbing  Calves non-tender to compression bilat  Incision CDI, margins well-approximated no signs of infection  Gomes out   Transfused 2 units PRBC yesterday       BP  Min: 101/66  Max: 134/83  Pulse  Av.6  Min: 77  Max: 106  Resp  Av.4  Min: 16  Max: 17  Temp  Av.3 °C (99.1 °F)  Min: 36.7 °C (98 °F)  Max: 37.7 °C (99.9 °F)  SpO2  Av.3 %  Min: 91 %  Max: 98 %    No Data Recorded    Recent Labs      18   1141  18   0312  18   0708  182  18   0423   WBC  6.2  4.5*   --    --   5.3   RBC  2.70*  2.26*   --    --   2.72*   HEMOGLOBIN  8.0*  6.6*  6.7*  7.0*  8.3*  7.9*   HEMATOCRIT  24.4*  21.3*  20.4*   --   25.3*  24.5*   MCV  90.4  90.3   --    --   90.1   MCH  29.6  29.6   --    --   29.0   MCHC  32.8*  32.8*   --    --   32.2*   RDW  45.9  46.5   --    --   46.3   PLATELETCT  207  159*   --    --   222   MPV  9.5  9.4   --    --   9.8     Recent Labs      18   1141   SODIUM  134*   POTASSIUM  3.8   CHLORIDE  99   CO2  29   GLUCOSE  116*   BUN  11   CREATININE  0.83   CALCIUM  8.9               Intake/Output       18 - 1859 18 -  Total 1900-0659 Total       Intake    P.O.  1020   800 1820  --  -- --    P.O. 3775 745 0293 -- -- --    Blood  310  -- 310  --  -- --    Volume (RELEASE RED BLOOD CELLS) 310 -- 310 -- -- --    Total Intake 2137 610 1933 -- -- --       Output    Urine  1100  850 1950  200  -- 200    Number of Times Voided -- -- -- 2 x -- 2 x    Urine Void (mL) -- 850 850 200 -- 200    Output (mL) ([REMOVED] Urethral Catheter Straight-tip;Latex 16 Fr.) 1100 -- 1100 -- -- --    Stool  --  -- --  --  -- --    Number of Times Stooled 1 x 3 x 4 x -- -- --    Total Output 0113 413 9291 200 -- 200       Net I/O     230 -50 180 -200 -- -200            Intake/Output Summary (Last 24 hours) at 11/26/18 0927  Last data filed at 11/26/18 0800   Gross per 24 hour   Intake             2130 ml   Output             2150 ml   Net              -20 ml       $ Bladder Scan Results (mL):  (bladder scan results was zero)    • iron sucrose  200 mg DAILY   • oxyCODONE immediate-release  5 mg Q4HRS PRN    Or   • oxyCODONE immediate-release  10 mg Q4HRS PRN   • tramadol  100 mg Q6HRS PRN   • tamsulosin  0.4 mg AFTER BREAKFAST   • enoxaparin (LOVENOX) injection  30 mg Q12HRS   • tizanidine  4 mg Q6HRS PRN   • ondansetron  4 mg Q6HRS PRN   • polyethylene glycol/lytes  1 Packet DAILY   • magnesium hydroxide  30 mL AFTER BREAKFAST   • acetaminophen  1,000 mg Q6HR   • therapeutic multivitamin-minerals  1 Tab DAILY   • budesonide-formoterol  2 Puff BID   • morphine injection  2-5 mg Q HOUR PRN   • omeprazole  20 mg BID   • atorvastatin  40 mg Nightly   • cetirizine  10 mg DAILY   • levothyroxine  50 mcg QAM AC   • lisinopril  10 mg DAILY   • MD ALERT...DO NOT ADMINISTER NSAIDS or ASPIRIN unless ORDERED By Neurosurgery  1 Each PRN   • docusate sodium  100 mg BID   • senna-docusate  1 Tab Nightly   • senna-docusate  1 Tab Q24HRS PRN   • magnesium hydroxide  30 mL QDAY PRN   • fleet  1 Each Once PRN   • cloNIDine  0.1 mg Q4HRS PRN       Assessment and Plan:  POD #7 L2-L3 TLIF, L3 screw repositioning,L3-L4 WILL    Prophylactic anticoagulation: yes  Bladder scan, will watch for retention and d/c on flomax   Daily CBC to trend Hgb.   Post op Fever - will trend CBC, temperature, currently pt WNL. UA negative, Cx NTD. Will give patient fluids. Afebrile > 48 hours at this point   Strict I&O   Rehab vs SNF order placed, recommend repeat PT/OT evaluation for d/c planning     Appreciate all teams involved in patient's care  Plan: d/c today with HH pending void, if cleared by medicine   Nursing, please call with questions/updates

## 2018-11-26 NOTE — THERAPY
"Physical Therapy Treatment completed.   Bed Mobility:  Supine to Sit:  (chair pre)  Transfers: Sit to Stand: Contact Guard Assist  Gait: Level Of Assist: Stand by Assist with Front-Wheel Walker       Plan of Care: Patient with no further skilled PT needs in the acute care setting at this time and Patient demonstrates safety with mobility in this environment at this time.   Discharge Recommendations: Equipment: Raised Toilet Seat. Post-acute therapy Discharge to home with outpatient or home health for additional skilled therapy services.     See \"Rehab Therapy-Acute\" Patient Summary Report for complete documentation.       "

## 2018-11-27 ENCOUNTER — PATIENT OUTREACH (OUTPATIENT)
Dept: HEALTH INFORMATION MANAGEMENT | Facility: OTHER | Age: 73
End: 2018-11-27

## 2018-11-27 NOTE — PROGRESS NOTES
Outbound call to Angelo for post discharge medication review. Left VM with my contact information and request for return call. Reviewed medications against AVS 11/26/18. No clinically significant interactions noted.     Josiane Pineda, KimD

## 2018-11-27 NOTE — PROGRESS NOTES
Left VM for pharmacist, Josiane Pineda, with [facility], requesting another outreach to patient in regards to questions about pain medications.

## 2018-11-28 ENCOUNTER — HOME CARE VISIT (OUTPATIENT)
Dept: HOME HEALTH SERVICES | Facility: HOME HEALTHCARE | Age: 73
End: 2018-11-28
Payer: MEDICARE

## 2018-11-28 ENCOUNTER — TELEPHONE (OUTPATIENT)
Dept: HEALTH INFORMATION MANAGEMENT | Facility: OTHER | Age: 73
End: 2018-11-28

## 2018-11-28 PROCEDURE — 665001 SOC-HOME HEALTH

## 2018-11-28 PROCEDURE — G0493 RN CARE EA 15 MIN HH/HOSPICE: HCPCS

## 2018-11-29 VITALS
OXYGEN SATURATION: 96 % | TEMPERATURE: 98.5 F | HEIGHT: 68 IN | SYSTOLIC BLOOD PRESSURE: 95 MMHG | HEART RATE: 86 BPM | DIASTOLIC BLOOD PRESSURE: 60 MMHG | BODY MASS INDEX: 36.82 KG/M2 | RESPIRATION RATE: 18 BRPM | WEIGHT: 242.95 LBS

## 2018-11-29 SDOH — ECONOMIC STABILITY: HOUSING INSECURITY: UNSAFE APPLIANCES: 0

## 2018-11-29 SDOH — ECONOMIC STABILITY: HOUSING INSECURITY: UNSAFE COOKING RANGE AREA: 0

## 2018-11-29 ASSESSMENT — ENCOUNTER SYMPTOMS
NAUSEA: DENIES
DEBILITATING PAIN: 1
VOMITING: DENIES

## 2018-11-29 ASSESSMENT — ACTIVITIES OF DAILY LIVING (ADL)
OASIS_M1830: 03
HOME_HEALTH_OASIS: 02

## 2018-11-29 ASSESSMENT — PATIENT HEALTH QUESTIONNAIRE - PHQ9
2. FEELING DOWN, DEPRESSED, IRRITABLE, OR HOPELESS: 00
CLINICAL INTERPRETATION OF PHQ2 SCORE: 0
1. LITTLE INTEREST OR PLEASURE IN DOING THINGS: 00

## 2018-11-29 NOTE — TELEPHONE ENCOUNTER
Received referral from [facility]. Medications reviewed against AVS 11/26/18. No clinically significant interactions noted.     Josiane Pineda, PharmD

## 2018-11-30 ENCOUNTER — HOME CARE VISIT (OUTPATIENT)
Dept: HOME HEALTH SERVICES | Facility: HOME HEALTHCARE | Age: 73
End: 2018-11-30
Payer: MEDICARE

## 2018-11-30 VITALS
TEMPERATURE: 97.6 F | OXYGEN SATURATION: 97 % | SYSTOLIC BLOOD PRESSURE: 122 MMHG | DIASTOLIC BLOOD PRESSURE: 80 MMHG | HEART RATE: 86 BPM | RESPIRATION RATE: 18 BRPM

## 2018-11-30 PROCEDURE — G0299 HHS/HOSPICE OF RN EA 15 MIN: HCPCS

## 2018-11-30 ASSESSMENT — ENCOUNTER SYMPTOMS
NAUSEA: DENIES
VOMITING: DENIES

## 2018-12-01 ENCOUNTER — HOME CARE VISIT (OUTPATIENT)
Dept: HOME HEALTH SERVICES | Facility: HOME HEALTHCARE | Age: 73
End: 2018-12-01
Payer: MEDICARE

## 2018-12-01 VITALS
OXYGEN SATURATION: 97 % | RESPIRATION RATE: 18 BRPM | DIASTOLIC BLOOD PRESSURE: 62 MMHG | HEART RATE: 88 BPM | SYSTOLIC BLOOD PRESSURE: 126 MMHG | TEMPERATURE: 98 F

## 2018-12-01 PROCEDURE — G0151 HHCP-SERV OF PT,EA 15 MIN: HCPCS

## 2018-12-01 ASSESSMENT — ENCOUNTER SYMPTOMS: DEBILITATING PAIN: 1

## 2018-12-01 ASSESSMENT — ACTIVITIES OF DAILY LIVING (ADL)
ADLS_COMMENTS: <!--EPICS-->SEE OT EVAL<!--EPICE-->
IADLS_COMMENTS: <!--EPICS-->SEE OT EVAL<!--EPICE-->

## 2018-12-03 ENCOUNTER — HOME CARE VISIT (OUTPATIENT)
Dept: HOME HEALTH SERVICES | Facility: HOME HEALTHCARE | Age: 73
End: 2018-12-03
Payer: MEDICARE

## 2018-12-03 ENCOUNTER — HOSPITAL ENCOUNTER (OUTPATIENT)
Dept: RADIOLOGY | Facility: MEDICAL CENTER | Age: 73
End: 2018-12-03
Attending: NEUROLOGICAL SURGERY
Payer: MEDICARE

## 2018-12-03 DIAGNOSIS — R60.0 LOCALIZED EDEMA: ICD-10-CM

## 2018-12-03 PROCEDURE — 93970 EXTREMITY STUDY: CPT

## 2018-12-05 NOTE — DISCHARGE SUMMARY
Discharge Summary    CHIEF COMPLAINT ON ADMISSION  No chief complaint on file.    Low back pain, leg pain, lumbar stenosis, flat back    Reason for Admission  LUMBAR STENOSIS     Admission Date  11/19/2018    CODE STATUS  Full Code    HPI & HOSPITAL COURSE  This is a 73 y.o. male here with lumbar stenosis and flat back.        Therefore, he is discharged in good and stable condition to home with organized home healthcare and close outpatient follow-up.    The patient met 2-midnight criteria for an inpatient stay at the time of discharge.    Discharge Date  11/26/2018    FOLLOW UP ITEMS POST DISCHARGE  2 weeks after discharge with Dr. Fowler    DISCHARGE DIAGNOSES  Active Problems:    SIRS (systemic inflammatory response syndrome) (HCC) POA: Yes    Anemia POA: Yes    Acquired hypothyroidism POA: Yes    GERD (gastroesophageal reflux disease) POA: Yes    Dyslipidemia POA: Yes    Essential hypertension POA: Yes    Obesity (BMI 35.0-39.9 without comorbidity) POA: Yes    S/P lumbar laminectomy POA: Unknown  Resolved Problems:    * No resolved hospital problems. *      FOLLOW UP  Future Appointments  Date Time Provider Department Center   12/5/2018 To Be Determined Carito Mckee OT [facility] None   12/6/2018 To Be Determined Sharon Conrad R.N. [facility] None   12/12/2018 To Be Determined Sharon Conrad R.N. [facility] None   12/19/2018 To Be Determined Sharon Conrad R.N. [facility] None   12/26/2018 To Be Determined Sharon Conrad R.N. [facility] None   1/2/2019 To Be Determined Sharon Conrda R.N. [facility] None   1/9/2019 To Be Determined Sharon Conrad R.N. [facility] None   1/16/2019 To Be Determined Sharon Conrad R.N. [facility] None   1/23/2019 To Be Determined Sharon Conrad R.N. [facility] None     [facility]  [facility] [facility]  [facility] [address]  [phone]        Jeovany Samson M.D.  [address]  [address]  [facility] [address]  [phone]            MEDICATIONS ON DISCHARGE     Medication List      START  taking these medications      Instructions   acetaminophen 500 MG Tabs  Commonly known as:  TYLENOL   Take 1 Tab by mouth every 6 hours as needed for Mild Pain.  Dose:  500 mg     ferrous sulfate 325 (65 Fe) MG tablet   Take 1 Tab by mouth every day.  Dose:  325 mg     morphine ER 15 MG Tbcr tablet  Commonly known as:  MS CONTIN   Doctor's comments:  Take BID x 1 week then QHS x 1 week  Take 1 Tab by mouth every 12 hours for 14 days.  Dose:  15 mg     * oxyCODONE immediate release 10 MG immediate release tablet  Commonly known as:  ROXICODONE   Take 1 Tab by mouth every four hours as needed for up to 14 days.  Dose:  10 mg     * oxyCODONE immediate-release 5 MG Tabs  Commonly known as:  ROXICODONE   Take 1 Tab by mouth every four hours as needed for Severe Pain (if APAP/Tramadol ineffective) for up to 10 days.  Dose:  5 mg     polyethylene glycol/lytes Pack  Commonly known as:  MIRALAX   Take 1 Packet by mouth every day.  Dose:  17 g     tamsulosin 0.4 MG capsule  Commonly known as:  FLOMAX   Take 1 Cap by mouth ONE-HALF HOUR AFTER BREAKFAST.  Dose:  0.4 mg     tizanidine 4 MG Tabs  Commonly known as:  ZANAFLEX   Take 1 Tab by mouth every 6 hours as needed.  Dose:  4 mg     tramadol 50 MG Tabs  Commonly known as:  ULTRAM   Take 2 Tabs by mouth every 6 hours as needed for Moderate Pain for up to 14 days.  Dose:  100 mg        * This list has 2 medication(s) that are the same as other medications prescribed for you. Read the directions carefully, and ask your doctor or other care provider to review them with you.            CONTINUE taking these medications      Instructions   ADVAIR DISKUS 100-50 MCG/DOSE Aepb  Generic drug:  fluticasone-salmeterol   INHALE 1 PUFF BY MOUTH TWICE A DAY *RINSE MOUTH AFTER USE*     atorvastatin 40 MG Tabs  Commonly known as:  LIPITOR   Take 40 mg by mouth every evening.  Dose:  40 mg     cetirizine 10 MG Tabs  Commonly known as:  ZYRTEC   Take 10 mg by mouth every day.  Dose:  10 mg      fish oil 1000 MG Caps capsule   Take 1,000 mg by mouth 2 Times a Day.  Dose:  1000 mg     levothyroxine 50 MCG Tabs  Commonly known as:  SYNTHROID   TAKE 1 TABLET BY MOUTH EVERY DAY     lisinopril 10 MG Tabs  Commonly known as:  PRINIVIL   TAKE 1 TABLET BY MOUTH EVERY DAY     PRILOSEC 20 MG delayed-release capsule  Generic drug:  omeprazole   Take 20 mg by mouth every day.  Dose:  20 mg     PROBIOTIC ADVANCED PO   Take 1 Tab by mouth every day.  Dose:  1 Tab     therapeutic multivitamin-minerals Tabs   Take 1 Tab by mouth every day.  Dose:  1 Tab     Vitamin D3 3000 units Tabs   Take 3,000 Units by mouth every day.  Dose:  3000 Units        STOP taking these medications    aspirin 81 MG tablet            Allergies  No Known Allergies    DIET  No orders of the defined types were placed in this encounter.      ACTIVITY  As tolerated.  Weight bearing as tolerated    CONSULTATIONS  Internal medicine with the hospitalist service.    PROCEDURES  L2,3 TLIF, LAMINECTOMY, L2 KYPHOPLASTY, L3,4 POSTERIOR HARDWARE REMOVAL    LABORATORY  Lab Results   Component Value Date    SODIUM 134 (L) 11/24/2018    POTASSIUM 3.8 11/24/2018    CHLORIDE 99 11/24/2018    CO2 29 11/24/2018    GLUCOSE 116 (H) 11/24/2018    BUN 11 11/24/2018    CREATININE 0.83 11/24/2018    CREATININE 0.9 05/18/2009        Lab Results   Component Value Date    WBC 5.3 11/26/2018    HEMOGLOBIN 7.9 (L) 11/26/2018    HEMATOCRIT 24.5 (L) 11/26/2018    PLATELETCT 222 11/26/2018        Total time of the discharge process exceeds 30 minutes.

## 2018-12-06 ENCOUNTER — HOME CARE VISIT (OUTPATIENT)
Dept: HOME HEALTH SERVICES | Facility: HOME HEALTHCARE | Age: 73
End: 2018-12-06
Payer: MEDICARE

## 2018-12-06 VITALS
OXYGEN SATURATION: 98 % | SYSTOLIC BLOOD PRESSURE: 128 MMHG | HEART RATE: 81 BPM | TEMPERATURE: 98.1 F | RESPIRATION RATE: 18 BRPM | DIASTOLIC BLOOD PRESSURE: 78 MMHG

## 2018-12-06 PROCEDURE — G0299 HHS/HOSPICE OF RN EA 15 MIN: HCPCS

## 2018-12-06 SDOH — ECONOMIC STABILITY: HOUSING INSECURITY: UNSAFE APPLIANCES: 0

## 2018-12-06 SDOH — ECONOMIC STABILITY: HOUSING INSECURITY: UNSAFE COOKING RANGE AREA: 0

## 2018-12-06 ASSESSMENT — ENCOUNTER SYMPTOMS
VOMITING: PT DENIES ANY EMESIS AT THIS TIME
NAUSEA: PT DENIES ANY NAUSEA AT THIS TIME

## 2018-12-11 ENCOUNTER — HOSPITAL ENCOUNTER (OUTPATIENT)
Dept: RADIOLOGY | Facility: MEDICAL CENTER | Age: 73
End: 2018-12-11
Attending: NEUROLOGICAL SURGERY
Payer: MEDICARE

## 2018-12-11 ENCOUNTER — HOSPITAL ENCOUNTER (OUTPATIENT)
Dept: LAB | Facility: MEDICAL CENTER | Age: 73
End: 2018-12-11
Attending: NEUROLOGICAL SURGERY
Payer: MEDICARE

## 2018-12-11 DIAGNOSIS — M48.061 SPINAL STENOSIS, LUMBAR REGION, WITHOUT NEUROGENIC CLAUDICATION: ICD-10-CM

## 2018-12-11 DIAGNOSIS — M51.36 DEGENERATION OF LUMBAR INTERVERTEBRAL DISC: ICD-10-CM

## 2018-12-11 DIAGNOSIS — M54.5 ACUTE LOW BACK PAIN, UNSPECIFIED BACK PAIN LATERALITY, WITH SCIATICA PRESENCE UNSPECIFIED: ICD-10-CM

## 2018-12-11 LAB
BASOPHILS # BLD AUTO: 0.3 % (ref 0–1.8)
BASOPHILS # BLD: 0.03 K/UL (ref 0–0.12)
EOSINOPHIL # BLD AUTO: 0.04 K/UL (ref 0–0.51)
EOSINOPHIL NFR BLD: 0.4 % (ref 0–6.9)
ERYTHROCYTE [DISTWIDTH] IN BLOOD BY AUTOMATED COUNT: 50.3 FL (ref 35.9–50)
HCT VFR BLD AUTO: 37.8 % (ref 42–52)
HGB BLD-MCNC: 11.9 G/DL (ref 14–18)
IMM GRANULOCYTES # BLD AUTO: 0.05 K/UL (ref 0–0.11)
IMM GRANULOCYTES NFR BLD AUTO: 0.5 % (ref 0–0.9)
LYMPHOCYTES # BLD AUTO: 0.78 K/UL (ref 1–4.8)
LYMPHOCYTES NFR BLD: 7.8 % (ref 22–41)
MCH RBC QN AUTO: 28.9 PG (ref 27–33)
MCHC RBC AUTO-ENTMCNC: 31.5 G/DL (ref 33.7–35.3)
MCV RBC AUTO: 91.7 FL (ref 81.4–97.8)
MONOCYTES # BLD AUTO: 0.49 K/UL (ref 0–0.85)
MONOCYTES NFR BLD AUTO: 4.9 % (ref 0–13.4)
NEUTROPHILS # BLD AUTO: 8.62 K/UL (ref 1.82–7.42)
NEUTROPHILS NFR BLD: 86.1 % (ref 44–72)
NRBC # BLD AUTO: 0 K/UL
NRBC BLD-RTO: 0 /100 WBC
PLATELET # BLD AUTO: 344 K/UL (ref 164–446)
PMV BLD AUTO: 9.3 FL (ref 9–12.9)
RBC # BLD AUTO: 4.12 M/UL (ref 4.7–6.1)
WBC # BLD AUTO: 10 K/UL (ref 4.8–10.8)

## 2018-12-11 PROCEDURE — 85025 COMPLETE CBC W/AUTO DIFF WBC: CPT

## 2018-12-11 PROCEDURE — 72110 X-RAY EXAM L-2 SPINE 4/>VWS: CPT

## 2018-12-11 PROCEDURE — 36415 COLL VENOUS BLD VENIPUNCTURE: CPT

## 2018-12-12 ENCOUNTER — HOME CARE VISIT (OUTPATIENT)
Dept: HOME HEALTH SERVICES | Facility: HOME HEALTHCARE | Age: 73
End: 2018-12-12
Payer: MEDICARE

## 2018-12-12 VITALS
SYSTOLIC BLOOD PRESSURE: 112 MMHG | DIASTOLIC BLOOD PRESSURE: 78 MMHG | TEMPERATURE: 99.2 F | OXYGEN SATURATION: 97 % | HEART RATE: 84 BPM | RESPIRATION RATE: 18 BRPM

## 2018-12-12 PROCEDURE — G0299 HHS/HOSPICE OF RN EA 15 MIN: HCPCS

## 2018-12-12 SDOH — ECONOMIC STABILITY: HOUSING INSECURITY: UNSAFE COOKING RANGE AREA: 0

## 2018-12-12 SDOH — ECONOMIC STABILITY: HOUSING INSECURITY: UNSAFE APPLIANCES: 0

## 2018-12-12 ASSESSMENT — ENCOUNTER SYMPTOMS
VOMITING: PT DENIES ANY EMESIS AT THIS TIME
NAUSEA: PT DENIES ANY NAUSEA AT THIS TIME

## 2018-12-13 ENCOUNTER — TELEPHONE (OUTPATIENT)
Dept: INTERNAL MEDICINE | Facility: MEDICAL CENTER | Age: 73
End: 2018-12-13

## 2018-12-13 PROCEDURE — G0180 MD CERTIFICATION HHA PATIENT: HCPCS | Performed by: INTERNAL MEDICINE

## 2018-12-13 NOTE — TELEPHONE ENCOUNTER
----- Message from Jeovany Samson M.D. sent at 12/13/2018 10:46 AM PST -----  Please offer the patient an appointment to see us about this worsening pain. He can also follow up with his surgeon for this issue, and that may be the most appropriate if his pain is secondary to his recent surgery.    Thank you, Jeovany Samson M.D.    ----- Message -----  From: Sharon Conrad R.N.  Sent: 12/12/2018   1:31 PM  To: Jeovany Samson M.D.    Per home health protocol we are to inform you of any pain outside parameters. During routine home health visit today pt's pain was 8/10 in lower back and left hip. Vital signs stable.    Thank you  Sharon Conrad RN

## 2018-12-14 ENCOUNTER — HOSPITAL ENCOUNTER (OUTPATIENT)
Dept: RADIOLOGY | Facility: MEDICAL CENTER | Age: 73
DRG: 074 | End: 2018-12-14
Attending: NEUROLOGICAL SURGERY
Payer: MEDICARE

## 2018-12-14 DIAGNOSIS — M51.36 DEGENERATION OF LUMBAR INTERVERTEBRAL DISC: ICD-10-CM

## 2018-12-14 PROCEDURE — 72148 MRI LUMBAR SPINE W/O DYE: CPT

## 2018-12-17 ENCOUNTER — APPOINTMENT (OUTPATIENT)
Dept: RADIOLOGY | Facility: MEDICAL CENTER | Age: 73
DRG: 074 | End: 2018-12-17
Attending: EMERGENCY MEDICINE
Payer: MEDICARE

## 2018-12-17 ENCOUNTER — HOSPITAL ENCOUNTER (INPATIENT)
Facility: MEDICAL CENTER | Age: 73
LOS: 10 days | DRG: 074 | End: 2018-12-27
Attending: EMERGENCY MEDICINE | Admitting: INTERNAL MEDICINE
Payer: MEDICARE

## 2018-12-17 DIAGNOSIS — G89.29 CHRONIC MIDLINE LOW BACK PAIN WITHOUT SCIATICA: ICD-10-CM

## 2018-12-17 DIAGNOSIS — M54.18 RADICULOPATHY OF SACRAL REGION: ICD-10-CM

## 2018-12-17 DIAGNOSIS — M25.551 RIGHT HIP PAIN: ICD-10-CM

## 2018-12-17 DIAGNOSIS — M54.59 INTRACTABLE LOW BACK PAIN: ICD-10-CM

## 2018-12-17 DIAGNOSIS — M54.50 CHRONIC MIDLINE LOW BACK PAIN WITHOUT SCIATICA: ICD-10-CM

## 2018-12-17 DIAGNOSIS — Z98.890 S/P LUMBAR LAMINECTOMY: ICD-10-CM

## 2018-12-17 DIAGNOSIS — M54.16 LUMBAR BACK PAIN WITH RADICULOPATHY AFFECTING LEFT LOWER EXTREMITY: ICD-10-CM

## 2018-12-17 PROBLEM — R74.8 ELEVATED ALKALINE PHOSPHATASE LEVEL: Status: ACTIVE | Noted: 2018-12-17

## 2018-12-17 PROBLEM — N40.0 BPH (BENIGN PROSTATIC HYPERPLASIA): Status: ACTIVE | Noted: 2018-12-17

## 2018-12-17 LAB
ALBUMIN SERPL BCP-MCNC: 4.1 G/DL (ref 3.2–4.9)
ALBUMIN/GLOB SERPL: 1.6 G/DL
ALP SERPL-CCNC: 111 U/L (ref 30–99)
ALT SERPL-CCNC: 19 U/L (ref 2–50)
ANION GAP SERPL CALC-SCNC: 9 MMOL/L (ref 0–11.9)
APPEARANCE UR: CLEAR
AST SERPL-CCNC: 19 U/L (ref 12–45)
BACTERIA #/AREA URNS HPF: ABNORMAL /HPF
BASOPHILS # BLD AUTO: 0.3 % (ref 0–1.8)
BASOPHILS # BLD: 0.02 K/UL (ref 0–0.12)
BILIRUB SERPL-MCNC: 0.8 MG/DL (ref 0.1–1.5)
BILIRUB UR QL STRIP.AUTO: NEGATIVE
BUN SERPL-MCNC: 14 MG/DL (ref 8–22)
CALCIUM SERPL-MCNC: 9.9 MG/DL (ref 8.5–10.5)
CHLORIDE SERPL-SCNC: 102 MMOL/L (ref 96–112)
CO2 SERPL-SCNC: 24 MMOL/L (ref 20–33)
COLOR UR: YELLOW
CREAT SERPL-MCNC: 0.95 MG/DL (ref 0.5–1.4)
EOSINOPHIL # BLD AUTO: 0.25 K/UL (ref 0–0.51)
EOSINOPHIL NFR BLD: 4.1 % (ref 0–6.9)
EPI CELLS #/AREA URNS HPF: NEGATIVE /HPF
ERYTHROCYTE [DISTWIDTH] IN BLOOD BY AUTOMATED COUNT: 47.8 FL (ref 35.9–50)
ERYTHROCYTE [SEDIMENTATION RATE] IN BLOOD BY WESTERGREN METHOD: 13 MM/HOUR (ref 0–20)
GLOBULIN SER CALC-MCNC: 2.5 G/DL (ref 1.9–3.5)
GLUCOSE SERPL-MCNC: 96 MG/DL (ref 65–99)
GLUCOSE UR STRIP.AUTO-MCNC: NEGATIVE MG/DL
HCT VFR BLD AUTO: 36.8 % (ref 42–52)
HGB BLD-MCNC: 12 G/DL (ref 14–18)
HYALINE CASTS #/AREA URNS LPF: ABNORMAL /LPF
IMM GRANULOCYTES # BLD AUTO: 0.03 K/UL (ref 0–0.11)
IMM GRANULOCYTES NFR BLD AUTO: 0.5 % (ref 0–0.9)
KETONES UR STRIP.AUTO-MCNC: ABNORMAL MG/DL
LEUKOCYTE ESTERASE UR QL STRIP.AUTO: ABNORMAL
LYMPHOCYTES # BLD AUTO: 0.85 K/UL (ref 1–4.8)
LYMPHOCYTES NFR BLD: 14 % (ref 22–41)
MAGNESIUM SERPL-MCNC: 1.9 MG/DL (ref 1.5–2.5)
MCH RBC QN AUTO: 29.2 PG (ref 27–33)
MCHC RBC AUTO-ENTMCNC: 32.6 G/DL (ref 33.7–35.3)
MCV RBC AUTO: 89.5 FL (ref 81.4–97.8)
MICRO URNS: ABNORMAL
MONOCYTES # BLD AUTO: 0.45 K/UL (ref 0–0.85)
MONOCYTES NFR BLD AUTO: 7.4 % (ref 0–13.4)
NEUTROPHILS # BLD AUTO: 4.49 K/UL (ref 1.82–7.42)
NEUTROPHILS NFR BLD: 73.7 % (ref 44–72)
NITRITE UR QL STRIP.AUTO: POSITIVE
NRBC # BLD AUTO: 0 K/UL
NRBC BLD-RTO: 0 /100 WBC
PH UR STRIP.AUTO: 7.5 [PH]
PLATELET # BLD AUTO: 210 K/UL (ref 164–446)
PMV BLD AUTO: 9.1 FL (ref 9–12.9)
POTASSIUM SERPL-SCNC: 4.1 MMOL/L (ref 3.6–5.5)
PROT SERPL-MCNC: 6.6 G/DL (ref 6–8.2)
PROT UR QL STRIP: NEGATIVE MG/DL
RBC # BLD AUTO: 4.11 M/UL (ref 4.7–6.1)
RBC # URNS HPF: ABNORMAL /HPF
RBC UR QL AUTO: NEGATIVE
SODIUM SERPL-SCNC: 135 MMOL/L (ref 135–145)
SP GR UR STRIP.AUTO: 1.01
UROBILINOGEN UR STRIP.AUTO-MCNC: 0.2 MG/DL
WBC # BLD AUTO: 6.1 K/UL (ref 4.8–10.8)
WBC #/AREA URNS HPF: ABNORMAL /HPF

## 2018-12-17 PROCEDURE — 36415 COLL VENOUS BLD VENIPUNCTURE: CPT

## 2018-12-17 PROCEDURE — 87186 SC STD MICRODIL/AGAR DIL: CPT

## 2018-12-17 PROCEDURE — 99285 EMERGENCY DEPT VISIT HI MDM: CPT

## 2018-12-17 PROCEDURE — 81001 URINALYSIS AUTO W/SCOPE: CPT

## 2018-12-17 PROCEDURE — 94760 N-INVAS EAR/PLS OXIMETRY 1: CPT

## 2018-12-17 PROCEDURE — 700102 HCHG RX REV CODE 250 W/ 637 OVERRIDE(OP): Performed by: STUDENT IN AN ORGANIZED HEALTH CARE EDUCATION/TRAINING PROGRAM

## 2018-12-17 PROCEDURE — 93005 ELECTROCARDIOGRAM TRACING: CPT | Performed by: EMERGENCY MEDICINE

## 2018-12-17 PROCEDURE — 770006 HCHG ROOM/CARE - MED/SURG/GYN SEMI*

## 2018-12-17 PROCEDURE — 96374 THER/PROPH/DIAG INJ IV PUSH: CPT

## 2018-12-17 PROCEDURE — 85025 COMPLETE CBC W/AUTO DIFF WBC: CPT

## 2018-12-17 PROCEDURE — 87077 CULTURE AEROBIC IDENTIFY: CPT

## 2018-12-17 PROCEDURE — 80053 COMPREHEN METABOLIC PANEL: CPT

## 2018-12-17 PROCEDURE — A9270 NON-COVERED ITEM OR SERVICE: HCPCS | Performed by: STUDENT IN AN ORGANIZED HEALTH CARE EDUCATION/TRAINING PROGRAM

## 2018-12-17 PROCEDURE — 85652 RBC SED RATE AUTOMATED: CPT

## 2018-12-17 PROCEDURE — 71045 X-RAY EXAM CHEST 1 VIEW: CPT

## 2018-12-17 PROCEDURE — 83735 ASSAY OF MAGNESIUM: CPT

## 2018-12-17 PROCEDURE — 96376 TX/PRO/DX INJ SAME DRUG ADON: CPT

## 2018-12-17 PROCEDURE — 700111 HCHG RX REV CODE 636 W/ 250 OVERRIDE (IP): Performed by: EMERGENCY MEDICINE

## 2018-12-17 PROCEDURE — 700101 HCHG RX REV CODE 250: Performed by: INTERNAL MEDICINE

## 2018-12-17 PROCEDURE — 87086 URINE CULTURE/COLONY COUNT: CPT

## 2018-12-17 RX ORDER — ACETAMINOPHEN 325 MG/1
650 TABLET ORAL EVERY 6 HOURS
Status: DISCONTINUED | OUTPATIENT
Start: 2018-12-18 | End: 2018-12-27 | Stop reason: HOSPADM

## 2018-12-17 RX ORDER — POLYETHYLENE GLYCOL 3350 17 G/17G
1 POWDER, FOR SOLUTION ORAL
Status: DISCONTINUED | OUTPATIENT
Start: 2018-12-17 | End: 2018-12-18

## 2018-12-17 RX ORDER — LISINOPRIL 10 MG/1
10 TABLET ORAL DAILY
Status: DISCONTINUED | OUTPATIENT
Start: 2018-12-18 | End: 2018-12-27 | Stop reason: HOSPADM

## 2018-12-17 RX ORDER — AMOXICILLIN 250 MG
2 CAPSULE ORAL 2 TIMES DAILY
Status: DISCONTINUED | OUTPATIENT
Start: 2018-12-17 | End: 2018-12-18

## 2018-12-17 RX ORDER — ONDANSETRON 2 MG/ML
4 INJECTION INTRAMUSCULAR; INTRAVENOUS
Status: DISCONTINUED | OUTPATIENT
Start: 2018-12-17 | End: 2018-12-17

## 2018-12-17 RX ORDER — OMEPRAZOLE 20 MG/1
20 CAPSULE, DELAYED RELEASE ORAL EVERY MORNING
COMMUNITY
End: 2021-09-21

## 2018-12-17 RX ORDER — BISACODYL 10 MG
10 SUPPOSITORY, RECTAL RECTAL
Status: DISCONTINUED | OUTPATIENT
Start: 2018-12-17 | End: 2018-12-18

## 2018-12-17 RX ORDER — ONDANSETRON 2 MG/ML
4 INJECTION INTRAMUSCULAR; INTRAVENOUS EVERY 4 HOURS PRN
Status: DISCONTINUED | OUTPATIENT
Start: 2018-12-17 | End: 2018-12-27 | Stop reason: HOSPADM

## 2018-12-17 RX ORDER — OXYCODONE HYDROCHLORIDE 10 MG/1
10 TABLET ORAL EVERY 4 HOURS PRN
Status: DISCONTINUED | OUTPATIENT
Start: 2018-12-17 | End: 2018-12-27 | Stop reason: HOSPADM

## 2018-12-17 RX ORDER — POLYETHYLENE GLYCOL 3350 17 G/17G
1 POWDER, FOR SOLUTION ORAL DAILY
Status: DISCONTINUED | OUTPATIENT
Start: 2018-12-18 | End: 2018-12-17

## 2018-12-17 RX ORDER — TRAMADOL HYDROCHLORIDE 50 MG/1
50-100 TABLET ORAL EVERY 6 HOURS PRN
Status: DISCONTINUED | OUTPATIENT
Start: 2018-12-17 | End: 2018-12-27 | Stop reason: HOSPADM

## 2018-12-17 RX ORDER — ATORVASTATIN CALCIUM 40 MG/1
40 TABLET, FILM COATED ORAL NIGHTLY
Status: DISCONTINUED | OUTPATIENT
Start: 2018-12-17 | End: 2018-12-27 | Stop reason: HOSPADM

## 2018-12-17 RX ORDER — TIZANIDINE 4 MG/1
4 TABLET ORAL EVERY 6 HOURS PRN
Status: DISCONTINUED | OUTPATIENT
Start: 2018-12-17 | End: 2018-12-27 | Stop reason: HOSPADM

## 2018-12-17 RX ORDER — HYDRALAZINE HYDROCHLORIDE 20 MG/ML
10 INJECTION INTRAMUSCULAR; INTRAVENOUS EVERY 4 HOURS PRN
Status: DISCONTINUED | OUTPATIENT
Start: 2018-12-17 | End: 2018-12-27 | Stop reason: HOSPADM

## 2018-12-17 RX ORDER — MORPHINE SULFATE 10 MG/ML
4 INJECTION, SOLUTION INTRAMUSCULAR; INTRAVENOUS
Status: DISCONTINUED | OUTPATIENT
Start: 2018-12-17 | End: 2018-12-17

## 2018-12-17 RX ORDER — LEVOTHYROXINE SODIUM 0.05 MG/1
50 TABLET ORAL
Status: DISCONTINUED | OUTPATIENT
Start: 2018-12-17 | End: 2018-12-18

## 2018-12-17 RX ORDER — LIDOCAINE 50 MG/G
1 PATCH TOPICAL EVERY 24 HOURS
Status: DISCONTINUED | OUTPATIENT
Start: 2018-12-17 | End: 2018-12-27 | Stop reason: HOSPADM

## 2018-12-17 RX ORDER — OMEPRAZOLE 20 MG/1
20 CAPSULE, DELAYED RELEASE ORAL DAILY
Status: DISCONTINUED | OUTPATIENT
Start: 2018-12-18 | End: 2018-12-27 | Stop reason: HOSPADM

## 2018-12-17 RX ORDER — CHLORAL HYDRATE 500 MG
1000 CAPSULE ORAL 2 TIMES DAILY
Status: DISCONTINUED | OUTPATIENT
Start: 2018-12-18 | End: 2018-12-27 | Stop reason: HOSPADM

## 2018-12-17 RX ORDER — CETIRIZINE HYDROCHLORIDE 10 MG/1
10 TABLET ORAL DAILY
Status: DISCONTINUED | OUTPATIENT
Start: 2018-12-18 | End: 2018-12-27 | Stop reason: HOSPADM

## 2018-12-17 RX ORDER — ONDANSETRON 4 MG/1
4 TABLET, ORALLY DISINTEGRATING ORAL EVERY 4 HOURS PRN
Status: DISCONTINUED | OUTPATIENT
Start: 2018-12-17 | End: 2018-12-27 | Stop reason: HOSPADM

## 2018-12-17 RX ORDER — BUDESONIDE AND FORMOTEROL FUMARATE DIHYDRATE 160; 4.5 UG/1; UG/1
2 AEROSOL RESPIRATORY (INHALATION)
Status: DISCONTINUED | OUTPATIENT
Start: 2018-12-17 | End: 2018-12-18

## 2018-12-17 RX ORDER — TAMSULOSIN HYDROCHLORIDE 0.4 MG/1
0.4 CAPSULE ORAL
Status: DISCONTINUED | OUTPATIENT
Start: 2018-12-18 | End: 2018-12-27 | Stop reason: HOSPADM

## 2018-12-17 RX ORDER — M-VIT,TX,IRON,MINS/CALC/FOLIC 27MG-0.4MG
1 TABLET ORAL DAILY
Status: DISCONTINUED | OUTPATIENT
Start: 2018-12-18 | End: 2018-12-27 | Stop reason: HOSPADM

## 2018-12-17 RX ORDER — OXYCODONE HYDROCHLORIDE 5 MG/1
5 TABLET ORAL EVERY 4 HOURS PRN
Status: DISCONTINUED | OUTPATIENT
Start: 2018-12-17 | End: 2018-12-27 | Stop reason: HOSPADM

## 2018-12-17 RX ORDER — MORPHINE SULFATE 10 MG/ML
4 INJECTION, SOLUTION INTRAMUSCULAR; INTRAVENOUS ONCE
Status: COMPLETED | OUTPATIENT
Start: 2018-12-17 | End: 2018-12-17

## 2018-12-17 RX ORDER — FERROUS SULFATE 325(65) MG
325 TABLET ORAL DAILY
Status: DISCONTINUED | OUTPATIENT
Start: 2018-12-18 | End: 2018-12-21

## 2018-12-17 RX ORDER — MORPHINE SULFATE 10 MG/ML
2 INJECTION, SOLUTION INTRAMUSCULAR; INTRAVENOUS
Status: DISCONTINUED | OUTPATIENT
Start: 2018-12-17 | End: 2018-12-22

## 2018-12-17 RX ADMIN — ACETAMINOPHEN 650 MG: 325 TABLET, FILM COATED ORAL at 23:41

## 2018-12-17 RX ADMIN — LEVOTHYROXINE SODIUM 50 MCG: 50 TABLET ORAL at 23:41

## 2018-12-17 RX ADMIN — MORPHINE SULFATE 4 MG: 10 INJECTION INTRAVENOUS at 19:39

## 2018-12-17 RX ADMIN — OXYCODONE HYDROCHLORIDE 10 MG: 10 TABLET ORAL at 23:42

## 2018-12-17 RX ADMIN — BUDESONIDE AND FORMOTEROL FUMARATE DIHYDRATE 2 PUFF: 160; 4.5 AEROSOL RESPIRATORY (INHALATION) at 23:52

## 2018-12-17 RX ADMIN — ATORVASTATIN CALCIUM 40 MG: 40 TABLET, FILM COATED ORAL at 23:41

## 2018-12-17 RX ADMIN — LIDOCAINE 1 PATCH: 50 PATCH TOPICAL at 23:52

## 2018-12-17 RX ADMIN — MORPHINE SULFATE 4 MG: 10 INJECTION INTRAVENOUS at 21:21

## 2018-12-17 ASSESSMENT — ENCOUNTER SYMPTOMS
PALPITATIONS: 0
DIZZINESS: 0
DOUBLE VISION: 0
DIARRHEA: 0
NECK PAIN: 0
CHILLS: 0
SPEECH CHANGE: 0
SEIZURES: 0
FALLS: 0
SINUS PAIN: 0
SORE THROAT: 0
ABDOMINAL PAIN: 0
SHORTNESS OF BREATH: 0
PSYCHIATRIC NEGATIVE: 1
COUGH: 0
TINGLING: 0
HEADACHES: 1
NAUSEA: 1
SENSORY CHANGE: 0
BLOOD IN STOOL: 0
CONSTIPATION: 0
WHEEZING: 0
FOCAL WEAKNESS: 0
EYE PAIN: 0
BACK PAIN: 1
WEAKNESS: 0
DIAPHORESIS: 0
VOMITING: 0
FLANK PAIN: 0
PND: 0
LOSS OF CONSCIOUSNESS: 0
BLURRED VISION: 0
HEMOPTYSIS: 0
HEARTBURN: 0
FEVER: 0

## 2018-12-17 ASSESSMENT — PAIN SCALES - GENERAL: PAINLEVEL_OUTOF10: 8

## 2018-12-18 ENCOUNTER — APPOINTMENT (OUTPATIENT)
Dept: RADIOLOGY | Facility: MEDICAL CENTER | Age: 73
DRG: 074 | End: 2018-12-18
Attending: NURSE PRACTITIONER
Payer: MEDICARE

## 2018-12-18 PROBLEM — R82.71 ASYMPTOMATIC BACTERIURIA: Status: ACTIVE | Noted: 2018-12-18

## 2018-12-18 PROBLEM — M54.18 RADICULOPATHY OF SACRAL REGION: Status: ACTIVE | Noted: 2018-11-20

## 2018-12-18 LAB
ANION GAP SERPL CALC-SCNC: 5 MMOL/L (ref 0–11.9)
BASOPHILS # BLD AUTO: 0.5 % (ref 0–1.8)
BASOPHILS # BLD: 0.02 K/UL (ref 0–0.12)
BUN SERPL-MCNC: 11 MG/DL (ref 8–22)
CALCIUM SERPL-MCNC: 10 MG/DL (ref 8.5–10.5)
CHLORIDE SERPL-SCNC: 104 MMOL/L (ref 96–112)
CHOLEST SERPL-MCNC: 86 MG/DL (ref 100–199)
CK SERPL-CCNC: 42 U/L (ref 0–154)
CO2 SERPL-SCNC: 28 MMOL/L (ref 20–33)
CREAT SERPL-MCNC: 0.87 MG/DL (ref 0.5–1.4)
CRP SERPL HS-MCNC: 1.42 MG/DL (ref 0–0.75)
EKG IMPRESSION: NORMAL
EOSINOPHIL # BLD AUTO: 0.21 K/UL (ref 0–0.51)
EOSINOPHIL NFR BLD: 4.8 % (ref 0–6.9)
ERYTHROCYTE [DISTWIDTH] IN BLOOD BY AUTOMATED COUNT: 48.4 FL (ref 35.9–50)
FERRITIN SERPL-MCNC: 56.9 NG/ML (ref 22–322)
FOLATE SERPL-MCNC: >23.4 NG/ML
GLUCOSE SERPL-MCNC: 90 MG/DL (ref 65–99)
HCT VFR BLD AUTO: 34.2 % (ref 42–52)
HDLC SERPL-MCNC: 45 MG/DL
HGB BLD-MCNC: 11 G/DL (ref 14–18)
HGB RETIC QN AUTO: 30.6 PG/CELL (ref 29–35)
IMM GRANULOCYTES # BLD AUTO: 0.02 K/UL (ref 0–0.11)
IMM GRANULOCYTES NFR BLD AUTO: 0.5 % (ref 0–0.9)
IMM RETICS NFR: 5.2 % (ref 9.3–17.4)
IRON SATN MFR SERPL: 12 % (ref 15–55)
IRON SERPL-MCNC: 42 UG/DL (ref 50–180)
LDLC SERPL CALC-MCNC: 27 MG/DL
LYMPHOCYTES # BLD AUTO: 0.89 K/UL (ref 1–4.8)
LYMPHOCYTES NFR BLD: 20.5 % (ref 22–41)
MAGNESIUM SERPL-MCNC: 2 MG/DL (ref 1.5–2.5)
MCH RBC QN AUTO: 29.3 PG (ref 27–33)
MCHC RBC AUTO-ENTMCNC: 32.2 G/DL (ref 33.7–35.3)
MCV RBC AUTO: 91 FL (ref 81.4–97.8)
MONOCYTES # BLD AUTO: 0.4 K/UL (ref 0–0.85)
MONOCYTES NFR BLD AUTO: 9.2 % (ref 0–13.4)
NEUTROPHILS # BLD AUTO: 2.8 K/UL (ref 1.82–7.42)
NEUTROPHILS NFR BLD: 64.5 % (ref 44–72)
NRBC # BLD AUTO: 0 K/UL
NRBC BLD-RTO: 0 /100 WBC
PLATELET # BLD AUTO: 180 K/UL (ref 164–446)
PMV BLD AUTO: 9.3 FL (ref 9–12.9)
POTASSIUM SERPL-SCNC: 3.9 MMOL/L (ref 3.6–5.5)
RBC # BLD AUTO: 3.76 M/UL (ref 4.7–6.1)
RETICS # AUTO: 0.02 M/UL (ref 0.04–0.06)
RETICS/RBC NFR: 0.6 % (ref 0.8–2.1)
SODIUM SERPL-SCNC: 137 MMOL/L (ref 135–145)
T4 FREE SERPL-MCNC: 1.06 NG/DL (ref 0.53–1.43)
TIBC SERPL-MCNC: 363 UG/DL (ref 250–450)
TRIGL SERPL-MCNC: 69 MG/DL (ref 0–149)
TSH SERPL DL<=0.005 MIU/L-ACNC: 30.79 UIU/ML (ref 0.38–5.33)
VIT B12 SERPL-MCNC: 957 PG/ML (ref 211–911)
WBC # BLD AUTO: 4.3 K/UL (ref 4.8–10.8)

## 2018-12-18 PROCEDURE — 84443 ASSAY THYROID STIM HORMONE: CPT

## 2018-12-18 PROCEDURE — 83550 IRON BINDING TEST: CPT

## 2018-12-18 PROCEDURE — A9270 NON-COVERED ITEM OR SERVICE: HCPCS | Performed by: NURSE PRACTITIONER

## 2018-12-18 PROCEDURE — 83036 HEMOGLOBIN GLYCOSYLATED A1C: CPT

## 2018-12-18 PROCEDURE — 770001 HCHG ROOM/CARE - MED/SURG/GYN PRIV*

## 2018-12-18 PROCEDURE — 80048 BASIC METABOLIC PNL TOTAL CA: CPT

## 2018-12-18 PROCEDURE — 73501 X-RAY EXAM HIP UNI 1 VIEW: CPT | Mod: LT

## 2018-12-18 PROCEDURE — 700102 HCHG RX REV CODE 250 W/ 637 OVERRIDE(OP): Performed by: STUDENT IN AN ORGANIZED HEALTH CARE EDUCATION/TRAINING PROGRAM

## 2018-12-18 PROCEDURE — 99223 1ST HOSP IP/OBS HIGH 75: CPT | Mod: AI,GC | Performed by: INTERNAL MEDICINE

## 2018-12-18 PROCEDURE — 700102 HCHG RX REV CODE 250 W/ 637 OVERRIDE(OP): Performed by: NURSE PRACTITIONER

## 2018-12-18 PROCEDURE — 86140 C-REACTIVE PROTEIN: CPT

## 2018-12-18 PROCEDURE — 82550 ASSAY OF CK (CPK): CPT

## 2018-12-18 PROCEDURE — A9270 NON-COVERED ITEM OR SERVICE: HCPCS | Performed by: STUDENT IN AN ORGANIZED HEALTH CARE EDUCATION/TRAINING PROGRAM

## 2018-12-18 PROCEDURE — 83735 ASSAY OF MAGNESIUM: CPT

## 2018-12-18 PROCEDURE — 82607 VITAMIN B-12: CPT

## 2018-12-18 PROCEDURE — 700101 HCHG RX REV CODE 250: Performed by: INTERNAL MEDICINE

## 2018-12-18 PROCEDURE — 700111 HCHG RX REV CODE 636 W/ 250 OVERRIDE (IP): Performed by: STUDENT IN AN ORGANIZED HEALTH CARE EDUCATION/TRAINING PROGRAM

## 2018-12-18 PROCEDURE — 85046 RETICYTE/HGB CONCENTRATE: CPT

## 2018-12-18 PROCEDURE — 85025 COMPLETE CBC W/AUTO DIFF WBC: CPT

## 2018-12-18 PROCEDURE — 80061 LIPID PANEL: CPT

## 2018-12-18 PROCEDURE — 84439 ASSAY OF FREE THYROXINE: CPT

## 2018-12-18 PROCEDURE — 36415 COLL VENOUS BLD VENIPUNCTURE: CPT

## 2018-12-18 PROCEDURE — 83540 ASSAY OF IRON: CPT

## 2018-12-18 PROCEDURE — 82728 ASSAY OF FERRITIN: CPT

## 2018-12-18 PROCEDURE — 82746 ASSAY OF FOLIC ACID SERUM: CPT

## 2018-12-18 RX ORDER — PREGABALIN 25 MG/1
25 CAPSULE ORAL 3 TIMES DAILY
Status: DISCONTINUED | OUTPATIENT
Start: 2018-12-18 | End: 2018-12-21

## 2018-12-18 RX ORDER — BISACODYL 10 MG
10 SUPPOSITORY, RECTAL RECTAL
Status: DISCONTINUED | OUTPATIENT
Start: 2018-12-18 | End: 2018-12-24

## 2018-12-18 RX ORDER — BUDESONIDE AND FORMOTEROL FUMARATE DIHYDRATE 160; 4.5 UG/1; UG/1
2 AEROSOL RESPIRATORY (INHALATION) 2 TIMES DAILY
Status: DISCONTINUED | OUTPATIENT
Start: 2018-12-18 | End: 2018-12-27 | Stop reason: HOSPADM

## 2018-12-18 RX ORDER — LEVOTHYROXINE SODIUM 0.05 MG/1
50 TABLET ORAL
Status: DISCONTINUED | OUTPATIENT
Start: 2018-12-18 | End: 2018-12-27 | Stop reason: HOSPADM

## 2018-12-18 RX ORDER — POLYETHYLENE GLYCOL 3350 17 G/17G
1 POWDER, FOR SOLUTION ORAL DAILY
Status: DISCONTINUED | OUTPATIENT
Start: 2018-12-18 | End: 2018-12-24

## 2018-12-18 RX ORDER — AMOXICILLIN 250 MG
2 CAPSULE ORAL 2 TIMES DAILY
Status: DISCONTINUED | OUTPATIENT
Start: 2018-12-18 | End: 2018-12-24

## 2018-12-18 RX ADMIN — LIDOCAINE 1 PATCH: 50 PATCH TOPICAL at 22:00

## 2018-12-18 RX ADMIN — VITAMIN D, TAB 1000IU (100/BT) 3000 UNITS: 25 TAB at 04:54

## 2018-12-18 RX ADMIN — BUDESONIDE AND FORMOTEROL FUMARATE DIHYDRATE 2 PUFF: 160; 4.5 AEROSOL RESPIRATORY (INHALATION) at 04:56

## 2018-12-18 RX ADMIN — OXYCODONE HYDROCHLORIDE 10 MG: 10 TABLET ORAL at 03:45

## 2018-12-18 RX ADMIN — OXYCODONE HYDROCHLORIDE 10 MG: 10 TABLET ORAL at 11:47

## 2018-12-18 RX ADMIN — ACETAMINOPHEN 650 MG: 325 TABLET, FILM COATED ORAL at 04:54

## 2018-12-18 RX ADMIN — LEVOTHYROXINE SODIUM 50 MCG: 50 TABLET ORAL at 06:14

## 2018-12-18 RX ADMIN — ACETAMINOPHEN 650 MG: 325 TABLET, FILM COATED ORAL at 10:15

## 2018-12-18 RX ADMIN — OMEGA-3 FATTY ACIDS CAP 1000 MG 1000 MG: 1000 CAP at 15:48

## 2018-12-18 RX ADMIN — ATORVASTATIN CALCIUM 40 MG: 40 TABLET, FILM COATED ORAL at 19:54

## 2018-12-18 RX ADMIN — OXYCODONE HYDROCHLORIDE 10 MG: 10 TABLET ORAL at 15:45

## 2018-12-18 RX ADMIN — PREGABALIN 25 MG: 25 CAPSULE ORAL at 15:48

## 2018-12-18 RX ADMIN — MORPHINE SULFATE 2 MG: 10 INJECTION INTRAVENOUS at 23:26

## 2018-12-18 RX ADMIN — TIZANIDINE 4 MG: 4 TABLET ORAL at 19:14

## 2018-12-18 RX ADMIN — TRAMADOL HYDROCHLORIDE 100 MG: 50 TABLET, FILM COATED ORAL at 21:20

## 2018-12-18 RX ADMIN — OMEPRAZOLE 20 MG: 20 CAPSULE, DELAYED RELEASE ORAL at 04:53

## 2018-12-18 RX ADMIN — OMEGA-3 FATTY ACIDS CAP 1000 MG 1000 MG: 1000 CAP at 04:54

## 2018-12-18 RX ADMIN — TIZANIDINE 4 MG: 4 TABLET ORAL at 10:14

## 2018-12-18 RX ADMIN — MULTIPLE VITAMINS W/ MINERALS TAB 1 TABLET: TAB at 04:53

## 2018-12-18 RX ADMIN — STANDARDIZED SENNA CONCENTRATE AND DOCUSATE SODIUM 2 TABLET: 8.6; 5 TABLET, FILM COATED ORAL at 04:54

## 2018-12-18 RX ADMIN — POLYETHYLENE GLYCOL 3350 1 PACKET: 17 POWDER, FOR SOLUTION ORAL at 06:46

## 2018-12-18 RX ADMIN — LISINOPRIL 10 MG: 10 TABLET ORAL at 04:54

## 2018-12-18 RX ADMIN — OXYCODONE HYDROCHLORIDE 10 MG: 10 TABLET ORAL at 19:54

## 2018-12-18 RX ADMIN — BUDESONIDE AND FORMOTEROL FUMARATE DIHYDRATE 2 PUFF: 160; 4.5 AEROSOL RESPIRATORY (INHALATION) at 15:51

## 2018-12-18 RX ADMIN — MORPHINE SULFATE 2 MG: 10 INJECTION INTRAVENOUS at 10:08

## 2018-12-18 RX ADMIN — MORPHINE SULFATE 2 MG: 10 INJECTION INTRAVENOUS at 12:38

## 2018-12-18 RX ADMIN — FERROUS SULFATE TAB 325 MG (65 MG ELEMENTAL FE) 325 MG: 325 (65 FE) TAB at 04:55

## 2018-12-18 RX ADMIN — TAMSULOSIN HYDROCHLORIDE 0.4 MG: 0.4 CAPSULE ORAL at 08:30

## 2018-12-18 RX ADMIN — TRAMADOL HYDROCHLORIDE 100 MG: 50 TABLET, FILM COATED ORAL at 10:14

## 2018-12-18 RX ADMIN — PREGABALIN 25 MG: 25 CAPSULE ORAL at 10:15

## 2018-12-18 RX ADMIN — TIZANIDINE 4 MG: 4 TABLET ORAL at 04:53

## 2018-12-18 RX ADMIN — ACETAMINOPHEN 650 MG: 325 TABLET, FILM COATED ORAL at 23:26

## 2018-12-18 RX ADMIN — MORPHINE SULFATE 2 MG: 10 INJECTION INTRAVENOUS at 06:14

## 2018-12-18 RX ADMIN — STANDARDIZED SENNA CONCENTRATE AND DOCUSATE SODIUM 2 TABLET: 8.6; 5 TABLET, FILM COATED ORAL at 15:47

## 2018-12-18 RX ADMIN — ENOXAPARIN SODIUM 40 MG: 100 INJECTION SUBCUTANEOUS at 04:53

## 2018-12-18 RX ADMIN — CETIRIZINE HYDROCHLORIDE 10 MG: 10 TABLET, FILM COATED ORAL at 04:54

## 2018-12-18 RX ADMIN — ACETAMINOPHEN 650 MG: 325 TABLET, FILM COATED ORAL at 15:47

## 2018-12-18 ASSESSMENT — PAIN SCALES - GENERAL
PAINLEVEL_OUTOF10: 6
PAINLEVEL_OUTOF10: 7
PAINLEVEL_OUTOF10: 7
PAINLEVEL_OUTOF10: 2
PAINLEVEL_OUTOF10: 2
PAINLEVEL_OUTOF10: 4
PAINLEVEL_OUTOF10: 7
PAINLEVEL_OUTOF10: 4
PAINLEVEL_OUTOF10: 4
PAINLEVEL_OUTOF10: 8
PAINLEVEL_OUTOF10: 5
PAINLEVEL_OUTOF10: 9

## 2018-12-18 ASSESSMENT — PATIENT HEALTH QUESTIONNAIRE - PHQ9
4. FEELING TIRED OR HAVING LITTLE ENERGY: SEVERAL DAYS
5. POOR APPETITE OR OVEREATING: NEARLY EVERY DAY
1. LITTLE INTEREST OR PLEASURE IN DOING THINGS: NEARLY EVERY DAY
9. THOUGHTS THAT YOU WOULD BE BETTER OFF DEAD, OR OF HURTING YOURSELF: NOT AT ALL
SUM OF ALL RESPONSES TO PHQ QUESTIONS 1-9: 13
8. MOVING OR SPEAKING SO SLOWLY THAT OTHER PEOPLE COULD HAVE NOTICED. OR THE OPPOSITE, BEING SO FIGETY OR RESTLESS THAT YOU HAVE BEEN MOVING AROUND A LOT MORE THAN USUAL: SEVERAL DAYS
SUM OF ALL RESPONSES TO PHQ9 QUESTIONS 1 AND 2: 6
3. TROUBLE FALLING OR STAYING ASLEEP OR SLEEPING TOO MUCH: SEVERAL DAYS
7. TROUBLE CONCENTRATING ON THINGS, SUCH AS READING THE NEWSPAPER OR WATCHING TELEVISION: SEVERAL DAYS
6. FEELING BAD ABOUT YOURSELF - OR THAT YOU ARE A FAILURE OR HAVE LET YOURSELF OR YOUR FAMILY DOWN: NOT AL ALL
2. FEELING DOWN, DEPRESSED, IRRITABLE, OR HOPELESS: NEARLY EVERY DAY

## 2018-12-18 ASSESSMENT — ENCOUNTER SYMPTOMS
COUGH: 0
DOUBLE VISION: 0
BRUISES/BLEEDS EASILY: 0
POLYDIPSIA: 0
MYALGIAS: 0
DIZZINESS: 0
BLURRED VISION: 0
VOMITING: 0
NAUSEA: 1
SHORTNESS OF BREATH: 0
HEADACHES: 1
PALPITATIONS: 0
CHILLS: 0
FEVER: 0
BACK PAIN: 1
DEPRESSION: 0
NERVOUS/ANXIOUS: 0

## 2018-12-18 ASSESSMENT — LIFESTYLE VARIABLES
ALCOHOL_USE: NO
EVER_SMOKED: NEVER

## 2018-12-18 ASSESSMENT — COPD QUESTIONNAIRES
COPD SCREENING SCORE: 2
DURING THE PAST 4 WEEKS HOW MUCH DID YOU FEEL SHORT OF BREATH: NONE/LITTLE OF THE TIME
DO YOU EVER COUGH UP ANY MUCUS OR PHLEGM?: NO/ONLY WITH OCCASIONAL COLDS OR INFECTIONS
HAVE YOU SMOKED AT LEAST 100 CIGARETTES IN YOUR ENTIRE LIFE: NO/DON'T KNOW

## 2018-12-18 NOTE — PROGRESS NOTES
Spoke with the Daughter Janki and she was concern about the pain medications said he misused the pain medication at home and was confused.Explained that we were  Limiting the morphine to extreme pain for breakthrough. Gave Prn pain medications and muscle relaxants for pain.

## 2018-12-18 NOTE — H&P
"[facility] INTERNAL MEDICINE SENIOR ADMIT NOTE:    Patient ID:   Name:             Angelo Magaña   YOB: 1945  Age:                 73 y.o.  male   MRN:               [id number]                                                          Chief Complaint:       Back pain    History of Present Illness:    Mr. Magaña is a 73 y.o. male with a PMHx of HTN, DLD, chronic back pain, ? 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 Fowler - L2-L3 fusion, lumbar laminectomy and discectomy L2-L3, hardware removal L4-L5, repositioning of L3 screw on 11/19/2018, s/p total right hip replacement, s/p total left knee replacement, presented to ER with refractory back pain. Was discharged on tramadol 50 mg Q6, roxicodone Q5-10 mg Q6 for two weeks. Pain was 6-7 prior to this current surgery, has been 5-6 since the surgery, now 6-8 past two days. Pain in bilateral buttocks, low midline back. Has been given tramadol 100 mg Q6, and oxycodone Q6 a week ago by Dr. Fowler. Has not been able to walk past two days. Pain and ambulation limitation are worse for the past week to 10 days since surgery, worse past two days. Had MRI 12/14/18 (organized by Dr. Fowler for same pain) - features of chronic arachnoiditis L4-L5, L5-S1 \"naked thecal sac\" sign, post-surgical seroma and pseudomeningocele. Seen by PA for Dr. Fowler this morning and was sent to ER for pain control and placement to rehab.     Denies nausea / vomiting / fever / chills / temperature at home / dizziness / visual disturbances / sensory disturbance / weakness / bowel or bladder disturbance (was using laxative to avoid opioid induced constipation) / chest pain / SOB / palpitations.     His last back surgery prior to the current surgery was in Feb 2018, complicated by an infection, needing antibiotics via PICC line.     ROS: Positive for sharp headache when he " "gets up to walk, lasts 2 minutes, resolves spontaneously, left fronto-temporal area.     LABS: Hb 12 (chronic), BUN/Creat 14/0.95, ESR and CRP pending    IMAGING: MRI 12/14/18 (organized by Dr. Fowler for same pain) - features of chronic arachnoiditis L4-L5, L5-S1 \"naked thecal sac\" sign, post-surgical seroma and pseudomeningocele. CXR today -  Nil acute, L-spine X-ray - L2-L3 DJD.       Active Ambulatory Problems     Diagnosis Date Noted   • Healthcare maintenance 06/08/2016   • Acquired hypothyroidism 06/08/2016   • Erectile dysfunction 06/08/2016   • History of skin cancer 06/08/2016   • Right hip pain 06/08/2016   • Rosacea 06/08/2016   • Chronic midline low back pain without sciatica 06/08/2016   • Asthma 06/08/2016   • Seasonal allergies 06/08/2016   • GERD (gastroesophageal reflux disease) 06/08/2016   • Dyslipidemia 06/08/2016   • Essential hypertension 06/08/2016   • Ascending aorta dilation (CMS-HCC), mild, needs OP followup 03/30/2017   • Paraspinal abscess (HCC) 03/30/2017   • Anemia 03/30/2017   • SIRS (systemic inflammatory response syndrome) ([facility]) 03/30/2017   • Obesity (BMI 35.0-39.9 without comorbidity) 04/06/2017   • Chronic right shoulder pain 04/06/2017   • Stress 04/06/2017   • Caregiver burden 04/25/2018   • Chest wall injury 05/04/2018   • S/P lumbar laminectomy 11/20/2018     Resolved Ambulatory Problems     Diagnosis Date Noted   • Obesity 06/08/2016   • Sepsis (HCC) 03/30/2017   • Staph infection 03/31/2017   • Spinal surgery in prior 3 months 04/06/2017     Past Medical History:   Diagnosis Date   • ASTHMA    • Bronchitis 02/2018   • Erectile dysfunction 6/8/2016   • GERD (gastroesophageal reflux disease) 6/8/2016   • Heart burn    • History of skin cancer 6/8/2016   • Hyperlipidemia 6/8/2016   • Hypertension    • Hypothyroidism 6/8/2016   • Indigestion    • Obesity 6/8/2016   • Pain 04/19/2018   • Preventative health care 6/8/2016   • Rosacea 6/8/2016   • Seasonal allergies 6/8/2016 " "    MEDICATION :  Lipitor  Synthroid  Lisinopril  Iron (since surgery)  Flowmax (since surgery)  Prilosec  Advair  Multivitamins  Vitamin D3  Cetirizine    SOCIAL HISTORY :  Lives with wife, mobilizes using walker even before prior surgery, reduced mobility past few days  Tobacco - denies  EtOH - very rare  Illicits - Marijuana - tried for pain 1-2 months ago, no effect      PHYSICAL EXAM  Vitals:   Weight/BMI: Body mass index is 34.44 kg/m².  Blood pressure 111/77, pulse 96, temperature 36.7 °C (98.1 °F), temperature source Temporal, resp. rate 16, height 1.778 m (5' 10\"), weight 108.9 kg (240 lb), SpO2 94 %.  Vitals:    12/17/18 1809 12/17/18 1816 12/17/18 2000   BP: 111/77     Pulse: (!) 129  96   Resp: 17  16   Temp: 36.7 °C (98.1 °F)     TempSrc: Temporal     SpO2: 96%  94%   Weight:  108.9 kg (240 lb)    Height: 1.778 m (5' 10\")       Oxygen Therapy:  Pulse Oximetry: 94 %, O2 Delivery: None (Room Air)    Physical Exam   Constitutional: He is oriented to person, place, and time and well-developed, well-nourished, and in no distress. No distress.   Comfortable at rest, in pain with movement / when examining back   HENT:   Head: Normocephalic and atraumatic.   Eyes: Pupils are equal, round, and reactive to light. Conjunctivae are normal. Right eye exhibits no discharge. Left eye exhibits no discharge. No scleral icterus.   Neck: Normal range of motion. Neck supple.   Cardiovascular: Normal rate, regular rhythm and normal heart sounds.    No murmur heard.  Pulmonary/Chest: Effort normal and breath sounds normal. No respiratory distress. He has no wheezes. He has no rales.   Abdominal: Soft. Bowel sounds are normal. He exhibits no distension. There is no tenderness. There is no rebound and no guarding.   Genitourinary: Rectum normal. Rectal exam shows guaiac negative stool.   Genitourinary Comments: Normal sandra-anal sensation and tone   Musculoskeletal: He exhibits tenderness. He exhibits no edema or deformity. "   Incision ~ 10 cm long, midline, low back. No discharge. No redness. Lower end of incision healed well, mild gaping upper part of incision but not full thickness. Bilateral SI joint tenderness. No costovertebral angle tenderness.   SLR positive at 30 degree active leg raise B/I. Pain worse after 60 degree passive on Right, 50 degree on left.    Lymphadenopathy:     He has no cervical adenopathy.   Neurological: He is alert and oriented to person, place, and time. He displays normal reflexes. GCS score is 15.   No gross focal motor or sensory neurological deficits.  Cranial nerves II - XII intact.    Skin: Skin is warm. No rash noted. He is not diaphoretic. No erythema.   Psychiatric: Mood, memory, affect and judgment normal.         IMPRESSION  This 73 y.o. male with a PMHx of HTN, DLD, chronic back pain, ? 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 Fowler - L2-L3 fusion, lumbar laminectomy and discectomy L2-L3, hardware removal L4-L5, repositioning of L3 screw on 11/19/2018, presented to ER with refractory back pain, despite tramadol Q6 100 mg and oxycodone. He does not have any features of infection based on symptoms (no fever / chills), examination (afebrile, clean wound with no cellulitis / discharge / fluctuance) and labs with normal WBC. He had an MRI on 12/14 suggesting post-op seroma and chrnoic arachnoiditis, with no e/o abscess. Seen by PA for Dr. Fowler this morning and was sent to ER for pain control and placement to rehab.     ASSESSMENT:  # Acute on chronic back pain  # s/p lumbar fusion, laminectomy on 11/19/2018  # Post-op seroma  # Chronic arachnoiditis  # Chronic back pain, s/p multiple back surgeries  # Hypertension  # Hypothyroidism  # Obesity , BMI 34  # Dyslipidemia      PLAN:  # Admit to surgical floor  # Analgesia - scheduled tylenol, tramadol  Q6 PRN, Oxycodone 5-10 mg  Q6 PRN, IV morphine 2 mg Q2-PRN  # PM&R referral  # Continue chronic home meds lipitor, feso4 and lisinopril to resume  # Bowel regimen with opiates   # PM&R consult placed  # PT, OT  # Anemia work-up am labs  # Day-team to consult Dr. Fowler tomorrow morning      Please refer to Intern's H&P for complete admission details.

## 2018-12-18 NOTE — ED NOTES
Med rec completed per pt.   Antibiotics within last 30 days: No  Patient allergies have been reviewed: AYO

## 2018-12-18 NOTE — PROGRESS NOTES
2 RN skin check complete. No s/s of skin breakdown. Pt has an old surgical site that is healing, open to air.

## 2018-12-18 NOTE — DISCHARGE PLANNING
Care Transition Team Assessment    Information Source  Orientation : Oriented x 4  Information Given By: Patient  Informant's Name:  (Angelo Magaña)  Who is responsible for making decisions for patient? : Patient    Readmission Evaluation  Is this a readmission?: Yes - unplanned readmission  Why do you think you were readmitted?: pain  Was an appointment arranged for you prior to discharge?: Yes, attended appointment  Were there new prescriptions you were supposed to fill after you were discharged?: Yes, prescriptions filled  Did you understand your discharge instructions?: Yes  Did you have enough support after your last discharge?: Yes    Elopement Risk  Legal Hold: No  Ambulatory or Self Mobile in Wheelchair: Yes  Disoriented: No  Psychiatric Symptoms: None  History of Wandering: No  Elopement this Admit: No  Vocalizing Wanting to Leave: No  Displays Behaviors, Body Language Wanting to Leave: No-Not at Risk for Elopement  Elopement Risk: Not at Risk for Elopement    Interdisciplinary Discharge Planning  Patient or legal guardian wants to designate a caregiver (see row info): No    Discharge Preparedness  What is your plan after discharge?: Uncertain - pending medical team collaboration  What are your discharge supports?: Child, Spouse  Prior Functional Level: Independent with Activities of Daily Living, Independent with Medication Management, Uses Walker, Ambulatory, Drives Self  Difficulity with ADLs: Walking    Functional Assesment  Prior Functional Level: Independent with Activities of Daily Living, Independent with Medication Management, Uses Walker, Ambulatory, Drives Self    Finances  Financial Barriers to Discharge: No  Prescription Coverage: Yes    Vision / Hearing Impairment  Right Eye Vision: Impaired, Wears Glasses  Left Eye Vision: Impaired, Wears Glasses         Advance Directive  Advance Directive?: None    Domestic Abuse  Have you ever been the victim of abuse or violence?: No    Psychological  Assessment  History of Substance Abuse: None  History of Psychiatric Problems: No  Non-compliant with Treatment: No  Newly Diagnosed Illness: No    Discharge Risks or Barriers  Discharge risks or barriers?: No  Patient risk factors: Readmission    Anticipated Discharge Information  Anticipated discharge disposition: Discharge needs currently unknown  Discharge Address: [address] [place] [address] [place] [id number]  Discharge Contact Phone Number: [phone]

## 2018-12-18 NOTE — H&P
"      Internal Medicine Admitting History and Physical    Note Author: Antelmo Valentine M.D.       Name Angelo Magaña     1945   Age/Sex 73 y.o. male   MRN [id number]   Code Status FULL     After 5PM or if no immediate response to page, please call for cross-coverage  Attending/Team: Dr. Dos Santos/JANE See Patient List for primary contact information  Call [phone] to page    1st Call - Day Intern (R1):   Dr. Bhat 2nd Call - Day Sr. Resident (R2/R3):   Dr. Kaminski       Chief Complaint:   Intractable low back pain with nausea    HPI:  Mr. Romero is a 73-year-old male past medical history significant for hypertension, bronchospastic asthma, GERD, chronic lower back pain (s/p back surgeries X5) and prior h/o post-op paraspinal abscess who presents to the ED with referral from orthopedic surgery Dr. Pitts for acute pain management and workup of worsening postsurgical lower back pain. Patient had L2-L3 fusion/laminectomy/discectomy with Dr. pitts on 2018 and was discharged from the hospital on 2018 with appropriate pain meds.  Patient says he has been recovering as expected with some pain control. However, his pain has become unbearable over the past 2 days.  He has constant 5-7/10 pain in the buttock, which sometimes radiates to bilateral posterior thigh especially on moving.  The pain exacerbated by movement to \"11/10\" and somewhat relieved by sitting still.  His pain medication has not been able to control his pain over the past 2 days.  He denies fever, he has some nausea but has not vomited.  He has no chills/rigors.  He denies bowel/bladder dysfunction.  He gets transient 4/10 left-sided headache each time he gets up.  Passes on its own within 2 minutes.  Prior to surgery was ambulatory with a front wheel walker.  He says he has not been able to walk much since surgery due to pain.     Patient had an MRI done through outpatient neurosurgery service on 2018.  Which showed \"posterior " "extradural postsurgical fluid collection at the level of L2-3. The differential diagnosis includes postsurgical seroma and pseudomeningocele.\"  He was seen and evaluated in the neurosurgery clinic today and was referred to the ED.    In the ED, he was afebrile, tachycardic (subsequently resolved with pain management), other vital signs were stable.  His chemistries were normal except for elevated ALP at 111.  CBC showed chronic normocytic anemia, hemoglobin improving from baseline of about 8, stool occult blood was negative.  Chest x-ray did not reveal any acute cardiopulmonary process.  His sed rate was normal last 13    Review of Systems   Constitutional: Negative for chills, diaphoresis and fever.   HENT: Negative for congestion, hearing loss, sinus pain, sore throat and tinnitus.    Eyes: Negative for blurred vision, double vision and pain.   Respiratory: Negative for cough, hemoptysis, shortness of breath and wheezing.    Cardiovascular: Negative for chest pain, palpitations, leg swelling and PND.   Gastrointestinal: Positive for nausea. Negative for abdominal pain, blood in stool, constipation, diarrhea, heartburn, melena and vomiting.   Genitourinary: Negative for dysuria, flank pain, frequency, hematuria and urgency.   Musculoskeletal: Positive for back pain and joint pain. Negative for falls and neck pain.   Skin: Negative for itching and rash.   Neurological: Positive for headaches. Negative for dizziness, tingling, sensory change, speech change, focal weakness, seizures, loss of consciousness and weakness.   Endo/Heme/Allergies: Negative.    Psychiatric/Behavioral: Negative.              Past Medical History (Chronic medical problem, known complications and current treatment)    Seasonal allergies, hyperlipidemia, hypothyroidism, BPH, GERD, vitamin D deficiency, hypertension.    Past Surgical History:  Past Surgical History:   Procedure Laterality Date   • LUMBAR FUSION O-ARM N/A 11/19/2018    Procedure: " LUMBAR FUSION O-ARM-  POSTERIOR L2-3 TLIF,;  Surgeon: Brandon Fowler M.D.;  Location: SURGERY [facility];  Service: Neurosurgery   • LUMBAR LAMINECTOMY DISKECTOMY N/A 11/19/2018    Procedure: LUMBAR LAMINECTOMY DISKECTOMY-  L2-3 LAMI;  Surgeon: Brandon Fowler M.D.;  Location: SURGERY [facility];  Service: Neurosurgery   • HARDWARE REMOVAL NEURO N/A 11/19/2018    Procedure: HARDWARE REMOVAL NEURO-  L4-5, L3 SCREW REPOSITIONING;  Surgeon: Brandon Fowler M.D.;  Location: SURGERY [facility];  Service: Neurosurgery   • KYPHOPLASTY N/A 11/19/2018    Procedure: KYPHOPLASTY-  L2;  Surgeon: Brandon Fowler M.D.;  Location: SURGERY [facility];  Service: Neurosurgery   • VENTRAL HERNIA REPAIR Left 5/3/2018    Procedure: VENTRAL HERNIA REPAIR FOR LUMBAR HERNIA/ LUNG HERNIA THROUGH CHEST WALL RECONSTRUCTION, MAJOR  ;  Surgeon: Phoenix Medina M.D.;  Location: SURGERY [facility];  Service: General   • IRRIGATION & DEBRIDEMENT NEURO  3/30/2017    Procedure: IRRIGATION & DEBRIDEMENT NEURO-LUMBAR WOUND;  Surgeon: Josiah Chakraborty M.D.;  Location: SURGERY [facility];  Service:    • LUMBAR FUSION POSTERIOR N/A 3/17/2017    Procedure: LUMBAR FUSION POSTERIOR L3-4 EXTENSION;  Surgeon: Josiah Chakraborty M.D.;  Location: SURGERY [facility];  Service:    • LUMBAR LAMINECTOMY DISKECTOMY N/A 3/17/2017    Procedure: LUMBAR LAMINECTOMY DISKECTOMY L3 & REDO L4;  Surgeon: Josiah Chakraborty M.D.;  Location: SURGERY [facility];  Service:    • HARDWARE REMOVAL NEURO N/A 3/17/2017    Procedure: HARDWARE REMOVAL NEURO L4-5;  Surgeon: Josiah Chakraborty M.D.;  Location: SURGERY [facility];  Service:    • LUMBAR FUSION POSTERIOR  9/9/2009    Performed by JOSIAH CHAKRABORTY at [facility]   • LUMBAR LAMINECTOMY DISKECTOMY  9/9/2009    Performed by JOSIAH CHAKRABORTY at [facility]   • KNEE ARTHROPLASTY TOTAL  5/26/2009    Performed by NAREN DANIELSON at [facility]   • HIP ARTHROPLASTY  TOTAL  1/21/2009    Performed by NAREN DANIELSON at SURGERY [facility] ORS   • INGUINAL HERNIA REPAIR  9/2/08    Performed by MERLYN BECKER at SURGERY SAME DAY [facility] ORS   • INGUINAL HERNIA REPAIR  6/10/08    Performed by MERLYN BECKER at SURGERY SAME DAY [facility] ORS   • HERNIA REPAIR  2008    HERNIA    • ROMAN BY LAPAROSCOPY  1982   • APPENDECTOMY  1953   • TONSILLECTOMY  1951   • OTHER      DISCECTOMY WITH HARWWARE   • OTHER      GASTRIC BYPASS AT 30 YEARS       Current Outpatient Medications:  Home Medications     Reviewed by Jose J Tolliver (Pharmacy Tech) on 12/17/18 at 2155  Med List Status: Complete   Medication Last Dose Status   acetaminophen (TYLENOL) 500 MG Tab unknown Active   atorvastatin (LIPITOR) 40 MG Tab 12/16/2018 Active   cetirizine (ZYRTEC) 10 MG Tab 12/17/2018 Active   Cholecalciferol (VITAMIN D3) 3000 units Tab 12/17/2018 Active   ferrous sulfate 325 (65 Fe) MG tablet 12/17/2018 Active   fluticasone-salmeterol (ADVAIR) 100-50 MCG/DOSE AEROSOL POWDER, BREATH ACTIVATED 12/17/2018 Active   levothyroxine (SYNTHROID) 50 MCG Tab 12/16/2018 Active   lisinopril (PRINIVIL) 10 MG Tab 12/17/2018 Active   Omega-3 Fatty Acids (FISH OIL) 1000 MG Cap capsule 12/17/2018 Active   omeprazole (PRILOSEC) 20 MG delayed-release capsule 12/17/2018 Active   polyethylene glycol/lytes (MIRALAX) Pack 12/17/2018 Active   Probiotic Product (PROBIOTIC ADVANCED PO) 12/17/2018 Active   tamsulosin (FLOMAX) 0.4 MG capsule 12/17/2018 Active   therapeutic multivitamin-minerals (THERAGRAN-M) Tab 12/17/2018 Active   tizanidine (ZANAFLEX) 4 MG Tab unknown Active                Medication Allergy/Sensitivities:  No Known Allergies      Family History (mandatory)   Family History   Problem Relation Age of Onset   • Hypertension Mother    • Dementia Mother    • Hypertension Father    • Lung Disease Father         asthma       Social History (mandatory)   Social History     Social History   • Marital status:      Spouse  "name: N/A   • Number of children: N/A   • Years of education: N/A     Occupational History   • Not on file.     Social History Main Topics   • Smoking status: Never Smoker   • Smokeless tobacco: Never Used   • Alcohol use Yes      Comment: 1/week    • Drug use: Yes     Types: Marijuana      Comment: marijuana edibles (for pain control)   • Sexual activity: Not on file     Other Topics Concern   • Not on file     Social History Narrative   • No narrative on file     Living situation: Lives with his wife at home in [place].  Has a supportive daughter.  PCP : Jeovany Samson M.D.    Physical Exam     Vitals:    12/17/18 1816 12/17/18 2000 12/17/18 2045 12/17/18 2230   BP:       Pulse:  96 93 82   Resp:  16 15 15   Temp:       TempSrc:       SpO2:  94% 97% 94%   Weight: 108.9 kg (240 lb)      Height:         Body mass index is 34.44 kg/m².  /77   Pulse 82   Temp 36.7 °C (98.1 °F) (Temporal)   Resp 15   Ht 1.778 m (5' 10\")   Wt 108.9 kg (240 lb)   SpO2 94%   BMI 34.44 kg/m²   O2 therapy: Pulse Oximetry: 94 %, O2 (LPM): 0, O2 Delivery: None (Room Air)    Physical Exam   Constitutional: He is oriented to person, place, and time and well-developed, well-nourished, and in no distress. No distress.   HENT:   Head: Normocephalic and atraumatic.   Right Ear: External ear normal.   Left Ear: External ear normal.   Nose: Nose normal.   Mouth/Throat: Oropharynx is clear and moist.   Eyes: Pupils are equal, round, and reactive to light. Conjunctivae and EOM are normal. Right eye exhibits no discharge. Left eye exhibits no discharge. No scleral icterus.   Neck: Normal range of motion. Neck supple. No tracheal deviation present.   Cardiovascular: Normal rate, regular rhythm, normal heart sounds and intact distal pulses.  Exam reveals no gallop and no friction rub.    No murmur heard.  Pulmonary/Chest: Effort normal and breath sounds normal. No respiratory distress. He has no wheezes. He has no rales.   Abdominal: Soft. " Bowel sounds are normal. He exhibits distension. He exhibits no mass. There is no tenderness. There is no rebound and no guarding.   Genitourinary: Rectum normal, prostate normal and penis normal. Rectal exam shows guaiac negative stool. No discharge found.   Genitourinary Comments: Normal Rectal Tone   Musculoskeletal: Normal range of motion. He exhibits no edema, tenderness or deformity.   + SLR test b/l: Leg raise active to 60 degrees on right with pain in buttock on right side, to 50 degrees with pain in buttock on right side. Passive raise to 30 degrees b/l without pain.     Surgical Site on back: tender, no erythema, no fluctuance/purulence, no drainage of fluid. Sutures visible. No dehiscence.    Lymphadenopathy:     He has no cervical adenopathy.   Neurological: He is alert and oriented to person, place, and time. He has normal reflexes. No cranial nerve deficit. He exhibits normal muscle tone. Coordination normal. GCS score is 15.   5/5 power symmetrical. Sensation intac.   CN II-XII intact without deficits.    Skin: Skin is warm and dry. He is not diaphoretic.   Midline abdominal scar (remote weight loss surgery with open cholecystectomy)   Left knee scar (knee replacement)   Psychiatric: Mood, memory, affect and judgment normal.   Anxious due to pain    Nursing note and vitals reviewed.        Data Review       Old Records Request:   Completed  Current Records review/summary: Completed    Lab Data Review:  Recent Results (from the past 24 hour(s))   CBC WITH DIFFERENTIAL    Collection Time: 12/17/18  7:34 PM   Result Value Ref Range    WBC 6.1 4.8 - 10.8 K/uL    RBC 4.11 (L) 4.70 - 6.10 M/uL    Hemoglobin 12.0 (L) 14.0 - 18.0 g/dL    Hematocrit 36.8 (L) 42.0 - 52.0 %    MCV 89.5 81.4 - 97.8 fL    MCH 29.2 27.0 - 33.0 pg    MCHC 32.6 (L) 33.7 - 35.3 g/dL    RDW 47.8 35.9 - 50.0 fL    Platelet Count 210 164 - 446 K/uL    MPV 9.1 9.0 - 12.9 fL    Neutrophils-Polys 73.70 (H) 44.00 - 72.00 %    Lymphocytes  14.00 (L) 22.00 - 41.00 %    Monocytes 7.40 0.00 - 13.40 %    Eosinophils 4.10 0.00 - 6.90 %    Basophils 0.30 0.00 - 1.80 %    Immature Granulocytes 0.50 0.00 - 0.90 %    Nucleated RBC 0.00 /100 WBC    Neutrophils (Absolute) 4.49 1.82 - 7.42 K/uL    Lymphs (Absolute) 0.85 (L) 1.00 - 4.80 K/uL    Monos (Absolute) 0.45 0.00 - 0.85 K/uL    Eos (Absolute) 0.25 0.00 - 0.51 K/uL    Baso (Absolute) 0.02 0.00 - 0.12 K/uL    Immature Granulocytes (abs) 0.03 0.00 - 0.11 K/uL    NRBC (Absolute) 0.00 K/uL   COMP METABOLIC PANEL    Collection Time: 18  7:34 PM   Result Value Ref Range    Sodium 135 135 - 145 mmol/L    Potassium 4.1 3.6 - 5.5 mmol/L    Chloride 102 96 - 112 mmol/L    Co2 24 20 - 33 mmol/L    Anion Gap 9.0 0.0 - 11.9    Glucose 96 65 - 99 mg/dL    Bun 14 8 - 22 mg/dL    Creatinine 0.95 0.50 - 1.40 mg/dL    Calcium 9.9 8.5 - 10.5 mg/dL    AST(SGOT) 19 12 - 45 U/L    ALT(SGPT) 19 2 - 50 U/L    Alkaline Phosphatase 111 (H) 30 - 99 U/L    Total Bilirubin 0.8 0.1 - 1.5 mg/dL    Albumin 4.1 3.2 - 4.9 g/dL    Total Protein 6.6 6.0 - 8.2 g/dL    Globulin 2.5 1.9 - 3.5 g/dL    A-G Ratio 1.6 g/dL   WESTERGREN SED RATE    Collection Time: 18  7:34 PM   Result Value Ref Range    Sed Rate Westergren 13 0 - 20 mm/hour   ESTIMATED GFR    Collection Time: 18  7:34 PM   Result Value Ref Range    GFR If African American >60 >60 mL/min/1.73 m 2    GFR If Non African American >60 >60 mL/min/1.73 m 2   EKG    Collection Time: 18  7:37 PM   Result Value Ref Range    Report       [facility] Emergency Dept.    Test Date:  2018  Pt Name:    AMOR RIVAS               Department: ER  MRN:        [id number]                      Room:       RD 07  Gender:     Male                         Technician: [id number]  :        1945                   Requested By:PAULETTE PATRICIA  Order #:    [id number]                    Reading MD:    Measurements  Intervals                                 Axis  Rate:       101                          P:          50  KS:         156                          QRS:        5  QRSD:       86                           T:          10  QT:         336  QTc:        436    Interpretive Statements  SINUS TACHYCARDIA  Compared to ECG 11/23/2018 17:17:28  No significant changes     URINALYSIS,CULTURE IF INDICATED    Collection Time: 12/17/18  9:30 PM   Result Value Ref Range    Color Yellow     Character Clear     Specific Gravity 1.012 <1.035    Ph 7.5 5.0 - 8.0    Glucose Negative Negative mg/dL    Ketones Trace (A) Negative mg/dL    Protein Negative Negative mg/dL    Bilirubin Negative Negative    Urobilinogen, Urine 0.2 Negative    Nitrite Positive (A) Negative    Leukocyte Esterase Small (A) Negative    Occult Blood Negative Negative    Micro Urine Req Microscopic    URINE MICROSCOPIC (W/UA)    Collection Time: 12/17/18  9:30 PM   Result Value Ref Range    WBC 10-20 (A) /hpf    RBC 0-2 (A) /hpf    Bacteria Moderate (A) None /hpf    Epithelial Cells Negative /hpf    Hyaline Cast 0-2 /lpf       Imaging/Procedures Review:    Independant Imaging Review: Completed  DX-CHEST-LIMITED (1 VIEW)   Final Result         No acute cardiac or pulmonary abnormality is identified.               EKG:   EKG Independant Review: Completed  QTc 436 , HR: 101, Sinus Tachycardia, Nml Rhythm, no ST/T changes    Records reviewed and summarized in current documentation :  Yes  [employer] teaching service handout given to patient:  No         Assessment/Plan     * S/P lumbar laminectomy- (present on admission)   Assessment & Plan    Had L2-L3 transforaminal lumbar interbody fusion with L3-L4 posterior hardware removal, L2-L3 hardware re-instrumentation,  L2 and L3 decompressive laminectomies, bilateral medial facetectomies and bilateral foraminotomies and L2-L3 posterolateral fusion by Dr Fowler on 11/19/2018. Initial relief from post-op pain with d/c meds. No with increasing LB pain and reduced efficacy of  pain meds. H & P not concerning for epidural abscess or surgical site infection.     -Q 4 neuro check with fall precautions  -Scheduled Tylenol 650 mg Q 6hrs  -Morphine 2 mg Q 2 Hrs PRN  -Tramadol  mg Q 6Hrs PRN  -Oxycodone 5-10 mg Q6Hrs PRN  -Lidocaine patch to back Q 24 hrs   -PM&R consult pending  -PT/OT consult pending  -Zofran for nausea PRN  -Bowel protocol           Anemia- (present on admission)   Assessment & Plan    Chronic normocytic anemia, hemoglobin improving from baseline of about 8, stool occult blood was negative.  According to patient last colonoscopy was about 2 years ago and was normal.  Denies melena/dizziness/shortness of breath/chest pain.    -Anemia workup pending  -Continue FeSO4 325 mg daily  -Continue to monitor.     BPH (benign prostatic hyperplasia)   Assessment & Plan    Stable. Pt denies LUTs    -Continue Flomax 0.4 mg daily  -CTM     Elevated alkaline phosphatase level   Assessment & Plan    Isolated in this patient with recent back surgery.     -Will continue to monitor  -Continue Vit D supplementation     Essential hypertension- (present on admission)   Assessment & Plan    Stable.  Normotensive on admission.    -Continue lisinopril 10 mg daily  -Continue to monitor     Dyslipidemia- (present on admission)   Assessment & Plan    Last lipid panel 11/20/2017 at goal.    -Continue atorvastatin 40 mg daily  -Lipid panel/A1C pending  -Continue to monitor     GERD (gastroesophageal reflux disease)- (present on admission)   Assessment & Plan    Stable disease.    -Continue Prilosec 20 mg daily  -Continue to monitor     Seasonal allergies- (present on admission)   Assessment & Plan    Stable.    -Continue cetirizine  -Continue to monitor     Asthma- (present on admission)   Assessment & Plan    Stable disease.  Uses Advair once a day.  No recent increase in need for Advair.    -Continue Advair  -Continue to monitor     Acquired hypothyroidism- (present on admission)   Assessment &  Plan    Last TSH 11/26/2018 18.570.  Patient is on 50 mcg Synthroid daily was apparently taking it with other meds and food.  PCP instructed to take first thing in the morning on empty stomach.    -Continue Synthroid 50 mcg daily  -TSH pending  -Continue to monitor             Anticipated Hospital stay:  >2 midnights      Quality Measures  Quality-Core Measures   Reviewed items::  EKG reviewed, Labs reviewed, Medications reviewed and Radiology images reviewed  Gomes catheter::  No Gomes  DVT prophylaxis pharmacological::  Enoxaparin (Lovenox)    PCP: Jeovany Samson M.D.

## 2018-12-18 NOTE — PROGRESS NOTES
Pt arrived to floor with transport. Pt complaining of 8/10 lower back pain. Pt unable to ambulate at this time. Pt denies numbness/tingling at this time. Pt on room air. Belongings at bedside. Call light within reach. No complaints at this time.

## 2018-12-18 NOTE — ED NOTES
Pt rounded on, asleep in bed, respirations even and unlabored, repositioning self as needed, call light in reach.

## 2018-12-18 NOTE — ED NOTES
"PT ambulates slowly to triage, sent to ER by MD for increased pain.  Pt post op back surgery 3 weeks, has had increase in pain.  \"It miserable.\"  A&ox4.  Appears uncomfortable.  Pt to lobby & advised to inform RN of any changes.   "

## 2018-12-18 NOTE — PROGRESS NOTES
Received bedside report from Waleska Alejandro night shift RN. Patient had surgery 4 weeks ago with .Patient complains of pain from back down bilateral legs but worse on left like feels like nerve pain but never had this much pain before.

## 2018-12-18 NOTE — ED NOTES
Assumed care of patient. Pt assessed, AAO x 4 . Patient's concerns addressed.  Fall precautions in place.  Pt repositioned and comfortable.  Call light within reach. Will continue to monitor.  PIV established, EKG in progress.

## 2018-12-18 NOTE — CARE PLAN
Problem: Safety  Goal: Will remain free from injury  Outcome: PROGRESSING AS EXPECTED  Pt has remained free from injury this visit. Pt calls for help appropriately.    Problem: Pain Management  Goal: Pain level will decrease to patient's comfort goal  Outcome: PROGRESSING AS EXPECTED  Pt medicated with pain medication available that alleviated the pts pain. Pt declines further intervention at this time. Pt resting quietly in bed.

## 2018-12-18 NOTE — ASSESSMENT & PLAN NOTE
Stable disease.  Uses Advair once a day.  No recent increase in need for Advair.    Plan  -Continue Advair

## 2018-12-18 NOTE — CARE PLAN
Problem: Communication  Goal: The ability to communicate needs accurately and effectively will improve  Outcome: PROGRESSING AS EXPECTED  Verbalizes understanding to use call light for needs.                        Problem: Safety  Goal: Will remain free from injury  Outcome: PROGRESSING AS EXPECTED  Alarms on and functioning.Tray table and call light in reach. Using urinal at this time.

## 2018-12-18 NOTE — THERAPY
Occupational Therapy Contact Note  OT eval attempt x2. First time, intractable pain; second time MD and RN in room and RN requested return later. Will assess as able

## 2018-12-18 NOTE — ASSESSMENT & PLAN NOTE
- Differential: S1 radiculopathy vs hip pain; Trochanteric brusitis  -s/p L2-L3 fusion, lumbar laminectomy and discectomy L2-L3, hardware removal L4-L5, repositioning of L3 screw by Dr. Fowler on 11/19/2018  -XR Hips showing postsurgical changes but no acute fracture  -CT myelogram showing pseudomeningocele, surgical hardware, stenosis at multiple levels and sever at L5/S1, moderate bulging discs  - Neurosurgery, PMR, Geriatrics, following and recs included in plan  -s/p Epidural spinal injection without relief    Plan  - Continued pain management  - Fall precautions  - Continue PT/OT  - Aggressive bowel protocol as patient is on opiate therapy  - avoiding NSAIDs, ASA; long acting blood thinning agents

## 2018-12-18 NOTE — DISCHARGE PLANNING
PMR referral from Dr. Valle     Admit for pain management and weakness. Prior admit with spinal surgery 11/19/ to 11/26/2018. Surgery evaluated no additional surgery need. Therapy evaluation pending to determine need for post acute therapy. Anticipate transtion to community home single story with spouse support. No physiatry consul ordered at this time. Will follow SCP provider.

## 2018-12-18 NOTE — PROGRESS NOTES
Internal Medicine Interval Note  Note Author: Sami Bhat M.D.     Name Angelo Magaña     1945   Age/Sex 73 y.o. male   MRN [id number]   Code Status full     After 5PM or if no immediate response to page, please call for cross-coverage  Attending/Team: Levon/Donnie See Patient List for primary contact information  Call [phone] to page    1st Call - Day Intern (R1):   Home 2nd Call - Day Sr. Resident (R2/R3):   [place]         Reason for interval visit  (Principal Problem)   S1 Radiculopathy vs hip pain      Interval Problem Daily Status Update  (24 hours, problem oriented, brief subjective history, new lab/imaging data pertinent to that problem)   -73M, PMH of HTN, asthma, GERD, chronic back pain, h/o paraspinal abscess, hypothyroidism, recent L2-L3 fusion on ; presented to ED referred from neurosurgery Dr. Fowler for acute pain management and workup of worsening post-surgical lower back pain. MRI on  showing extradural fluid collection at L2-3. XR Hips showing postsurgical changes but no acute fracture. Neurosurgery following and recs included in plan.     -patient with back pain, no saddle anesthesia/incontinence, pain in low back band with focus on left, patient able to ambulate but has difficulty with rising/sitting due to pain, had some nausea this morning, primary concern is pain management  -scheduled bowel regimen  -TSH elevated, Free T4 wnl  -iron studies low, continuing iron supplementation  -asymptomatic bacteruria, will monitor for symptoms  -added lyrica  -CPK non elevated, significant muscle injury unlikely  -PT/OT pending  -NPO at midnight, and coags ordered for possible epidural tomorrow if necessary    Review of Systems   Constitutional: Negative for chills and fever.   HENT: Negative for ear pain and hearing loss.    Eyes: Negative for blurred vision and double vision.   Respiratory: Negative for cough and shortness of breath.    Cardiovascular: Negative for chest  pain and palpitations.   Gastrointestinal: Positive for nausea. Negative for vomiting.   Genitourinary: Negative for dysuria and hematuria.   Musculoskeletal: Positive for back pain and joint pain. Negative for myalgias.   Skin: Negative for itching and rash.   Neurological: Positive for headaches. Negative for dizziness.   Endo/Heme/Allergies: Negative for polydipsia. Does not bruise/bleed easily.   Psychiatric/Behavioral: Negative for depression. The patient is not nervous/anxious.        Disposition/Barriers to discharge:   Pain management    Consultants/Specialty  Neurosurgery - Dr. Fowler  PCP: Jeovany Samson M.D.      Quality Measures  Quality-Core Measures   Reviewed items::  Labs reviewed and Medications reviewed  Gomes catheter::  No Gomes  DVT prophylaxis - mechanical:  SCDs          Physical Exam       Vitals:    12/18/18 0400 12/18/18 0800 12/18/18 0830 12/18/18 1200   BP: 131/73 127/81  131/78   Pulse: 85 79 78 83   Resp: 20 16 20 18   Temp: 37.2 °C (99 °F) 37.1 °C (98.7 °F)  37.1 °C (98.8 °F)   TempSrc: Temporal Temporal  Temporal   SpO2: 94% 97% 94% 92%   Weight:       Height:         Body mass index is 34.44 kg/m². Weight: 108.9 kg (240 lb)  Oxygen Therapy:  Pulse Oximetry: 92 %, O2 (LPM): 0, O2 Delivery: None (Room Air)    Physical Exam   Constitutional: He is oriented to person, place, and time and well-developed, well-nourished, and in no distress. No distress.   HENT:   Head: Normocephalic and atraumatic.   Mouth/Throat: Oropharynx is clear and moist. No oropharyngeal exudate.   Eyes: Pupils are equal, round, and reactive to light. Conjunctivae and EOM are normal. Right eye exhibits no discharge. Left eye exhibits no discharge. No scleral icterus.   Neck: Normal range of motion. Neck supple. No tracheal deviation present.   Cardiovascular: Normal rate, regular rhythm, normal heart sounds and intact distal pulses.  Exam reveals no gallop and no friction rub.    No murmur heard.  Pulmonary/Chest:  Effort normal and breath sounds normal. No respiratory distress. He has no wheezes. He has no rales. He exhibits no tenderness.   Abdominal: Soft. Bowel sounds are normal. He exhibits no distension and no mass. There is no tenderness. There is no rebound and no guarding.   Musculoskeletal: Normal range of motion. He exhibits no edema, tenderness or deformity.   Left buttock pain with straight leg raise bilaterally   Neurological: He is alert and oriented to person, place, and time. He exhibits normal muscle tone. Coordination normal.   Skin: Skin is warm and dry. No rash noted. He is not diaphoretic. No erythema. No pallor.   Left knee scar, midline abdominal scar    Psychiatric: Mood and affect normal.             Assessment/Plan     * Radiculopathy of sacral region- (present on admission)   Assessment & Plan    -patient with low back pain more prominent on left, pain on palpation and with ROM  -S1 radiculopathy vs hip pain  -s/p L2-L3 fusion, lumbar laminectomy and discectomy L2-L3, hardware removal L4-L5, repositioning of L3 screw by Dr. Fowler on 11/19/2018  -XR Hips showing postsurgical changes but no acute fracture  -Neurosurgery, Dr. Fowler, following and recs included in plan    -Q 4 neuro check with fall precautions  -scheduled and prn pain management  -PT/OT consult pending  -Zofran for nausea PRN  -Bowel protocol  -avoiding NSAIDs, ASA; long acting blood thinning agents     Anemia- (present on admission)   Assessment & Plan    -Chronic normocytic anemia, hemoglobin improving from baseline of about 8, stool occult blood was negative.  According to patient last colonoscopy was about 2 years ago and was normal.  Denies melena/dizziness/shortness of breath/chest pain.  -iron studies low; ferritin/B12/Folate/Free T4 non deficient    -Continue FeSO4 325 mg daily     Asymptomatic bacteriuria   Assessment & Plan    -UA showing bacteriuria, patient currently asymptomatic  -monitor for symptoms     BPH (benign  prostatic hyperplasia)   Assessment & Plan    Stable. Pt denies LUTs    -Continue Flomax 0.4 mg daily  -CTM     Elevated alkaline phosphatase level   Assessment & Plan    Isolated in this patient with recent back surgery.     -Will continue to monitor  -Continue Vit D supplementation     Essential hypertension- (present on admission)   Assessment & Plan    Stable.  Normotensive on admission.    -Continue lisinopril 10 mg daily  -Continue to monitor     Dyslipidemia- (present on admission)   Assessment & Plan    Last lipid panel 11/20/2017 at goal.    -Continue atorvastatin 40 mg daily  -Lipid panel/A1C pending  -Continue to monitor     GERD (gastroesophageal reflux disease)- (present on admission)   Assessment & Plan    Stable disease.    -Continue Prilosec 20 mg daily  -Continue to monitor     Seasonal allergies- (present on admission)   Assessment & Plan    Stable.    -Continue cetirizine  -Continue to monitor     Asthma- (present on admission)   Assessment & Plan    Stable disease.  Uses Advair once a day.  No recent increase in need for Advair.    -Continue Advair  -Continue to monitor     Acquired hypothyroidism- (present on admission)   Assessment & Plan    -TSH elevated, Free T4 wnl    -Continue Synthroid 50 mcg daily  -TSH pending  -Continue to monitor

## 2018-12-18 NOTE — DISCHARGE PLANNING
Anticipated Discharge Disposition:   TBD    Action:    -Spoke with patient and assessment done.  -Patient has not been out of bed yet.  His pain is 6/10 in his back.  Supine in bed very pleasant, calm. Patient has his own FWW here in his room.  He has [facility] already from prior admission.    Barriers to Discharge:    -Pain management    Plan:    Wait for PT evaluation and recommendation  Medical clearance

## 2018-12-18 NOTE — PROGRESS NOTES
"Neurosurgery Progress Note    Subjective:  C/o shooting pain to left buttock and posterior thigh mostly with weight bearing,     Exam:  Incision: CDI  NM:5/5, negative nerve tension sign  Pain with hip provocative maneuvers and palpation    MRI LS spine :    1.  Postsurgical and degenerative changes in the lumbar spine as described above.  2.  There is no central canal stenosis.  3.  The lumbar nerve roots are peripherally displaced at the levels of L4-5 and L5-S1 creating \"naked thecal sac\" sign. This finding likely represents chronic arachnoiditis.  4.  Posterior extradural postsurgical fluid collection at the level of L2-3. The differential diagnosis includes postsurgical seroma and pseudomeningocele.    Results discussed by Dr. Fowler with patient: no surgical complication    BP  Min: 111/77  Max: 140/89  Pulse  Av.3  Min: 81  Max: 129  Resp  Av.9  Min: 15  Max: 20  Temp  Av.1 °C (98.8 °F)  Min: 36.7 °C (98.1 °F)  Max: 37.4 °C (99.4 °F)  SpO2  Av.3 %  Min: 94 %  Max: 97 %    No Data Recorded    Recent Labs      18   0342   WBC  6.1  4.3*   RBC  4.11*  3.76*   HEMOGLOBIN  12.0*  11.0*   HEMATOCRIT  36.8*  34.2*   MCV  89.5  91.0   MCH  29.2  29.3   MCHC  32.6*  32.2*   RDW  47.8  48.4   PLATELETCT  210  180   MPV  9.1  9.3     Recent Labs      18   0342   SODIUM  135  137   POTASSIUM  4.1  3.9   CHLORIDE  102  104   CO2  24  28   GLUCOSE  96  90   BUN  14  11   CREATININE  0.95  0.87   CALCIUM  9.9  10.0               Intake/Output       18 - 18 0659 18 - 18 0659       Total  Total       Intake    P.O.  --  240 240  --  -- --    P.O. -- 240 240 -- -- --    Total Intake -- 240 240 -- -- --       Output    Urine  --  525 525  600  -- 600    Number of Times Voided -- 2 x 2 x -- -- --    Urine Void (mL) -- 525 525 600 -- 600    Total Output -- 525 525 600 -- 600       Net I/O    "  -- -285 -285 -600 -- -600            Intake/Output Summary (Last 24 hours) at 12/18/18 0824  Last data filed at 12/18/18 0758   Gross per 24 hour   Intake              240 ml   Output             1125 ml   Net             -885 ml            • budesonide-formoterol  2 Puff BID   • levothyroxine  50 mcg AM ES   • polyethylene glycol/lytes  1 Packet DAILY    And   • senna-docusate  2 Tab BID    And   • magnesium hydroxide  30 mL QDAY PRN    And   • bisacodyl  10 mg QDAY PRN   • Respiratory Care per Protocol   Continuous RT   • enoxaparin  40 mg DAILY   • hydrALAZINE  10 mg Q4HRS PRN   • ondansetron  4 mg Q4HRS PRN   • ondansetron  4 mg Q4HRS PRN   • acetaminophen  650 mg Q6HRS   • atorvastatin  40 mg Nightly   • cetirizine  10 mg DAILY   • vitamin D  3,000 Units DAILY   • ferrous sulfate  325 mg DAILY   • lisinopril  10 mg DAILY   • fish oil  1,000 mg BID   • omeprazole  20 mg DAILY   • tamsulosin  0.4 mg AFTER BREAKFAST   • therapeutic multivitamin-minerals  1 Tab DAILY   • tizanidine  4 mg Q6HRS PRN   • oxyCODONE immediate-release  5 mg Q4HRS PRN    Or   • oxyCODONE immediate-release  10 mg Q4HRS PRN   • tramadol   mg Q6HRS PRN   • morphine injection  2 mg Q2HRS PRN   • lidocaine  1 Patch Q24HR       Assessment and Plan:  Hospital day # 1 back pain, left S1 radiculopthay vs hip pain  Sp L2-L3 fusion, lumbar laminectomy and discectomy L2-L3, hardware removal L4-L5, repositioning of L3 screw on 11/19/2018      Prophylactic anticoagulation: yes         Start date/time: started    Will order baseline hip/pelvis xrays, PT, may require Epidural or hip injection,: avoid NSAIDS, ASA, or other long acting blood thinning agents, add Lyrica    Seen with Dr. Fowler

## 2018-12-18 NOTE — ED PROVIDER NOTES
ED Provider Note    CHIEF COMPLAINT  Chief Complaint   Patient presents with   • Sent by MD     post op back surgery 3 weeks, increased pain        HPI  Angelo Magaña is a 73 y.o. male who presents sent in by Dr. pitts's neurosurgery clinic for admission  for postop pain 4 weeks after L2-5 fusion.  Patient has been taking Zanaflex, one half of a 5 mg Percocet every 6 hours and Ultram for pain.  This has been inadequate and he has had altered mental status on the medication.  He is using stool softeners and stimulants and having bowel movement.  He can eat but has no appetite.  Pain is severe at 8 of 10 and radiates to both knees.  No associated weakness numbness bowel or bladder issue.  Denies fever or urinary symptoms.  Recent MRI that did not explain his postoperative    REVIEW OF SYSTEMS  Pertinent positives include: Severe postoperative lumbar back pain.  Headache which is worse with standing since surgery  Pertinent negatives include: Fever, abdominal pain, vomiting, cough.  10+ systems reviewed and negative.      PAST MEDICAL HISTORY  Past Medical History:   Diagnosis Date   • ASTHMA     scheduled inhaler use   • Bronchitis 02/2018   • Erectile dysfunction 6/8/2016   • GERD (gastroesophageal reflux disease) 6/8/2016   • Heart burn    • History of skin cancer 6/8/2016   • Hyperlipidemia 6/8/2016   • Hypertension    • Hypothyroidism 6/8/2016   • Indigestion    • Obesity 6/8/2016   • Pain 04/19/2018    chronic back pain, 0/10   • Preventative health care 6/8/2016   • Rosacea 6/8/2016   • Seasonal allergies 6/8/2016       FAMILY HISTORY  Family History   Problem Relation Age of Onset   • Hypertension Mother    • Hypertension Father    • Lung Disease Father         asthma       SOCIAL HISTORY  Social History   Substance Use Topics   • Smoking status: Never Smoker   • Smokeless tobacco: Never Used   • Alcohol use Yes      Comment: 1/week      History   Drug Use No       SURGICAL HISTORY  Past Surgical History:    Procedure Laterality Date   • LUMBAR FUSION O-ARM N/A 11/19/2018    Procedure: LUMBAR FUSION O-ARM-  POSTERIOR L2-3 TLIF,;  Surgeon: Brandon Fowler M.D.;  Location: SURGERY [facility];  Service: Neurosurgery   • LUMBAR LAMINECTOMY DISKECTOMY N/A 11/19/2018    Procedure: LUMBAR LAMINECTOMY DISKECTOMY-  L2-3 LAMI;  Surgeon: Brandon Fowler M.D.;  Location: SURGERY [facility];  Service: Neurosurgery   • HARDWARE REMOVAL NEURO N/A 11/19/2018    Procedure: HARDWARE REMOVAL NEURO-  L4-5, L3 SCREW REPOSITIONING;  Surgeon: Brandon Fowler M.D.;  Location: SURGERY [facility];  Service: Neurosurgery   • KYPHOPLASTY N/A 11/19/2018    Procedure: KYPHOPLASTY-  L2;  Surgeon: Brandon Fowler M.D.;  Location: SURGERY [facility];  Service: Neurosurgery   • VENTRAL HERNIA REPAIR Left 5/3/2018    Procedure: VENTRAL HERNIA REPAIR FOR LUMBAR HERNIA/ LUNG HERNIA THROUGH CHEST WALL RECONSTRUCTION, MAJOR  ;  Surgeon: Phoenix Medina M.D.;  Location: SURGERY [facility];  Service: General   • IRRIGATION & DEBRIDEMENT NEURO  3/30/2017    Procedure: IRRIGATION & DEBRIDEMENT NEURO-LUMBAR WOUND;  Surgeon: Juan Miguel Chakraborty M.D.;  Location: SURGERY [facility];  Service:    • LUMBAR FUSION POSTERIOR N/A 3/17/2017    Procedure: LUMBAR FUSION POSTERIOR L3-4 EXTENSION;  Surgeon: Juan Miguel Chakraborty M.D.;  Location: SURGERY [facility];  Service:    • LUMBAR LAMINECTOMY DISKECTOMY N/A 3/17/2017    Procedure: LUMBAR LAMINECTOMY DISKECTOMY L3 & REDO L4;  Surgeon: Juan Miguel Chakraborty M.D.;  Location: SURGERY [facility];  Service:    • HARDWARE REMOVAL NEURO N/A 3/17/2017    Procedure: HARDWARE REMOVAL NEURO L4-5;  Surgeon: Juan Miguel Chakraborty M.D.;  Location: SURGERY [facility];  Service:    • LUMBAR FUSION POSTERIOR  9/9/2009    Performed by JUAN MIGUEL CHAKRABORTY at [facility]   • LUMBAR LAMINECTOMY DISKECTOMY  9/9/2009    Performed by JUAN MIGUEL CHAKRABORTY at [facility]   • KNEE ARTHROPLASTY TOTAL   5/26/2009    Performed by NAREN DANIELSON at SURGERY [facility] ORS   • HIP ARTHROPLASTY TOTAL  1/21/2009    Performed by NAREN DANIELSON at SURGERY [facility] ORS   • INGUINAL HERNIA REPAIR  9/2/08    Performed by MERLYN BECKER at SURGERY SAME DAY [facility] ORS   • INGUINAL HERNIA REPAIR  6/10/08    Performed by MERLYN BECKER at SURGERY SAME DAY [facility] ORS   • HERNIA REPAIR  2008    HERNIA    • ROMAN BY LAPAROSCOPY  1982   • APPENDECTOMY  1953   • TONSILLECTOMY  1951   • OTHER      DISCECTOMY WITH HARWWARE   • OTHER      GASTRIC BYPASS AT 30 YEARS       CURRENT MEDICATIONS    Current Outpatient Prescriptions   Medication Sig Dispense Refill   • acetaminophen (TYLENOL) 500 MG Tab Take 1 Tab by mouth every 6 hours as needed for Mild Pain. 60 Tab 0   • polyethylene glycol/lytes (MIRALAX) Pack Take 1 Packet by mouth every day. 15 Each 0   • tamsulosin (FLOMAX) 0.4 MG capsule Take 1 Cap by mouth ONE-HALF HOUR AFTER BREAKFAST. 30 Cap 0   • ferrous sulfate 325 (65 Fe) MG tablet Take 1 Tab by mouth every day. 30 Tab 2   • tizanidine (ZANAFLEX) 4 MG Tab Take 1 Tab by mouth every 6 hours as needed. 60 Tab 0   • atorvastatin (LIPITOR) 40 MG Tab Take 40 mg by mouth every evening.     • levothyroxine (SYNTHROID) 50 MCG Tab TAKE 1 TABLET BY MOUTH EVERY DAY 90 Tab 1   • Omega-3 Fatty Acids (FISH OIL) 1000 MG Cap capsule Take 1,000 mg by mouth 2 Times a Day.     • therapeutic multivitamin-minerals (THERAGRAN-M) Tab Take 1 Tab by mouth every day.     • ADVAIR DISKUS 100-50 MCG/DOSE AEROSOL POWDER, BREATH ACTIVATED INHALE 1 PUFF BY MOUTH TWICE A DAY *RINSE MOUTH AFTER USE* 3 Inhaler 0   • lisinopril (PRINIVIL) 10 MG Tab TAKE 1 TABLET BY MOUTH EVERY DAY 90 Tab 1   • Cholecalciferol (VITAMIN D3) 3000 units Tab Take 3,000 Units by mouth every day.     • cetirizine (ZYRTEC) 10 MG Tab Take 10 mg by mouth every day.     • Probiotic Product (PROBIOTIC ADVANCED PO) Take 1 Tab by mouth every day.     • PRILOSEC 20 MG PO CPDR Take 20 mg by  "mouth every day.         ALLERGIES  No Known Allergies    PHYSICAL EXAM  VITAL SIGNS: /77   Pulse (!) 129   Temp 36.7 °C (98.1 °F) (Temporal)   Resp 17   Ht 1.778 m (5' 10\")   Wt 108.9 kg (240 lb)   SpO2 96%   BMI 34.44 kg/m²   Reviewed and tachycardic  Constitutional: Well developed, Well nourished, well-appearing, appears to be in pain, moderately overweight.  HENT: Normocephalic, atraumatic, bilateral external ears normal, oropharynx moist, No exudates or erythema.   Eyes: PERRLA, conjunctiva pink, no scleral icterus.   Cardiovascular: Regular tachycardic without murmur, rub, gallop.  Respiratory: No rales, rhonchi, wheeze.  Gastrointestinal: Soft, nontender, nondistended.  Skin: No erythema, no rash.  No edema, no purulent discharge, surgical scar healing well  Genitourinary: No costovertebral angle tenderness.   Neurologic: Alert & oriented x 3, cranial nerves 2-12 intact by passive exam.  No focal deficit noted.Hip flexion, extension, knee flexion and extension, extensor hallucis longus and ankle plantar flexion are 5/5 and symmetric.  Sensation is intact to light touch in the both legs.  Deep tendon reflexes 0 Achilles and patellar bilateral.  Psychiatric: Affect normal, Judgment normal, Mood normal.   Musculoskeletal: Moderate upper midline lumbar tenderness without step-off      DIFFERENTIAL DIAGNOSIS:  Postoperative pain, inadequate analgesia, doubt epidural abscess or discitis.    EKG  EKG Interpretation 7:43 PM    Interpreted by me.  Indication: Intractable back pain    Rhythm: normal sinus   Rate: Tachycardic at 101  Axis: normal  Ectopy: none  Conduction: normal  ST/T Waves: no acute change  Q Waves: none  R Wave progression: normal  Comparison: Unchanged from prior    Clinical Impression: Sinus tachycardia  .    RADIOLOGY/PROCEDURES  DX-CHEST-LIMITED (1 VIEW)   Final Result         No acute cardiac or pulmonary abnormality is identified.        MRI lumbar spine from December 14 reviewed " "as below    Impression       1.  Postsurgical and degenerative changes in the lumbar spine as described above.  2.  There is no central canal stenosis.  3.  The lumbar nerve roots are peripherally displaced at the levels of L4-5 and L5-S1 creating \"naked thecal sac\" sign. This finding likely represents chronic arachnoiditis.  4.  Posterior extradural postsurgical fluid collection at the level of L2-3. The differential diagnosis includes postsurgical seroma and pseudomeningocele.         LABORATORY:  Results for orders placed or performed during the hospital encounter of 12/17/18   CBC WITH DIFFERENTIAL   Result Value Ref Range    WBC 6.1 4.8 - 10.8 K/uL    RBC 4.11 (L) 4.70 - 6.10 M/uL    Hemoglobin 12.0 (L) 14.0 - 18.0 g/dL    Hematocrit 36.8 (L) 42.0 - 52.0 %    MCV 89.5 81.4 - 97.8 fL    MCH 29.2 27.0 - 33.0 pg    MCHC 32.6 (L) 33.7 - 35.3 g/dL    RDW 47.8 35.9 - 50.0 fL    Platelet Count 210 164 - 446 K/uL    MPV 9.1 9.0 - 12.9 fL    Neutrophils-Polys 73.70 (H) 44.00 - 72.00 %    Lymphocytes 14.00 (L) 22.00 - 41.00 %    Monocytes 7.40 0.00 - 13.40 %    Eosinophils 4.10 0.00 - 6.90 %    Basophils 0.30 0.00 - 1.80 %    Immature Granulocytes 0.50 0.00 - 0.90 %    Nucleated RBC 0.00 /100 WBC    Neutrophils (Absolute) 4.49 1.82 - 7.42 K/uL    Lymphs (Absolute) 0.85 (L) 1.00 - 4.80 K/uL    Monos (Absolute) 0.45 0.00 - 0.85 K/uL    Eos (Absolute) 0.25 0.00 - 0.51 K/uL    Baso (Absolute) 0.02 0.00 - 0.12 K/uL    Immature Granulocytes (abs) 0.03 0.00 - 0.11 K/uL    NRBC (Absolute) 0.00 K/uL   COMP METABOLIC PANEL   Result Value Ref Range    Sodium 135 135 - 145 mmol/L    Potassium 4.1 3.6 - 5.5 mmol/L    Chloride 102 96 - 112 mmol/L    Co2 24 20 - 33 mmol/L    Anion Gap 9.0 0.0 - 11.9    Glucose 96 65 - 99 mg/dL    Bun 14 8 - 22 mg/dL    Creatinine 0.95 0.50 - 1.40 mg/dL    Calcium 9.9 8.5 - 10.5 mg/dL    AST(SGOT) 19 12 - 45 U/L    ALT(SGPT) 19 2 - 50 U/L    Alkaline Phosphatase 111 (H) 30 - 99 U/L    Total Bilirubin " 0.8 0.1 - 1.5 mg/dL    Albumin 4.1 3.2 - 4.9 g/dL    Total Protein 6.6 6.0 - 8.2 g/dL    Globulin 2.5 1.9 - 3.5 g/dL    A-G Ratio 1.6 g/dL       INTERVENTIONS:  Medications   morphine (pf) 10 mg/mL injection 4 mg (not administered)   ondansetron (ZOFRAN) syringe/vial injection 4 mg (not administered)   morphine (pf) 10 mg/mL injection 4 mg (not administered)     Case discussed with [facility] of medicine internal medicine resident who will admit the patient for pain control and rehab    COURSE & MEDICAL DECISION MAKING  This patient presents with intractable postoperative lumbar spine pain.  He has had adverse effects to his analgesics.  He is unable to provide for basic ADLs at home in this condition.  There is no evidence of epidural abscess discitis or wound infection.  There is no evidence of hardware malfunction..      PLAN:  Admission for analgesics, physical therapy, rehab    CONDITION: Fair.    FINAL IMPRESSION  1. Intractable low back pain          Electronically signed by: Cristian Adan, 12/17/2018 7:32 PM

## 2018-12-18 NOTE — DISCHARGE PLANNING
[facility] Please write home care orders upon discharge to home for continuum of care.[facility] at  /[phone] if there are any questions.

## 2018-12-18 NOTE — ED NOTES
Report to Waleska MIGUEL, all questions answered, belongings and walker with pt, chart with pt transport.

## 2018-12-18 NOTE — PROGRESS NOTES
and Ellen Selby and Jose from neurosurgery saw patient said patient's MRI was fine. Jose looked at xray of hip results and said patient good for PT/OT and rehab and [facility] team following patient.

## 2018-12-18 NOTE — ASSESSMENT & PLAN NOTE
-Chronic normocytic anemia, hemoglobin improving from baseline of about 8, stool occult blood was negative.  According to patient last colonoscopy was about 2 years ago and was normal.  Denies melena/dizziness/shortness of breath/chest pain. Hx of AVMs.  -iron studies low; ferritin/B12/Folate/Free T4 non deficient    Plan  -Continue iron supplementation  - Follow-up iron studies as outpatient

## 2018-12-18 NOTE — ED NOTES
Pt rounded on, pt reports pain has returned, respirations even and unlabored, repositioning self as needed with some assistance, pt medicated per MAR, voided x1 urinal, needs met at this time.

## 2018-12-18 NOTE — DIETARY
Nutrition Services: Day 1 of admit.  Angelo Magaña is a 73 y.o. male with admitting DX of Back pain  Consult received for Poor PO    Pt reports he hasn't had an appetite for ~2 weeks. Pt reports some nausea. Pt states 4 weeks ago he weighed 240 lbs (109.1 kg). Current wt is a stated wt unable to determine if pt has lost wt. Pt declined snacks at this time.     Assessment:  Ht: 177.8 cm, Wt 12/17: 108.9 kg via - stated wt, BMI: 34.44  Diet/Intake: Regular - Per chart pt PO 50-75% 1 meal documented.      Evaluation:   1. Current problem list: s/p lumbar laminectomy, anemia, elevated alkaline phosphatase level, benign prostatic hyperplasia, acquired hypothyroidism, asthma, seasonal allergies, GERD, dyslipidemia, essential HTN  2. Meds: lipitor, lovenox, ferrous sulfate, fish oil, synthroid, prinivil, prilosec, lyrica, pericolace, miralax, flomax, MVI with minerals, vitamin D, morphine (prn)    Recommendations/Plan:  1. Encourage intake of meals  2. Document intake of all meals as % taken in ADL's to provide interdisciplinary communication across all shifts.   3. Please obtain a measured weight. Monitor weight.  4. Nutrition rep will continue to see patient for ongoing meal and snack preferences.  5. Obtain supplement order per RD as needed.    RD following

## 2018-12-18 NOTE — ED NOTES
Pt rounded on, resting in bed, respirations even and unlabored, repositioning self as needed,[facility] MD at BS, pt reports some relief with morphine.    Pt's daughter Janki requesting update when available. Pt okay with giving Janki updates. : [phone]

## 2018-12-19 ENCOUNTER — HOME CARE VISIT (OUTPATIENT)
Dept: HOME HEALTH SERVICES | Facility: HOME HEALTHCARE | Age: 73
End: 2018-12-19
Payer: MEDICARE

## 2018-12-19 ENCOUNTER — APPOINTMENT (OUTPATIENT)
Dept: RADIOLOGY | Facility: MEDICAL CENTER | Age: 73
DRG: 074 | End: 2018-12-19
Attending: PHYSICIAN ASSISTANT
Payer: MEDICARE

## 2018-12-19 ENCOUNTER — APPOINTMENT (OUTPATIENT)
Dept: RADIOLOGY | Facility: MEDICAL CENTER | Age: 73
DRG: 074 | End: 2018-12-19
Attending: NURSE PRACTITIONER
Payer: MEDICARE

## 2018-12-19 PROBLEM — M54.16 LUMBAR BACK PAIN WITH RADICULOPATHY AFFECTING LEFT LOWER EXTREMITY: Status: ACTIVE | Noted: 2018-12-19

## 2018-12-19 PROBLEM — M54.59 INTRACTABLE LOW BACK PAIN: Status: ACTIVE | Noted: 2018-12-19

## 2018-12-19 LAB
APTT PPP: 30 SEC (ref 24.7–36)
INR PPP: 1.04 (ref 0.87–1.13)
PROTHROMBIN TIME: 13.7 SEC (ref 12–14.6)

## 2018-12-19 PROCEDURE — 36415 COLL VENOUS BLD VENIPUNCTURE: CPT

## 2018-12-19 PROCEDURE — 700111 HCHG RX REV CODE 636 W/ 250 OVERRIDE (IP): Performed by: RADIOLOGY

## 2018-12-19 PROCEDURE — A9270 NON-COVERED ITEM OR SERVICE: HCPCS | Performed by: STUDENT IN AN ORGANIZED HEALTH CARE EDUCATION/TRAINING PROGRAM

## 2018-12-19 PROCEDURE — 3E0R3BZ INTRODUCTION OF ANESTHETIC AGENT INTO SPINAL CANAL, PERCUTANEOUS APPROACH: ICD-10-PCS | Performed by: RADIOLOGY

## 2018-12-19 PROCEDURE — 85610 PROTHROMBIN TIME: CPT

## 2018-12-19 PROCEDURE — 3E0R33Z INTRODUCTION OF ANTI-INFLAMMATORY INTO SPINAL CANAL, PERCUTANEOUS APPROACH: ICD-10-PCS | Performed by: RADIOLOGY

## 2018-12-19 PROCEDURE — 700111 HCHG RX REV CODE 636 W/ 250 OVERRIDE (IP)

## 2018-12-19 PROCEDURE — 62284 INJECTION FOR MYELOGRAM: CPT

## 2018-12-19 PROCEDURE — 72131 CT LUMBAR SPINE W/O DYE: CPT

## 2018-12-19 PROCEDURE — 700102 HCHG RX REV CODE 250 W/ 637 OVERRIDE(OP): Performed by: STUDENT IN AN ORGANIZED HEALTH CARE EDUCATION/TRAINING PROGRAM

## 2018-12-19 PROCEDURE — 99232 SBSQ HOSP IP/OBS MODERATE 35: CPT | Mod: GC | Performed by: INTERNAL MEDICINE

## 2018-12-19 PROCEDURE — 700117 HCHG RX CONTRAST REV CODE 255: Performed by: NURSE PRACTITIONER

## 2018-12-19 PROCEDURE — 700117 HCHG RX CONTRAST REV CODE 255: Performed by: RADIOLOGY

## 2018-12-19 PROCEDURE — 700101 HCHG RX REV CODE 250

## 2018-12-19 PROCEDURE — 770001 HCHG ROOM/CARE - MED/SURG/GYN PRIV*

## 2018-12-19 PROCEDURE — 62323 NJX INTERLAMINAR LMBR/SAC: CPT

## 2018-12-19 PROCEDURE — A9270 NON-COVERED ITEM OR SERVICE: HCPCS | Performed by: NURSE PRACTITIONER

## 2018-12-19 PROCEDURE — 700101 HCHG RX REV CODE 250: Performed by: INTERNAL MEDICINE

## 2018-12-19 PROCEDURE — 85730 THROMBOPLASTIN TIME PARTIAL: CPT

## 2018-12-19 PROCEDURE — 700111 HCHG RX REV CODE 636 W/ 250 OVERRIDE (IP): Performed by: STUDENT IN AN ORGANIZED HEALTH CARE EDUCATION/TRAINING PROGRAM

## 2018-12-19 PROCEDURE — 700102 HCHG RX REV CODE 250 W/ 637 OVERRIDE(OP): Performed by: NURSE PRACTITIONER

## 2018-12-19 RX ORDER — BUPIVACAINE HYDROCHLORIDE 5 MG/ML
INJECTION, SOLUTION EPIDURAL; INTRACAUDAL
Status: COMPLETED
Start: 2018-12-19 | End: 2018-12-19

## 2018-12-19 RX ORDER — DEXAMETHASONE SODIUM PHOSPHATE 4 MG/ML
INJECTION, SOLUTION INTRA-ARTICULAR; INTRALESIONAL; INTRAMUSCULAR; INTRAVENOUS; SOFT TISSUE
Status: COMPLETED
Start: 2018-12-19 | End: 2018-12-19

## 2018-12-19 RX ORDER — DEXAMETHASONE SODIUM PHOSPHATE 10 MG/ML
10 INJECTION INTRAMUSCULAR; INTRAVENOUS ONCE
Status: COMPLETED | OUTPATIENT
Start: 2018-12-19 | End: 2018-12-19

## 2018-12-19 RX ORDER — METHYLPREDNISOLONE ACETATE 80 MG/ML
INJECTION, SUSPENSION INTRA-ARTICULAR; INTRALESIONAL; INTRAMUSCULAR; SOFT TISSUE
Status: DISPENSED
Start: 2018-12-19 | End: 2018-12-20

## 2018-12-19 RX ADMIN — ACETAMINOPHEN 650 MG: 325 TABLET, FILM COATED ORAL at 16:51

## 2018-12-19 RX ADMIN — OMEPRAZOLE 20 MG: 20 CAPSULE, DELAYED RELEASE ORAL at 06:00

## 2018-12-19 RX ADMIN — DEXAMETHASONE SODIUM PHOSPHATE 10 MG: 10 INJECTION, SOLUTION INTRAMUSCULAR; INTRAVENOUS at 16:16

## 2018-12-19 RX ADMIN — TIZANIDINE 4 MG: 4 TABLET ORAL at 03:29

## 2018-12-19 RX ADMIN — TAMSULOSIN HYDROCHLORIDE 0.4 MG: 0.4 CAPSULE ORAL at 09:30

## 2018-12-19 RX ADMIN — ATORVASTATIN CALCIUM 40 MG: 40 TABLET, FILM COATED ORAL at 21:33

## 2018-12-19 RX ADMIN — FERROUS SULFATE TAB 325 MG (65 MG ELEMENTAL FE) 325 MG: 325 (65 FE) TAB at 05:30

## 2018-12-19 RX ADMIN — OXYCODONE HYDROCHLORIDE 10 MG: 10 TABLET ORAL at 05:30

## 2018-12-19 RX ADMIN — PREGABALIN 25 MG: 25 CAPSULE ORAL at 16:50

## 2018-12-19 RX ADMIN — LISINOPRIL 10 MG: 10 TABLET ORAL at 05:31

## 2018-12-19 RX ADMIN — VITAMIN D, TAB 1000IU (100/BT) 3000 UNITS: 25 TAB at 05:30

## 2018-12-19 RX ADMIN — OMEGA-3 FATTY ACIDS CAP 1000 MG 1000 MG: 1000 CAP at 05:31

## 2018-12-19 RX ADMIN — IOHEXOL 1 ML: 300 INJECTION, SOLUTION INTRAVENOUS at 16:19

## 2018-12-19 RX ADMIN — CETIRIZINE HYDROCHLORIDE 10 MG: 10 TABLET, FILM COATED ORAL at 05:30

## 2018-12-19 RX ADMIN — LEVOTHYROXINE SODIUM 50 MCG: 50 TABLET ORAL at 06:00

## 2018-12-19 RX ADMIN — POLYETHYLENE GLYCOL 3350 1 PACKET: 17 POWDER, FOR SOLUTION ORAL at 05:30

## 2018-12-19 RX ADMIN — OMEGA-3 FATTY ACIDS CAP 1000 MG 1000 MG: 1000 CAP at 16:51

## 2018-12-19 RX ADMIN — IOHEXOL 10 ML: 180 INJECTION INTRAVENOUS at 12:15

## 2018-12-19 RX ADMIN — ACETAMINOPHEN 650 MG: 325 TABLET, FILM COATED ORAL at 05:30

## 2018-12-19 RX ADMIN — MULTIPLE VITAMINS W/ MINERALS TAB 1 TABLET: TAB at 05:31

## 2018-12-19 RX ADMIN — LIDOCAINE HYDROCHLORIDE 4 ML: 10 INJECTION, SOLUTION EPIDURAL; INFILTRATION; INTRACAUDAL; PERINEURAL at 16:16

## 2018-12-19 RX ADMIN — BUDESONIDE AND FORMOTEROL FUMARATE DIHYDRATE 2 PUFF: 160; 4.5 AEROSOL RESPIRATORY (INHALATION) at 21:33

## 2018-12-19 RX ADMIN — STANDARDIZED SENNA CONCENTRATE AND DOCUSATE SODIUM 2 TABLET: 8.6; 5 TABLET, FILM COATED ORAL at 05:31

## 2018-12-19 RX ADMIN — TIZANIDINE 4 MG: 4 TABLET ORAL at 16:50

## 2018-12-19 RX ADMIN — PREGABALIN 25 MG: 25 CAPSULE ORAL at 05:31

## 2018-12-19 RX ADMIN — ACETAMINOPHEN 650 MG: 325 TABLET, FILM COATED ORAL at 13:13

## 2018-12-19 RX ADMIN — BUDESONIDE AND FORMOTEROL FUMARATE DIHYDRATE 2 PUFF: 160; 4.5 AEROSOL RESPIRATORY (INHALATION) at 05:32

## 2018-12-19 RX ADMIN — TRAMADOL HYDROCHLORIDE 100 MG: 50 TABLET, FILM COATED ORAL at 03:29

## 2018-12-19 RX ADMIN — PREGABALIN 25 MG: 25 CAPSULE ORAL at 13:13

## 2018-12-19 RX ADMIN — OXYCODONE HYDROCHLORIDE 10 MG: 10 TABLET ORAL at 13:13

## 2018-12-19 RX ADMIN — TIZANIDINE 4 MG: 4 TABLET ORAL at 09:30

## 2018-12-19 RX ADMIN — OXYCODONE HYDROCHLORIDE 10 MG: 10 TABLET ORAL at 21:33

## 2018-12-19 RX ADMIN — OXYCODONE HYDROCHLORIDE 10 MG: 10 TABLET ORAL at 09:29

## 2018-12-19 RX ADMIN — STANDARDIZED SENNA CONCENTRATE AND DOCUSATE SODIUM 2 TABLET: 8.6; 5 TABLET, FILM COATED ORAL at 21:35

## 2018-12-19 RX ADMIN — LIDOCAINE 1 PATCH: 50 PATCH TOPICAL at 21:35

## 2018-12-19 RX ADMIN — OXYCODONE HYDROCHLORIDE 10 MG: 10 TABLET ORAL at 00:43

## 2018-12-19 RX ADMIN — TRAMADOL HYDROCHLORIDE 100 MG: 50 TABLET, FILM COATED ORAL at 16:50

## 2018-12-19 RX ADMIN — BUPIVACAINE HYDROCHLORIDE: 5 INJECTION, SOLUTION EPIDURAL; INTRACAUDAL; PERINEURAL at 16:17

## 2018-12-19 RX ADMIN — MORPHINE SULFATE 2 MG: 10 INJECTION INTRAVENOUS at 15:14

## 2018-12-19 ASSESSMENT — ENCOUNTER SYMPTOMS
HEADACHES: 1
BACK PAIN: 1
NAUSEA: 1
BLURRED VISION: 0
COUGH: 0
DEPRESSION: 0
SHORTNESS OF BREATH: 0
PALPITATIONS: 0
DOUBLE VISION: 0
FEVER: 0
BRUISES/BLEEDS EASILY: 0
CHILLS: 0
POLYDIPSIA: 0
MYALGIAS: 0
DIZZINESS: 0
VOMITING: 0
NERVOUS/ANXIOUS: 0

## 2018-12-19 ASSESSMENT — PAIN SCALES - GENERAL
PAINLEVEL_OUTOF10: 6
PAINLEVEL_OUTOF10: 4
PAINLEVEL_OUTOF10: 7
PAINLEVEL_OUTOF10: 7
PAINLEVEL_OUTOF10: 6
PAINLEVEL_OUTOF10: 4
PAINLEVEL_OUTOF10: 3
PAINLEVEL_OUTOF10: 2
PAINLEVEL_OUTOF10: 3
PAINLEVEL_OUTOF10: 6

## 2018-12-19 NOTE — PROGRESS NOTES
Internal Medicine Interval Note  Note Author: Sami Bhat M.D.     Name Angelo Magaña     1945   Age/Sex 73 y.o. male   MRN [id number]   Code Status full     After 5PM or if no immediate response to page, please call for cross-coverage  Attending/Team: Levon/Donnie See Patient List for primary contact information  Call [phone] to page    1st Call - Day Intern (R1):   Home 2nd Call - Day Sr. Resident (R2/R3):   Shaiuri         Reason for interval visit  (Principal Problem)   S1 Radiculopathy vs hip pain      Interval Problem Daily Status Update  (24 hours, problem oriented, brief subjective history, new lab/imaging data pertinent to that problem)   -73M, PMH of HTN, asthma, GERD, chronic back pain, h/o paraspinal abscess, hypothyroidism, recent L2-L3 fusion on ; presented to ED referred from neurosurgery Dr. Fowler for acute pain management and workup of worsening post-surgical lower back pain. MRI on  showing extradural fluid collection at L2-3. XR Hips showing postsurgical changes but no acute fracture. Neurosurgery following and recs included in plan.     -patient with continued back pain this morning, primary concern is pain management, no further concerns, no BM since admission  -Epidural spinal injection planned  -CT myelogram pending  -PT/OT pending      Review of Systems   Constitutional: Negative for chills and fever.   HENT: Negative for ear pain and hearing loss.    Eyes: Negative for blurred vision and double vision.   Respiratory: Negative for cough and shortness of breath.    Cardiovascular: Negative for chest pain and palpitations.   Gastrointestinal: Positive for nausea. Negative for vomiting.   Genitourinary: Negative for dysuria and hematuria.   Musculoskeletal: Positive for back pain and joint pain. Negative for myalgias.   Skin: Negative for itching and rash.   Neurological: Positive for headaches. Negative for dizziness.   Endo/Heme/Allergies: Negative for  polydipsia. Does not bruise/bleed easily.   Psychiatric/Behavioral: Negative for depression. The patient is not nervous/anxious.        Disposition/Barriers to discharge:   Pain management    Consultants/Specialty  Neurosurgery - Dr. Fowler  PCP: Jeovany Samson M.D.      Quality Measures  Quality-Core Measures   Reviewed items::  Labs reviewed and Medications reviewed  Gomes catheter::  No Gomes  DVT prophylaxis - mechanical:  SCDs          Physical Exam       Vitals:    12/19/18 0400 12/19/18 0727 12/19/18 0745 12/19/18 1541   BP: 101/70 114/76  114/68   Pulse: 81 78  87   Resp: 18 16  16   Temp: 37.3 °C (99.1 °F) 37.1 °C (98.8 °F)  37.6 °C (99.6 °F)   TempSrc: Temporal Temporal  Temporal   SpO2: 90% 92% 94% 98%   Weight:       Height:         Body mass index is 34.44 kg/m².    Oxygen Therapy:  Pulse Oximetry: 98 %, O2 (LPM): 0, O2 Delivery: None (Room Air)    Physical Exam   Constitutional: He is oriented to person, place, and time and well-developed, well-nourished, and in no distress. No distress.   HENT:   Head: Normocephalic and atraumatic.   Mouth/Throat: Oropharynx is clear and moist. No oropharyngeal exudate.   Eyes: Pupils are equal, round, and reactive to light. Conjunctivae and EOM are normal. Right eye exhibits no discharge. Left eye exhibits no discharge. No scleral icterus.   Neck: Normal range of motion. Neck supple. No tracheal deviation present.   Cardiovascular: Normal rate, regular rhythm, normal heart sounds and intact distal pulses.  Exam reveals no gallop and no friction rub.    No murmur heard.  Pulmonary/Chest: Effort normal and breath sounds normal. No respiratory distress. He has no wheezes. He has no rales. He exhibits no tenderness.   Abdominal: Soft. Bowel sounds are normal. He exhibits no distension and no mass. There is no tenderness. There is no rebound and no guarding.   Musculoskeletal: Normal range of motion. He exhibits no edema, tenderness or deformity.   Left buttock pain with  straight leg raise bilaterally   Neurological: He is alert and oriented to person, place, and time. He exhibits normal muscle tone. Coordination normal.   Skin: Skin is warm and dry. No rash noted. He is not diaphoretic. No erythema. No pallor.   Left knee scar, midline abdominal scar    Psychiatric: Mood and affect normal.             Assessment/Plan     * Radiculopathy of sacral region- (present on admission)   Assessment & Plan    -patient with low back pain more prominent on left, pain on palpation and with ROM  -S1 radiculopathy vs hip pain  -s/p L2-L3 fusion, lumbar laminectomy and discectomy L2-L3, hardware removal L4-L5, repositioning of L3 screw by Dr. Fowler on 11/19/2018  -XR Hips showing postsurgical changes but no acute fracture  -Neurosurgery, Dr. Fowler, following and recs included in plan    -Q 4 neuro check with fall precautions  -scheduled and prn pain management  -PT/OT consult pending  -Zofran for nausea PRN  -Bowel protocol  -avoiding NSAIDs, ASA; long acting blood thinning agents  -Epidural spinal injection planned  -CT myelogram pending     Anemia- (present on admission)   Assessment & Plan    -Chronic normocytic anemia, hemoglobin improving from baseline of about 8, stool occult blood was negative.  According to patient last colonoscopy was about 2 years ago and was normal.  Denies melena/dizziness/shortness of breath/chest pain.  -iron studies low; ferritin/B12/Folate/Free T4 non deficient    -Continue FeSO4 325 mg daily     Asymptomatic bacteriuria   Assessment & Plan    -UA showing bacteriuria, patient currently asymptomatic  -monitor for symptoms     BPH (benign prostatic hyperplasia)   Assessment & Plan    Stable. Pt denies LUTs    -Continue Flomax 0.4 mg daily  -CTM     Elevated alkaline phosphatase level   Assessment & Plan    Isolated in this patient with recent back surgery.     -Will continue to monitor  -Continue Vit D supplementation     Essential hypertension- (present on admission)    Assessment & Plan    Stable.  Normotensive on admission.    -Continue lisinopril 10 mg daily  -Continue to monitor     Dyslipidemia- (present on admission)   Assessment & Plan    Last lipid panel 11/20/2017 at goal.    -Continue atorvastatin 40 mg daily  -Lipid panel/A1C pending  -Continue to monitor     GERD (gastroesophageal reflux disease)- (present on admission)   Assessment & Plan    Stable disease.    -Continue Prilosec 20 mg daily  -Continue to monitor     Seasonal allergies- (present on admission)   Assessment & Plan    Stable.    -Continue cetirizine  -Continue to monitor     Asthma- (present on admission)   Assessment & Plan    Stable disease.  Uses Advair once a day.  No recent increase in need for Advair.    -Continue Advair  -Continue to monitor     Acquired hypothyroidism- (present on admission)   Assessment & Plan    -TSH elevated, Free T4 wnl    -Continue Synthroid 50 mcg daily  -TSH pending  -Continue to monitor

## 2018-12-19 NOTE — CARE PLAN
Problem: Safety  Goal: Will remain free from injury  Outcome: PROGRESSING AS EXPECTED  Alarms on and functioning.call light and tray table in reach.

## 2018-12-19 NOTE — CARE PLAN
Problem: Pain Management  Goal: Pain level will decrease to patient's comfort goal  Patient's pain remains in a high level with all PRNs on board.     Problem: Mobility  Goal: Risk for activity intolerance will decrease  Patient is unable to ambulate r/t pain.

## 2018-12-19 NOTE — THERAPY
PT eval attempted again; pt now to undergo CT myelogram of lumbar spine as well as additional xray. Will attempt later as able. If decision for surgery, then will cancel current order and await new orders post-op.

## 2018-12-19 NOTE — PROGRESS NOTES
Left hip xr are negative.    Recommend left L5,S1 obdulia.    Also, recommend lumbar CT myelogram.    If we detect something surgical, plan surgery this Friday.    I really appreciate nursing and [employer] help.

## 2018-12-19 NOTE — THERAPY
Attempted PT eval x2. In AM, too much pain. In PM, awaiting xray results. Will re-attempt as able.

## 2018-12-19 NOTE — THERAPY
Occupational Therapy Contact Note  OT eval attempted again. CT myelogram of lumbar spine and x-ray ordered, and depending on results, possible sx on Friday. Will reattempt as appropriate.

## 2018-12-19 NOTE — PROGRESS NOTES
Neurosurgery Progress Note    Subjective:  C/O: Lumbosacral pain and left hip/SI pain, occasional shooting pain into left buttock, no B/L complaints    Ambulatory  Voiding, passing flatus  Pain well controlled on oral medications  Denies new pain, numbness, weakness.   Denies HA or positional HA(although does have very brief HA with any position change), dizziness, chest pain, SOB, difficulty breathing, constitutional symptoms, GI symptoms  Dr. Fowler consulted this morning, recommends CT myelogram of lumbar spine, if anything surgical discovered will plan for on Friday.         Exam:  VSS  A&Ox4, NAD  NM: 5/5 hip flexion, knee extension, knee flexion, plantar flexion, dorsiflexion, EHL  Sensation intact and equal throughout all four extremities.   Abdomen: soft, non-tender  Non-labored breathing on room air, normal respiratory effort  Capillary refill in all four extremities <2 seconds  No LE edema, erythema, cyanosis, clubbing  Calves non-tender to compression bilat  Incision CDI,   Palpatory left SI pain and left hip bursal pain,   SLR left positive for L SI pain at 70 degrees, no left LE pain, R SLR negative   VIVIAN neg B/L  [place] neg  Gap test positive for left SI pain   Pain in left SI with log roll to left         BP  Min: 101/70  Max: 131/78  Pulse  Av.2  Min: 78  Max: 86  Resp  Av.6  Min: 16  Max: 18  Temp  Av.2 °C (99 °F)  Min: 37.1 °C (98.8 °F)  Max: 37.4 °C (99.4 °F)  SpO2  Av %  Min: 90 %  Max: 98 %    No Data Recorded    Recent Labs      18   0342   WBC  6.1  4.3*   RBC  4.11*  3.76*   HEMOGLOBIN  12.0*  11.0*   HEMATOCRIT  36.8*  34.2*   MCV  89.5  91.0   MCH  29.2  29.3   MCHC  32.6*  32.2*   RDW  47.8  48.4   PLATELETCT  210  180   MPV  9.1  9.3     Recent Labs      18   0342   SODIUM  135  137   POTASSIUM  4.1  3.9   CHLORIDE  102  104   CO2  24  28   GLUCOSE  96  90   BUN  14  11   CREATININE  0.95  0.87   CALCIUM  9.9  10.0      Recent Labs      12/19/18   0241   APTT  30.0   INR  1.04           Intake/Output       12/18/18 0700 - 12/19/18 0659 12/19/18 0700 - 12/20/18 0659      [id number] 5057-7424 Total 9874-2193 5875-9027 Total       Intake    P.O.  240  240 480  --  -- --    P.O. 240 240 480 -- -- --    Total Intake 240 240 480 -- -- --       Output    Urine  1400  450 1850  --  -- --    Urine Void (mL) 4082 960 0568 -- -- --    Total Output 7895 586 4290 -- -- --       Net I/O     -1160 -210 -1370 -- -- --            Intake/Output Summary (Last 24 hours) at 12/19/18 0839  Last data filed at 12/18/18 2000   Gross per 24 hour   Intake              480 ml   Output             1250 ml   Net             -770 ml            • budesonide-formoterol  2 Puff BID   • levothyroxine  50 mcg AM ES   • polyethylene glycol/lytes  1 Packet DAILY    And   • senna-docusate  2 Tab BID    And   • magnesium hydroxide  30 mL QDAY PRN    And   • bisacodyl  10 mg QDAY PRN   • pregabalin  25 mg TID   • Respiratory Care per Protocol   Continuous RT   • hydrALAZINE  10 mg Q4HRS PRN   • ondansetron  4 mg Q4HRS PRN   • ondansetron  4 mg Q4HRS PRN   • acetaminophen  650 mg Q6HRS   • atorvastatin  40 mg Nightly   • cetirizine  10 mg DAILY   • vitamin D  3,000 Units DAILY   • ferrous sulfate  325 mg DAILY   • lisinopril  10 mg DAILY   • fish oil  1,000 mg BID   • omeprazole  20 mg DAILY   • tamsulosin  0.4 mg AFTER BREAKFAST   • therapeutic multivitamin-minerals  1 Tab DAILY   • tizanidine  4 mg Q6HRS PRN   • oxyCODONE immediate-release  5 mg Q4HRS PRN    Or   • oxyCODONE immediate-release  10 mg Q4HRS PRN   • tramadol   mg Q6HRS PRN   • morphine injection  2 mg Q2HRS PRN   • lidocaine  1 Patch Q24HR       Assessment and Plan:  Hospital day #3  Prophylactic anticoagulation: yes         Start date/time: TBD per hospitalist team/ timing of injection and CT myelogram  Dr. Fowler recommends CT myelogram, will place order, patient has been made NPO overnight  Order  SCD   Order L5-S1 left RADHA   Consider left SI vs. Left hip intra articular injection should RADHA not provide diagnostic or therapeutic benefit  Patient agrees with treatment plan.   Case discussed with Dr. Fowler.

## 2018-12-20 LAB
BACTERIA UR CULT: ABNORMAL
BACTERIA UR CULT: ABNORMAL
SIGNIFICANT IND 70042: ABNORMAL
SITE SITE: ABNORMAL
SOURCE SOURCE: ABNORMAL

## 2018-12-20 PROCEDURE — G8988 SELF CARE GOAL STATUS: HCPCS | Mod: CI

## 2018-12-20 PROCEDURE — A9270 NON-COVERED ITEM OR SERVICE: HCPCS | Performed by: STUDENT IN AN ORGANIZED HEALTH CARE EDUCATION/TRAINING PROGRAM

## 2018-12-20 PROCEDURE — G8978 MOBILITY CURRENT STATUS: HCPCS | Mod: CK

## 2018-12-20 PROCEDURE — 97535 SELF CARE MNGMENT TRAINING: CPT

## 2018-12-20 PROCEDURE — A9270 NON-COVERED ITEM OR SERVICE: HCPCS | Performed by: NURSE PRACTITIONER

## 2018-12-20 PROCEDURE — 99232 SBSQ HOSP IP/OBS MODERATE 35: CPT | Mod: GC | Performed by: INTERNAL MEDICINE

## 2018-12-20 PROCEDURE — G8979 MOBILITY GOAL STATUS: HCPCS | Mod: CI

## 2018-12-20 PROCEDURE — 97166 OT EVAL MOD COMPLEX 45 MIN: CPT

## 2018-12-20 PROCEDURE — G8987 SELF CARE CURRENT STATUS: HCPCS | Mod: CK

## 2018-12-20 PROCEDURE — 700102 HCHG RX REV CODE 250 W/ 637 OVERRIDE(OP): Performed by: STUDENT IN AN ORGANIZED HEALTH CARE EDUCATION/TRAINING PROGRAM

## 2018-12-20 PROCEDURE — 770001 HCHG ROOM/CARE - MED/SURG/GYN PRIV*

## 2018-12-20 PROCEDURE — 97162 PT EVAL MOD COMPLEX 30 MIN: CPT

## 2018-12-20 PROCEDURE — 700101 HCHG RX REV CODE 250: Performed by: INTERNAL MEDICINE

## 2018-12-20 PROCEDURE — 700102 HCHG RX REV CODE 250 W/ 637 OVERRIDE(OP): Performed by: NURSE PRACTITIONER

## 2018-12-20 RX ADMIN — OXYCODONE HYDROCHLORIDE 10 MG: 10 TABLET ORAL at 04:36

## 2018-12-20 RX ADMIN — CETIRIZINE HYDROCHLORIDE 10 MG: 10 TABLET, FILM COATED ORAL at 04:41

## 2018-12-20 RX ADMIN — TRAMADOL HYDROCHLORIDE 100 MG: 50 TABLET, FILM COATED ORAL at 00:20

## 2018-12-20 RX ADMIN — BUDESONIDE AND FORMOTEROL FUMARATE DIHYDRATE 2 PUFF: 160; 4.5 AEROSOL RESPIRATORY (INHALATION) at 22:02

## 2018-12-20 RX ADMIN — OXYCODONE HYDROCHLORIDE 5 MG: 5 TABLET ORAL at 08:36

## 2018-12-20 RX ADMIN — ATORVASTATIN CALCIUM 40 MG: 40 TABLET, FILM COATED ORAL at 22:02

## 2018-12-20 RX ADMIN — PREGABALIN 25 MG: 25 CAPSULE ORAL at 04:41

## 2018-12-20 RX ADMIN — BUDESONIDE AND FORMOTEROL FUMARATE DIHYDRATE 2 PUFF: 160; 4.5 AEROSOL RESPIRATORY (INHALATION) at 04:40

## 2018-12-20 RX ADMIN — LIDOCAINE 1 PATCH: 50 PATCH TOPICAL at 22:02

## 2018-12-20 RX ADMIN — PREGABALIN 25 MG: 25 CAPSULE ORAL at 10:44

## 2018-12-20 RX ADMIN — PREGABALIN 25 MG: 25 CAPSULE ORAL at 17:44

## 2018-12-20 RX ADMIN — OXYCODONE HYDROCHLORIDE 10 MG: 10 TABLET ORAL at 10:44

## 2018-12-20 RX ADMIN — LEVOTHYROXINE SODIUM 50 MCG: 50 TABLET ORAL at 04:41

## 2018-12-20 RX ADMIN — OMEPRAZOLE 20 MG: 20 CAPSULE, DELAYED RELEASE ORAL at 04:41

## 2018-12-20 RX ADMIN — TRAMADOL HYDROCHLORIDE 100 MG: 50 TABLET, FILM COATED ORAL at 06:32

## 2018-12-20 RX ADMIN — TIZANIDINE 4 MG: 4 TABLET ORAL at 06:32

## 2018-12-20 RX ADMIN — OMEGA-3 FATTY ACIDS CAP 1000 MG 1000 MG: 1000 CAP at 04:41

## 2018-12-20 RX ADMIN — LISINOPRIL 10 MG: 10 TABLET ORAL at 04:41

## 2018-12-20 RX ADMIN — ACETAMINOPHEN 650 MG: 325 TABLET, FILM COATED ORAL at 04:41

## 2018-12-20 RX ADMIN — ACETAMINOPHEN 650 MG: 325 TABLET, FILM COATED ORAL at 00:20

## 2018-12-20 RX ADMIN — ACETAMINOPHEN 650 MG: 325 TABLET, FILM COATED ORAL at 10:44

## 2018-12-20 RX ADMIN — OMEGA-3 FATTY ACIDS CAP 1000 MG 1000 MG: 1000 CAP at 17:44

## 2018-12-20 RX ADMIN — TAMSULOSIN HYDROCHLORIDE 0.4 MG: 0.4 CAPSULE ORAL at 08:35

## 2018-12-20 RX ADMIN — ACETAMINOPHEN 650 MG: 325 TABLET, FILM COATED ORAL at 17:44

## 2018-12-20 RX ADMIN — POLYETHYLENE GLYCOL 3350 1 PACKET: 17 POWDER, FOR SOLUTION ORAL at 04:40

## 2018-12-20 RX ADMIN — MULTIPLE VITAMINS W/ MINERALS TAB 1 TABLET: TAB at 04:41

## 2018-12-20 RX ADMIN — STANDARDIZED SENNA CONCENTRATE AND DOCUSATE SODIUM 2 TABLET: 8.6; 5 TABLET, FILM COATED ORAL at 04:40

## 2018-12-20 RX ADMIN — MAGNESIUM HYDROXIDE 30 ML: 400 SUSPENSION ORAL at 04:40

## 2018-12-20 RX ADMIN — VITAMIN D, TAB 1000IU (100/BT) 3000 UNITS: 25 TAB at 04:41

## 2018-12-20 RX ADMIN — STANDARDIZED SENNA CONCENTRATE AND DOCUSATE SODIUM 2 TABLET: 8.6; 5 TABLET, FILM COATED ORAL at 17:44

## 2018-12-20 RX ADMIN — TIZANIDINE 4 MG: 4 TABLET ORAL at 00:20

## 2018-12-20 RX ADMIN — FERROUS SULFATE TAB 325 MG (65 MG ELEMENTAL FE) 325 MG: 325 (65 FE) TAB at 04:41

## 2018-12-20 ASSESSMENT — ENCOUNTER SYMPTOMS
BRUISES/BLEEDS EASILY: 0
COUGH: 0
NAUSEA: 1
POLYDIPSIA: 0
HEADACHES: 1
NERVOUS/ANXIOUS: 0
SHORTNESS OF BREATH: 0
MYALGIAS: 0
DIZZINESS: 0
VOMITING: 0
PALPITATIONS: 0
CHILLS: 0
DOUBLE VISION: 0
DEPRESSION: 0
BACK PAIN: 1
FEVER: 0
BLURRED VISION: 0

## 2018-12-20 ASSESSMENT — COGNITIVE AND FUNCTIONAL STATUS - GENERAL
MOVING TO AND FROM BED TO CHAIR: UNABLE
DAILY ACTIVITIY SCORE: 18
PERSONAL GROOMING: A LITTLE
WALKING IN HOSPITAL ROOM: A LITTLE
SUGGESTED CMS G CODE MODIFIER MOBILITY: CK
DRESSING REGULAR LOWER BODY CLOTHING: A LITTLE
SUGGESTED CMS G CODE MODIFIER DAILY ACTIVITY: CK
CLIMB 3 TO 5 STEPS WITH RAILING: A LITTLE
TOILETING: A LITTLE
HELP NEEDED FOR BATHING: A LOT
MOVING FROM LYING ON BACK TO SITTING ON SIDE OF FLAT BED: UNABLE
MOBILITY SCORE: 15
STANDING UP FROM CHAIR USING ARMS: A LITTLE
DRESSING REGULAR UPPER BODY CLOTHING: A LITTLE

## 2018-12-20 ASSESSMENT — GAIT ASSESSMENTS
GAIT LEVEL OF ASSIST: CONTACT GUARD ASSIST
ASSISTIVE DEVICE: FRONT WHEEL WALKER
DEVIATION: BRADYKINETIC;SHUFFLED GAIT;OTHER (COMMENT)
DISTANCE (FEET): 2

## 2018-12-20 ASSESSMENT — PAIN SCALES - GENERAL
PAINLEVEL_OUTOF10: 5
PAINLEVEL_OUTOF10: 3
PAINLEVEL_OUTOF10: 4
PAINLEVEL_OUTOF10: 8
PAINLEVEL_OUTOF10: 4
PAINLEVEL_OUTOF10: 3
PAINLEVEL_OUTOF10: 4

## 2018-12-20 ASSESSMENT — ACTIVITIES OF DAILY LIVING (ADL): TOILETING: INDEPENDENT

## 2018-12-20 NOTE — PROGRESS NOTES
Assumed patient care at 1900. Patient has verbalized his pain is better 5/10. PRN given, see MAR. He denies any numbness or tingling at this time. He is able to reposition himself from side to side. Bed alarm is in place. Call light within reach.

## 2018-12-20 NOTE — THERAPY
"Occupational Therapy Evaluation completed.   Functional Status: Pt seated in chair w/ \"adopted dtr\" in room. Req min A for don/doff of socks, reports lives w/ wife and able to assist, but \"dtr\" reported wife has Alzheimer's and he usually takes care of her. Sit>stand with CGA and FWW. Ambulated to sink with CGA. Completed oral care with SBA, supporting self with LUE, reported d/t pain/weakness in BLE. Asked pt if wanted to sit down, but reported he could continue. Tremors began in BLE, and chair provided for pt to sit, req CGA for stand>sit. Pt reported after 1 min break he could walk more, but continued to have BLE tremors. Pt asked therapist if there was someone in room on either side of him. When asked if he could see anyone, reported no, but wasn't sure as some items in room looked like the could be people standing. Demo'd nystagmus during tracking to L and R, and able to verbalize correctly 1/2 during peripheral vision screening; report has been meaning to get eyes checked, \"but with everything going on\" hasn't been able to. Demo'd poor insight to deficits, recall, and safety awareness.   Plan of Care: Will benefit from Occupational Therapy 3 times per week  Discharge Recommendations:  Equipment: Shower Chair, Grab Bars and Will Continue to Assess for Equipment Needs. Post-acute therapy: High fall risk. Recommend inpatient transitional care services for continued occupational therapy services. D/t observations and family report of changes in  cognition, recommend SLP cognitive evaluation.      See \"Rehab Therapy-Acute\" Patient Summary Report for complete documentation.    Pt is a 72 yo male admitted for intractable back pain s/p L5-S1 TF injection, and currently has an extradural postsurgical fluid collection at L2-3. PMHx of L2-3 fusion, L3-4 hardware removal, and L2 kyphoplasty on 11/20, as well as HTN, and DLD. Currently limited by pain, decreased cognition, activity tolerance, functional mobility, balance, " and visual deficits which are currently affecting pt's ability to complete ADLs/IADLs I'ly. Pt is currently a high fall risk. Currently recommend acute OT, and inpatient transitional care services for continued occupational therapy services prior to d/c home. D/t observations and family report of changes in cognition, recommend SLP cognitive evaluation.

## 2018-12-20 NOTE — OR SURGEON
Immediate Post- Operative Note        PostOp Diagnosis: low back pain with LLE radiculopathy      Procedure(s): LEFT L5-S1 transforaminal epidural steroid injection with anesthetic      Estimated Blood Loss: Less than 5 ml        Complications: None            12/19/2018     4:20 PM     Kj Thomas

## 2018-12-20 NOTE — RESPIRATORY CARE
COPD EDUCATION by COPD CLINICAL EDUCATOR  12/20/2018 at 11:22 AM by Blank Segal     Patient interviewed by COPD education team. Patient pleasantly refused COPD program. 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

## 2018-12-20 NOTE — PROGRESS NOTES
Per report from IR LEFT L5-S1 transforaminal epidural steroid injection with anesthetic. Vitals /74 95 on oxygen 2 LPM at this time. 83 HR, RR 18.

## 2018-12-20 NOTE — PROGRESS NOTES
IR Nursing Note:    Patient underwent a L5 -S1 transforaminal steroid injection by Dr. Thomas. Procedure site was marked by MD and verified using imaging guidance.  Patient was placed in a prone position.   A Band-aid dressing was placed over surgical site. Report called to Sue MIGUEL. Pt transported by bed with RN to S168.

## 2018-12-20 NOTE — PROGRESS NOTES
"Neurosurgery Progress Note    Subjective:  Overall feeling better, with less painful bed mobility, had left L5-S1 TF injection    Exam:  Strength good, remains with + left Si joint pain to palpation and left hip pain with external rotation more painful than internal rotation      MRI Ls w/wo   1.  Postsurgical and degenerative changes in the lumbar spine as described above.  2.  There is no central canal stenosis.  3.  The lumbar nerve roots are peripherally displaced at the levels of L4-5 and L5-S1 creating \"naked thecal sac\" sign. This finding likely represents chronic arachnoiditis. Moderate bilateral neural foraminal stenosis.  4.  Posterior extradural postsurgical fluid collection at the level of L2-3. The differential diagnosis includes postsurgical seroma and pseudomeningocele.    CT myelogram :  1.  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   L2-L5 posterior fixation hardware, L2, L3 laminectomies, L4, L5 posterior bony overgrowth encasing the dorsal aspect of the sac. No evidence of hardware failure/complication    3.  Several levels of foraminal stenoses with greatest narrowing seen on the right at L5/S1, severe. Lesser stenoses at other levels as detailed above    BP  Min: 96/58  Max: 114/68  Pulse  Av.8  Min: 80  Max: 92  Resp  Av  Min: 16  Max: 16  Temp  Av °C (98.6 °F)  Min: 36.3 °C (97.4 °F)  Max: 37.6 °C (99.7 °F)  SpO2  Av.8 %  Min: 93 %  Max: 99 %    No Data Recorded    Recent Labs      18   0342   WBC  6.1  4.3*   RBC  4.11*  3.76*   HEMOGLOBIN  12.0*  11.0*   HEMATOCRIT  36.8*  34.2*   MCV  89.5  91.0   MCH  29.2  29.3   MCHC  32.6*  32.2*   RDW  47.8  48.4   PLATELETCT  210  180   MPV  9.1  9.3     Recent Labs      18   0342   SODIUM  135  137   POTASSIUM  4.1  3.9   CHLORIDE  102  104   CO2  24  28   GLUCOSE  96  90   BUN  14  11   CREATININE  0.95  " 0.87   CALCIUM  9.9  10.0     Recent Labs      12/19/18   0241   APTT  30.0   INR  1.04           Intake/Output     None          No intake or output data in the 24 hours ending 12/20/18 0911         • Pharmacy Consult Request  1 Each PRN   • budesonide-formoterol  2 Puff BID   • levothyroxine  50 mcg AM ES   • polyethylene glycol/lytes  1 Packet DAILY    And   • senna-docusate  2 Tab BID    And   • magnesium hydroxide  30 mL QDAY PRN    And   • bisacodyl  10 mg QDAY PRN   • pregabalin  25 mg TID   • Respiratory Care per Protocol   Continuous RT   • hydrALAZINE  10 mg Q4HRS PRN   • ondansetron  4 mg Q4HRS PRN   • ondansetron  4 mg Q4HRS PRN   • acetaminophen  650 mg Q6HRS   • atorvastatin  40 mg Nightly   • cetirizine  10 mg DAILY   • vitamin D  3,000 Units DAILY   • ferrous sulfate  325 mg DAILY   • lisinopril  10 mg DAILY   • fish oil  1,000 mg BID   • omeprazole  20 mg DAILY   • tamsulosin  0.4 mg AFTER BREAKFAST   • therapeutic multivitamin-minerals  1 Tab DAILY   • tizanidine  4 mg Q6HRS PRN   • oxyCODONE immediate-release  5 mg Q4HRS PRN    Or   • oxyCODONE immediate-release  10 mg Q4HRS PRN   • tramadol   mg Q6HRS PRN   • morphine injection  2 mg Q2HRS PRN   • lidocaine  1 Patch Q24HR       Assessment and Plan:  Readmission for intractable back pain and related immobility    Sp L2-3 TLIF and L3-4 hardware removal with L2 kyphoplasty 11/19/18    Sp left L5-S1 transforaminal Jaskaran 12/19. Patient reports general improvement but has not been OOB yet    Sacroillities and left hip pain per exam, pelvic xray grossly unremarkable, consider MR arthrogram left hip prn    Plan: mobilize and monitor, may need SNF placement.     Prophylactic anticoagulation: no         Start date/time: once ambulatory and  no  need for further procedures anticipated     D/w Dr. Fowler

## 2018-12-20 NOTE — DISCHARGE PLANNING
"Anticipated Discharge Disposition: Awaiting medical team decision on discharge plan.     Action: LSW spoke with Janki ([phone]), pt's daughter. Janki stated that she is concerned about pt's medications and feels like pt is \"faking his pain\" in order to get medications because she feels like pt is addicted to the oxycodone. Janki stated that pt has been having hallucinations and has talked about suicide which he has never done before. Pt's wife has alzheimer's and she does not feel like pt is safe to discharge home. LSW discussed the potential discharge plan for pt and that PT/OT will see pt to evaluate for need. Janki stated she would like to be updated on pt's discharge plan as more information is accumulated.     Barriers to Discharge: None    Plan: Awaiting PT/OT recommendations.     "

## 2018-12-21 ENCOUNTER — HOSPITAL ENCOUNTER (INPATIENT)
Facility: REHABILITATION | Age: 73
End: 2018-12-21
Attending: PHYSICAL MEDICINE & REHABILITATION | Admitting: PHYSICAL MEDICINE & REHABILITATION
Payer: MEDICARE

## 2018-12-21 DIAGNOSIS — M54.18 RADICULOPATHY OF SACRAL REGION: ICD-10-CM

## 2018-12-21 LAB — TEST NAME 95000: NORMAL

## 2018-12-21 PROCEDURE — 97530 THERAPEUTIC ACTIVITIES: CPT

## 2018-12-21 PROCEDURE — 700102 HCHG RX REV CODE 250 W/ 637 OVERRIDE(OP): Performed by: NURSE PRACTITIONER

## 2018-12-21 PROCEDURE — 700102 HCHG RX REV CODE 250 W/ 637 OVERRIDE(OP): Performed by: STUDENT IN AN ORGANIZED HEALTH CARE EDUCATION/TRAINING PROGRAM

## 2018-12-21 PROCEDURE — 700111 HCHG RX REV CODE 636 W/ 250 OVERRIDE (IP): Performed by: STUDENT IN AN ORGANIZED HEALTH CARE EDUCATION/TRAINING PROGRAM

## 2018-12-21 PROCEDURE — 99222 1ST HOSP IP/OBS MODERATE 55: CPT | Performed by: PHYSICAL MEDICINE & REHABILITATION

## 2018-12-21 PROCEDURE — A9270 NON-COVERED ITEM OR SERVICE: HCPCS | Performed by: STUDENT IN AN ORGANIZED HEALTH CARE EDUCATION/TRAINING PROGRAM

## 2018-12-21 PROCEDURE — 99232 SBSQ HOSP IP/OBS MODERATE 35: CPT | Mod: GC | Performed by: INTERNAL MEDICINE

## 2018-12-21 PROCEDURE — 770001 HCHG ROOM/CARE - MED/SURG/GYN PRIV*

## 2018-12-21 PROCEDURE — 97535 SELF CARE MNGMENT TRAINING: CPT

## 2018-12-21 PROCEDURE — 99222 1ST HOSP IP/OBS MODERATE 55: CPT | Performed by: INTERNAL MEDICINE

## 2018-12-21 PROCEDURE — A9270 NON-COVERED ITEM OR SERVICE: HCPCS | Performed by: NURSE PRACTITIONER

## 2018-12-21 PROCEDURE — 97116 GAIT TRAINING THERAPY: CPT

## 2018-12-21 PROCEDURE — 700101 HCHG RX REV CODE 250: Performed by: INTERNAL MEDICINE

## 2018-12-21 RX ORDER — FERROUS SULFATE 325(65) MG
325 TABLET ORAL
Status: DISCONTINUED | OUTPATIENT
Start: 2018-12-23 | End: 2018-12-27 | Stop reason: HOSPADM

## 2018-12-21 RX ORDER — PREGABALIN 25 MG/1
50 CAPSULE ORAL 3 TIMES DAILY
Status: DISCONTINUED | OUTPATIENT
Start: 2018-12-21 | End: 2018-12-27 | Stop reason: HOSPADM

## 2018-12-21 RX ADMIN — LIDOCAINE 1 PATCH: 50 PATCH TOPICAL at 22:20

## 2018-12-21 RX ADMIN — STANDARDIZED SENNA CONCENTRATE AND DOCUSATE SODIUM 2 TABLET: 8.6; 5 TABLET, FILM COATED ORAL at 18:17

## 2018-12-21 RX ADMIN — TRAMADOL HYDROCHLORIDE 100 MG: 50 TABLET, FILM COATED ORAL at 08:54

## 2018-12-21 RX ADMIN — CETIRIZINE HYDROCHLORIDE 10 MG: 10 TABLET, FILM COATED ORAL at 06:08

## 2018-12-21 RX ADMIN — PREGABALIN 25 MG: 25 CAPSULE ORAL at 11:30

## 2018-12-21 RX ADMIN — PREGABALIN 25 MG: 25 CAPSULE ORAL at 06:07

## 2018-12-21 RX ADMIN — OMEPRAZOLE 20 MG: 20 CAPSULE, DELAYED RELEASE ORAL at 06:07

## 2018-12-21 RX ADMIN — ACETAMINOPHEN 650 MG: 325 TABLET, FILM COATED ORAL at 11:30

## 2018-12-21 RX ADMIN — OMEGA-3 FATTY ACIDS CAP 1000 MG 1000 MG: 1000 CAP at 18:17

## 2018-12-21 RX ADMIN — FERROUS SULFATE TAB 325 MG (65 MG ELEMENTAL FE) 325 MG: 325 (65 FE) TAB at 06:08

## 2018-12-21 RX ADMIN — PREGABALIN 50 MG: 25 CAPSULE ORAL at 18:17

## 2018-12-21 RX ADMIN — OXYCODONE HYDROCHLORIDE 10 MG: 10 TABLET ORAL at 22:20

## 2018-12-21 RX ADMIN — BUDESONIDE AND FORMOTEROL FUMARATE DIHYDRATE 2 PUFF: 160; 4.5 AEROSOL RESPIRATORY (INHALATION) at 06:09

## 2018-12-21 RX ADMIN — LISINOPRIL 10 MG: 10 TABLET ORAL at 06:07

## 2018-12-21 RX ADMIN — OXYCODONE HYDROCHLORIDE 10 MG: 10 TABLET ORAL at 18:17

## 2018-12-21 RX ADMIN — ACETAMINOPHEN 650 MG: 325 TABLET, FILM COATED ORAL at 06:07

## 2018-12-21 RX ADMIN — OXYCODONE HYDROCHLORIDE 10 MG: 10 TABLET ORAL at 02:36

## 2018-12-21 RX ADMIN — LEVOTHYROXINE SODIUM 50 MCG: 50 TABLET ORAL at 06:07

## 2018-12-21 RX ADMIN — ATORVASTATIN CALCIUM 40 MG: 40 TABLET, FILM COATED ORAL at 22:20

## 2018-12-21 RX ADMIN — MULTIPLE VITAMINS W/ MINERALS TAB 1 TABLET: TAB at 06:08

## 2018-12-21 RX ADMIN — BUDESONIDE AND FORMOTEROL FUMARATE DIHYDRATE 2 PUFF: 160; 4.5 AEROSOL RESPIRATORY (INHALATION) at 18:00

## 2018-12-21 RX ADMIN — TIZANIDINE 4 MG: 4 TABLET ORAL at 06:07

## 2018-12-21 RX ADMIN — VITAMIN D, TAB 1000IU (100/BT) 3000 UNITS: 25 TAB at 06:07

## 2018-12-21 RX ADMIN — TAMSULOSIN HYDROCHLORIDE 0.4 MG: 0.4 CAPSULE ORAL at 08:54

## 2018-12-21 RX ADMIN — OMEGA-3 FATTY ACIDS CAP 1000 MG 1000 MG: 1000 CAP at 06:08

## 2018-12-21 RX ADMIN — MORPHINE SULFATE 2 MG: 10 INJECTION INTRAVENOUS at 10:42

## 2018-12-21 RX ADMIN — ACETAMINOPHEN 650 MG: 325 TABLET, FILM COATED ORAL at 18:17

## 2018-12-21 ASSESSMENT — ENCOUNTER SYMPTOMS
COUGH: 0
DEPRESSION: 0
SHORTNESS OF BREATH: 0
NERVOUS/ANXIOUS: 0
BRUISES/BLEEDS EASILY: 0
DIZZINESS: 0
POLYDIPSIA: 0
PALPITATIONS: 0
DOUBLE VISION: 0
HEADACHES: 1
BACK PAIN: 1
MYALGIAS: 0
BLURRED VISION: 0
NAUSEA: 1
FEVER: 0
CHILLS: 0
VOMITING: 0

## 2018-12-21 ASSESSMENT — COGNITIVE AND FUNCTIONAL STATUS - GENERAL
MOVING FROM LYING ON BACK TO SITTING ON SIDE OF FLAT BED: UNABLE
SUGGESTED CMS G CODE MODIFIER DAILY ACTIVITY: CK
TOILETING: A LITTLE
STANDING UP FROM CHAIR USING ARMS: A LITTLE
CLIMB 3 TO 5 STEPS WITH RAILING: A LITTLE
DRESSING REGULAR UPPER BODY CLOTHING: A LITTLE
DAILY ACTIVITIY SCORE: 17
SUGGESTED CMS G CODE MODIFIER MOBILITY: CK
WALKING IN HOSPITAL ROOM: A LITTLE
MOBILITY SCORE: 15
DRESSING REGULAR LOWER BODY CLOTHING: A LOT
PERSONAL GROOMING: A LITTLE
HELP NEEDED FOR BATHING: A LOT
MOVING TO AND FROM BED TO CHAIR: UNABLE

## 2018-12-21 ASSESSMENT — PAIN SCALES - GENERAL
PAINLEVEL_OUTOF10: 5
PAINLEVEL_OUTOF10: 8
PAINLEVEL_OUTOF10: 3
PAINLEVEL_OUTOF10: 7

## 2018-12-21 ASSESSMENT — GAIT ASSESSMENTS
DEVIATION: BRADYKINETIC;SHUFFLED GAIT;ANTALGIC
GAIT LEVEL OF ASSIST: MINIMAL ASSIST
ASSISTIVE DEVICE: FRONT WHEEL WALKER
DISTANCE (FEET): 60

## 2018-12-21 NOTE — THERAPY
"Occupational Therapy Treatment completed with focus on ADLs, ADL transfers and patient education.  Functional Status:  Pt seen for OT tx. Mod A supine > sit, once seated EOB pt able to hold midline w/o assistance from therapist. Pt c/o high levels of pain. Mod A to don pants. Pt required cues for spinal precautions during session. Min A sit > stand, amb w/ FWW in room and hallway. Max A to don LSO seated EOB and standing. Pt continues to be limited by pain, generalized weakness and decreased activity tolerance. Pt pleasantly confused but expresses motivation to regain strength, endurance and independence in ADLs and ADL transfers.   Plan of Care: Will benefit from Occupational Therapy 3 times per week  Discharge Recommendations:  Equipment Will Continue to Assess for Equipment Needs.     See \"Rehab Therapy-Acute\" Patient Summary Report for complete documentation.   "

## 2018-12-21 NOTE — CARE PLAN
Problem: Communication  Goal: The ability to communicate needs accurately and effectively will improve  Outcome: PROGRESSING AS EXPECTED  Pt is able to voice his needs/feelings at this time.    Problem: Safety  Goal: Will remain free from falls  Outcome: PROGRESSING AS EXPECTED  Bed locked in lowest position, bed alarm on. Call light and personal belongings within reach. Hourly rounding.

## 2018-12-21 NOTE — PROGRESS NOTES
Neurosurgery Progress Note    Subjective:  Was only able to walk once yesterday AM with relative improvement sp left L5-S1 NF injection on , now c/o recurring pain left buttock, radiating over hip joint and into lat/post. Thigh, pain is contributing to legs buckling and shaking, denies headache: per imaging studies: right , not left NF stenosis L5-S1, fluid collection increased between MRI on  and CT myelogram : no headache, no motor deficit     Exam:  Incision: healed, flat  NM: full strength, no pain with resistance, + pain with palpation left L5-S1 facet joint, SIoint and external > internal rotation of hip, modest discomfort with hip palpation    BP  Min: 102/68  Max: 130/60  Pulse  Av.5  Min: 75  Max: 92  Resp  Av  Min: 16  Max: 16  Temp  Av.1 °C (98.7 °F)  Min: 36.7 °C (98.1 °F)  Max: 37.3 °C (99.2 °F)  SpO2  Av.3 %  Min: 92 %  Max: 94 %    No Data Recorded            Recent Labs      18   0241   APTT  30.0   INR  1.04           Intake/Output       18 0700 - 18 0659 18 0700 - 18 0659      [id number] Total [id number] Total       Intake    P.O.  --  -- --  200  -- 200    P.O. -- -- -- 200 -- 200    Total Intake -- -- -- 200 -- 200       Output    Urine  --  -- --  --  -- --    Number of Times Voided -- -- -- 2 x -- 2 x    Stool  --  -- --  --  -- --    Number of Times Stooled -- 1 x 1 x 1 x -- 1 x    Total Output -- -- -- -- -- --       Net I/O     -- -- -- 200 -- 200            Intake/Output Summary (Last 24 hours) at 18 0833  Last data filed at 18 0700   Gross per 24 hour   Intake              200 ml   Output                0 ml   Net              200 ml            • Pharmacy Consult Request  1 Each PRN   • budesonide-formoterol  2 Puff BID   • levothyroxine  50 mcg AM ES   • polyethylene glycol/lytes  1 Packet DAILY    And   • senna-docusate  2 Tab BID    And   • magnesium hydroxide  30 mL QDAY PRN    And   •  bisacodyl  10 mg QDAY PRN   • pregabalin  25 mg TID   • Respiratory Care per Protocol   Continuous RT   • hydrALAZINE  10 mg Q4HRS PRN   • ondansetron  4 mg Q4HRS PRN   • ondansetron  4 mg Q4HRS PRN   • acetaminophen  650 mg Q6HRS   • atorvastatin  40 mg Nightly   • cetirizine  10 mg DAILY   • vitamin D  3,000 Units DAILY   • ferrous sulfate  325 mg DAILY   • lisinopril  10 mg DAILY   • fish oil  1,000 mg BID   • omeprazole  20 mg DAILY   • tamsulosin  0.4 mg AFTER BREAKFAST   • therapeutic multivitamin-minerals  1 Tab DAILY   • tizanidine  4 mg Q6HRS PRN   • oxyCODONE immediate-release  5 mg Q4HRS PRN    Or   • oxyCODONE immediate-release  10 mg Q4HRS PRN   • tramadol   mg Q6HRS PRN   • morphine injection  2 mg Q2HRS PRN   • lidocaine  1 Patch Q24HR       Assessment and Plan:  Hospital day # 5: Readmission for intractable back pain and related immobility, brief improvement after left L5-S1 NF RADHA on 12/19      Sp L2-3 TLIF and L3-4 hardware removal with L2 kyphoplasty 11/19/18    Per exam: facetogenic/SI joint and left hip joint contributing pain generators    Prophylactic anticoagulation: no         Start date/time: TBD, depends on further treatment plan including possible interventions.    Will discuss with Dr. Fowler.

## 2018-12-21 NOTE — PROGRESS NOTES
Bedside report received from day shift RN, care assumed. Pt assessed, A&Ox4, denies any needs at this time. Call light and personal belongings within reach. Bed locked in lowest position, bed alarm on.

## 2018-12-21 NOTE — CARE PLAN
Problem: Nutritional:  Goal: Achieve adequate nutritional intake  Patient will consume >50% of meals   Outcome: MET Date Met: 12/21/18  Per chart pt PO %. Pt is eating well. RD available prn

## 2018-12-21 NOTE — PROGRESS NOTES
"       Internal Medicine Interval Note  Note Author: Sami Bhat M.D.     Name Angelo Magaña     1945   Age/Sex 73 y.o. male   MRN [id number]   Code Status full     After 5PM or if no immediate response to page, please call for cross-coverage  Attending/Team: Levon/Donnie See Patient List for primary contact information  Call [phone] to page    1st Call - Day Intern (R1):   Home 2nd Call - Day Sr. Resident (R2/R3):   Yamilex         Reason for interval visit  (Principal Problem)   S1 Radiculopathy vs hip pain      Interval Problem Daily Status Update  (24 hours, problem oriented, brief subjective history, new lab/imaging data pertinent to that problem)   -73M, PMH of HTN, asthma, GERD, chronic back pain, h/o paraspinal abscess, hypothyroidism, recent L2-L3 fusion on ; presented to ED referred from neurosurgery Dr. Fowler for acute pain management and workup of worsening post-surgical lower back pain. MRI on  showing extradural fluid collection at L2-3. XR Hips showing postsurgical changes but no acute fracture. Neurosurgery following and recs included in plan.     -patient with continued back pain this morning, primary concern is pain management, no further concerns, still no BM since admission  -Epidural spinal injection   -CT myelogram showing pseudomeningocele, surgical hardware, stenosis at multiple levels and sever at L5/S1, moderate bulging discs; results discussed with neurosurg plan per rec  -Neurosurg rec, mobilize and monitor, possible SNF placement, may consider MR arthorogram for left hip prn  -OT rec, inpatient transitional care services, SLP cog eval  -PT rec, speech therapy evaluation and probable placement until ambulatory  -discussed current mood with patient, he's upset that he's unable to take care of his wife with alzheimer's and family, he did state he \"wanted to kill himself\" when he was in pain but endorses he has no real intent and it was more of an exasperation " from the pain, denies SI/HI, he would like to become ambulatory again and go home to his family but understands his current limitations      Review of Systems   Constitutional: Negative for chills and fever.   HENT: Negative for ear pain and hearing loss.    Eyes: Negative for blurred vision and double vision.   Respiratory: Negative for cough and shortness of breath.    Cardiovascular: Negative for chest pain and palpitations.   Gastrointestinal: Positive for nausea. Negative for vomiting.   Genitourinary: Negative for dysuria and hematuria.   Musculoskeletal: Positive for back pain and joint pain. Negative for myalgias.   Skin: Negative for itching and rash.   Neurological: Positive for headaches. Negative for dizziness.   Endo/Heme/Allergies: Negative for polydipsia. Does not bruise/bleed easily.   Psychiatric/Behavioral: Negative for depression. The patient is not nervous/anxious.        Disposition/Barriers to discharge:   Pain management    Consultants/Specialty  Neurosurgery - Dr. Fowler  PCP: Jeovany Samson M.D.      Quality Measures  Quality-Core Measures   Reviewed items::  Labs reviewed and Medications reviewed  Gomes catheter::  No Gomes  DVT prophylaxis - mechanical:  SCDs          Physical Exam       Vitals:    12/19/18 2000 12/20/18 0400 12/20/18 0833 12/20/18 1635   BP: (!) 96/58 101/76 104/80 102/68   Pulse: 92 80 92 87   Resp: 16 16 16 16   Temp: 36.3 °C (97.4 °F) 36.3 °C (97.4 °F) 37.1 °C (98.7 °F) 36.7 °C (98.1 °F)   TempSrc: Temporal Temporal Temporal Temporal   SpO2: 94% 99% 93% 94%   Weight:       Height:         Body mass index is 34.44 kg/m².    Oxygen Therapy:  Pulse Oximetry: 94 %, O2 (LPM): 0, O2 Delivery: None (Room Air) (as found)    Physical Exam   Constitutional: He is oriented to person, place, and time and well-developed, well-nourished, and in no distress. No distress.   HENT:   Head: Normocephalic and atraumatic.   Mouth/Throat: Oropharynx is clear and moist. No oropharyngeal  exudate.   Eyes: Pupils are equal, round, and reactive to light. Conjunctivae and EOM are normal. Right eye exhibits no discharge. Left eye exhibits no discharge. No scleral icterus.   Neck: Normal range of motion. Neck supple. No tracheal deviation present.   Cardiovascular: Normal rate, regular rhythm, normal heart sounds and intact distal pulses.  Exam reveals no gallop and no friction rub.    No murmur heard.  Pulmonary/Chest: Effort normal and breath sounds normal. No respiratory distress. He has no wheezes. He has no rales. He exhibits no tenderness.   Abdominal: Soft. Bowel sounds are normal. He exhibits no distension and no mass. There is no tenderness. There is no rebound and no guarding.   Musculoskeletal: Normal range of motion. He exhibits no edema, tenderness or deformity.   Left buttock pain with straight leg raise bilaterally   Neurological: He is alert and oriented to person, place, and time. He exhibits normal muscle tone. Coordination normal.   Skin: Skin is warm and dry. No rash noted. He is not diaphoretic. No erythema. No pallor.   Left knee scar, midline abdominal scar    Psychiatric: Mood and affect normal.             Assessment/Plan     * Radiculopathy of sacral region- (present on admission)   Assessment & Plan    -patient with low back pain more prominent on left, pain on palpation and with ROM  -S1 radiculopathy vs hip pain  -s/p L2-L3 fusion, lumbar laminectomy and discectomy L2-L3, hardware removal L4-L5, repositioning of L3 screw by Dr. Fowler on 11/19/2018  -XR Hips showing postsurgical changes but no acute fracture  -CT myelogram showing pseudomeningocele, surgical hardware, stenosis at multiple levels and sever at L5/S1, moderate bulging discs  -Neurosurgery, Dr. Fowler, following and recs included in plan    -Q 4 neuro check with fall precautions  -scheduled and prn pain management  -PT/OT  -Zofran for nausea PRN  -Bowel protocol  -avoiding NSAIDs, ASA; long acting blood thinning  agents  -Epidural spinal injection planned  -CT myelogram pending     Lumbar back pain with radiculopathy affecting left lower extremity- (present on admission)   Assessment & Plan    -plan per radiculopathy of the sacral region       Anemia- (present on admission)   Assessment & Plan    -Chronic normocytic anemia, hemoglobin improving from baseline of about 8, stool occult blood was negative.  According to patient last colonoscopy was about 2 years ago and was normal.  Denies melena/dizziness/shortness of breath/chest pain.  -iron studies low; ferritin/B12/Folate/Free T4 non deficient    -Continue FeSO4 325 mg daily     Intractable low back pain- (present on admission)   Assessment & Plan    -plan per radiculopathy of the sacral region     Asymptomatic bacteriuria   Assessment & Plan    -UA showing bacteriuria, patient currently asymptomatic  -monitor for symptoms     BPH (benign prostatic hyperplasia)   Assessment & Plan    Stable. Pt denies LUTs    -Continue Flomax 0.4 mg daily  -CTM     Elevated alkaline phosphatase level   Assessment & Plan    Isolated in this patient with recent back surgery.     -Will continue to monitor  -Continue Vit D supplementation     Essential hypertension- (present on admission)   Assessment & Plan    Stable.  Normotensive on admission.    -Continue lisinopril 10 mg daily  -Continue to monitor     Dyslipidemia- (present on admission)   Assessment & Plan    Last lipid panel 11/20/2017 at goal.    -Continue atorvastatin 40 mg daily  -Lipid panel/A1C pending  -Continue to monitor     GERD (gastroesophageal reflux disease)- (present on admission)   Assessment & Plan    Stable disease.    -Continue Prilosec 20 mg daily  -Continue to monitor     Seasonal allergies- (present on admission)   Assessment & Plan    Stable.    -Continue cetirizine  -Continue to monitor     Asthma- (present on admission)   Assessment & Plan    Stable disease.  Uses Advair once a day.  No recent increase in need  for Advair.    -Continue Advair  -Continue to monitor     Acquired hypothyroidism- (present on admission)   Assessment & Plan    -TSH elevated, Free T4 wnl    -Continue Synthroid 50 mcg daily  -TSH pending  -Continue to monitor

## 2018-12-21 NOTE — THERAPY
"Physical Therapy Treatment completed.   Bed Mobility:  Supine to Sit: Moderate Assist  Transfers: Sit to Stand: Minimal Assist  Gait: Level Of Assist: Minimal Assist with Front-Wheel Walker       Plan of Care: Will benefit from Physical Therapy 4 times per week  Discharge Recommendations: Equipment: Will Continue to Assess for Equipment Needs.     See \"Rehab Therapy-Acute\" Patient Summary Report for complete documentation.     Pt pleasant and agreeable to therapy session. Pt reports he is in pain but this is \"normal recently\". Pt required modA for supine to sit today with cues for sequencing. Pt was able to increase total gait distance today but required Shaista for balance and safety. Pt demonstrates short shuffling gait pattern with frequent cues to keep fww close as he had tendency to push too far forward increasing his risk for falling. Pt required education and cues to not push pass his limit for pain management during gait. Pt was adamant about cotinuing to walk further despite pain and pt physically becoming more tremulous due to fatigue. Pt at this time is below his baseline and will benefit from continued acute skilled therapy to progress mobility. Continue to recommend post acute placement at discharge. Will continue to follow while in house.  "

## 2018-12-21 NOTE — CONSULTS
[facility] Consult.   Patient Name: Angelo Magaña  Patient Age, Gender: 73 y.o., male  Date of Consult:12/21/2018      Name of Requesting Consultant(s): Antoni Dos Santos M.D.  Consultant: Jeovany Samson M.D.  Reason for Consult: polypharmacy, impaired mobility    Chief Complaint:   Chief Complaint   Patient presents with   • Sent by MD     post op back surgery 3 weeks, increased pain        History of Present Illness:  Angelo is a 73 y.o. male admitted 4 days ago for intractable lower back pain with radiation.  I am the patient's primary care physician and have been following him for about 9 months.  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  He initially got his repeat back surgery with Dr. pitts about a month ago in hopes that it would improve his mobility and his ability to care for his family, however he tells me today that after the surgery he initially was able to walk around and had some postoperative pain but was doing well.  During his time at home however he had progressively worsening pain that appeared to transition more to his left buttocks.  He says that he continues to have lower back pain with occasional shooting pains down both of his legs, left worse than right currently.  He says that the morphine is the most effective medication as it works the fastest.    Since the patient's admission is received imaging of his hips, lumbar spine with x-rays.  He received a CT L-spine.  The major finding on these was an 8 cm dorsal fluid collection around L1-L3 communicating with the thecal sac.  The patient received an IR guided sacroiliac joint steroid injection.  The patient says he has not noticed any real improvement from this at this point.    The patient had a physical medicine and rehabilitation consult this morning who recommended the patient get a steroid shot for possible potential greater trochanteric  bursitis or gluteus medius.  They also recommended increasing his Lyrica from 25 mg3 times daily to 75 mg 3 times daily tendinopathy.    During our visit the patient tells me that he has had his , Itz, come by which has been very helpful.  He says he has been more emotional liable easier for him to come to tears as he thinks about his family and his inability to currently help him.  He denies any recent confusion.  He denies any constipation though he said he had a big bowel movement today after not having one since Sunday.  Denies any urination issue denies any breathing issues.    ROS:A 14 sytem ROS is negative other than as stated above in HPI.     Past Medical History:   Diagnosis Date   • ASTHMA     scheduled inhaler use   • Bronchitis 02/2018   • Erectile dysfunction 6/8/2016   • GERD (gastroesophageal reflux disease) 6/8/2016   • Heart burn    • History of skin cancer 6/8/2016   • Hyperlipidemia 6/8/2016   • Hypertension    • Hypothyroidism 6/8/2016   • Indigestion    • Obesity 6/8/2016   • Pain 04/19/2018    chronic back pain, 0/10   • Preventative health care 6/8/2016   • Rosacea 6/8/2016   • Seasonal allergies 6/8/2016       Current Facility-Administered Medications:   •  Pharmacy Consult Request ...Pain Management Review 1 Each, 1 Each, Other, PRN, Kj Thomas M.D.  •  budesonide-formoterol (SYMBICORT) 160-4.5 MCG/ACT inhaler 2 Puff, 2 Puff, Inhalation, BID, Antoni Dos Santos M.D., 2 Puff at 12/21/18 0609  •  levothyroxine (SYNTHROID) tablet 50 mcg, 50 mcg, Oral, AM ES, Louise Samayoa M.D., 50 mcg at 12/21/18 0607  •  senna-docusate (PERICOLACE or SENOKOT S) 8.6-50 MG per tablet 2 Tab, 2 Tab, Oral, BID, Stopped at 12/21/18 0600 **AND** polyethylene glycol/lytes (MIRALAX) PACKET 1 Packet, 1 Packet, Oral, DAILY, Stopped at 12/21/18 0600 **AND** magnesium hydroxide (MILK OF MAGNESIA) suspension 30 mL, 30 mL, Oral, QDAY PRN, 30 mL at 12/20/18 0440 **AND** bisacodyl (DULCOLAX) suppository 10 mg,  10 mg, Rectal, QDAY PRN, Bebe Kaminski M.D.  •  pregabalin (LYRICA) capsule 25 mg, 25 mg, Oral, TID, Ellen Selby, A.P.N., 25 mg at 12/21/18 1130  •  Respiratory Care per Protocol, , Nebulization, Continuous RT, Louise Samayoa M.D.  •  hydrALAZINE (APRESOLINE) injection 10 mg, 10 mg, Intravenous, Q4HRS PRN, Louise Samayoa M.D.  •  ondansetron (ZOFRAN) syringe/vial injection 4 mg, 4 mg, Intravenous, Q4HRS PRN, Louise Samayoa M.D.  •  ondansetron (ZOFRAN ODT) dispertab 4 mg, 4 mg, Oral, Q4HRS PRN, Louise Samayoa M.D.  •  acetaminophen (TYLENOL) tablet 650 mg, 650 mg, Oral, Q6HRS, Louise Samayoa M.D., 650 mg at 12/21/18 1130  •  atorvastatin (LIPITOR) tablet 40 mg, 40 mg, Oral, Nightly, Louise Samayoa M.D., 40 mg at 12/20/18 2202  •  cetirizine (ZYRTEC) tablet 10 mg, 10 mg, Oral, DAILY, Louise Samayoa M.D., 10 mg at 12/21/18 0608  •  vitamin D (cholecalciferol) tablet 3,000 Units, 3,000 Units, Oral, DAILY, Louise Samayoa M.D., 3,000 Units at 12/21/18 0607  •  ferrous sulfate tablet 325 mg, 325 mg, Oral, DAILY, Louise Samayoa M.D., 325 mg at 12/21/18 0608  •  lisinopril (PRINIVIL) 10 MG tablet 10 mg, 10 mg, Oral, DAILY, Louise Samayoa M.D., 10 mg at 12/21/18 0607  •  fish oil capsule 1,000 mg, 1,000 mg, Oral, BID, Louise Samayoa M.D., 1,000 mg at 12/21/18 0608  •  omeprazole (PRILOSEC) capsule 20 mg, 20 mg, Oral, DAILY, Louise Samayoa M.D., 20 mg at 12/21/18 0607  •  tamsulosin (FLOMAX) capsule 0.4 mg, 0.4 mg, Oral, AFTER BREAKFAST, Louise Samayoa M.D., 0.4 mg at 12/21/18 0854  •  therapeutic multivitamin-minerals (THERAGRAN-M) tablet 1 Tab, 1 Tab, Oral, DAILY, Louise Samayoa M.D., 1 Tab at 12/21/18 0608  •  tizanidine (ZANAFLEX) tablet 4 mg, 4 mg, Oral, Q6HRS PRN, Louise Samayoa M.D., 4 mg at 12/21/18 0607  •  oxyCODONE immediate-release (ROXICODONE) tablet 5 mg, 5 mg, Oral, Q4HRS PRN, 5 mg at 12/20/18 0836 **OR** oxyCODONE immediate release (ROXICODONE) tablet 10 mg, 10  mg, Oral, Q4HRS PRN, Louise Samayoa M.D., 10 mg at 12/21/18 0236  •  tramadol (ULTRAM) 50 MG tablet  mg,  mg, Oral, Q6HRS PRN, Louise Samayoa M.D., 100 mg at 12/21/18 0854  •  morphine (pf) 10 mg/mL injection 2 mg, 2 mg, Intravenous, Q2HRS PRN, Louise Samayoa M.D., 2 mg at 12/21/18 1042  •  lidocaine (LIDODERM) 5 % 1 Patch, 1 Patch, Transdermal, Q24HR, Antelmo Valentine M.D., 1 Patch at 12/20/18 2202  Social History     Social History   • Marital status:      Spouse name: N/A   • Number of children: N/A   • Years of education: N/A     Occupational History   • Not on file.     Social History Main Topics   • Smoking status: Never Smoker   • Smokeless tobacco: Never Used   • Alcohol use Yes      Comment: 1/week    • Drug use: Yes     Types: Marijuana      Comment: marijuana edibles (for pain control)   • Sexual activity: Not on file     Other Topics Concern   • Not on file     Social History Narrative   • No narrative on file     Family History   Problem Relation Age of Onset   • Hypertension Mother    • Dementia Mother    • Hypertension Father    • Lung Disease Father         asthma     Past Surgical History:   Procedure Laterality Date   • LUMBAR FUSION O-ARM N/A 11/19/2018    Procedure: LUMBAR FUSION O-ARM-  POSTERIOR L2-3 TLIF,;  Surgeon: Brandon Fowler M.D.;  Location: [facility];  Service: Neurosurgery   • LUMBAR LAMINECTOMY DISKECTOMY N/A 11/19/2018    Procedure: LUMBAR LAMINECTOMY DISKECTOMY-  L2-3 LAMI;  Surgeon: Brandon Fowler M.D.;  Location: SURGERY [facility];  Service: Neurosurgery   • HARDWARE REMOVAL NEURO N/A 11/19/2018    Procedure: HARDWARE REMOVAL NEURO-  L4-5, L3 SCREW REPOSITIONING;  Surgeon: Brandon Fowler M.D.;  Location: [facility];  Service: Neurosurgery   • KYPHOPLASTY N/A 11/19/2018    Procedure: KYPHOPLASTY-  L2;  Surgeon: Brandon Fowler M.D.;  Location: [facility];  Service: Neurosurgery   • VENTRAL HERNIA  "REPAIR Left 5/3/2018    Procedure: VENTRAL HERNIA REPAIR FOR LUMBAR HERNIA/ LUNG HERNIA THROUGH CHEST WALL RECONSTRUCTION, MAJOR  ;  Surgeon: Phoenix Becker M.D.;  Location: SURGERY [facility];  Service: General   • IRRIGATION & DEBRIDEMENT NEURO  3/30/2017    Procedure: IRRIGATION & DEBRIDEMENT NEURO-LUMBAR WOUND;  Surgeon: Josiah Chakraborty M.D.;  Location: SURGERY [facility];  Service:    • LUMBAR FUSION POSTERIOR N/A 3/17/2017    Procedure: LUMBAR FUSION POSTERIOR L3-4 EXTENSION;  Surgeon: Josiah Chakraborty M.D.;  Location: SURGERY [facility];  Service:    • LUMBAR LAMINECTOMY DISKECTOMY N/A 3/17/2017    Procedure: LUMBAR LAMINECTOMY DISKECTOMY L3 & REDO L4;  Surgeon: Josiah Chakraborty M.D.;  Location: SURGERY [facility];  Service:    • HARDWARE REMOVAL NEURO N/A 3/17/2017    Procedure: HARDWARE REMOVAL NEURO L4-5;  Surgeon: Josiah Chakraborty M.D.;  Location: SURGERY [facility];  Service:    • LUMBAR FUSION POSTERIOR  9/9/2009    Performed by JOSIAH CHAKRABORTY at SURGERY [facility]   • LUMBAR LAMINECTOMY DISKECTOMY  9/9/2009    Performed by JOSIAH CHAKRABORTY at SURGERY [facility]   • KNEE ARTHROPLASTY TOTAL  5/26/2009    Performed by NAREN DANIELSON at SURGERY [facility]   • HIP ARTHROPLASTY TOTAL  1/21/2009    Performed by NAREN DANIELSON at SURGERY [facility]   • INGUINAL HERNIA REPAIR  9/2/08    Performed by PHOENIX BECKER at SURGERY SAME DAY [facility]   • INGUINAL HERNIA REPAIR  6/10/08    Performed by PHOENIX BECKER at SURGERY SAME DAY [facility]   • HERNIA REPAIR  2008    HERNIA    • ROMAN BY LAPAROSCOPY  1982   • APPENDECTOMY  1953   • TONSILLECTOMY  1951   • OTHER      DISCECTOMY WITH HARWWARE   • OTHER      GASTRIC BYPASS AT 30 YEARS         Physical Exam:  /63   Pulse 81   Temp 37 °C (98.6 °F) (Temporal)   Resp 16   Ht 1.778 m (5' 10\")   Wt 108.9 kg (240 lb)   SpO2 96%   BMI 34.44 kg/m²    General: No acute distress, pleasant, alert, oriented to self, " time, situation, place  Cardiovascular: 2+ radial pulses bilaterally 2+ DP pulses bilaterally  Lungs: Normal effort  Abdomen: Soft, nontender, nondistended, obese  Extremities: Left midline knee scar noted  Back: Well-healing midline lumbar back incision, point palpation over greater trochanter on the left, no bony tenderness or soft tissue tenderness noted on the right lower extremity  Neuro: 5 out of 5 strength in lower extremities bilaterally proximal and distal  Delirium Screen: Negative, no observational signs of disorganized thinking and attention fluctuating mental status.  Formal test of attention not completed today  Vision Screen: Grossly normal  Hearing Screen: Grossly normal      Labs: CBC reviewed hemoglobin 11, ferritin recently less around 20, CMP normal, UA reviewed, TSH reviewed, B12 level reviewed  Imaging: Reviewed back imaging   EKG: QTC this admission was 430    Assessment: 73-year-old male with past medical history of chronic back pain and multiple back surgeries presents with intractable pain 1 month postoperative from a L2-L3 transforaminal lumbar interbody and posteriolateral fusion, with hardware removal.      Plan:  Intractable back pain  -Primary physicians and physical medicine and rehabilitation are now addressing this issue.  -Overall I agree with the course of management as document by physical medicine rehabilitation.  -His and my exam is consistent with a bursitis and perhaps treatment with an intra-bursal steroid will be helpful, however it appears they are planning on doing another IR guided sacroiliac joint steroid injection.  -Patient is received one IR guided sacroiliac joint, they are planning on repeating it on the opposite side today.  -It also seems reasonable to increase his Lyrica to more appropriate dose.  Would recommend up titrating to 50 mg 3 times daily to avoid polypharmacy  -The patient is using a considerable amount of morphine equivalents per day.  If the  intrabursal steroid injection and increase in Lyrica do not have an appropriate effect on the patient's pain may want to consider the use of long-acting pain medications.  Methadone may be a good choice given its ability to affect the NMDA receptor and the patient has a QTC of 436ms.  -I encouraged the patient to continue to maximize his mobility and he is open to being discharged to rehab at this time.  -Recommend continue monitoring of the dorsal fluid collection as there is potential this mass occupying lesion could be impinging his nerves making his pain worse.  -Continue scheduled Tylenol  -Monitor for constipation.    Iron deficiency anemia  History of intestinal AVMs seen on colonoscopy 2017  Labs are consistent.  Recommend iron supplementation every other day other day    Hypothyroidism  -Patient was unlikely taking his medication consistently at home based on my previous discussions with him at home.  Continue 50 mcg dosage and will need to recheck a TSH as an outpatient.    Interventions to be considered in all patients in order to minimize the risk of delirium.   -do not disturb patient (vitals or lab draws) between the hours of 10 PM and 6 AM.  -ideally the patient should not sleep during the day and we should avoid day time naps.   -up in chair for meals  -ambulate at least three times daily, as able  -watch for constipation  -timed voiding - ask patient is she would like to go to the bathroom q 2-3 hours, except during the do not disturb hours.   -remove all necessary lines (central lines, peripheral IVs, feeding tubes, ruiz catheters)  -unless patient has shown harm to self or others I would recommend against use of restraints - either chemical or physical (antipsychotics)   -minimize polypharmacy, do not dose medication during sleep hours       Thank you for this interesting consult.    We will continue to follow that patient along with you on Monday if the patient is still admitted.   Unfortunately  we do not have weekend or night time coverage.        Jeovany Samson M.D.  Geriatric Attending  [facility]  Available Monday-Friday 8:00-5:00 (excudling holidays)    This note was partially dictated with voice recognition software, for any confusion please do not hesitate to contact me.       Direct Links to Patient Handouts:    CDC Healthy Aging  NIH National [place] on Aging  [facility] Patient Resources    Links that can be Cut and Paste into your browser:    Healthy Aging  www.healthinaging.org  Staying Active  www.activeandhealthy.nsw.gov.au  Geriatrics Online   [email or web address]

## 2018-12-21 NOTE — THERAPY
"Physical Therapy Evaluation completed.   Bed Mobility:  Supine to Sit: Contact Guard Assist (pt requesting LE assist)  Transfers: Sit to Stand: Contact Guard Assist (with FWW)  Gait: Level Of Assist: Contact Guard Assist with Front-Wheel Walker  X 5ft      Plan of Care: Will benefit from Physical Therapy 4 times per week  Discharge Recommendations: Equipment: Will Continue to Assess for Equipment Needs.       Pt presents with impaired activity tolerance, dynamic balance, LE motor control and strength associated with c/c LBP. Pt with significant guarding of right LE, movement stimuli does not appear to always correlate to mechanical exam; jerking from pain without stimuli at times; no dermatome/myotome pattern coorelation, pt self localizing to left piriformis but again does not always coorelate to symptoms; pt has a mind set of 'surgery is all that will fix it'; very clear cognitive involvement, reports he was taking his year old codeine prior to admit; CT does report 8 cm fluid collection but does not appear to correlate to physical exam; in current condition would recommend speech therapy evaluation and probable placement until ambulatory. Emotional support will likely be a large part of physical rehab.         See \"Rehab Therapy-Acute\" Patient Summary Report for complete documentation.     "

## 2018-12-22 LAB
ALBUMIN SERPL BCP-MCNC: 3.6 G/DL (ref 3.2–4.9)
ALBUMIN/GLOB SERPL: 1.5 G/DL
ALP SERPL-CCNC: 105 U/L (ref 30–99)
ALT SERPL-CCNC: 19 U/L (ref 2–50)
ANION GAP SERPL CALC-SCNC: 5 MMOL/L (ref 0–11.9)
AST SERPL-CCNC: 19 U/L (ref 12–45)
BASOPHILS # BLD AUTO: 0.6 % (ref 0–1.8)
BASOPHILS # BLD: 0.03 K/UL (ref 0–0.12)
BILIRUB SERPL-MCNC: 0.5 MG/DL (ref 0.1–1.5)
BUN SERPL-MCNC: 17 MG/DL (ref 8–22)
CALCIUM SERPL-MCNC: 10 MG/DL (ref 8.5–10.5)
CHLORIDE SERPL-SCNC: 106 MMOL/L (ref 96–112)
CO2 SERPL-SCNC: 27 MMOL/L (ref 20–33)
CREAT SERPL-MCNC: 0.96 MG/DL (ref 0.5–1.4)
EOSINOPHIL # BLD AUTO: 0.32 K/UL (ref 0–0.51)
EOSINOPHIL NFR BLD: 6.8 % (ref 0–6.9)
ERYTHROCYTE [DISTWIDTH] IN BLOOD BY AUTOMATED COUNT: 49.1 FL (ref 35.9–50)
GLOBULIN SER CALC-MCNC: 2.4 G/DL (ref 1.9–3.5)
GLUCOSE SERPL-MCNC: 98 MG/DL (ref 65–99)
HCT VFR BLD AUTO: 37.2 % (ref 42–52)
HGB BLD-MCNC: 11.8 G/DL (ref 14–18)
IMM GRANULOCYTES # BLD AUTO: 0.02 K/UL (ref 0–0.11)
IMM GRANULOCYTES NFR BLD AUTO: 0.4 % (ref 0–0.9)
LYMPHOCYTES # BLD AUTO: 1.04 K/UL (ref 1–4.8)
LYMPHOCYTES NFR BLD: 22 % (ref 22–41)
MCH RBC QN AUTO: 29.2 PG (ref 27–33)
MCHC RBC AUTO-ENTMCNC: 31.7 G/DL (ref 33.7–35.3)
MCV RBC AUTO: 92.1 FL (ref 81.4–97.8)
MONOCYTES # BLD AUTO: 0.38 K/UL (ref 0–0.85)
MONOCYTES NFR BLD AUTO: 8 % (ref 0–13.4)
NEUTROPHILS # BLD AUTO: 2.94 K/UL (ref 1.82–7.42)
NEUTROPHILS NFR BLD: 62.2 % (ref 44–72)
NRBC # BLD AUTO: 0 K/UL
NRBC BLD-RTO: 0 /100 WBC
PLATELET # BLD AUTO: 188 K/UL (ref 164–446)
PMV BLD AUTO: 9.4 FL (ref 9–12.9)
POTASSIUM SERPL-SCNC: 4.2 MMOL/L (ref 3.6–5.5)
PROT SERPL-MCNC: 6 G/DL (ref 6–8.2)
RBC # BLD AUTO: 4.04 M/UL (ref 4.7–6.1)
SODIUM SERPL-SCNC: 138 MMOL/L (ref 135–145)
WBC # BLD AUTO: 4.7 K/UL (ref 4.8–10.8)

## 2018-12-22 PROCEDURE — 20610 DRAIN/INJ JOINT/BURSA W/O US: CPT | Performed by: PHYSICAL MEDICINE & REHABILITATION

## 2018-12-22 PROCEDURE — 700102 HCHG RX REV CODE 250 W/ 637 OVERRIDE(OP): Performed by: STUDENT IN AN ORGANIZED HEALTH CARE EDUCATION/TRAINING PROGRAM

## 2018-12-22 PROCEDURE — 3E0U3BZ INTRODUCTION OF ANESTHETIC AGENT INTO JOINTS, PERCUTANEOUS APPROACH: ICD-10-PCS | Performed by: PHYSICAL MEDICINE & REHABILITATION

## 2018-12-22 PROCEDURE — 99232 SBSQ HOSP IP/OBS MODERATE 35: CPT | Mod: GC | Performed by: INTERNAL MEDICINE

## 2018-12-22 PROCEDURE — 20611 DRAIN/INJ JOINT/BURSA W/US: CPT

## 2018-12-22 PROCEDURE — 80053 COMPREHEN METABOLIC PANEL: CPT

## 2018-12-22 PROCEDURE — 770001 HCHG ROOM/CARE - MED/SURG/GYN PRIV*

## 2018-12-22 PROCEDURE — A9270 NON-COVERED ITEM OR SERVICE: HCPCS | Performed by: STUDENT IN AN ORGANIZED HEALTH CARE EDUCATION/TRAINING PROGRAM

## 2018-12-22 PROCEDURE — 700111 HCHG RX REV CODE 636 W/ 250 OVERRIDE (IP): Mod: JG | Performed by: PHYSICAL MEDICINE & REHABILITATION

## 2018-12-22 PROCEDURE — 3E0U33Z INTRODUCTION OF ANTI-INFLAMMATORY INTO JOINTS, PERCUTANEOUS APPROACH: ICD-10-PCS | Performed by: PHYSICAL MEDICINE & REHABILITATION

## 2018-12-22 PROCEDURE — 85025 COMPLETE CBC W/AUTO DIFF WBC: CPT

## 2018-12-22 PROCEDURE — 700101 HCHG RX REV CODE 250: Performed by: INTERNAL MEDICINE

## 2018-12-22 PROCEDURE — 36415 COLL VENOUS BLD VENIPUNCTURE: CPT

## 2018-12-22 PROCEDURE — 700111 HCHG RX REV CODE 636 W/ 250 OVERRIDE (IP): Performed by: STUDENT IN AN ORGANIZED HEALTH CARE EDUCATION/TRAINING PROGRAM

## 2018-12-22 RX ORDER — TRIAMCINOLONE ACETONIDE 40 MG/ML
40 INJECTION, SUSPENSION INTRA-ARTICULAR; INTRAMUSCULAR ONCE
Status: COMPLETED | OUTPATIENT
Start: 2018-12-22 | End: 2018-12-22

## 2018-12-22 RX ORDER — BUPIVACAINE HYDROCHLORIDE 2.5 MG/ML
10 INJECTION, SOLUTION EPIDURAL; INFILTRATION; INTRACAUDAL ONCE
Status: COMPLETED | OUTPATIENT
Start: 2018-12-22 | End: 2018-12-22

## 2018-12-22 RX ADMIN — MAGNESIUM HYDROXIDE 30 ML: 400 SUSPENSION ORAL at 16:48

## 2018-12-22 RX ADMIN — TRIAMCINOLONE ACETONIDE 40 MG: 40 INJECTION, SUSPENSION INTRA-ARTICULAR; INTRAMUSCULAR at 13:15

## 2018-12-22 RX ADMIN — MAGNESIUM HYDROXIDE 30 ML: 400 SUSPENSION ORAL at 16:33

## 2018-12-22 RX ADMIN — BUPIVACAINE HYDROCHLORIDE 10 ML: 2.5 INJECTION, SOLUTION EPIDURAL; INFILTRATION; INTRACAUDAL; PERINEURAL at 13:15

## 2018-12-22 RX ADMIN — OMEPRAZOLE 20 MG: 20 CAPSULE, DELAYED RELEASE ORAL at 06:05

## 2018-12-22 RX ADMIN — LIDOCAINE 1 PATCH: 50 PATCH TOPICAL at 22:46

## 2018-12-22 RX ADMIN — STANDARDIZED SENNA CONCENTRATE AND DOCUSATE SODIUM 2 TABLET: 8.6; 5 TABLET, FILM COATED ORAL at 16:33

## 2018-12-22 RX ADMIN — STANDARDIZED SENNA CONCENTRATE AND DOCUSATE SODIUM 2 TABLET: 8.6; 5 TABLET, FILM COATED ORAL at 06:04

## 2018-12-22 RX ADMIN — ACETAMINOPHEN 650 MG: 325 TABLET, FILM COATED ORAL at 06:04

## 2018-12-22 RX ADMIN — TIZANIDINE 4 MG: 4 TABLET ORAL at 09:49

## 2018-12-22 RX ADMIN — OXYCODONE HYDROCHLORIDE 10 MG: 10 TABLET ORAL at 22:46

## 2018-12-22 RX ADMIN — TRAMADOL HYDROCHLORIDE 100 MG: 50 TABLET, FILM COATED ORAL at 09:49

## 2018-12-22 RX ADMIN — TIZANIDINE 4 MG: 4 TABLET ORAL at 16:34

## 2018-12-22 RX ADMIN — BUDESONIDE AND FORMOTEROL FUMARATE DIHYDRATE 2 PUFF: 160; 4.5 AEROSOL RESPIRATORY (INHALATION) at 16:50

## 2018-12-22 RX ADMIN — CETIRIZINE HYDROCHLORIDE 10 MG: 10 TABLET, FILM COATED ORAL at 06:04

## 2018-12-22 RX ADMIN — PREGABALIN 50 MG: 25 CAPSULE ORAL at 13:02

## 2018-12-22 RX ADMIN — ACETAMINOPHEN 650 MG: 325 TABLET, FILM COATED ORAL at 13:02

## 2018-12-22 RX ADMIN — LEVOTHYROXINE SODIUM 50 MCG: 50 TABLET ORAL at 06:04

## 2018-12-22 RX ADMIN — TAMSULOSIN HYDROCHLORIDE 0.4 MG: 0.4 CAPSULE ORAL at 09:39

## 2018-12-22 RX ADMIN — POLYETHYLENE GLYCOL 3350 1 PACKET: 17 POWDER, FOR SOLUTION ORAL at 06:12

## 2018-12-22 RX ADMIN — OMEGA-3 FATTY ACIDS CAP 1000 MG 1000 MG: 1000 CAP at 06:04

## 2018-12-22 RX ADMIN — PREGABALIN 50 MG: 25 CAPSULE ORAL at 16:50

## 2018-12-22 RX ADMIN — ACETAMINOPHEN 650 MG: 325 TABLET, FILM COATED ORAL at 16:33

## 2018-12-22 RX ADMIN — PREGABALIN 50 MG: 25 CAPSULE ORAL at 06:04

## 2018-12-22 RX ADMIN — ACETAMINOPHEN 650 MG: 325 TABLET, FILM COATED ORAL at 00:21

## 2018-12-22 RX ADMIN — ACETAMINOPHEN 650 MG: 325 TABLET, FILM COATED ORAL at 22:47

## 2018-12-22 RX ADMIN — OMEGA-3 FATTY ACIDS CAP 1000 MG 1000 MG: 1000 CAP at 16:48

## 2018-12-22 RX ADMIN — BUDESONIDE AND FORMOTEROL FUMARATE DIHYDRATE 2 PUFF: 160; 4.5 AEROSOL RESPIRATORY (INHALATION) at 06:12

## 2018-12-22 RX ADMIN — LISINOPRIL 10 MG: 10 TABLET ORAL at 06:04

## 2018-12-22 RX ADMIN — MULTIPLE VITAMINS W/ MINERALS TAB 1 TABLET: TAB at 06:05

## 2018-12-22 RX ADMIN — MORPHINE SULFATE 2 MG: 10 INJECTION INTRAVENOUS at 00:17

## 2018-12-22 RX ADMIN — ATORVASTATIN CALCIUM 40 MG: 40 TABLET, FILM COATED ORAL at 22:47

## 2018-12-22 RX ADMIN — VITAMIN D, TAB 1000IU (100/BT) 3000 UNITS: 25 TAB at 06:04

## 2018-12-22 RX ADMIN — OXYCODONE HYDROCHLORIDE 10 MG: 10 TABLET ORAL at 06:04

## 2018-12-22 ASSESSMENT — PAIN SCALES - GENERAL
PAINLEVEL_OUTOF10: 10
PAINLEVEL_OUTOF10: 6
PAINLEVEL_OUTOF10: 6
PAINLEVEL_OUTOF10: 7
PAINLEVEL_OUTOF10: 7
PAINLEVEL_OUTOF10: 4
PAINLEVEL_OUTOF10: 6
PAINLEVEL_OUTOF10: 5
PAINLEVEL_OUTOF10: 6
PAINLEVEL_OUTOF10: 6
PAINLEVEL_OUTOF10: 8

## 2018-12-22 ASSESSMENT — ENCOUNTER SYMPTOMS
HEADACHES: 0
FEVER: 0
ABDOMINAL PAIN: 0
MYALGIAS: 0
BLURRED VISION: 0
DEPRESSION: 0
POLYDIPSIA: 0
SHORTNESS OF BREATH: 0
DOUBLE VISION: 0
COUGH: 0
PALPITATIONS: 0
VOMITING: 0
CHILLS: 0
NAUSEA: 0
BRUISES/BLEEDS EASILY: 0
DIZZINESS: 0
NERVOUS/ANXIOUS: 0
BACK PAIN: 1

## 2018-12-22 NOTE — PROCEDURES
.Date of Service: 12/22/2018    Physician/s: Lorenzo Forte DO    Pre-operative Diagnosis: LEFT  greater trochanteric bursitis    Post-operative Diagnosis: LEFT  greater trochanteric bursitis    Procedure: LEFT  greater trochanteric bursa injection ultrasound-guided    Description of procedure:    The risks, benefits, and alternatives of the procedure were reviewed and discussed with the patient.  Written informed consent was freely obtained. A pre-procedural time-out was conducted by the physician verifying patient’s identity, procedure to be performed, procedure site and side, and allergy verification. Appropriate equipment was determined to be in place for the procedure.     No sedation was used for this procedure.     In the hospital bed the patient was placed in a lateral position with his LEFT  side up, and the skin was prepped in the usual sterile fashion. The ultrasound probe was placed over the LEFT  greater trochanter and the joint space was located and the target for injection was marked.  A 25g 3.5 inch needle was placed into skin and advanced under ultrasound guidance into the greater trochanteric bursa. Following negative aspiration, approx 4.5cc of 0.25% marcaine with 0.5cc of [id number]/ml was then injected into the bursase, and the needle was subsequently removed intact after restyleted. The patient's hip was wiped with a 4x4 gauze, the area was cleansed with alcohol prep, and a bandaid was applied. There were no complications noted. The images were saved for permanent storage.       Preprocedural pain: 10/10  Postprocedural pain: 5/10    Lorenzo Forte D.O.

## 2018-12-22 NOTE — PROGRESS NOTES
"Encouraged patient to get OOB for dinner, he refused, \"Getting up hurts too much right now even with pain medication, I already got up today I'm done.\" Patient educated on importance of getting out of bed and repositioning while in bed. Medicated for pain per EMAR. Educated about risk of pressure ulcers and skin breakdown   "

## 2018-12-22 NOTE — PROGRESS NOTES
Bedside report received from day shift RN, care assumed. Pt assessed, A&Ox4, no changes observed since last assessment. Pt denies any discomfort at this time. Bed locked in lowest position, bed alarm on. Call light and personal belongings within reach.    0642: Order for left SI joint block with IR placed 12/21. However, pt was not taken for the procedure. Called twice to IR at #[id number], no answer at this time.

## 2018-12-22 NOTE — PROGRESS NOTES
Internal Medicine Interval Note  Note Author: Sami Bhat M.D.     Name Angelo Magaña     1945   Age/Sex 73 y.o. male   MRN [id number]   Code Status full     After 5PM or if no immediate response to page, please call for cross-coverage  Attending/Team: Levon/Donnie See Patient List for primary contact information  Call [phone] to page    1st Call - Day Intern (R1):   Home 2nd Call - Day Sr. Resident (R2/R3):   Shaiuri         Reason for interval visit  (Principal Problem)   S1 Radiculopathy vs hip pain      Interval Problem Daily Status Update  (24 hours, problem oriented, brief subjective history, new lab/imaging data pertinent to that problem)   -73M, PMH of HTN, asthma, GERD, chronic back pain, h/o paraspinal abscess, hypothyroidism, recent L2-L3 fusion on ; presented to ED referred from neurosurgery Dr. Fowler for acute pain management and workup of worsening post-surgical lower back pain. MRI on  showing extradural fluid collection at L2-3. XR Hips showing postsurgical changes but no acute fracture. Neurosurgery, PMR, Geriatrics following. Myelogram showing stenoses and moderate bulging disc. Patient is s/p epidural spinal infusion.     -patient with continued back pain this morning, primary concern is pain management, no further concerns, BM this am    -Neurosurg updated rec, exam showing facetogenic/SI joint and left hip join contributing pain factors, planned left SI joint block today  -PMR seen and rec greater trochanteric/gluteus medius steroid injection with Kenalog 20 mg for greater trochanteric bursitis versus gluteus medius tendinopathy, will consider this weekend and discuss with Dr. Forte  -PT/OT continued therapy, rec post acute placement (we will continue to follow SNF referral progress)  -Patient has capacity, endorses a clear assessment of his current situation, did not appear significantly confused during my interview but will continue to follow and assess, does  not seem he would benefit from cognitive evaluation at this time  -Geriatrics following, agree to presumptive bursitis, increasing lyrica to 50mg tid, iron supplementation qod  -lyrica 50mg tid  -ferrous sulfate qod  -left SI joint block by IR today    Review of Systems   Constitutional: Negative for chills and fever.   HENT: Negative for ear pain and hearing loss.    Eyes: Negative for blurred vision and double vision.   Respiratory: Negative for cough and shortness of breath.    Cardiovascular: Negative for chest pain and palpitations.   Gastrointestinal: Positive for nausea. Negative for vomiting.   Genitourinary: Negative for dysuria and hematuria.   Musculoskeletal: Positive for back pain and joint pain. Negative for myalgias.   Skin: Negative for itching and rash.   Neurological: Positive for headaches. Negative for dizziness.   Endo/Heme/Allergies: Negative for polydipsia. Does not bruise/bleed easily.   Psychiatric/Behavioral: Negative for depression. The patient is not nervous/anxious.        Disposition/Barriers to discharge:   Pain management    Consultants/Specialty  Neurosurgery - Dr. Fowler  PCP: Jeovany Samson M.D.      Quality Measures  Quality-Core Measures   Reviewed items::  Labs reviewed and Medications reviewed  Gomes catheter::  No Gomes  DVT prophylaxis - mechanical:  SCDs          Physical Exam       Vitals:    12/20/18 2000 12/21/18 0400 12/21/18 0756 12/21/18 1558   BP: 103/63 130/60 108/63 (!) 96/60   Pulse: 88 75 81 84   Resp: 16 16 16 16   Temp: 37.3 °C (99.2 °F) 37.1 °C (98.8 °F) 37 °C (98.6 °F) 36.8 °C (98.2 °F)   TempSrc: Temporal Temporal Temporal Temporal   SpO2: 92% 94% 96% 95%   Weight:       Height:         Body mass index is 34.44 kg/m².    Oxygen Therapy:  Pulse Oximetry: 95 %, O2 (LPM): 0, O2 Delivery: None (Room Air)    Physical Exam   Constitutional: He is oriented to person, place, and time and well-developed, well-nourished, and in no distress. No distress.   HENT:    Head: Normocephalic and atraumatic.   Mouth/Throat: Oropharynx is clear and moist. No oropharyngeal exudate.   Eyes: Pupils are equal, round, and reactive to light. Conjunctivae and EOM are normal. Right eye exhibits no discharge. Left eye exhibits no discharge. No scleral icterus.   Neck: Normal range of motion. Neck supple. No tracheal deviation present.   Cardiovascular: Normal rate, regular rhythm, normal heart sounds and intact distal pulses.  Exam reveals no gallop and no friction rub.    No murmur heard.  Pulmonary/Chest: Effort normal and breath sounds normal. No respiratory distress. He has no wheezes. He has no rales. He exhibits no tenderness.   Abdominal: Soft. Bowel sounds are normal. He exhibits no distension and no mass. There is no tenderness. There is no rebound and no guarding.   Musculoskeletal: Normal range of motion. He exhibits no edema, tenderness or deformity.   Left buttock pain with straight leg raise bilaterally   Neurological: He is alert and oriented to person, place, and time. He exhibits normal muscle tone. Coordination normal.   Skin: Skin is warm and dry. No rash noted. He is not diaphoretic. No erythema. No pallor.   Left knee scar, midline abdominal scar    Psychiatric: Mood and affect normal.             Assessment/Plan     * Radiculopathy of sacral region- (present on admission)   Assessment & Plan    -patient with low back pain more prominent on left, pain on palpation and with ROM  -S1 radiculopathy vs hip pain  -s/p L2-L3 fusion, lumbar laminectomy and discectomy L2-L3, hardware removal L4-L5, repositioning of L3 screw by Dr. Fowler on 11/19/2018  -XR Hips showing postsurgical changes but no acute fracture  -CT myelogram showing pseudomeningocele, surgical hardware, stenosis at multiple levels and sever at L5/S1, moderate bulging discs  -Neurosurgery, PMR, Geriatrics, following and recs included in plan  -s/p Epidural spinal injection without relief    -Q 4 neuro check with  fall precautions  -scheduled and prn pain management  -PT/OT  -Zofran for nausea PRN  -Bowel protocol  -avoiding NSAIDs, ASA; long acting blood thinning agents  -planned left SI joint injection       Lumbar back pain with radiculopathy affecting left lower extremity- (present on admission)   Assessment & Plan    -plan per radiculopathy of the sacral region       Anemia- (present on admission)   Assessment & Plan    -Chronic normocytic anemia, hemoglobin improving from baseline of about 8, stool occult blood was negative.  According to patient last colonoscopy was about 2 years ago and was normal.  Denies melena/dizziness/shortness of breath/chest pain. Hx of AVMs.  -iron studies low; ferritin/B12/Folate/Free T4 non deficient    -Continue iron supplementation     Intractable low back pain- (present on admission)   Assessment & Plan    -plan per radiculopathy of the sacral region     Asymptomatic bacteriuria   Assessment & Plan    -UA showing bacteriuria, patient currently asymptomatic  -monitor for symptoms     BPH (benign prostatic hyperplasia)   Assessment & Plan    Stable. Pt denies LUTs    -Continue Flomax 0.4 mg daily     Elevated alkaline phosphatase level   Assessment & Plan    Isolated in this patient with recent back surgery.     -Continue Vit D supplementation     Essential hypertension- (present on admission)   Assessment & Plan    Stable.  Normotensive on admission.    -Continue lisinopril 10 mg daily     Dyslipidemia- (present on admission)   Assessment & Plan    Last lipid panel 11/20/2017, and current one at goal.    -Continue atorvastatin 40 mg daily     GERD (gastroesophageal reflux disease)- (present on admission)   Assessment & Plan    Stable disease.    -Continue Prilosec 20 mg daily     Seasonal allergies- (present on admission)   Assessment & Plan    Stable.    -Continue cetirizine  -Continue to monitor     Asthma- (present on admission)   Assessment & Plan    Stable disease.  Uses Advair once a  day.  No recent increase in need for Advair.    -Continue Advair     Acquired hypothyroidism- (present on admission)   Assessment & Plan    -TSH elevated, Free T4 wnl    -Continue Synthroid

## 2018-12-22 NOTE — PROGRESS NOTES
Neurosurgery Progress Note    Subjective:  Still has left buttock/hip joint pain, tolerating left side lying position this AM, reports that pain causes leg to buckle, only able to ambulate short distances, denies headache    Exam:  Incision: CDI  Remains with left SI joint pain to palpation/provocation and left hip pain more to provocation than palpation for me      BP  Min: 96/60  Max: 128/92  Pulse  Av  Min: 80  Max: 100  Resp  Av  Min: 16  Max: 18  Temp  Av.8 °C (98.2 °F)  Min: 36.3 °C (97.3 °F)  Max: 37.2 °C (99 °F)  SpO2  Av %  Min: 91 %  Max: 95 %    No Data Recorded    Recent Labs      18   0359   WBC  4.7*   RBC  4.04*   HEMOGLOBIN  11.8*   HEMATOCRIT  37.2*   MCV  92.1   MCH  29.2   MCHC  31.7*   RDW  49.1   PLATELETCT  188   MPV  9.4     Recent Labs      18   0359   SODIUM  138   POTASSIUM  4.2   CHLORIDE  106   CO2  27   GLUCOSE  98   BUN  17   CREATININE  0.96   CALCIUM  10.0               Intake/Output       18 0700 - 18 0659 18 0700 - 18 0659      [id number] 8463-7140 Total 9939-6890 1607-5705 Total       Intake    P.O.  200  -- 200  --  -- --    P.O. 200 -- 200 -- -- --    Total Intake 200 -- 200 -- -- --       Output    Urine  1300  -- 1300  --  -- --    Number of Times Voided 4 x -- 4 x -- -- --    Urine Void (mL) 1300 -- 1300 -- -- --    Stool  --  -- --  --  -- --    Number of Times Stooled 1 x -- 1 x -- -- --    Total Output 1300 -- 1300 -- -- --       Net I/O     -1100 -- -1100 -- -- --            Intake/Output Summary (Last 24 hours) at 18 0944  Last data filed at 18 1700   Gross per 24 hour   Intake                0 ml   Output             1300 ml   Net            -1300 ml            • [START ON 2018] ferrous sulfate  325 mg Q48HRS   • pregabalin  50 mg TID   • Pharmacy Consult Request  1 Each PRN   • budesonide-formoterol  2 Puff BID   • levothyroxine  50 mcg AM ES   • polyethylene glycol/lytes  1 Packet DAILY    And   •  senna-docusate  2 Tab BID    And   • magnesium hydroxide  30 mL QDAY PRN    And   • bisacodyl  10 mg QDAY PRN   • Respiratory Care per Protocol   Continuous RT   • hydrALAZINE  10 mg Q4HRS PRN   • ondansetron  4 mg Q4HRS PRN   • ondansetron  4 mg Q4HRS PRN   • acetaminophen  650 mg Q6HRS   • atorvastatin  40 mg Nightly   • cetirizine  10 mg DAILY   • vitamin D  3,000 Units DAILY   • lisinopril  10 mg DAILY   • fish oil  1,000 mg BID   • omeprazole  20 mg DAILY   • tamsulosin  0.4 mg AFTER BREAKFAST   • therapeutic multivitamin-minerals  1 Tab DAILY   • tizanidine  4 mg Q6HRS PRN   • oxyCODONE immediate-release  5 mg Q4HRS PRN    Or   • oxyCODONE immediate-release  10 mg Q4HRS PRN   • tramadol   mg Q6HRS PRN   • lidocaine  1 Patch Q24HR       Assessment and Plan:  Hospital day # 6 Readmission for intractable back pain left buttock/posterior-lat thigh pain: No Nsx etiology per MRI and CT myelogtram.    Sp L2-3 TLIF and L3-4 hardware removal with L2 kyphoplasty 11/19/18    Sp left L5-S1 transforaminal Jaskaran 12/19 with transient relief     Multiple potential pain generators: trochanteric bursitis:Kenalog injection planned per pysiatry, SI joint block as outpatient, outpatient pain management referral.     Agree with transfer to SNF.  Follow up in clinic after discharge from facility.     Prophylactic anticoagulation: yes         Start date/time: started     D/w Dr. Fowler

## 2018-12-22 NOTE — PROGRESS NOTES
Patient A+Ox4, working with PT/OT at this time. C/O pain in left thigh and muscle spasms. Medicated per EMAR. OOB to chair with PT, back to bed 1/2 hour later, unable to tolerate. On room air, VSS. Using call bell approp, bed alarm on, whiteboard updated, hourly rounding in place. Fall precautions in place

## 2018-12-22 NOTE — CARE PLAN
Problem: Communication  Goal: The ability to communicate needs accurately and effectively will improve  Outcome: PROGRESSING AS EXPECTED  Pt is able to voice his needs/feelings at this time.    Problem: Infection  Goal: Will remain free from infection  Outcome: PROGRESSING AS EXPECTED  Pt assessed, proper hand hygiene. Infection prevention education. No signs/symptoms of infection observed at this time.

## 2018-12-22 NOTE — CARE PLAN
Problem: Mobility  Goal: Risk for activity intolerance will decrease  Outcome: PROGRESSING AS EXPECTED  Patient OOB with PT/OT today, walked in hallway

## 2018-12-22 NOTE — PROGRESS NOTES
Internal Medicine Interval Note  Note Author: Bebe Kaminski M.D.     Name Angelo Magaña     1945   Age/Sex 73 y.o. male   MRN [id number]   Code Status full     After 5PM or if no immediate response to page, please call for cross-coverage  Attending/Team: Levon/Donnie See Patient List for primary contact information  Call [phone] to page    1st Call - Day Intern (R1):   Bhat 2nd Call - Day Sr. Resident (R2/R3):   Yamilex         Reason for interval visit  (Principal Problem)   S1 Radiculopathy vs hip pain    ID:  -73M, PMH of HTN, asthma, GERD, chronic back pain, h/o paraspinal abscess, hypothyroidism, recent L2-L3 fusion on ; presented to ED referred from neurosurgery Dr. Fowler for acute pain management and workup of worsening post-surgical lower back pain. MRI on  showing extradural fluid collection at L2-3. XR Hips showing postsurgical changes but no acute fracture. Neurosurgery, PMR, Geriatrics following. Myelogram showing stenoses and moderate bulging disc. Patient is s/p epidural spinal infusion.         Interval Problem Daily Status Update  (24 hours, problem oriented, brief subjective history, new lab/imaging data pertinent to that problem)   - Continues to endorse pain  - Per Dr. Cheung, SI joint block to be done as outpatient  - SNF vs acute rehab (referrals placed). Dr. Forte with PMR has evaluated patient. Plan for Kenalog injection hopefully this afternoon  - Continue physical therapy, occupational therapy  - Had large BM      Review of Systems   Constitutional: Negative for chills and fever.   HENT: Negative for ear pain and hearing loss.    Eyes: Negative for blurred vision and double vision.   Respiratory: Negative for cough and shortness of breath.    Cardiovascular: Negative for chest pain and palpitations.   Gastrointestinal: Negative for abdominal pain, nausea and vomiting.   Genitourinary: Negative for dysuria and hematuria.   Musculoskeletal: Positive for back pain  and joint pain. Negative for myalgias.   Skin: Negative for itching and rash.   Neurological: Negative for dizziness and headaches.   Endo/Heme/Allergies: Negative for polydipsia. Does not bruise/bleed easily.   Psychiatric/Behavioral: Negative for depression. The patient is not nervous/anxious.        Disposition/Barriers to discharge:   Pain management    Consultants/Specialty  Neurosurgery - Dr. Fowler  PMR - Dr. Forte  PCP: Jeovany Samson M.D.      Quality Measures  Quality-Core Measures   Reviewed items::  Labs reviewed and Medications reviewed  Gomes catheter::  No Gomes  DVT prophylaxis - mechanical:  SCDs          Physical Exam       Vitals:    12/21/18 0756 12/21/18 1558 12/21/18 2000 12/22/18 0400   BP: 108/63 (!) 96/60 105/59 102/68   Pulse: 81 84 100 80   Resp: 16 16 18 16   Temp: 37 °C (98.6 °F) 36.8 °C (98.2 °F) 36.8 °C (98.3 °F) 36.3 °C (97.3 °F)   TempSrc: Temporal Temporal Temporal Temporal   SpO2: 96% 95% 95% 91%   Weight:       Height:         Body mass index is 34.44 kg/m².    Oxygen Therapy:  Pulse Oximetry: 91 %, O2 (LPM): 0, O2 Delivery: None (Room Air)    Physical Exam   Constitutional: He is oriented to person, place, and time and well-developed, well-nourished, and in no distress.   HENT:   Head: Normocephalic and atraumatic.   Mouth/Throat: Oropharynx is clear and moist.   Eyes: Pupils are equal, round, and reactive to light. Conjunctivae and EOM are normal. No scleral icterus.   Neck: Normal range of motion. Neck supple. No tracheal deviation present.   Cardiovascular: Normal rate, regular rhythm, normal heart sounds and intact distal pulses.  Exam reveals no gallop and no friction rub.    No murmur heard.  Pulmonary/Chest: Effort normal and breath sounds normal. No respiratory distress. He has no wheezes.   Abdominal: Soft. Bowel sounds are normal. He exhibits no distension. There is no tenderness.   Musculoskeletal: Normal range of motion. He exhibits tenderness. He exhibits no edema  or deformity.   Left buttock pain with straight leg raise bilaterally. Trochanteric bursitis   Neurological: He is alert and oriented to person, place, and time. He exhibits normal muscle tone. Coordination normal.   Skin: Skin is warm and dry. No rash noted. No erythema. No pallor.   Left knee scar, midline abdominal scar    Psychiatric: Mood and affect normal.   Nursing note and vitals reviewed.        Assessment/Plan     * Radiculopathy of sacral region- (present on admission)   Assessment & Plan    - Differential: S1 radiculopathy vs hip pain; Trochanteric brusitis  -s/p L2-L3 fusion, lumbar laminectomy and discectomy L2-L3, hardware removal L4-L5, repositioning of L3 screw by Dr. Fowler on 11/19/2018  -XR Hips showing postsurgical changes but no acute fracture  -CT myelogram showing pseudomeningocele, surgical hardware, stenosis at multiple levels and sever at L5/S1, moderate bulging discs  - Neurosurgery, PMR, Geriatrics, following and recs included in plan  -s/p Epidural spinal injection without relief    Plan  - Continued pain management  - SI joint block to be done as outpatient per IR. Plan for Kenalog injection with Dr. Forte hopefully this afternoon  - Fall precautions  - Continue PT/OT  - Aggressive bowel protocol as patient is on opiate therapy  - avoiding NSAIDs, ASA; long acting blood thinning agents       Intractable low back pain- (present on admission)   Assessment & Plan    -plan per radiculopathy of the sacral region    Plan  - Outpatient SI joint injection per IR  - Outpatient pain management referral     Lumbar back pain with radiculopathy affecting left lower extremity- (present on admission)   Assessment & Plan    - plan per radiculopathy of the sacral region       Asymptomatic bacteriuria   Assessment & Plan    -UA showing bacteriuria, patient currently asymptomatic  -monitor for symptoms     BPH (benign prostatic hyperplasia)   Assessment & Plan    Stable. Pt denies LUTs    -Continue Flomax  0.4 mg daily     Elevated alkaline phosphatase level   Assessment & Plan    Isolated in this patient with recent back surgery.     -Continue Vit D supplementation     Anemia- (present on admission)   Assessment & Plan    -Chronic normocytic anemia, hemoglobin improving from baseline of about 8, stool occult blood was negative.  According to patient last colonoscopy was about 2 years ago and was normal.  Denies melena/dizziness/shortness of breath/chest pain. Hx of AVMs.  -iron studies low; ferritin/B12/Folate/Free T4 non deficient    Plan  -Continue iron supplementation  - Follow-up iron studies as outpatient     Essential hypertension- (present on admission)   Assessment & Plan    Stable.  Normotensive on admission.    -Continue lisinopril 10 mg daily     Dyslipidemia- (present on admission)   Assessment & Plan    Last lipid panel 11/20/2017, and current one at goal.    -Continue atorvastatin 40 mg daily     GERD (gastroesophageal reflux disease)- (present on admission)   Assessment & Plan    Stable disease.    -Continue Prilosec 20 mg daily     Seasonal allergies- (present on admission)   Assessment & Plan    Stable.    -Continue cetirizine  -Continue to monitor     Asthma- (present on admission)   Assessment & Plan    Stable disease.  Uses Advair once a day.  No recent increase in need for Advair.    Plan  -Continue Advair     Acquired hypothyroidism- (present on admission)   Assessment & Plan    -TSH elevated, Free T4 wnl  - ? compliance    Plan  -Continue Synthroid

## 2018-12-23 PROBLEM — E83.52 HYPERCALCEMIA: Status: ACTIVE | Noted: 2018-12-23

## 2018-12-23 PROBLEM — M70.62 GREATER TROCHANTERIC BURSITIS OF LEFT HIP: Status: ACTIVE | Noted: 2018-12-23

## 2018-12-23 LAB
ALBUMIN SERPL BCP-MCNC: 3.9 G/DL (ref 3.2–4.9)
ALBUMIN/GLOB SERPL: 1.4 G/DL
ALP SERPL-CCNC: 118 U/L (ref 30–99)
ALT SERPL-CCNC: 23 U/L (ref 2–50)
ANION GAP SERPL CALC-SCNC: 6 MMOL/L (ref 0–11.9)
AST SERPL-CCNC: 21 U/L (ref 12–45)
BASOPHILS # BLD AUTO: 0.6 % (ref 0–1.8)
BASOPHILS # BLD: 0.03 K/UL (ref 0–0.12)
BILIRUB SERPL-MCNC: 0.7 MG/DL (ref 0.1–1.5)
BUN SERPL-MCNC: 13 MG/DL (ref 8–22)
CALCIUM SERPL-MCNC: 10.9 MG/DL (ref 8.5–10.5)
CHLORIDE SERPL-SCNC: 104 MMOL/L (ref 96–112)
CO2 SERPL-SCNC: 28 MMOL/L (ref 20–33)
CREAT SERPL-MCNC: 1.05 MG/DL (ref 0.5–1.4)
EOSINOPHIL # BLD AUTO: 0.13 K/UL (ref 0–0.51)
EOSINOPHIL NFR BLD: 2.6 % (ref 0–6.9)
ERYTHROCYTE [DISTWIDTH] IN BLOOD BY AUTOMATED COUNT: 48.2 FL (ref 35.9–50)
GLOBULIN SER CALC-MCNC: 2.7 G/DL (ref 1.9–3.5)
GLUCOSE SERPL-MCNC: 107 MG/DL (ref 65–99)
HCT VFR BLD AUTO: 40.1 % (ref 42–52)
HGB BLD-MCNC: 12.4 G/DL (ref 14–18)
IMM GRANULOCYTES # BLD AUTO: 0.02 K/UL (ref 0–0.11)
IMM GRANULOCYTES NFR BLD AUTO: 0.4 % (ref 0–0.9)
LYMPHOCYTES # BLD AUTO: 0.98 K/UL (ref 1–4.8)
LYMPHOCYTES NFR BLD: 19.5 % (ref 22–41)
MCH RBC QN AUTO: 28.3 PG (ref 27–33)
MCHC RBC AUTO-ENTMCNC: 30.9 G/DL (ref 33.7–35.3)
MCV RBC AUTO: 91.6 FL (ref 81.4–97.8)
MONOCYTES # BLD AUTO: 0.43 K/UL (ref 0–0.85)
MONOCYTES NFR BLD AUTO: 8.5 % (ref 0–13.4)
NEUTROPHILS # BLD AUTO: 3.44 K/UL (ref 1.82–7.42)
NEUTROPHILS NFR BLD: 68.4 % (ref 44–72)
NRBC # BLD AUTO: 0 K/UL
NRBC BLD-RTO: 0 /100 WBC
PLATELET # BLD AUTO: 229 K/UL (ref 164–446)
PMV BLD AUTO: 9.4 FL (ref 9–12.9)
POTASSIUM SERPL-SCNC: 4.5 MMOL/L (ref 3.6–5.5)
PROT SERPL-MCNC: 6.6 G/DL (ref 6–8.2)
RBC # BLD AUTO: 4.38 M/UL (ref 4.7–6.1)
SODIUM SERPL-SCNC: 138 MMOL/L (ref 135–145)
WBC # BLD AUTO: 5 K/UL (ref 4.8–10.8)

## 2018-12-23 PROCEDURE — 80053 COMPREHEN METABOLIC PANEL: CPT

## 2018-12-23 PROCEDURE — 85025 COMPLETE CBC W/AUTO DIFF WBC: CPT

## 2018-12-23 PROCEDURE — A9270 NON-COVERED ITEM OR SERVICE: HCPCS | Performed by: STUDENT IN AN ORGANIZED HEALTH CARE EDUCATION/TRAINING PROGRAM

## 2018-12-23 PROCEDURE — 99232 SBSQ HOSP IP/OBS MODERATE 35: CPT | Mod: GC | Performed by: INTERNAL MEDICINE

## 2018-12-23 PROCEDURE — A9270 NON-COVERED ITEM OR SERVICE: HCPCS | Performed by: INTERNAL MEDICINE

## 2018-12-23 PROCEDURE — 700102 HCHG RX REV CODE 250 W/ 637 OVERRIDE(OP): Performed by: STUDENT IN AN ORGANIZED HEALTH CARE EDUCATION/TRAINING PROGRAM

## 2018-12-23 PROCEDURE — 700102 HCHG RX REV CODE 250 W/ 637 OVERRIDE(OP): Performed by: INTERNAL MEDICINE

## 2018-12-23 PROCEDURE — 306398 CUSHION, WAFFLE BARIATRIC 28-22: Performed by: STUDENT IN AN ORGANIZED HEALTH CARE EDUCATION/TRAINING PROGRAM

## 2018-12-23 PROCEDURE — 770001 HCHG ROOM/CARE - MED/SURG/GYN PRIV*

## 2018-12-23 PROCEDURE — 700105 HCHG RX REV CODE 258: Performed by: STUDENT IN AN ORGANIZED HEALTH CARE EDUCATION/TRAINING PROGRAM

## 2018-12-23 PROCEDURE — 700101 HCHG RX REV CODE 250: Performed by: INTERNAL MEDICINE

## 2018-12-23 PROCEDURE — 36415 COLL VENOUS BLD VENIPUNCTURE: CPT

## 2018-12-23 RX ORDER — SODIUM CHLORIDE 9 MG/ML
500 INJECTION, SOLUTION INTRAVENOUS ONCE
Status: COMPLETED | OUTPATIENT
Start: 2018-12-23 | End: 2018-12-23

## 2018-12-23 RX ADMIN — TIZANIDINE 4 MG: 4 TABLET ORAL at 20:34

## 2018-12-23 RX ADMIN — ACETAMINOPHEN 650 MG: 325 TABLET, FILM COATED ORAL at 23:22

## 2018-12-23 RX ADMIN — MULTIPLE VITAMINS W/ MINERALS TAB 1 TABLET: TAB at 06:05

## 2018-12-23 RX ADMIN — PREGABALIN 50 MG: 25 CAPSULE ORAL at 06:05

## 2018-12-23 RX ADMIN — OMEGA-3 FATTY ACIDS CAP 1000 MG 1000 MG: 1000 CAP at 16:59

## 2018-12-23 RX ADMIN — VITAMIN D, TAB 1000IU (100/BT) 3000 UNITS: 25 TAB at 06:04

## 2018-12-23 RX ADMIN — LISINOPRIL 10 MG: 10 TABLET ORAL at 06:04

## 2018-12-23 RX ADMIN — LEVOTHYROXINE SODIUM 50 MCG: 50 TABLET ORAL at 06:05

## 2018-12-23 RX ADMIN — CETIRIZINE HYDROCHLORIDE 10 MG: 10 TABLET, FILM COATED ORAL at 06:04

## 2018-12-23 RX ADMIN — SODIUM CHLORIDE 500 ML: 9 INJECTION, SOLUTION INTRAVENOUS at 11:53

## 2018-12-23 RX ADMIN — HYDROCORTISONE 2.5%: 25 CREAM TOPICAL at 20:34

## 2018-12-23 RX ADMIN — HYDROCORTISONE 2.5%: 25 CREAM TOPICAL at 14:25

## 2018-12-23 RX ADMIN — TAMSULOSIN HYDROCHLORIDE 0.4 MG: 0.4 CAPSULE ORAL at 07:47

## 2018-12-23 RX ADMIN — PREGABALIN 50 MG: 25 CAPSULE ORAL at 16:59

## 2018-12-23 RX ADMIN — OXYCODONE HYDROCHLORIDE 5 MG: 5 TABLET ORAL at 11:49

## 2018-12-23 RX ADMIN — POLYETHYLENE GLYCOL 3350 1 PACKET: 17 POWDER, FOR SOLUTION ORAL at 06:04

## 2018-12-23 RX ADMIN — OXYCODONE HYDROCHLORIDE 10 MG: 10 TABLET ORAL at 17:00

## 2018-12-23 RX ADMIN — OMEGA-3 FATTY ACIDS CAP 1000 MG 1000 MG: 1000 CAP at 06:04

## 2018-12-23 RX ADMIN — OXYCODONE HYDROCHLORIDE 10 MG: 10 TABLET ORAL at 23:22

## 2018-12-23 RX ADMIN — STANDARDIZED SENNA CONCENTRATE AND DOCUSATE SODIUM 2 TABLET: 8.6; 5 TABLET, FILM COATED ORAL at 17:08

## 2018-12-23 RX ADMIN — TRAMADOL HYDROCHLORIDE 100 MG: 50 TABLET, FILM COATED ORAL at 20:34

## 2018-12-23 RX ADMIN — PREGABALIN 50 MG: 25 CAPSULE ORAL at 11:44

## 2018-12-23 RX ADMIN — ATORVASTATIN CALCIUM 40 MG: 40 TABLET, FILM COATED ORAL at 20:34

## 2018-12-23 RX ADMIN — OMEPRAZOLE 20 MG: 20 CAPSULE, DELAYED RELEASE ORAL at 06:05

## 2018-12-23 RX ADMIN — ACETAMINOPHEN 650 MG: 325 TABLET, FILM COATED ORAL at 11:44

## 2018-12-23 RX ADMIN — ACETAMINOPHEN 650 MG: 325 TABLET, FILM COATED ORAL at 17:00

## 2018-12-23 RX ADMIN — FERROUS SULFATE TAB 325 MG (65 MG ELEMENTAL FE) 325 MG: 325 (65 FE) TAB at 06:04

## 2018-12-23 RX ADMIN — BUDESONIDE AND FORMOTEROL FUMARATE DIHYDRATE 2 PUFF: 160; 4.5 AEROSOL RESPIRATORY (INHALATION) at 06:03

## 2018-12-23 RX ADMIN — ACETAMINOPHEN 650 MG: 325 TABLET, FILM COATED ORAL at 06:05

## 2018-12-23 RX ADMIN — LIDOCAINE 1 PATCH: 50 PATCH TOPICAL at 22:00

## 2018-12-23 RX ADMIN — TRAMADOL HYDROCHLORIDE 100 MG: 50 TABLET, FILM COATED ORAL at 07:47

## 2018-12-23 RX ADMIN — BUDESONIDE AND FORMOTEROL FUMARATE DIHYDRATE 2 PUFF: 160; 4.5 AEROSOL RESPIRATORY (INHALATION) at 17:01

## 2018-12-23 RX ADMIN — TIZANIDINE 4 MG: 4 TABLET ORAL at 07:46

## 2018-12-23 RX ADMIN — STANDARDIZED SENNA CONCENTRATE AND DOCUSATE SODIUM 2 TABLET: 8.6; 5 TABLET, FILM COATED ORAL at 06:04

## 2018-12-23 ASSESSMENT — ENCOUNTER SYMPTOMS
HEADACHES: 1
SHORTNESS OF BREATH: 0
COUGH: 0
NAUSEA: 1
BLURRED VISION: 0
PALPITATIONS: 0
MYALGIAS: 0
DIZZINESS: 0
POLYDIPSIA: 0
DEPRESSION: 0
VOMITING: 0
FEVER: 0
BRUISES/BLEEDS EASILY: 0
NERVOUS/ANXIOUS: 0
DOUBLE VISION: 0
BACK PAIN: 1
CHILLS: 0

## 2018-12-23 ASSESSMENT — PAIN SCALES - GENERAL
PAINLEVEL_OUTOF10: 6
PAINLEVEL_OUTOF10: 8
PAINLEVEL_OUTOF10: 6
PAINLEVEL_OUTOF10: 8
PAINLEVEL_OUTOF10: 5

## 2018-12-23 NOTE — PROGRESS NOTES
Internal Medicine Interval Note  Note Author: Sami Bhat M.D.     Name Angelo Magaña     1945   Age/Sex 73 y.o. male   MRN [id number]   Code Status full     After 5PM or if no immediate response to page, please call for cross-coverage  Attending/Team: Levon/Donnie See Patient List for primary contact information  Call [phone] to page    1st Call - Day Intern (R1):   Home 2nd Call - Day Sr. Resident (R2/R3):   Shaiuri         Reason for interval visit  (Principal Problem)   S1 Radiculopathy vs hip pain    ID:  -73M, PMH of HTN, asthma, GERD, chronic back pain, h/o paraspinal abscess, hypothyroidism, recent L2-L3 fusion on ; presented to ED referred from neurosurgery Dr. Fowler for acute pain management and workup of worsening post-surgical lower back pain. MRI on  showing extradural fluid collection at L2-3. XR Hips showing postsurgical changes but no acute fracture. Neurosurgery, PMR, Geriatrics following. Myelogram showing stenoses and moderate bulging disc. Patient is s/p epidural spinal infusion and left greater trochanteric bursa injection. Planning for SNF or rehab placement and left SI joint injection outpatient.     Interval Problem Daily Status Update  (24 hours, problem oriented, brief subjective history, new lab/imaging data pertinent to that problem)   -patient with improved mobility/pain this morning currently laying on affected hip (s/p kenalog injection yesterday), no complaining of burning around rectal area, rectal area and surrounding skin intact without significant erythema, no further concers    -added hydrocortisone hemorrhoid rectal cream  -hypercalcemic today, will administer 500ml NS bolus and recheck CMP for corrected Ca  -Rehab and SNF referral pending    Review of Systems   Constitutional: Negative for chills and fever.   HENT: Negative for ear pain and hearing loss.    Eyes: Negative for blurred vision and double vision.   Respiratory: Negative for cough  and shortness of breath.    Cardiovascular: Negative for chest pain and palpitations.   Gastrointestinal: Positive for nausea. Negative for vomiting.   Genitourinary: Negative for dysuria and hematuria.   Musculoskeletal: Positive for back pain and joint pain. Negative for myalgias.   Skin: Negative for itching and rash.   Neurological: Positive for headaches. Negative for dizziness.   Endo/Heme/Allergies: Negative for polydipsia. Does not bruise/bleed easily.   Psychiatric/Behavioral: Negative for depression. The patient is not nervous/anxious.        Disposition/Barriers to discharge:   Pain management  Placement    Consultants/Specialty  Neurosurgery - Dr. Fowler  PMR-Dr. Forte  PCP: Jeovany Samson M.D.      Quality Measures  Quality-Core Measures   Reviewed items::  Labs reviewed and Medications reviewed  Gomes catheter::  No Gomes  DVT prophylaxis - mechanical:  SCDs          Physical Exam       Vitals:    12/22/18 1700 12/22/18 2000 12/23/18 0400 12/23/18 0745   BP: 115/62 124/62 120/78 152/59   Pulse: 96 77 75 82   Resp: 17 18 18 18   Temp: 37.1 °C (98.8 °F) 36.6 °C (97.8 °F) 37.2 °C (99 °F) 37.3 °C (99.2 °F)   TempSrc: Temporal Temporal Temporal Oral   SpO2: 94% 97% 94% 95%   Weight:       Height:         Body mass index is 32.17 kg/m².    Oxygen Therapy:  Pulse Oximetry: 95 %, O2 (LPM): 0, O2 Delivery: None (Room Air)    Physical Exam   Constitutional: He is oriented to person, place, and time and well-developed, well-nourished, and in no distress. No distress.   HENT:   Head: Normocephalic and atraumatic.   Mouth/Throat: Oropharynx is clear and moist. No oropharyngeal exudate.   Eyes: Pupils are equal, round, and reactive to light. Conjunctivae and EOM are normal. Right eye exhibits no discharge. Left eye exhibits no discharge. No scleral icterus.   Neck: Normal range of motion. Neck supple. No tracheal deviation present.   Cardiovascular: Normal rate, regular rhythm, normal heart sounds and intact distal  pulses.  Exam reveals no gallop and no friction rub.    No murmur heard.  Pulmonary/Chest: Effort normal and breath sounds normal. No respiratory distress. He has no wheezes. He has no rales. He exhibits no tenderness.   Abdominal: Soft. Bowel sounds are normal. He exhibits no distension and no mass. There is no tenderness. There is no rebound and no guarding.   Genitourinary: Rectum normal.   Genitourinary Comments: External anus and surrounding skin intact, no erythema, some moisture, no warmth/redness/steven blood/purulent discharge   Musculoskeletal: Normal range of motion. He exhibits no edema, tenderness or deformity.   Left buttock pain with straight leg raise bilaterally   Neurological: He is alert and oriented to person, place, and time. He exhibits normal muscle tone. Coordination normal.   Skin: Skin is warm and dry. No rash noted. He is not diaphoretic. No erythema. No pallor.   Left knee scar, midline abdominal scar    Psychiatric: Mood and affect normal.             Assessment/Plan     * Radiculopathy of sacral region- (present on admission)   Assessment & Plan    - Differential: S1 radiculopathy vs hip pain; Trochanteric brusitis  -s/p L2-L3 fusion, lumbar laminectomy and discectomy L2-L3, hardware removal L4-L5, repositioning of L3 screw by Dr. Fowler on 11/19/2018  -XR Hips showing postsurgical changes but no acute fracture  -CT myelogram showing pseudomeningocele, surgical hardware, stenosis at multiple levels and sever at L5/S1, moderate bulging discs  - Neurosurgery, PMR, Geriatrics, following and recs included in plan  -s/p Epidural spinal injection without relief    Plan  - Continued pain management  - SI joint block to be done as outpatient per IR  - Fall precautions  - Continue PT/OT  - Aggressive bowel protocol as patient is on opiate therapy  - avoiding NSAIDs, ASA; long acting blood thinning agents       Greater trochanteric bursitis of left hip   Assessment & Plan    -PMR following; s/p left  greater trochanteric bursa injection with improved mobility/pain    -ice/lidocaine patch  -SNF/Rehab placement     Intractable low back pain- (present on admission)   Assessment & Plan    -plan per radiculopathy of the sacral region    Plan  - Outpatient SI joint injection per IR  - Outpatient pain management referral     Lumbar back pain with radiculopathy affecting left lower extremity- (present on admission)   Assessment & Plan    - plan per radiculopathy of the sacral region       Hypercalcemia   Assessment & Plan    -IVF bolus and recheck     Asymptomatic bacteriuria   Assessment & Plan    -UA showing bacteriuria, patient currently asymptomatic  -monitor for symptoms     BPH (benign prostatic hyperplasia)   Assessment & Plan    Stable. Pt denies LUTs    -Continue Flomax 0.4 mg daily     Elevated alkaline phosphatase level   Assessment & Plan    Isolated in this patient with recent back surgery.     -Continue Vit D supplementation     Anemia- (present on admission)   Assessment & Plan    -Chronic normocytic anemia, hemoglobin improving from baseline of about 8, stool occult blood was negative.  According to patient last colonoscopy was about 2 years ago and was normal.  Denies melena/dizziness/shortness of breath/chest pain. Hx of AVMs.  -iron studies low; ferritin/B12/Folate/Free T4 non deficient    Plan  -Continue iron supplementation  - Follow-up iron studies as outpatient     Essential hypertension- (present on admission)   Assessment & Plan    Stable.  Normotensive on admission.    -Continue lisinopril 10 mg daily     Dyslipidemia- (present on admission)   Assessment & Plan    Last lipid panel 11/20/2017, and current one at goal.    -Continue atorvastatin 40 mg daily     GERD (gastroesophageal reflux disease)- (present on admission)   Assessment & Plan    Stable disease.    -Continue Prilosec 20 mg daily     Seasonal allergies- (present on admission)   Assessment & Plan    Stable.    -Continue  cetirizine  -Continue to monitor     Asthma- (present on admission)   Assessment & Plan    Stable disease.  Uses Advair once a day.  No recent increase in need for Advair.    Plan  -Continue Advair     Acquired hypothyroidism- (present on admission)   Assessment & Plan    -TSH elevated, Free T4 wnl  - ? compliance    Plan  -Continue Synthroid

## 2018-12-23 NOTE — PROGRESS NOTES
Neurosurgery Progress Note    Subjective:  Able to walk yesterday short distance, still with a lot of pain after left troch. Bursa injection, denies radiating pain, his main concern is anal burning this AM    Exam:  Incision: flat, dry, no sign of infection  NM: 5/5 in bed    BP  Min: 115/62  Max: 152/59  Pulse  Av.5  Min: 75  Max: 96  Resp  Av.8  Min: 17  Max: 18  Temp  Av.1 °C (98.7 °F)  Min: 36.6 °C (97.8 °F)  Max: 37.3 °C (99.2 °F)  SpO2  Av %  Min: 94 %  Max: 97 %    No Data Recorded    Recent Labs      18   0359  18   0511   WBC  4.7*  5.0   RBC  4.04*  4.38*   HEMOGLOBIN  11.8*  12.4*   HEMATOCRIT  37.2*  40.1*   MCV  92.1  91.6   MCH  29.2  28.3   MCHC  31.7*  30.9*   RDW  49.1  48.2   PLATELETCT  188  229   MPV  9.4  9.4     Recent Labs      18   0359  18   0511   SODIUM  138  138   POTASSIUM  4.2  4.5   CHLORIDE  106  104   CO2  27  28   GLUCOSE  98  107*   BUN  17  13   CREATININE  0.96  1.05   CALCIUM  10.0  10.9*               Intake/Output       18 0700 - 18 0659 18 07 - 18 0659       Total  Total       Intake    P.O.  240  -- 240  --  -- --    P.O. 240 -- 240 -- -- --    Total Intake 240 -- 240 -- -- --       Output    Urine  300  800 1100  --  -- --    Number of Times Voided 2 x 3 x 5 x -- -- --    Urine Void (mL)  -- -- --    Stool  --  -- --  --  -- --    Number of Times Stooled 1 x -- 1 x -- -- --    Total Output  -- -- --       Net I/O     -60 -800 -860 -- -- --            Intake/Output Summary (Last 24 hours) at 18 1029  Last data filed at 18 0400   Gross per 24 hour   Intake              120 ml   Output             1100 ml   Net             -980 ml            • NS  500 mL Once   • hydrocortisone rectal   QHS   • ferrous sulfate  325 mg Q48HRS   • pregabalin  50 mg TID   • Pharmacy Consult Request  1 Each PRN   • budesonide-formoterol  2 Puff BID   •  levothyroxine  50 mcg AM ES   • polyethylene glycol/lytes  1 Packet DAILY    And   • senna-docusate  2 Tab BID    And   • magnesium hydroxide  30 mL QDAY PRN    And   • bisacodyl  10 mg QDAY PRN   • Respiratory Care per Protocol   Continuous RT   • hydrALAZINE  10 mg Q4HRS PRN   • ondansetron  4 mg Q4HRS PRN   • ondansetron  4 mg Q4HRS PRN   • acetaminophen  650 mg Q6HRS   • atorvastatin  40 mg Nightly   • cetirizine  10 mg DAILY   • vitamin D  3,000 Units DAILY   • lisinopril  10 mg DAILY   • fish oil  1,000 mg BID   • omeprazole  20 mg DAILY   • tamsulosin  0.4 mg AFTER BREAKFAST   • therapeutic multivitamin-minerals  1 Tab DAILY   • tizanidine  4 mg Q6HRS PRN   • oxyCODONE immediate-release  5 mg Q4HRS PRN    Or   • oxyCODONE immediate-release  10 mg Q4HRS PRN   • tramadol   mg Q6HRS PRN   • lidocaine  1 Patch Q24HR       Assessment and Plan:  Hospital day 7  Readmission for intractable back pain left buttock/posterior-lat thigh pain: No Nsx etiology per MRI and CT myelogtram. 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 Bursa injection 12/22, no significant relief per patient, but slightly more mobile     Multiple potential pain generators: trochanteric bursitis, intraarticular hip pain, sacroiliitis , consider outpatient pain management referral.      Agree with transfer to SNF.  Follow up in clinic after discharge from facility.      Prophylactic anticoagulation: yes         Start date/time: started     D/w Dr. Fowler

## 2018-12-23 NOTE — PROGRESS NOTES
Bedside report received from day shift RN, care assumed. Pt denies pain/needs at this time. Bed duane  in lowest position, bed alarm on. Call light and personal belongings within reach.

## 2018-12-23 NOTE — ASSESSMENT & PLAN NOTE
-PMR following; s/p left greater trochanteric bursa injection with improved mobility/pain    -ice/lidocaine patch  -SNF placement

## 2018-12-23 NOTE — CARE PLAN
Problem: Safety  Goal: Will remain free from falls  Outcome: PROGRESSING AS EXPECTED  Bed locked in lowest position, bed alarm on. Call light and personal belongings within reach.    Problem: Knowledge Deficit  Goal: Knowledge of disease process/condition, treatment plan, diagnostic tests, and medications will improve  Outcome: PROGRESSING AS EXPECTED  POC discussed, questions answered, pt verbalized understanding.

## 2018-12-23 NOTE — DISCHARGE SUMMARY
Internal Medicine Discharge Summary  Note Author: Sami Bhat M.D. / Bebe Kaminski M.D.      Name Angelo Magaña     1945   Age/Sex 73 y.o. male   MRN [id number]         Admit Date:  2018       Discharge Date:   2018    Service:   [facility] Internal Medicine Blue Team  Attending Physician(s):   Dr. Dos Santos      Senior Resident(s):   Dr. Stout  Gian Resident(s):   Dr. Bhat  PCP: Jeovany Samson M.D.      Primary Diagnosis:   Radiculopathy of sacral region/ Greater trochanteric bursitis of left hip    Secondary Diagnoses:                Principal Problem:    Radiculopathy of sacral region POA: Yes  Active Problems:    Greater trochanteric bursitis of left hip POA: Unknown    Lumbar back pain with radiculopathy affecting left lower extremity POA: Yes    Intractable low back pain POA: Yes    Acquired hypothyroidism POA: Yes    Asthma POA: Yes    Seasonal allergies POA: Yes    GERD (gastroesophageal reflux disease) POA: Yes    Dyslipidemia POA: Yes    Essential hypertension POA: Yes    Anemia POA: Yes    Elevated alkaline phosphatase level POA: Unknown    BPH (benign prostatic hyperplasia) POA: Unknown    Asymptomatic bacteriuria POA: Unknown    Hypercalcemia POA: No  Resolved Problems:    * No resolved hospital problems. *      Hospital Summary (Brief Narrative):       73 year old male with past medical history of recent L2-L3 fusion, lumbar laminectomy and discectomy L2-L3, hardware removal L4-L5, repositioning of L3 screws by Dr. Fowler presented with complaints of intractable back pain and hip pain that had been progressively worsening and uncontrolled with his outpatient pain medications. X-rays were obtained with no acute findings.     Neurosurgery subsequently recommended CT myelogram which was significant for pseudomeningocele, surgical hardware, stenosis at multiple levels and severe at L5/S1, moderate bulging discs. He subsequently underwent L5-S1 left Epidural steroid injection on  12/19/18 by Dr. Thomas with some improvement of his back pain. Recommending f/u outpatient.    Patient was assessed by physical therapy and occupational therapy; recommended continued rehab therapy. Physiatry was consulted; performed left greater trochanteric bursa injection for hip pain along with increasing lidocaine patches to two, increasing lyrica dosage, physical therapy and occupational therapy at skilled nursing facility focusing on stretching of IT band and strengthening of gluteus medius. Right lateral hip pain was consistent with right piriformis muscle pain, not indicated for injection at this time, and stretching exercises taught. Recommending placement to SNF.    Geriatrics (Dr. Samson - patients primary care provider) was consulted and recommendations included in plan. Patient to follow-up with geriatrics as outpatient.     Patient's mobility and pain improved following continue PT/OT and injections, most notably following bursa injection. Patient discharged to SNF and to follow up with neurosurgery and geriatrics/PCP following stay.    Patient /Hospital Summary (Details -- Problem Oriented) :          Greater trochanteric bursitis of left hip   Assessment & Plan    -PMR following; s/p left greater trochanteric bursa injection with improved mobility/pain    -ice/lidocaine patch  -SNF placement     * Radiculopathy of sacral region   Assessment & Plan    - Differential: S1 radiculopathy vs hip pain; Trochanteric brusitis  -s/p L2-L3 fusion, lumbar laminectomy and discectomy L2-L3, hardware removal L4-L5, repositioning of L3 screw by Dr. Fowler on 11/19/2018  -XR Hips showing postsurgical changes but no acute fracture  -CT myelogram showing pseudomeningocele, surgical hardware, stenosis at multiple levels and sever at L5/S1, moderate bulging discs  - Neurosurgery, PMR, Geriatrics, following and recs included in plan  -s/p Epidural spinal injection without relief    Plan  - Continued pain management  - Fall  precautions  - Continue PT/OT  - Aggressive bowel protocol as patient is on opiate therapy  - avoiding NSAIDs, ASA; long acting blood thinning agents       Intractable low back pain   Assessment & Plan    -plan per radiculopathy of the sacral region    Plan  - Outpatient pain management referral     Lumbar back pain with radiculopathy affecting left lower extremity   Assessment & Plan    - plan per radiculopathy of the sacral region       Hypercalcemia   Assessment & Plan    -IVF bolus and recheck     Asymptomatic bacteriuria   Assessment & Plan    -UA showing bacteriuria, patient currently asymptomatic  -monitor for symptoms     BPH (benign prostatic hyperplasia)   Assessment & Plan    Stable. Pt denies LUTs    -Continue Flomax 0.4 mg daily     Elevated alkaline phosphatase level   Assessment & Plan    Isolated in this patient with recent back surgery.     -Continue Vit D supplementation     Anemia   Assessment & Plan    -Chronic normocytic anemia, hemoglobin improving from baseline of about 8, stool occult blood was negative.  According to patient last colonoscopy was about 2 years ago and was normal.  Denies melena/dizziness/shortness of breath/chest pain. Hx of AVMs.  -iron studies low; ferritin/B12/Folate/Free T4 non deficient    Plan  -Continue iron supplementation  - Follow-up iron studies as outpatient     Essential hypertension   Assessment & Plan    Stable.  Normotensive on admission.    -Continue lisinopril 10 mg daily     Dyslipidemia   Assessment & Plan    Last lipid panel 11/20/2017, and current one at goal.    -Continue atorvastatin 40 mg daily     GERD (gastroesophageal reflux disease)   Assessment & Plan    Stable disease.    -Continue Prilosec 20 mg daily     Seasonal allergies   Assessment & Plan    Stable.    -Continue cetirizine  -Continue to monitor     Asthma   Assessment & Plan    Stable disease.  Uses Advair once a day.  No recent increase in need for Advair.    Plan  -Continue Advair      Acquired hypothyroidism   Assessment & Plan    -TSH elevated, Free T4 wnl  - ? compliance    Plan  -Continue Synthroid         Consultants:     Neurosurgery: Dr. Fowler  PMR: Dr. Forte  Geriatrics: Dr. Samson    Procedures:        12/19/2018: L5-S1 transforaminal epidural steroid injection by Dr. Thomas  12/22/2018: Left greater trochanteric bursa kenalog injection (ultrasound-guided) by Dr. Forte    Imaging/ Testing:      IR-INJ-SACROILIAC-ANES/STER   Final Result      Successful fluoroscopically guided LEFT L5-S1 transforaminal epidural steroid and anesthetic injection      CT-LSPINE W/O PLUS RECONS   Final Result      1.  8 cm dorsal fluid collection spanning L1-L3 communicates with the thecal sac indicating pseudomeningocele      2.   L2-L5 posterior fixation hardware, L2, L3 laminectomies, L4, L5 posterior bony overgrowth encasing the dorsal aspect of the sac. No evidence of hardware failure/complication      3.  Several levels of foraminal stenoses with greatest narrowing seen on the right at L5/S1, severe. Lesser stenoses at other levels as detailed above      DX-LUMBAR PUNCTURE FOR CT ONLY   Final Result      Successful fluoroscopic-guided lumbar puncture with intrathecal contrast administration for subsequent CT myelogram.      DX-HIP-UNILATERAL-WITH PELVIS-1 VIEW LEFT   Final Result      Postsurgical changes status post right hip arthroplasty.      Minimal degenerative changes at the left hip.      Degenerative and postsurgical changes in the lower lumbar spine.      Calcification in the right gluteal region is likely dystrophic.         DX-CHEST-LIMITED (1 VIEW)   Final Result         No acute cardiac or pulmonary abnormality is identified.            Discharge Medications:         Medication Reconciliation: Completed       Medication List      START taking these medications      Instructions   budesonide-formoterol 160-4.5 MCG/ACT Aero  Commonly known as:  SYMBICORT   Inhale 2 Puffs by mouth 2 Times a Day  for 15 days.  Dose:  2 Puff     lidocaine 5 % Ptch  Commonly known as:  LIDODERM   Apply 1 Patch to skin as directed every 24 hours.  Dose:  1 Patch     pregabalin 50 MG capsule  Commonly known as:  LYRICA   Take 1 Cap by mouth 3 times a day for 7 days.  Dose:  50 mg     senna-docusate 8.6-50 MG Tabs  Commonly known as:  PERICOLACE or SENOKOT S   Take 2 Tabs by mouth 2 Times a Day.  Dose:  2 Tab        CONTINUE taking these medications      Instructions   acetaminophen 500 MG Tabs  Commonly known as:  TYLENOL   Take 1 Tab by mouth every 6 hours as needed for Mild Pain.  Dose:  500 mg     atorvastatin 40 MG Tabs  Commonly known as:  LIPITOR   Take 40 mg by mouth every evening.  Dose:  40 mg     cetirizine 10 MG Tabs  Commonly known as:  ZYRTEC   Take 10 mg by mouth every day.  Dose:  10 mg     ferrous sulfate 325 (65 Fe) MG tablet   Take 1 Tab by mouth every day.  Dose:  325 mg     fish oil 1000 MG Caps capsule   Take 1,000 mg by mouth 2 Times a Day.  Dose:  1000 mg     fluticasone-salmeterol 100-50 MCG/DOSE Aepb  Commonly known as:  ADVAIR   Inhale 1 Puff by mouth every morning.  Dose:  1 Puff     levothyroxine 50 MCG Tabs  Commonly known as:  SYNTHROID   TAKE 1 TABLET BY MOUTH EVERY DAY     lisinopril 10 MG Tabs  Commonly known as:  PRINIVIL   TAKE 1 TABLET BY MOUTH EVERY DAY     omeprazole 20 MG delayed-release capsule  Commonly known as:  PRILOSEC   Take 20 mg by mouth every morning.  Dose:  20 mg     polyethylene glycol/lytes Pack  Commonly known as:  MIRALAX   Take 1 Packet by mouth every day.  Dose:  17 g     PROBIOTIC ADVANCED PO   Take 1 Tab by mouth every day.  Dose:  1 Tab     tamsulosin 0.4 MG capsule  Commonly known as:  FLOMAX   Take 1 Cap by mouth ONE-HALF HOUR AFTER BREAKFAST.  Dose:  0.4 mg     therapeutic multivitamin-minerals Tabs   Take 1 Tab by mouth every day.  Dose:  1 Tab     tizanidine 4 MG Tabs  Commonly known as:  ZANAFLEX   Take 1 Tab by mouth every 6 hours as needed.  Dose:  4 mg      Vitamin D3 3000 units Tabs   Take 3,000 Units by mouth every day.  Dose:  3000 Units            Can use .DISCHARGEMEDSLIST if going to another facility         Disposition:   Skilled Nursing Facility    Diet:   As tolerated    Activity:   As tolerated, continue physical and occupational therapy    Instructions:         The patient was instructed to return to the ER in the event of worsening symptoms. I have counseled the patient on the importance of compliance and the patient has agreed to proceed with all medical recommendations and follow up plan indicated above.   The patient understands that all medications come with benefits and risks. Risks may include permanent injury or death and these risks can be minimized with close reassessment and monitoring.        Primary Care Provider:    Dr. Samson  Discharge summary faxed to primary care provider:  Deferred  Copy of discharge summary given to the patient: Deferred      Follow up appointment details :      Follow-up with primary care provider / geriatrics (Dr. Samson)  Follow-up with Dr. Fowler    Pending Studies:        None    Time spent on discharge day patient visit, preparing discharge paperwork and arranging for patient follow up.    Summary of follow up issues:   - Primary care to consider outpatient pain management referral for possible methadone therapy if pain remains uncontrolled despite physical and occupational therapy  - Follow up with Neurosurgery / PCP    Discharge Time (Minutes) :    50  Hospital Course Type:  Inpatient Stay >2 midnights      Condition on Discharge:hemodynamically stable   ______________________________________________________________________    Interval history/exam for day of discharge:    -patient with increasing mobility and improved pain this morning, walking further everyday, last BM 2 days ago, no further concerns  -continue mobility as tolerated     -Neurosurg, geriatrics, PMR following  -planning for SNF, will plan on discharge  today  -Geriatrics recommend considering contralateral greater trochanteric bursa injection, following up with PMR for assessment and did not find indication for injection at this time      Physical Exam   Constitutional: He is oriented to person, place, and time and well-developed, well-nourished, and in no distress. No distress.   HENT:   Head: Normocephalic and atraumatic.   Mouth/Throat: Oropharynx is clear and moist. No oropharyngeal exudate.   Eyes: Pupils are equal, round, and reactive to light. Conjunctivae and EOM are normal. Right eye exhibits no discharge. Left eye exhibits no discharge. No scleral icterus.   Neck: Normal range of motion. Neck supple. No tracheal deviation present.   Cardiovascular: Normal rate, regular rhythm, normal heart sounds and intact distal pulses.  Exam reveals no gallop and no friction rub.    No murmur heard.  Pulmonary/Chest: Effort normal and breath sounds normal. No respiratory distress. He has no wheezes. He has no rales. He exhibits no tenderness.   Abdominal: Soft. Bowel sounds are normal. He exhibits no distension and no mass. There is no tenderness. There is no rebound and no guarding.   Musculoskeletal: Normal range of motion. He exhibits no edema, tenderness or deformity.   Neurological: He is alert and oriented to person, place, and time. He exhibits normal muscle tone. Coordination normal.   Skin: Skin is warm and dry. No rash noted. He is not diaphoretic. No erythema. No pallor.   Left knee scar, midline abdominal scar    Psychiatric: Mood and affect normal.       Most Recent Labs:    Lab Results   Component Value Date/Time    WBC 5.0 12/23/2018 05:11 AM    RBC 4.38 (L) 12/23/2018 05:11 AM    HEMOGLOBIN 12.4 (L) 12/23/2018 05:11 AM    HEMATOCRIT 40.1 (L) 12/23/2018 05:11 AM    MCV 91.6 12/23/2018 05:11 AM    MCH 28.3 12/23/2018 05:11 AM    MCHC 30.9 (L) 12/23/2018 05:11 AM    MPV 9.4 12/23/2018 05:11 AM    NEUTSPOLYS 68.40 12/23/2018 05:11 AM    LYMPHOCYTES 19.50  (L) 12/23/2018 05:11 AM    MONOCYTES 8.50 12/23/2018 05:11 AM    EOSINOPHILS 2.60 12/23/2018 05:11 AM    EOSINOPHILS 3 05/11/2005 03:30 PM    BASOPHILS 0.60 12/23/2018 05:11 AM    HYPOCHROMIA 1+ 12/16/2013 08:55 AM      Lab Results   Component Value Date/Time    SODIUM 137 12/24/2018 02:47 AM    POTASSIUM 4.2 12/24/2018 02:47 AM    CHLORIDE 105 12/24/2018 02:47 AM    CO2 25 12/24/2018 02:47 AM    GLUCOSE 111 (H) 12/24/2018 02:47 AM    BUN 16 12/24/2018 02:47 AM    CREATININE 0.94 12/24/2018 02:47 AM    CREATININE 0.9 05/18/2009 04:20 PM      Lab Results   Component Value Date/Time    ALTSGPT 19 12/24/2018 02:47 AM    ASTSGOT 17 12/24/2018 02:47 AM    ALKPHOSPHAT 103 (H) 12/24/2018 02:47 AM    TBILIRUBIN 0.5 12/24/2018 02:47 AM    ALBUMIN 3.6 12/24/2018 02:47 AM    GLOBULIN 2.3 12/24/2018 02:47 AM    INR 1.04 12/19/2018 02:41 AM     Lab Results   Component Value Date/Time    PROTHROMBTM 13.7 12/19/2018 02:41 AM    INR 1.04 12/19/2018 02:41 AM

## 2018-12-23 NOTE — PROGRESS NOTES
Bedside rounding complete. Patient a & o x 4. Pt complains of pain in b/l hips and burning in anus. MD checked patient's anus. No hemorrhoids noted. Some non-blanching redness around anus. Patient states he doesn't feel much relief from the shot given by Dr. Forte to his hip yesterday.     Bed alarm on, upper side rails up, bed locked and in lowest position. Call bell and belongings within reach. Communication board updated and plan of care discussed with the patient. Patient educated on hourly rounding.

## 2018-12-23 NOTE — PROGRESS NOTES
Page sent to [facility] for orders for waffle cushion and clarification on hydrocortisone cream to anus is dose now and at bedtime.

## 2018-12-24 LAB
ALBUMIN SERPL BCP-MCNC: 3.6 G/DL (ref 3.2–4.9)
ALBUMIN/GLOB SERPL: 1.6 G/DL
ALP SERPL-CCNC: 103 U/L (ref 30–99)
ALT SERPL-CCNC: 19 U/L (ref 2–50)
ANION GAP SERPL CALC-SCNC: 7 MMOL/L (ref 0–11.9)
AST SERPL-CCNC: 17 U/L (ref 12–45)
BILIRUB SERPL-MCNC: 0.5 MG/DL (ref 0.1–1.5)
BUN SERPL-MCNC: 16 MG/DL (ref 8–22)
CALCIUM SERPL-MCNC: 9.6 MG/DL (ref 8.5–10.5)
CHLORIDE SERPL-SCNC: 105 MMOL/L (ref 96–112)
CO2 SERPL-SCNC: 25 MMOL/L (ref 20–33)
CREAT SERPL-MCNC: 0.94 MG/DL (ref 0.5–1.4)
GLOBULIN SER CALC-MCNC: 2.3 G/DL (ref 1.9–3.5)
GLUCOSE SERPL-MCNC: 111 MG/DL (ref 65–99)
POTASSIUM SERPL-SCNC: 4.2 MMOL/L (ref 3.6–5.5)
PROT SERPL-MCNC: 5.9 G/DL (ref 6–8.2)
SODIUM SERPL-SCNC: 137 MMOL/L (ref 135–145)

## 2018-12-24 PROCEDURE — 80053 COMPREHEN METABOLIC PANEL: CPT

## 2018-12-24 PROCEDURE — 700102 HCHG RX REV CODE 250 W/ 637 OVERRIDE(OP): Performed by: INTERNAL MEDICINE

## 2018-12-24 PROCEDURE — 36415 COLL VENOUS BLD VENIPUNCTURE: CPT

## 2018-12-24 PROCEDURE — 700101 HCHG RX REV CODE 250: Performed by: INTERNAL MEDICINE

## 2018-12-24 PROCEDURE — 700102 HCHG RX REV CODE 250 W/ 637 OVERRIDE(OP): Performed by: STUDENT IN AN ORGANIZED HEALTH CARE EDUCATION/TRAINING PROGRAM

## 2018-12-24 PROCEDURE — A9270 NON-COVERED ITEM OR SERVICE: HCPCS | Performed by: STUDENT IN AN ORGANIZED HEALTH CARE EDUCATION/TRAINING PROGRAM

## 2018-12-24 PROCEDURE — 99232 SBSQ HOSP IP/OBS MODERATE 35: CPT | Performed by: INTERNAL MEDICINE

## 2018-12-24 PROCEDURE — 770001 HCHG ROOM/CARE - MED/SURG/GYN PRIV*

## 2018-12-24 PROCEDURE — A9270 NON-COVERED ITEM OR SERVICE: HCPCS | Performed by: INTERNAL MEDICINE

## 2018-12-24 PROCEDURE — 99232 SBSQ HOSP IP/OBS MODERATE 35: CPT | Mod: GC | Performed by: INTERNAL MEDICINE

## 2018-12-24 PROCEDURE — 97535 SELF CARE MNGMENT TRAINING: CPT

## 2018-12-24 RX ORDER — AMOXICILLIN 250 MG
2 CAPSULE ORAL 2 TIMES DAILY
Status: DISCONTINUED | OUTPATIENT
Start: 2018-12-24 | End: 2018-12-27 | Stop reason: HOSPADM

## 2018-12-24 RX ORDER — POLYETHYLENE GLYCOL 3350 17 G/17G
1 POWDER, FOR SOLUTION ORAL 2 TIMES DAILY
Status: DISCONTINUED | OUTPATIENT
Start: 2018-12-24 | End: 2018-12-27 | Stop reason: HOSPADM

## 2018-12-24 RX ORDER — BISACODYL 10 MG
10 SUPPOSITORY, RECTAL RECTAL
Status: DISCONTINUED | OUTPATIENT
Start: 2018-12-24 | End: 2018-12-27 | Stop reason: HOSPADM

## 2018-12-24 RX ADMIN — OXYCODONE HYDROCHLORIDE 5 MG: 5 TABLET ORAL at 16:25

## 2018-12-24 RX ADMIN — OMEGA-3 FATTY ACIDS CAP 1000 MG 1000 MG: 1000 CAP at 16:25

## 2018-12-24 RX ADMIN — ACETAMINOPHEN 650 MG: 325 TABLET, FILM COATED ORAL at 11:09

## 2018-12-24 RX ADMIN — LEVOTHYROXINE SODIUM 50 MCG: 50 TABLET ORAL at 04:58

## 2018-12-24 RX ADMIN — BUDESONIDE AND FORMOTEROL FUMARATE DIHYDRATE 2 PUFF: 160; 4.5 AEROSOL RESPIRATORY (INHALATION) at 04:57

## 2018-12-24 RX ADMIN — HYDROCORTISONE 2.5%: 25 CREAM TOPICAL at 20:10

## 2018-12-24 RX ADMIN — OXYCODONE HYDROCHLORIDE 5 MG: 5 TABLET ORAL at 09:36

## 2018-12-24 RX ADMIN — BUDESONIDE AND FORMOTEROL FUMARATE DIHYDRATE 2 PUFF: 160; 4.5 AEROSOL RESPIRATORY (INHALATION) at 16:27

## 2018-12-24 RX ADMIN — TIZANIDINE 4 MG: 4 TABLET ORAL at 11:10

## 2018-12-24 RX ADMIN — OXYCODONE HYDROCHLORIDE 10 MG: 10 TABLET ORAL at 23:52

## 2018-12-24 RX ADMIN — VITAMIN D, TAB 1000IU (100/BT) 3000 UNITS: 25 TAB at 04:57

## 2018-12-24 RX ADMIN — OMEPRAZOLE 20 MG: 20 CAPSULE, DELAYED RELEASE ORAL at 04:58

## 2018-12-24 RX ADMIN — ACETAMINOPHEN 650 MG: 325 TABLET, FILM COATED ORAL at 23:52

## 2018-12-24 RX ADMIN — MULTIPLE VITAMINS W/ MINERALS TAB 1 TABLET: TAB at 04:57

## 2018-12-24 RX ADMIN — TIZANIDINE 4 MG: 4 TABLET ORAL at 04:58

## 2018-12-24 RX ADMIN — PREGABALIN 50 MG: 25 CAPSULE ORAL at 16:24

## 2018-12-24 RX ADMIN — STANDARDIZED SENNA CONCENTRATE AND DOCUSATE SODIUM 2 TABLET: 8.6; 5 TABLET, FILM COATED ORAL at 04:58

## 2018-12-24 RX ADMIN — PREGABALIN 50 MG: 25 CAPSULE ORAL at 11:09

## 2018-12-24 RX ADMIN — ACETAMINOPHEN 650 MG: 325 TABLET, FILM COATED ORAL at 16:24

## 2018-12-24 RX ADMIN — OMEGA-3 FATTY ACIDS CAP 1000 MG 1000 MG: 1000 CAP at 04:57

## 2018-12-24 RX ADMIN — ATORVASTATIN CALCIUM 40 MG: 40 TABLET, FILM COATED ORAL at 20:10

## 2018-12-24 RX ADMIN — LISINOPRIL 10 MG: 10 TABLET ORAL at 04:58

## 2018-12-24 RX ADMIN — CETIRIZINE HYDROCHLORIDE 10 MG: 10 TABLET, FILM COATED ORAL at 04:57

## 2018-12-24 RX ADMIN — STANDARDIZED SENNA CONCENTRATE AND DOCUSATE SODIUM 2 TABLET: 8.6; 5 TABLET, FILM COATED ORAL at 16:25

## 2018-12-24 RX ADMIN — TRAMADOL HYDROCHLORIDE 100 MG: 50 TABLET, FILM COATED ORAL at 20:10

## 2018-12-24 RX ADMIN — PREGABALIN 50 MG: 25 CAPSULE ORAL at 04:58

## 2018-12-24 RX ADMIN — TAMSULOSIN HYDROCHLORIDE 0.4 MG: 0.4 CAPSULE ORAL at 07:15

## 2018-12-24 RX ADMIN — TRAMADOL HYDROCHLORIDE 100 MG: 50 TABLET, FILM COATED ORAL at 04:57

## 2018-12-24 RX ADMIN — POLYETHYLENE GLYCOL 3350 1 PACKET: 17 POWDER, FOR SOLUTION ORAL at 16:24

## 2018-12-24 RX ADMIN — ACETAMINOPHEN 650 MG: 325 TABLET, FILM COATED ORAL at 04:58

## 2018-12-24 RX ADMIN — POLYETHYLENE GLYCOL 3350 1 PACKET: 17 POWDER, FOR SOLUTION ORAL at 04:57

## 2018-12-24 RX ADMIN — LIDOCAINE 1 PATCH: 50 PATCH TOPICAL at 20:30

## 2018-12-24 RX ADMIN — TIZANIDINE 4 MG: 4 TABLET ORAL at 20:10

## 2018-12-24 ASSESSMENT — ENCOUNTER SYMPTOMS
NAUSEA: 1
PALPITATIONS: 0
BRUISES/BLEEDS EASILY: 0
DOUBLE VISION: 0
COUGH: 0
DIZZINESS: 0
SHORTNESS OF BREATH: 0
CHILLS: 0
POLYDIPSIA: 0
HEADACHES: 1
MYALGIAS: 0
BACK PAIN: 1
VOMITING: 0
BLURRED VISION: 0
NERVOUS/ANXIOUS: 0
DEPRESSION: 0
FEVER: 0

## 2018-12-24 ASSESSMENT — COGNITIVE AND FUNCTIONAL STATUS - GENERAL
DAILY ACTIVITIY SCORE: 17
DRESSING REGULAR LOWER BODY CLOTHING: A LOT
SUGGESTED CMS G CODE MODIFIER DAILY ACTIVITY: CK
PERSONAL GROOMING: A LITTLE
DRESSING REGULAR UPPER BODY CLOTHING: A LITTLE
TOILETING: A LITTLE
HELP NEEDED FOR BATHING: A LOT

## 2018-12-24 ASSESSMENT — PATIENT HEALTH QUESTIONNAIRE - PHQ9
1. LITTLE INTEREST OR PLEASURE IN DOING THINGS: NOT AT ALL
SUM OF ALL RESPONSES TO PHQ9 QUESTIONS 1 AND 2: 0

## 2018-12-24 ASSESSMENT — PAIN SCALES - GENERAL
PAINLEVEL_OUTOF10: 7
PAINLEVEL_OUTOF10: 6
PAINLEVEL_OUTOF10: 5
PAINLEVEL_OUTOF10: 6

## 2018-12-24 NOTE — PROGRESS NOTES
Internal Medicine Interval Note  Note Author: Sami Bhat M.D.     Name Angelo Magaña     1945   Age/Sex 73 y.o. male   MRN [id number]   Code Status full     After 5PM or if no immediate response to page, please call for cross-coverage  Attending/Team: Levon/Donnie See Patient List for primary contact information  Call [phone] to page    1st Call - Day Intern (R1):   Home 2nd Call - Day Sr. Resident (R2/R3):   Shaiuri         Reason for interval visit  (Principal Problem)   S1 Radiculopathy vs hip pain    ID:  -73M, PMH of HTN, asthma, GERD, chronic back pain, h/o paraspinal abscess, hypothyroidism, recent L2-L3 fusion on ; presented to ED referred from neurosurgery Dr. Fowler for acute pain management and workup of worsening post-surgical lower back pain. MRI on  showing extradural fluid collection at L2-3. XR Hips showing postsurgical changes but no acute fracture. Neurosurgery, PMR, Geriatrics following. Myelogram showing stenoses and moderate bulging disc. Patient is s/p epidural spinal infusion and left greater trochanteric bursa injection. Planning for SNF or rehab placement and f/u outpaitent neurosurgery.     Interval Problem Daily Status Update  (24 hours, problem oriented, brief subjective history, new lab/imaging data pertinent to that problem)   -patient with improved mobility/pain this morning was able to have a short walk yesterday, rectal area pain improved with hemorrhoid cream, no further concerns  -continue mobility as tolerated    -panning for SNF, referral pending    Review of Systems   Constitutional: Negative for chills and fever.   HENT: Negative for ear pain and hearing loss.    Eyes: Negative for blurred vision and double vision.   Respiratory: Negative for cough and shortness of breath.    Cardiovascular: Negative for chest pain and palpitations.   Gastrointestinal: Positive for nausea. Negative for vomiting.   Genitourinary: Negative for dysuria and  hematuria.   Musculoskeletal: Positive for back pain and joint pain. Negative for myalgias.   Skin: Negative for itching and rash.   Neurological: Positive for headaches. Negative for dizziness.   Endo/Heme/Allergies: Negative for polydipsia. Does not bruise/bleed easily.   Psychiatric/Behavioral: Negative for depression. The patient is not nervous/anxious.        Disposition/Barriers to discharge:   Pain management  Placement    Consultants/Specialty  Neurosurgery - Dr. Fowler  PMR-Dr. Forte  PCP: Jeovany Samson M.D.      Quality Measures  Quality-Core Measures   Reviewed items::  Labs reviewed and Medications reviewed  Gomes catheter::  No Gomes  DVT prophylaxis - mechanical:  SCDs          Physical Exam       Vitals:    12/23/18 1640 12/23/18 2000 12/24/18 0400 12/24/18 0704   BP: 140/73 115/71 108/70 107/64   Pulse: 72 94 70 74   Resp: 18 16 16 16   Temp: 37.5 °C (99.5 °F) 37.5 °C (99.5 °F) 36.6 °C (97.9 °F) 36.6 °C (97.9 °F)   TempSrc: Temporal Temporal Temporal Temporal   SpO2: 96% 96% 94% 96%   Weight:       Height:         Body mass index is 32.17 kg/m².    Oxygen Therapy:  Pulse Oximetry: 96 %, O2 (LPM): 0, O2 Delivery: None (Room Air)    Physical Exam   Constitutional: He is oriented to person, place, and time and well-developed, well-nourished, and in no distress. No distress.   HENT:   Head: Normocephalic and atraumatic.   Mouth/Throat: Oropharynx is clear and moist. No oropharyngeal exudate.   Eyes: Pupils are equal, round, and reactive to light. Conjunctivae and EOM are normal. Right eye exhibits no discharge. Left eye exhibits no discharge. No scleral icterus.   Neck: Normal range of motion. Neck supple. No tracheal deviation present.   Cardiovascular: Normal rate, regular rhythm, normal heart sounds and intact distal pulses.  Exam reveals no gallop and no friction rub.    No murmur heard.  Pulmonary/Chest: Effort normal and breath sounds normal. No respiratory distress. He has no wheezes. He has no  rales. He exhibits no tenderness.   Abdominal: Soft. Bowel sounds are normal. He exhibits no distension and no mass. There is no tenderness. There is no rebound and no guarding.   Genitourinary: Rectum normal.   Genitourinary Comments: External anus and surrounding skin intact, no erythema, some moisture, no warmth/redness/steven blood/purulent discharge   Musculoskeletal: Normal range of motion. He exhibits no edema, tenderness or deformity.   Left buttock pain with straight leg raise bilaterally   Neurological: He is alert and oriented to person, place, and time. He exhibits normal muscle tone. Coordination normal.   Skin: Skin is warm and dry. No rash noted. He is not diaphoretic. No erythema. No pallor.   Left knee scar, midline abdominal scar    Psychiatric: Mood and affect normal.             Assessment/Plan     * Radiculopathy of sacral region- (present on admission)   Assessment & Plan    - Differential: S1 radiculopathy vs hip pain; Trochanteric brusitis  -s/p L2-L3 fusion, lumbar laminectomy and discectomy L2-L3, hardware removal L4-L5, repositioning of L3 screw by Dr. Fowler on 11/19/2018  -XR Hips showing postsurgical changes but no acute fracture  -CT myelogram showing pseudomeningocele, surgical hardware, stenosis at multiple levels and sever at L5/S1, moderate bulging discs  - Neurosurgery, PMR, Geriatrics, following and recs included in plan  -s/p Epidural spinal injection without relief    Plan  - Continued pain management  - Fall precautions  - Continue PT/OT  - Aggressive bowel protocol as patient is on opiate therapy  - avoiding NSAIDs, ASA; long acting blood thinning agents       Greater trochanteric bursitis of left hip   Assessment & Plan    -PMR following; s/p left greater trochanteric bursa injection with improved mobility/pain    -ice/lidocaine patch  -SNF placement     Intractable low back pain- (present on admission)   Assessment & Plan    -plan per radiculopathy of the sacral  region    Plan  - Outpatient SI joint injection per IR  - Outpatient pain management referral     Lumbar back pain with radiculopathy affecting left lower extremity- (present on admission)   Assessment & Plan    - plan per radiculopathy of the sacral region       Hypercalcemia   Assessment & Plan    -IVF bolus and recheck     Asymptomatic bacteriuria   Assessment & Plan    -UA showing bacteriuria, patient currently asymptomatic  -monitor for symptoms     BPH (benign prostatic hyperplasia)   Assessment & Plan    Stable. Pt denies LUTs    -Continue Flomax 0.4 mg daily     Elevated alkaline phosphatase level   Assessment & Plan    Isolated in this patient with recent back surgery.     -Continue Vit D supplementation     Anemia- (present on admission)   Assessment & Plan    -Chronic normocytic anemia, hemoglobin improving from baseline of about 8, stool occult blood was negative.  According to patient last colonoscopy was about 2 years ago and was normal.  Denies melena/dizziness/shortness of breath/chest pain. Hx of AVMs.  -iron studies low; ferritin/B12/Folate/Free T4 non deficient    Plan  -Continue iron supplementation  - Follow-up iron studies as outpatient     Essential hypertension- (present on admission)   Assessment & Plan    Stable.  Normotensive on admission.    -Continue lisinopril 10 mg daily     Dyslipidemia- (present on admission)   Assessment & Plan    Last lipid panel 11/20/2017, and current one at goal.    -Continue atorvastatin 40 mg daily     GERD (gastroesophageal reflux disease)- (present on admission)   Assessment & Plan    Stable disease.    -Continue Prilosec 20 mg daily     Seasonal allergies- (present on admission)   Assessment & Plan    Stable.    -Continue cetirizine  -Continue to monitor     Asthma- (present on admission)   Assessment & Plan    Stable disease.  Uses Advair once a day.  No recent increase in need for Advair.    Plan  -Continue Advair     Acquired hypothyroidism- (present on  admission)   Assessment & Plan    -TSH elevated, Free T4 wnl  - ? compliance    Plan  -Continue Synthroid

## 2018-12-24 NOTE — CARE PLAN
Problem: Venous Thromboembolism (VTW)/Deep Vein Thrombosis (DVT) Prevention:  Goal: Patient will participate in Venous Thrombosis (VTE)/Deep Vein Thrombosis (DVT)Prevention Measures    Intervention: Ensure patient wears graduated elastic stockings (SARAH hose) and/or SCDs, if ordered, when in bed or chair (Remove at least once per shift for skin check)  Patient SCDs removed for skin check. Skin intact. SCDS on. Pt receiving Lovenox injections.       Problem: Mobility  Goal: Risk for activity intolerance will decrease    Intervention: Assess and monitor signs of activity intolerance  Patient ambulated with 1 assist and walker.

## 2018-12-24 NOTE — PROGRESS NOTES
"Geriatric Progress Note:  12/24/2018  Chief Complaint   Patient presents with   • Sent by MD     post op back surgery 3 weeks, increased pain      73-year-old male admitted for intractable back pain after repeat back surgery on 11/19 where he underwent L2-L3 transforaminal lumbar interbody fusion with L3-L4 hardware removal and L2-L3 posterior lateral fusion  S: Over the weekend the patient has been weaned off IV pain medications.  His mobility is increasing.  He is walking around the unit twice a day.  He had a large bowel movement last night.  There is no confusion reported.  They are holding off on further injections to see how the first injection works.  His Lyrica has been increased from 25 3 times daily to 50 mg 3 times daily.  Started on hydrocortisone cream for hemorrhoids with good relief.    Discussion with the nurse suggest that he is doing well and mobilizing.  She took care of him during his initial stay in remarked that he was much more delirious during that stay and is much happier about his mentation currently.  She is unclear if he endorses that his pain is improved but both the patient and the nurse endorsed that his functionality is improving.      ROS: a 14 pt ROS was negative other than as stated above.     O:/64   Pulse 74   Temp 36.6 °C (97.9 °F) (Temporal)   Resp 16   Ht 1.778 m (5' 10\")   Wt 101.7 kg (224 lb 3.3 oz)   SpO2 96%   BMI 32.17 kg/m²   General: No acute distress, alert oriented x4  Cardiovascular: 2+ radial pulse on the right wrist, regular rate and rhythm  Lungs: Normal effort  Abdomen: Obese, soft non tender  Lumbar back exam: Painful tenderness to palpation paraspinal in the lumbar region, no pain noted within the greater trochanter or gluteus medius areas  Neurological: Able to move all 4 extremities to command      Labs/Imaging/EKG: Reviewed yesterday's CBC, today CMP    Assessment: 73-year-old male status post back surgery on 11/19 admitted for intractable back " pain    Plan:   Intractable back pain  Lumbar spinal stenosis  Recent back surgery  Constipation  -Pain is now being treated with scheduled Tylenol, scheduled Lyrica, lidocaine patch and as needed oxycodone and tramadol.  Appears to be going well  -Goal is to maximize function with the use of these medications.  I discussed this with the patient that his side effects are outweighing their ability to help and function we need to reconsider the treatment plan.  -We will increase MiraLAX to twice a day to help avoid worsening constipation    Iron deficiency anemia  -Continue iron every 48 hours    Hypothyroidism  -Elevated TSH on admission unclear how patient was taking the medication at home.  Continue levothyroxine 50 mcg we will recheck TSH as an outpatient    Disposition  -Ensuring patient's mobility is continuing to improve in-house, consideration of more corticosteroid injections per physical medicine and rehab and primary team, awaiting SNF acceptance    BPH-on tamsulosin; no current concerns for urinary retention  Seasonal allergies-on cetirizine  Use of supplements-on fish oil and vitamin D and multivitamin  GERD-on 20 mg omeprazole  Hyperlipidemia-primary prevention, on 40 mg atorvastatin    Thank you for including us in the care of this patient.  Unfortunately we do not have coverage over the Jennifer holiday.  Will be back on Wednesday, 12/26/2018.    Jeovany Samson M.D.  Geriatric Physician   Can be reached at extension: [phone]  Monday - Friday 8-5      This note was partially complete completed using voice recognition software.  I did my best to correct errors prior to submitting this note, but for any concerns please do not hesitate to contact me.

## 2018-12-24 NOTE — PROGRESS NOTES
"Neurosurgery Progress Note    Subjective:  \"Yesterday was the best day I have had yet since surgery\".    Patient walked to nurses station pain free yesterday.      Exam:  Patient is awake, alert, oriented x 3,   CN 2-12 grossly intact  No nuchal rigidity, neck is supple  No headache complaints  No dizziness complaints  Motor is full  Incision is cdi  Sensation is intact  Patient has dramatically less tenderness in the left hip/SI joint to palpation  Negative straight leg raise    BP  Min: 107/64  Max: 152/59  Pulse  Av.7  Min: 70  Max: 94  Resp  Av  Min: 16  Max: 18  Temp  Av.1 °C (98.8 °F)  Min: 36.6 °C (97.9 °F)  Max: 37.5 °C (99.5 °F)  SpO2  Av.3 %  Min: 94 %  Max: 96 %    No Data Recorded    Recent Labs      18   03518   0511   WBC  4.7*  5.0   RBC  4.04*  4.38*   HEMOGLOBIN  11.8*  12.4*   HEMATOCRIT  37.2*  40.1*   MCV  92.1  91.6   MCH  29.2  28.3   MCHC  31.7*  30.9*   RDW  49.1  48.2   PLATELETCT  188  229   MPV  9.4  9.4     Recent Labs      18   0359  18   0511  18   0247   SODIUM  138  138  137   POTASSIUM  4.2  4.5  4.2   CHLORIDE  106  104  105   CO2  27  28  25   GLUCOSE  98  107*  111*   BUN  17  13  16   CREATININE  0.96  1.05  0.94   CALCIUM  10.0  10.9*  9.6               Intake/Output       18 0700 - 18 0659 18 0700 - 18 0659      [id number] 7392-9350 Total  6119-8314 Total       Intake    P.O.  360  -- 360  --  -- --    P.O. 360 -- 360 -- -- --    Total Intake 360 -- 360 -- -- --       Output    Urine  500  -- 500  --  -- --    Number of Times Voided 2 x 1 x 3 x -- -- --    Urine Void (mL) 500 -- 500 -- -- --    Stool  --  -- --  --  -- --    Number of Times Stooled -- 1 x 1 x -- -- --    Total Output 500 -- 500 -- -- --       Net I/O     -140 -- -140 -- -- --            Intake/Output Summary (Last 24 hours) at 18 8916  Last data filed at 18 1700   Gross per 24 hour   Intake              360 ml "   Output              500 ml   Net             -140 ml            • hydrocortisone rectal   QHS   • ferrous sulfate  325 mg Q48HRS   • pregabalin  50 mg TID   • Pharmacy Consult Request  1 Each PRN   • budesonide-formoterol  2 Puff BID   • levothyroxine  50 mcg AM ES   • polyethylene glycol/lytes  1 Packet DAILY    And   • senna-docusate  2 Tab BID    And   • magnesium hydroxide  30 mL QDAY PRN    And   • bisacodyl  10 mg QDAY PRN   • Respiratory Care per Protocol   Continuous RT   • hydrALAZINE  10 mg Q4HRS PRN   • ondansetron  4 mg Q4HRS PRN   • ondansetron  4 mg Q4HRS PRN   • acetaminophen  650 mg Q6HRS   • atorvastatin  40 mg Nightly   • cetirizine  10 mg DAILY   • vitamin D  3,000 Units DAILY   • lisinopril  10 mg DAILY   • fish oil  1,000 mg BID   • omeprazole  20 mg DAILY   • tamsulosin  0.4 mg AFTER BREAKFAST   • therapeutic multivitamin-minerals  1 Tab DAILY   • tizanidine  4 mg Q6HRS PRN   • oxyCODONE immediate-release  5 mg Q4HRS PRN    Or   • oxyCODONE immediate-release  10 mg Q4HRS PRN   • tramadol   mg Q6HRS PRN   • lidocaine  1 Patch Q24HR       Assessment and Plan:  Hospital day #8  POD #28    Prophylactic anticoagulation: yes         Start date/time: per [facility]    Patient looks much better.    I believe his left hip pain is multifactorial and unrelated to his back surgery 11/19/18 by myself.    He seems to have responded to left L5,S1 obdulia, SI joint injx, left Hip injections.    It appears that the hip injection by Dr. Forte seems to have given the most relief.    Patient has no clinical evidence of a CSF leak or pseudomeningocele.    Continue pain control / discharge planning.    OK from NS perspective to discharge to SNF.    Appreciate kind help from [facility] team and nursing and therapy and PMR.

## 2018-12-24 NOTE — THERAPY
"Occupational Therapy Treatment completed with focus on ADLs, ADL transfers and patient education.  Functional Status:  Pt seen for OT tx. Min A supine > sit, Max A to don LSO seated EOB. Max A LB dressing. Stood while using urinal EOB w/ CGA for balance and safety. CGA light grooming at the sink. Tolerated amb in room and hallway w/ FWW and CGA for balance and safety. Pt reports improved pain tolerance this session from previous session. Pt pleasant and cooperative. Continues to be limited by pain and decreased activity tolerance. Pt motivated to regain strength, endurance and independence in ADLs and ADL transfers.   Plan of Care: Will benefit from Occupational Therapy 3 times per week  Discharge Recommendations:  Equipment Will Continue to Assess for Equipment Needs.     See \"Rehab Therapy-Acute\" Patient Summary Report for complete documentation.   "

## 2018-12-24 NOTE — PROGRESS NOTES
Bedside rounding complete. Patient a & o x 4. Pt had a BM last night. Pain 5/10. Pt voiding without difficulty. Pt states tingling intermittently. Pt states anus pain burning and pain is improving.     Bed alarm on, upper side rails up, bed locked and in lowest position. Call bell and belongings within reach. Communication board updated and plan of care discussed with the patient. Patient educated on hourly rounding.

## 2018-12-25 PROCEDURE — 700101 HCHG RX REV CODE 250: Performed by: INTERNAL MEDICINE

## 2018-12-25 PROCEDURE — A9270 NON-COVERED ITEM OR SERVICE: HCPCS | Performed by: STUDENT IN AN ORGANIZED HEALTH CARE EDUCATION/TRAINING PROGRAM

## 2018-12-25 PROCEDURE — 700111 HCHG RX REV CODE 636 W/ 250 OVERRIDE (IP): Performed by: INTERNAL MEDICINE

## 2018-12-25 PROCEDURE — 700102 HCHG RX REV CODE 250 W/ 637 OVERRIDE(OP): Performed by: STUDENT IN AN ORGANIZED HEALTH CARE EDUCATION/TRAINING PROGRAM

## 2018-12-25 PROCEDURE — A9270 NON-COVERED ITEM OR SERVICE: HCPCS | Performed by: INTERNAL MEDICINE

## 2018-12-25 PROCEDURE — 99232 SBSQ HOSP IP/OBS MODERATE 35: CPT | Mod: GC | Performed by: INTERNAL MEDICINE

## 2018-12-25 PROCEDURE — 700102 HCHG RX REV CODE 250 W/ 637 OVERRIDE(OP): Performed by: INTERNAL MEDICINE

## 2018-12-25 PROCEDURE — 770001 HCHG ROOM/CARE - MED/SURG/GYN PRIV*

## 2018-12-25 RX ADMIN — BUDESONIDE AND FORMOTEROL FUMARATE DIHYDRATE 2 PUFF: 160; 4.5 AEROSOL RESPIRATORY (INHALATION) at 18:00

## 2018-12-25 RX ADMIN — POLYETHYLENE GLYCOL 3350 1 PACKET: 17 POWDER, FOR SOLUTION ORAL at 04:24

## 2018-12-25 RX ADMIN — FERROUS SULFATE TAB 325 MG (65 MG ELEMENTAL FE) 325 MG: 325 (65 FE) TAB at 04:24

## 2018-12-25 RX ADMIN — OXYCODONE HYDROCHLORIDE 5 MG: 5 TABLET ORAL at 20:23

## 2018-12-25 RX ADMIN — LIDOCAINE 1 PATCH: 50 PATCH TOPICAL at 20:23

## 2018-12-25 RX ADMIN — PREGABALIN 50 MG: 25 CAPSULE ORAL at 17:39

## 2018-12-25 RX ADMIN — PREGABALIN 50 MG: 25 CAPSULE ORAL at 13:38

## 2018-12-25 RX ADMIN — ACETAMINOPHEN 650 MG: 325 TABLET, FILM COATED ORAL at 13:38

## 2018-12-25 RX ADMIN — ENOXAPARIN SODIUM 40 MG: 100 INJECTION SUBCUTANEOUS at 13:38

## 2018-12-25 RX ADMIN — TIZANIDINE 4 MG: 4 TABLET ORAL at 17:47

## 2018-12-25 RX ADMIN — MULTIPLE VITAMINS W/ MINERALS TAB 1 TABLET: TAB at 04:25

## 2018-12-25 RX ADMIN — TAMSULOSIN HYDROCHLORIDE 0.4 MG: 0.4 CAPSULE ORAL at 07:50

## 2018-12-25 RX ADMIN — ATORVASTATIN CALCIUM 40 MG: 40 TABLET, FILM COATED ORAL at 20:23

## 2018-12-25 RX ADMIN — ACETAMINOPHEN 650 MG: 325 TABLET, FILM COATED ORAL at 17:39

## 2018-12-25 RX ADMIN — LEVOTHYROXINE SODIUM 50 MCG: 50 TABLET ORAL at 04:25

## 2018-12-25 RX ADMIN — OXYCODONE HYDROCHLORIDE 10 MG: 10 TABLET ORAL at 09:53

## 2018-12-25 RX ADMIN — OMEGA-3 FATTY ACIDS CAP 1000 MG 1000 MG: 1000 CAP at 04:25

## 2018-12-25 RX ADMIN — OXYCODONE HYDROCHLORIDE 10 MG: 10 TABLET ORAL at 14:00

## 2018-12-25 RX ADMIN — STANDARDIZED SENNA CONCENTRATE AND DOCUSATE SODIUM 2 TABLET: 8.6; 5 TABLET, FILM COATED ORAL at 04:25

## 2018-12-25 RX ADMIN — OMEPRAZOLE 20 MG: 20 CAPSULE, DELAYED RELEASE ORAL at 04:25

## 2018-12-25 RX ADMIN — TIZANIDINE 4 MG: 4 TABLET ORAL at 04:24

## 2018-12-25 RX ADMIN — OMEGA-3 FATTY ACIDS CAP 1000 MG 1000 MG: 1000 CAP at 17:40

## 2018-12-25 RX ADMIN — TRAMADOL HYDROCHLORIDE 100 MG: 50 TABLET, FILM COATED ORAL at 04:24

## 2018-12-25 RX ADMIN — ACETAMINOPHEN 650 MG: 325 TABLET, FILM COATED ORAL at 04:25

## 2018-12-25 RX ADMIN — CETIRIZINE HYDROCHLORIDE 10 MG: 10 TABLET, FILM COATED ORAL at 04:25

## 2018-12-25 RX ADMIN — BUDESONIDE AND FORMOTEROL FUMARATE DIHYDRATE 2 PUFF: 160; 4.5 AEROSOL RESPIRATORY (INHALATION) at 04:24

## 2018-12-25 RX ADMIN — VITAMIN D, TAB 1000IU (100/BT) 3000 UNITS: 25 TAB at 04:24

## 2018-12-25 RX ADMIN — PREGABALIN 50 MG: 25 CAPSULE ORAL at 04:25

## 2018-12-25 RX ADMIN — MAGNESIUM HYDROXIDE 30 ML: 400 SUSPENSION ORAL at 10:27

## 2018-12-25 RX ADMIN — LISINOPRIL 10 MG: 10 TABLET ORAL at 04:24

## 2018-12-25 ASSESSMENT — ENCOUNTER SYMPTOMS
HEADACHES: 1
SHORTNESS OF BREATH: 0
POLYDIPSIA: 0
BLURRED VISION: 0
NAUSEA: 1
BRUISES/BLEEDS EASILY: 0
BACK PAIN: 1
DOUBLE VISION: 0
FEVER: 0
MYALGIAS: 0
DEPRESSION: 0
VOMITING: 0
DIZZINESS: 0
COUGH: 0
PALPITATIONS: 0
NERVOUS/ANXIOUS: 0
CHILLS: 0

## 2018-12-25 ASSESSMENT — PAIN SCALES - GENERAL
PAINLEVEL_OUTOF10: 8
PAINLEVEL_OUTOF10: 5
PAINLEVEL_OUTOF10: 6
PAINLEVEL_OUTOF10: 7
PAINLEVEL_OUTOF10: 6

## 2018-12-25 NOTE — PROGRESS NOTES
Neurosurgery Progress Note    Subjective:  Left hip area feeling 40-50 % better, able to walk short distances, has moderate right buttock pain as well  No headache complaints  No dizziness complaints      Exam:  No nuchal rigidity,   Motor is full  Incision is cdi, flat  Sensation is intact  Patient has less tenderness in the left hip/SI joint to palpation, still pain with SANDEE test, but seems less acute  Negative straight leg raise    BP  Min: 110/63  Max: 118/67  Pulse  Av  Min: 67  Max: 80  Resp  Av.3  Min: 16  Max: 18  Temp  Av.1 °C (98.8 °F)  Min: 37.1 °C (98.8 °F)  Max: 37.2 °C (98.9 °F)  SpO2  Av %  Min: 93 %  Max: 96 %    No Data Recorded    Recent Labs      18   0511   WBC  5.0   RBC  4.38*   HEMOGLOBIN  12.4*   HEMATOCRIT  40.1*   MCV  91.6   MCH  28.3   MCHC  30.9*   RDW  48.2   PLATELETCT  229   MPV  9.4     Recent Labs      18   0511  18   0247   SODIUM  138  137   POTASSIUM  4.5  4.2   CHLORIDE  104  105   CO2  28  25   GLUCOSE  107*  111*   BUN  13  16   CREATININE  1.05  0.94   CALCIUM  10.9*  9.6               Intake/Output       18 0700 - 18 0659 18 0700 - 18 0659      [id number] 6528-4466 Total 0066-1275 4389-0008 Total       Intake    P.O.  480  -- 480  --  -- --    P.O. 480 -- 480 -- -- --    Total Intake 480 -- 480 -- -- --       Output    Urine  1000  -- 1000  --  -- --    Number of Times Voided 2 x -- 2 x -- -- --    Urine Void (mL) 1000 -- 1000 -- -- --    Total Output 1000 -- 1000 -- -- --       Net I/O     -520 -- -520 -- -- --            Intake/Output Summary (Last 24 hours) at 18 0913  Last data filed at 18 1630   Gross per 24 hour   Intake              240 ml   Output              800 ml   Net             -560 ml            • polyethylene glycol/lytes  1 Packet BID    And   • senna-docusate  2 Tab BID    And   • magnesium hydroxide  30 mL QDAY PRN    And   • bisacodyl  10 mg QDAY PRN   • ferrous sulfate  325 mg  Q48HRS   • pregabalin  50 mg TID   • Pharmacy Consult Request  1 Each PRN   • budesonide-formoterol  2 Puff BID   • levothyroxine  50 mcg AM ES   • Respiratory Care per Protocol   Continuous RT   • hydrALAZINE  10 mg Q4HRS PRN   • ondansetron  4 mg Q4HRS PRN   • ondansetron  4 mg Q4HRS PRN   • acetaminophen  650 mg Q6HRS   • atorvastatin  40 mg Nightly   • cetirizine  10 mg DAILY   • vitamin D  3,000 Units DAILY   • lisinopril  10 mg DAILY   • fish oil  1,000 mg BID   • omeprazole  20 mg DAILY   • tamsulosin  0.4 mg AFTER BREAKFAST   • therapeutic multivitamin-minerals  1 Tab DAILY   • tizanidine  4 mg Q6HRS PRN   • oxyCODONE immediate-release  5 mg Q4HRS PRN    Or   • oxyCODONE immediate-release  10 mg Q4HRS PRN   • tramadol   mg Q6HRS PRN   • lidocaine  1 Patch Q24HR       Assessment and Plan:     Hospital day 9:  Readmission for intractable back pain left buttock/posterior-lat thigh pain: No Nsx etiology per MRI and CT myelogtram. 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 Bursa injection 12/22,  With 40 50%  relief per patient, and improved mobility    Remains with left > right buttock pain, improved mobility      Multiple potential pain generators: trochanteric bursitis, intraarticular hip pain, sacroiliitis , consider outpatient pain management referral.      Agree with transfer to SNF.  Follow up in clinic after discharge from facility.      Prophylactic anticoagulation: yes         Start date/time: started    D/w Dr. Fowler

## 2018-12-25 NOTE — THERAPY
Pt found amb hallway distances w/OT. PT will attempt to see pt in afternoon if time permits. There will be no PT on 12/25. PT to resume 12/26.

## 2018-12-25 NOTE — DISCHARGE PLANNING
Anticipated Discharge Disposition:   SNF    Action:   Met with pt about dispo.   SNF choice made for [facility].   Choice faxed to [facility].     Barriers to Discharge:   Pending acceptance by SNF    Plan:   Follow up with [facility].

## 2018-12-25 NOTE — PROGRESS NOTES
Bedside rounding complete. Patient a & o x 4. Pt voiding without difficulty. No BM since 12/23/18 in late evening. Pt agreeable to MOM. Scheduled Miralax BID. Pt denies N/T. Pain 5/10 to back. Pt states rectal pain has improved. Pt medically ready for dc and is just awaiting SNF placement.     Bed alarm on, upper side rails up, bed locked and in lowest position. Call bell and belongings within reach. Communication board updated and plan of care discussed with the patient. Patient educated on hourly rounding.

## 2018-12-25 NOTE — PROGRESS NOTES
Internal Medicine Interval Note  Note Author: Sami Bhat M.D.     Name Angelo Magaña     1945   Age/Sex 73 y.o. male   MRN [id number]   Code Status full     After 5PM or if no immediate response to page, please call for cross-coverage  Attending/Team: Levon/Mario See Patient List for primary contact information  Call [phone] to page    1st Call - Day Intern (R1):   Home 2nd Call - Day Sr. Resident (R2/R3):   Girish         Reason for interval visit  (Principal Problem)   S1 Radiculopathy vs hip pain    ID:  -73M, PMH of HTN, asthma, GERD, chronic back pain, h/o paraspinal abscess, hypothyroidism, recent L2-L3 fusion on ; presented to ED referred from neurosurgery Dr. Fowler for acute pain management and workup of worsening post-surgical lower back pain. MRI on  showing extradural fluid collection at L2-3. XR Hips showing postsurgical changes but no acute fracture. Neurosurgery, PMR, Geriatrics following. Myelogram showing stenoses and moderate bulging disc. Patient is s/p epidural spinal infusion and left greater trochanteric bursa injection. Planning for SNF or rehab placement and f/u outpaitent neurosurgery.     Interval Problem Daily Status Update  (24 hours, problem oriented, brief subjective history, new lab/imaging data pertinent to that problem)   -patient with increasing mobility and improved pain this morning, walking further everyday, last BM 1.5-2d ago and bowel regimen in place, no further concerns  -continue mobility as tolerated    -Neurosurg, geriatrics, PMR following  -planning for SNF, referral pending    Review of Systems   Constitutional: Negative for chills and fever.   HENT: Negative for ear pain and hearing loss.    Eyes: Negative for blurred vision and double vision.   Respiratory: Negative for cough and shortness of breath.    Cardiovascular: Negative for chest pain and palpitations.   Gastrointestinal: Positive for nausea. Negative for vomiting.    Genitourinary: Negative for dysuria and hematuria.   Musculoskeletal: Positive for back pain and joint pain. Negative for myalgias.   Skin: Negative for itching and rash.   Neurological: Positive for headaches. Negative for dizziness.   Endo/Heme/Allergies: Negative for polydipsia. Does not bruise/bleed easily.   Psychiatric/Behavioral: Negative for depression. The patient is not nervous/anxious.        Disposition/Barriers to discharge:   Pain management  Placement    Consultants/Specialty  Neurosurgery - Dr. Fowler  PMR-Dr. Forte  Geriatrics-Dr. Samson  PCP: Jeovany Samson M.D.      Quality Measures  Quality-Core Measures   Reviewed items::  Labs reviewed and Medications reviewed  Gomes catheter::  No Gomes  DVT prophylaxis - mechanical:  SCDs          Physical Exam       Vitals:    12/24/18 1630 12/24/18 2000 12/25/18 0400 12/25/18 0800   BP: 110/63 118/67 116/71 135/84   Pulse: 67 80 78 72   Resp: 18 18 16 16   Temp: 37.2 °C (98.9 °F) 37.1 °C (98.8 °F) 37.1 °C (98.8 °F) 37 °C (98.6 °F)   TempSrc: Temporal Oral Temporal Temporal   SpO2: 96% 96% 93% 95%   Weight:       Height:         Body mass index is 32.17 kg/m².    Oxygen Therapy:  Pulse Oximetry: 95 %, O2 (LPM): 0, O2 Delivery: None (Room Air)    Physical Exam   Constitutional: He is oriented to person, place, and time and well-developed, well-nourished, and in no distress. No distress.   HENT:   Head: Normocephalic and atraumatic.   Mouth/Throat: Oropharynx is clear and moist. No oropharyngeal exudate.   Eyes: Pupils are equal, round, and reactive to light. Conjunctivae and EOM are normal. Right eye exhibits no discharge. Left eye exhibits no discharge. No scleral icterus.   Neck: Normal range of motion. Neck supple. No tracheal deviation present.   Cardiovascular: Normal rate, regular rhythm, normal heart sounds and intact distal pulses.  Exam reveals no gallop and no friction rub.    No murmur heard.  Pulmonary/Chest: Effort normal and breath sounds  normal. No respiratory distress. He has no wheezes. He has no rales. He exhibits no tenderness.   Abdominal: Soft. Bowel sounds are normal. He exhibits no distension and no mass. There is no tenderness. There is no rebound and no guarding.   Musculoskeletal: Normal range of motion. He exhibits no edema, tenderness or deformity.   Neurological: He is alert and oriented to person, place, and time. He exhibits normal muscle tone. Coordination normal.   Skin: Skin is warm and dry. No rash noted. He is not diaphoretic. No erythema. No pallor.   Left knee scar, midline abdominal scar    Psychiatric: Mood and affect normal.             Assessment/Plan     * Radiculopathy of sacral region- (present on admission)   Assessment & Plan    - Differential: S1 radiculopathy vs hip pain; Trochanteric brusitis  -s/p L2-L3 fusion, lumbar laminectomy and discectomy L2-L3, hardware removal L4-L5, repositioning of L3 screw by Dr. Fowler on 11/19/2018  -XR Hips showing postsurgical changes but no acute fracture  -CT myelogram showing pseudomeningocele, surgical hardware, stenosis at multiple levels and sever at L5/S1, moderate bulging discs  - Neurosurgery, PMR, Geriatrics, following and recs included in plan  -s/p Epidural spinal injection without relief    Plan  - Continued pain management  - Fall precautions  - Continue PT/OT  - Aggressive bowel protocol as patient is on opiate therapy  - avoiding NSAIDs, ASA; long acting blood thinning agents       Greater trochanteric bursitis of left hip   Assessment & Plan    -PMR following; s/p left greater trochanteric bursa injection with improved mobility/pain    -ice/lidocaine patch  -SNF placement     Intractable low back pain- (present on admission)   Assessment & Plan    -plan per radiculopathy of the sacral region    Plan  - Outpatient pain management referral     Lumbar back pain with radiculopathy affecting left lower extremity- (present on admission)   Assessment & Plan    - plan per  radiculopathy of the sacral region       Hypercalcemia   Assessment & Plan    -IVF bolus and recheck     Asymptomatic bacteriuria   Assessment & Plan    -UA showing bacteriuria, patient currently asymptomatic  -monitor for symptoms     BPH (benign prostatic hyperplasia)   Assessment & Plan    Stable. Pt denies LUTs    -Continue Flomax 0.4 mg daily     Elevated alkaline phosphatase level   Assessment & Plan    Isolated in this patient with recent back surgery.     -Continue Vit D supplementation     Anemia- (present on admission)   Assessment & Plan    -Chronic normocytic anemia, hemoglobin improving from baseline of about 8, stool occult blood was negative.  According to patient last colonoscopy was about 2 years ago and was normal.  Denies melena/dizziness/shortness of breath/chest pain. Hx of AVMs.  -iron studies low; ferritin/B12/Folate/Free T4 non deficient    Plan  -Continue iron supplementation  - Follow-up iron studies as outpatient     Essential hypertension- (present on admission)   Assessment & Plan    Stable.  Normotensive on admission.    -Continue lisinopril 10 mg daily     Dyslipidemia- (present on admission)   Assessment & Plan    Last lipid panel 11/20/2017, and current one at goal.    -Continue atorvastatin 40 mg daily     GERD (gastroesophageal reflux disease)- (present on admission)   Assessment & Plan    Stable disease.    -Continue Prilosec 20 mg daily     Seasonal allergies- (present on admission)   Assessment & Plan    Stable.    -Continue cetirizine  -Continue to monitor     Asthma- (present on admission)   Assessment & Plan    Stable disease.  Uses Advair once a day.  No recent increase in need for Advair.    Plan  -Continue Advair     Acquired hypothyroidism- (present on admission)   Assessment & Plan    -TSH elevated, Free T4 wnl  - ? compliance    Plan  -Continue Synthroid

## 2018-12-26 PROCEDURE — 700102 HCHG RX REV CODE 250 W/ 637 OVERRIDE(OP): Performed by: STUDENT IN AN ORGANIZED HEALTH CARE EDUCATION/TRAINING PROGRAM

## 2018-12-26 PROCEDURE — 700111 HCHG RX REV CODE 636 W/ 250 OVERRIDE (IP): Performed by: INTERNAL MEDICINE

## 2018-12-26 PROCEDURE — A9270 NON-COVERED ITEM OR SERVICE: HCPCS | Performed by: STUDENT IN AN ORGANIZED HEALTH CARE EDUCATION/TRAINING PROGRAM

## 2018-12-26 PROCEDURE — 99232 SBSQ HOSP IP/OBS MODERATE 35: CPT | Performed by: INTERNAL MEDICINE

## 2018-12-26 PROCEDURE — 770001 HCHG ROOM/CARE - MED/SURG/GYN PRIV*

## 2018-12-26 PROCEDURE — A9270 NON-COVERED ITEM OR SERVICE: HCPCS | Performed by: INTERNAL MEDICINE

## 2018-12-26 PROCEDURE — 97530 THERAPEUTIC ACTIVITIES: CPT

## 2018-12-26 PROCEDURE — 700101 HCHG RX REV CODE 250: Performed by: INTERNAL MEDICINE

## 2018-12-26 PROCEDURE — 700102 HCHG RX REV CODE 250 W/ 637 OVERRIDE(OP): Performed by: INTERNAL MEDICINE

## 2018-12-26 PROCEDURE — 99232 SBSQ HOSP IP/OBS MODERATE 35: CPT | Mod: GC | Performed by: INTERNAL MEDICINE

## 2018-12-26 RX ADMIN — OMEGA-3 FATTY ACIDS CAP 1000 MG 1000 MG: 1000 CAP at 17:08

## 2018-12-26 RX ADMIN — CETIRIZINE HYDROCHLORIDE 10 MG: 10 TABLET, FILM COATED ORAL at 05:00

## 2018-12-26 RX ADMIN — LISINOPRIL 10 MG: 10 TABLET ORAL at 05:00

## 2018-12-26 RX ADMIN — MULTIPLE VITAMINS W/ MINERALS TAB 1 TABLET: TAB at 05:00

## 2018-12-26 RX ADMIN — LEVOTHYROXINE SODIUM 50 MCG: 50 TABLET ORAL at 05:00

## 2018-12-26 RX ADMIN — BUDESONIDE AND FORMOTEROL FUMARATE DIHYDRATE 2 PUFF: 160; 4.5 AEROSOL RESPIRATORY (INHALATION) at 05:02

## 2018-12-26 RX ADMIN — TIZANIDINE 4 MG: 4 TABLET ORAL at 00:13

## 2018-12-26 RX ADMIN — ATORVASTATIN CALCIUM 40 MG: 40 TABLET, FILM COATED ORAL at 21:29

## 2018-12-26 RX ADMIN — PREGABALIN 50 MG: 25 CAPSULE ORAL at 12:16

## 2018-12-26 RX ADMIN — ENOXAPARIN SODIUM 40 MG: 100 INJECTION SUBCUTANEOUS at 05:01

## 2018-12-26 RX ADMIN — OMEGA-3 FATTY ACIDS CAP 1000 MG 1000 MG: 1000 CAP at 05:00

## 2018-12-26 RX ADMIN — TRAMADOL HYDROCHLORIDE 100 MG: 50 TABLET, FILM COATED ORAL at 00:13

## 2018-12-26 RX ADMIN — OXYCODONE HYDROCHLORIDE 5 MG: 5 TABLET ORAL at 17:05

## 2018-12-26 RX ADMIN — ACETAMINOPHEN 650 MG: 325 TABLET, FILM COATED ORAL at 00:13

## 2018-12-26 RX ADMIN — ACETAMINOPHEN 650 MG: 325 TABLET, FILM COATED ORAL at 17:08

## 2018-12-26 RX ADMIN — OXYCODONE HYDROCHLORIDE 5 MG: 5 TABLET ORAL at 21:29

## 2018-12-26 RX ADMIN — PREGABALIN 50 MG: 25 CAPSULE ORAL at 17:08

## 2018-12-26 RX ADMIN — ACETAMINOPHEN 650 MG: 325 TABLET, FILM COATED ORAL at 05:00

## 2018-12-26 RX ADMIN — OXYCODONE HYDROCHLORIDE 5 MG: 5 TABLET ORAL at 08:50

## 2018-12-26 RX ADMIN — STANDARDIZED SENNA CONCENTRATE AND DOCUSATE SODIUM 2 TABLET: 8.6; 5 TABLET, FILM COATED ORAL at 05:00

## 2018-12-26 RX ADMIN — PREGABALIN 50 MG: 25 CAPSULE ORAL at 05:00

## 2018-12-26 RX ADMIN — STANDARDIZED SENNA CONCENTRATE AND DOCUSATE SODIUM 2 TABLET: 8.6; 5 TABLET, FILM COATED ORAL at 17:09

## 2018-12-26 RX ADMIN — OXYCODONE HYDROCHLORIDE 5 MG: 5 TABLET ORAL at 12:50

## 2018-12-26 RX ADMIN — VITAMIN D, TAB 1000IU (100/BT) 3000 UNITS: 25 TAB at 05:00

## 2018-12-26 RX ADMIN — OMEPRAZOLE 20 MG: 20 CAPSULE, DELAYED RELEASE ORAL at 05:00

## 2018-12-26 RX ADMIN — ACETAMINOPHEN 650 MG: 325 TABLET, FILM COATED ORAL at 12:16

## 2018-12-26 RX ADMIN — OXYCODONE HYDROCHLORIDE 5 MG: 5 TABLET ORAL at 05:00

## 2018-12-26 RX ADMIN — LIDOCAINE 1 PATCH: 50 PATCH TOPICAL at 21:29

## 2018-12-26 RX ADMIN — TAMSULOSIN HYDROCHLORIDE 0.4 MG: 0.4 CAPSULE ORAL at 08:36

## 2018-12-26 ASSESSMENT — PAIN SCALES - GENERAL
PAINLEVEL_OUTOF10: 7
PAINLEVEL_OUTOF10: 5
PAINLEVEL_OUTOF10: 4
PAINLEVEL_OUTOF10: 4
PAINLEVEL_OUTOF10: 6
PAINLEVEL_OUTOF10: 6
PAINLEVEL_OUTOF10: 8
PAINLEVEL_OUTOF10: 5

## 2018-12-26 ASSESSMENT — ENCOUNTER SYMPTOMS
NAUSEA: 1
COUGH: 0
NERVOUS/ANXIOUS: 0
BLURRED VISION: 0
DEPRESSION: 0
BACK PAIN: 1
POLYDIPSIA: 0
VOMITING: 0
SHORTNESS OF BREATH: 0
PALPITATIONS: 0
DIZZINESS: 0
CHILLS: 0
BRUISES/BLEEDS EASILY: 0
DOUBLE VISION: 0
FEVER: 0
HEADACHES: 1
MYALGIAS: 0

## 2018-12-26 ASSESSMENT — COGNITIVE AND FUNCTIONAL STATUS - GENERAL
SUGGESTED CMS G CODE MODIFIER MOBILITY: CL
MOVING FROM LYING ON BACK TO SITTING ON SIDE OF FLAT BED: A LITTLE
TURNING FROM BACK TO SIDE WHILE IN FLAT BAD: UNABLE
CLIMB 3 TO 5 STEPS WITH RAILING: A LITTLE
MOBILITY SCORE: 14
STANDING UP FROM CHAIR USING ARMS: A LITTLE
WALKING IN HOSPITAL ROOM: A LITTLE
MOVING TO AND FROM BED TO CHAIR: UNABLE

## 2018-12-26 ASSESSMENT — GAIT ASSESSMENTS
DEVIATION: ANTALGIC;BRADYKINETIC
ASSISTIVE DEVICE: FRONT WHEEL WALKER
DISTANCE (FEET): 150
GAIT LEVEL OF ASSIST: CONTACT GUARD ASSIST

## 2018-12-26 NOTE — PROGRESS NOTES
Neurosurgery Progress Note    Subjective:  Continued improvement, no B/L LE radic complaints, occasional left post thigh pain to the knee lasts for minutes only. Left buttock and hip pain well controlled on oral pain meds which have been tapered recently, able to increase distance with walking, sitting prolonged continues to be difficult. Per patient, hospitalist team contemplating left hip bursa inj. Patient ready for D/C to SNF.     Exam:  VSS  A&Ox4, NAD  Palp: tender left piriformis/sciatic notch  NM: 5/5 hip flexion, knee extension, knee flexion, plantar flexion, dorsiflexion, EHL  Sensation intact and equal throughout all four extremities.   Abdomen: soft, non-tender  Non-labored breathing on room air, normal respiratory effort  Capillary refill in all four extremities <2 seconds  No LE edema, erythema, cyanosis, clubbing  Calves non-tender to compression bilat  Incision CDI        BP  Min: 102/56  Max: 121/58  Pulse  Av  Min: 68  Max: 83  Resp  Av.7  Min: 16  Max: 18  Temp  Av.9 °C (98.4 °F)  Min: 36.8 °C (98.2 °F)  Max: 37 °C (98.6 °F)  SpO2  Av.7 %  Min: 96 %  Max: 97 %    No Data Recorded        Recent Labs      18   0247   SODIUM  137   POTASSIUM  4.2   CHLORIDE  105   CO2  25   GLUCOSE  111*   BUN  16   CREATININE  0.94   CALCIUM  9.6               Intake/Output       18 0700 - 18 0659 18 0700 - 18 0659       Total 1900-0659 Total       Intake    P.O.  760  -- 760  --  -- --    P.O. 760 -- 760 -- -- --    Total Intake 760 -- 760 -- -- --       Output    Urine  400  -- 400  --  -- --    Number of Times Voided 2 x -- 2 x -- -- --    Urine Void (mL) 400 -- 400 -- -- --    Total Output 400 -- 400 -- -- --       Net I/O     360 -- 360 -- -- --            Intake/Output Summary (Last 24 hours) at 18 0849  Last data filed at 18 1800   Gross per 24 hour   Intake              520 ml   Output              400 ml   Net               120 ml            • enoxaparin (LOVENOX) injection  40 mg DAILY   • polyethylene glycol/lytes  1 Packet BID    And   • senna-docusate  2 Tab BID    And   • magnesium hydroxide  30 mL QDAY PRN    And   • bisacodyl  10 mg QDAY PRN   • ferrous sulfate  325 mg Q48HRS   • pregabalin  50 mg TID   • Pharmacy Consult Request  1 Each PRN   • budesonide-formoterol  2 Puff BID   • levothyroxine  50 mcg AM ES   • Respiratory Care per Protocol   Continuous RT   • hydrALAZINE  10 mg Q4HRS PRN   • ondansetron  4 mg Q4HRS PRN   • ondansetron  4 mg Q4HRS PRN   • acetaminophen  650 mg Q6HRS   • atorvastatin  40 mg Nightly   • cetirizine  10 mg DAILY   • vitamin D  3,000 Units DAILY   • lisinopril  10 mg DAILY   • fish oil  1,000 mg BID   • omeprazole  20 mg DAILY   • tamsulosin  0.4 mg AFTER BREAKFAST   • therapeutic multivitamin-minerals  1 Tab DAILY   • tizanidine  4 mg Q6HRS PRN   • oxyCODONE immediate-release  5 mg Q4HRS PRN    Or   • oxyCODONE immediate-release  10 mg Q4HRS PRN   • tramadol   mg Q6HRS PRN   • lidocaine  1 Patch Q24HR       Assessment and Plan:  Hospital day #10  POD #30  Prophylactic anticoagulation: yes         Start date/time: Per Hospitalist team   Patient looks much better.     Left hip pain is multifactorial and unrelated to his back surgery 11/19/18 per Dr. Fowler.     He seems to have responded to left L5,S1 obdulia, SI joint injx, left Hip injections.     Left bursa hip injection by Dr. Forte seems to have given the most relief.     Patient has no clinical evidence of a CSF leak or pseudomeningocele.     Continue pain control / discharge planning.     OK from NS perspective to discharge to SNF.    D/w Dr. Fowler

## 2018-12-26 NOTE — DISCHARGE PLANNING
Dr. Forte now recommending skilled nursing for post acute care. Insurance CM notified and Floor CM notified.

## 2018-12-26 NOTE — DISCHARGE PLANNING
note:  SNF choice refaxed to [facility].   Referrals have been sent to [facility] and [facility].     Received a call from García at [facility] who said that Dr. Forte is interested to accept pt so he will call insurance and get auth.

## 2018-12-26 NOTE — PROGRESS NOTES
Internal Medicine Interval Note  Note Author: Sami Bhat M.D.     Name Aneglo Magaña     1945   Age/Sex 73 y.o. male   MRN [id number]   Code Status full     After 5PM or if no immediate response to page, please call for cross-coverage  Attending/Team: Levon/Mario See Patient List for primary contact information  Call [phone] to page    1st Call - Day Intern (R1):   Home 2nd Call - Day Sr. Resident (R2/R3):   Girish         Reason for interval visit  (Principal Problem)   S1 Radiculopathy vs hip pain    ID:  -73M, PMH of HTN, asthma, GERD, chronic back pain, h/o paraspinal abscess, hypothyroidism, recent L2-L3 fusion on ; presented to ED referred from neurosurgery Dr. Fowler for acute pain management and workup of worsening post-surgical lower back pain. MRI on  showing extradural fluid collection at L2-3. XR Hips showing postsurgical changes but no acute fracture. Neurosurgery, PMR, Geriatrics following. Myelogram showing stenoses and moderate bulging disc. Patient is s/p epidural spinal infusion and left greater trochanteric bursa injection. Planning for SNF or rehab placement and f/u outpaitent neurosurgery.     Interval Problem Daily Status Update  (24 hours, problem oriented, brief subjective history, new lab/imaging data pertinent to that problem)   -patient with increasing mobility and improved pain this morning, walking further everyday, last BM yesterday, no further concerns  -continue mobility as tolerated    -Neurosurg, geriatrics, PMR following  -planning for SNF, referral pending will follow up    Review of Systems   Constitutional: Negative for chills and fever.   HENT: Negative for ear pain and hearing loss.    Eyes: Negative for blurred vision and double vision.   Respiratory: Negative for cough and shortness of breath.    Cardiovascular: Negative for chest pain and palpitations.   Gastrointestinal: Positive for nausea. Negative for vomiting.   Genitourinary:  Negative for dysuria and hematuria.   Musculoskeletal: Positive for back pain and joint pain. Negative for myalgias.   Skin: Negative for itching and rash.   Neurological: Positive for headaches. Negative for dizziness.   Endo/Heme/Allergies: Negative for polydipsia. Does not bruise/bleed easily.   Psychiatric/Behavioral: Negative for depression. The patient is not nervous/anxious.        Disposition/Barriers to discharge:   Placement    Consultants/Specialty  Neurosurgery - Dr. Fowler  PMR-Dr. Forte  Geriatrics-Dr. Samson  PCP: Jeovany Samson M.D.      Quality Measures  Quality-Core Measures   Reviewed items::  Labs reviewed and Medications reviewed  Gomes catheter::  No Gomes  DVT prophylaxis pharmacological::  Enoxaparin (Lovenox)  DVT prophylaxis - mechanical:  SCDs          Physical Exam       Vitals:    12/25/18 1659 12/25/18 2000 12/26/18 0400 12/26/18 0830   BP: 121/58 102/56 112/56 108/67   Pulse: 83 77 68 84   Resp: 18 16 16 17   Temp: 37 °C (98.6 °F) 36.8 °C (98.2 °F) 36.8 °C (98.3 °F) 36.9 °C (98.4 °F)   TempSrc: Temporal Temporal Temporal Temporal   SpO2: 96% 97% 97% 97%   Weight:       Height:         Body mass index is 32.17 kg/m².    Oxygen Therapy:  Pulse Oximetry: 97 %, O2 (LPM): 0, O2 Delivery: None (Room Air)    Physical Exam   Constitutional: He is oriented to person, place, and time and well-developed, well-nourished, and in no distress. No distress.   HENT:   Head: Normocephalic and atraumatic.   Mouth/Throat: Oropharynx is clear and moist. No oropharyngeal exudate.   Eyes: Pupils are equal, round, and reactive to light. Conjunctivae and EOM are normal. Right eye exhibits no discharge. Left eye exhibits no discharge. No scleral icterus.   Neck: Normal range of motion. Neck supple. No tracheal deviation present.   Cardiovascular: Normal rate, regular rhythm, normal heart sounds and intact distal pulses.  Exam reveals no gallop and no friction rub.    No murmur heard.  Pulmonary/Chest: Effort  normal and breath sounds normal. No respiratory distress. He has no wheezes. He has no rales. He exhibits no tenderness.   Abdominal: Soft. Bowel sounds are normal. He exhibits no distension and no mass. There is no tenderness. There is no rebound and no guarding.   Musculoskeletal: Normal range of motion. He exhibits no edema, tenderness or deformity.   Neurological: He is alert and oriented to person, place, and time. He exhibits normal muscle tone. Coordination normal.   Skin: Skin is warm and dry. No rash noted. He is not diaphoretic. No erythema. No pallor.   Left knee scar, midline abdominal scar    Psychiatric: Mood and affect normal.             Assessment/Plan     * Radiculopathy of sacral region- (present on admission)   Assessment & Plan    - Differential: S1 radiculopathy vs hip pain; Trochanteric brusitis  -s/p L2-L3 fusion, lumbar laminectomy and discectomy L2-L3, hardware removal L4-L5, repositioning of L3 screw by Dr. Fowler on 11/19/2018  -XR Hips showing postsurgical changes but no acute fracture  -CT myelogram showing pseudomeningocele, surgical hardware, stenosis at multiple levels and sever at L5/S1, moderate bulging discs  - Neurosurgery, PMR, Geriatrics, following and recs included in plan  -s/p Epidural spinal injection without relief    Plan  - Continued pain management  - Fall precautions  - Continue PT/OT  - Aggressive bowel protocol as patient is on opiate therapy  - avoiding NSAIDs, ASA; long acting blood thinning agents       Greater trochanteric bursitis of left hip   Assessment & Plan    -PMR following; s/p left greater trochanteric bursa injection with improved mobility/pain    -ice/lidocaine patch  -SNF placement     Intractable low back pain- (present on admission)   Assessment & Plan    -plan per radiculopathy of the sacral region    Plan  - Outpatient pain management referral     Lumbar back pain with radiculopathy affecting left lower extremity- (present on admission)   Assessment  & Plan    - plan per radiculopathy of the sacral region       Hypercalcemia   Assessment & Plan    -IVF bolus and recheck     Asymptomatic bacteriuria   Assessment & Plan    -UA showing bacteriuria, patient currently asymptomatic  -monitor for symptoms     BPH (benign prostatic hyperplasia)   Assessment & Plan    Stable. Pt denies LUTs    -Continue Flomax 0.4 mg daily     Elevated alkaline phosphatase level   Assessment & Plan    Isolated in this patient with recent back surgery.     -Continue Vit D supplementation     Anemia- (present on admission)   Assessment & Plan    -Chronic normocytic anemia, hemoglobin improving from baseline of about 8, stool occult blood was negative.  According to patient last colonoscopy was about 2 years ago and was normal.  Denies melena/dizziness/shortness of breath/chest pain. Hx of AVMs.  -iron studies low; ferritin/B12/Folate/Free T4 non deficient    Plan  -Continue iron supplementation  - Follow-up iron studies as outpatient     Essential hypertension- (present on admission)   Assessment & Plan    Stable.  Normotensive on admission.    -Continue lisinopril 10 mg daily     Dyslipidemia- (present on admission)   Assessment & Plan    Last lipid panel 11/20/2017, and current one at goal.    -Continue atorvastatin 40 mg daily     GERD (gastroesophageal reflux disease)- (present on admission)   Assessment & Plan    Stable disease.    -Continue Prilosec 20 mg daily     Seasonal allergies- (present on admission)   Assessment & Plan    Stable.    -Continue cetirizine  -Continue to monitor     Asthma- (present on admission)   Assessment & Plan    Stable disease.  Uses Advair once a day.  No recent increase in need for Advair.    Plan  -Continue Advair     Acquired hypothyroidism- (present on admission)   Assessment & Plan    -TSH elevated, Free T4 wnl  - ? compliance    Plan  -Continue Synthroid

## 2018-12-26 NOTE — DISCHARGE PLANNING
Received Choice form at [phone]  Agency/Facility Name: [facility]  Referral sent per Choice form @ [phone]

## 2018-12-26 NOTE — DISCHARGE PLANNING
Agency/Facility Name: [facility]  Spoke To: Maycol  Outcome: Patient accepted. Maylin([facility] CM) notified.

## 2018-12-26 NOTE — PROGRESS NOTES
"Geriatric Progress Note:  12/26/2018  Chief Complaint   Patient presents with   • Sent by MD     post op back surgery 3 weeks, increased pain        S: Over the last 48 hours patient has decreasing opiate requirements, but continues to have severe pain after mobilizing.  Today the patient endorses pain when sitting both on the right and left buttocks.  He is hopeful that he will get the injections and that they will help.  He says that this morning he walked as far he had in quite some time.  He denies any recent confusion.  He is having bowel movements.   I spoke with his daughter over the phone today.  The patient participated in the conversation.  We discussed the current events and recent events.  The daughter endorsed concerned about the confusion the patient was having just after his surgery and at home.  We discussed delirium.  She was much happier with his current mentation.  I discussed with her that she would like to let me know, as the patient's primary care physician, anytime he gets confused at home again.  I discussed there is a balance between pain treatment and mentation.  We discussed the patient's functionality as the main goal for his pain treatment.  We discussed that after we had maximized his pain control he would go to the skilled nursing rehab, which would be short-term, with hopeful transition back home.  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  That is his functional level did not return the they would need to consider getting help at home and/or assisted living facility.    ROS: a 14 pt ROS was negative other than as stated above.     O:/67   Pulse 84   Temp 36.9 °C (98.4 °F) (Temporal)   Resp 17   Ht 1.778 m (5' 10\")   Wt 101.7 kg (224 lb 3.3 oz)   SpO2 97%   BMI 32.17 kg/m²   General: No acute distress, pleasant, alert and oriented x4  Abdomen: Soft nontender nondistended  Right hip exam pain " tenderness over the lumbar spine, paraspinal tenderness, pain over the greater trochanter, no ischial spine tenderness  The patient was in considerable pain after this exam and so we deferred examining the left side  Lungs: Normal effort clear   Cardiovascular 2+ radial pulse on the right  Delirium screen: Negative patient was able to say days of week backwards, no observational signs of inattention    Labs/Imaging/EKG: None recent    Assessment: 73-year-old male admitted for pain control after recent, lumbar spine surgery    Plan:     Intractable back pain  Lumbar spinal stenosis  Recent back surgery  Constipation  -Pain is now being treated with scheduled Tylenol, scheduled Lyrica, lidocaine patch and as needed oxycodone and tramadol.    -Recommend bilateral intrabursal injections to see if that can help with his pain, with suspected bilateral trochanteric bursitis  -Goal is to maximize function with the use of these medications.  I discussed this with the patient that his side effects are outweighing their ability to help and function we need to reconsider the treatment plan.  -Continue to monitor bowels     Iron deficiency anemia  -Continue iron every 48 hours     Hypothyroidism  -Elevated TSH on admission unclear how patient was taking the medication at home.  Continue levothyroxine 50 mcg we will recheck TSH as an outpatient     Disposition  -Ensuring patient's mobility is continuing to improve in-house, consideration of more corticosteroid injections per physical medicine and rehab and primary team, awaiting SNF acceptance, see discussion above with the daughter        Thank you for including us in the care of this patient. We will follow along tomorrow.     Jeovany Samson M.D.  Geriatric Physician   Can be reached at extension: [phone]  Monday - Friday 8-5      This note was partially complete completed using voice recognition software.  I did my best to correct errors prior to submitting this note, but for  any concerns please do not hesitate to contact me.

## 2018-12-26 NOTE — CARE PLAN
Problem: Safety  Goal: Will remain free from injury  Outcome: PROGRESSING AS EXPECTED  Bed locked and in lowest position, non skid socks in place, call light in reach, bed alarm on    Problem: Discharge Barriers/Planning  Goal: Patient's continuum of care needs will be met  Outcome: PROGRESSING AS EXPECTED  Pending snf or rehab

## 2018-12-27 VITALS
OXYGEN SATURATION: 95 % | TEMPERATURE: 99 F | SYSTOLIC BLOOD PRESSURE: 98 MMHG | HEIGHT: 70 IN | HEART RATE: 88 BPM | WEIGHT: 224.21 LBS | DIASTOLIC BLOOD PRESSURE: 68 MMHG | RESPIRATION RATE: 18 BRPM | BODY MASS INDEX: 32.1 KG/M2

## 2018-12-27 PROCEDURE — 700102 HCHG RX REV CODE 250 W/ 637 OVERRIDE(OP): Performed by: STUDENT IN AN ORGANIZED HEALTH CARE EDUCATION/TRAINING PROGRAM

## 2018-12-27 PROCEDURE — 97116 GAIT TRAINING THERAPY: CPT

## 2018-12-27 PROCEDURE — A9270 NON-COVERED ITEM OR SERVICE: HCPCS | Performed by: STUDENT IN AN ORGANIZED HEALTH CARE EDUCATION/TRAINING PROGRAM

## 2018-12-27 PROCEDURE — A9270 NON-COVERED ITEM OR SERVICE: HCPCS | Performed by: INTERNAL MEDICINE

## 2018-12-27 PROCEDURE — 99239 HOSP IP/OBS DSCHRG MGMT >30: CPT | Mod: GC | Performed by: INTERNAL MEDICINE

## 2018-12-27 PROCEDURE — 700111 HCHG RX REV CODE 636 W/ 250 OVERRIDE (IP): Performed by: INTERNAL MEDICINE

## 2018-12-27 PROCEDURE — 97530 THERAPEUTIC ACTIVITIES: CPT

## 2018-12-27 PROCEDURE — 99232 SBSQ HOSP IP/OBS MODERATE 35: CPT | Mod: 25 | Performed by: PHYSICAL MEDICINE & REHABILITATION

## 2018-12-27 PROCEDURE — 700102 HCHG RX REV CODE 250 W/ 637 OVERRIDE(OP): Performed by: INTERNAL MEDICINE

## 2018-12-27 RX ORDER — LIDOCAINE 50 MG/G
1 PATCH TOPICAL EVERY 24 HOURS
Qty: 2 PATCH | Refills: 0 | Status: SHIPPED | OUTPATIENT
Start: 2018-12-27 | End: 2019-01-17

## 2018-12-27 RX ORDER — AMOXICILLIN 250 MG
2 CAPSULE ORAL 2 TIMES DAILY
Qty: 30 TAB | Refills: 0 | Status: SHIPPED | OUTPATIENT
Start: 2018-12-27 | End: 2019-01-17

## 2018-12-27 RX ORDER — BUDESONIDE AND FORMOTEROL FUMARATE DIHYDRATE 160; 4.5 UG/1; UG/1
2 AEROSOL RESPIRATORY (INHALATION) 2 TIMES DAILY
Qty: 1 INHALER | Refills: 0 | Status: SHIPPED | OUTPATIENT
Start: 2018-12-27 | End: 2019-01-11

## 2018-12-27 RX ORDER — PREGABALIN 50 MG/1
50 CAPSULE ORAL 3 TIMES DAILY
Qty: 9 CAP | Refills: 0 | Status: SHIPPED | OUTPATIENT
Start: 2018-12-27 | End: 2018-12-30

## 2018-12-27 RX ORDER — PREGABALIN 50 MG/1
50 CAPSULE ORAL 3 TIMES DAILY
Qty: 21 CAP | Refills: 0 | Status: SHIPPED | OUTPATIENT
Start: 2018-12-27 | End: 2018-12-27

## 2018-12-27 RX ADMIN — PREGABALIN 50 MG: 25 CAPSULE ORAL at 17:35

## 2018-12-27 RX ADMIN — OXYCODONE HYDROCHLORIDE 10 MG: 10 TABLET ORAL at 05:35

## 2018-12-27 RX ADMIN — ACETAMINOPHEN 650 MG: 325 TABLET, FILM COATED ORAL at 10:26

## 2018-12-27 RX ADMIN — PREGABALIN 50 MG: 25 CAPSULE ORAL at 05:35

## 2018-12-27 RX ADMIN — OMEGA-3 FATTY ACIDS CAP 1000 MG 1000 MG: 1000 CAP at 17:35

## 2018-12-27 RX ADMIN — ENOXAPARIN SODIUM 40 MG: 100 INJECTION SUBCUTANEOUS at 05:34

## 2018-12-27 RX ADMIN — LEVOTHYROXINE SODIUM 50 MCG: 50 TABLET ORAL at 05:35

## 2018-12-27 RX ADMIN — PREGABALIN 50 MG: 25 CAPSULE ORAL at 10:26

## 2018-12-27 RX ADMIN — OMEGA-3 FATTY ACIDS CAP 1000 MG 1000 MG: 1000 CAP at 05:35

## 2018-12-27 RX ADMIN — VITAMIN D, TAB 1000IU (100/BT) 3000 UNITS: 25 TAB at 05:38

## 2018-12-27 RX ADMIN — OMEPRAZOLE 20 MG: 20 CAPSULE, DELAYED RELEASE ORAL at 05:35

## 2018-12-27 RX ADMIN — STANDARDIZED SENNA CONCENTRATE AND DOCUSATE SODIUM 2 TABLET: 8.6; 5 TABLET, FILM COATED ORAL at 17:35

## 2018-12-27 RX ADMIN — STANDARDIZED SENNA CONCENTRATE AND DOCUSATE SODIUM 2 TABLET: 8.6; 5 TABLET, FILM COATED ORAL at 05:35

## 2018-12-27 RX ADMIN — BUDESONIDE AND FORMOTEROL FUMARATE DIHYDRATE 2 PUFF: 160; 4.5 AEROSOL RESPIRATORY (INHALATION) at 05:35

## 2018-12-27 RX ADMIN — POLYETHYLENE GLYCOL 3350 1 PACKET: 17 POWDER, FOR SOLUTION ORAL at 17:35

## 2018-12-27 RX ADMIN — MAGNESIUM HYDROXIDE 30 ML: 400 SUSPENSION ORAL at 10:28

## 2018-12-27 RX ADMIN — OXYCODONE HYDROCHLORIDE 10 MG: 10 TABLET ORAL at 01:22

## 2018-12-27 RX ADMIN — ACETAMINOPHEN 650 MG: 325 TABLET, FILM COATED ORAL at 01:22

## 2018-12-27 RX ADMIN — OXYCODONE HYDROCHLORIDE 10 MG: 10 TABLET ORAL at 15:50

## 2018-12-27 RX ADMIN — TAMSULOSIN HYDROCHLORIDE 0.4 MG: 0.4 CAPSULE ORAL at 10:26

## 2018-12-27 RX ADMIN — CETIRIZINE HYDROCHLORIDE 10 MG: 10 TABLET, FILM COATED ORAL at 05:35

## 2018-12-27 RX ADMIN — ACETAMINOPHEN 650 MG: 325 TABLET, FILM COATED ORAL at 05:35

## 2018-12-27 RX ADMIN — MULTIPLE VITAMINS W/ MINERALS TAB 1 TABLET: TAB at 05:35

## 2018-12-27 RX ADMIN — ACETAMINOPHEN 650 MG: 325 TABLET, FILM COATED ORAL at 17:35

## 2018-12-27 RX ADMIN — LISINOPRIL 10 MG: 10 TABLET ORAL at 05:35

## 2018-12-27 RX ADMIN — FERROUS SULFATE TAB 325 MG (65 MG ELEMENTAL FE) 325 MG: 325 (65 FE) TAB at 05:35

## 2018-12-27 ASSESSMENT — COGNITIVE AND FUNCTIONAL STATUS - GENERAL
MOVING TO AND FROM BED TO CHAIR: UNABLE
MOVING FROM LYING ON BACK TO SITTING ON SIDE OF FLAT BED: A LITTLE
WALKING IN HOSPITAL ROOM: A LITTLE
MOBILITY SCORE: 14
STANDING UP FROM CHAIR USING ARMS: A LITTLE
SUGGESTED CMS G CODE MODIFIER MOBILITY: CL
CLIMB 3 TO 5 STEPS WITH RAILING: A LITTLE
TURNING FROM BACK TO SIDE WHILE IN FLAT BAD: UNABLE

## 2018-12-27 ASSESSMENT — ENCOUNTER SYMPTOMS
NERVOUS/ANXIOUS: 0
DOUBLE VISION: 0
POLYDIPSIA: 0
COUGH: 0
DIZZINESS: 0
BACK PAIN: 1
SHORTNESS OF BREATH: 0
FEVER: 0
BRUISES/BLEEDS EASILY: 0
HEADACHES: 1
DEPRESSION: 0
BLURRED VISION: 0
MYALGIAS: 0
VOMITING: 0
PALPITATIONS: 0
NAUSEA: 1
CHILLS: 0

## 2018-12-27 ASSESSMENT — PAIN SCALES - GENERAL
PAINLEVEL_OUTOF10: 7
PAINLEVEL_OUTOF10: 3
PAINLEVEL_OUTOF10: 6
PAINLEVEL_OUTOF10: 7
PAINLEVEL_OUTOF10: 0
PAINLEVEL_OUTOF10: 5
PAINLEVEL_OUTOF10: 7
PAINLEVEL_OUTOF10: 5

## 2018-12-27 ASSESSMENT — GAIT ASSESSMENTS
ASSISTIVE DEVICE: FRONT WHEEL WALKER
GAIT LEVEL OF ASSIST: CONTACT GUARD ASSIST
DEVIATION: ANTALGIC;BRADYKINETIC
DISTANCE (FEET): 200

## 2018-12-27 NOTE — PROGRESS NOTES
Physical Medicine and Rehabilitation Consultation         Follow up Consult      Date of Consultation: 12/27/2018  Consulting provider: Lorenzo Forte D.O.  Reason for consultation: assess for acute inpatient rehab appropriateness      Chief complaint: right hip pain    S: complains of mild pain in the right lateral hip, started after admission, worse with siting down, not limiting his ability to walk  He reports after the last injection that his left hip/leg is improved and he is able to sit down better and walk better.         ROS:  Rest of 14 point ros is negative    Medications:  Current Facility-Administered Medications   Medication Dose   • enoxaparin (LOVENOX) inj 40 mg  40 mg   • polyethylene glycol/lytes (MIRALAX) PACKET 1 Packet  1 Packet    And   • senna-docusate (PERICOLACE or SENOKOT S) 8.6-50 MG per tablet 2 Tab  2 Tab    And   • magnesium hydroxide (MILK OF MAGNESIA) suspension 30 mL  30 mL    And   • bisacodyl (DULCOLAX) suppository 10 mg  10 mg   • ferrous sulfate tablet 325 mg  325 mg   • pregabalin (LYRICA) capsule 50 mg  50 mg   • Pharmacy Consult Request ...Pain Management Review 1 Each  1 Each   • budesonide-formoterol (SYMBICORT) 160-4.5 MCG/ACT inhaler 2 Puff  2 Puff   • levothyroxine (SYNTHROID) tablet 50 mcg  50 mcg   • Respiratory Care per Protocol     • hydrALAZINE (APRESOLINE) injection 10 mg  10 mg   • ondansetron (ZOFRAN) syringe/vial injection 4 mg  4 mg   • ondansetron (ZOFRAN ODT) dispertab 4 mg  4 mg   • acetaminophen (TYLENOL) tablet 650 mg  650 mg   • atorvastatin (LIPITOR) tablet 40 mg  40 mg   • cetirizine (ZYRTEC) tablet 10 mg  10 mg   • vitamin D (cholecalciferol) tablet 3,000 Units  3,000 Units   • lisinopril (PRINIVIL) 10 MG tablet 10 mg  10 mg   • fish oil capsule 1,000 mg  1,000 mg   • omeprazole (PRILOSEC) capsule 20 mg  20 mg   • tamsulosin (FLOMAX) capsule 0.4 mg  0.4 mg   • therapeutic multivitamin-minerals (THERAGRAN-M) tablet 1 Tab  1 Tab  "  • tizanidine (ZANAFLEX) tablet 4 mg  4 mg   • oxyCODONE immediate-release (ROXICODONE) tablet 5 mg  5 mg    Or   • oxyCODONE immediate release (ROXICODONE) tablet 10 mg  10 mg   • tramadol (ULTRAM) 50 MG tablet  mg   mg   • lidocaine (LIDODERM) 5 % 1 Patch  1 Patch       Physical Exam:  Vitals: Blood pressure 115/69, pulse 88, temperature 36.6 °C (97.8 °F), temperature source Temporal, resp. rate 16, height 1.778 m (5' 10\"), weight 101.7 kg (224 lb 3.3 oz), SpO2 91 %.  Gen: NAD, Body mass index is 32.17 kg/m².  HEENT: NC/AT, PERRLA, moist mucous membranes  Cardio: RRR, no mumurs  Pulm: CTAB, with normal respiratory effort  Abd: Soft NTND, active bowel sounds,   Ext: No peripheral edema.   MSK: TTP over the right piriformis, full ROM, + FAIR on the right, no N/T down leg    Labs:  No results for input(s): RBC, HEMOGLOBIN, HEMATOCRIT, PLATELETCT, PROTHROMBTM, APTT, INR, IRON, FERRITIN, TOTIRONBC in the last 72 hours.      No results found for this or any previous visit (from the past 24 hour(s)).      ASSESSMENT:  Right lateral hip pain: PE consistent with right piriformis muscle pain. TTP over this area. + FAIR,   - discussed with him about options including injections, don't think he needs an injection at this time  - taught him stretching of the piriformis muscle  On the right    Impaired adl:   Recommend continued PT at SNF with focus on core strengthening, glut med strengthening, stretching of piriformis, ITB stretching and friction.       Discussed with pt, summarized hospitalization and care, options for next step of care    Discussed with team about recommendations     Patient was seen for 25 minutes on unit/floor of which > 50% of time was spent on counseling and coordination of care regarding the above, including prognosis, risk reduction, benefits of treatment, and options for next stage of care.          Lorenzo Forte D.O.  "

## 2018-12-27 NOTE — DISCHARGE PLANNING
Agency/Facility Name: [facility]  Spoke To: Myranda  Outcome: Patient accepted. Jessica([facility] CM) notified.

## 2018-12-27 NOTE — PROGRESS NOTES
Neurosurgery Progress Note    Subjective:  Continued improvement, no B/L LE radic complaints, occasional left buttock and post thigh pain to the knee lasts for minutes only. Left buttock and hip pain well controlled on oral pain meds which continue to taper, able to continue to increase distance with walking, sitting prolonged continues to be difficult so he avoids. Per patient, hospitalist team continues to contemplate left hip bursa inj. Patient ready for D/C to SNF.     Exam:  VSS  A&Ox4, NAD  Palp: tender left piriformis/sciatic notch  NM: 5/5 hip flexion, knee extension, knee flexion, plantar flexion, dorsiflexion, EHL  Sensation intact and equal throughout all four extremities.   Abdomen: soft, non-tender  Non-labored breathing on room air, normal respiratory effort  Capillary refill in all four extremities <2 seconds  No LE edema, erythema, cyanosis, clubbing  Calves non-tender to compression bilat  Incision CDI    BP  Min: 100/76  Max: 124/73  Pulse  Av.5  Min: 77  Max: 84  Resp  Av.5  Min: 17  Max: 18  Temp  Av.9 °C (98.4 °F)  Min: 36.7 °C (98 °F)  Max: 37.3 °C (99.1 °F)  SpO2  Av.5 %  Min: 95 %  Max: 98 %    No Data Recorded                      Intake/Output       18 07 - 18 0659 18 0700 - 18 0659      1640-4179 1114-3059 Total 1900-0659 Total       Intake    P.O.  --  800 800  --  -- --    P.O. -- 800 800 -- -- --    Total Intake -- 800 800 -- -- --       Output    Urine  1000  1500 2500  --  -- --    Number of Times Voided 3 x -- 3 x -- -- --    Urine Void (mL) 1000 1500 2500 -- -- --    Stool  --  -- --  --  -- --    Number of Times Stooled 1 x -- 1 x -- -- --    Total Output 1000 1500 2500 -- -- --       Net I/O     -1000 -700 -1700 -- -- --            Intake/Output Summary (Last 24 hours) at 18 0805  Last data filed at 18 0200   Gross per 24 hour   Intake              800 ml   Output             2500 ml   Net            -1700 ml             • enoxaparin (LOVENOX) injection  40 mg DAILY   • polyethylene glycol/lytes  1 Packet BID    And   • senna-docusate  2 Tab BID    And   • magnesium hydroxide  30 mL QDAY PRN    And   • bisacodyl  10 mg QDAY PRN   • ferrous sulfate  325 mg Q48HRS   • pregabalin  50 mg TID   • Pharmacy Consult Request  1 Each PRN   • budesonide-formoterol  2 Puff BID   • levothyroxine  50 mcg AM ES   • Respiratory Care per Protocol   Continuous RT   • hydrALAZINE  10 mg Q4HRS PRN   • ondansetron  4 mg Q4HRS PRN   • ondansetron  4 mg Q4HRS PRN   • acetaminophen  650 mg Q6HRS   • atorvastatin  40 mg Nightly   • cetirizine  10 mg DAILY   • vitamin D  3,000 Units DAILY   • lisinopril  10 mg DAILY   • fish oil  1,000 mg BID   • omeprazole  20 mg DAILY   • tamsulosin  0.4 mg AFTER BREAKFAST   • therapeutic multivitamin-minerals  1 Tab DAILY   • tizanidine  4 mg Q6HRS PRN   • oxyCODONE immediate-release  5 mg Q4HRS PRN    Or   • oxyCODONE immediate-release  10 mg Q4HRS PRN   • tramadol   mg Q6HRS PRN   • lidocaine  1 Patch Q24HR       Assessment and Plan:  Hospital day #11  POD #31  Prophylactic anticoagulation: yes         Start date/time: Per [facility] team     Unfortunately this kind man has been struggling postoperatively. 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 Fortunately there is no obvious or apparent clinical post operative complication as a cause for his symptoms. There does appear to be additional factors related to possible hyper analgesia as well as delirium. He continues to be neuro intact, recommend continued pain control and discharge planning. Ok from neurosurgery perspective to discharge to SNF, follow up in clinic after discharge.       We kindly appreciate the help of the [facility] team as well as nursing with his re-admission    D/W Dr. Fowler

## 2018-12-27 NOTE — THERAPY
"Physical Therapy Treatment completed.   Bed Mobility:  Supine to Sit: Stand by Assist (via log roll with bed features and extra time)  Transfers: Sit to Stand: Contact Guard Assist  Gait: Level Of Assist: Contact Guard Assist with Front-Wheel Walker       Plan of Care: Will benefit from Physical Therapy 4 times per week  Discharge Recommendations: Equipment: Will Continue to Assess for Equipment Needs. Post-acute therapy Discharge to a transitional care facility for continued skilled therapy services.    Pt has progressed his gait distance but is greatly limiting by shooting pain across low back intermittently during activity. He performed supine to sit at SBA with extra time and bed features, and upon return, required min A for B LE onto bed. Pt performed gait with antalgic and bradykinetic pattern. He will benefit from ongoing acute PT intervention to progress his mobility. At current level, recommend placement.      See \"Rehab Therapy-Acute\" Patient Summary Report for complete documentation.       "

## 2018-12-27 NOTE — PROGRESS NOTES
Prolonged discussion with pt about location of discharge. He is requesting to go to SNF so he can have longer than 2 weeks and not burden his wife.   Recommend discharge to SNF.     Lorenzo Forte D.O.

## 2018-12-27 NOTE — PROGRESS NOTES
Brief Geriatric Note:       Saw the patient today.  We discussed his bilateral hip pain, but left is still worse than right. Pain which is much better today.  We discussed the physical medicine rehab or go to come by to assess his need for injection.  I reviewed the note it appears they would not do an injection and the primary team is planning on discharging to her [facility] today.  Thank you for allowing me to take part in this patient's care.  See previous notes for previous recommendations and thoughts.      Jeovany Samson M.D.  Geriatric Physician [facility]  Contact: at  M-F 8-5

## 2018-12-27 NOTE — PROGRESS NOTES
Internal Medicine Interval Note  Note Author: Sami Bhat M.D.     Name Angelo Magaña     1945   Age/Sex 73 y.o. male   MRN [id number]   Code Status full     After 5PM or if no immediate response to page, please call for cross-coverage  Attending/Team: Levon/Mario See Patient List for primary contact information  Call [phone] to page    1st Call - Day Intern (R1):   Home 2nd Call - Day Sr. Resident (R2/R3):   Girish         Reason for interval visit  (Principal Problem)   S1 Radiculopathy vs hip pain    ID:  -73M, PMH of HTN, asthma, GERD, chronic back pain, h/o paraspinal abscess, hypothyroidism, recent L2-L3 fusion on ; presented to ED referred from neurosurgery Dr. Fowler for acute pain management and workup of worsening post-surgical lower back pain. MRI on  showing extradural fluid collection at L2-3. XR Hips showing postsurgical changes but no acute fracture. Neurosurgery, PMR, Geriatrics following. Myelogram showing stenoses and moderate bulging disc. Patient is s/p epidural spinal infusion and left greater trochanteric bursa injection. Planning for SNF or rehab placement and f/u outpaitent neurosurgery.     Interval Problem Daily Status Update  (24 hours, problem oriented, brief subjective history, new lab/imaging data pertinent to that problem)   -patient with increasing mobility and improved pain this morning, walking further everyday, last BM 2 days ago, no further concerns  -continue mobility as tolerated    -Neurosurg, geriatrics, PMR following  -planning for SNF, will plan on discharge today  -Geriatrics recommend considering contralateral greater trochanteric bursa injection, following up with PMR for assessment and did not find indication for injection at this time    Review of Systems   Constitutional: Negative for chills and fever.   HENT: Negative for ear pain and hearing loss.    Eyes: Negative for blurred vision and double vision.   Respiratory: Negative for  cough and shortness of breath.    Cardiovascular: Negative for chest pain and palpitations.   Gastrointestinal: Positive for nausea. Negative for vomiting.   Genitourinary: Negative for dysuria and hematuria.   Musculoskeletal: Positive for back pain and joint pain. Negative for myalgias.   Skin: Negative for itching and rash.   Neurological: Positive for headaches. Negative for dizziness.   Endo/Heme/Allergies: Negative for polydipsia. Does not bruise/bleed easily.   Psychiatric/Behavioral: Negative for depression. The patient is not nervous/anxious.        Disposition/Barriers to discharge:   Placement    Consultants/Specialty  Neurosurgery - Dr. Fowler  PMR-Dr. Forte  Geriatrics-Dr. Samson  PCP: Jeovany Samson M.D.      Quality Measures  Quality-Core Measures   Reviewed items::  Labs reviewed and Medications reviewed  Gomes catheter::  No Gomes  DVT prophylaxis pharmacological::  Enoxaparin (Lovenox)  DVT prophylaxis - mechanical:  SCDs          Physical Exam       Vitals:    12/26/18 1500 12/26/18 2000 12/27/18 0500 12/27/18 0715   BP: 124/73 116/77 100/76 115/69   Pulse: 82 77 79 88   Resp: 17 18 18 16   Temp: 36.7 °C (98 °F) 37.3 °C (99.1 °F) 36.7 °C (98 °F) 36.6 °C (97.8 °F)   TempSrc: Temporal Temporal Temporal Temporal   SpO2: 96% 95% 98% 91%   Weight:       Height:         Body mass index is 32.17 kg/m².    Oxygen Therapy:  Pulse Oximetry: 91 %, O2 (LPM): 2, O2 Delivery: None (Room Air)    Physical Exam   Constitutional: He is oriented to person, place, and time and well-developed, well-nourished, and in no distress. No distress.   HENT:   Head: Normocephalic and atraumatic.   Mouth/Throat: Oropharynx is clear and moist. No oropharyngeal exudate.   Eyes: Pupils are equal, round, and reactive to light. Conjunctivae and EOM are normal. Right eye exhibits no discharge. Left eye exhibits no discharge. No scleral icterus.   Neck: Normal range of motion. Neck supple. No tracheal deviation present.    Cardiovascular: Normal rate, regular rhythm, normal heart sounds and intact distal pulses.  Exam reveals no gallop and no friction rub.    No murmur heard.  Pulmonary/Chest: Effort normal and breath sounds normal. No respiratory distress. He has no wheezes. He has no rales. He exhibits no tenderness.   Abdominal: Soft. Bowel sounds are normal. He exhibits no distension and no mass. There is no tenderness. There is no rebound and no guarding.   Musculoskeletal: Normal range of motion. He exhibits no edema, tenderness or deformity.   Neurological: He is alert and oriented to person, place, and time. He exhibits normal muscle tone. Coordination normal.   Skin: Skin is warm and dry. No rash noted. He is not diaphoretic. No erythema. No pallor.   Left knee scar, midline abdominal scar    Psychiatric: Mood and affect normal.             Assessment/Plan     * Radiculopathy of sacral region- (present on admission)   Assessment & Plan    - Differential: S1 radiculopathy vs hip pain; Trochanteric brusitis  -s/p L2-L3 fusion, lumbar laminectomy and discectomy L2-L3, hardware removal L4-L5, repositioning of L3 screw by Dr. Fowler on 11/19/2018  -XR Hips showing postsurgical changes but no acute fracture  -CT myelogram showing pseudomeningocele, surgical hardware, stenosis at multiple levels and sever at L5/S1, moderate bulging discs  - Neurosurgery, PMR, Geriatrics, following and recs included in plan  -s/p Epidural spinal injection without relief    Plan  - Continued pain management  - Fall precautions  - Continue PT/OT  - Aggressive bowel protocol as patient is on opiate therapy  - avoiding NSAIDs, ASA; long acting blood thinning agents       Greater trochanteric bursitis of left hip   Assessment & Plan    -PMR following; s/p left greater trochanteric bursa injection with improved mobility/pain    -ice/lidocaine patch  -SNF placement     Intractable low back pain- (present on admission)   Assessment & Plan    -plan per  radiculopathy of the sacral region    Plan  - Outpatient pain management referral     Lumbar back pain with radiculopathy affecting left lower extremity- (present on admission)   Assessment & Plan    - plan per radiculopathy of the sacral region       Hypercalcemia   Assessment & Plan    -IVF bolus and recheck     Asymptomatic bacteriuria   Assessment & Plan    -UA showing bacteriuria, patient currently asymptomatic  -monitor for symptoms     BPH (benign prostatic hyperplasia)   Assessment & Plan    Stable. Pt denies LUTs    -Continue Flomax 0.4 mg daily     Elevated alkaline phosphatase level   Assessment & Plan    Isolated in this patient with recent back surgery.     -Continue Vit D supplementation     Anemia- (present on admission)   Assessment & Plan    -Chronic normocytic anemia, hemoglobin improving from baseline of about 8, stool occult blood was negative.  According to patient last colonoscopy was about 2 years ago and was normal.  Denies melena/dizziness/shortness of breath/chest pain. Hx of AVMs.  -iron studies low; ferritin/B12/Folate/Free T4 non deficient    Plan  -Continue iron supplementation  - Follow-up iron studies as outpatient     Essential hypertension- (present on admission)   Assessment & Plan    Stable.  Normotensive on admission.    -Continue lisinopril 10 mg daily     Dyslipidemia- (present on admission)   Assessment & Plan    Last lipid panel 11/20/2017, and current one at goal.    -Continue atorvastatin 40 mg daily     GERD (gastroesophageal reflux disease)- (present on admission)   Assessment & Plan    Stable disease.    -Continue Prilosec 20 mg daily     Seasonal allergies- (present on admission)   Assessment & Plan    Stable.    -Continue cetirizine  -Continue to monitor     Asthma- (present on admission)   Assessment & Plan    Stable disease.  Uses Advair once a day.  No recent increase in need for Advair.    Plan  -Continue Advair     Acquired hypothyroidism- (present on admission)    Assessment & Plan    -TSH elevated, Free T4 wnl  - ? compliance    Plan  -Continue Synthroid

## 2018-12-27 NOTE — DISCHARGE PLANNING
Anticipated Discharge Disposition:   SNF-accepted by [facility]    Action:   PASRR: [id number]    Barriers to Discharge:   Medical clearance    Plan:   Follow up with [facility] team

## 2018-12-27 NOTE — PROGRESS NOTES
Received report from day RN. Assumed care at 1900. Pt A&Ox4. Reporting 7/10 back pain, medicated per MAR, no SOB or signs of discomfort. Lidocaine patch applied to low back. Discussed plan of care with pt. Pt resting comfortably, treaded socks in place, bed locked and in lowest position, call light in reach.

## 2018-12-27 NOTE — DISCHARGE PLANNING
Agency/Facility Name: [facility]  Outcome: Bed available for patient today. Arranged patient's transport to [facility] @ 1800. Jessica(LAMONT CM) notified of transport time.

## 2018-12-27 NOTE — DISCHARGE PLANNING
Anticipated Discharge Disposition:   SNF  [facility] or [facility]  Action:    Spoke with patient and informed him that he was accepted at both SNFs. He requested to look at brochures of each SNF and then make a decision.  Information provided.   -[facility] blue team paged for medical clearance  Barriers to Discharge:    Patient choice  Medical clearance  Plan:    Wait for patient decision  Wait for return call from [facility] blue team on medical clearance    Addendum:  -RN CM spoke with patient and he called his daughter to go and tour the SNFs  -Spoke with [facility] blue team Dr. Elliot Bhat and he is consulting with Dr. Forte for left hip bursa injection    Addendum:  -RN CM spoke with patient and he has chosen [facility].  LAMONT PENALOZA spoke with VIVEK Turner to arrange transportation.  -Dr. Bhat with R paged to obtain signature on [employer]    Addendum:  -[facility] signed by Dr. Bhat  -Transport time is 6pm today  -Patient informed      Addendum:  Transport form faxed to [facility]    Addendum:  Notified bedside LAMONT Marks of 6 pm pt. pickup time today

## 2018-12-27 NOTE — THERAPY
"Physical Therapy Treatment completed.   Bed Mobility:  Supine to Sit: Stand by Assist (bed rail and extra time to complete)  Transfers: Sit to Stand: Contact Guard Assist  Gait: Level Of Assist: Contact Guard Assist with Front-Wheel Walker       Plan of Care: Will benefit from Physical Therapy 4 times per week  Discharge Recommendations: Equipment: Will Continue to Assess for Equipment Needs. Post-acute therapy Discharge to a transitional care facility for continued skilled therapy services.     See \"Rehab Therapy-Acute\" Patient Summary Report for complete documentation.     Pt continues to progress slowly with therapy session. Pt able to complete bed mobility via log roll with cues for placement of BLE, with extra time and use of bed rail. Pt able to complete 200ft of gait with CGA for safety. Pt demonstrates very slow sherri, forward flexed posture and inconsistent step length. Pt required frequent cues to keep fww close for safety. Pt continued to report intermittent pain in back but wanted to keep ambulating. Pt will benefit from continued acute skilled therapy to progress mobility. At current level continue to recommend placement at discharge.  "

## 2018-12-27 NOTE — PROGRESS NOTES
RN notified MD Samson that pt currently has an order for seizure precautions. Per MD, this can be dc'd.

## 2018-12-28 ENCOUNTER — TELEPHONE (OUTPATIENT)
Dept: INTERNAL MEDICINE | Facility: MEDICAL CENTER | Age: 73
End: 2018-12-28

## 2018-12-28 NOTE — DISCHARGE INSTRUCTIONS
Bursitis  Introduction  Bursitis is when the fluid-filled sac (bursa) that covers and protects a joint is swollen (inflamed). Bursitis is most common near joints, especially the knees, elbows, hips, and shoulders.  Follow these instructions at home:  · Take medicines only as told by your doctor.  · If you were prescribed an antibiotic medicine, finish it all even if you start to feel better.  · Rest the affected area as told by your doctor.  ¨ Keep the area raised up.  ¨ Avoid doing things that make the pain worse.  · Apply ice to the injured area:  ¨ Place ice in a plastic bag.  ¨ Place a towel between your skin and the bag.  ¨ Leave the ice on for 20 minutes, 2-3 times a day.  · Use splints, braces, pads, or walking aids as told by your doctor.  · Keep all follow-up visits as told by your doctor. This is important.  Contact a doctor if:  · You have more pain with home care.  · You have a fever.  · You have chills.  This information is not intended to replace advice given to you by your health care provider. Make sure you discuss any questions you have with your health care provider.  Document Released: 06/07/2011 Document Revised: 05/25/2017 Document Reviewed: 03/09/2015  © 2017 Elsevier      Discharge Instructions    Discharged to other by medical transportation with escort. Discharged via wheelchair, hospital escort: Yes.  Special equipment needed: Not Applicable    Be sure to schedule a follow-up appointment with your primary care doctor or any specialists as instructed.     Discharge Plan:   Pneumococcal Vaccine Administered/Refused: Not given - Patient refused pneumococcal vaccine  Influenza Vaccine Indication: Not indicated: Previously immunized this influenza season and > 8 years of age    I understand that a diet low in cholesterol, fat, and sodium is recommended for good health. Unless I have been given specific instructions below for another diet, I accept this instruction as my diet prescription.    Other diet: Regular    Special Instructions: None    · Is patient discharged on Warfarin / Coumadin?   No     Depression / Suicide Risk    As you are discharged from this [facility] facility, it is important to learn how to keep safe from harming yourself.    Recognize the warning signs:  · Abrupt changes in personality, positive or negative- including increase in energy   · Giving away possessions  · Change in eating patterns- significant weight changes-  positive or negative  · Change in sleeping patterns- unable to sleep or sleeping all the time   · Unwillingness or inability to communicate  · Depression  · Unusual sadness, discouragement and loneliness  · Talk of wanting to die  · Neglect of personal appearance   · Rebelliousness- reckless behavior  · Withdrawal from people/activities they love  · Confusion- inability to concentrate     If you or a loved one observes any of these behaviors or has concerns about self-harm, here's what you can do:  · Talk about it- your feelings and reasons for harming yourself  · Remove any means that you might use to hurt yourself (examples: pills, rope, extension cords, firearm)  · Get professional help from the community (Mental Health, Substance Abuse, psychological counseling)  · Do not be alone:Call your Safe Contact- someone whom you trust who will be there for you.  · Call your local CRISIS HOTLINE 794-8379 or 799-577-3711  · Call your local Children's Mobile Crisis Response Team [place] (245) 589-4441 or www.DOOMORO  · Call the toll free National Suicide Prevention Hotlines   · National Suicide Prevention Lifeline 963-042-CDWD (3903)  · National Hope Line Network 800-SUICIDE (756-6977)      Sciatica  Introduction  Sciatica is pain, numbness, weakness, or tingling along your sciatic nerve. The sciatic nerve starts in the lower back and goes down the back of each leg. Sciatica happens when this nerve is pinched or has pressure put on it. Sciatica  usually goes away on its own or with treatment. Sometimes, sciatica may keep coming back (recur).  Follow these instructions at home:  Medicines  · Take over-the-counter and prescription medicines only as told by your doctor.  · Do not drive or use heavy machinery while taking prescription pain medicine.  Managing pain  · If directed, put ice on the affected area.  ¨ Put ice in a plastic bag.  ¨ Place a towel between your skin and the bag.  ¨ Leave the ice on for 20 minutes, 2-3 times a day.  · After icing, apply heat to the affected area before you exercise or as often as told by your doctor. Use the heat source that your doctor tells you to use, such as a moist heat pack or a heating pad.  ¨ Place a towel between your skin and the heat source.  ¨ Leave the heat on for 20-30 minutes.  ¨ Remove the heat if your skin turns bright red. This is especially important if you are unable to feel pain, heat, or cold. You may have a greater risk of getting burned.  Activity  · Return to your normal activities as told by your doctor. Ask your doctor what activities are safe for you.  ¨ Avoid activities that make your sciatica worse.  · Take short rests during the day. Rest in a lying or standing position. This is usually better than sitting to rest.  ¨ When you rest for a long time, do some physical activity or stretching between periods of rest.  ¨ Avoid sitting for a long time without moving. Get up and move around at least one time each hour.  · Exercise and stretch regularly, as told by your doctor.  · Do not lift anything that is heavier than 10 lb (4.5 kg) while you have symptoms of sciatica.  ¨ Avoid lifting heavy things even when you do not have symptoms.  ¨ Avoid lifting heavy things over and over.  · When you lift objects, always lift in a way that is safe for your body. To do this, you should:  ¨ Bend your knees.  ¨ Keep the object close to your body.  ¨ Avoid twisting.  General instructions  · Use good  posture.  ¨ Avoid leaning forward when you are sitting.  ¨ Avoid hunching over when you are standing.  · Stay at a healthy weight.  · Wear comfortable shoes that support your feet. Avoid wearing high heels.  · Avoid sleeping on a mattress that is too soft or too hard. You might have less pain if you sleep on a mattress that is firm enough to support your back.  · Keep all follow-up visits as told by your doctor. This is important.  Contact a doctor if:  · You have pain that:  ¨ Wakes you up when you are sleeping.  ¨ Gets worse when you lie down.  ¨ Is worse than the pain you have had in the past.  ¨ Lasts longer than 4 weeks.  · You lose weight for without trying.  Get help right away if:  · You cannot control when you pee (urinate) or poop (have a bowel movement).  · You have weakness in any of these areas and it gets worse.  ¨ Lower back.  ¨ Lower belly (pelvis).  ¨ Butt (buttocks).  ¨ Legs.  · You have redness or swelling of your back.  · You have a burning feeling when you pee.  This information is not intended to replace advice given to you by your health care provider. Make sure you discuss any questions you have with your health care provider.  Document Released: 09/26/2009 Document Revised: 05/25/2017 Document Reviewed: 08/26/2016  © 2017 Elsevier

## 2018-12-28 NOTE — PROGRESS NOTES
Prescription for lyrica obtained and placed inside envelope that is going with pt.    Discharge instructions reviewed with pt, IV removed, tip intact.

## 2018-12-28 NOTE — TELEPHONE ENCOUNTER
----- Message from Raissa Matute sent at 12/28/2018  9:58 AM PST -----  Regarding: Dr Samson/regarding transfer  Contact: [phone]  Please call patient he wants to let you know that he was transferred from [facility] to [facility] and he is not happy about it.

## 2018-12-28 NOTE — PROGRESS NOTES
DOMINGA notified mercedez per RN at [facility], they would need a prescription for lyrica. DOMINGA to contact [facility].

## 2019-01-02 ENCOUNTER — PATIENT OUTREACH (OUTPATIENT)
Dept: HEALTH INFORMATION MANAGEMENT | Facility: OTHER | Age: 74
End: 2019-01-02

## 2019-01-02 ENCOUNTER — TELEPHONE (OUTPATIENT)
Dept: INTERNAL MEDICINE | Facility: MEDICAL CENTER | Age: 74
End: 2019-01-02

## 2019-01-02 NOTE — TELEPHONE ENCOUNTER
----- Message from Brandon John sent at 12/31/2018  3:56 PM PST -----  Regarding: RE: Dr Samson/regarding transfer  Contact: [phone]  I spoke with the patient, and he was not upset or bothered by the transfer to [facility].  He said that when he called and spoke with the MA, he was trying to make sure that you knew where he was, and hoped that you would have time to speak with him.  The phone number of the facility is [phone], and he is currently in room #118 B.  He did relay to me that he is undergoing physical therapy to assist him to walk, and that it is causing him pain, and some discomfort, however is not dissatisfied with where he is now.      ----- Message -----  From: Jeovany Samson M.D.  Sent: 12/31/2018   3:45 PM  To: Brandon John  Subject: RE: Dr Samson/regarding transfer                   I do not think that was his concern.  ----- Message -----  From: Brandon John  Sent: 12/31/2018  11:36 AM  To: Jeovany Samson M.D.  Subject: RE: Dr Samson/regarding transfer                   This is a transfer from Rehab to Skilled Nursing.  [facility] no longer has their own Skilled Nursing Facility.  ----- Message -----  From: Jeovany Samson M.D.  Sent: 12/28/2018  10:52 AM  To: Raissa Mcdonald  Subject: FW: Dr Samson/regarding transfer                   Will forward to  so he can address. Thank you.   Jeovany Samson M.D.    ----- Message -----  From: Raissa Matute  Sent: 12/28/2018   9:58 AM  To: Rubia Christianson, Med Ass't, #  Subject: Dr Samson/regarding transfer                       Please call patient he wants to let you know that he was transferred from [facility] to [facility] and he is not happy about it.

## 2019-01-02 NOTE — TELEPHONE ENCOUNTER
Called patient. Had discussion about current care.   Continues to have some confusion from initial surgery, but overall not considerably more delirious than in the hospital, based on a phone conversation.    He discussed with me that when he was transferred from [facility] to  that no one told his daughter and that she was angry about this lack of communication about his discharge.    We discussed other things regarding the food at the SNF, that he is near his mother, who is in long term care, how an electrical stimulation device is very helpful with the pain. He is doing his best to mobilize and has had visitors.

## 2019-01-03 NOTE — PROGRESS NOTES
Transitional Care Navigator:    Situation    Pt has been receiving therapies at [facility] since 12/27/18, after a hospital admission for intractable pain following L2-5 fusion.   Background       Pt has had numerous back surgeries, and has multiple co-morbidities. He live in [place] with his wife, who has dementia. A daughter lives nearby and sees the patient and his wife several times each week.   Assessment    Pt is planning to discharge home to his wife, and is agreeable to home health.  He appreciated the information about the [facility] program.    Recommendation TCN will f/u about possibly enrolling in CCM.

## 2019-01-11 ENCOUNTER — TELEPHONE (OUTPATIENT)
Dept: INTERNAL MEDICINE | Facility: MEDICAL CENTER | Age: 74
End: 2019-01-11

## 2019-01-11 NOTE — TELEPHONE ENCOUNTER
1. Caller Name: PT                      Call Back Number: [phone] (home)     2. Message: Patient called stating he is in a rehab center and would like Dr. Samson to give him a call back please when he has the chance    3. Patient approves office to leave a detailed voicemail/MyChart message: N\A

## 2019-01-14 NOTE — TELEPHONE ENCOUNTER
Called patient back this morning.  He discussed with me how he is continuing to have considerable pain and the pain is interfering with his function.  He says they are planning on discharging from his rehab/skilled nursing facility on Wednesday.  He went over multiple times about concerns about why he is not improving what he can do to improve it.  He was wondering if rest would be better in this continual therapy..  He expressed how he felt like he was doing everything that the therapist asked for him and more.  He tells me that he is having worsening orthostatic dizziness upon standing and even had an episode where he passed out yesterday.  He says he is continuing to get his medications as prescribed including blood pressure and his thyroid medication.  He tells me overall he needs to go home to see his wife and his dog.  He says that is the most important thing to him right now.  He tells me that he is not yet discussed with the family about discharge on Wednesday.  I encouraged him to discuss this with his family and will follow up again tomorrow.  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

## 2019-01-14 NOTE — TELEPHONE ENCOUNTER
Patient Seen: 11/05/18 With Dr. Samson  Next Appointment: None  Was the patient seen in the last year in this department? Yes     Does patient have an active prescription for medications requested? No     Received Request Via: Pharmacy

## 2019-01-15 RX ORDER — LISINOPRIL 10 MG/1
TABLET ORAL
Qty: 90 TAB | Refills: 0 | Status: SHIPPED | OUTPATIENT
Start: 2019-01-15 | End: 2019-01-17

## 2019-01-15 NOTE — TELEPHONE ENCOUNTER
Called patient to follow-up again today.  He tells me that he had multiple episodes of him standing up and getting dizzy.  He says the room was spinning around him he did not feel lightheaded.  They said they checked his blood pressure at the facility and that it was in the 140s.  He says that the poor they serve food is really poor and he feels that is why the symptoms are occurring as he is not eating very much.  He cannot wait to get out of the skilled nursing facility.  He says he was unable to get a hold of his daughter today but will definitely try to get hold of her today.  I made a strong recommendation to him that he should recruit her before he allows a facility to discharge him just in case he is unable to tolerate being at home independently.  He says he is also recruit a friend to help.  I asked if there is anything else I could do for him today and he said there was nothing else for us to do.  I encouraged him to contact us and keep us appraised if he felt there was something we need to know.

## 2019-01-17 ENCOUNTER — OFFICE VISIT (OUTPATIENT)
Dept: INTERNAL MEDICINE | Facility: MEDICAL CENTER | Age: 74
End: 2019-01-17
Payer: MEDICARE

## 2019-01-17 VITALS
OXYGEN SATURATION: 92 % | TEMPERATURE: 97.4 F | HEART RATE: 112 BPM | SYSTOLIC BLOOD PRESSURE: 96 MMHG | DIASTOLIC BLOOD PRESSURE: 71 MMHG | BODY MASS INDEX: 34.37 KG/M2 | HEIGHT: 67 IN | WEIGHT: 219 LBS

## 2019-01-17 DIAGNOSIS — M54.50 CHRONIC MIDLINE LOW BACK PAIN WITHOUT SCIATICA: ICD-10-CM

## 2019-01-17 DIAGNOSIS — M54.59 INTRACTABLE LOW BACK PAIN: ICD-10-CM

## 2019-01-17 DIAGNOSIS — E03.9 ACQUIRED HYPOTHYROIDISM: ICD-10-CM

## 2019-01-17 DIAGNOSIS — E66.9 OBESITY (BMI 35.0-39.9 WITHOUT COMORBIDITY): ICD-10-CM

## 2019-01-17 DIAGNOSIS — G89.29 CHRONIC MIDLINE LOW BACK PAIN WITHOUT SCIATICA: ICD-10-CM

## 2019-01-17 DIAGNOSIS — I10 ESSENTIAL HYPERTENSION: ICD-10-CM

## 2019-01-17 DIAGNOSIS — D64.9 ANEMIA, UNSPECIFIED TYPE: ICD-10-CM

## 2019-01-17 DIAGNOSIS — R74.8 ELEVATED ALKALINE PHOSPHATASE LEVEL: ICD-10-CM

## 2019-01-17 PROBLEM — R65.10 SIRS (SYSTEMIC INFLAMMATORY RESPONSE SYNDROME) (HCC): Status: RESOLVED | Noted: 2017-03-30 | Resolved: 2019-01-17

## 2019-01-17 PROBLEM — E83.52 HYPERCALCEMIA: Status: RESOLVED | Noted: 2018-12-23 | Resolved: 2019-01-17

## 2019-01-17 PROCEDURE — 99214 OFFICE O/P EST MOD 30 MIN: CPT | Performed by: INTERNAL MEDICINE

## 2019-01-17 RX ORDER — PREGABALIN 50 MG/1
50 CAPSULE ORAL 3 TIMES DAILY
Status: SHIPPED | COMMUNITY
End: 2019-05-14

## 2019-01-17 RX ORDER — FERROUS SULFATE 325(65) MG
325 TABLET ORAL
Qty: 30 TAB | Refills: 1 | Status: SHIPPED
Start: 2019-01-18 | End: 2019-05-14

## 2019-01-17 RX ORDER — TRAMADOL HYDROCHLORIDE 50 MG/1
50-100 TABLET ORAL EVERY 6 HOURS PRN
COMMUNITY
Start: 2019-01-16 | End: 2019-12-29

## 2019-01-17 RX ORDER — ACETAMINOPHEN 500 MG
1000 TABLET ORAL 2 TIMES DAILY
COMMUNITY
Start: 2019-01-17

## 2019-01-17 ASSESSMENT — PAIN SCALES - GENERAL: PAINLEVEL: 6=MODERATE PAIN

## 2019-01-17 ASSESSMENT — PATIENT HEALTH QUESTIONNAIRE - PHQ9
CLINICAL INTERPRETATION OF PHQ2 SCORE: 2
5. POOR APPETITE OR OVEREATING: 1 - SEVERAL DAYS
SUM OF ALL RESPONSES TO PHQ QUESTIONS 1-9: 10

## 2019-01-17 NOTE — PROGRESS NOTES
Geriatric Clinic Note  Patient: Angelo Magaña  Age: 73 y.o.   Gender: male  Author: Jeovany Samson M.D.  PCP: Jeovany Samson M.D.    Today's date: 01/17/19  Chief Complaint   Patient presents with   • Hospital Follow-up     C/o dizziness        HPI:  History obtained from patient, medical chart and family (daughter and son in law).    Patient is coming in for follow-up.  He has had 2 recent hospitalizations for intractable back pain after getting back surgery with Dr. Fowler on November 19, 2018 which was due to acute on chronic exacerbation of the patient's back pain and imaging findings consistent with lumbar stenosis.  Based on family report it appears he had some delirium postoperatively, but was discharged home.  At home he continued to work with home health however his pain continued to worsen and he was admitted to the hospital on December 17, 2018.  He had a 10-day hospital stay which I was consulted as an inpatient geriatric consultant.  The primary team did their best to it obtain pain management and he received paraspinal intracortical with steroid shot.  He also saw physical medicine rehabilitation who thought there may be some bursitis involved however it was not giving a shot for those ailments.  He was discharged to a skilled nursing facility on 12/27/2018 and had been admitted there up until earlier this week when he was discharged.  I spoke with the patient over the phone while he was at the skilled nursing facility and he was having significant issues with his appetite, did not getting lightheaded with passing out during stand standing however it was not thought to be due to his blood pressure per the patient's report.     D/c from rehab at noon yesterday. Took shower wife helped clean back.The shower took all morning  No falls since being home.   Gets dizzy, get not describe vertignous vs light headed. Not always a/w with position changes.   Taking meds this AM.   Brought in Rx slips  from pharmacy and pill packs from SNF. We went over meds in detial.     He is now able to sit longer however he still has issues sitting for prolonged periods of time.  He is taking his tramadol more frequently than prescribed.Tizandine is helpful per report.     Assessment: 73-year-old male coming in for follow-up of his intractable back pain after spinal surgery on 11/19/2018  Plan:  1. Obesity (BMI 35.0-39.9 without comorbidity)  2. Chronic midline low back pain without sciatica  3. Intractable low back pain  Patient will continue taking tramadol 50 mg every 6 hours as needed and tizanidine 4 mg every 6 hours as needed  We will add 1000 mg Tylenol 3 times daily regardless of pain level  Exam could be consistent with bilateral trochanteric bursitis will refer to PMR for functionality and best treatment options.  The patient received a left-sided L5-S1 transforaminal epidural steroid injection during hospital stay,/urinary retention.  Discussed staggering med dosages and avoiding taking tramadol and tizanidine at the same time.   Will need to get medication agreement if he continues to need tramadol at next visit.   -Counseled patient to watch for constipation  - REFERRAL TO PHYSIATRY (PMR)    4. Elevated alkaline phosphatase level  Repeat CMP unlikely significant  - COMP METABOLIC PANEL; Future    5. Anemia, unspecified type  epeat CBC  Decrease frequency of iron 325 mg to 3 times a week from daily  - CBC WITHOUT DIFFERENTIAL; Future    6. Essential hypertension  Patient with orthostatic symptoms, low blood pressure today we will discontinue lisinopril 10 mg daily    7. Acquired hypothyroidism  Recheck thyroid medication continue levothyroxine 50 mg last TSH was remarkably high.  Patient will likely be taking his medication with other pills in the morning but just as long as he takes consistently we can likely deal with this issue  - TSH WITH REFLEX TO FT4; Future    Mood disorder  -The patient's daughter was very  concerned about the patient's mood and how the pain is affecting it.  She feels he is depressed she is wondering if there is a medication that can help.  He said we will discuss this at future visits when things are more stable    Functionality  -Main concerns include patient's ability to toilet and medications.  I counseled the family on helping the patient with his medications at home to ensure he is taking them appropriately.  I also counseled to ensure that he is voiding appropriately as urinary retention or constipation could be reasons for hospital stay.  He is at very high risk for fall however he does need to continue to mobilize.  He has home health ordered we are making referral to physical medicine rehabilitation as above.  We will follow-up in 2 weeks or sooner as needed.    Health Maintenance: Deferred for today  Immunizations:   Immunization History   Administered Date(s) Administered   • Influenza Seasonal Injectable 10/01/2016   • Influenza TIV (IM) 10/22/2012, 10/01/2016, 09/18/2018   • Influenza Vaccine Pediatric Split 10/01/2016   • Pneumococcal Conjugate Vaccine (Prevnar/PCV-13) 10/01/2010, 12/24/2014, 10/01/2015   • Pneumococcal polysaccharide vaccine (PPSV-23) 09/26/2018   • Tuberculin Skin Test 01/03/2011, 02/06/2012, 02/13/2012, 01/14/2013     Return in about 2 weeks (around 1/31/2019) for Long.     ROS:  No fevers or chills  A 14 point ROS is negative, other than as stated above.     Past Medical History:   Diagnosis Date   • ASTHMA     scheduled inhaler use   • Bronchitis 02/2018   • Erectile dysfunction 6/8/2016   • GERD (gastroesophageal reflux disease) 6/8/2016   • Heart burn    • History of skin cancer 6/8/2016   • Hyperlipidemia 6/8/2016   • Hypertension    • Hypothyroidism 6/8/2016   • Indigestion    • Obesity 6/8/2016   • Pain 04/19/2018    chronic back pain, 0/10   • Preventative health care 6/8/2016   • Rosacea 6/8/2016   • Seasonal allergies 6/8/2016     Social History     Social  History   • Marital status:      Spouse name: N/A   • Number of children: N/A   • Years of education: N/A     Occupational History   • Not on file.     Social History Main Topics   • Smoking status: Never Smoker   • Smokeless tobacco: Never Used   • Alcohol use Yes      Comment: 1/week    • Drug use: No      Comment: marijuana edibles (for pain control)   • Sexual activity: Not on file     Other Topics Concern   • Not on file     Social History Narrative   • No narrative on file     Family History   Problem Relation Age of Onset   • Hypertension Mother    • Dementia Mother    • Hypertension Father    • Lung Disease Father         asthma     Allergies: Patient has no known allergies.  Current Outpatient Prescriptions on File Prior to Visit   Medication Sig Dispense Refill   • fluticasone-salmeterol (ADVAIR) 100-50 MCG/DOSE AEROSOL POWDER, BREATH ACTIVATED Inhale 1 Puff by mouth every morning.     • omeprazole (PRILOSEC) 20 MG delayed-release capsule Take 20 mg by mouth every morning.     • polyethylene glycol/lytes (MIRALAX) Pack Take 1 Packet by mouth every day. 15 Each 0   • tamsulosin (FLOMAX) 0.4 MG capsule Take 1 Cap by mouth ONE-HALF HOUR AFTER BREAKFAST. 30 Cap 0   • tizanidine (ZANAFLEX) 4 MG Tab Take 1 Tab by mouth every 6 hours as needed. 60 Tab 0   • atorvastatin (LIPITOR) 40 MG Tab Take 40 mg by mouth every evening.     • levothyroxine (SYNTHROID) 50 MCG Tab TAKE 1 TABLET BY MOUTH EVERY DAY 90 Tab 1   • therapeutic multivitamin-minerals (THERAGRAN-M) Tab Take 1 Tab by mouth every day.     • Cholecalciferol (VITAMIN D3) 3000 units Tab Take 3,000 Units by mouth every day.     • cetirizine (ZYRTEC) 10 MG Tab Take 10 mg by mouth every day.       No current facility-administered medications on file prior to visit.        Physical Exam:  BP (!) 96/71 (BP Location: Left arm, Patient Position: Sitting, BP Cuff Size: Large adult) Comment: Unable tohear both arms-machine reading  Pulse (!) 112   Temp 36.3  "°C (97.4 °F)   Ht 1.695 m (5' 6.75\")   Wt 99.3 kg (219 lb)   SpO2 92%   BMI 34.56 kg/m² Body mass index is 34.56 kg/m².    Gen: Mild to moderate intermittent distress secondary to pain alert oriented to situation  HEENT: Neck supple  CV: 2+ radial pulses bilateral  Lungs: Normal effort  Abd: Obese, soft  Ext: Right hip with full range of motion no pain left hip with pain with flexion of the hip and internal rotation.  Patient had pain diffusely around the right hip, as well as the left hip he did have areas of point tenderness near the gluteus minimus to the greater trochanteric region  Back: No thoracic or cervical spine point tenderness  Neuro: Walks with a walker normally, has a TLSO brace on  Psych: Does not appear to be responding to internal stimuli    Labs: reviewed CBC, CMP, TSH, hypercalcemia likely transient, not concenred given trend and history, CTM      Imaging: Reviewed CT CT L-spine surrounding fluid collection on 12/19/2018    Jeovany Samson M.D.  Geriatric Physician    This note was partially dictated with voice recognition software, for any confusion please do not hesitate to contact me.     "

## 2019-01-18 ENCOUNTER — PATIENT OUTREACH (OUTPATIENT)
Dept: HEALTH INFORMATION MANAGEMENT | Facility: OTHER | Age: 74
End: 2019-01-18

## 2019-01-18 ASSESSMENT — ENCOUNTER SYMPTOMS
DEPRESSION: 0
LOSS OF SENSATION IN FEET: 0
OCCASIONAL FEELINGS OF UNSTEADINESS: 1

## 2019-01-21 ENCOUNTER — TELEPHONE (OUTPATIENT)
Dept: INTERNAL MEDICINE | Facility: MEDICAL CENTER | Age: 74
End: 2019-01-21

## 2019-01-21 RX ORDER — NALOXONE HYDROCHLORIDE 4 MG/.1ML
1 SPRAY NASAL
Qty: 2 PACKAGE | Refills: 1 | Status: SHIPPED | OUTPATIENT
Start: 2019-01-21 | End: 2020-02-06

## 2019-01-21 NOTE — TELEPHONE ENCOUNTER
I made 3 phone calls this morning.  Two to the daughter 1 to the patient.  I called the patient in between my 2 phone calls to the daughter in summary the patient continues to have pain.  The pain appears to be progressing as it now worsens with mobilization and the patient has pain with standing up at times.  He was wondering if he had a fractured his left  hip because the pain is so bad.  I asked him if he had any trauma or had any falls and he denied either of these things.  We discussed potential plans including going to the hospital, transitioning his living situation, or continuing with her current plan.  The patient decided to make the physical medicine and rehab consult appointment and we will increase his tramadol to  mg every 6 hours as needed.  The patient appears to have improved relief on the increased dosage.  I also prescribed a dose of naloxone and and told the patient and the daughter about this prescription and they should pick it up in case there is evidence of an overdose.  I told him that there is limited role for repeat imaging given the lack of trauma, at this point.  I answered all questions, they think me for my time they will follow-up with me as things continue to progress.  We have a follow-up appointment scheduled

## 2019-01-21 NOTE — TELEPHONE ENCOUNTER
1. Caller Name: Susana Shearer                     Call Back Number: [phone]    2. Message: Pt c/o of terrible pain in his hip, thinks it's broken but doesn't want to go back to ER. Can you order a STAT MRI? Daughter says he's screaming in pain this am.     3. Patient approves office to leave a detailed voicemail/MyChart message: yes

## 2019-01-23 ENCOUNTER — APPOINTMENT (OUTPATIENT)
Dept: PHYSICAL MEDICINE AND REHAB | Facility: MEDICAL CENTER | Age: 74
End: 2019-01-23
Payer: MEDICARE

## 2019-01-24 ENCOUNTER — OFFICE VISIT (OUTPATIENT)
Dept: PHYSICAL MEDICINE AND REHAB | Facility: MEDICAL CENTER | Age: 74
End: 2019-01-24
Payer: MEDICARE

## 2019-01-24 VITALS
DIASTOLIC BLOOD PRESSURE: 74 MMHG | OXYGEN SATURATION: 98 % | TEMPERATURE: 97.2 F | HEIGHT: 68 IN | SYSTOLIC BLOOD PRESSURE: 118 MMHG | WEIGHT: 210 LBS | HEART RATE: 106 BPM | BODY MASS INDEX: 31.83 KG/M2

## 2019-01-24 DIAGNOSIS — M51.36 DDD (DEGENERATIVE DISC DISEASE), LUMBAR: ICD-10-CM

## 2019-01-24 DIAGNOSIS — R26.9 IMPAIRED GAIT: ICD-10-CM

## 2019-01-24 DIAGNOSIS — M54.50 LUMBOSACRAL PAIN: ICD-10-CM

## 2019-01-24 DIAGNOSIS — M25.559 ARTHRALGIA OF HIP, UNSPECIFIED LATERALITY: ICD-10-CM

## 2019-01-24 DIAGNOSIS — M54.16 LUMBAR RADICULITIS: ICD-10-CM

## 2019-01-24 DIAGNOSIS — Z98.890 HISTORY OF LUMBAR SURGERY: ICD-10-CM

## 2019-01-24 DIAGNOSIS — G96.198 PSEUDOMENINGOCELE: ICD-10-CM

## 2019-01-24 DIAGNOSIS — M79.18 MYOFASCIAL PAIN: ICD-10-CM

## 2019-01-24 DIAGNOSIS — M79.2 NERVE PAIN: ICD-10-CM

## 2019-01-24 DIAGNOSIS — G03.9 ARACHNOIDITIS: ICD-10-CM

## 2019-01-24 PROCEDURE — 99214 OFFICE O/P EST MOD 30 MIN: CPT | Performed by: PHYSICAL MEDICINE & REHABILITATION

## 2019-01-24 ASSESSMENT — ENCOUNTER SYMPTOMS
BACK PAIN: 1
SENSORY CHANGE: 1
FEVER: 0
CLAUDICATION: 0
SPUTUM PRODUCTION: 0
EYE PAIN: 0
CHILLS: 0
SHORTNESS OF BREATH: 0
NAUSEA: 0
ORTHOPNEA: 0
MYALGIAS: 1
TINGLING: 1

## 2019-01-24 ASSESSMENT — PATIENT HEALTH QUESTIONNAIRE - PHQ9
5. POOR APPETITE OR OVEREATING: 2 - MORE THAN HALF THE DAYS
CLINICAL INTERPRETATION OF PHQ2 SCORE: 2
SUM OF ALL RESPONSES TO PHQ QUESTIONS 1-9: 8

## 2019-01-24 NOTE — PROGRESS NOTES
Subjective:      Angelo Magaña is a 73 y.o. male who presents with New Patient    Chief complaint: Low back pain, leg pain      HPI The patient presents for evaluation of ongoing low back and lower limb pain.  The patient has a complicated spine and pain history, has undergone multiple lumbar spine surgeries.    Regarding today's visit:    The patient notes ongoing pain in the lumbosacral region, left greater than right, also notes radiating pain to the left posterior pelvic, posterior thigh with neuropathic component, worse with activities, limiting sitting, standing, and walking tolerance.  The patient underwent lumbar spine surgery most recently in 11/2018 per Dr. Fowler, records reviewed.  The patient had SNF stay postoperatively.  He had an increase in postoperative pain, had hospital admission in 12/2018, including updated imaging, records reviewed, recommending continued nonsurgical management.     The patient notes left hip area pain, relatively controlled.  He is undergone prior right total hip arthroplasty.    The patient notes intermittent left knee area pain, relatively controlled, has undergone prior left total knee arthroplasty.    The patient notes only intermittent mid back pain, controlled.    The patient has had prior treatment with medications.  He has been to physical therapy.  He is tried injections preoperatively.  No acute changes with bowel/bladder noted.  No acute changes with strength noted.  He is making an effort with home exercise program.  The ongoing pain limits his ability to function.  He is inquiring about additional treatment options, also inquiring about second opinion spine surgery consultation    The patient's daughter was present for the evaluation today.      MEDICAL RECORDS REVIEW/DATA REVIEW: Reviewed in epic.    Records Reviewed: Reviewed referring provider notes.     I reviewed medications.     I reviewed  profile 1/24/2019    I reviewed diagnostic studies:     I  reviewed radiographs.    Reviewed CT lumbar spine 12/2018, I reviewed images and report, showed postoperative changes, dorsal fluid collection communicating with thecal sac, degenerative disc disease, spondylosis    Reviewed MRI lumbar spine 12/2018, I reviewed images and report, showed postoperative changes, multilevel degenerative changes, findings suggestive of arachnoiditis, postoperative fluid collection    Reviewed lumbar spine x-rays 12/2018     Reviewed left hip x-rays 12/2018, showed mild hip arthritis, status post right total hip arthroplasty without complication noted    Reviewed head CT 11/2018.    I reviewed lab studies.  Reviewed labs 12/1018, including CMP, CBC    I reviewed medical issues.     I reviewed family history: No neuromuscular disorders noted.    I reviewed social issues.  Retired, prior RT      PAST MEDICAL HISTORY:   Past Medical History:   Diagnosis Date   • ASTHMA     scheduled inhaler use   • Bronchitis 02/2018   • Erectile dysfunction 6/8/2016   • GERD (gastroesophageal reflux disease) 6/8/2016   • Heart burn    • History of skin cancer 6/8/2016   • Hyperlipidemia 6/8/2016   • Hypertension    • Hypothyroidism 6/8/2016   • Indigestion    • Obesity 6/8/2016   • Pain 04/19/2018    chronic back pain, 0/10   • Preventative health care 6/8/2016   • Rosacea 6/8/2016   • Seasonal allergies 6/8/2016       PAST SURGICAL HISTORY:    Past Surgical History:   Procedure Laterality Date   • LUMBAR FUSION O-ARM N/A 11/19/2018    Procedure: LUMBAR FUSION O-ARM-  POSTERIOR L2-3 TLIF,;  Surgeon: Brandon Fowler M.D.;  Location: [facility];  Service: Neurosurgery   • LUMBAR LAMINECTOMY DISKECTOMY N/A 11/19/2018    Procedure: LUMBAR LAMINECTOMY DISKECTOMY-  L2-3 LAMI;  Surgeon: Brandon Fowler M.D.;  Location: SURGERY [facility];  Service: Neurosurgery   • HARDWARE REMOVAL NEURO N/A 11/19/2018    Procedure: HARDWARE REMOVAL NEURO-  L4-5, L3 SCREW REPOSITIONING;  Surgeon: Brandon Fowler  M.D.;  Location: SURGERY [facility];  Service: Neurosurgery   • KYPHOPLASTY N/A 11/19/2018    Procedure: KYPHOPLASTY-  L2;  Surgeon: Brandon Fowler M.D.;  Location: SURGERY [facility];  Service: Neurosurgery   • VENTRAL HERNIA REPAIR Left 5/3/2018    Procedure: VENTRAL HERNIA REPAIR FOR LUMBAR HERNIA/ LUNG HERNIA THROUGH CHEST WALL RECONSTRUCTION, MAJOR  ;  Surgeon: Phoenix Becker M.D.;  Location: SURGERY [facility];  Service: General   • IRRIGATION & DEBRIDEMENT NEURO  3/30/2017    Procedure: IRRIGATION & DEBRIDEMENT NEURO-LUMBAR WOUND;  Surgeon: Josiah Chakraborty M.D.;  Location: SURGERY [facility];  Service:    • LUMBAR FUSION POSTERIOR N/A 3/17/2017    Procedure: LUMBAR FUSION POSTERIOR L3-4 EXTENSION;  Surgeon: Josiah Chakraborty M.D.;  Location: SURGERY [facility];  Service:    • LUMBAR LAMINECTOMY DISKECTOMY N/A 3/17/2017    Procedure: LUMBAR LAMINECTOMY DISKECTOMY L3 & REDO L4;  Surgeon: Josiah Chakraborty M.D.;  Location: SURGERY [facility];  Service:    • HARDWARE REMOVAL NEURO N/A 3/17/2017    Procedure: HARDWARE REMOVAL NEURO L4-5;  Surgeon: Josiah Chakraborty M.D.;  Location: SURGERY [facility];  Service:    • LUMBAR FUSION POSTERIOR  9/9/2009    Performed by JOSIAH CHAKRABORTY at SURGERY [facility]   • LUMBAR LAMINECTOMY DISKECTOMY  9/9/2009    Performed by JOSIAH CHAKRABORTY at SURGERY [facility]   • KNEE ARTHROPLASTY TOTAL  5/26/2009    Performed by NAREN DANIELSON at SURGERY [facility]   • HIP ARTHROPLASTY TOTAL  1/21/2009    Performed by NAREN DANIELSON at SURGERY [facility]   • INGUINAL HERNIA REPAIR  9/2/08    Performed by PHOENIX BECKER at SURGERY SAME DAY [facility]   • INGUINAL HERNIA REPAIR  6/10/08    Performed by PHOENIX BECKER at SURGERY SAME DAY [facility]   • HERNIA REPAIR  2008    HERNIA    • ROMAN BY LAPAROSCOPY  1982   • APPENDECTOMY  1953   • TONSILLECTOMY  1951   • OTHER      DISCECTOMY WITH HARWWARE   • OTHER      GASTRIC BYPASS AT 30 YEARS        ALLERGIES:  Patient has no known allergies.    MEDICATIONS:    Outpatient Encounter Prescriptions as of 1/24/2019   Medication Sig Dispense Refill   • tramadol (ULTRAM) 50 MG Tab Take  mg by mouth every 6 hours as needed.     • acetaminophen (TYLENOL) 500 MG Tab Take 2 Tabs by mouth 3 times a day.     • fluticasone-salmeterol (ADVAIR) 100-50 MCG/DOSE AEROSOL POWDER, BREATH ACTIVATED Inhale 1 Puff by mouth every morning.     • omeprazole (PRILOSEC) 20 MG delayed-release capsule Take 20 mg by mouth every morning.     • tizanidine (ZANAFLEX) 4 MG Tab Take 1 Tab by mouth every 6 hours as needed. 60 Tab 0   • levothyroxine (SYNTHROID) 50 MCG Tab TAKE 1 TABLET BY MOUTH EVERY DAY 90 Tab 1   • cetirizine (ZYRTEC) 10 MG Tab Take 10 mg by mouth every day.     • Naloxone HCl 4 MG/0.1ML Liquid Spray 4 mg in nose Once PRN for up to 1 dose. 2 Package 1   • pregabalin (LYRICA) 50 MG capsule Take 50 mg by mouth 3 times a day.     • ferrous sulfate 325 (65 Fe) MG tablet Take 1 Tab by mouth every Monday, Wednesday, and Friday. (Patient not taking: Reported on 1/24/2019) 30 Tab 1   • polyethylene glycol/lytes (MIRALAX) Pack Take 1 Packet by mouth every day. (Patient not taking: Reported on 1/24/2019) 15 Each 0   • tamsulosin (FLOMAX) 0.4 MG capsule Take 1 Cap by mouth ONE-HALF HOUR AFTER BREAKFAST. (Patient not taking: Reported on 1/24/2019) 30 Cap 0   • atorvastatin (LIPITOR) 40 MG Tab Take 40 mg by mouth every evening.     • therapeutic multivitamin-minerals (THERAGRAN-M) Tab Take 1 Tab by mouth every day.     • Cholecalciferol (VITAMIN D3) 3000 units Tab Take 3,000 Units by mouth every day.       No facility-administered encounter medications on file as of 1/24/2019.        SOCIAL HISTORY:    Social History     Social History   • Marital status:      Spouse name: N/A   • Number of children: N/A   • Years of education: N/A     Social History Main Topics   • Smoking status: Never Smoker   • Smokeless tobacco: Never  "Used   • Alcohol use Yes      Comment: 1/week    • Drug use: No      Comment: marijuana edibles (for pain control)   • Sexual activity: Not on file     Other Topics Concern   •  Service No   • Blood Transfusions No   • Caffeine Concern No   • Occupational Exposure No   • Hobby Hazards No   • Sleep Concern No   • Stress Concern No   • Weight Concern Yes   • Special Diet No   • Back Care Yes   • Exercise Yes   • Bike Helmet No     does not ride bike    • Seat Belt Yes   • Self-Exams No     Social History Narrative   • No narrative on file       Review of Systems   Constitutional: Negative for chills and fever.   HENT: Negative for ear pain and tinnitus.    Eyes: Negative for pain.   Respiratory: Negative for sputum production and shortness of breath.    Cardiovascular: Negative for orthopnea and claudication.   Gastrointestinal: Negative for nausea.   Genitourinary: Negative for frequency and urgency.   Musculoskeletal: Positive for back pain, joint pain and myalgias.   Skin: Negative.    Neurological: Positive for tingling and sensory change.   All other systems reviewed and are negative.        Objective:     /74 (BP Location: Left arm, Patient Position: Sitting, BP Cuff Size: Adult)   Pulse (!) 106   Temp 36.2 °C (97.2 °F) (Temporal)   Ht 1.727 m (5' 8\")   Wt 95.3 kg (210 lb)   SpO2 98%   BMI 31.93 kg/m²      Physical Exam  Constitutional: Awake, alert, pain with transition  HEENT: Normocephalic atraumatic, neck supple, no JVD noted,  no meningeal signs noted  Lymphadenopathy: no cervical, supraclavicular, or inguinal lymphadenopathy noted  Cardiovascular: Intact distal pulses, including ankles, no limb swelling noted  Pulmonary: No tachypnea noted, no accessory muscle use noted, no dyspnea noted  Abdominal: Soft, nontender, exhibits no distension, no peritoneal signs, no HSM  Musculoskeletal:   Thoracic: Minimal tenderness palpation, minimal pain with range of motion test  Lumbar: Healed scar, " tender with palpation bilateral lumbosacral region, pain with range of motion testing, trigger points noted, straight leg testing produces posterior pelvic and thigh pain on the left  Neurological: oriented. Cranial nerves grossly intact, decreased strength with testing about left hip due to pain, reflexes 1-2+ in lower limbs, gait antalgic, wide based, reciprocal, with use of walker, slow, forward bent posture  Skin: Skin is intact. no rashes or lesions noted  Psychiatric: normal mood and affect. speech is normal and behavior is normal.        Assessment/Plan:       ASSESSMENT:    1.  Persistent lumbosacral pain, myofascial pain, lumbar radicular pain, nerve pain, history of multiple lumbar spine surgeries, most recently in 11/2018 by Dr. 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  Left hip pain, sprain strain, mild arthritis; history of right total hip arthroplasty    3.  History of left total knee arthroplasty, relatively symptomatically controlled    4.  Comorbid medical issues, with care per primary care provider      DISCUSSION/PLAN:    - I discussed management options. I reviewed symptomatic care    - I would like to obtain/review additional records    - I reviewed home exercise program, with medical precautions.  Lumbar spine activity/restrictions per spine surgeon    - The patient can consider complementary trial withTENS unit    - I reviewed medication monitoring.  Pain/symptomatic medications per primary care provider.  The patient can consider trial with Lidoderm patch or over-the-counter equivalent, if no contraindication. I reviewed further symptomatic medications.  I did not prescribe any medications today    - I reviewed additional diagnostic options, including further/advanced  imaging, electrodiagnostic testing, vascular studies, and further lab screen    - I reviewed additional therapeutic options, including injection/interventional therapy and additional consultative input    - I reviewed psychosocial interventions    - Return after the second opinion spine surgery consultation or an as-needed basis      Please note that this dictation was created using voice recognition software. I have made every reasonable attempt to correct obvious errors but there may be errors of grammar and content that I may have overlooked prior to finalization of this note.

## 2019-01-28 ENCOUNTER — PATIENT OUTREACH (OUTPATIENT)
Dept: HEALTH INFORMATION MANAGEMENT | Facility: OTHER | Age: 74
End: 2019-01-28

## 2019-01-28 NOTE — PROGRESS NOTES
Transitional Care Navigator:    Situation    2nd post SNF follow up call.  Pt has an increased level of pain since his return home; appointments pending.   Background       Pt received therapy at [facility] related to exacerbation of chronic back pain after surgical intervention.He has not been able to work with home health due to his high level of pain.   Assessment    Pt states that he has an appointment pending with his orthopedic surgeon, Dr. Giang.   Recommendation TCN will continue to follow.

## 2019-02-04 ENCOUNTER — PATIENT OUTREACH (OUTPATIENT)
Dept: HEALTH INFORMATION MANAGEMENT | Facility: OTHER | Age: 74
End: 2019-02-04

## 2019-02-04 NOTE — PROGRESS NOTES
Transitional Care Navigator:    Situation    Transitional Care Navigator:     3rd 30 day post SNF discharge call.  Pt continues to have significant pain, although issue with hip has been r/o. Waiting for pain specialist appt. For injections in an attempt to alleviate his constant pain.    Background       Pt has extensive history of back pain and surgeries.  Recently received therapies at [facility] as treatment for pain.   Assessment    Pt will follow up with pain specialist at the recommendation of ortho.   Recommendation TCN will continue to follow patient; he remains interested but undecided about enrolling into the [facility] program.

## 2019-02-14 ENCOUNTER — PATIENT OUTREACH (OUTPATIENT)
Dept: HEALTH INFORMATION MANAGEMENT | Facility: OTHER | Age: 74
End: 2019-02-14

## 2019-02-14 DIAGNOSIS — F11.20 NARCOTIC DEPENDENCE (HCC): ICD-10-CM

## 2019-02-14 DIAGNOSIS — Z71.89 COMPLEX CARE COORDINATION: ICD-10-CM

## 2019-02-14 DIAGNOSIS — G89.29 CHRONIC BACK PAIN GREATER THAN 3 MONTHS DURATION: ICD-10-CM

## 2019-02-14 DIAGNOSIS — E66.09 OBESITY DUE TO EXCESS CALORIES WITHOUT SERIOUS COMORBIDITY, UNSPECIFIED CLASSIFICATION: ICD-10-CM

## 2019-02-14 DIAGNOSIS — J44.9 CHRONIC OBSTRUCTIVE PULMONARY DISEASE, UNSPECIFIED COPD TYPE (HCC): ICD-10-CM

## 2019-02-14 DIAGNOSIS — M54.9 CHRONIC BACK PAIN GREATER THAN 3 MONTHS DURATION: ICD-10-CM

## 2019-02-14 DIAGNOSIS — I10 HYPERTENSION, UNSPECIFIED TYPE: ICD-10-CM

## 2019-02-14 NOTE — PROGRESS NOTES
"Transitional Care Navigator:    Situation    30-day post SNF discharge follow up call.  Pt continues to c/o pain; states that he had an injection this past Monday, but could not recall who gave it to him or where he went for it. States that the day of the injection, he felt great,however, in the days since, his pain has returned.   Background       Pt has a hx of chronic pain, with multiple back surgeries. Discharged from [facility] 1/16/19 after admission for pain management.    Assessment    Today the patient indicated that \"someone, an Alyssa I think\" spoke to him about aqua therapy, and that he would like to see about that as an alternative to his high level of narcotic use. He states that he is concerned about how many narcotics he takes, and that his daughter is concerned as well.    In following up with Dr. Fowler's office (neurosurgery), the source of potential aqua therapy has been found.  Dr. Fowler's office indicated that Mr. Magaña is due for a follow up appt. This information was relayed back to Mr. Magaña, who will discuss it with his daughter.   Recommendation The patient agreed to enrollment into the [facility] program to help manage his medical care, and especially his medications. He indicated agreement to meet with  a pharmacist when his daughter is available, to review his medication use.   TCN will follow up with the patient on Tuesday, after he has spoken with his daughter. TCN will also refer to [facility]  Program.       "

## 2019-02-15 ENCOUNTER — PATIENT OUTREACH (OUTPATIENT)
Dept: HEALTH INFORMATION MANAGEMENT | Facility: OTHER | Age: 74
End: 2019-02-15

## 2019-02-15 NOTE — PROGRESS NOTES
Chart review completed. Patient was referred by TCN for medication management. Scheduled for outreach call.

## 2019-02-16 NOTE — PROGRESS NOTES
Received referral from DEANDRA Holly for medication review. Outbound call to Angelo. Patient reports pain from recent back surgery. He states he is now following with Dr. Brooke and has pain contract set up. He states his daughter is assisting with medications and which to take and which not to take but he would like to review. Patient reports difficulty with transportation and cannot make it to [place] very often. He states he was considered for home health and his TCN is helping to coordinate. Patient declined virtual visit. He agreed to telephonic medication review scheduled for 2/20/19 at 1 pm.    Josiane Pineda, KimD

## 2019-02-20 ENCOUNTER — PATIENT OUTREACH (OUTPATIENT)
Dept: HEALTH INFORMATION MANAGEMENT | Facility: OTHER | Age: 74
End: 2019-02-20

## 2019-02-20 NOTE — PROGRESS NOTES
Outbound call to Angelo for scheduled telephonic medication review. Left VM with my contact information and request for return call. Will attempt to reach patient another date/ time.     Josiane Pineda, KimD

## 2019-02-22 ENCOUNTER — PATIENT OUTREACH (OUTPATIENT)
Dept: HEALTH INFORMATION MANAGEMENT | Facility: OTHER | Age: 74
End: 2019-02-22

## 2019-02-22 NOTE — PROGRESS NOTES
2nd attempt to reach patient for medication review. Patient states he is not having a good day. He continues to be in pain. He requested return call on 2/25/19 at 10 am.     Josiane Pineda, KimD

## 2019-02-25 ENCOUNTER — PATIENT OUTREACH (OUTPATIENT)
Dept: HEALTH INFORMATION MANAGEMENT | Facility: OTHER | Age: 74
End: 2019-02-25

## 2019-02-25 NOTE — PROGRESS NOTES
Outbound call to Angelo for scheduled medication review. Left VM with my contact information and request for return call.     Josiane Pineda, KimD

## 2019-03-07 ENCOUNTER — PATIENT OUTREACH (OUTPATIENT)
Dept: HEALTH INFORMATION MANAGEMENT | Facility: OTHER | Age: 74
End: 2019-03-07

## 2019-03-08 NOTE — PROGRESS NOTES
Outreach call to patient on CCM for possible enrollment in program. Left VM to call back. Will attempt to call at a later time and date

## 2019-03-18 ENCOUNTER — PATIENT OUTREACH (OUTPATIENT)
Dept: HEALTH INFORMATION MANAGEMENT | Facility: OTHER | Age: 74
End: 2019-03-18

## 2019-04-12 ENCOUNTER — HOSPITAL ENCOUNTER (OUTPATIENT)
Dept: LAB | Facility: MEDICAL CENTER | Age: 74
End: 2019-04-12
Attending: INTERNAL MEDICINE
Payer: MEDICARE

## 2019-04-12 DIAGNOSIS — E03.9 ACQUIRED HYPOTHYROIDISM: ICD-10-CM

## 2019-04-12 DIAGNOSIS — D64.9 ANEMIA, UNSPECIFIED TYPE: ICD-10-CM

## 2019-04-12 DIAGNOSIS — R74.8 ELEVATED ALKALINE PHOSPHATASE LEVEL: ICD-10-CM

## 2019-04-12 LAB
ALBUMIN SERPL BCP-MCNC: 4.2 G/DL (ref 3.2–4.9)
ALBUMIN/GLOB SERPL: 1.9 G/DL
ALP SERPL-CCNC: 66 U/L (ref 30–99)
ALT SERPL-CCNC: 11 U/L (ref 2–50)
ANION GAP SERPL CALC-SCNC: 8 MMOL/L (ref 0–11.9)
AST SERPL-CCNC: 18 U/L (ref 12–45)
BILIRUB SERPL-MCNC: 0.7 MG/DL (ref 0.1–1.5)
BUN SERPL-MCNC: 8 MG/DL (ref 8–22)
CALCIUM SERPL-MCNC: 9.7 MG/DL (ref 8.5–10.5)
CHLORIDE SERPL-SCNC: 108 MMOL/L (ref 96–112)
CO2 SERPL-SCNC: 26 MMOL/L (ref 20–33)
CREAT SERPL-MCNC: 0.77 MG/DL (ref 0.5–1.4)
ERYTHROCYTE [DISTWIDTH] IN BLOOD BY AUTOMATED COUNT: 52.6 FL (ref 35.9–50)
FASTING STATUS PATIENT QL REPORTED: NORMAL
GLOBULIN SER CALC-MCNC: 2.2 G/DL (ref 1.9–3.5)
GLUCOSE SERPL-MCNC: 85 MG/DL (ref 65–99)
HCT VFR BLD AUTO: 41.5 % (ref 42–52)
HGB BLD-MCNC: 13.2 G/DL (ref 14–18)
MCH RBC QN AUTO: 29.5 PG (ref 27–33)
MCHC RBC AUTO-ENTMCNC: 31.8 G/DL (ref 33.7–35.3)
MCV RBC AUTO: 92.6 FL (ref 81.4–97.8)
PLATELET # BLD AUTO: 220 K/UL (ref 164–446)
PMV BLD AUTO: 10 FL (ref 9–12.9)
POTASSIUM SERPL-SCNC: 4.4 MMOL/L (ref 3.6–5.5)
PROT SERPL-MCNC: 6.4 G/DL (ref 6–8.2)
RBC # BLD AUTO: 4.48 M/UL (ref 4.7–6.1)
SODIUM SERPL-SCNC: 142 MMOL/L (ref 135–145)
TSH SERPL DL<=0.005 MIU/L-ACNC: 5.16 UIU/ML (ref 0.38–5.33)
WBC # BLD AUTO: 4.2 K/UL (ref 4.8–10.8)

## 2019-04-12 PROCEDURE — 85027 COMPLETE CBC AUTOMATED: CPT

## 2019-04-12 PROCEDURE — 80053 COMPREHEN METABOLIC PANEL: CPT

## 2019-04-12 PROCEDURE — 84443 ASSAY THYROID STIM HORMONE: CPT

## 2019-04-12 PROCEDURE — 36415 COLL VENOUS BLD VENIPUNCTURE: CPT

## 2019-04-15 RX ORDER — LISINOPRIL 10 MG/1
TABLET ORAL
Qty: 30 TAB | Refills: 0 | Status: SHIPPED | OUTPATIENT
Start: 2019-04-15 | End: 2019-05-14

## 2019-04-15 NOTE — TELEPHONE ENCOUNTER
Last seen: 01/17/19 by Dr. Samson  Next appt: 05/14/19 with Dr. Samson    Was the patient seen in the last year in this department? Yes   Does patient have an active prescription for medications requested? No   Received Request Via: Pharmacy

## 2019-05-14 ENCOUNTER — OFFICE VISIT (OUTPATIENT)
Dept: INTERNAL MEDICINE | Facility: MEDICAL CENTER | Age: 74
End: 2019-05-14
Payer: MEDICARE

## 2019-05-14 VITALS
TEMPERATURE: 98.7 F | HEIGHT: 67 IN | WEIGHT: 208.13 LBS | DIASTOLIC BLOOD PRESSURE: 82 MMHG | OXYGEN SATURATION: 99 % | HEART RATE: 68 BPM | BODY MASS INDEX: 32.67 KG/M2 | SYSTOLIC BLOOD PRESSURE: 118 MMHG

## 2019-05-14 DIAGNOSIS — Z63.6 CAREGIVER BURDEN: ICD-10-CM

## 2019-05-14 DIAGNOSIS — M54.16 LUMBAR BACK PAIN WITH RADICULOPATHY AFFECTING LEFT LOWER EXTREMITY: ICD-10-CM

## 2019-05-14 DIAGNOSIS — D64.9 ANEMIA, UNSPECIFIED TYPE: ICD-10-CM

## 2019-05-14 DIAGNOSIS — E03.9 ACQUIRED HYPOTHYROIDISM: ICD-10-CM

## 2019-05-14 PROBLEM — M70.62 GREATER TROCHANTERIC BURSITIS OF LEFT HIP: Status: RESOLVED | Noted: 2018-12-23 | Resolved: 2019-05-14

## 2019-05-14 PROCEDURE — 99214 OFFICE O/P EST MOD 30 MIN: CPT | Performed by: INTERNAL MEDICINE

## 2019-05-14 RX ORDER — GABAPENTIN 300 MG/1
300 CAPSULE ORAL 4 TIMES DAILY
Refills: 5 | COMMUNITY
Start: 2019-05-09 | End: 2023-04-04

## 2019-05-14 RX ORDER — HYDROCODONE BITARTRATE AND ACETAMINOPHEN 5; 325 MG/1; MG/1
TABLET ORAL
Refills: 0 | COMMUNITY
Start: 2019-04-25 | End: 2019-12-29

## 2019-05-14 SDOH — SOCIAL STABILITY - SOCIAL INSECURITY: DEPENDENT RELATIVE NEEDING CARE AT HOME: Z63.6

## 2019-05-14 NOTE — PATIENT INSTRUCTIONS
Start taking the levothyroxine 50 mcg daily.   Start taking the tamsulosin 0.4 mg daily.    Go get blood work in six weeks to check your thyroid, iron stores and blood counts.     Go the pharmacy and get on a wait list for the Shingles vaccine.     You also need to get a tetanus vaccine. Ask the pharmacy to give you the TDaP or Td, whichever your insurance will cover.

## 2019-05-14 NOTE — PROGRESS NOTES
Geriatric Clinic Note  Patient: Angelo Magaña  Age: 74 y.o.   Gender: male  Author: Jeovany Samson M.D.  PCP: Jeovany Samson M.D.    Today's date: 05/14/19  Chief Complaint   Patient presents with   • Medication Management     To go over medications   • Gastrophageal Reflux       HPI:  History obtained from patient, medical chart and family (wife, present, but provided minimal history)    Coming for general follow up.  Would like a medication reconciliation as this medication management currently is much different than her current list he was taking prior to his surgery.  PMH of back pain, had an exacerbation iin the fall and signed up for repeat surgery. After surgery pain significantly worsened. Patient saw pain management and had a trial of a TENS device which was very beneficial.  The patient says that his use of opiates was cut in half.  He says he is currently taking 3 oxycodone per day.  Denies any opiate-induced constipation well.  Says he is also urinating well.  He lost 55 pounds, but recent gain of 15 pounds since his surgery.  His wife continues to help with the IADLs of cooking and cleaning and he continues to get assistance from his daughter.  He is not doing much better than he was a few months ago and is looking for getting the TENS device from placement few weeks.    Not taking lisinopirl, iron, doxy, lipitor, tamsulosin, flucisone, or levothyroxine.    The patient endorses frequent urination with feelings of incomplete voiding.  Denies any lightheadedness with standing.  He is currently using a walker for ambulation.  He denies any falls.    Patient is aware that he will need to continue primary care physician that will longer be able clinic appointments.    Assessment: 74-year-old male with history of chronic back pain status post multiple back surgeries here for general follow-up.    Plan:  Chronic back pain  -had a surgery with Dr. Fowler 11/2018.  This was his fifth back surgery.  Was  readmitted with intractable pack pain 12/2018 and discharged to SNF.  -saw me after d/c with daughter and we discussed pain management plan.  See note  -patient now seeing pain management and had good relief from temporary TENS unit, now planning on implantation.   -continues on pain management with opiates (hydrocodone and tramadol) and tizandine. Patient watching for constipation. Not currently an issue.  Patient is using pain management to maximize functionality.   -continues on gabapentin as well.   -recommended minimal use of NSAID that patient is currently using for pain control. Recommend to continue PPI while taking NSAID consistently.  -patient counseled on max APAP use given use of hydrocodone, but patient remarked he is not taking it currently.     BPH  -requested patient restart 0.4 mg tamsulosin given current symptoms    HTN  -requested patient stop lisinopril given normal BP. Likley due to weight loss.     Hypothyroid  -request patient restart L4. Recent TSH normal, however had been off and previous TSH were elevated. Repeat TSh and T4 in six weeks.     Anemia  -f/u with CBC, ferritin, hold TIW iron for now.     Asthma - continue on home inhalers.     Caregiver fatigue  Patient provides care and is codependent on his wife who has moderate dementia.-Her functional ability is quite good and his cognitive status continues to be fair.    Health Maintenance:  Immunizations:   Immunization History   Administered Date(s) Administered   • Influenza Seasonal Injectable 10/01/2016   • Influenza TIV (IM) 10/22/2012, 10/01/2016, 09/18/2018   • Influenza Vaccine Pediatric Split 10/01/2016   • Pneumococcal Conjugate Vaccine (Prevnar/PCV-13) 10/01/2010, 12/24/2014, 10/01/2015   • Pneumococcal polysaccharide vaccine (PPSV-23) 09/26/2018   • Tuberculin Skin Test 01/03/2011, 02/06/2012, 02/13/2012, 01/14/2013     Return in about 6 months (around 11/14/2019) for Long.     ROS:  A 14 point ROS is negative, other than as  stated above.     Past Medical History:   Diagnosis Date   • ASTHMA     scheduled inhaler use   • Bronchitis 02/2018   • Erectile dysfunction 6/8/2016   • GERD (gastroesophageal reflux disease) 6/8/2016   • Heart burn    • History of skin cancer 6/8/2016   • Hyperlipidemia 6/8/2016   • Hypertension    • Hypothyroidism 6/8/2016   • Indigestion    • Obesity 6/8/2016   • Pain 04/19/2018    chronic back pain, 0/10   • Preventative health care 6/8/2016   • Rosacea 6/8/2016   • Seasonal allergies 6/8/2016     Social History     Social History   • Marital status:      Spouse name: N/A   • Number of children: N/A   • Years of education: N/A     Occupational History   • Not on file.     Social History Main Topics   • Smoking status: Never Smoker   • Smokeless tobacco: Never Used   • Alcohol use Yes      Comment: 1/week    • Drug use: No      Comment: marijuana edibles (for pain control)   • Sexual activity: Not on file     Other Topics Concern   •  Service No   • Blood Transfusions No   • Caffeine Concern No   • Occupational Exposure No   • Hobby Hazards No   • Sleep Concern No   • Stress Concern No   • Weight Concern Yes   • Special Diet No   • Back Care Yes   • Exercise Yes   • Bike Helmet No     does not ride bike    • Seat Belt Yes   • Self-Exams No     Social History Narrative   • No narrative on file     Family History   Problem Relation Age of Onset   • Hypertension Mother    • Dementia Mother    • Hypertension Father    • Lung Disease Father         asthma     Allergies: Patient has no known allergies.  Current Outpatient Prescriptions on File Prior to Visit   Medication Sig Dispense Refill   • ADVAIR DISKUS 100-50 MCG/DOSE AEROSOL POWDER, BREATH ACTIVATED INHALE 1 PUFF BY MOUTH TWICE A DAY *RINSE MOUTH AFTER USE* 3 Inhaler 0   • acetaminophen (TYLENOL) 500 MG Tab Take 2 Tabs by mouth 3 times a day.     • omeprazole (PRILOSEC) 20 MG delayed-release capsule Take 20 mg by mouth every morning.     •  "tizanidine (ZANAFLEX) 4 MG Tab Take 1 Tab by mouth every 6 hours as needed. 60 Tab 0   • lisinopril (PRINIVIL) 10 MG Tab TAKE 1 TABLET BY MOUTH EVERY DAY (Patient not taking: Reported on 5/14/2019) 30 Tab 0   • Naloxone HCl 4 MG/0.1ML Liquid Spray 4 mg in nose Once PRN for up to 1 dose. (Patient not taking: Reported on 5/14/2019) 2 Package 1   • tramadol (ULTRAM) 50 MG Tab Take  mg by mouth every 6 hours as needed.     • pregabalin (LYRICA) 50 MG capsule Take 50 mg by mouth 3 times a day.     • ferrous sulfate 325 (65 Fe) MG tablet Take 1 Tab by mouth every Monday, Wednesday, and Friday. (Patient not taking: Reported on 1/24/2019) 30 Tab 1   • fluticasone-salmeterol (ADVAIR) 100-50 MCG/DOSE AEROSOL POWDER, BREATH ACTIVATED Inhale 1 Puff by mouth every morning.     • polyethylene glycol/lytes (MIRALAX) Pack Take 1 Packet by mouth every day. (Patient not taking: Reported on 1/24/2019) 15 Each 0   • tamsulosin (FLOMAX) 0.4 MG capsule Take 1 Cap by mouth ONE-HALF HOUR AFTER BREAKFAST. (Patient not taking: Reported on 1/24/2019) 30 Cap 0   • atorvastatin (LIPITOR) 40 MG Tab Take 40 mg by mouth every evening.     • levothyroxine (SYNTHROID) 50 MCG Tab TAKE 1 TABLET BY MOUTH EVERY DAY (Patient not taking: Reported on 5/14/2019) 90 Tab 1   • therapeutic multivitamin-minerals (THERAGRAN-M) Tab Take 1 Tab by mouth every day.     • Cholecalciferol (VITAMIN D3) 3000 units Tab Take 3,000 Units by mouth every day.     • cetirizine (ZYRTEC) 10 MG Tab Take 10 mg by mouth every day.       No current facility-administered medications on file prior to visit.        Physical Exam:  /82 (BP Location: Left arm, Patient Position: Sitting, BP Cuff Size: Adult)   Pulse 68   Temp 37.1 °C (98.7 °F) (Temporal)   Ht 1.695 m (5' 6.75\")   Wt 94.4 kg (208 lb 2 oz)   SpO2 99%   BMI 32.84 kg/m² Body mass index is 32.84 kg/m².    Gen: NAD, alert, noted weight loss, increase in lose skin noted around thighs.   HEENT:neck " supple  CV:RRR 2+ radial pulses bialterally  Lungs:CAB, normal effort  Abd:soft non tender  Ext:arthric chagnes noted to hands bilaterally.   Neuro: walks withFWW  Psych: Does not appear to be responding to internal stimuli    Labs:4/2019 CBC, CMP reviwed      Jeovany Samson M.D.  Geriatric Physician    This note was partially dictated with voice recognition software, for any confusion please do not hesitate to contact me.

## 2019-06-05 ENCOUNTER — HOSPITAL ENCOUNTER (OUTPATIENT)
Dept: LAB | Facility: MEDICAL CENTER | Age: 74
End: 2019-06-05
Attending: ANESTHESIOLOGY
Payer: MEDICARE

## 2019-06-05 ENCOUNTER — HOSPITAL ENCOUNTER (OUTPATIENT)
Dept: RADIOLOGY | Facility: MEDICAL CENTER | Age: 74
End: 2019-06-05
Attending: ANESTHESIOLOGY
Payer: MEDICARE

## 2019-06-05 DIAGNOSIS — Z01.89 TESTS ORDERED: ICD-10-CM

## 2019-06-05 LAB
ANION GAP SERPL CALC-SCNC: 7 MMOL/L (ref 0–11.9)
APPEARANCE UR: CLEAR
APTT PPP: 30.5 SEC (ref 24.7–36)
BASOPHILS # BLD AUTO: 0.4 % (ref 0–1.8)
BASOPHILS # BLD: 0.02 K/UL (ref 0–0.12)
BILIRUB UR QL STRIP.AUTO: NEGATIVE
BUN SERPL-MCNC: 11 MG/DL (ref 8–22)
CALCIUM SERPL-MCNC: 9.8 MG/DL (ref 8.5–10.5)
CHLORIDE SERPL-SCNC: 109 MMOL/L (ref 96–112)
CO2 SERPL-SCNC: 24 MMOL/L (ref 20–33)
COLOR UR: YELLOW
CREAT SERPL-MCNC: 0.86 MG/DL (ref 0.5–1.4)
EOSINOPHIL # BLD AUTO: 0.18 K/UL (ref 0–0.51)
EOSINOPHIL NFR BLD: 4 % (ref 0–6.9)
ERYTHROCYTE [DISTWIDTH] IN BLOOD BY AUTOMATED COUNT: 41.4 FL (ref 35.9–50)
GLUCOSE SERPL-MCNC: 92 MG/DL (ref 65–99)
GLUCOSE UR STRIP.AUTO-MCNC: NEGATIVE MG/DL
HCT VFR BLD AUTO: 40 % (ref 42–52)
HGB BLD-MCNC: 12.7 G/DL (ref 14–18)
IMM GRANULOCYTES # BLD AUTO: 0.01 K/UL (ref 0–0.11)
IMM GRANULOCYTES NFR BLD AUTO: 0.2 % (ref 0–0.9)
INR PPP: 1.03 (ref 0.87–1.13)
KETONES UR STRIP.AUTO-MCNC: 15 MG/DL
LEUKOCYTE ESTERASE UR QL STRIP.AUTO: NEGATIVE
LYMPHOCYTES # BLD AUTO: 1.4 K/UL (ref 1–4.8)
LYMPHOCYTES NFR BLD: 31.5 % (ref 22–41)
MCH RBC QN AUTO: 28.5 PG (ref 27–33)
MCHC RBC AUTO-ENTMCNC: 31.8 G/DL (ref 33.7–35.3)
MCV RBC AUTO: 89.7 FL (ref 81.4–97.8)
MICRO URNS: ABNORMAL
MONOCYTES # BLD AUTO: 0.28 K/UL (ref 0–0.85)
MONOCYTES NFR BLD AUTO: 6.3 % (ref 0–13.4)
NEUTROPHILS # BLD AUTO: 2.56 K/UL (ref 1.82–7.42)
NEUTROPHILS NFR BLD: 57.6 % (ref 44–72)
NITRITE UR QL STRIP.AUTO: NEGATIVE
NRBC # BLD AUTO: 0 K/UL
NRBC BLD-RTO: 0 /100 WBC
PH UR STRIP.AUTO: 6.5 [PH]
PLATELET # BLD AUTO: 221 K/UL (ref 164–446)
PMV BLD AUTO: 10 FL (ref 9–12.9)
POTASSIUM SERPL-SCNC: 4.2 MMOL/L (ref 3.6–5.5)
PROT UR QL STRIP: NEGATIVE MG/DL
PROTHROMBIN TIME: 13.7 SEC (ref 12–14.6)
RBC # BLD AUTO: 4.46 M/UL (ref 4.7–6.1)
RBC UR QL AUTO: NEGATIVE
SODIUM SERPL-SCNC: 140 MMOL/L (ref 135–145)
SP GR UR STRIP.AUTO: 1.02
UROBILINOGEN UR STRIP.AUTO-MCNC: 1 MG/DL
WBC # BLD AUTO: 4.5 K/UL (ref 4.8–10.8)

## 2019-06-05 PROCEDURE — 71046 X-RAY EXAM CHEST 2 VIEWS: CPT

## 2019-06-05 PROCEDURE — 36415 COLL VENOUS BLD VENIPUNCTURE: CPT

## 2019-06-05 PROCEDURE — 80048 BASIC METABOLIC PNL TOTAL CA: CPT

## 2019-06-05 PROCEDURE — 85610 PROTHROMBIN TIME: CPT

## 2019-06-05 PROCEDURE — 85730 THROMBOPLASTIN TIME PARTIAL: CPT

## 2019-06-05 PROCEDURE — 85025 COMPLETE CBC W/AUTO DIFF WBC: CPT

## 2019-06-05 PROCEDURE — 81003 URINALYSIS AUTO W/O SCOPE: CPT

## 2019-06-14 ENCOUNTER — HOSPITAL ENCOUNTER (OUTPATIENT)
Dept: CARDIOLOGY | Facility: MEDICAL CENTER | Age: 74
End: 2019-06-14
Attending: ANESTHESIOLOGY
Payer: MEDICARE

## 2019-06-14 LAB — EKG IMPRESSION: NORMAL

## 2019-06-14 PROCEDURE — 93010 ELECTROCARDIOGRAM REPORT: CPT | Performed by: INTERNAL MEDICINE

## 2019-06-14 PROCEDURE — 93005 ELECTROCARDIOGRAM TRACING: CPT | Performed by: ANESTHESIOLOGY

## 2019-06-24 RX ORDER — TAMSULOSIN HYDROCHLORIDE 0.4 MG/1
CAPSULE ORAL
Qty: 90 CAP | Refills: 0 | Status: SHIPPED | OUTPATIENT
Start: 2019-06-24 | End: 2022-04-06

## 2019-06-24 NOTE — TELEPHONE ENCOUNTER
Last seen: 05/14/19 by Dr. Samson  Next appt: none     Was the patient seen in the last year in this department? Yes   Does patient have an active prescription for medications requested? No   Received Request Via: Pharmacy

## 2019-10-11 ENCOUNTER — TELEPHONE (OUTPATIENT)
Dept: CARDIOLOGY | Facility: MEDICAL CENTER | Age: 74
End: 2019-10-11

## 2019-10-11 NOTE — TELEPHONE ENCOUNTER
Pt. Was last seen in April 2017, si Dr. Jung will have to see him to reassess and determine any future testing.

## 2019-10-11 NOTE — TELEPHONE ENCOUNTER
CHARLY Silva,    Pt's is gong to be scheduling a f/v w/Dr. Jung that is a bit overdo. Per Dr. Jung's last notes he wanted Pt to have a surveillance CT. Pt wants to know if that can be ordered. Please let me know and I will schedule him for f/v and have him call [phone] to schedule CT.    Thank you so much,    Dee

## 2019-10-31 ENCOUNTER — HOSPITAL ENCOUNTER (OUTPATIENT)
Dept: LAB | Facility: MEDICAL CENTER | Age: 74
End: 2019-10-31
Attending: INTERNAL MEDICINE
Payer: MEDICARE

## 2019-10-31 DIAGNOSIS — D64.9 ANEMIA, UNSPECIFIED TYPE: ICD-10-CM

## 2019-10-31 DIAGNOSIS — E03.9 ACQUIRED HYPOTHYROIDISM: ICD-10-CM

## 2019-10-31 LAB
BASOPHILS # BLD AUTO: 0.6 % (ref 0–1.8)
BASOPHILS # BLD: 0.02 K/UL (ref 0–0.12)
EOSINOPHIL # BLD AUTO: 0.11 K/UL (ref 0–0.51)
EOSINOPHIL NFR BLD: 3.1 % (ref 0–6.9)
ERYTHROCYTE [DISTWIDTH] IN BLOOD BY AUTOMATED COUNT: 45.3 FL (ref 35.9–50)
FERRITIN SERPL-MCNC: 10 NG/ML (ref 22–322)
HCT VFR BLD AUTO: 42 % (ref 42–52)
HGB BLD-MCNC: 13 G/DL (ref 14–18)
IMM GRANULOCYTES # BLD AUTO: 0.01 K/UL (ref 0–0.11)
IMM GRANULOCYTES NFR BLD AUTO: 0.3 % (ref 0–0.9)
LYMPHOCYTES # BLD AUTO: 1.04 K/UL (ref 1–4.8)
LYMPHOCYTES NFR BLD: 29.2 % (ref 22–41)
MCH RBC QN AUTO: 27.3 PG (ref 27–33)
MCHC RBC AUTO-ENTMCNC: 31 G/DL (ref 33.7–35.3)
MCV RBC AUTO: 88.2 FL (ref 81.4–97.8)
MONOCYTES # BLD AUTO: 0.28 K/UL (ref 0–0.85)
MONOCYTES NFR BLD AUTO: 7.9 % (ref 0–13.4)
NEUTROPHILS # BLD AUTO: 2.1 K/UL (ref 1.82–7.42)
NEUTROPHILS NFR BLD: 58.9 % (ref 44–72)
NRBC # BLD AUTO: 0 K/UL
NRBC BLD-RTO: 0 /100 WBC
PLATELET # BLD AUTO: 179 K/UL (ref 164–446)
PMV BLD AUTO: 10.6 FL (ref 9–12.9)
RBC # BLD AUTO: 4.76 M/UL (ref 4.7–6.1)
T4 FREE SERPL-MCNC: 0.92 NG/DL (ref 0.53–1.43)
TSH SERPL DL<=0.005 MIU/L-ACNC: 5.68 UIU/ML (ref 0.38–5.33)
WBC # BLD AUTO: 3.6 K/UL (ref 4.8–10.8)

## 2019-10-31 PROCEDURE — 84443 ASSAY THYROID STIM HORMONE: CPT

## 2019-10-31 PROCEDURE — 82728 ASSAY OF FERRITIN: CPT

## 2019-10-31 PROCEDURE — 84439 ASSAY OF FREE THYROXINE: CPT

## 2019-10-31 PROCEDURE — 85025 COMPLETE CBC W/AUTO DIFF WBC: CPT

## 2019-10-31 PROCEDURE — 36415 COLL VENOUS BLD VENIPUNCTURE: CPT

## 2019-11-11 ENCOUNTER — OFFICE VISIT (OUTPATIENT)
Dept: CARDIOLOGY | Facility: MEDICAL CENTER | Age: 74
End: 2019-11-11
Payer: MEDICARE

## 2019-11-11 VITALS
DIASTOLIC BLOOD PRESSURE: 84 MMHG | HEART RATE: 64 BPM | BODY MASS INDEX: 33.04 KG/M2 | OXYGEN SATURATION: 96 % | SYSTOLIC BLOOD PRESSURE: 130 MMHG | WEIGHT: 218 LBS | HEIGHT: 68 IN

## 2019-11-11 DIAGNOSIS — I71.20 THORACIC AORTIC ANEURYSM WITHOUT RUPTURE (HCC): ICD-10-CM

## 2019-11-11 DIAGNOSIS — I10 ESSENTIAL HYPERTENSION: ICD-10-CM

## 2019-11-11 DIAGNOSIS — I25.10 CORONARY ARTERY DISEASE WITHOUT ANGINA PECTORIS, UNSPECIFIED VESSEL OR LESION TYPE, UNSPECIFIED WHETHER NATIVE OR TRANSPLANTED HEART: Primary | ICD-10-CM

## 2019-11-11 DIAGNOSIS — E78.2 MIXED HYPERLIPIDEMIA: ICD-10-CM

## 2019-11-11 LAB — EKG IMPRESSION: NORMAL

## 2019-11-11 PROCEDURE — 99214 OFFICE O/P EST MOD 30 MIN: CPT | Performed by: INTERNAL MEDICINE

## 2019-11-11 PROCEDURE — 93000 ELECTROCARDIOGRAM COMPLETE: CPT | Performed by: INTERNAL MEDICINE

## 2019-11-11 RX ORDER — METOPROLOL SUCCINATE 25 MG/1
12.5 TABLET, EXTENDED RELEASE ORAL DAILY
Qty: 15 TAB | Refills: 11 | Status: SHIPPED | OUTPATIENT
Start: 2019-11-11 | End: 2020-09-15

## 2019-11-11 RX ORDER — ATORVASTATIN CALCIUM 40 MG/1
40 TABLET, FILM COATED ORAL DAILY
Qty: 30 TAB | Refills: 11 | Status: SHIPPED | OUTPATIENT
Start: 2019-11-11 | End: 2020-02-06

## 2019-11-11 NOTE — PROGRESS NOTES
Arrhythmia Clinic Note (Established patient)    DOS: 11/11/2019    Chief complaint/Reason for consult: F/u TAA    Interval History:  Patient is a 73 yo M. History of TAA. Saw us back in 2017. Size 4.0 then. I started him on atorvastatin/BB. Since then he was lost to follow-up. Course since then mostly dominated by back pain issues resulting in multiple surgeries and pain stimulator. Not sure if he is taking his statin anymore. Also with history of significant coronary calcium. No cardiac complaints today.    ROS (+ highlighted in red):  Constitutional: Fevers/chills/fatigue/weightloss  HEENT: Blurry vision/eye pain/sore throat/hearing loss  Respiratory: Shortness of breath/cough  Cardiovascular: Chest pain/palpitations/edema/orthopnea/syncope  GI: Nausea/vomitting/diarrhea  MSK: Arthralgias/myagias/muscle weakness  Skin: Rash/sores  Neurological: Numbness/tremors/vertigo  Endocrine: Excessive thirst/polyuria/cold intolerance/heat intolerance  Psych: Depression/anxiety    Past Medical History:   Diagnosis Date   • ASTHMA     scheduled inhaler use   • Bronchitis 02/2018   • Erectile dysfunction 6/8/2016   • GERD (gastroesophageal reflux disease) 6/8/2016   • Heart burn    • History of skin cancer 6/8/2016   • Hyperlipidemia 6/8/2016   • Hypertension    • Hypothyroidism 6/8/2016   • Indigestion    • Obesity 6/8/2016   • Pain 04/19/2018    chronic back pain, 0/10   • Preventative health care 6/8/2016   • Rosacea 6/8/2016   • Seasonal allergies 6/8/2016       Past Surgical History:   Procedure Laterality Date   • LUMBAR FUSION O-ARM N/A 11/19/2018    Procedure: LUMBAR FUSION O-ARM-  POSTERIOR L2-3 TLIF,;  Surgeon: Brandon Fowler M.D.;  Location: SURGERY [facility];  Service: Neurosurgery   • LUMBAR LAMINECTOMY DISKECTOMY N/A 11/19/2018    Procedure: LUMBAR LAMINECTOMY DISKECTOMY-  L2-3 LAMI;  Surgeon: Brandon Fowler M.D.;  Location: SURGERY [facility];  Service: Neurosurgery   • HARDWARE REMOVAL NEURO N/A  11/19/2018    Procedure: HARDWARE REMOVAL NEURO-  L4-5, L3 SCREW REPOSITIONING;  Surgeon: Brandon Fowler M.D.;  Location: SURGERY [facility];  Service: Neurosurgery   • KYPHOPLASTY N/A 11/19/2018    Procedure: KYPHOPLASTY-  L2;  Surgeon: Brandon Fowler M.D.;  Location: SURGERY [facility];  Service: Neurosurgery   • VENTRAL HERNIA REPAIR Left 5/3/2018    Procedure: VENTRAL HERNIA REPAIR FOR LUMBAR HERNIA/ LUNG HERNIA THROUGH CHEST WALL RECONSTRUCTION, MAJOR  ;  Surgeon: Phoenix Becker M.D.;  Location: SURGERY [facility];  Service: General   • IRRIGATION & DEBRIDEMENT NEURO  3/30/2017    Procedure: IRRIGATION & DEBRIDEMENT NEURO-LUMBAR WOUND;  Surgeon: Josiah Chakraborty M.D.;  Location: SURGERY [facility];  Service:    • LUMBAR FUSION POSTERIOR N/A 3/17/2017    Procedure: LUMBAR FUSION POSTERIOR L3-4 EXTENSION;  Surgeon: Josiah Chakraborty M.D.;  Location: SURGERY [facility];  Service:    • LUMBAR LAMINECTOMY DISKECTOMY N/A 3/17/2017    Procedure: LUMBAR LAMINECTOMY DISKECTOMY L3 & REDO L4;  Surgeon: Josiah Chakraborty M.D.;  Location: SURGERY [facility];  Service:    • HARDWARE REMOVAL NEURO N/A 3/17/2017    Procedure: HARDWARE REMOVAL NEURO L4-5;  Surgeon: Josiah Chakraborty M.D.;  Location: SURGERY [facility];  Service:    • LUMBAR FUSION POSTERIOR  9/9/2009    Performed by JOSIAH CHAKRABORTY at SURGERY [facility]   • LUMBAR LAMINECTOMY DISKECTOMY  9/9/2009    Performed by JOSIAH CHAKRABORTY at SURGERY [facility]   • KNEE ARTHROPLASTY TOTAL  5/26/2009    Performed by NAREN DANIELSON at SURGERY [facility] ORS   • HIP ARTHROPLASTY TOTAL  1/21/2009    Performed by NAREN DANIELSON at SURGERY [facility]   • INGUINAL HERNIA REPAIR  9/2/08    Performed by PHOENIX BECKER at SURGERY SAME DAY [facility] ORS   • INGUINAL HERNIA REPAIR  6/10/08    Performed by PHOENIX BECKER at SURGERY SAME DAY [facility] ORS   • HERNIA REPAIR  2008    HERNIA    • ROMAN BY LAPAROSCOPY  1982   • APPENDECTOMY  1953    • TONSILLECTOMY  1951   • OTHER      DISCECTOMY WITH HARWWARE   • OTHER      GASTRIC BYPASS AT 30 YEARS       Social History     Socioeconomic History   • Marital status:      Spouse name: Not on file   • Number of children: Not on file   • Years of education: Not on file   • Highest education level: Not on file   Occupational History   • Not on file   Social Needs   • Financial resource strain: Not on file   • Food insecurity:     Worry: Not on file     Inability: Not on file   • Transportation needs:     Medical: Not on file     Non-medical: Not on file   Tobacco Use   • Smoking status: Never Smoker   • Smokeless tobacco: Never Used   Substance and Sexual Activity   • Alcohol use: Yes     Comment: 1/week    • Drug use: No     Comment: marijuana edibles (for pain control)   • Sexual activity: Not on file   Lifestyle   • Physical activity:     Days per week: Not on file     Minutes per session: Not on file   • Stress: Not on file   Relationships   • Social connections:     Talks on phone: Not on file     Gets together: Not on file     Attends [employer] service: Not on file     Active member of club or organization: Not on file     Attends meetings of clubs or organizations: Not on file     Relationship status: Not on file   • Intimate partner violence:     Fear of current or ex partner: Not on file     Emotionally abused: Not on file     Physically abused: Not on file     Forced sexual activity: Not on file   Other Topics Concern   •  Service No   • Blood Transfusions No   • Caffeine Concern No   • Occupational Exposure No   • Hobby Hazards No   • Sleep Concern No   • Stress Concern No   • Weight Concern Yes   • Special Diet No   • Back Care Yes   • Exercise Yes   • Bike Helmet No     Comment: does not ride bike    • Seat Belt Yes   • Self-Exams No   Social History Narrative   • Not on file       Family History   Problem Relation Age of Onset   • Hypertension Mother    • Dementia Mother    •  "Hypertension Father    • Lung Disease Father         asthma       No Known Allergies    Current Outpatient Medications   Medication Sig Dispense Refill   • gabapentin (NEURONTIN) 300 MG Cap TAKE 1-2 TABLETS BY MOUTH 3 TIMES A DAY TAPER UP AS DIRECTED  5   • IBUPROFEN PO Take 500 mg by mouth every 6 hours as needed.     • HYDROcodone-acetaminophen (NORCO) 5-325 MG Tab per tablet TAKE 1 TABLET 45 MINUTES BEFORE PROCDURE FOR 1 DAY  0   • ADVAIR DISKUS 100-50 MCG/DOSE AEROSOL POWDER, BREATH ACTIVATED INHALE 1 PUFF BY MOUTH TWICE A DAY *RINSE MOUTH AFTER USE* 3 Inhaler 0   • tramadol (ULTRAM) 50 MG Tab Take  mg by mouth every 6 hours as needed.     • acetaminophen (TYLENOL) 500 MG Tab Take 2 Tabs by mouth 3 times a day.     • tizanidine (ZANAFLEX) 4 MG Tab Take 1 Tab by mouth every 6 hours as needed. 60 Tab 0   • atorvastatin (LIPITOR) 40 MG Tab Take 40 mg by mouth every evening.     • levothyroxine (SYNTHROID) 50 MCG Tab TAKE 1 TABLET BY MOUTH EVERY DAY 90 Tab 1   • tamsulosin (FLOMAX) 0.4 MG capsule TAKE ONE CAPSULE BY MOUTH EVERY DAY FOR BPH. GIVE 1/2 HOUR AFTER BREAKFAST 90 Cap 0   • Naloxone HCl 4 MG/0.1ML Liquid Spray 4 mg in nose Once PRN for up to 1 dose. (Patient not taking: Reported on 5/14/2019) 2 Package 1   • fluticasone-salmeterol (ADVAIR) 100-50 MCG/DOSE AEROSOL POWDER, BREATH ACTIVATED Inhale 1 Puff by mouth every morning.     • omeprazole (PRILOSEC) 20 MG delayed-release capsule Take 20 mg by mouth every morning.     • polyethylene glycol/lytes (MIRALAX) Pack Take 1 Packet by mouth every day. (Patient not taking: Reported on 11/11/2019) 15 Each 0     No current facility-administered medications for this visit.        Physical Exam:  Vitals:    11/11/19 1441   BP: 130/84   BP Location: Left arm   Patient Position: Sitting   BP Cuff Size: Adult   Pulse: 64   SpO2: 96%   Weight: 98.9 kg (218 lb)   Height: 1.727 m (5' 8\")     General appearance: NAD, conversant   Eyes: anicteric sclerae, moist " conjunctivae; no lid-lag; PERRLA  HENT: Atraumatic; oropharynx clear with moist mucous membranes and no mucosal ulcerations; normal hard and soft palate  Neck: Trachea midline; FROM, supple, no thyromegaly or lymphadenopathy  Lungs: CTA, with normal respiratory effort and no intercostal retractions  CV: RRR, no MRGs, no JVD  Abdomen: Soft, non-tender; no masses or HSM  Extremities: No peripheral edema or extremity lymphadenopathy  Skin: Normal temperature, turgor and texture; no rash, ulcers or subcutaneous nodules  Psych: Appropriate affect, alert and oriented to person, place and time    Data:  Labs reviewed    CTA showing 4.0 TAA    EKG interpreted by me:  NSR    Impression/Plan:  1. Coronary artery disease without angina pectoris, unspecified vessel or lesion type, unspecified whether native or transplanted heart  EKG    CT-THORACIC AORTA EVALUATION   2. Thoracic aortic aneurysm without rupture (HCC)     3. Mixed hyperlipidemia     4. Essential hypertension       -Start BB  -Re-write statin for him  -CTA for surveillance  -F/u with general cardiology    Lyssa Jung MD

## 2019-11-12 ENCOUNTER — HOSPITAL ENCOUNTER (OUTPATIENT)
Dept: LAB | Facility: MEDICAL CENTER | Age: 74
End: 2019-11-12
Attending: INTERNAL MEDICINE
Payer: MEDICARE

## 2019-11-12 DIAGNOSIS — I71.20 THORACIC AORTIC ANEURYSM WITHOUT RUPTURE (HCC): ICD-10-CM

## 2019-11-12 LAB — CREAT SERPL-MCNC: 0.94 MG/DL (ref 0.5–1.4)

## 2019-11-12 PROCEDURE — 82565 ASSAY OF CREATININE: CPT

## 2019-11-12 PROCEDURE — 36415 COLL VENOUS BLD VENIPUNCTURE: CPT

## 2019-11-18 ENCOUNTER — TELEPHONE (OUTPATIENT)
Dept: CARDIOLOGY | Facility: MEDICAL CENTER | Age: 74
End: 2019-11-18

## 2019-11-18 ENCOUNTER — HOSPITAL ENCOUNTER (OUTPATIENT)
Dept: RADIOLOGY | Facility: MEDICAL CENTER | Age: 74
End: 2019-11-18
Attending: INTERNAL MEDICINE
Payer: MEDICARE

## 2019-11-18 DIAGNOSIS — I25.10 CORONARY ARTERY DISEASE WITHOUT ANGINA PECTORIS, UNSPECIFIED VESSEL OR LESION TYPE, UNSPECIFIED WHETHER NATIVE OR TRANSPLANTED HEART: ICD-10-CM

## 2019-11-18 PROCEDURE — 700117 HCHG RX CONTRAST REV CODE 255: Performed by: INTERNAL MEDICINE

## 2019-11-18 PROCEDURE — 71275 CT ANGIOGRAPHY CHEST: CPT

## 2019-11-18 RX ADMIN — IOHEXOL 100 ML: 350 INJECTION, SOLUTION INTRAVENOUS at 10:31

## 2019-11-18 NOTE — TELEPHONE ENCOUNTER
Dr. Jung reviewed CT-thoracic aorta. Per Dr. Jung: aneurysm is stable, unchanged.  Left message for pt. To call.

## 2019-12-10 ENCOUNTER — TELEPHONE (OUTPATIENT)
Dept: CARDIOLOGY | Facility: MEDICAL CENTER | Age: 74
End: 2019-12-10

## 2019-12-10 NOTE — TELEPHONE ENCOUNTER
Called pt. To explain that his recent CT-CTA thoracoabdominal scan was done to assess aneurysm. He will continue Metoprolol. Schefuled him to see general cardiologist, Dr. Chavez, in Feb. Per Dr. Jung's recommendation.

## 2019-12-12 ENCOUNTER — HOSPITAL ENCOUNTER (OUTPATIENT)
Dept: LAB | Facility: MEDICAL CENTER | Age: 74
End: 2019-12-12
Attending: STUDENT IN AN ORGANIZED HEALTH CARE EDUCATION/TRAINING PROGRAM
Payer: MEDICARE

## 2019-12-12 PROCEDURE — 82274 ASSAY TEST FOR BLOOD FECAL: CPT

## 2019-12-18 LAB — HEMOCCULT STL QL IA: NEGATIVE

## 2019-12-29 ENCOUNTER — OFFICE VISIT (OUTPATIENT)
Dept: URGENT CARE | Facility: PHYSICIAN GROUP | Age: 74
End: 2019-12-29
Payer: MEDICARE

## 2019-12-29 VITALS
WEIGHT: 218 LBS | BODY MASS INDEX: 33.15 KG/M2 | SYSTOLIC BLOOD PRESSURE: 144 MMHG | HEART RATE: 82 BPM | OXYGEN SATURATION: 97 % | DIASTOLIC BLOOD PRESSURE: 98 MMHG | RESPIRATION RATE: 16 BRPM | TEMPERATURE: 99 F

## 2019-12-29 DIAGNOSIS — R05.9 COUGH: ICD-10-CM

## 2019-12-29 DIAGNOSIS — J01.00 ACUTE NON-RECURRENT MAXILLARY SINUSITIS: ICD-10-CM

## 2019-12-29 PROCEDURE — 99214 OFFICE O/P EST MOD 30 MIN: CPT | Performed by: PHYSICIAN ASSISTANT

## 2019-12-29 RX ORDER — CODEINE PHOSPHATE/GUAIFENESIN 10-100MG/5
10 LIQUID (ML) ORAL 4 TIMES DAILY PRN
Qty: 200 ML | Refills: 0 | Status: SHIPPED | OUTPATIENT
Start: 2019-12-29 | End: 2020-01-03

## 2019-12-29 RX ORDER — AMOXICILLIN AND CLAVULANATE POTASSIUM 875; 125 MG/1; MG/1
1 TABLET, FILM COATED ORAL 2 TIMES DAILY
Qty: 20 TAB | Refills: 0 | Status: SHIPPED | OUTPATIENT
Start: 2019-12-29 | End: 2020-02-06

## 2020-01-03 ASSESSMENT — ENCOUNTER SYMPTOMS
FEVER: 0
SHORTNESS OF BREATH: 0
SINUS PAIN: 1
RHINORRHEA: 1
COUGH: 1
SPUTUM PRODUCTION: 0
SORE THROAT: 0

## 2020-01-04 NOTE — PROGRESS NOTES
Subjective:      Angelo Magaña is a 74 y.o. male who presents with Cough (going on for a month in a half,agrivating other issues)    PMH:  has a past medical history of ASTHMA, Bronchitis (02/2018), Erectile dysfunction (6/8/2016), GERD (gastroesophageal reflux disease) (6/8/2016), Heart burn, History of skin cancer (6/8/2016), Hyperlipidemia (6/8/2016), Hypertension, Hypothyroidism (6/8/2016), Indigestion, Obesity (6/8/2016), Pain (04/19/2018), Preventative health care (6/8/2016), Rosacea (6/8/2016), and Seasonal allergies (6/8/2016).  MEDS:   Current Outpatient Medications:   •  amoxicillin-clavulanate (AUGMENTIN) 875-125 MG Tab, Take 1 Tab by mouth 2 times a day., Disp: 20 Tab, Rfl: 0  •  guaifenesin-codeine (TUSSI-ORGANIDIN NR) 100-10 MG/5ML syrup, Take 10 mL by mouth 4 times a day as needed for up to 5 days., Disp: 200 mL, Rfl: 0  •  metoprolol SR (TOPROL XL) 25 MG TABLET SR 24 HR, Take 0.5 Tabs by mouth every day., Disp: 15 Tab, Rfl: 11  •  tamsulosin (FLOMAX) 0.4 MG capsule, TAKE ONE CAPSULE BY MOUTH EVERY DAY FOR BPH. GIVE 1/2 HOUR AFTER BREAKFAST, Disp: 90 Cap, Rfl: 0  •  gabapentin (NEURONTIN) 300 MG Cap, TAKE 1-2 TABLETS BY MOUTH 3 TIMES A DAY TAPER UP AS DIRECTED, Disp: , Rfl: 5  •  IBUPROFEN PO, Take 500 mg by mouth every 6 hours as needed., Disp: , Rfl:   •  ADVAIR DISKUS 100-50 MCG/DOSE AEROSOL POWDER, BREATH ACTIVATED, INHALE 1 PUFF BY MOUTH TWICE A DAY *RINSE MOUTH AFTER USE*, Disp: 3 Inhaler, Rfl: 0  •  acetaminophen (TYLENOL) 500 MG Tab, Take 2 Tabs by mouth 3 times a day., Disp: , Rfl:   •  fluticasone-salmeterol (ADVAIR) 100-50 MCG/DOSE AEROSOL POWDER, BREATH ACTIVATED, Inhale 1 Puff by mouth every morning., Disp: , Rfl:   •  omeprazole (PRILOSEC) 20 MG delayed-release capsule, Take 20 mg by mouth every morning., Disp: , Rfl:   •  tizanidine (ZANAFLEX) 4 MG Tab, Take 1 Tab by mouth every 6 hours as needed., Disp: 60 Tab, Rfl: 0  •  atorvastatin (LIPITOR) 40 MG Tab, Take 40 mg by mouth  every evening., Disp: , Rfl:   •  levothyroxine (SYNTHROID) 50 MCG Tab, TAKE 1 TABLET BY MOUTH EVERY DAY, Disp: 90 Tab, Rfl: 1  •  atorvastatin (LIPITOR) 40 MG Tab, Take 1 Tab by mouth every day. (Patient not taking: Reported on 12/29/2019), Disp: 30 Tab, Rfl: 11  •  Naloxone HCl 4 MG/0.1ML Liquid, Spray 4 mg in nose Once PRN for up to 1 dose. (Patient not taking: Reported on 5/14/2019), Disp: 2 Package, Rfl: 1  •  polyethylene glycol/lytes (MIRALAX) Pack, Take 1 Packet by mouth every day. (Patient not taking: Reported on 11/11/2019), Disp: 15 Each, Rfl: 0  ALLERGIES: No Known Allergies  SURGHX:   Past Surgical History:   Procedure Laterality Date   • LUMBAR FUSION O-ARM N/A 11/19/2018    Procedure: LUMBAR FUSION O-ARM-  POSTERIOR L2-3 TLIF,;  Surgeon: Brandon Fowler M.D.;  Location: [facility];  Service: Neurosurgery   • LUMBAR LAMINECTOMY DISKECTOMY N/A 11/19/2018    Procedure: LUMBAR LAMINECTOMY DISKECTOMY-  L2-3 LAMI;  Surgeon: Brandon Fowler M.D.;  Location: [facility];  Service: Neurosurgery   • HARDWARE REMOVAL NEURO N/A 11/19/2018    Procedure: HARDWARE REMOVAL NEURO-  L4-5, L3 SCREW REPOSITIONING;  Surgeon: Brandon Fowler M.D.;  Location: [facility];  Service: Neurosurgery   • KYPHOPLASTY N/A 11/19/2018    Procedure: KYPHOPLASTY-  L2;  Surgeon: Brandon Fowler M.D.;  Location: [facility];  Service: Neurosurgery   • VENTRAL HERNIA REPAIR Left 5/3/2018    Procedure: VENTRAL HERNIA REPAIR FOR LUMBAR HERNIA/ LUNG HERNIA THROUGH CHEST WALL RECONSTRUCTION, MAJOR  ;  Surgeon: Phoenix Medina M.D.;  Location: [facility];  Service: General   • IRRIGATION & DEBRIDEMENT NEURO  3/30/2017    Procedure: IRRIGATION & DEBRIDEMENT NEURO-LUMBAR WOUND;  Surgeon: Juan Miguel Chakraborty M.D.;  Location: [facility];  Service:    • LUMBAR FUSION POSTERIOR N/A 3/17/2017    Procedure: LUMBAR FUSION POSTERIOR L3-4 EXTENSION;  Surgeon: Juan Miguel Chakraborty M.D.;   Location: [facility];  Service:    • LUMBAR LAMINECTOMY DISKECTOMY N/A 3/17/2017    Procedure: LUMBAR LAMINECTOMY DISKECTOMY L3 & REDO L4;  Surgeon: Josiah Chakraborty M.D.;  Location: [facility];  Service:    • HARDWARE REMOVAL NEURO N/A 3/17/2017    Procedure: HARDWARE REMOVAL NEURO L4-5;  Surgeon: Josiah Chakraborty M.D.;  Location: [facility];  Service:    • LUMBAR FUSION POSTERIOR  9/9/2009    Performed by JOSIAH CHAKRABORTY at SURGERY [facility]   • LUMBAR LAMINECTOMY DISKECTOMY  9/9/2009    Performed by JOSIAH CHAKRABORTY at SURGERY [facility]   • KNEE ARTHROPLASTY TOTAL  5/26/2009    Performed by NAREN DANIELSON at SURGERY [facility]   • HIP ARTHROPLASTY TOTAL  1/21/2009    Performed by NAREN DANIELSON at SURGERY [facility]   • INGUINAL HERNIA REPAIR  9/2/08    Performed by MERLYN BECKER at SURGERY SAME DAY [facility]   • INGUINAL HERNIA REPAIR  6/10/08    Performed by MERLYN BECKER at SURGERY SAME DAY [facility]   • HERNIA REPAIR  2008    HERNIA    • ROMAN BY LAPAROSCOPY  1982   • APPENDECTOMY  1953   • TONSILLECTOMY  1951   • OTHER      DISCECTOMY WITH HARWWARE   • OTHER      GASTRIC BYPASS AT 30 YEARS     SOCHX:  reports that he has never smoked. He has never used smokeless tobacco. He reports current alcohol use. He reports that he does not use drugs.  FH: Reviewed with patient, not pertinent to this visit.           Patient presents with:  Cough: going on for a month in a half.  Pt has sinus pain, pressure and post nasal drip, could be driving the cough.      Cough   This is a new problem. The current episode started 1 to 4 weeks ago. The problem has been gradually worsening. The problem occurs every few minutes. The cough is non-productive. Associated symptoms include ear congestion, nasal congestion, postnasal drip and rhinorrhea. Pertinent negatives include no fever, sore throat or shortness of breath. The symptoms are aggravated by lying down and  cold air. He has tried OTC cough suppressant, steroid inhaler and body position changes for the symptoms. The treatment provided no relief. His past medical history is significant for asthma, bronchitis and environmental allergies.       Review of Systems   Constitutional: Negative for fever.   HENT: Positive for congestion, postnasal drip, rhinorrhea and sinus pain. Negative for sore throat.    Respiratory: Positive for cough. Negative for sputum production and shortness of breath.    Endo/Heme/Allergies: Positive for environmental allergies.   All other systems reviewed and are negative.         Objective:     /98 (BP Location: Left arm, Patient Position: Sitting, BP Cuff Size: Adult)   Pulse 82   Temp 37.2 °C (99 °F) (Temporal)   Resp 16   Wt 98.9 kg (218 lb)   SpO2 97%   BMI 33.15 kg/m²      Physical Exam  Vitals signs and nursing note reviewed.   Constitutional:       General: He is not in acute distress.     Appearance: He is well-developed. He is not toxic-appearing.   HENT:      Head: Normocephalic and atraumatic.      Right Ear: Tympanic membrane normal.      Left Ear: Tympanic membrane normal.      Nose:      Right Sinus: Maxillary sinus tenderness present.      Left Sinus: Maxillary sinus tenderness present.      Mouth/Throat:      Lips: Pink.      Mouth: Mucous membranes are moist.      Pharynx: Uvula midline.   Eyes:      Extraocular Movements: Extraocular movements intact.      Conjunctiva/sclera: Conjunctivae normal.      Pupils: Pupils are equal, round, and reactive to light.   Neck:      Musculoskeletal: Normal range of motion and neck supple.   Cardiovascular:      Rate and Rhythm: Normal rate and regular rhythm.      Heart sounds: Normal heart sounds.   Pulmonary:      Effort: Pulmonary effort is normal.      Breath sounds: Normal breath sounds. No rales.   Abdominal:      Palpations: Abdomen is soft.   Musculoskeletal: Normal range of motion.   Skin:     General: Skin is warm.       Capillary Refill: Capillary refill takes less than 2 seconds.   Neurological:      General: No focal deficit present.      Mental Status: He is alert and oriented to person, place, and time.      Gait: Gait normal.   Psychiatric:         Mood and Affect: Mood normal.         Behavior: Behavior is cooperative.                 Assessment/Plan:     1. Acute non-recurrent maxillary sinusitis  amoxicillin-clavulanate (AUGMENTIN) 875-125 MG Tab    guaifenesin-codeine (TUSSI-ORGANIDIN NR) 100-10 MG/5ML syrup   2. Cough  amoxicillin-clavulanate (AUGMENTIN) 875-125 MG Tab    guaifenesin-codeine (TUSSI-ORGANIDIN NR) 100-10 MG/5ML syrup     PT can continue OTC medications, increase fluids and rest until symptoms improve.     PT to continue taking prescription medications as prescribed.      PT instructed not to drive or operate heavy machinery or drink alcohol while taking this medication because it contains either a narcotic or benzodiazepines which causes drowsiness. PT verbalized understanding of these instructions.     [facility] Aware web site evaluation: I have obtained and reviewed patient utilization report from [facility] pharmacy database prior to writing prescription for controlled substance.  No history of abuse.    PT should follow up with PCP in 1-2 days for re-evaluation if symptoms have not improved.  Discussed red flags and reasons to return to  or ED.  Pt and/or family verbalized understanding of diagnosis and follow up instructions and was offered informational handout on diagnosis.  PT discharged.

## 2020-01-29 ENCOUNTER — HOSPITAL ENCOUNTER (OUTPATIENT)
Dept: LAB | Facility: MEDICAL CENTER | Age: 75
End: 2020-01-29
Attending: PHYSICIAN ASSISTANT
Payer: MEDICARE

## 2020-01-29 ENCOUNTER — HOSPITAL ENCOUNTER (OUTPATIENT)
Dept: LAB | Facility: MEDICAL CENTER | Age: 75
End: 2020-01-29
Attending: INTERNAL MEDICINE
Payer: MEDICARE

## 2020-01-29 LAB
ALBUMIN SERPL BCP-MCNC: 4.2 G/DL (ref 3.2–4.9)
ALBUMIN/GLOB SERPL: 1.6 G/DL
ALP SERPL-CCNC: 86 U/L (ref 30–99)
ALT SERPL-CCNC: 15 U/L (ref 2–50)
ANION GAP SERPL CALC-SCNC: 7 MMOL/L (ref 0–11.9)
AST SERPL-CCNC: 18 U/L (ref 12–45)
BASOPHILS # BLD AUTO: 0.7 % (ref 0–1.8)
BASOPHILS # BLD: 0.03 K/UL (ref 0–0.12)
BILIRUB SERPL-MCNC: 0.8 MG/DL (ref 0.1–1.5)
BUN SERPL-MCNC: 11 MG/DL (ref 8–22)
CALCIUM SERPL-MCNC: 10.1 MG/DL (ref 8.5–10.5)
CHLORIDE SERPL-SCNC: 105 MMOL/L (ref 96–112)
CO2 SERPL-SCNC: 29 MMOL/L (ref 20–33)
CREAT SERPL-MCNC: 0.95 MG/DL (ref 0.5–1.4)
EOSINOPHIL # BLD AUTO: 0.11 K/UL (ref 0–0.51)
EOSINOPHIL NFR BLD: 2.6 % (ref 0–6.9)
ERYTHROCYTE [DISTWIDTH] IN BLOOD BY AUTOMATED COUNT: 46.9 FL (ref 35.9–50)
FASTING STATUS PATIENT QL REPORTED: NORMAL
FERRITIN SERPL-MCNC: 11.6 NG/ML (ref 22–322)
FOLATE SERPL-MCNC: 19.8 NG/ML
GLOBULIN SER CALC-MCNC: 2.7 G/DL (ref 1.9–3.5)
GLUCOSE SERPL-MCNC: 92 MG/DL (ref 65–99)
HCT VFR BLD AUTO: 43.4 % (ref 42–52)
HGB BLD-MCNC: 13.5 G/DL (ref 14–18)
IMM GRANULOCYTES # BLD AUTO: 0.01 K/UL (ref 0–0.11)
IMM GRANULOCYTES NFR BLD AUTO: 0.2 % (ref 0–0.9)
IRON SATN MFR SERPL: 11 % (ref 15–55)
IRON SERPL-MCNC: 53 UG/DL (ref 50–180)
LYMPHOCYTES # BLD AUTO: 1.15 K/UL (ref 1–4.8)
LYMPHOCYTES NFR BLD: 27.2 % (ref 22–41)
MCH RBC QN AUTO: 27.5 PG (ref 27–33)
MCHC RBC AUTO-ENTMCNC: 31.1 G/DL (ref 33.7–35.3)
MCV RBC AUTO: 88.4 FL (ref 81.4–97.8)
MONOCYTES # BLD AUTO: 0.25 K/UL (ref 0–0.85)
MONOCYTES NFR BLD AUTO: 5.9 % (ref 0–13.4)
NEUTROPHILS # BLD AUTO: 2.68 K/UL (ref 1.82–7.42)
NEUTROPHILS NFR BLD: 63.4 % (ref 44–72)
NRBC # BLD AUTO: 0 K/UL
NRBC BLD-RTO: 0 /100 WBC
PLATELET # BLD AUTO: 197 K/UL (ref 164–446)
PMV BLD AUTO: 10.3 FL (ref 9–12.9)
POTASSIUM SERPL-SCNC: 4.2 MMOL/L (ref 3.6–5.5)
PROT SERPL-MCNC: 6.9 G/DL (ref 6–8.2)
RBC # BLD AUTO: 4.91 M/UL (ref 4.7–6.1)
SODIUM SERPL-SCNC: 141 MMOL/L (ref 135–145)
TIBC SERPL-MCNC: 475 UG/DL (ref 250–450)
TSH SERPL DL<=0.005 MIU/L-ACNC: 3.6 UIU/ML (ref 0.38–5.33)
VIT B12 SERPL-MCNC: 241 PG/ML (ref 211–911)
WBC # BLD AUTO: 4.2 K/UL (ref 4.8–10.8)

## 2020-01-29 PROCEDURE — 82728 ASSAY OF FERRITIN: CPT

## 2020-01-29 PROCEDURE — 83550 IRON BINDING TEST: CPT

## 2020-01-29 PROCEDURE — 82607 VITAMIN B-12: CPT

## 2020-01-29 PROCEDURE — 83540 ASSAY OF IRON: CPT

## 2020-01-29 PROCEDURE — 85025 COMPLETE CBC W/AUTO DIFF WBC: CPT

## 2020-01-29 PROCEDURE — 36415 COLL VENOUS BLD VENIPUNCTURE: CPT

## 2020-01-29 PROCEDURE — 84443 ASSAY THYROID STIM HORMONE: CPT

## 2020-01-29 PROCEDURE — 80053 COMPREHEN METABOLIC PANEL: CPT

## 2020-01-29 PROCEDURE — 82746 ASSAY OF FOLIC ACID SERUM: CPT

## 2020-02-06 ENCOUNTER — OFFICE VISIT (OUTPATIENT)
Dept: CARDIOLOGY | Facility: MEDICAL CENTER | Age: 75
End: 2020-02-06
Payer: MEDICARE

## 2020-02-06 VITALS
HEART RATE: 64 BPM | BODY MASS INDEX: 34.1 KG/M2 | WEIGHT: 225 LBS | SYSTOLIC BLOOD PRESSURE: 132 MMHG | OXYGEN SATURATION: 95 % | HEIGHT: 68 IN | DIASTOLIC BLOOD PRESSURE: 80 MMHG

## 2020-02-06 DIAGNOSIS — I25.10 CORONARY ARTERIOSCLEROSIS: ICD-10-CM

## 2020-02-06 DIAGNOSIS — I77.810 ASCENDING AORTA DILATION (HCC): ICD-10-CM

## 2020-02-06 DIAGNOSIS — E78.5 DYSLIPIDEMIA: ICD-10-CM

## 2020-02-06 PROCEDURE — 99204 OFFICE O/P NEW MOD 45 MIN: CPT | Performed by: INTERNAL MEDICINE

## 2020-02-06 RX ORDER — FERROUS SULFATE 325(65) MG
325 TABLET ORAL DAILY
COMMUNITY
End: 2021-01-19

## 2020-02-06 NOTE — PROGRESS NOTES
"CARDIOLOGY OUTPATIENT FOLLOWUP    PCP: Mariela Gibson M.D.    1. Ascending aorta dilation (CMS-HCC), mild  4.1 cm.  It appears to be longstanding though the only record I can find is a 3.9 cm echocardiogram from over a decade ago.  Agree with beta-blockers for blood pressure control, could consider adding ARB if added BP control as needed.  - Echo in 1 year    2. Coronary arteriosclerosis  By CT study.  Continue atorvastatin.    3. Dyslipidemia  Lipids are under excellent control.  No changes advised      Follow up with Lorenzo Chavez M.D. in 1 year    Chief Complaint   Patient presents with   • Coronary Artery Disease       History: Angelo Magaña is a 74 y.o. male with a past medical history of Chronic back pain and spine disease presenting for follow up of ascending aortic aneurysm and coronary atherosclerosis.  He was previously followed by Dr. Jung.  He has known about an enlarged aorta many years ago and has been periodically followed over the years for the details of that are not specifically clear today.  His recent health ailments have centered around back pain with recurrent spine surgeries.  He continues to be bothered by substantial discomfort in the back and does not experience angina, shortness of breath syncope or orthopnea.  Imaging has also shown coronary atherosclerosis.  He is taking statin therapy without difficulty.    ROS:  All other systems reviewed and negative except as per the HPI    PE:  /80 (BP Location: Left arm, Patient Position: Sitting, BP Cuff Size: Adult)   Pulse 64   Ht 1.727 m (5' 8\")   Wt 102.1 kg (225 lb)   SpO2 95%   BMI 34.21 kg/m²   Gen: Well appearing  HEENT: Symmetric face. Anicteric sclerae. Moist mucus membranes  NECK: No JVD. No lymphadenopathy  CARDIAC: Normal PMI, regular, normal S1, S2.  VASCULATURE: Normal carotid amplitude without bruit.   RESP: Clear to auscultation bilaterally  ABD: Soft, non-tender, non-distended  EXT: No edema, no clubbing or " cyanosis  SKIN: Warm and dry  NEURO: No gross deficits  PSYCH: Appropriate affect, participates in conversation    Past Medical History:   Diagnosis Date   • ASTHMA     scheduled inhaler use   • Bronchitis 02/2018   • Erectile dysfunction 6/8/2016   • GERD (gastroesophageal reflux disease) 6/8/2016   • Heart burn    • History of skin cancer 6/8/2016   • Hyperlipidemia 6/8/2016   • Hypertension    • Hypothyroidism 6/8/2016   • Indigestion    • Obesity 6/8/2016   • Pain 04/19/2018    chronic back pain, 0/10   • Preventative health care 6/8/2016   • Rosacea 6/8/2016   • Seasonal allergies 6/8/2016     No Known Allergies  Outpatient Encounter Medications as of 2/6/2020   Medication Sig Dispense Refill   • ferrous sulfate 325 (65 Fe) MG tablet Take 325 mg by mouth every day.     • metoprolol SR (TOPROL XL) 25 MG TABLET SR 24 HR Take 0.5 Tabs by mouth every day. 15 Tab 11   • tamsulosin (FLOMAX) 0.4 MG capsule TAKE ONE CAPSULE BY MOUTH EVERY DAY FOR BPH. GIVE 1/2 HOUR AFTER BREAKFAST 90 Cap 0   • gabapentin (NEURONTIN) 300 MG Cap Indications: twice a day  5   • IBUPROFEN PO Take 500 mg by mouth every 6 hours as needed.     • ADVAIR DISKUS 100-50 MCG/DOSE AEROSOL POWDER, BREATH ACTIVATED INHALE 1 PUFF BY MOUTH TWICE A DAY *RINSE MOUTH AFTER USE* 3 Inhaler 0   • acetaminophen (TYLENOL) 500 MG Tab Take 2 Tabs by mouth 3 times a day.     • omeprazole (PRILOSEC) 20 MG delayed-release capsule Take 20 mg by mouth every morning.     • atorvastatin (LIPITOR) 40 MG Tab Take 40 mg by mouth every evening.     • levothyroxine (SYNTHROID) 50 MCG Tab TAKE 1 TABLET BY MOUTH EVERY DAY 90 Tab 1   • [DISCONTINUED] amoxicillin-clavulanate (AUGMENTIN) 875-125 MG Tab Take 1 Tab by mouth 2 times a day. (Patient not taking: Reported on 2/6/2020) 20 Tab 0   • [DISCONTINUED] atorvastatin (LIPITOR) 40 MG Tab Take 1 Tab by mouth every day. (Patient not taking: Reported on 12/29/2019) 30 Tab 11   • [DISCONTINUED] Naloxone HCl 4 MG/0.1ML Liquid Spray  4 mg in nose Once PRN for up to 1 dose. (Patient not taking: Reported on 5/14/2019) 2 Package 1   • [DISCONTINUED] fluticasone-salmeterol (ADVAIR) 100-50 MCG/DOSE AEROSOL POWDER, BREATH ACTIVATED Inhale 1 Puff by mouth every morning.     • [DISCONTINUED] polyethylene glycol/lytes (MIRALAX) Pack Take 1 Packet by mouth every day. (Patient not taking: Reported on 11/11/2019) 15 Each 0   • [DISCONTINUED] tizanidine (ZANAFLEX) 4 MG Tab Take 1 Tab by mouth every 6 hours as needed. (Patient not taking: Reported on 2/6/2020) 60 Tab 0     No facility-administered encounter medications on file as of 2/6/2020.      Social History     Socioeconomic History   • Marital status:      Spouse name: Not on file   • Number of children: Not on file   • Years of education: Not on file   • Highest education level: Not on file   Occupational History   • Not on file   Social Needs   • Financial resource strain: Not on file   • Food insecurity:     Worry: Not on file     Inability: Not on file   • Transportation needs:     Medical: Not on file     Non-medical: Not on file   Tobacco Use   • Smoking status: Never Smoker   • Smokeless tobacco: Never Used   Substance and Sexual Activity   • Alcohol use: Yes     Comment: 1/week    • Drug use: No     Comment: marijuana edibles (for pain control)   • Sexual activity: Not on file   Lifestyle   • Physical activity:     Days per week: Not on file     Minutes per session: Not on file   • Stress: Not on file   Relationships   • Social connections:     Talks on phone: Not on file     Gets together: Not on file     Attends Religion service: Not on file     Active member of club or organization: Not on file     Attends meetings of clubs or organizations: Not on file     Relationship status: Not on file   • Intimate partner violence:     Fear of current or ex partner: Not on file     Emotionally abused: Not on file     Physically abused: Not on file     Forced sexual activity: Not on file   Other  Topics Concern   •  Service No   • Blood Transfusions No   • Caffeine Concern No   • Occupational Exposure No   • Hobby Hazards No   • Sleep Concern No   • Stress Concern No   • Weight Concern Yes   • Special Diet No   • Back Care Yes   • Exercise Yes   • Bike Helmet No     Comment: does not ride bike    • Seat Belt Yes   • Self-Exams No   Social History Narrative   • Not on file       Studies  Lab Results   Component Value Date/Time    CHOLSTRLTOT 86 (L) 12/18/2018 03:42 AM    LDL 27 12/18/2018 03:42 AM    HDL 45 12/18/2018 03:42 AM    TRIGLYCERIDE 69 12/18/2018 03:42 AM       Lab Results   Component Value Date/Time    SODIUM 141 01/29/2020 08:53 AM    POTASSIUM 4.2 01/29/2020 08:53 AM    CHLORIDE 105 01/29/2020 08:53 AM    CO2 29 01/29/2020 08:53 AM    GLUCOSE 92 01/29/2020 08:53 AM    BUN 11 01/29/2020 08:53 AM    CREATININE 0.95 01/29/2020 08:53 AM    CREATININE 0.9 05/18/2009 04:20 PM     Lab Results   Component Value Date/Time    ALKPHOSPHAT 86 01/29/2020 08:53 AM    ASTSGOT 18 01/29/2020 08:53 AM    ALTSGPT 15 01/29/2020 08:53 AM    TBILIRUBIN 0.8 01/29/2020 08:53 AM        For this encounter I directly reviewed ECG tracings, medical records and Chest CT images I agree with the interpretations in the electronic health record

## 2020-03-09 ENCOUNTER — HOSPITAL ENCOUNTER (OUTPATIENT)
Dept: RADIOLOGY | Facility: MEDICAL CENTER | Age: 75
End: 2020-03-09
Attending: NURSE PRACTITIONER
Payer: MEDICARE

## 2020-03-09 ENCOUNTER — HOSPITAL ENCOUNTER (OUTPATIENT)
Dept: LAB | Facility: MEDICAL CENTER | Age: 75
End: 2020-03-09
Attending: INTERNAL MEDICINE
Payer: MEDICARE

## 2020-03-09 DIAGNOSIS — M25.552 LEFT HIP PAIN: ICD-10-CM

## 2020-03-09 LAB
ALBUMIN SERPL BCP-MCNC: 4.6 G/DL (ref 3.2–4.9)
ALBUMIN/GLOB SERPL: 1.9 G/DL
ALP SERPL-CCNC: 80 U/L (ref 30–99)
ALT SERPL-CCNC: 16 U/L (ref 2–50)
ANION GAP SERPL CALC-SCNC: 6 MMOL/L (ref 0–11.9)
APPEARANCE UR: CLEAR
AST SERPL-CCNC: 25 U/L (ref 12–45)
BILIRUB SERPL-MCNC: 1 MG/DL (ref 0.1–1.5)
BILIRUB UR QL STRIP.AUTO: NEGATIVE
BUN SERPL-MCNC: 12 MG/DL (ref 8–22)
CALCIUM SERPL-MCNC: 10 MG/DL (ref 8.5–10.5)
CHLORIDE SERPL-SCNC: 106 MMOL/L (ref 96–112)
CO2 SERPL-SCNC: 28 MMOL/L (ref 20–33)
COLOR UR: YELLOW
CREAT SERPL-MCNC: 0.97 MG/DL (ref 0.5–1.4)
GLOBULIN SER CALC-MCNC: 2.4 G/DL (ref 1.9–3.5)
GLUCOSE SERPL-MCNC: 86 MG/DL (ref 65–99)
GLUCOSE UR STRIP.AUTO-MCNC: NEGATIVE MG/DL
KETONES UR STRIP.AUTO-MCNC: NEGATIVE MG/DL
LEUKOCYTE ESTERASE UR QL STRIP.AUTO: NEGATIVE
MICRO URNS: NORMAL
NITRITE UR QL STRIP.AUTO: NEGATIVE
PH UR STRIP.AUTO: 6.5 [PH] (ref 5–8)
POTASSIUM SERPL-SCNC: 4.1 MMOL/L (ref 3.6–5.5)
PROT SERPL-MCNC: 7 G/DL (ref 6–8.2)
PROT UR QL STRIP: NEGATIVE MG/DL
RBC UR QL AUTO: NEGATIVE
SODIUM SERPL-SCNC: 140 MMOL/L (ref 135–145)
SP GR UR STRIP.AUTO: 1.02
UROBILINOGEN UR STRIP.AUTO-MCNC: 1 MG/DL

## 2020-03-09 PROCEDURE — 80053 COMPREHEN METABOLIC PANEL: CPT

## 2020-03-09 PROCEDURE — 81003 URINALYSIS AUTO W/O SCOPE: CPT

## 2020-03-09 PROCEDURE — 36415 COLL VENOUS BLD VENIPUNCTURE: CPT

## 2020-03-09 PROCEDURE — 73502 X-RAY EXAM HIP UNI 2-3 VIEWS: CPT | Mod: LT

## 2020-03-10 DIAGNOSIS — E78.5 DYSLIPIDEMIA: Primary | ICD-10-CM

## 2020-03-10 RX ORDER — ATORVASTATIN CALCIUM 40 MG/1
40 TABLET, FILM COATED ORAL EVERY EVENING
Qty: 100 TAB | Refills: 3 | Status: SHIPPED | OUTPATIENT
Start: 2020-03-10 | End: 2021-04-09

## 2020-05-20 ENCOUNTER — HOSPITAL ENCOUNTER (OUTPATIENT)
Dept: RADIOLOGY | Facility: MEDICAL CENTER | Age: 75
End: 2020-05-20
Attending: STUDENT IN AN ORGANIZED HEALTH CARE EDUCATION/TRAINING PROGRAM
Payer: MEDICARE

## 2020-05-20 DIAGNOSIS — J40 BRONCHITIS, NOT SPECIFIED AS ACUTE OR CHRONIC: ICD-10-CM

## 2020-05-20 PROCEDURE — 71046 X-RAY EXAM CHEST 2 VIEWS: CPT

## 2020-06-23 ENCOUNTER — APPOINTMENT (RX ONLY)
Dept: URBAN - METROPOLITAN AREA CLINIC 4 | Facility: CLINIC | Age: 75
Setting detail: DERMATOLOGY
End: 2020-06-23

## 2020-06-23 DIAGNOSIS — L81.4 OTHER MELANIN HYPERPIGMENTATION: ICD-10-CM

## 2020-06-23 DIAGNOSIS — D22 MELANOCYTIC NEVI: ICD-10-CM

## 2020-06-23 DIAGNOSIS — D18.0 HEMANGIOMA: ICD-10-CM

## 2020-06-23 DIAGNOSIS — L82.1 OTHER SEBORRHEIC KERATOSIS: ICD-10-CM

## 2020-06-23 DIAGNOSIS — L57.8 OTHER SKIN CHANGES DUE TO CHRONIC EXPOSURE TO NONIONIZING RADIATION: ICD-10-CM

## 2020-06-23 PROBLEM — D48.5 NEOPLASM OF UNCERTAIN BEHAVIOR OF SKIN: Status: ACTIVE | Noted: 2020-06-23

## 2020-06-23 PROBLEM — D22.5 MELANOCYTIC NEVI OF TRUNK: Status: ACTIVE | Noted: 2020-06-23

## 2020-06-23 PROBLEM — D18.01 HEMANGIOMA OF SKIN AND SUBCUTANEOUS TISSUE: Status: ACTIVE | Noted: 2020-06-23

## 2020-06-23 PROCEDURE — ? COUNSELING

## 2020-06-23 PROCEDURE — ? BIOPSY BY SHAVE METHOD

## 2020-06-23 PROCEDURE — 99213 OFFICE O/P EST LOW 20 MIN: CPT | Mod: 25

## 2020-06-23 PROCEDURE — 11102 TANGNTL BX SKIN SINGLE LES: CPT

## 2020-06-23 ASSESSMENT — LOCATION SIMPLE DESCRIPTION DERM
LOCATION SIMPLE: LEFT THIGH
LOCATION SIMPLE: LEFT UPPER ARM
LOCATION SIMPLE: RIGHT FOREARM
LOCATION SIMPLE: RIGHT THIGH
LOCATION SIMPLE: LEFT FOREARM
LOCATION SIMPLE: RIGHT UPPER BACK
LOCATION SIMPLE: CHEST
LOCATION SIMPLE: LEFT CHEEK
LOCATION SIMPLE: RIGHT UPPER ARM
LOCATION SIMPLE: LEFT ZYGOMA
LOCATION SIMPLE: RIGHT CHEEK

## 2020-06-23 ASSESSMENT — LOCATION DETAILED DESCRIPTION DERM
LOCATION DETAILED: LEFT DISTAL DORSAL FOREARM
LOCATION DETAILED: RIGHT INFERIOR UPPER BACK
LOCATION DETAILED: LEFT MEDIAL SUPERIOR CHEST
LOCATION DETAILED: RIGHT ANTERIOR PROXIMAL UPPER ARM
LOCATION DETAILED: LEFT ANTERIOR DISTAL THIGH
LOCATION DETAILED: RIGHT DISTAL DORSAL FOREARM
LOCATION DETAILED: RIGHT CENTRAL MALAR CHEEK
LOCATION DETAILED: RIGHT ANTERIOR DISTAL THIGH
LOCATION DETAILED: RIGHT MID-UPPER BACK
LOCATION DETAILED: LEFT LATERAL ZYGOMA
LOCATION DETAILED: LEFT INFERIOR CENTRAL MALAR CHEEK
LOCATION DETAILED: LEFT ANTERIOR PROXIMAL UPPER ARM
LOCATION DETAILED: RIGHT SUPERIOR MEDIAL UPPER BACK

## 2020-06-23 ASSESSMENT — LOCATION ZONE DERM
LOCATION ZONE: LEG
LOCATION ZONE: FACE
LOCATION ZONE: ARM
LOCATION ZONE: TRUNK

## 2020-09-22 ENCOUNTER — HOSPITAL ENCOUNTER (OUTPATIENT)
Dept: LAB | Facility: MEDICAL CENTER | Age: 75
End: 2020-09-22
Attending: STUDENT IN AN ORGANIZED HEALTH CARE EDUCATION/TRAINING PROGRAM
Payer: MEDICARE

## 2020-09-22 LAB
ANION GAP SERPL CALC-SCNC: 12 MMOL/L (ref 7–16)
BASOPHILS # BLD AUTO: 0.4 % (ref 0–1.8)
BASOPHILS # BLD: 0.02 K/UL (ref 0–0.12)
BUN SERPL-MCNC: 12 MG/DL (ref 8–22)
CALCIUM SERPL-MCNC: 9.7 MG/DL (ref 8.5–10.5)
CHLORIDE SERPL-SCNC: 103 MMOL/L (ref 96–112)
CHOLEST SERPL-MCNC: 118 MG/DL (ref 100–199)
CO2 SERPL-SCNC: 24 MMOL/L (ref 20–33)
CREAT SERPL-MCNC: 0.83 MG/DL (ref 0.5–1.4)
EOSINOPHIL # BLD AUTO: 0.12 K/UL (ref 0–0.51)
EOSINOPHIL NFR BLD: 2.5 % (ref 0–6.9)
ERYTHROCYTE [DISTWIDTH] IN BLOOD BY AUTOMATED COUNT: 44.3 FL (ref 35.9–50)
FASTING STATUS PATIENT QL REPORTED: NORMAL
GLUCOSE SERPL-MCNC: 91 MG/DL (ref 65–99)
HCT VFR BLD AUTO: 44.9 % (ref 42–52)
HDLC SERPL-MCNC: 55 MG/DL
HGB BLD-MCNC: 14.7 G/DL (ref 14–18)
IMM GRANULOCYTES # BLD AUTO: 0.02 K/UL (ref 0–0.11)
IMM GRANULOCYTES NFR BLD AUTO: 0.4 % (ref 0–0.9)
IRON SATN MFR SERPL: 37 % (ref 15–55)
IRON SERPL-MCNC: 130 UG/DL (ref 50–180)
LDLC SERPL CALC-MCNC: 51 MG/DL
LYMPHOCYTES # BLD AUTO: 1.44 K/UL (ref 1–4.8)
LYMPHOCYTES NFR BLD: 30.4 % (ref 22–41)
MCH RBC QN AUTO: 30.4 PG (ref 27–33)
MCHC RBC AUTO-ENTMCNC: 32.7 G/DL (ref 33.7–35.3)
MCV RBC AUTO: 93 FL (ref 81.4–97.8)
MONOCYTES # BLD AUTO: 0.3 K/UL (ref 0–0.85)
MONOCYTES NFR BLD AUTO: 6.3 % (ref 0–13.4)
NEUTROPHILS # BLD AUTO: 2.83 K/UL (ref 1.82–7.42)
NEUTROPHILS NFR BLD: 60 % (ref 44–72)
NRBC # BLD AUTO: 0 K/UL
NRBC BLD-RTO: 0 /100 WBC
PLATELET # BLD AUTO: 193 K/UL (ref 164–446)
PMV BLD AUTO: 9.7 FL (ref 9–12.9)
POTASSIUM SERPL-SCNC: 4.5 MMOL/L (ref 3.6–5.5)
RBC # BLD AUTO: 4.83 M/UL (ref 4.7–6.1)
SODIUM SERPL-SCNC: 139 MMOL/L (ref 135–145)
TIBC SERPL-MCNC: 351 UG/DL (ref 250–450)
TRIGL SERPL-MCNC: 61 MG/DL (ref 0–149)
TSH SERPL DL<=0.005 MIU/L-ACNC: 4.94 UIU/ML (ref 0.38–5.33)
UIBC SERPL-MCNC: 221 UG/DL (ref 110–370)
VIT B12 SERPL-MCNC: 353 PG/ML (ref 211–911)
WBC # BLD AUTO: 4.7 K/UL (ref 4.8–10.8)

## 2020-09-22 PROCEDURE — 83550 IRON BINDING TEST: CPT

## 2020-09-22 PROCEDURE — 82607 VITAMIN B-12: CPT

## 2020-09-22 PROCEDURE — 80048 BASIC METABOLIC PNL TOTAL CA: CPT

## 2020-09-22 PROCEDURE — 83540 ASSAY OF IRON: CPT

## 2020-09-22 PROCEDURE — 84443 ASSAY THYROID STIM HORMONE: CPT

## 2020-09-22 PROCEDURE — 80061 LIPID PANEL: CPT

## 2020-09-22 PROCEDURE — 85025 COMPLETE CBC W/AUTO DIFF WBC: CPT

## 2020-09-22 PROCEDURE — 36415 COLL VENOUS BLD VENIPUNCTURE: CPT

## 2020-12-14 ENCOUNTER — OFFICE VISIT (OUTPATIENT)
Dept: URGENT CARE | Facility: PHYSICIAN GROUP | Age: 75
End: 2020-12-14
Payer: MEDICARE

## 2020-12-14 VITALS
SYSTOLIC BLOOD PRESSURE: 136 MMHG | RESPIRATION RATE: 14 BRPM | TEMPERATURE: 98.8 F | OXYGEN SATURATION: 96 % | DIASTOLIC BLOOD PRESSURE: 84 MMHG | HEART RATE: 76 BPM

## 2020-12-14 DIAGNOSIS — M54.50 CHRONIC RIGHT-SIDED LOW BACK PAIN WITHOUT SCIATICA: ICD-10-CM

## 2020-12-14 DIAGNOSIS — G89.29 CHRONIC RIGHT-SIDED LOW BACK PAIN WITHOUT SCIATICA: ICD-10-CM

## 2020-12-14 PROCEDURE — 99214 OFFICE O/P EST MOD 30 MIN: CPT | Performed by: STUDENT IN AN ORGANIZED HEALTH CARE EDUCATION/TRAINING PROGRAM

## 2020-12-14 RX ORDER — KETOROLAC TROMETHAMINE 30 MG/ML
30 INJECTION, SOLUTION INTRAMUSCULAR; INTRAVENOUS ONCE
Status: COMPLETED | OUTPATIENT
Start: 2020-12-14 | End: 2020-12-14

## 2020-12-14 RX ADMIN — KETOROLAC TROMETHAMINE 30 MG: 30 INJECTION, SOLUTION INTRAMUSCULAR; INTRAVENOUS at 14:06

## 2020-12-14 NOTE — PROGRESS NOTES
Subjective:   CHIEF COMPLAINT  Chief Complaint   Patient presents with   • Back Pain     lower right side pain,x 4-5 days       HPI  Angelo Magaña is a 75 y.o. male with a past medical history of chronic low back pain presents with chief complaint of right-sided low back pain for approximately 4 to 5 days.  He has had approximately 5 previous lumbar spinal surgeries but no history of recent trauma or surgery.  Symptoms are worse with weightbearing and improve when at rest.  He has tried taking Tylenol which has provided nominal relief.  He has not tried any NSAID.  pain does not radiate.  He is not experiencing any dysuria or hematuria.  No abdominal pain.  Normal number of daily bowel movements.  Denies any associated symptoms of spontaneous loss of bowels or bladder or saddle anesthesia.    REVIEW OF SYSTEMS  General: no fever or chills  GI: no nausea or vomiting  See HPI for further details.    PAST MEDICAL HISTORY   has a past medical history of ASTHMA, Bronchitis (02/2018), Erectile dysfunction (6/8/2016), GERD (gastroesophageal reflux disease) (6/8/2016), Heart burn, History of skin cancer (6/8/2016), Hyperlipidemia (6/8/2016), Hypertension, Hypothyroidism (6/8/2016), Indigestion, Obesity (6/8/2016), Pain (04/19/2018), Preventative health care (6/8/2016), Rosacea (6/8/2016), and Seasonal allergies (6/8/2016).    SURGICAL HISTORY   has a past surgical history that includes hip arthroplasty total (1/21/2009); knee arthroplasty total (5/26/2009); inguinal hernia repair (6/10/08); inguinal hernia repair (9/2/08); lumbar fusion posterior (9/9/2009); lumbar laminectomy diskectomy (9/9/2009); other; hernia repair (2008); other; lumbar fusion posterior (N/A, 3/17/2017); lumbar laminectomy diskectomy (N/A, 3/17/2017); hardware removal neuro (N/A, 3/17/2017); irrigation & debridement neuro (3/30/2017); appendectomy (1953); nancy by laparoscopy (1982); tonsillectomy (1951); ventral hernia repair (Left, 5/3/2018);  lumbar fusion o-arm (N/A, 11/19/2018); lumbar laminectomy diskectomy (N/A, 11/19/2018); hardware removal neuro (N/A, 11/19/2018); and kyphoplasty (N/A, 11/19/2018).    ALLERGIES  No Known Allergies    CURRENT MEDICATIONS  Home Medications     Reviewed by Aimee Dubon Med Ass't (Medical Assistant) on 12/14/20 at 1303  Med List Status: <None>   Medication Last Dose Status   acetaminophen (TYLENOL) 500 MG Tab Taking Active   ADVAIR DISKUS 100-50 MCG/DOSE AEROSOL POWDER, BREATH ACTIVATED Taking Active   atorvastatin (LIPITOR) 40 MG Tab Taking Active   ferrous sulfate 325 (65 Fe) MG tablet Taking Active   gabapentin (NEURONTIN) 300 MG Cap Taking Active   IBUPROFEN PO Taking Active   levothyroxine (SYNTHROID) 50 MCG Tab Taking Active   metoprolol SR (TOPROL XL) 25 MG TABLET SR 24 HR Taking Active   omeprazole (PRILOSEC) 20 MG delayed-release capsule Taking Active   tamsulosin (FLOMAX) 0.4 MG capsule Taking Active                SOCIAL HISTORY  Social History     Tobacco Use   • Smoking status: Never Smoker   • Smokeless tobacco: Never Used   Substance and Sexual Activity   • Alcohol use: Yes     Comment: 1/week    • Drug use: No     Comment: marijuana edibles (for pain control)   • Sexual activity: Not on file       FAMILY HISTORY  Family History   Problem Relation Age of Onset   • Hypertension Mother    • Dementia Mother    • Hypertension Father    • Lung Disease Father         asthma          Objective:   PHYSICAL EXAM  VITAL SIGNS: /84 (BP Location: Left arm, Patient Position: Sitting, BP Cuff Size: Adult)   Pulse 76   Temp 37.1 °C (98.8 °F) (Temporal)   Resp 14   SpO2 96%     Gen: no acute distress, normal voice  Skin: dry, intact, moist mucosal membranes  Lungs: CTAB w/ symmetric expansion  CV: RRR w/o murmurs or clicks  MSK: Antalgic gait.  Scar midline lumbar region consistent with previous spinal surgery.  Limited range of motion secondary to pain. no midline tenderness to palpation.  No  tenderness to palpation along the right paraspinal muscles or thoracic region.  No CVA tenderness to palpation.  Negative logroll test.  Negative straight leg test.  Unable to reproduce symptoms.  Distal sensation motor fully intact  Abdomen: Normal bowel sounds.  Soft, no tenderness, rebound or guarding  Psych: normal affect, normal judgement, alert, awake    UA: Negative blood, nitrite or leukocytes    Assessment/Plan:     1. Chronic right-sided low back pain without sciatica  ketorolac (TORADOL) injection 30 mg   Suspect symptoms are musculoskeletal in nature.  UA was unremarkable.  No red flags and exam was reassuring.  He was given Toradol 30 mg x 1 in the clinic.  -Instructed to try ibuprofen 600 mg 3 times daily as needed plus Tylenol as needed  -Activity as tolerated  -Encourage patient to follow-up with pain management specialist  -Pt was in full understanding and agreement with the plan.    Differential diagnosis, natural history, supportive care, and indications for immediate follow-up discussed. All questions answered. Patient agrees with the plan of care.    Follow-up as needed if symptoms worsen or fail to improve to PCP, Urgent care or Emergency Room.    Please note that this dictation was created using voice recognition software. I have made a reasonable attempt to correct obvious errors, but I expect that there are errors of grammar and possibly content that I did not discover before finalizing the note.

## 2021-01-15 ENCOUNTER — TELEPHONE (OUTPATIENT)
Dept: CARDIOLOGY | Facility: MEDICAL CENTER | Age: 76
End: 2021-01-15

## 2021-01-15 ENCOUNTER — OFFICE VISIT (OUTPATIENT)
Dept: URGENT CARE | Facility: PHYSICIAN GROUP | Age: 76
End: 2021-01-15
Payer: MEDICARE

## 2021-01-15 VITALS
DIASTOLIC BLOOD PRESSURE: 60 MMHG | OXYGEN SATURATION: 95 % | TEMPERATURE: 98.8 F | SYSTOLIC BLOOD PRESSURE: 100 MMHG | RESPIRATION RATE: 14 BRPM | HEART RATE: 68 BPM

## 2021-01-15 DIAGNOSIS — I71.20 THORACIC AORTIC ANEURYSM WITHOUT RUPTURE (HCC): ICD-10-CM

## 2021-01-15 DIAGNOSIS — R07.89 CHEST DISCOMFORT: ICD-10-CM

## 2021-01-15 DIAGNOSIS — Z23 NEED FOR VACCINATION: ICD-10-CM

## 2021-01-15 PROCEDURE — 93000 ELECTROCARDIOGRAM COMPLETE: CPT | Performed by: PHYSICIAN ASSISTANT

## 2021-01-15 PROCEDURE — 99214 OFFICE O/P EST MOD 30 MIN: CPT | Performed by: PHYSICIAN ASSISTANT

## 2021-01-15 ASSESSMENT — ENCOUNTER SYMPTOMS
SHORTNESS OF BREATH: 0
PALPITATIONS: 0
SPUTUM PRODUCTION: 0
ABDOMINAL PAIN: 0

## 2021-01-15 NOTE — TELEPHONE ENCOUNTER
TO: /Fri/Ofc  NM: Itz Magaña   PH: [phone]   PT NM: Itz Magaña   : 49   REG DR: Dr Jung   RE: Had an episode 2 nights ago with  chest pain.  Urgent care doctor  advised to get in as soon as possible.

## 2021-01-15 NOTE — PROGRESS NOTES
Subjective:   Angelo Magaña is a 75 y.o. male who presents today with   Chief Complaint   Patient presents with   • Chest Pain     left side of chest pain 2 days ago subsided after 10 minutes went away now today having throbing       Chest Pain   This is a new problem. Episode onset: 2 days. The problem has been gradually improving. The pain is present in the substernal region. The pain is mild. The quality of the pain is described as pressure and sharp. The pain radiates to the left arm. Pertinent negatives include no abdominal pain, palpitations, shortness of breath or sputum production. The pain is aggravated by nothing. He has tried nothing for the symptoms. The treatment provided moderate relief. Risk factors include male gender, sedentary lifestyle and being elderly.   His past medical history is significant for aortic aneurysm.     HX of Thoracic Aortic Aneurysm. 4.0 x 4.1cm in 2019.  Patient states he was sitting in his recliner and his wife woke him up to go to bed and he started noticing some left-sided chest pain that was sharp in nature and lasted for what he believes was about 10 minutes.  He states he started having some sweating and pain that radiated down his left arm.  He states that the pain went away and now is a very mild throbbing pain.  Pain does not radiate to his back.  Patient states he was attempted to call 911 at that time but the pain went away quickly.  PMH:  has a past medical history of ASTHMA, Bronchitis (02/2018), Erectile dysfunction (6/8/2016), GERD (gastroesophageal reflux disease) (6/8/2016), Heart burn, History of skin cancer (6/8/2016), Hyperlipidemia (6/8/2016), Hypertension, Hypothyroidism (6/8/2016), Indigestion, Obesity (6/8/2016), Pain (04/19/2018), Preventative health care (6/8/2016), Rosacea (6/8/2016), and Seasonal allergies (6/8/2016).  MEDS:   Current Outpatient Medications:   •  metoprolol SR (TOPROL XL) 25 MG TABLET SR 24 HR, TAKE 1/2 TABLET BY MOUTH EVERY  DAY, Disp: 45 Tab, Rfl: 1  •  atorvastatin (LIPITOR) 40 MG Tab, Take 1 Tab by mouth every evening. Indications: High Amount of Fats in the Blood, Disp: 100 Tab, Rfl: 3  •  ferrous sulfate 325 (65 Fe) MG tablet, Take 325 mg by mouth every day., Disp: , Rfl:   •  tamsulosin (FLOMAX) 0.4 MG capsule, TAKE ONE CAPSULE BY MOUTH EVERY DAY FOR BPH. GIVE 1/2 HOUR AFTER BREAKFAST, Disp: 90 Cap, Rfl: 0  •  gabapentin (NEURONTIN) 300 MG Cap, Indications: twice a day, Disp: , Rfl: 5  •  IBUPROFEN PO, Take 500 mg by mouth every 6 hours as needed., Disp: , Rfl:   •  ADVAIR DISKUS 100-50 MCG/DOSE AEROSOL POWDER, BREATH ACTIVATED, INHALE 1 PUFF BY MOUTH TWICE A DAY *RINSE MOUTH AFTER USE*, Disp: 3 Inhaler, Rfl: 0  •  acetaminophen (TYLENOL) 500 MG Tab, Take 2 Tabs by mouth 3 times a day., Disp: , Rfl:   •  omeprazole (PRILOSEC) 20 MG delayed-release capsule, Take 20 mg by mouth every morning., Disp: , Rfl:   •  levothyroxine (SYNTHROID) 50 MCG Tab, TAKE 1 TABLET BY MOUTH EVERY DAY, Disp: 90 Tab, Rfl: 1  ALLERGIES: No Known Allergies  SURGHX:   Past Surgical History:   Procedure Laterality Date   • LUMBAR FUSION O-ARM N/A 11/19/2018    Procedure: LUMBAR FUSION O-ARM-  POSTERIOR L2-3 TLIF,;  Surgeon: Brandon Fowler M.D.;  Location: [facility];  Service: Neurosurgery   • LUMBAR LAMINECTOMY DISKECTOMY N/A 11/19/2018    Procedure: LUMBAR LAMINECTOMY DISKECTOMY-  L2-3 LAMI;  Surgeon: Brandon Fowler M.D.;  Location: [facility];  Service: Neurosurgery   • HARDWARE REMOVAL NEURO N/A 11/19/2018    Procedure: HARDWARE REMOVAL NEURO-  L4-5, L3 SCREW REPOSITIONING;  Surgeon: Brandon Fowler M.D.;  Location: [facility];  Service: Neurosurgery   • KYPHOPLASTY N/A 11/19/2018    Procedure: KYPHOPLASTY-  L2;  Surgeon: Brandon Fowler M.D.;  Location: [facility];  Service: Neurosurgery   • VENTRAL HERNIA REPAIR Left 5/3/2018    Procedure: VENTRAL HERNIA REPAIR FOR LUMBAR HERNIA/ LUNG HERNIA THROUGH  CHEST WALL RECONSTRUCTION, MAJOR  ;  Surgeon: Phoenix Becker M.D.;  Location: SURGERY [facility];  Service: General   • IRRIGATION & DEBRIDEMENT NEURO  3/30/2017    Procedure: IRRIGATION & DEBRIDEMENT NEURO-LUMBAR WOUND;  Surgeon: Josiah Chakraborty M.D.;  Location: SURGERY [facility];  Service:    • LUMBAR FUSION POSTERIOR N/A 3/17/2017    Procedure: LUMBAR FUSION POSTERIOR L3-4 EXTENSION;  Surgeon: Josiah Chakraborty M.D.;  Location: SURGERY [facility];  Service:    • LUMBAR LAMINECTOMY DISKECTOMY N/A 3/17/2017    Procedure: LUMBAR LAMINECTOMY DISKECTOMY L3 & REDO L4;  Surgeon: Josiah Chakrabroty M.D.;  Location: SURGERY [facility];  Service:    • HARDWARE REMOVAL NEURO N/A 3/17/2017    Procedure: HARDWARE REMOVAL NEURO L4-5;  Surgeon: Josiah Chakraborty M.D.;  Location: SURGERY [facility];  Service:    • LUMBAR FUSION POSTERIOR  9/9/2009    Performed by JOSIAH CHAKRABORTY at SURGERY [facility]   • LUMBAR LAMINECTOMY DISKECTOMY  9/9/2009    Performed by JOSIAH CHAKRABORTY at SURGERY [facility]   • KNEE ARTHROPLASTY TOTAL  5/26/2009    Performed by NAREN DANIELSON at SURGERY [facility]   • HIP ARTHROPLASTY TOTAL  1/21/2009    Performed by NAREN DANIELSON at SURGERY [facility]   • INGUINAL HERNIA REPAIR  9/2/08    Performed by PHOENIX BECKER at SURGERY SAME DAY [facility]   • INGUINAL HERNIA REPAIR  6/10/08    Performed by PHOENIX BECKER at SURGERY SAME DAY [facility]   • HERNIA REPAIR  2008    HERNIA    • ROMAN BY LAPAROSCOPY  1982   • APPENDECTOMY  1953   • TONSILLECTOMY  1951   • OTHER      DISCECTOMY WITH HARWWARE   • OTHER      GASTRIC BYPASS AT 30 YEARS     SOCHX:  reports that he has never smoked. He has never used smokeless tobacco. He reports current alcohol use. He reports that he does not use drugs.  FH: Reviewed with patient, not pertinent to this visit.       Review of Systems   Respiratory: Negative for sputum production and shortness of breath.    Cardiovascular: Positive  for chest pain. Negative for palpitations.   Gastrointestinal: Negative for abdominal pain.   All other systems reviewed and are negative.       Objective:   /60 (BP Location: Left arm, Patient Position: Sitting, BP Cuff Size: Adult)   Pulse 68   Temp 37.1 °C (98.8 °F) (Temporal)   Resp 14   SpO2 95%   Physical Exam  Vitals signs and nursing note reviewed.   Constitutional:       General: He is not in acute distress.     Appearance: Normal appearance. He is well-developed. He is not ill-appearing or toxic-appearing.   HENT:      Head: Normocephalic and atraumatic.      Right Ear: Hearing normal.      Left Ear: Hearing normal.   Eyes:      Pupils: Pupils are equal, round, and reactive to light.   Cardiovascular:      Rate and Rhythm: Normal rate and regular rhythm.      Heart sounds: Normal heart sounds.   Pulmonary:      Effort: Pulmonary effort is normal.   Musculoskeletal:      Comments: Normal movement in all 4 extremities   Skin:     General: Skin is warm and dry.   Neurological:      General: No focal deficit present.      Mental Status: He is alert.      GCS: GCS eye subscore is 4. GCS verbal subscore is 5. GCS motor subscore is 6.      Cranial Nerves: Cranial nerves are intact.      Motor: Motor function is intact.      Coordination: Coordination is intact. Coordination normal.   Psychiatric:         Mood and Affect: Mood normal.         EKG when compared with most recent done in 11/19  Rate of 62.  Normal sinus rhythm.  No acute changes appreciated but also artifact disrupting the read today.  Still shows abnormal R wave progression as well as artifact.  Assessment/Plan:   Assessment    1. Chest discomfort  - EKG - Clinic Performed  Extensively discussed with patient multiple times EKG does not rule out acute cardiac event and given his history and presentation he needs immediate workup in the ER. Explained this multiple times and he is able to repeat this back to me.  Discussed high concern with  his history of thoracic aortic aneurysm and discussed potential risk and outcomes of not going to the ER including but not limited to death and patient is understanding of this.  He would like to try to follow-up with his cardiologist first.  Discussed with him that he needs immediate follow-up and he is understanding and would still choose to follow-up with his cardiologist before going to the ER. AMA signed and scanned into chart.  Offered to call for [facility] but he declines as well.  Please note that this dictation was created using voice recognition software. I have made every reasonable attempt to correct obvious errors, but I expect that there are errors of grammar and possibly content that I did not discover before finalizing the note.    Dimitri Moore PA-C

## 2021-01-16 NOTE — TELEPHONE ENCOUNTER
"Called pt. He explains that on Wednesday night he was asleep in his chair when his wife woke him up to go to bed. He then felt a sharp intense pain in his left upper chest that lasted for about 10 minutes.  He then went to bed, slept well, and felt well enough the next day to ride his exercise bike for an hour. After a friend expressed concern and encouraged him to be evaluated, he went to urgent care today. They advised he go to the ER, but pt was concerned about his wife who has alzheimers and opted to call our office to schedule an appt instead. Advised pt that the symptoms he experienced sound concerning and that our office would advise the ER as well. Pt understands this, but would like to try an office visit first. He said he is feeling much better now, though he has an occasional \"nagging\" sensation in the same left upper chest area. He says that the episode of chest pain was \"very scary\" and he agrees that if symptoms return he will go to the ER. Scheduled to see RT on 1/19.   "

## 2021-01-19 ENCOUNTER — OFFICE VISIT (OUTPATIENT)
Dept: CARDIOLOGY | Facility: MEDICAL CENTER | Age: 76
End: 2021-01-19
Payer: MEDICARE

## 2021-01-19 VITALS
SYSTOLIC BLOOD PRESSURE: 116 MMHG | OXYGEN SATURATION: 95 % | BODY MASS INDEX: 36.98 KG/M2 | HEART RATE: 79 BPM | HEIGHT: 68 IN | DIASTOLIC BLOOD PRESSURE: 74 MMHG | WEIGHT: 244 LBS

## 2021-01-19 DIAGNOSIS — I77.810 ASCENDING AORTA DILATION (HCC): ICD-10-CM

## 2021-01-19 DIAGNOSIS — I20.89 ANGINA OF EFFORT (HCC): ICD-10-CM

## 2021-01-19 DIAGNOSIS — I10 ESSENTIAL HYPERTENSION: ICD-10-CM

## 2021-01-19 DIAGNOSIS — I71.20 THORACIC AORTIC ANEURYSM WITHOUT RUPTURE (HCC): ICD-10-CM

## 2021-01-19 PROCEDURE — 99214 OFFICE O/P EST MOD 30 MIN: CPT | Performed by: NURSE PRACTITIONER

## 2021-01-19 RX ORDER — ASPIRIN 81 MG/1
81 TABLET, CHEWABLE ORAL DAILY
Qty: 100 TAB | Refills: 2 | Status: SHIPPED | OUTPATIENT
Start: 2021-01-19 | End: 2023-08-21

## 2021-01-19 ASSESSMENT — ENCOUNTER SYMPTOMS
SPUTUM PRODUCTION: 0
NERVOUS/ANXIOUS: 0
ORTHOPNEA: 0
PND: 0
DIZZINESS: 0
COUGH: 0
CLAUDICATION: 0
HEMOPTYSIS: 0
VOMITING: 0
SHORTNESS OF BREATH: 0
WEAKNESS: 0
NAUSEA: 0
WHEEZING: 0
PALPITATIONS: 0

## 2021-01-19 ASSESSMENT — FIBROSIS 4 INDEX: FIB4 SCORE: 2.36

## 2021-01-19 NOTE — PROGRESS NOTES
Chief Complaint   Patient presents with   • Coronary Artery Disease     F/V DX:CAD       Subjective:   Itz Magaña is a 75 y.o. male who presents today for urgent care follow up chest pain.    He is followed by Dr. Chavez in our clinic, history of AAA 4.1cm, coronary arteriosclerosis, and dyslipidemia.    Urgent care 1/15/2021 with chest pain radiating to the substernal region. Resided in 5 minutes. EKG at urgent are showed no acute changes. 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     Interim events:  Patient has not have recurring chest pain since the initial episodes. Tolerating his mediations well.     Denies any sob, dizziness or palpitations.       Past Medical History:   Diagnosis Date   • ASTHMA     scheduled inhaler use   • Bronchitis 02/2018   • Erectile dysfunction 6/8/2016   • GERD (gastroesophageal reflux disease) 6/8/2016   • Heart burn    • History of skin cancer 6/8/2016   • Hyperlipidemia 6/8/2016   • Hypertension    • Hypothyroidism 6/8/2016   • Indigestion    • Obesity 6/8/2016   • Pain 04/19/2018    chronic back pain, 0/10   • Preventative health care 6/8/2016   • Rosacea 6/8/2016   • Seasonal allergies 6/8/2016     Past Surgical History:   Procedure Laterality Date   • LUMBAR FUSION O-ARM N/A 11/19/2018    Procedure: LUMBAR FUSION O-ARM-  POSTERIOR L2-3 TLIF,;  Surgeon: Brandon Fowler M.D.;  Location: [facility];  Service: Neurosurgery   • LUMBAR LAMINECTOMY DISKECTOMY N/A 11/19/2018    Procedure: LUMBAR LAMINECTOMY DISKECTOMY-  L2-3 LAMI;  Surgeon: Brandon Fowler M.D.;  Location: [facility];  Service: Neurosurgery   • HARDWARE REMOVAL NEURO N/A 11/19/2018    Procedure: HARDWARE REMOVAL NEURO-  L4-5, L3 SCREW REPOSITIONING;  Surgeon: Brandon Fowler M.D.;  Location: [facility];  Service: Neurosurgery   • KYPHOPLASTY N/A 11/19/2018    Procedure: KYPHOPLASTY-  L2;  Surgeon: Brandon Fowler M.D.;  Location: [facility];  Service: Neurosurgery   • VENTRAL HERNIA REPAIR Left 5/3/2018    Procedure: VENTRAL HERNIA REPAIR FOR LUMBAR HERNIA/ LUNG HERNIA THROUGH CHEST WALL RECONSTRUCTION, MAJOR  ;  Surgeon: Phoenix Becker M.D.;  Location: SURGERY [facility];  Service: General   • IRRIGATION & DEBRIDEMENT NEURO  3/30/2017    Procedure: IRRIGATION & DEBRIDEMENT NEURO-LUMBAR WOUND;  Surgeon: Josiah Chakraborty M.D.;  Location: SURGERY [facility];  Service:    • LUMBAR FUSION POSTERIOR N/A 3/17/2017    Procedure: LUMBAR FUSION POSTERIOR L3-4 EXTENSION;  Surgeon: Josiah Chakraborty M.D.;  Location: SURGERY [facility];  Service:    • LUMBAR LAMINECTOMY DISKECTOMY N/A 3/17/2017    Procedure: LUMBAR LAMINECTOMY DISKECTOMY L3 & REDO L4;  Surgeon: Josiah Chakraborty M.D.;  Location: SURGERY [facility];  Service:    • HARDWARE REMOVAL NEURO N/A 3/17/2017    Procedure: HARDWARE REMOVAL NEURO L4-5;  Surgeon: Josiah Chakraborty M.D.;  Location: SURGERY [facility];  Service:    • LUMBAR FUSION POSTERIOR  9/9/2009    Performed by JOSIAH CHAKRABORTY at SURGERY [facility]   • LUMBAR LAMINECTOMY DISKECTOMY  9/9/2009    Performed by JOSIAH CHAKRABORTY at SURGERY [facility]   • KNEE ARTHROPLASTY TOTAL  5/26/2009    Performed by NAREN DANIELSON at SURGERY [facility]   • HIP ARTHROPLASTY TOTAL  1/21/2009    Performed by NAREN DANIELSON at SURGERY [facility]   • INGUINAL HERNIA REPAIR  9/2/08    Performed by PHOENIX BECKER at SURGERY SAME DAY [facility]   • INGUINAL HERNIA REPAIR  6/10/08    Performed by PHOENIX BECKER at SURGERY SAME DAY [facility]S   • HERNIA REPAIR  2008    HERNIA    • ROMAN BY LAPAROSCOPY  1982   • APPENDECTOMY  1953   • TONSILLECTOMY  1951   • OTHER      DISCECTOMY WITH HARWWARE   • OTHER      GASTRIC BYPASS AT 30 YEARS     Family History   Problem Relation Age of Onset   • Hypertension Mother    • Dementia Mother    • Hypertension Father    • Lung Disease Father         asthma     Social History      Socioeconomic History   • Marital status:      Spouse name: Not on file   • Number of children: Not on file   • Years of education: Not on file   • Highest education level: Not on file   Occupational History   • Not on file   Social Needs   • Financial resource strain: Not on file   • Food insecurity     Worry: Not on file     Inability: Not on file   • Transportation needs     Medical: Not on file     Non-medical: Not on file   Tobacco Use   • Smoking status: Never Smoker   • Smokeless tobacco: Never Used   Substance and Sexual Activity   • Alcohol use: Yes     Comment: 1/week    • Drug use: No     Comment: marijuana edibles (for pain control)   • Sexual activity: Not on file   Lifestyle   • Physical activity     Days per week: Not on file     Minutes per session: Not on file   • Stress: Not on file   Relationships   • Social connections     Talks on phone: Not on file     Gets together: Not on file     Attends Tenriism service: Not on file     Active member of club or organization: Not on file     Attends meetings of clubs or organizations: Not on file     Relationship status: Not on file   • Intimate partner violence     Fear of current or ex partner: Not on file     Emotionally abused: Not on file     Physically abused: Not on file     Forced sexual activity: Not on file   Other Topics Concern   •  Service No   • Blood Transfusions No   • Caffeine Concern No   • Occupational Exposure No   • Hobby Hazards No   • Sleep Concern No   • Stress Concern No   • Weight Concern Yes   • Special Diet No   • Back Care Yes   • Exercise Yes   • Bike Helmet No     Comment: does not ride bike    • Seat Belt Yes   • Self-Exams No   Social History Narrative   • Not on file     No Known Allergies  Outpatient Encounter Medications as of 1/19/2021   Medication Sig Dispense Refill   • aspirin (ASA) 81 MG Chew Tab chewable tablet Chew 1 Tab every day. 100 Tab 2   • metoprolol SR (TOPROL XL) 25 MG TABLET SR 24 HR  "TAKE 1/2 TABLET BY MOUTH EVERY DAY 45 Tab 1   • atorvastatin (LIPITOR) 40 MG Tab Take 1 Tab by mouth every evening. Indications: High Amount of Fats in the Blood 100 Tab 3   • tamsulosin (FLOMAX) 0.4 MG capsule TAKE ONE CAPSULE BY MOUTH EVERY DAY FOR BPH. GIVE 1/2 HOUR AFTER BREAKFAST 90 Cap 0   • gabapentin (NEURONTIN) 300 MG Cap Indications: twice a day  5   • ADVAIR DISKUS 100-50 MCG/DOSE AEROSOL POWDER, BREATH ACTIVATED INHALE 1 PUFF BY MOUTH TWICE A DAY *RINSE MOUTH AFTER USE* 3 Inhaler 0   • acetaminophen (TYLENOL) 500 MG Tab Take 2 Tabs by mouth 3 times a day.     • omeprazole (PRILOSEC) 20 MG delayed-release capsule Take 20 mg by mouth every morning.     • levothyroxine (SYNTHROID) 50 MCG Tab TAKE 1 TABLET BY MOUTH EVERY DAY 90 Tab 1   • [DISCONTINUED] ferrous sulfate 325 (65 Fe) MG tablet Take 325 mg by mouth every day.     • [DISCONTINUED] IBUPROFEN PO Take 500 mg by mouth every 6 hours as needed.       No facility-administered encounter medications on file as of 1/19/2021.      Review of Systems   Constitutional: Negative for malaise/fatigue.   Respiratory: Negative for cough, hemoptysis, sputum production, shortness of breath and wheezing.    Cardiovascular: Negative for chest pain, palpitations, orthopnea, claudication, leg swelling and PND.   Gastrointestinal: Negative for nausea and vomiting.   Neurological: Negative for dizziness and weakness.   Psychiatric/Behavioral: The patient is not nervous/anxious.         Objective:   /74 (BP Location: Left arm, Patient Position: Sitting, BP Cuff Size: Large adult)   Pulse 79   Ht 1.727 m (5' 8\")   Wt 110.7 kg (244 lb)   SpO2 95%   BMI 37.10 kg/m²     Physical Exam   Constitutional: He is oriented to person, place, and time. He appears well-developed and well-nourished. No distress.   HENT:   Head: Normocephalic and atraumatic.   Eyes: Pupils are equal, round, and reactive to light.   Neck: No JVD present.   Cardiovascular: Normal rate, regular " rhythm and normal heart sounds.   No murmur heard.  Pulmonary/Chest: Effort normal and breath sounds normal. No respiratory distress.   Abdominal: Soft. Bowel sounds are normal. He exhibits no distension.   Musculoskeletal:         General: No edema.   Neurological: He is alert and oriented to person, place, and time.   Skin: Skin is warm and dry. He is not diaphoretic.   Psychiatric: He has a normal mood and affect. His behavior is normal. Judgment and thought content normal.         Stress test   2012  1. The calculated left ventricular ejection fraction is 62%.   2.  There are no wall motion abnormalities.   3.  No evidence of infarct or reversible ischemia.    Assessment:     1. Angina of effort (HCC)     2. Essential hypertension     3. Thoracic aortic aneurysm without rupture (HCC)  EC-ECHOCARDIOGRAM COMPLETE W/O CONT   4. Ascending aorta dilation (CMS-HCC), mild, needs OP followup  EC-ECHOCARDIOGRAM COMPLETE W/O CONT       Medical Decision Making:  Today's Assessment / Status / Plan:     1. Angina of effort:  - resolved   - get echo to evaluate AAA, previously 4.1cm   - start asa 81mg qd and recommend stopping ibuprofen   - continue metoprolol 25mg XL and atorvastatin 40mg qd     2. Hypertension:  - stable     Follow up in 1 month after echo. ER precaution discussed.

## 2021-02-17 ENCOUNTER — HOSPITAL ENCOUNTER (OUTPATIENT)
Dept: CARDIOLOGY | Facility: MEDICAL CENTER | Age: 76
End: 2021-02-17
Attending: NURSE PRACTITIONER
Payer: MEDICARE

## 2021-02-17 DIAGNOSIS — I77.810 ASCENDING AORTA DILATION (HCC): ICD-10-CM

## 2021-02-17 DIAGNOSIS — I71.20 THORACIC AORTIC ANEURYSM WITHOUT RUPTURE (HCC): ICD-10-CM

## 2021-02-17 LAB
LV EJECT FRACT  99904: 65
LV EJECT FRACT MOD 2C 99903: 70.52
LV EJECT FRACT MOD 4C 99902: 77.38
LV EJECT FRACT MOD BP 99901: 75.46

## 2021-02-17 PROCEDURE — 93306 TTE W/DOPPLER COMPLETE: CPT

## 2021-02-17 PROCEDURE — 93306 TTE W/DOPPLER COMPLETE: CPT | Mod: 26 | Performed by: INTERNAL MEDICINE

## 2021-02-22 ENCOUNTER — OFFICE VISIT (OUTPATIENT)
Dept: CARDIOLOGY | Facility: MEDICAL CENTER | Age: 76
End: 2021-02-22
Payer: MEDICARE

## 2021-02-22 VITALS
HEART RATE: 65 BPM | WEIGHT: 253.3 LBS | RESPIRATION RATE: 12 BRPM | BODY MASS INDEX: 38.39 KG/M2 | SYSTOLIC BLOOD PRESSURE: 108 MMHG | OXYGEN SATURATION: 96 % | HEIGHT: 68 IN | DIASTOLIC BLOOD PRESSURE: 82 MMHG

## 2021-02-22 DIAGNOSIS — I10 ESSENTIAL HYPERTENSION, BENIGN: ICD-10-CM

## 2021-02-22 DIAGNOSIS — I25.10 CORONARY ARTERIOSCLEROSIS: ICD-10-CM

## 2021-02-22 DIAGNOSIS — I25.10 CORONARY ARTERY DISEASE WITHOUT ANGINA PECTORIS, UNSPECIFIED VESSEL OR LESION TYPE, UNSPECIFIED WHETHER NATIVE OR TRANSPLANTED HEART: ICD-10-CM

## 2021-02-22 DIAGNOSIS — E78.2 MIXED HYPERLIPIDEMIA: ICD-10-CM

## 2021-02-22 PROCEDURE — 99214 OFFICE O/P EST MOD 30 MIN: CPT | Performed by: NURSE PRACTITIONER

## 2021-02-22 RX ORDER — AMOXICILLIN 500 MG/1
CAPSULE ORAL
COMMUNITY
Start: 2021-01-20 | End: 2022-09-16

## 2021-02-22 RX ORDER — LISINOPRIL 10 MG/1
TABLET ORAL
COMMUNITY
Start: 2020-10-05 | End: 2021-09-21 | Stop reason: SDUPTHER

## 2021-02-22 RX ORDER — METOPROLOL SUCCINATE 25 MG/1
12.5 TABLET, EXTENDED RELEASE ORAL DAILY
Qty: 45 TABLET | Refills: 3 | Status: SHIPPED | OUTPATIENT
Start: 2021-02-22 | End: 2021-09-21 | Stop reason: SDUPTHER

## 2021-02-22 ASSESSMENT — ENCOUNTER SYMPTOMS
PND: 0
SPUTUM PRODUCTION: 0
DIZZINESS: 0
COUGH: 0
NERVOUS/ANXIOUS: 0
WHEEZING: 0
PALPITATIONS: 0
NAUSEA: 0
SHORTNESS OF BREATH: 0
VOMITING: 0
CLAUDICATION: 0
HEMOPTYSIS: 0
ORTHOPNEA: 0
WEAKNESS: 0

## 2021-02-22 ASSESSMENT — FIBROSIS 4 INDEX: FIB4 SCORE: 2.36

## 2021-02-22 NOTE — PROGRESS NOTES
Chief Complaint   Patient presents with   • Hypertension     F/V Dx: Essential hypertension       Subjective:   Itz Magaña is a 75 y.o. male who presents today for chest pain.    He is followed by Dr. Chavez in our clinic, history of AAA 4.1cm, coronary arteriosclerosis, and dyslipidemia.    Urgent care 1/15/2021 with chest pain radiating to the substernal region. Resided in 5 minutes. EKG at urgent are showed no acute changes. 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 ECHO showed preserved ef 65% with normal wall motion.     Interim events:  Patient has not have recurring chest pain since the initial epissodes. Tolerating his mediations well. He works out five times a week at least an hour without any chest pain/pressure or sob.     Denies any sob, dizziness or palpitations.       Past Medical History:   Diagnosis Date   • ASTHMA     scheduled inhaler use   • Bronchitis 02/2018   • Erectile dysfunction 6/8/2016   • GERD (gastroesophageal reflux disease) 6/8/2016   • Heart burn    • History of skin cancer 6/8/2016   • Hyperlipidemia 6/8/2016   • Hypertension    • Hypothyroidism 6/8/2016   • Indigestion    • Obesity 6/8/2016   • Pain 04/19/2018    chronic back pain, 0/10   • Preventative health care 6/8/2016   • Rosacea 6/8/2016   • Seasonal allergies 6/8/2016     Past Surgical History:   Procedure Laterality Date   • LUMBAR FUSION O-ARM N/A 11/19/2018    Procedure: LUMBAR FUSION O-ARM-  POSTERIOR L2-3 TLIF,;  Surgeon: Brandon Fowler M.D.;  Location: SURGERY [facility];  Service: Neurosurgery   • LUMBAR LAMINECTOMY DISKECTOMY N/A 11/19/2018    Procedure: LUMBAR LAMINECTOMY DISKECTOMY-  L2-3 LAMI;  Surgeon: Brandon Fowler M.D.;  Location: SURGERY [facility];  Service: Neurosurgery   • HARDWARE REMOVAL NEURO N/A 11/19/2018    Procedure: HARDWARE REMOVAL NEURO-  L4-5, L3 SCREW REPOSITIONING;  Surgeon: Brandon Fowler M.D.;  Location: [facility];  Service:  Neurosurgery   • KYPHOPLASTY N/A 11/19/2018    Procedure: KYPHOPLASTY-  L2;  Surgeon: Brandon Fowler M.D.;  Location: SURGERY [facility];  Service: Neurosurgery   • VENTRAL HERNIA REPAIR Left 5/3/2018    Procedure: VENTRAL HERNIA REPAIR FOR LUMBAR HERNIA/ LUNG HERNIA THROUGH CHEST WALL RECONSTRUCTION, MAJOR  ;  Surgeon: Phoenix Becker M.D.;  Location: SURGERY [facility];  Service: General   • IRRIGATION & DEBRIDEMENT NEURO  3/30/2017    Procedure: IRRIGATION & DEBRIDEMENT NEURO-LUMBAR WOUND;  Surgeon: Josiah Chakraborty M.D.;  Location: SURGERY [facility];  Service:    • LUMBAR FUSION POSTERIOR N/A 3/17/2017    Procedure: LUMBAR FUSION POSTERIOR L3-4 EXTENSION;  Surgeon: Josiah Chakraborty M.D.;  Location: SURGERY [facility];  Service:    • LUMBAR LAMINECTOMY DISKECTOMY N/A 3/17/2017    Procedure: LUMBAR LAMINECTOMY DISKECTOMY L3 & REDO L4;  Surgeon: Josiah Chakraborty M.D.;  Location: SURGERY [facility];  Service:    • HARDWARE REMOVAL NEURO N/A 3/17/2017    Procedure: HARDWARE REMOVAL NEURO L4-5;  Surgeon: Josiah Chakraborty M.D.;  Location: SURGERY [facility];  Service:    • LUMBAR FUSION POSTERIOR  9/9/2009    Performed by JOSIAH CHAKRABORTY at SURGERY [facility]   • LUMBAR LAMINECTOMY DISKECTOMY  9/9/2009    Performed by JOSIAH CHAKRABORTY at SURGERY [facility]   • KNEE ARTHROPLASTY TOTAL  5/26/2009    Performed by NAREN DANIELSON at SURGERY [facility] ORS   • HIP ARTHROPLASTY TOTAL  1/21/2009    Performed by NAREN DANIELSON at SURGERY [facility]   • INGUINAL HERNIA REPAIR  9/2/08    Performed by PHOENIX BECKER at SURGERY SAME DAY [facility] ORS   • INGUINAL HERNIA REPAIR  6/10/08    Performed by PHOENIX BECKER at SURGERY SAME DAY [facility] ORS   • HERNIA REPAIR  2008    HERNIA    • ROMAN BY LAPAROSCOPY  1982   • APPENDECTOMY  1953   • TONSILLECTOMY  1951   • OTHER      DISCECTOMY WITH HARWWARE   • OTHER      GASTRIC BYPASS AT 30 YEARS     Family History   Problem Relation Age of Onset    • Hypertension Mother    • Dementia Mother    • Hypertension Father    • Lung Disease Father         asthma     Social History     Socioeconomic History   • Marital status:      Spouse name: Not on file   • Number of children: Not on file   • Years of education: Not on file   • Highest education level: Not on file   Occupational History   • Not on file   Tobacco Use   • Smoking status: Never Smoker   • Smokeless tobacco: Never Used   Substance and Sexual Activity   • Alcohol use: Yes     Comment: 1/week    • Drug use: No     Comment: marijuana edibles (for pain control)   • Sexual activity: Not on file   Other Topics Concern   •  Service No   • Blood Transfusions No   • Caffeine Concern No   • Occupational Exposure No   • Hobby Hazards No   • Sleep Concern No   • Stress Concern No   • Weight Concern Yes   • Special Diet No   • Back Care Yes   • Exercise Yes   • Bike Helmet No     Comment: does not ride bike    • Seat Belt Yes   • Self-Exams No   Social History Narrative   • Not on file     Social Determinants of Health     Financial Resource Strain:    • Difficulty of Paying Living Expenses:    Food Insecurity:    • Worried About Running Out of Food in the Last Year:    • Ran Out of Food in the Last Year:    Transportation Needs:    • Lack of Transportation (Medical):    • Lack of Transportation (Non-Medical):    Physical Activity:    • Days of Exercise per Week:    • Minutes of Exercise per Session:    Stress:    • Feeling of Stress :    Social Connections:    • Frequency of Communication with Friends and Family:    • Frequency of Social Gatherings with Friends and Family:    • Attends Church Services:    • Active Member of Clubs or Organizations:    • Attends Club or Organization Meetings:    • Marital Status:    Intimate Partner Violence:    • Fear of Current or Ex-Partner:    • Emotionally Abused:    • Physically Abused:    • Sexually Abused:      No Known Allergies  Outpatient Encounter  "Medications as of 2/22/2021   Medication Sig Dispense Refill   • amoxicillin (AMOXIL) 500 MG Cap      • lisinopril (PRINIVIL) 10 MG Tab LISINOPRIL 10 MG TABS     • metoprolol SR (TOPROL XL) 25 MG TABLET SR 24 HR Take 0.5 Tablets by mouth every day. 45 tablet 3   • aspirin (ASA) 81 MG Chew Tab chewable tablet Chew 1 Tab every day. 100 Tab 2   • atorvastatin (LIPITOR) 40 MG Tab Take 1 Tab by mouth every evening. Indications: High Amount of Fats in the Blood 100 Tab 3   • tamsulosin (FLOMAX) 0.4 MG capsule TAKE ONE CAPSULE BY MOUTH EVERY DAY FOR BPH. GIVE 1/2 HOUR AFTER BREAKFAST 90 Cap 0   • gabapentin (NEURONTIN) 300 MG Cap Indications: twice a day  5   • ADVAIR DISKUS 100-50 MCG/DOSE AEROSOL POWDER, BREATH ACTIVATED INHALE 1 PUFF BY MOUTH TWICE A DAY *RINSE MOUTH AFTER USE* 3 Inhaler 0   • acetaminophen (TYLENOL) 500 MG Tab Take 1,000 mg by mouth 2 Times a Day.     • omeprazole (PRILOSEC) 20 MG delayed-release capsule Take 20 mg by mouth every morning.     • levothyroxine (SYNTHROID) 50 MCG Tab TAKE 1 TABLET BY MOUTH EVERY DAY 90 Tab 1   • [DISCONTINUED] metoprolol SR (TOPROL XL) 25 MG TABLET SR 24 HR TAKE 1/2 TABLET BY MOUTH EVERY DAY 45 Tab 1     No facility-administered encounter medications on file as of 2/22/2021.     Review of Systems   Constitutional: Negative for malaise/fatigue.   Respiratory: Negative for cough, hemoptysis, sputum production, shortness of breath and wheezing.    Cardiovascular: Negative for chest pain, palpitations, orthopnea, claudication, leg swelling and PND.   Gastrointestinal: Negative for nausea and vomiting.   Neurological: Negative for dizziness and weakness.   Psychiatric/Behavioral: The patient is not nervous/anxious.         Objective:   /82 (BP Location: Left arm, Patient Position: Sitting, BP Cuff Size: Adult)   Pulse 65   Resp 12   Ht 1.727 m (5' 8\")   Wt 115 kg (253 lb 4.8 oz)   SpO2 96%   BMI 38.51 kg/m²     Physical Exam   Constitutional: He is oriented to " person, place, and time. He appears well-developed and well-nourished. No distress.   HENT:   Head: Normocephalic and atraumatic.   Eyes: Pupils are equal, round, and reactive to light.   Neck: No JVD present.   Cardiovascular: Normal rate, regular rhythm and normal heart sounds.   No murmur heard.  Pulmonary/Chest: Effort normal and breath sounds normal. No respiratory distress.   Abdominal: Soft. Bowel sounds are normal. He exhibits no distension.   Musculoskeletal:         General: No edema.   Neurological: He is alert and oriented to person, place, and time.   Skin: Skin is warm and dry. He is not diaphoretic.   Psychiatric: He has a normal mood and affect. His behavior is normal. Judgment and thought content normal.         Treadmill Stress test   2012  1. The calculated left ventricular ejection fraction is 62%.   2.  There are no wall motion abnormalities.   3.  No evidence of infarct or reversible ischemia.    Echocardiography Laboratory  2/17/2021  No prior study is available for comparison.   Left ventricular ejection fraction is visually estimated to be 65%.  Normal regional wall motion.  No significant valve abnormalities.   Normal aortic root for body surface area. Ascending aorta diameter is   3.6 cm.    Lab Results   Component Value Date/Time    CHOLSTRLTOT 118 09/22/2020 10:26 AM    LDL 51 09/22/2020 10:26 AM    HDL 55 09/22/2020 10:26 AM    TRIGLYCERIDE 61 09/22/2020 10:26 AM       Lab Results   Component Value Date/Time    SODIUM 139 09/22/2020 10:26 AM    POTASSIUM 4.5 09/22/2020 10:26 AM    CHLORIDE 103 09/22/2020 10:26 AM    CO2 24 09/22/2020 10:26 AM    GLUCOSE 91 09/22/2020 10:26 AM    BUN 12 09/22/2020 10:26 AM    CREATININE 0.83 09/22/2020 10:26 AM    CREATININE 0.9 05/18/2009 04:20 PM     Lab Results   Component Value Date/Time    ALKPHOSPHAT 80 03/09/2020 07:29 AM    ASTSGOT 25 03/09/2020 07:29 AM    ALTSGPT 16 03/09/2020 07:29 AM    TBILIRUBIN 1.0 03/09/2020 07:29 AM          Assessment:      1. Coronary arteriosclerosis  Comp Metabolic Panel    Lipid Profile   2. Coronary artery disease without angina pectoris, unspecified vessel or lesion type, unspecified whether native or transplanted heart  Comp Metabolic Panel    Lipid Profile   3. Essential hypertension, benign     4. Mixed hyperlipidemia         Medical Decision Making:  Today's Assessment / Status / Plan:     1. Coronary arteriosclerosis   - echo showed normal regional wall motion. EF 65%  - continue asa therapy   - continue metoprolol 25mg XL and atorvastatin 40mg qd     2. Hypertension:  - stable   - continue lisinopril 10mg qd and metoprolol SR 25mg qd     3. Hyperlipidemia:  - LDL 51  - continue statin therapy     Follow up in 6 months, lipid and CMP prior to appt.

## 2021-04-09 DIAGNOSIS — E78.5 DYSLIPIDEMIA: ICD-10-CM

## 2021-04-09 RX ORDER — ATORVASTATIN CALCIUM 40 MG/1
TABLET, FILM COATED ORAL
Qty: 100 TABLET | Refills: 3 | Status: SHIPPED | OUTPATIENT
Start: 2021-04-09 | End: 2022-02-23

## 2021-04-09 NOTE — TELEPHONE ENCOUNTER
Spoke with pt. To give results. Dr. Jung recommends he follow-up with (new) general cardiologist in 3-4 months.  To schedulers to call pt.    1.53

## 2021-04-16 NOTE — PROGRESS NOTES
"Surgery    Blood pressure 115/81, pulse 86, temperature 37 °C (98.6 °F), resp. rate 18, height 1.702 m (5' 7\"), weight 107.6 kg (237 lb 3.4 oz), SpO2 95 %.    Feeling well. Pain controlled.  Incisions clean and dry. Hernias reduced.    Good progress.  Home today.  " PAST SURGICAL HISTORY:  H/O mastectomy, right

## 2021-06-11 NOTE — ED NOTES
Second set of blood cultures drawn and to lab. Medicated per ERP's orders. Aware of POC, call light within reach. Awaiting results.    No

## 2021-06-22 ENCOUNTER — PATIENT OUTREACH (OUTPATIENT)
Dept: HEALTH INFORMATION MANAGEMENT | Facility: OTHER | Age: 76
End: 2021-06-22

## 2021-06-23 ENCOUNTER — APPOINTMENT (RX ONLY)
Dept: URBAN - METROPOLITAN AREA CLINIC 4 | Facility: CLINIC | Age: 76
Setting detail: DERMATOLOGY
End: 2021-06-23

## 2021-06-23 DIAGNOSIS — L57.8 OTHER SKIN CHANGES DUE TO CHRONIC EXPOSURE TO NONIONIZING RADIATION: ICD-10-CM

## 2021-06-23 DIAGNOSIS — D18.0 HEMANGIOMA: ICD-10-CM

## 2021-06-23 DIAGNOSIS — L81.4 OTHER MELANIN HYPERPIGMENTATION: ICD-10-CM

## 2021-06-23 DIAGNOSIS — L82.1 OTHER SEBORRHEIC KERATOSIS: ICD-10-CM

## 2021-06-23 DIAGNOSIS — D22 MELANOCYTIC NEVI: ICD-10-CM

## 2021-06-23 PROBLEM — D18.01 HEMANGIOMA OF SKIN AND SUBCUTANEOUS TISSUE: Status: ACTIVE | Noted: 2021-06-23

## 2021-06-23 PROBLEM — D23.71 OTHER BENIGN NEOPLASM OF SKIN OF RIGHT LOWER LIMB, INCLUDING HIP: Status: ACTIVE | Noted: 2021-06-23

## 2021-06-23 PROBLEM — D22.5 MELANOCYTIC NEVI OF TRUNK: Status: ACTIVE | Noted: 2021-06-23

## 2021-06-23 PROBLEM — D48.5 NEOPLASM OF UNCERTAIN BEHAVIOR OF SKIN: Status: ACTIVE | Noted: 2021-06-23

## 2021-06-23 PROCEDURE — 99213 OFFICE O/P EST LOW 20 MIN: CPT | Mod: 25

## 2021-06-23 PROCEDURE — ? BIOPSY BY SHAVE METHOD

## 2021-06-23 PROCEDURE — ? COUNSELING

## 2021-06-23 PROCEDURE — 11102 TANGNTL BX SKIN SINGLE LES: CPT

## 2021-06-23 ASSESSMENT — LOCATION SIMPLE DESCRIPTION DERM
LOCATION SIMPLE: LEFT HAND
LOCATION SIMPLE: LEFT SHOULDER
LOCATION SIMPLE: RIGHT FOREARM
LOCATION SIMPLE: LEFT FOREARM
LOCATION SIMPLE: LEFT UPPER BACK
LOCATION SIMPLE: LEFT CHEEK
LOCATION SIMPLE: RIGHT UPPER BACK
LOCATION SIMPLE: RIGHT CHEEK
LOCATION SIMPLE: RIGHT HAND
LOCATION SIMPLE: CHEST

## 2021-06-23 ASSESSMENT — LOCATION DETAILED DESCRIPTION DERM
LOCATION DETAILED: LEFT DISTAL DORSAL FOREARM
LOCATION DETAILED: LEFT SUPERIOR UPPER BACK
LOCATION DETAILED: RIGHT SUPERIOR MEDIAL UPPER BACK
LOCATION DETAILED: LEFT SUPERIOR MEDIAL UPPER BACK
LOCATION DETAILED: LEFT MEDIAL SUPERIOR CHEST
LOCATION DETAILED: RIGHT INFERIOR CENTRAL MALAR CHEEK
LOCATION DETAILED: RIGHT MID-UPPER BACK
LOCATION DETAILED: LEFT RADIAL DORSAL HAND
LOCATION DETAILED: LEFT LATERAL SHOULDER
LOCATION DETAILED: LEFT INFERIOR CENTRAL MALAR CHEEK
LOCATION DETAILED: RIGHT PROXIMAL DORSAL FOREARM
LOCATION DETAILED: RIGHT RADIAL DORSAL HAND

## 2021-06-23 ASSESSMENT — LOCATION ZONE DERM
LOCATION ZONE: FACE
LOCATION ZONE: HAND
LOCATION ZONE: TRUNK
LOCATION ZONE: ARM

## 2021-07-28 ENCOUNTER — APPOINTMENT (RX ONLY)
Dept: URBAN - METROPOLITAN AREA CLINIC 4 | Facility: CLINIC | Age: 76
Setting detail: DERMATOLOGY
End: 2021-07-28

## 2021-07-28 PROBLEM — D03.62 MELANOMA IN SITU OF LEFT UPPER LIMB, INCLUDING SHOULDER: Status: ACTIVE | Noted: 2021-07-28

## 2021-07-28 PROCEDURE — 13122 CMPLX RPR S/A/L ADDL 5 CM/>: CPT

## 2021-07-28 PROCEDURE — 11604 EXC TR-EXT MAL+MARG 3.1-4 CM: CPT

## 2021-07-28 PROCEDURE — ? EXCISION

## 2021-07-28 PROCEDURE — ? COUNSELING

## 2021-07-28 PROCEDURE — 13121 CMPLX RPR S/A/L 2.6-7.5 CM: CPT

## 2021-07-28 NOTE — PROCEDURE: EXCISION
Surgeon (Optional): [id number]
Biopsy Photograph Reviewed: Yes
Previous Accession (Optional): [id number]
Breslow Depth: MM in situ...
Size Of Lesion In Cm: 2.8
X Size Of Lesion In Cm (Optional): 0
Size Of Margin In Cm: 0.5
Anesthesia Volume In Cc: 12
Was An Eye Clamp Used?: No
Eye Clamp Note Details: An eye clamp was used during the procedure.
Excision Method: Elliptical
Repair Type: Complex
Suturegard Retention Suture: 2-0 Nylon
Retention Suture Bite Size: 3 mm
Length To Time In Minutes Device Was In Place: 10
Number Of Hemigard Strips Per Side: 1
Intermediate / Complex Repair - Final Wound Length In Cm: 9.5
Width Of Defect Perpendicular To Closure In Cm (Required): 3.1
Distance Of Undermining In Cm (Required): 3.4
Undermining Type: Entire Wound
Debridement Text: The wound edges were debrided prior to proceeding with the closure to facilitate wound healing.
Helical Rim Text: The closure involved the helical rim.
Vermilion Border Text: The closure involved the vermilion border.
Nostril Rim Text: The closure involved the nostril rim.
Retention Suture Text: Retention sutures were placed to support the closure and prevent dehiscence.
Lab: [id number]
Lab Facility: 
Graft Donor Site Bandage (Optional-Leave Blank If You Don't Want In Note): Steri-strips and a pressure bandage were applied to the donor site.
Epidermal Closure Graft Donor Site (Optional): simple interrupted
Billing Type: Third-Party Bill
Excision Depth: adipose tissue
Scalpel Size: 15 blade
Anesthesia Type: 1% lidocaine with epinephrine
Additional Anesthesia Volume In Cc: 6
Hemostasis: Electrocautery
Estimated Blood Loss (Cc): minimal
Detail Level: Detailed
Repair Anesthesia Method: local infiltration
Anesthesia Volume In Cc: 17.2
Deep Sutures: 2-0 Vicryl
Dermal Closure: buried vertical mattress
Epidermal Sutures: 5-0 Caprosyn
Epidermal Closure: running subcuticular
Wound Care: Bacitracin
Dressing: dry sterile dressing
Suturegard Intro: Intraoperative tissue expansion was performed, utilizing the SUTUREGARD device, in order to reduce wound tension.
Suturegard Body: The suture ends were repeatedly re-tightened and re-clamped to achieve the desired tissue expansion.
Hemigard Intro: Due to skin fragility and wound tension, it was decided to use HEMIGARD adhesive retention suture devices to permit a linear closure. The skin was cleaned and dried for a 6cm distance away from the wound. Excessive hair, if present, was removed to allow for adhesion.
Hemigard Postcare Instructions: The HEMIGARD strips are to remain completely dry for at least 5-7 days.
Positioning (Leave Blank If You Do Not Want): The patient was placed in a comfortable position exposing the surgical site.
Pre-Excision Curettage Text (Leave Blank If You Do Not Want): Prior to drawing the surgical margin the visible lesion was removed with electrodesiccation and curettage to clearly define the lesion size.
Complex Repair Preamble Text (Leave Blank If You Do Not Want): Extensive wide undermining was performed.
Intermediate Repair Preamble Text (Leave Blank If You Do Not Want): Undermining was performed with blunt dissection.
Curvilinear Excision Additional Text (Leave Blank If You Do Not Want): The margin was drawn around the clinically apparent lesion.  A curvilinear shape was then drawn on the skin incorporating the lesion and margins.  Incisions were then made along these lines to the appropriate tissue plane and the lesion was extirpated.
Fusiform Excision Additional Text (Leave Blank If You Do Not Want): The margin was drawn around the clinically apparent lesion.  A fusiform shape was then drawn on the skin incorporating the lesion and margins.  Incisions were then made along these lines to the appropriate tissue plane and the lesion was extirpated.
Elliptical Excision Additional Text (Leave Blank If You Do Not Want): The margin was drawn around the clinically apparent lesion.  An elliptical shape was then drawn on the skin incorporating the lesion and margins.  Incisions were then made along these lines to the appropriate tissue plane and the lesion was extirpated.
Saucerization Excision Additional Text (Leave Blank If You Do Not Want): The margin was drawn around the clinically apparent lesion.  Incisions were then made along these lines, in a tangential fashion, to the appropriate tissue plane and the lesion was extirpated.
Slit Excision Additional Text (Leave Blank If You Do Not Want): A linear line was drawn on the skin overlying the lesion. An incision was made slowly until the lesion was visualized.  Once visualized, the lesion was removed with blunt dissection.
Excisional Biopsy Additional Text (Leave Blank If You Do Not Want): The margin was drawn around the clinically apparent lesion. An elliptical shape was then drawn on the skin incorporating the lesion and margins.  Incisions were then made along these lines to the appropriate tissue plane and the lesion was extirpated.
Perilesional Excision Additional Text (Leave Blank If You Do Not Want): The margin was drawn around the clinically apparent lesion. Incisions were then made along these lines to the appropriate tissue plane and the lesion was extirpated.
Repair Performed By Another Provider Text (Leave Blank If You Do Not Want): After the tissue was excised the defect was repaired by another provider.
No Repair - Repaired With Adjacent Surgical Defect Text (Leave Blank If You Do Not Want): After the excision the defect was repaired concurrently with another surgical defect which was in close approximation.
Advancement Flap (Single) Text: The defect edges were debeveled with a #15 scalpel blade.  Given the location of the defect and the proximity to free margins a single advancement flap was deemed most appropriate.  Using a sterile surgical marker, an appropriate advancement flap was drawn incorporating the defect and placing the expected incisions within the relaxed skin tension lines where possible.    The area thus outlined was incised deep to adipose tissue with a #15 scalpel blade.  The skin margins were undermined to an appropriate distance in all directions utilizing iris scissors.
Advancement Flap (Double) Text: The defect edges were debeveled with a #15 scalpel blade.  Given the location of the defect and the proximity to free margins a double advancement flap was deemed most appropriate.  Using a sterile surgical marker, the appropriate advancement flaps were drawn incorporating the defect and placing the expected incisions within the relaxed skin tension lines where possible.    The area thus outlined was incised deep to adipose tissue with a #15 scalpel blade.  The skin margins were undermined to an appropriate distance in all directions utilizing iris scissors.
Burow's Advancement Flap Text: The defect edges were debeveled with a #15 scalpel blade.  Given the location of the defect and the proximity to free margins a Burow's advancement flap was deemed most appropriate.  Using a sterile surgical marker, the appropriate advancement flap was drawn incorporating the defect and placing the expected incisions within the relaxed skin tension lines where possible.    The area thus outlined was incised deep to adipose tissue with a #15 scalpel blade.  The skin margins were undermined to an appropriate distance in all directions utilizing iris scissors.
Chonodrocutaneous Helical Advancement Flap Text: The defect edges were debeveled with a #15 scalpel blade.  Given the location of the defect and the proximity to free margins a chondrocutaneous helical advancement flap was deemed most appropriate.  Using a sterile surgical marker, the appropriate advancement flap was drawn incorporating the defect and placing the expected incisions within the relaxed skin tension lines where possible.    The area thus outlined was incised deep to adipose tissue with a #15 scalpel blade.  The skin margins were undermined to an appropriate distance in all directions utilizing iris scissors.
Crescentic Advancement Flap Text: The defect edges were debeveled with a #15 scalpel blade.  Given the location of the defect and the proximity to free margins a crescentic advancement flap was deemed most appropriate.  Using a sterile surgical marker, the appropriate advancement flap was drawn incorporating the defect and placing the expected incisions within the relaxed skin tension lines where possible.    The area thus outlined was incised deep to adipose tissue with a #15 scalpel blade.  The skin margins were undermined to an appropriate distance in all directions utilizing iris scissors.
A-T Advancement Flap Text: The defect edges were debeveled with a #15 scalpel blade.  Given the location of the defect, shape of the defect and the proximity to free margins an A-T advancement flap was deemed most appropriate.  Using a sterile surgical marker, an appropriate advancement flap was drawn incorporating the defect and placing the expected incisions within the relaxed skin tension lines where possible.    The area thus outlined was incised deep to adipose tissue with a #15 scalpel blade.  The skin margins were undermined to an appropriate distance in all directions utilizing iris scissors.
O-T Advancement Flap Text: The defect edges were debeveled with a #15 scalpel blade.  Given the location of the defect, shape of the defect and the proximity to free margins an O-T advancement flap was deemed most appropriate.  Using a sterile surgical marker, an appropriate advancement flap was drawn incorporating the defect and placing the expected incisions within the relaxed skin tension lines where possible.    The area thus outlined was incised deep to adipose tissue with a #15 scalpel blade.  The skin margins were undermined to an appropriate distance in all directions utilizing iris scissors.
O-L Flap Text: The defect edges were debeveled with a #15 scalpel blade.  Given the location of the defect, shape of the defect and the proximity to free margins an O-L flap was deemed most appropriate.  Using a sterile surgical marker, an appropriate advancement flap was drawn incorporating the defect and placing the expected incisions within the relaxed skin tension lines where possible.    The area thus outlined was incised deep to adipose tissue with a #15 scalpel blade.  The skin margins were undermined to an appropriate distance in all directions utilizing iris scissors.
O-Z Flap Text: The defect edges were debeveled with a #15 scalpel blade.  Given the location of the defect, shape of the defect and the proximity to free margins an O-Z flap was deemed most appropriate.  Using a sterile surgical marker, an appropriate transposition flap was drawn incorporating the defect and placing the expected incisions within the relaxed skin tension lines where possible. The area thus outlined was incised deep to adipose tissue with a #15 scalpel blade.  The skin margins were undermined to an appropriate distance in all directions utilizing iris scissors.
Double O-Z Flap Text: The defect edges were debeveled with a #15 scalpel blade.  Given the location of the defect, shape of the defect and the proximity to free margins a Double O-Z flap was deemed most appropriate.  Using a sterile surgical marker, an appropriate transposition flap was drawn incorporating the defect and placing the expected incisions within the relaxed skin tension lines where possible. The area thus outlined was incised deep to adipose tissue with a #15 scalpel blade.  The skin margins were undermined to an appropriate distance in all directions utilizing iris scissors.
V-Y Flap Text: The defect edges were debeveled with a #15 scalpel blade.  Given the location of the defect, shape of the defect and the proximity to free margins a V-Y flap was deemed most appropriate.  Using a sterile surgical marker, an appropriate advancement flap was drawn incorporating the defect and placing the expected incisions within the relaxed skin tension lines where possible.    The area thus outlined was incised deep to adipose tissue with a #15 scalpel blade.  The skin margins were undermined to an appropriate distance in all directions utilizing iris scissors.
Advancement-Rotation Flap Text: The defect edges were debeveled with a #15 scalpel blade.  Given the location of the defect, shape of the defect and the proximity to free margins an advancement-rotation flap was deemed most appropriate.  Using a sterile surgical marker, an appropriate flap was drawn incorporating the defect and placing the expected incisions within the relaxed skin tension lines where possible. The area thus outlined was incised deep to adipose tissue with a #15 scalpel blade.  The skin margins were undermined to an appropriate distance in all directions utilizing iris scissors.
Mercedes Flap Text: The defect edges were debeveled with a #15 scalpel blade.  Given the location of the defect, shape of the defect and the proximity to free margins a Mercedes flap was deemed most appropriate.  Using a sterile surgical marker, an appropriate advancement flap was drawn incorporating the defect and placing the expected incisions within the relaxed skin tension lines where possible. The area thus outlined was incised deep to adipose tissue with a #15 scalpel blade.  The skin margins were undermined to an appropriate distance in all directions utilizing iris scissors.
Modified Advancement Flap Text: The defect edges were debeveled with a #15 scalpel blade.  Given the location of the defect, shape of the defect and the proximity to free margins a modified advancement flap was deemed most appropriate.  Using a sterile surgical marker, an appropriate advancement flap was drawn incorporating the defect and placing the expected incisions within the relaxed skin tension lines where possible.    The area thus outlined was incised deep to adipose tissue with a #15 scalpel blade.  The skin margins were undermined to an appropriate distance in all directions utilizing iris scissors.
Mucosal Advancement Flap Text: Given the location of the defect, shape of the defect and the proximity to free margins a mucosal advancement flap was deemed most appropriate. Incisions were made with a 15 blade scalpel in the appropriate fashion along the cutaneous vermilion border and the mucosal lip. The remaining actinically damaged mucosal tissue was excised.  The mucosal advancement flap was then elevated to the gingival sulcus with care taken to preserve the neurovascular structures and advanced into the primary defect. Care was taken to ensure that precise realignment of the vermilion border was achieved.
Peng Advancement Flap Text: The defect edges were debeveled with a #15 scalpel blade.  Given the location of the defect, shape of the defect and the proximity to free margins a Peng advancement flap was deemed most appropriate.  Using a sterile surgical marker, an appropriate advancement flap was drawn incorporating the defect and placing the expected incisions within the relaxed skin tension lines where possible. The area thus outlined was incised deep to adipose tissue with a #15 scalpel blade.  The skin margins were undermined to an appropriate distance in all directions utilizing iris scissors.
Hatchet Flap Text: The defect edges were debeveled with a #15 scalpel blade.  Given the location of the defect, shape of the defect and the proximity to free margins a hatchet flap was deemed most appropriate.  Using a sterile surgical marker, an appropriate hatchet flap was drawn incorporating the defect and placing the expected incisions within the relaxed skin tension lines where possible.    The area thus outlined was incised deep to adipose tissue with a #15 scalpel blade.  The skin margins were undermined to an appropriate distance in all directions utilizing iris scissors.
Rotation Flap Text: The defect edges were debeveled with a #15 scalpel blade.  Given the location of the defect, shape of the defect and the proximity to free margins a rotation flap was deemed most appropriate.  Using a sterile surgical marker, an appropriate rotation flap was drawn incorporating the defect and placing the expected incisions within the relaxed skin tension lines where possible.    The area thus outlined was incised deep to adipose tissue with a #15 scalpel blade.  The skin margins were undermined to an appropriate distance in all directions utilizing iris scissors.
Spiral Flap Text: The defect edges were debeveled with a #15 scalpel blade.  Given the location of the defect, shape of the defect and the proximity to free margins a spiral flap was deemed most appropriate.  Using a sterile surgical marker, an appropriate rotation flap was drawn incorporating the defect and placing the expected incisions within the relaxed skin tension lines where possible. The area thus outlined was incised deep to adipose tissue with a #15 scalpel blade.  The skin margins were undermined to an appropriate distance in all directions utilizing iris scissors.
Staged Advancement Flap Text: The defect edges were debeveled with a #15 scalpel blade.  Given the location of the defect, shape of the defect and the proximity to free margins a staged advancement flap was deemed most appropriate.  Using a sterile surgical marker, an appropriate advancement flap was drawn incorporating the defect and placing the expected incisions within the relaxed skin tension lines where possible. The area thus outlined was incised deep to adipose tissue with a #15 scalpel blade.  The skin margins were undermined to an appropriate distance in all directions utilizing iris scissors.
Star Wedge Flap Text: The defect edges were debeveled with a #15 scalpel blade.  Given the location of the defect, shape of the defect and the proximity to free margins a star wedge flap was deemed most appropriate.  Using a sterile surgical marker, an appropriate rotation flap was drawn incorporating the defect and placing the expected incisions within the relaxed skin tension lines where possible. The area thus outlined was incised deep to adipose tissue with a #15 scalpel blade.  The skin margins were undermined to an appropriate distance in all directions utilizing iris scissors.
Transposition Flap Text: The defect edges were debeveled with a #15 scalpel blade.  Given the location of the defect and the proximity to free margins a transposition flap was deemed most appropriate.  Using a sterile surgical marker, an appropriate transposition flap was drawn incorporating the defect.    The area thus outlined was incised deep to adipose tissue with a #15 scalpel blade.  The skin margins were undermined to an appropriate distance in all directions utilizing iris scissors.
Muscle Hinge Flap Text: The defect edges were debeveled with a #15 scalpel blade.  Given the size, depth and location of the defect and the proximity to free margins a muscle hinge flap was deemed most appropriate.  Using a sterile surgical marker, an appropriate hinge flap was drawn incorporating the defect. The area thus outlined was incised with a #15 scalpel blade.  The skin margins were undermined to an appropriate distance in all directions utilizing iris scissors.
Nasal Turnover Hinge Flap Text: The defect edges were debeveled with a #15 scalpel blade.  Given the size, depth, location of the defect and the defect being full thickness a nasal turnover hinge flap was deemed most appropriate.  Using a sterile surgical marker, an appropriate hinge flap was drawn incorporating the defect. The area thus outlined was incised with a #15 scalpel blade. The flap was designed to recreate the nasal mucosal lining and the alar rim. The skin margins were undermined to an appropriate distance in all directions utilizing iris scissors.
Nasalis-Muscle-Based Myocutaneous Island Pedicle Flap Text: Using a #15 blade, an incision was made around the donor flap to the level of the nasalis muscle. Wide lateral undermining was then performed in both the subcutaneous plane above the nasalis muscle, and in a submuscular plane just above periosteum. This allowed the formation of a free nasalis muscle axial pedicle (based on the angular artery) which was still attached to the actual cutaneous flap, increasing its mobility and vascular viability. Hemostasis was obtained with pinpoint electrocoagulation. The flap was mobilized into position and the pivotal anchor points positioned and stabilized with buried interrupted sutures. Subcutaneous and dermal tissues were closed in a multilayered fashion with sutures. Tissue redundancies were excised, and the epidermal edges were apposed without significant tension and sutured with sutures.
Orbicularis Oris Muscle Flap Text: The defect edges were debeveled with a #15 scalpel blade.  Given that the defect affected the competency of the oral sphincter an orbicularis oris muscle flap was deemed most appropriate to restore this competency and normal muscle function.  Using a sterile surgical marker, an appropriate flap was drawn incorporating the defect. The area thus outlined was incised with a #15 scalpel blade.
Melolabial Transposition Flap Text: The defect edges were debeveled with a #15 scalpel blade.  Given the location of the defect and the proximity to free margins a melolabial flap was deemed most appropriate.  Using a sterile surgical marker, an appropriate melolabial transposition flap was drawn incorporating the defect.    The area thus outlined was incised deep to adipose tissue with a #15 scalpel blade.  The skin margins were undermined to an appropriate distance in all directions utilizing iris scissors.
Rhombic Flap Text: The defect edges were debeveled with a #15 scalpel blade.  Given the location of the defect and the proximity to free margins a rhombic flap was deemed most appropriate.  Using a sterile surgical marker, an appropriate rhombic flap was drawn incorporating the defect.    The area thus outlined was incised deep to adipose tissue with a #15 scalpel blade.  The skin margins were undermined to an appropriate distance in all directions utilizing iris scissors.
Rhomboid Transposition Flap Text: The defect edges were debeveled with a #15 scalpel blade.  Given the location of the defect and the proximity to free margins a rhomboid transposition flap was deemed most appropriate.  Using a sterile surgical marker, an appropriate rhomboid flap was drawn incorporating the defect.    The area thus outlined was incised deep to adipose tissue with a #15 scalpel blade.  The skin margins were undermined to an appropriate distance in all directions utilizing iris scissors.
Bi-Rhombic Flap Text: The defect edges were debeveled with a #15 scalpel blade.  Given the location of the defect and the proximity to free margins a bi-rhombic flap was deemed most appropriate.  Using a sterile surgical marker, an appropriate rhombic flap was drawn incorporating the defect. The area thus outlined was incised deep to adipose tissue with a #15 scalpel blade.  The skin margins were undermined to an appropriate distance in all directions utilizing iris scissors.
Helical Rim Advancement Flap Text: The defect edges were debeveled with a #15 blade scalpel.  Given the location of the defect and the proximity to free margins (helical rim) a double helical rim advancement flap was deemed most appropriate.  Using a sterile surgical marker, the appropriate advancement flaps were drawn incorporating the defect and placing the expected incisions between the helical rim and antihelix where possible.  The area thus outlined was incised through and through with a #15 scalpel blade.  With a skin hook and iris scissors, the flaps were gently and sharply undermined and freed up.
Bilateral Helical Rim Advancement Flap Text: The defect edges were debeveled with a #15 blade scalpel.  Given the location of the defect and the proximity to free margins (helical rim) a bilateral helical rim advancement flap was deemed most appropriate.  Using a sterile surgical marker, the appropriate advancement flaps were drawn incorporating the defect and placing the expected incisions between the helical rim and antihelix where possible.  The area thus outlined was incised through and through with a #15 scalpel blade.  With a skin hook and iris scissors, the flaps were gently and sharply undermined and freed up.
Ear Star Wedge Flap Text: The defect edges were debeveled with a #15 blade scalpel.  Given the location of the defect and the proximity to free margins (helical rim) an ear star wedge flap was deemed most appropriate.  Using a sterile surgical marker, the appropriate flap was drawn incorporating the defect and placing the expected incisions between the helical rim and antihelix where possible.  The area thus outlined was incised through and through with a #15 scalpel blade.
Banner Transposition Flap Text: The defect edges were debeveled with a #15 scalpel blade.  Given the location of the defect and the proximity to free margins a Banner transposition flap was deemed most appropriate.  Using a sterile surgical marker, an appropriate flap drawn around the defect. The area thus outlined was incised deep to adipose tissue with a #15 scalpel blade.  The skin margins were undermined to an appropriate distance in all directions utilizing iris scissors.
Bilobed Flap Text: The defect edges were debeveled with a #15 scalpel blade.  Given the location of the defect and the proximity to free margins a bilobe flap was deemed most appropriate.  Using a sterile surgical marker, an appropriate bilobe flap drawn around the defect.    The area thus outlined was incised deep to adipose tissue with a #15 scalpel blade.  The skin margins were undermined to an appropriate distance in all directions utilizing iris scissors.
Bilobed Transposition Flap Text: The defect edges were debeveled with a #15 scalpel blade.  Given the location of the defect and the proximity to free margins a bilobed transposition flap was deemed most appropriate.  Using a sterile surgical marker, an appropriate bilobe flap drawn around the defect.    The area thus outlined was incised deep to adipose tissue with a #15 scalpel blade.  The skin margins were undermined to an appropriate distance in all directions utilizing iris scissors.
Trilobed Flap Text: The defect edges were debeveled with a #15 scalpel blade.  Given the location of the defect and the proximity to free margins a trilobed flap was deemed most appropriate.  Using a sterile surgical marker, an appropriate trilobed flap drawn around the defect.    The area thus outlined was incised deep to adipose tissue with a #15 scalpel blade.  The skin margins were undermined to an appropriate distance in all directions utilizing iris scissors.
Dorsal Nasal Flap Text: The defect edges were debeveled with a #15 scalpel blade.  Given the location of the defect and the proximity to free margins a dorsal nasal flap was deemed most appropriate.  Using a sterile surgical marker, an appropriate dorsal nasal flap was drawn around the defect.    The area thus outlined was incised deep to adipose tissue with a #15 scalpel blade.  The skin margins were undermined to an appropriate distance in all directions utilizing iris scissors.
Island Pedicle Flap Text: The defect edges were debeveled with a #15 scalpel blade.  Given the location of the defect, shape of the defect and the proximity to free margins an island pedicle advancement flap was deemed most appropriate.  Using a sterile surgical marker, an appropriate advancement flap was drawn incorporating the defect, outlining the appropriate donor tissue and placing the expected incisions within the relaxed skin tension lines where possible.    The area thus outlined was incised deep to adipose tissue with a #15 scalpel blade.  The skin margins were undermined to an appropriate distance in all directions around the primary defect and laterally outward around the island pedicle utilizing iris scissors.  There was minimal undermining beneath the pedicle flap.
Island Pedicle Flap With Canthal Suspension Text: The defect edges were debeveled with a #15 scalpel blade.  Given the location of the defect, shape of the defect and the proximity to free margins an island pedicle advancement flap was deemed most appropriate.  Using a sterile surgical marker, an appropriate advancement flap was drawn incorporating the defect, outlining the appropriate donor tissue and placing the expected incisions within the relaxed skin tension lines where possible. The area thus outlined was incised deep to adipose tissue with a #15 scalpel blade.  The skin margins were undermined to an appropriate distance in all directions around the primary defect and laterally outward around the island pedicle utilizing iris scissors.  There was minimal undermining beneath the pedicle flap. A suspension suture was placed in the canthal tendon to prevent tension and prevent ectropion.
Alar Island Pedicle Flap Text: The defect edges were debeveled with a #15 scalpel blade.  Given the location of the defect, shape of the defect and the proximity to the alar rim an island pedicle advancement flap was deemed most appropriate.  Using a sterile surgical marker, an appropriate advancement flap was drawn incorporating the defect, outlining the appropriate donor tissue and placing the expected incisions within the nasal ala running parallel to the alar rim. The area thus outlined was incised with a #15 scalpel blade.  The skin margins were undermined minimally to an appropriate distance in all directions around the primary defect and laterally outward around the island pedicle utilizing iris scissors.  There was minimal undermining beneath the pedicle flap.
Double Island Pedicle Flap Text: The defect edges were debeveled with a #15 scalpel blade.  Given the location of the defect, shape of the defect and the proximity to free margins a double island pedicle advancement flap was deemed most appropriate.  Using a sterile surgical marker, an appropriate advancement flap was drawn incorporating the defect, outlining the appropriate donor tissue and placing the expected incisions within the relaxed skin tension lines where possible.    The area thus outlined was incised deep to adipose tissue with a #15 scalpel blade.  The skin margins were undermined to an appropriate distance in all directions around the primary defect and laterally outward around the island pedicle utilizing iris scissors.  There was minimal undermining beneath the pedicle flap.
Island Pedicle Flap-Requiring Vessel Identification Text: The defect edges were debeveled with a #15 scalpel blade.  Given the location of the defect, shape of the defect and the proximity to free margins an island pedicle advancement flap was deemed most appropriate.  Using a sterile surgical marker, an appropriate advancement flap was drawn, based on the axial vessel mentioned above, incorporating the defect, outlining the appropriate donor tissue and placing the expected incisions within the relaxed skin tension lines where possible.    The area thus outlined was incised deep to adipose tissue with a #15 scalpel blade.  The skin margins were undermined to an appropriate distance in all directions around the primary defect and laterally outward around the island pedicle utilizing iris scissors.  There was minimal undermining beneath the pedicle flap.
Keystone Flap Text: The defect edges were debeveled with a #15 scalpel blade.  Given the location of the defect, shape of the defect a keystone flap was deemed most appropriate.  Using a sterile surgical marker, an appropriate keystone flap was drawn incorporating the defect, outlining the appropriate donor tissue and placing the expected incisions within the relaxed skin tension lines where possible. The area thus outlined was incised deep to adipose tissue with a #15 scalpel blade.  The skin margins were undermined to an appropriate distance in all directions around the primary defect and laterally outward around the flap utilizing iris scissors.
O-T Plasty Text: The defect edges were debeveled with a #15 scalpel blade.  Given the location of the defect, shape of the defect and the proximity to free margins an O-T plasty was deemed most appropriate.  Using a sterile surgical marker, an appropriate O-T plasty was drawn incorporating the defect and placing the expected incisions within the relaxed skin tension lines where possible.    The area thus outlined was incised deep to adipose tissue with a #15 scalpel blade.  The skin margins were undermined to an appropriate distance in all directions utilizing iris scissors.
O-Z Plasty Text: The defect edges were debeveled with a #15 scalpel blade.  Given the location of the defect, shape of the defect and the proximity to free margins an O-Z plasty (double transposition flap) was deemed most appropriate.  Using a sterile surgical marker, the appropriate transposition flaps were drawn incorporating the defect and placing the expected incisions within the relaxed skin tension lines where possible.    The area thus outlined was incised deep to adipose tissue with a #15 scalpel blade.  The skin margins were undermined to an appropriate distance in all directions utilizing iris scissors.  Hemostasis was achieved with electrocautery.  The flaps were then transposed into place, one clockwise and the other counterclockwise, and anchored with interrupted buried subcutaneous sutures.
Double O-Z Plasty Text: The defect edges were debeveled with a #15 scalpel blade.  Given the location of the defect, shape of the defect and the proximity to free margins a Double O-Z plasty (double transposition flap) was deemed most appropriate.  Using a sterile surgical marker, the appropriate transposition flaps were drawn incorporating the defect and placing the expected incisions within the relaxed skin tension lines where possible. The area thus outlined was incised deep to adipose tissue with a #15 scalpel blade.  The skin margins were undermined to an appropriate distance in all directions utilizing iris scissors.  Hemostasis was achieved with electrocautery.  The flaps were then transposed into place, one clockwise and the other counterclockwise, and anchored with interrupted buried subcutaneous sutures.
V-Y Plasty Text: The defect edges were debeveled with a #15 scalpel blade.  Given the location of the defect, shape of the defect and the proximity to free margins an V-Y advancement flap was deemed most appropriate.  Using a sterile surgical marker, an appropriate advancement flap was drawn incorporating the defect and placing the expected incisions within the relaxed skin tension lines where possible.    The area thus outlined was incised deep to adipose tissue with a #15 scalpel blade.  The skin margins were undermined to an appropriate distance in all directions utilizing iris scissors.
H Plasty Text: Given the location of the defect, shape of the defect and the proximity to free margins a H-plasty was deemed most appropriate for repair.  Using a sterile surgical marker, the appropriate advancement arms of the H-plasty were drawn incorporating the defect and placing the expected incisions within the relaxed skin tension lines where possible. The area thus outlined was incised deep to adipose tissue with a #15 scalpel blade. The skin margins were undermined to an appropriate distance in all directions utilizing iris scissors.  The opposing advancement arms were then advanced into place in opposite direction and anchored with interrupted buried subcutaneous sutures.
W Plasty Text: The lesion was extirpated to the level of the fat with a #15 scalpel blade.  Given the location of the defect, shape of the defect and the proximity to free margins a W-plasty was deemed most appropriate for repair.  Using a sterile surgical marker, the appropriate transposition arms of the W-plasty were drawn incorporating the defect and placing the expected incisions within the relaxed skin tension lines where possible.    The area thus outlined was incised deep to adipose tissue with a #15 scalpel blade.  The skin margins were undermined to an appropriate distance in all directions utilizing iris scissors.  The opposing transposition arms were then transposed into place in opposite direction and anchored with interrupted buried subcutaneous sutures.
Z Plasty Text: The lesion was extirpated to the level of the fat with a #15 scalpel blade.  Given the location of the defect, shape of the defect and the proximity to free margins a Z-plasty was deemed most appropriate for repair.  Using a sterile surgical marker, the appropriate transposition arms of the Z-plasty were drawn incorporating the defect and placing the expected incisions within the relaxed skin tension lines where possible.    The area thus outlined was incised deep to adipose tissue with a #15 scalpel blade.  The skin margins were undermined to an appropriate distance in all directions utilizing iris scissors.  The opposing transposition arms were then transposed into place in opposite direction and anchored with interrupted buried subcutaneous sutures.
Zygomaticofacial Flap Text: Given the location of the defect, shape of the defect and the proximity to free margins a zygomaticofacial flap was deemed most appropriate for repair.  Using a sterile surgical marker, the appropriate flap was drawn incorporating the defect and placing the expected incisions within the relaxed skin tension lines where possible. The area thus outlined was incised deep to adipose tissue with a #15 scalpel blade with preservation of a vascular pedicle.  The skin margins were undermined to an appropriate distance in all directions utilizing iris scissors.  The flap was then placed into the defect and anchored with interrupted buried subcutaneous sutures.
Cheek Interpolation Flap Text: A decision was made to reconstruct the defect utilizing an interpolation axial flap and a staged reconstruction.  A telfa template was made of the defect.  This telfa template was then used to outline the Cheek Interpolation flap.  The donor area for the pedicle flap was then injected with anesthesia.  The flap was excised through the skin and subcutaneous tissue down to the layer of the underlying musculature.  The interpolation flap was carefully excised within this deep plane to maintain its blood supply.  The edges of the donor site were undermined.   The donor site was closed in a primary fashion.  The pedicle was then rotated into position and sutured.  Once the tube was sutured into place, adequate blood supply was confirmed with blanching and refill.  The pedicle was then wrapped with xeroform gauze and dressed appropriately with a telfa and gauze bandage to ensure continued blood supply and protect the attached pedicle.
Cheek-To-Nose Interpolation Flap Text: A decision was made to reconstruct the defect utilizing an interpolation axial flap and a staged reconstruction.  A telfa template was made of the defect.  This telfa template was then used to outline the Cheek-To-Nose Interpolation flap.  The donor area for the pedicle flap was then injected with anesthesia.  The flap was excised through the skin and subcutaneous tissue down to the layer of the underlying musculature.  The interpolation flap was carefully excised within this deep plane to maintain its blood supply.  The edges of the donor site were undermined.   The donor site was closed in a primary fashion.  The pedicle was then rotated into position and sutured.  Once the tube was sutured into place, adequate blood supply was confirmed with blanching and refill.  The pedicle was then wrapped with xeroform gauze and dressed appropriately with a telfa and gauze bandage to ensure continued blood supply and protect the attached pedicle.
Interpolation Flap Text: A decision was made to reconstruct the defect utilizing an interpolation axial flap and a staged reconstruction.  A telfa template was made of the defect.  This telfa template was then used to outline the interpolation flap.  The donor area for the pedicle flap was then injected with anesthesia.  The flap was excised through the skin and subcutaneous tissue down to the layer of the underlying musculature.  The interpolation flap was carefully excised within this deep plane to maintain its blood supply.  The edges of the donor site were undermined.   The donor site was closed in a primary fashion.  The pedicle was then rotated into position and sutured.  Once the tube was sutured into place, adequate blood supply was confirmed with blanching and refill.  The pedicle was then wrapped with xeroform gauze and dressed appropriately with a telfa and gauze bandage to ensure continued blood supply and protect the attached pedicle.
Melolabial Interpolation Flap Text: A decision was made to reconstruct the defect utilizing an interpolation axial flap and a staged reconstruction.  A telfa template was made of the defect.  This telfa template was then used to outline the melolabial interpolation flap.  The donor area for the pedicle flap was then injected with anesthesia.  The flap was excised through the skin and subcutaneous tissue down to the layer of the underlying musculature.  The pedicle flap was carefully excised within this deep plane to maintain its blood supply.  The edges of the donor site were undermined.   The donor site was closed in a primary fashion.  The pedicle was then rotated into position and sutured.  Once the tube was sutured into place, adequate blood supply was confirmed with blanching and refill.  The pedicle was then wrapped with xeroform gauze and dressed appropriately with a telfa and gauze bandage to ensure continued blood supply and protect the attached pedicle.
Mastoid Interpolation Flap Text: A decision was made to reconstruct the defect utilizing an interpolation axial flap and a staged reconstruction.  A telfa template was made of the defect.  This telfa template was then used to outline the mastoid interpolation flap.  The donor area for the pedicle flap was then injected with anesthesia.  The flap was excised through the skin and subcutaneous tissue down to the layer of the underlying musculature.  The pedicle flap was carefully excised within this deep plane to maintain its blood supply.  The edges of the donor site were undermined.   The donor site was closed in a primary fashion.  The pedicle was then rotated into position and sutured.  Once the tube was sutured into place, adequate blood supply was confirmed with blanching and refill.  The pedicle was then wrapped with xeroform gauze and dressed appropriately with a telfa and gauze bandage to ensure continued blood supply and protect the attached pedicle.
Posterior Auricular Interpolation Flap Text: A decision was made to reconstruct the defect utilizing an interpolation axial flap and a staged reconstruction.  A telfa template was made of the defect.  This telfa template was then used to outline the posterior auricular interpolation flap.  The donor area for the pedicle flap was then injected with anesthesia.  The flap was excised through the skin and subcutaneous tissue down to the layer of the underlying musculature.  The pedicle flap was carefully excised within this deep plane to maintain its blood supply.  The edges of the donor site were undermined.   The donor site was closed in a primary fashion.  The pedicle was then rotated into position and sutured.  Once the tube was sutured into place, adequate blood supply was confirmed with blanching and refill.  The pedicle was then wrapped with xeroform gauze and dressed appropriately with a telfa and gauze bandage to ensure continued blood supply and protect the attached pedicle.
Paramedian Forehead Flap Text: A decision was made to reconstruct the defect utilizing an interpolation axial flap and a staged reconstruction.  A telfa template was made of the defect.  This telfa template was then used to outline the paramedian forehead pedicle flap.  The donor area for the pedicle flap was then injected with anesthesia.  The flap was excised through the skin and subcutaneous tissue down to the layer of the underlying musculature.  The pedicle flap was carefully excised within this deep plane to maintain its blood supply.  The edges of the donor site were undermined.   The donor site was closed in a primary fashion.  The pedicle was then rotated into position and sutured.  Once the tube was sutured into place, adequate blood supply was confirmed with blanching and refill.  The pedicle was then wrapped with xeroform gauze and dressed appropriately with a telfa and gauze bandage to ensure continued blood supply and protect the attached pedicle.
Lip Wedge Excision Repair Text: Given the location of the defect and the proximity to free margins a full thickness wedge repair was deemed most appropriate.  Using a sterile surgical marker, the appropriate repair was drawn incorporating the defect and placing the expected incisions perpendicular to the vermilion border.  The vermilion border was also meticulously outlined to ensure appropriate reapproximation during the repair.  The area thus outlined was incised through and through with a #15 scalpel blade.  The muscularis and dermis were reaproximated with deep sutures following hemostasis. Care was taken to realign the vermilion border before proceeding with the superficial closure.  Once the vermilion was realigned the superfical and mucosal closure was finished.
Ftsg Text: The defect edges were debeveled with a #15 scalpel blade.  Given the location of the defect, shape of the defect and the proximity to free margins a full thickness skin graft was deemed most appropriate.  Using a sterile surgical marker, the primary defect shape was transferred to the donor site. The area thus outlined was incised deep to adipose tissue with a #15 scalpel blade.  The harvested graft was then trimmed of adipose tissue until only dermis and epidermis was left.  The skin margins of the secondary defect were undermined to an appropriate distance in all directions utilizing iris scissors.  The secondary defect was closed with interrupted buried subcutaneous sutures.  The skin edges were then re-apposed with running  sutures.  The skin graft was then placed in the primary defect and oriented appropriately.
Split-Thickness Skin Graft Text: The defect edges were debeveled with a #15 scalpel blade.  Given the location of the defect, shape of the defect and the proximity to free margins a split thickness skin graft was deemed most appropriate.  Using a sterile surgical marker, the primary defect shape was transferred to the donor site. The split thickness graft was then harvested.  The skin graft was then placed in the primary defect and oriented appropriately.
Burow's Graft Text: The defect edges were debeveled with a #15 scalpel blade.  Given the location of the defect, shape of the defect, the proximity to free margins and the presence of a standing cone deformity a Burow's skin graft was deemed most appropriate. The standing cone was removed and this tissue was then trimmed to the shape of the primary defect. The adipose tissue was also removed until only dermis and epidermis were left.  The skin margins of the secondary defect were undermined to an appropriate distance in all directions utilizing iris scissors.  The secondary defect was closed with interrupted buried subcutaneous sutures.  The skin edges were then re-apposed with running  sutures.  The skin graft was then placed in the primary defect and oriented appropriately.
Cartilage Graft Text: The defect edges were debeveled with a #15 scalpel blade.  Given the location of the defect, shape of the defect, the fact the defect involved a full thickness cartilage defect a cartilage graft was deemed most appropriate.  An appropriate donor site was identified, cleansed, and anesthetized. The cartilage graft was then harvested and transferred to the recipient site, oriented appropriately and then sutured into place.  The secondary defect was then repaired using a primary closure.
Composite Graft Text: The defect edges were debeveled with a #15 scalpel blade.  Given the location of the defect, shape of the defect, the proximity to free margins and the fact the defect was full thickness a composite graft was deemed most appropriate.  The defect was outline and then transferred to the donor site.  A full thickness graft was then excised from the donor site. The graft was then placed in the primary defect, oriented appropriately and then sutured into place.  The secondary defect was then repaired using a primary closure.
Epidermal Autograft Text: The defect edges were debeveled with a #15 scalpel blade.  Given the location of the defect, shape of the defect and the proximity to free margins an epidermal autograft was deemed most appropriate.  Using a sterile surgical marker, the primary defect shape was transferred to the donor site. The epidermal graft was then harvested.  The skin graft was then placed in the primary defect and oriented appropriately.
Dermal Autograft Text: The defect edges were debeveled with a #15 scalpel blade.  Given the location of the defect, shape of the defect and the proximity to free margins a dermal autograft was deemed most appropriate.  Using a sterile surgical marker, the primary defect shape was transferred to the donor site. The area thus outlined was incised deep to adipose tissue with a #15 scalpel blade.  The harvested graft was then trimmed of adipose and epidermal tissue until only dermis was left.  The skin graft was then placed in the primary defect and oriented appropriately.
Skin Substitute Text: The defect edges were debeveled with a #15 scalpel blade.  Given the location of the defect, shape of the defect and the proximity to free margins a skin substitute graft was deemed most appropriate.  The graft material was trimmed to fit the size of the defect. The graft was then placed in the primary defect and oriented appropriately.
Tissue Cultured Epidermal Autograft Text: The defect edges were debeveled with a #15 scalpel blade.  Given the location of the defect, shape of the defect and the proximity to free margins a tissue cultured epidermal autograft was deemed most appropriate.  The graft was then trimmed to fit the size of the defect.  The graft was then placed in the primary defect and oriented appropriately.
Xenograft Text: The defect edges were debeveled with a #15 scalpel blade.  Given the location of the defect, shape of the defect and the proximity to free margins a xenograft was deemed most appropriate.  The graft was then trimmed to fit the size of the defect.  The graft was then placed in the primary defect and oriented appropriately.
Purse String (Intermediate) Text: Given the location of the defect and the characteristics of the surrounding skin a purse string intermediate closure was deemed most appropriate.  Undermining was performed circumfirentially around the surgical defect.  A purse string suture was then placed and tightened.
Purse String (Simple) Text: Given the location of the defect and the characteristics of the surrounding skin a purse string simple closure was deemed most appropriate.  Undermining was performed circumferentially around the surgical defect.  A purse string suture was then placed and tightened.
Partial Purse String (Intermediate) Text: Given the location of the defect and the characteristics of the surrounding skin an intermediate purse string closure was deemed most appropriate.  Undermining was performed circumferentially around the surgical defect.  A purse string suture was then placed and tightened. Wound tension of the circular defect prevented complete closure of the wound.
Partial Purse String (Simple) Text: Given the location of the defect and the characteristics of the surrounding skin a simple purse string closure was deemed most appropriate.  Undermining was performed circumferentially around the surgical defect.  A purse string suture was then placed and tightened. Wound tension of the circular defect prevented complete closure of the wound.
Complex Repair And Single Advancement Flap Text: The defect edges were debeveled with a #15 scalpel blade.  The primary defect was closed partially with a complex linear closure.  Given the location of the remaining defect, shape of the defect and the proximity to free margins a single advancement flap was deemed most appropriate for complete closure of the defect.  Using a sterile surgical marker, an appropriate advancement flap was drawn incorporating the defect and placing the expected incisions within the relaxed skin tension lines where possible.    The area thus outlined was incised deep to adipose tissue with a #15 scalpel blade.  The skin margins were undermined to an appropriate distance in all directions utilizing iris scissors.
Complex Repair And Double Advancement Flap Text: The defect edges were debeveled with a #15 scalpel blade.  The primary defect was closed partially with a complex linear closure.  Given the location of the remaining defect, shape of the defect and the proximity to free margins a double advancement flap was deemed most appropriate for complete closure of the defect.  Using a sterile surgical marker, an appropriate advancement flap was drawn incorporating the defect and placing the expected incisions within the relaxed skin tension lines where possible.    The area thus outlined was incised deep to adipose tissue with a #15 scalpel blade.  The skin margins were undermined to an appropriate distance in all directions utilizing iris scissors.
Complex Repair And Modified Advancement Flap Text: The defect edges were debeveled with a #15 scalpel blade.  The primary defect was closed partially with a complex linear closure.  Given the location of the remaining defect, shape of the defect and the proximity to free margins a modified advancement flap was deemed most appropriate for complete closure of the defect.  Using a sterile surgical marker, an appropriate advancement flap was drawn incorporating the defect and placing the expected incisions within the relaxed skin tension lines where possible.    The area thus outlined was incised deep to adipose tissue with a #15 scalpel blade.  The skin margins were undermined to an appropriate distance in all directions utilizing iris scissors.
Complex Repair And A-T Advancement Flap Text: The defect edges were debeveled with a #15 scalpel blade.  The primary defect was closed partially with a complex linear closure.  Given the location of the remaining defect, shape of the defect and the proximity to free margins an A-T advancement flap was deemed most appropriate for complete closure of the defect.  Using a sterile surgical marker, an appropriate advancement flap was drawn incorporating the defect and placing the expected incisions within the relaxed skin tension lines where possible.    The area thus outlined was incised deep to adipose tissue with a #15 scalpel blade.  The skin margins were undermined to an appropriate distance in all directions utilizing iris scissors.
Complex Repair And O-T Advancement Flap Text: The defect edges were debeveled with a #15 scalpel blade.  The primary defect was closed partially with a complex linear closure.  Given the location of the remaining defect, shape of the defect and the proximity to free margins an O-T advancement flap was deemed most appropriate for complete closure of the defect.  Using a sterile surgical marker, an appropriate advancement flap was drawn incorporating the defect and placing the expected incisions within the relaxed skin tension lines where possible.    The area thus outlined was incised deep to adipose tissue with a #15 scalpel blade.  The skin margins were undermined to an appropriate distance in all directions utilizing iris scissors.
Complex Repair And O-L Flap Text: The defect edges were debeveled with a #15 scalpel blade.  The primary defect was closed partially with a complex linear closure.  Given the location of the remaining defect, shape of the defect and the proximity to free margins an O-L flap was deemed most appropriate for complete closure of the defect.  Using a sterile surgical marker, an appropriate flap was drawn incorporating the defect and placing the expected incisions within the relaxed skin tension lines where possible.    The area thus outlined was incised deep to adipose tissue with a #15 scalpel blade.  The skin margins were undermined to an appropriate distance in all directions utilizing iris scissors.
Complex Repair And Bilobe Flap Text: The defect edges were debeveled with a #15 scalpel blade.  The primary defect was closed partially with a complex linear closure.  Given the location of the remaining defect, shape of the defect and the proximity to free margins a bilobe flap was deemed most appropriate for complete closure of the defect.  Using a sterile surgical marker, an appropriate advancement flap was drawn incorporating the defect and placing the expected incisions within the relaxed skin tension lines where possible.    The area thus outlined was incised deep to adipose tissue with a #15 scalpel blade.  The skin margins were undermined to an appropriate distance in all directions utilizing iris scissors.
Complex Repair And Melolabial Flap Text: The defect edges were debeveled with a #15 scalpel blade.  The primary defect was closed partially with a complex linear closure.  Given the location of the remaining defect, shape of the defect and the proximity to free margins a melolabial flap was deemed most appropriate for complete closure of the defect.  Using a sterile surgical marker, an appropriate advancement flap was drawn incorporating the defect and placing the expected incisions within the relaxed skin tension lines where possible.    The area thus outlined was incised deep to adipose tissue with a #15 scalpel blade.  The skin margins were undermined to an appropriate distance in all directions utilizing iris scissors.
Complex Repair And Rotation Flap Text: The defect edges were debeveled with a #15 scalpel blade.  The primary defect was closed partially with a complex linear closure.  Given the location of the remaining defect, shape of the defect and the proximity to free margins a rotation flap was deemed most appropriate for complete closure of the defect.  Using a sterile surgical marker, an appropriate advancement flap was drawn incorporating the defect and placing the expected incisions within the relaxed skin tension lines where possible.    The area thus outlined was incised deep to adipose tissue with a #15 scalpel blade.  The skin margins were undermined to an appropriate distance in all directions utilizing iris scissors.
Complex Repair And Rhombic Flap Text: The defect edges were debeveled with a #15 scalpel blade.  The primary defect was closed partially with a complex linear closure.  Given the location of the remaining defect, shape of the defect and the proximity to free margins a rhombic flap was deemed most appropriate for complete closure of the defect.  Using a sterile surgical marker, an appropriate advancement flap was drawn incorporating the defect and placing the expected incisions within the relaxed skin tension lines where possible.    The area thus outlined was incised deep to adipose tissue with a #15 scalpel blade.  The skin margins were undermined to an appropriate distance in all directions utilizing iris scissors.
Complex Repair And Transposition Flap Text: The defect edges were debeveled with a #15 scalpel blade.  The primary defect was closed partially with a complex linear closure.  Given the location of the remaining defect, shape of the defect and the proximity to free margins a transposition flap was deemed most appropriate for complete closure of the defect.  Using a sterile surgical marker, an appropriate advancement flap was drawn incorporating the defect and placing the expected incisions within the relaxed skin tension lines where possible.    The area thus outlined was incised deep to adipose tissue with a #15 scalpel blade.  The skin margins were undermined to an appropriate distance in all directions utilizing iris scissors.
Complex Repair And V-Y Plasty Text: The defect edges were debeveled with a #15 scalpel blade.  The primary defect was closed partially with a complex linear closure.  Given the location of the remaining defect, shape of the defect and the proximity to free margins a V-Y plasty was deemed most appropriate for complete closure of the defect.  Using a sterile surgical marker, an appropriate advancement flap was drawn incorporating the defect and placing the expected incisions within the relaxed skin tension lines where possible.    The area thus outlined was incised deep to adipose tissue with a #15 scalpel blade.  The skin margins were undermined to an appropriate distance in all directions utilizing iris scissors.
Complex Repair And M Plasty Text: The defect edges were debeveled with a #15 scalpel blade.  The primary defect was closed partially with a complex linear closure.  Given the location of the remaining defect, shape of the defect and the proximity to free margins an M plasty was deemed most appropriate for complete closure of the defect.  Using a sterile surgical marker, an appropriate advancement flap was drawn incorporating the defect and placing the expected incisions within the relaxed skin tension lines where possible.    The area thus outlined was incised deep to adipose tissue with a #15 scalpel blade.  The skin margins were undermined to an appropriate distance in all directions utilizing iris scissors.
Complex Repair And Double M Plasty Text: The defect edges were debeveled with a #15 scalpel blade.  The primary defect was closed partially with a complex linear closure.  Given the location of the remaining defect, shape of the defect and the proximity to free margins a double M plasty was deemed most appropriate for complete closure of the defect.  Using a sterile surgical marker, an appropriate advancement flap was drawn incorporating the defect and placing the expected incisions within the relaxed skin tension lines where possible.    The area thus outlined was incised deep to adipose tissue with a #15 scalpel blade.  The skin margins were undermined to an appropriate distance in all directions utilizing iris scissors.
Complex Repair And W Plasty Text: The defect edges were debeveled with a #15 scalpel blade.  The primary defect was closed partially with a complex linear closure.  Given the location of the remaining defect, shape of the defect and the proximity to free margins a W plasty was deemed most appropriate for complete closure of the defect.  Using a sterile surgical marker, an appropriate advancement flap was drawn incorporating the defect and placing the expected incisions within the relaxed skin tension lines where possible.    The area thus outlined was incised deep to adipose tissue with a #15 scalpel blade.  The skin margins were undermined to an appropriate distance in all directions utilizing iris scissors.
Complex Repair And Z Plasty Text: The defect edges were debeveled with a #15 scalpel blade.  The primary defect was closed partially with a complex linear closure.  Given the location of the remaining defect, shape of the defect and the proximity to free margins a Z plasty was deemed most appropriate for complete closure of the defect.  Using a sterile surgical marker, an appropriate advancement flap was drawn incorporating the defect and placing the expected incisions within the relaxed skin tension lines where possible.    The area thus outlined was incised deep to adipose tissue with a #15 scalpel blade.  The skin margins were undermined to an appropriate distance in all directions utilizing iris scissors.
Complex Repair And Dorsal Nasal Flap Text: The defect edges were debeveled with a #15 scalpel blade.  The primary defect was closed partially with a complex linear closure.  Given the location of the remaining defect, shape of the defect and the proximity to free margins a dorsal nasal flap was deemed most appropriate for complete closure of the defect.  Using a sterile surgical marker, an appropriate flap was drawn incorporating the defect and placing the expected incisions within the relaxed skin tension lines where possible.    The area thus outlined was incised deep to adipose tissue with a #15 scalpel blade.  The skin margins were undermined to an appropriate distance in all directions utilizing iris scissors.
Complex Repair And Ftsg Text: The defect edges were debeveled with a #15 scalpel blade.  The primary defect was closed partially with a complex linear closure.  Given the location of the defect, shape of the defect and the proximity to free margins a full thickness skin graft was deemed most appropriate to repair the remaining defect.  The graft was trimmed to fit the size of the remaining defect.  The graft was then placed in the primary defect, oriented appropriately, and sutured into place.
Complex Repair And Burow's Graft Text: The defect edges were debeveled with a #15 scalpel blade.  The primary defect was closed partially with a complex linear closure.  Given the location of the defect, shape of the defect, the proximity to free margins and the presence of a standing cone deformity a Burow's graft was deemed most appropriate to repair the remaining defect.  The graft was trimmed to fit the size of the remaining defect.  The graft was then placed in the primary defect, oriented appropriately, and sutured into place.
Complex Repair And Split-Thickness Skin Graft Text: The defect edges were debeveled with a #15 scalpel blade.  The primary defect was closed partially with a complex linear closure.  Given the location of the defect, shape of the defect and the proximity to free margins a split thickness skin graft was deemed most appropriate to repair the remaining defect.  The graft was trimmed to fit the size of the remaining defect.  The graft was then placed in the primary defect, oriented appropriately, and sutured into place.
Complex Repair And Epidermal Autograft Text: The defect edges were debeveled with a #15 scalpel blade.  The primary defect was closed partially with a complex linear closure.  Given the location of the defect, shape of the defect and the proximity to free margins an epidermal autograft was deemed most appropriate to repair the remaining defect.  The graft was trimmed to fit the size of the remaining defect.  The graft was then placed in the primary defect, oriented appropriately, and sutured into place.
Complex Repair And Dermal Autograft Text: The defect edges were debeveled with a #15 scalpel blade.  The primary defect was closed partially with a complex linear closure.  Given the location of the defect, shape of the defect and the proximity to free margins an dermal autograft was deemed most appropriate to repair the remaining defect.  The graft was trimmed to fit the size of the remaining defect.  The graft was then placed in the primary defect, oriented appropriately, and sutured into place.
Complex Repair And Tissue Cultured Epidermal Autograft Text: The defect edges were debeveled with a #15 scalpel blade.  The primary defect was closed partially with a complex linear closure.  Given the location of the defect, shape of the defect and the proximity to free margins an tissue cultured epidermal autograft was deemed most appropriate to repair the remaining defect.  The graft was trimmed to fit the size of the remaining defect.  The graft was then placed in the primary defect, oriented appropriately, and sutured into place.
Complex Repair And Xenograft Text: The defect edges were debeveled with a #15 scalpel blade.  The primary defect was closed partially with a complex linear closure.  Given the location of the defect, shape of the defect and the proximity to free margins a xenograft was deemed most appropriate to repair the remaining defect.  The graft was trimmed to fit the size of the remaining defect.  The graft was then placed in the primary defect, oriented appropriately, and sutured into place.
Complex Repair And Skin Substitute Graft Text: The defect edges were debeveled with a #15 scalpel blade.  The primary defect was closed partially with a complex linear closure.  Given the location of the remaining defect, shape of the defect and the proximity to free margins a skin substitute graft was deemed most appropriate to repair the remaining defect.  The graft was trimmed to fit the size of the remaining defect.  The graft was then placed in the primary defect, oriented appropriately, and sutured into place.
Path Notes (To The Dermatopathologist): Please check margins.
Consent was obtained from the patient. The risks and benefits to therapy were discussed in detail. Specifically, the risks of infection, scarring, bleeding, prolonged wound healing, incomplete removal, allergy to anesthesia, nerve injury and recurrence were addressed. Prior to the procedure, the treatment site was clearly identified and confirmed by the patient. All components of Universal Protocol/PAUSE Rule completed.
Post-Care Instructions: I reviewed with the patient in detail post-care instructions:\\n1. Apply bacitracin over the steri-strips.  \\n2. Cut non-stick pad (Telfa) to cover the steri-strips\\n3. Apply tape (hypafix) over the non-stick pad\\n4. Change once per day for 5 days\\n5. Shower with bandage on, change bandage after shower\\n\\nPatient is not to engage in any heavy lifting, exercise, hot tub, or swimming for the next 14 days. Should the patient develop any fevers, chills, bleeding, severe pain patient will contact the office immediately.
Home Suture Removal Text: Patient was provided a home suture removal kit and will remove their sutures at home.  If they have any questions or difficulties they will call the office.
Where Do You Want The Question To Include Opioid Counseling Located?: Case Summary Tab
Information: Selecting Yes will display possible errors in your note based on the variables you have selected. This validation is only offered as a suggestion for you. PLEASE NOTE THAT THE VALIDATION TEXT WILL BE REMOVED WHEN YOU FINALIZE YOUR NOTE. IF YOU WANT TO FAX A PRELIMINARY NOTE YOU WILL NEED TO TOGGLE THIS TO 'NO' IF YOU DO NOT WANT IT IN YOUR FAXED NOTE.

## 2021-07-28 NOTE — PROCEDURE: MIPS QUALITY
Detail Level: Detailed
Quality 265: Biopsy Follow-Up: Biopsy results reviewed, communicated, tracked, and documented
Quality 138: Melanoma: Coordination Of Care: A treatment plan was communicated to the physicians providing continuing care within one month of diagnosis outlining: diagnosis, tumor thickness and a plan for surgery or alternate care.
Quality 226: Preventive Care And Screening: Tobacco Use: Screening And Cessation Intervention: Patient screened for tobacco use and is an ex/non-smoker
Quality 111:Pneumonia Vaccination Status For Older Adults: Pneumococcal Vaccination Previously Received
Quality 137: Melanoma: Continuity Of Care - Recall System: Patient information entered into a recall system that includes: target date for the next exam specified AND a process to follow up with patients regarding missed or unscheduled appointments
Quality 130: Documentation Of Current Medications In The Medical Record: Current Medications Documented

## 2021-07-28 NOTE — PROCEDURE: COUNSELING
Quality 137: Melanoma: Continuity Of Care - Recall System: Patient information entered into a recall system that includes: target date for the next exam specified AND a process to follow up with patients regarding missed or unscheduled appointments
Show Follow-Up Variable: Yes
Detail Level: Detailed
Quality 138: Melanoma: Coordination Of Care: A treatment plan was communicated to the physicians providing continuing care within one month of diagnosis outlining: diagnosis, tumor thickness and a plan for surgery or alternate care.
When Should The Patient Follow-Up For Their Next Full-Body Skin Exam?: 3 Months

## 2021-09-21 ENCOUNTER — OFFICE VISIT (OUTPATIENT)
Dept: CARDIOLOGY | Facility: MEDICAL CENTER | Age: 76
End: 2021-09-21
Payer: MEDICARE

## 2021-09-21 VITALS
WEIGHT: 264 LBS | SYSTOLIC BLOOD PRESSURE: 118 MMHG | BODY MASS INDEX: 40.01 KG/M2 | RESPIRATION RATE: 18 BRPM | HEART RATE: 70 BPM | DIASTOLIC BLOOD PRESSURE: 70 MMHG | HEIGHT: 68 IN | OXYGEN SATURATION: 95 %

## 2021-09-21 DIAGNOSIS — I10 ESSENTIAL HYPERTENSION: ICD-10-CM

## 2021-09-21 DIAGNOSIS — I77.810 ASCENDING AORTA DILATION (HCC): ICD-10-CM

## 2021-09-21 DIAGNOSIS — I71.20 THORACIC AORTIC ANEURYSM WITHOUT RUPTURE (HCC): ICD-10-CM

## 2021-09-21 PROCEDURE — 99214 OFFICE O/P EST MOD 30 MIN: CPT | Performed by: NURSE PRACTITIONER

## 2021-09-21 RX ORDER — ALUMINUM ZIRCONIUM TRICHLOROHYDREX GLY 0.19 G/G
STICK TOPICAL
COMMUNITY
Start: 2021-07-06 | End: 2021-09-21

## 2021-09-21 RX ORDER — METOPROLOL SUCCINATE 25 MG/1
12.5 TABLET, EXTENDED RELEASE ORAL DAILY
Qty: 45 TABLET | Refills: 3 | Status: SHIPPED | OUTPATIENT
Start: 2021-09-21 | End: 2022-09-27

## 2021-09-21 RX ORDER — OMEPRAZOLE 20 MG/1
TABLET, DELAYED RELEASE ORAL
COMMUNITY
Start: 2021-07-06

## 2021-09-21 RX ORDER — LEVOTHYROXINE SODIUM 0.05 MG/1
TABLET ORAL
COMMUNITY
Start: 2021-07-06 | End: 2021-09-21

## 2021-09-21 RX ORDER — LISINOPRIL 10 MG/1
TABLET ORAL
Qty: 90 TABLET | Refills: 3 | Status: SHIPPED | OUTPATIENT
Start: 2021-09-22 | End: 2022-08-08 | Stop reason: SDUPTHER

## 2021-09-21 ASSESSMENT — ENCOUNTER SYMPTOMS
SHORTNESS OF BREATH: 0
WHEEZING: 0
DIZZINESS: 0
SPUTUM PRODUCTION: 0
COUGH: 0
NAUSEA: 0
NERVOUS/ANXIOUS: 0
WEAKNESS: 0
PND: 0
ORTHOPNEA: 0
HEMOPTYSIS: 0
VOMITING: 0
CLAUDICATION: 0
PALPITATIONS: 0

## 2021-09-21 ASSESSMENT — FIBROSIS 4 INDEX: FIB4 SCORE: 2.4

## 2021-09-21 NOTE — PROGRESS NOTES
Chief Complaint   Patient presents with   • Aortic Stenosis     F/V Dx: Ascending aorta dilation (CMS-HCC), mild, needs OP followup       Subjective:   Itz Magaña is a 75 y.o. male who presents today for 6 months follow up.    He is followed by Dr. Chavez in our clinic, history of AAA 4.1cm, coronary arteriosclerosis, and dyslipidemia.    Patient is doing well. He is working out at least 1 hour a day with his wife (Joelle). Denies any chest pain, sob, dizziness or palpitations.     Tolerating medication well.    Past Medical History:   Diagnosis Date   • ASTHMA     scheduled inhaler use   • Bronchitis 02/2018   • Erectile dysfunction 6/8/2016   • GERD (gastroesophageal reflux disease) 6/8/2016   • Heart burn    • History of skin cancer 6/8/2016   • Hyperlipidemia 6/8/2016   • Hypertension    • Hypothyroidism 6/8/2016   • Indigestion    • Obesity 6/8/2016   • Pain 04/19/2018    chronic back pain, 0/10   • Preventative health care 6/8/2016   • Rosacea 6/8/2016   • Seasonal allergies 6/8/2016     Past Surgical History:   Procedure Laterality Date   • LUMBAR FUSION O-ARM N/A 11/19/2018    Procedure: LUMBAR FUSION O-ARM-  POSTERIOR L2-3 TLIF,;  Surgeon: Brandon Fowler M.D.;  Location: [facility];  Service: Neurosurgery   • LUMBAR LAMINECTOMY DISKECTOMY N/A 11/19/2018    Procedure: LUMBAR LAMINECTOMY DISKECTOMY-  L2-3 LAMI;  Surgeon: Brandon Fowler M.D.;  Location: [facility];  Service: Neurosurgery   • HARDWARE REMOVAL NEURO N/A 11/19/2018    Procedure: HARDWARE REMOVAL NEURO-  L4-5, L3 SCREW REPOSITIONING;  Surgeon: Brandon Fowler M.D.;  Location: [facility];  Service: Neurosurgery   • KYPHOPLASTY N/A 11/19/2018    Procedure: KYPHOPLASTY-  L2;  Surgeon: Brandon Fowler M.D.;  Location: [facility];  Service: Neurosurgery   • VENTRAL HERNIA REPAIR Left 5/3/2018    Procedure: VENTRAL HERNIA REPAIR FOR LUMBAR HERNIA/ LUNG HERNIA THROUGH CHEST WALL  RECONSTRUCTION, MAJOR  ;  Surgeon: Phoenix Becker M.D.;  Location: SURGERY [facility];  Service: General   • IRRIGATION & DEBRIDEMENT NEURO  3/30/2017    Procedure: IRRIGATION & DEBRIDEMENT NEURO-LUMBAR WOUND;  Surgeon: Josiah Chakraborty M.D.;  Location: SURGERY [facility];  Service:    • LUMBAR FUSION POSTERIOR N/A 3/17/2017    Procedure: LUMBAR FUSION POSTERIOR L3-4 EXTENSION;  Surgeon: Josiah Chakraboryt M.D.;  Location: SURGERY [facility];  Service:    • LUMBAR LAMINECTOMY DISKECTOMY N/A 3/17/2017    Procedure: LUMBAR LAMINECTOMY DISKECTOMY L3 & REDO L4;  Surgeon: Josiah Chakraborty M.D.;  Location: SURGERY [facility];  Service:    • HARDWARE REMOVAL NEURO N/A 3/17/2017    Procedure: HARDWARE REMOVAL NEURO L4-5;  Surgeon: Josiah Chakraborty M.D.;  Location: SURGERY [facility];  Service:    • LUMBAR FUSION POSTERIOR  9/9/2009    Performed by JOSIAH CHAKRABORTY at SURGERY [facility]   • LUMBAR LAMINECTOMY DISKECTOMY  9/9/2009    Performed by JOSIAH CHAKRABORTY at SURGERY [facility]   • KNEE ARTHROPLASTY TOTAL  5/26/2009    Performed by NAREN DANIELSON at SURGERY [facility]   • HIP ARTHROPLASTY TOTAL  1/21/2009    Performed by NAREN DANIELSON at SURGERY [facility]   • INGUINAL HERNIA REPAIR  9/2/08    Performed by PHOENIX BECKER at SURGERY SAME DAY [facility]   • INGUINAL HERNIA REPAIR  6/10/08    Performed by PHOENIX BECKER at SURGERY SAME DAY [facility]   • HERNIA REPAIR  2008    HERNIA    • ROMAN BY LAPAROSCOPY  1982   • APPENDECTOMY  1953   • TONSILLECTOMY  1951   • OTHER      DISCECTOMY WITH HARWWARE   • OTHER      GASTRIC BYPASS AT 30 YEARS     Family History   Problem Relation Age of Onset   • Hypertension Mother    • Dementia Mother    • Hypertension Father    • Lung Disease Father         asthma     Social History     Socioeconomic History   • Marital status:      Spouse name: Not on file   • Number of children: Not on file   • Years of education: Not on file   •  Highest education level: Not on file   Occupational History   • Not on file   Tobacco Use   • Smoking status: Never Smoker   • Smokeless tobacco: Never Used   Vaping Use   • Vaping Use: Never used   Substance and Sexual Activity   • Alcohol use: Yes     Comment: 1/week    • Drug use: No     Comment: marijuana edibles (for pain control)   • Sexual activity: Not on file   Other Topics Concern   •  Service No   • Blood Transfusions No   • Caffeine Concern No   • Occupational Exposure No   • Hobby Hazards No   • Sleep Concern No   • Stress Concern No   • Weight Concern Yes   • Special Diet No   • Back Care Yes   • Exercise Yes   • Bike Helmet No     Comment: does not ride bike    • Seat Belt Yes   • Self-Exams No   Social History Narrative   • Not on file     Social Determinants of Health     Financial Resource Strain:    • Difficulty of Paying Living Expenses:    Food Insecurity:    • Worried About Running Out of Food in the Last Year:    • Ran Out of Food in the Last Year:    Transportation Needs:    • Lack of Transportation (Medical):    • Lack of Transportation (Non-Medical):    Physical Activity:    • Days of Exercise per Week:    • Minutes of Exercise per Session:    Stress:    • Feeling of Stress :    Social Connections:    • Frequency of Communication with Friends and Family:    • Frequency of Social Gatherings with Friends and Family:    • Attends Sabianism Services:    • Active Member of Clubs or Organizations:    • Attends Club or Organization Meetings:    • Marital Status:    Intimate Partner Violence:    • Fear of Current or Ex-Partner:    • Emotionally Abused:    • Physically Abused:    • Sexually Abused:      No Known Allergies  Outpatient Encounter Medications as of 9/21/2021   Medication Sig Dispense Refill   • fluticasone-salmeterol (ADVAIR DISKUS) 100-50 MCG/DOSE AEROSOL POWDER, BREATH ACTIVATED ADVAIR DISKUS 100-50 MCG/DOSE AEPB     • omeprazole (PRILOSEC OTC) 20 MG tablet PRILOSEC OTC 20 MG  TBEC     • [START ON 9/22/2021] lisinopril (PRINIVIL) 10 MG Tab LISINOPRIL 10 MG TABS 90 Tablet 3   • metoprolol SR (TOPROL XL) 25 MG TABLET SR 24 HR Take 0.5 Tablets by mouth every day. 45 Tablet 3   • atorvastatin (LIPITOR) 40 MG Tab TAKE 1 TABLET BY MOUTH EVERY EVENING FOR HIGH AMOUNTS OF FATS IN BLOOD 100 tablet 3   • amoxicillin (AMOXIL) 500 MG Cap      • aspirin (ASA) 81 MG Chew Tab chewable tablet Chew 1 Tab every day. 100 Tab 2   • tamsulosin (FLOMAX) 0.4 MG capsule TAKE ONE CAPSULE BY MOUTH EVERY DAY FOR BPH. GIVE 1/2 HOUR AFTER BREAKFAST 90 Cap 0   • gabapentin (NEURONTIN) 300 MG Cap Take 300 mg by mouth 4 times a day. Indications: twice a day  5   • ADVAIR DISKUS 100-50 MCG/DOSE AEROSOL POWDER, BREATH ACTIVATED INHALE 1 PUFF BY MOUTH TWICE A DAY *RINSE MOUTH AFTER USE* 3 Inhaler 0   • acetaminophen (TYLENOL) 500 MG Tab Take 1,000 mg by mouth 2 Times a Day.     • levothyroxine (SYNTHROID) 50 MCG Tab TAKE 1 TABLET BY MOUTH EVERY DAY 90 Tab 1   • [DISCONTINUED] levothyroxine (SYNTHROID) 50 MCG Tab SYNTHROID 50 MCG TABS (Patient not taking: Reported on 9/21/2021)     • [DISCONTINUED] PRILOSEC OTC 20 MG tablet  (Patient not taking: Reported on 9/21/2021)     • [DISCONTINUED] lisinopril (PRINIVIL) 10 MG Tab LISINOPRIL 10 MG TABS     • [DISCONTINUED] metoprolol SR (TOPROL XL) 25 MG TABLET SR 24 HR Take 0.5 Tablets by mouth every day. 45 tablet 3   • [DISCONTINUED] omeprazole (PRILOSEC) 20 MG delayed-release capsule Take 20 mg by mouth every morning. (Patient not taking: Reported on 9/21/2021)       No facility-administered encounter medications on file as of 9/21/2021.     Review of Systems   Constitutional: Negative for malaise/fatigue.   Respiratory: Negative for cough, hemoptysis, sputum production, shortness of breath and wheezing.    Cardiovascular: Negative for chest pain, palpitations, orthopnea, claudication, leg swelling and PND.   Gastrointestinal: Negative for nausea and vomiting.   Neurological:  "Negative for dizziness and weakness.   Psychiatric/Behavioral: The patient is not nervous/anxious.         Objective:   /70 (BP Location: Left arm, Patient Position: Sitting, BP Cuff Size: Adult)   Pulse 70   Resp 18   Ht 1.727 m (5' 8\")   Wt 120 kg (264 lb)   SpO2 95%   BMI 40.14 kg/m²     Physical Exam  Constitutional:       General: He is not in acute distress.     Appearance: He is well-developed. He is not diaphoretic.   HENT:      Head: Normocephalic and atraumatic.   Eyes:      Pupils: Pupils are equal, round, and reactive to light.   Neck:      Vascular: No JVD.   Cardiovascular:      Rate and Rhythm: Normal rate and regular rhythm.      Heart sounds: Normal heart sounds. No murmur heard.     Pulmonary:      Effort: Pulmonary effort is normal. No respiratory distress.      Breath sounds: Normal breath sounds.   Abdominal:      General: Bowel sounds are normal. There is no distension.      Palpations: Abdomen is soft.   Skin:     General: Skin is warm and dry.   Neurological:      Mental Status: He is alert and oriented to person, place, and time.   Psychiatric:         Behavior: Behavior normal.         Thought Content: Thought content normal.         Judgment: Judgment normal.           Treadmill Stress test   2012  1. The calculated left ventricular ejection fraction is 62%.   2.  There are no wall motion abnormalities.   3.  No evidence of infarct or reversible ischemia.    Echocardiography Laboratory  2/17/2021  No prior study is available for comparison.   Left ventricular ejection fraction is visually estimated to be 65%.  Normal regional wall motion.  No significant valve abnormalities.   Normal aortic root for body surface area. Ascending aorta diameter is   3.6 cm.    Lab Results   Component Value Date/Time    CHOLSTRLTOT 118 09/22/2020 10:26 AM    LDL 51 09/22/2020 10:26 AM    HDL 55 09/22/2020 10:26 AM    TRIGLYCERIDE 61 09/22/2020 10:26 AM       Lab Results   Component Value Date/Time "    SODIUM 139 09/22/2020 10:26 AM    POTASSIUM 4.5 09/22/2020 10:26 AM    CHLORIDE 103 09/22/2020 10:26 AM    CO2 24 09/22/2020 10:26 AM    GLUCOSE 91 09/22/2020 10:26 AM    BUN 12 09/22/2020 10:26 AM    CREATININE 0.83 09/22/2020 10:26 AM    CREATININE 0.9 05/18/2009 04:20 PM     Lab Results   Component Value Date/Time    ALKPHOSPHAT 80 03/09/2020 07:29 AM    ASTSGOT 25 03/09/2020 07:29 AM    ALTSGPT 16 03/09/2020 07:29 AM    TBILIRUBIN 1.0 03/09/2020 07:29 AM          Assessment:     1. Essential hypertension     2. Ascending aorta dilation (HCC)  CT-CTA COMPLETE THORACOABDOMINAL AORTA   3. Thoracic aortic aneurysm without rupture (HCC)         Medical Decision Making:  Today's Assessment / Status / Plan:     1. Coronary arteriosclerosis   - echo showed normal regional wall motion. EF 65%  - continue asa therapy   - continue metoprolol 25mg XL and atorvastatin 40mg qd     2. Hypertension:  - stable   - continue lisinopril 10mg qd and metoprolol SR 25mg qd     3. Hyperlipidemia:  - LDL 51  - continue statin therapy     4. TAA:  - ascending aorta diameter is 3.6cm  - repeat CTA, previous CTA showed 2019, ascending thoracic aorta 4.1cm    Follow up in 6 months, CT-CTA.

## 2021-10-06 NOTE — NON-PROVIDER
Outcome: Left Message    Please transfer to Patient Outreach Team at [phone] when patient returns call.    [facility] Verified: yes    Attempt # 2

## 2021-10-13 NOTE — PROCEDURE: MIPS QUALITY
Name band;
Quality 111:Pneumonia Vaccination Status For Older Adults: Pneumococcal Vaccination Previously Received
Quality 130: Documentation Of Current Medications In The Medical Record: Current Medications Documented
Detail Level: Detailed
Quality 226: Preventive Care And Screening: Tobacco Use: Screening And Cessation Intervention: Patient screened for tobacco use and is an ex/non-smoker

## 2021-11-01 ENCOUNTER — APPOINTMENT (RX ONLY)
Dept: URBAN - METROPOLITAN AREA CLINIC 4 | Facility: CLINIC | Age: 76
Setting detail: DERMATOLOGY
End: 2021-11-01

## 2021-11-01 DIAGNOSIS — L81.4 OTHER MELANIN HYPERPIGMENTATION: ICD-10-CM

## 2021-11-01 DIAGNOSIS — L82.1 OTHER SEBORRHEIC KERATOSIS: ICD-10-CM

## 2021-11-01 DIAGNOSIS — D22 MELANOCYTIC NEVI: ICD-10-CM

## 2021-11-01 DIAGNOSIS — L57.8 OTHER SKIN CHANGES DUE TO CHRONIC EXPOSURE TO NONIONIZING RADIATION: ICD-10-CM

## 2021-11-01 DIAGNOSIS — D18.0 HEMANGIOMA: ICD-10-CM

## 2021-11-01 DIAGNOSIS — Z86.006 PERSONAL HISTORY OF MELANOMA IN-SITU: ICD-10-CM

## 2021-11-01 PROBLEM — D22.5 MELANOCYTIC NEVI OF TRUNK: Status: ACTIVE | Noted: 2021-11-01

## 2021-11-01 PROBLEM — Z85.820 PERSONAL HISTORY OF MALIGNANT MELANOMA OF SKIN: Status: ACTIVE | Noted: 2021-11-01

## 2021-11-01 PROBLEM — D48.5 NEOPLASM OF UNCERTAIN BEHAVIOR OF SKIN: Status: ACTIVE | Noted: 2021-11-01

## 2021-11-01 PROBLEM — D18.01 HEMANGIOMA OF SKIN AND SUBCUTANEOUS TISSUE: Status: ACTIVE | Noted: 2021-11-01

## 2021-11-01 PROCEDURE — 11102 TANGNTL BX SKIN SINGLE LES: CPT

## 2021-11-01 PROCEDURE — ? COUNSELING

## 2021-11-01 PROCEDURE — ? BIOPSY BY SHAVE METHOD

## 2021-11-01 PROCEDURE — 99213 OFFICE O/P EST LOW 20 MIN: CPT | Mod: 25

## 2021-11-01 ASSESSMENT — LOCATION ZONE DERM
LOCATION ZONE: HAND
LOCATION ZONE: ARM
LOCATION ZONE: ARM
LOCATION ZONE: TRUNK
LOCATION ZONE: FACE

## 2021-11-01 ASSESSMENT — LOCATION DETAILED DESCRIPTION DERM
LOCATION DETAILED: RIGHT RADIAL DORSAL HAND
LOCATION DETAILED: LEFT SUPERIOR MEDIAL UPPER BACK
LOCATION DETAILED: LEFT SUPERIOR UPPER BACK
LOCATION DETAILED: RIGHT SUPERIOR MEDIAL UPPER BACK
LOCATION DETAILED: LEFT LATERAL SHOULDER
LOCATION DETAILED: RIGHT PROXIMAL DORSAL FOREARM
LOCATION DETAILED: LEFT CLAVICULAR SKIN
LOCATION DETAILED: RIGHT INFERIOR CENTRAL MALAR CHEEK
LOCATION DETAILED: RIGHT MID-UPPER BACK
LOCATION DETAILED: LEFT MEDIAL SUPERIOR CHEST
LOCATION DETAILED: LEFT DISTAL DORSAL FOREARM
LOCATION DETAILED: LEFT INFERIOR CENTRAL MALAR CHEEK
LOCATION DETAILED: LEFT RADIAL DORSAL HAND

## 2021-11-01 ASSESSMENT — LOCATION SIMPLE DESCRIPTION DERM
LOCATION SIMPLE: CHEST
LOCATION SIMPLE: RIGHT CHEEK
LOCATION SIMPLE: LEFT CHEEK
LOCATION SIMPLE: RIGHT UPPER BACK
LOCATION SIMPLE: RIGHT HAND
LOCATION SIMPLE: LEFT UPPER BACK
LOCATION SIMPLE: LEFT FOREARM
LOCATION SIMPLE: LEFT HAND
LOCATION SIMPLE: RIGHT FOREARM
LOCATION SIMPLE: LEFT SHOULDER
LOCATION SIMPLE: LEFT CLAVICULAR SKIN

## 2021-11-02 ENCOUNTER — HOSPITAL ENCOUNTER (OUTPATIENT)
Dept: RADIOLOGY | Facility: MEDICAL CENTER | Age: 76
End: 2021-11-02
Attending: NURSE PRACTITIONER
Payer: MEDICARE

## 2021-11-02 ENCOUNTER — HOSPITAL ENCOUNTER (OUTPATIENT)
Dept: LAB | Facility: MEDICAL CENTER | Age: 76
End: 2021-11-02
Attending: NURSE PRACTITIONER
Payer: MEDICARE

## 2021-11-02 DIAGNOSIS — I25.10 CORONARY ARTERY DISEASE WITHOUT ANGINA PECTORIS, UNSPECIFIED VESSEL OR LESION TYPE, UNSPECIFIED WHETHER NATIVE OR TRANSPLANTED HEART: ICD-10-CM

## 2021-11-02 DIAGNOSIS — I25.10 CORONARY ARTERIOSCLEROSIS: ICD-10-CM

## 2021-11-02 LAB
ALBUMIN SERPL BCP-MCNC: 4.3 G/DL (ref 3.2–4.9)
ALBUMIN/GLOB SERPL: 1.8 G/DL
ALP SERPL-CCNC: 74 U/L (ref 30–99)
ALT SERPL-CCNC: 19 U/L (ref 2–50)
ANION GAP SERPL CALC-SCNC: 11 MMOL/L (ref 7–16)
AST SERPL-CCNC: 22 U/L (ref 12–45)
BILIRUB SERPL-MCNC: 0.6 MG/DL (ref 0.1–1.5)
BUN SERPL-MCNC: 17 MG/DL (ref 8–22)
CALCIUM SERPL-MCNC: 9.9 MG/DL (ref 8.5–10.5)
CHLORIDE SERPL-SCNC: 102 MMOL/L (ref 96–112)
CHOLEST SERPL-MCNC: 103 MG/DL (ref 100–199)
CO2 SERPL-SCNC: 24 MMOL/L (ref 20–33)
CREAT SERPL-MCNC: 0.86 MG/DL (ref 0.5–1.4)
FASTING STATUS PATIENT QL REPORTED: NORMAL
GLOBULIN SER CALC-MCNC: 2.4 G/DL (ref 1.9–3.5)
GLUCOSE SERPL-MCNC: 82 MG/DL (ref 65–99)
HDLC SERPL-MCNC: 48 MG/DL
LDLC SERPL CALC-MCNC: 43 MG/DL
POTASSIUM SERPL-SCNC: 4.6 MMOL/L (ref 3.6–5.5)
PROT SERPL-MCNC: 6.7 G/DL (ref 6–8.2)
SODIUM SERPL-SCNC: 137 MMOL/L (ref 135–145)
TRIGL SERPL-MCNC: 60 MG/DL (ref 0–149)

## 2021-11-02 PROCEDURE — 80053 COMPREHEN METABOLIC PANEL: CPT

## 2021-11-02 PROCEDURE — 36415 COLL VENOUS BLD VENIPUNCTURE: CPT

## 2021-11-02 PROCEDURE — 80061 LIPID PANEL: CPT

## 2021-11-03 ENCOUNTER — HOSPITAL ENCOUNTER (OUTPATIENT)
Dept: RADIOLOGY | Facility: MEDICAL CENTER | Age: 76
End: 2021-11-03
Attending: NURSE PRACTITIONER
Payer: MEDICARE

## 2021-11-03 DIAGNOSIS — I77.810 ASCENDING AORTA DILATION (HCC): ICD-10-CM

## 2021-11-03 PROCEDURE — 700117 HCHG RX CONTRAST REV CODE 255: Performed by: NURSE PRACTITIONER

## 2021-11-03 PROCEDURE — 71275 CT ANGIOGRAPHY CHEST: CPT | Mod: ME

## 2021-11-03 RX ADMIN — IOHEXOL 100 ML: 350 INJECTION, SOLUTION INTRAVENOUS at 17:30

## 2021-11-09 ENCOUNTER — APPOINTMENT (RX ONLY)
Dept: URBAN - METROPOLITAN AREA CLINIC 4 | Facility: CLINIC | Age: 76
Setting detail: DERMATOLOGY
End: 2021-11-09

## 2021-11-09 PROBLEM — D03.59 MELANOMA IN SITU OF OTHER PART OF TRUNK: Status: ACTIVE | Noted: 2021-11-09

## 2021-11-09 PROCEDURE — 13102 CMPLX RPR TRUNK ADDL 5CM/<: CPT

## 2021-11-09 PROCEDURE — ? EXCISION

## 2021-11-09 PROCEDURE — ? COUNSELING

## 2021-11-09 PROCEDURE — 11604 EXC TR-EXT MAL+MARG 3.1-4 CM: CPT

## 2021-11-09 PROCEDURE — 13101 CMPLX RPR TRUNK 2.6-7.5 CM: CPT

## 2021-11-09 NOTE — PROCEDURE: MIPS QUALITY
Quality 130: Documentation Of Current Medications In The Medical Record: Current Medications Documented
Quality 111:Pneumonia Vaccination Status For Older Adults: Pneumococcal Vaccination Previously Received
Quality 138: Melanoma: Coordination Of Care: A treatment plan was communicated to the physicians providing continuing care within one month of diagnosis outlining: diagnosis, tumor thickness and a plan for surgery or alternate care.
Quality 265: Biopsy Follow-Up: Biopsy results reviewed, communicated, tracked, and documented
Quality 226: Preventive Care And Screening: Tobacco Use: Screening And Cessation Intervention: Patient screened for tobacco use and is an ex/non-smoker
Detail Level: Detailed
Quality 137: Melanoma: Continuity Of Care - Recall System: Patient information entered into a recall system that includes: target date for the next exam specified AND a process to follow up with patients regarding missed or unscheduled appointments

## 2021-11-09 NOTE — PROCEDURE: EXCISION
Surgeon (Optional): Huang
Biopsy Photograph Reviewed: Yes
Previous Accession (Optional): [id number]
Breslow Depth: MM in situ...
Size Of Lesion In Cm: 2.8
X Size Of Lesion In Cm (Optional): 0
Size Of Margin In Cm: 0.5
Anesthesia Volume In Cc: 12
Was An Eye Clamp Used?: No
Eye Clamp Note Details: An eye clamp was used during the procedure.
Excision Method: Elliptical
Saucerization Depth: dermis and superficial adipose tissue
Repair Type: Complex
Suturegard Retention Suture: 2-0 Nylon
Retention Suture Bite Size: 3 mm
Length To Time In Minutes Device Was In Place: 10
Number Of Hemigard Strips Per Side: 1
Intermediate / Complex Repair - Final Wound Length In Cm: 9
Width Of Defect Perpendicular To Closure In Cm (Required): 2.4
Distance Of Undermining In Cm (Required): 3.5
Undermining Type: Entire Wound
Debridement Text: The wound edges were debrided prior to proceeding with the closure to facilitate wound healing.
Helical Rim Text: The closure involved the helical rim.
Vermilion Border Text: The closure involved the vermilion border.
Nostril Rim Text: The closure involved the nostril rim.
Retention Suture Text: Retention sutures were placed to support the closure and prevent dehiscence.
Lab: [id number]
Lab Facility: 
Graft Donor Site Bandage (Optional-Leave Blank If You Don't Want In Note): Steri-strips and a pressure bandage were applied to the donor site.
Epidermal Closure Graft Donor Site (Optional): simple interrupted
Billing Type: Third-Party Bill
Excision Depth: adipose tissue
Scalpel Size: 15 blade
Anesthesia Type: 1% lidocaine with epinephrine
Additional Anesthesia Volume In Cc: 6
Hemostasis: Electrocautery
Estimated Blood Loss (Cc): minimal
Detail Level: Detailed
Repair Anesthesia Method: local infiltration
Deep Sutures: 2-0 Vicryl
Dermal Closure: buried vertical mattress
Epidermal Sutures: 5-0 Caprosyn
Epidermal Closure: running subcuticular
Wound Care: Bacitracin
Dressing: dry sterile dressing
Suturegard Intro: Intraoperative tissue expansion was performed, utilizing the SUTUREGARD device, in order to reduce wound tension.
Suturegard Body: The suture ends were repeatedly re-tightened and re-clamped to achieve the desired tissue expansion.
Hemigard Intro: Due to skin fragility and wound tension, it was decided to use HEMIGARD adhesive retention suture devices to permit a linear closure. The skin was cleaned and dried for a 6cm distance away from the wound. Excessive hair, if present, was removed to allow for adhesion.
Hemigard Postcare Instructions: The HEMIGARD strips are to remain completely dry for at least 5-7 days.
Positioning (Leave Blank If You Do Not Want): The patient was placed in a comfortable position exposing the surgical site.
Pre-Excision Curettage Text (Leave Blank If You Do Not Want): Prior to drawing the surgical margin the visible lesion was removed with electrodesiccation and curettage to clearly define the lesion size.
Complex Repair Preamble Text (Leave Blank If You Do Not Want): Extensive wide undermining was performed.
Intermediate Repair Preamble Text (Leave Blank If You Do Not Want): Undermining was performed with blunt dissection.
Curvilinear Excision Additional Text (Leave Blank If You Do Not Want): The margin was drawn around the clinically apparent lesion.  A curvilinear shape was then drawn on the skin incorporating the lesion and margins.  Incisions were then made along these lines to the appropriate tissue plane and the lesion was extirpated.
Fusiform Excision Additional Text (Leave Blank If You Do Not Want): The margin was drawn around the clinically apparent lesion.  A fusiform shape was then drawn on the skin incorporating the lesion and margins.  Incisions were then made along these lines to the appropriate tissue plane and the lesion was extirpated.
Elliptical Excision Additional Text (Leave Blank If You Do Not Want): The margin was drawn around the clinically apparent lesion.  An elliptical shape was then drawn on the skin incorporating the lesion and margins.  Incisions were then made along these lines to the appropriate tissue plane and the lesion was extirpated.
Saucerization Excision Additional Text (Leave Blank If You Do Not Want): The margin was drawn around the clinically apparent lesion.  Incisions were then made along these lines, in a tangential fashion, to the appropriate tissue plane and the lesion was extirpated.
Slit Excision Additional Text (Leave Blank If You Do Not Want): A linear line was drawn on the skin overlying the lesion. An incision was made slowly until the lesion was visualized.  Once visualized, the lesion was removed with blunt dissection.
Excisional Biopsy Additional Text (Leave Blank If You Do Not Want): The margin was drawn around the clinically apparent lesion. An elliptical shape was then drawn on the skin incorporating the lesion and margins.  Incisions were then made along these lines to the appropriate tissue plane and the lesion was extirpated.
Perilesional Excision Additional Text (Leave Blank If You Do Not Want): The margin was drawn around the clinically apparent lesion. Incisions were then made along these lines to the appropriate tissue plane and the lesion was extirpated.
Repair Performed By Another Provider Text (Leave Blank If You Do Not Want): After the tissue was excised the defect was repaired by another provider.
No Repair - Repaired With Adjacent Surgical Defect Text (Leave Blank If You Do Not Want): After the excision the defect was repaired concurrently with another surgical defect which was in close approximation.
Adjacent Tissue Transfer Text: The defect edges were debeveled with a #15 scalpel blade.  Given the location of the defect and the proximity to free margins an adjacent tissue transfer was deemed most appropriate.  Using a sterile surgical marker, an appropriate flap was drawn incorporating the defect and placing the expected incisions within the relaxed skin tension lines where possible.    The area thus outlined was incised deep to adipose tissue with a #15 scalpel blade.  The skin margins were undermined to an appropriate distance in all directions utilizing iris scissors.
Advancement Flap (Single) Text: The defect edges were debeveled with a #15 scalpel blade.  Given the location of the defect and the proximity to free margins a single advancement flap was deemed most appropriate.  Using a sterile surgical marker, an appropriate advancement flap was drawn incorporating the defect and placing the expected incisions within the relaxed skin tension lines where possible.    The area thus outlined was incised deep to adipose tissue with a #15 scalpel blade.  The skin margins were undermined to an appropriate distance in all directions utilizing iris scissors.
Advancement Flap (Double) Text: The defect edges were debeveled with a #15 scalpel blade.  Given the location of the defect and the proximity to free margins a double advancement flap was deemed most appropriate.  Using a sterile surgical marker, the appropriate advancement flaps were drawn incorporating the defect and placing the expected incisions within the relaxed skin tension lines where possible.    The area thus outlined was incised deep to adipose tissue with a #15 scalpel blade.  The skin margins were undermined to an appropriate distance in all directions utilizing iris scissors.
Burow's Advancement Flap Text: The defect edges were debeveled with a #15 scalpel blade.  Given the location of the defect and the proximity to free margins a Burow's advancement flap was deemed most appropriate.  Using a sterile surgical marker, the appropriate advancement flap was drawn incorporating the defect and placing the expected incisions within the relaxed skin tension lines where possible.    The area thus outlined was incised deep to adipose tissue with a #15 scalpel blade.  The skin margins were undermined to an appropriate distance in all directions utilizing iris scissors.
Chonodrocutaneous Helical Advancement Flap Text: The defect edges were debeveled with a #15 scalpel blade.  Given the location of the defect and the proximity to free margins a chondrocutaneous helical advancement flap was deemed most appropriate.  Using a sterile surgical marker, the appropriate advancement flap was drawn incorporating the defect and placing the expected incisions within the relaxed skin tension lines where possible.    The area thus outlined was incised deep to adipose tissue with a #15 scalpel blade.  The skin margins were undermined to an appropriate distance in all directions utilizing iris scissors.
Crescentic Advancement Flap Text: The defect edges were debeveled with a #15 scalpel blade.  Given the location of the defect and the proximity to free margins a crescentic advancement flap was deemed most appropriate.  Using a sterile surgical marker, the appropriate advancement flap was drawn incorporating the defect and placing the expected incisions within the relaxed skin tension lines where possible.    The area thus outlined was incised deep to adipose tissue with a #15 scalpel blade.  The skin margins were undermined to an appropriate distance in all directions utilizing iris scissors.
A-T Advancement Flap Text: The defect edges were debeveled with a #15 scalpel blade.  Given the location of the defect, shape of the defect and the proximity to free margins an A-T advancement flap was deemed most appropriate.  Using a sterile surgical marker, an appropriate advancement flap was drawn incorporating the defect and placing the expected incisions within the relaxed skin tension lines where possible.    The area thus outlined was incised deep to adipose tissue with a #15 scalpel blade.  The skin margins were undermined to an appropriate distance in all directions utilizing iris scissors.
O-T Advancement Flap Text: The defect edges were debeveled with a #15 scalpel blade.  Given the location of the defect, shape of the defect and the proximity to free margins an O-T advancement flap was deemed most appropriate.  Using a sterile surgical marker, an appropriate advancement flap was drawn incorporating the defect and placing the expected incisions within the relaxed skin tension lines where possible.    The area thus outlined was incised deep to adipose tissue with a #15 scalpel blade.  The skin margins were undermined to an appropriate distance in all directions utilizing iris scissors.
O-L Flap Text: The defect edges were debeveled with a #15 scalpel blade.  Given the location of the defect, shape of the defect and the proximity to free margins an O-L flap was deemed most appropriate.  Using a sterile surgical marker, an appropriate advancement flap was drawn incorporating the defect and placing the expected incisions within the relaxed skin tension lines where possible.    The area thus outlined was incised deep to adipose tissue with a #15 scalpel blade.  The skin margins were undermined to an appropriate distance in all directions utilizing iris scissors.
O-Z Flap Text: The defect edges were debeveled with a #15 scalpel blade.  Given the location of the defect, shape of the defect and the proximity to free margins an O-Z flap was deemed most appropriate.  Using a sterile surgical marker, an appropriate transposition flap was drawn incorporating the defect and placing the expected incisions within the relaxed skin tension lines where possible. The area thus outlined was incised deep to adipose tissue with a #15 scalpel blade.  The skin margins were undermined to an appropriate distance in all directions utilizing iris scissors.
Double O-Z Flap Text: The defect edges were debeveled with a #15 scalpel blade.  Given the location of the defect, shape of the defect and the proximity to free margins a Double O-Z flap was deemed most appropriate.  Using a sterile surgical marker, an appropriate transposition flap was drawn incorporating the defect and placing the expected incisions within the relaxed skin tension lines where possible. The area thus outlined was incised deep to adipose tissue with a #15 scalpel blade.  The skin margins were undermined to an appropriate distance in all directions utilizing iris scissors.
V-Y Flap Text: The defect edges were debeveled with a #15 scalpel blade.  Given the location of the defect, shape of the defect and the proximity to free margins a V-Y flap was deemed most appropriate.  Using a sterile surgical marker, an appropriate advancement flap was drawn incorporating the defect and placing the expected incisions within the relaxed skin tension lines where possible.    The area thus outlined was incised deep to adipose tissue with a #15 scalpel blade.  The skin margins were undermined to an appropriate distance in all directions utilizing iris scissors.
Advancement-Rotation Flap Text: The defect edges were debeveled with a #15 scalpel blade.  Given the location of the defect, shape of the defect and the proximity to free margins an advancement-rotation flap was deemed most appropriate.  Using a sterile surgical marker, an appropriate flap was drawn incorporating the defect and placing the expected incisions within the relaxed skin tension lines where possible. The area thus outlined was incised deep to adipose tissue with a #15 scalpel blade.  The skin margins were undermined to an appropriate distance in all directions utilizing iris scissors.
Mercedes Flap Text: The defect edges were debeveled with a #15 scalpel blade.  Given the location of the defect, shape of the defect and the proximity to free margins a Mercedes flap was deemed most appropriate.  Using a sterile surgical marker, an appropriate advancement flap was drawn incorporating the defect and placing the expected incisions within the relaxed skin tension lines where possible. The area thus outlined was incised deep to adipose tissue with a #15 scalpel blade.  The skin margins were undermined to an appropriate distance in all directions utilizing iris scissors.
Modified Advancement Flap Text: The defect edges were debeveled with a #15 scalpel blade.  Given the location of the defect, shape of the defect and the proximity to free margins a modified advancement flap was deemed most appropriate.  Using a sterile surgical marker, an appropriate advancement flap was drawn incorporating the defect and placing the expected incisions within the relaxed skin tension lines where possible.    The area thus outlined was incised deep to adipose tissue with a #15 scalpel blade.  The skin margins were undermined to an appropriate distance in all directions utilizing iris scissors.
Mucosal Advancement Flap Text: Given the location of the defect, shape of the defect and the proximity to free margins a mucosal advancement flap was deemed most appropriate. Incisions were made with a 15 blade scalpel in the appropriate fashion along the cutaneous vermilion border and the mucosal lip. The remaining actinically damaged mucosal tissue was excised.  The mucosal advancement flap was then elevated to the gingival sulcus with care taken to preserve the neurovascular structures and advanced into the primary defect. Care was taken to ensure that precise realignment of the vermilion border was achieved.
Peng Advancement Flap Text: The defect edges were debeveled with a #15 scalpel blade.  Given the location of the defect, shape of the defect and the proximity to free margins a Peng advancement flap was deemed most appropriate.  Using a sterile surgical marker, an appropriate advancement flap was drawn incorporating the defect and placing the expected incisions within the relaxed skin tension lines where possible. The area thus outlined was incised deep to adipose tissue with a #15 scalpel blade.  The skin margins were undermined to an appropriate distance in all directions utilizing iris scissors.
Hatchet Flap Text: The defect edges were debeveled with a #15 scalpel blade.  Given the location of the defect, shape of the defect and the proximity to free margins a hatchet flap was deemed most appropriate.  Using a sterile surgical marker, an appropriate hatchet flap was drawn incorporating the defect and placing the expected incisions within the relaxed skin tension lines where possible.    The area thus outlined was incised deep to adipose tissue with a #15 scalpel blade.  The skin margins were undermined to an appropriate distance in all directions utilizing iris scissors.
Rotation Flap Text: The defect edges were debeveled with a #15 scalpel blade.  Given the location of the defect, shape of the defect and the proximity to free margins a rotation flap was deemed most appropriate.  Using a sterile surgical marker, an appropriate rotation flap was drawn incorporating the defect and placing the expected incisions within the relaxed skin tension lines where possible.    The area thus outlined was incised deep to adipose tissue with a #15 scalpel blade.  The skin margins were undermined to an appropriate distance in all directions utilizing iris scissors.
Spiral Flap Text: The defect edges were debeveled with a #15 scalpel blade.  Given the location of the defect, shape of the defect and the proximity to free margins a spiral flap was deemed most appropriate.  Using a sterile surgical marker, an appropriate rotation flap was drawn incorporating the defect and placing the expected incisions within the relaxed skin tension lines where possible. The area thus outlined was incised deep to adipose tissue with a #15 scalpel blade.  The skin margins were undermined to an appropriate distance in all directions utilizing iris scissors.
Staged Advancement Flap Text: The defect edges were debeveled with a #15 scalpel blade.  Given the location of the defect, shape of the defect and the proximity to free margins a staged advancement flap was deemed most appropriate.  Using a sterile surgical marker, an appropriate advancement flap was drawn incorporating the defect and placing the expected incisions within the relaxed skin tension lines where possible. The area thus outlined was incised deep to adipose tissue with a #15 scalpel blade.  The skin margins were undermined to an appropriate distance in all directions utilizing iris scissors.
Star Wedge Flap Text: The defect edges were debeveled with a #15 scalpel blade.  Given the location of the defect, shape of the defect and the proximity to free margins a star wedge flap was deemed most appropriate.  Using a sterile surgical marker, an appropriate rotation flap was drawn incorporating the defect and placing the expected incisions within the relaxed skin tension lines where possible. The area thus outlined was incised deep to adipose tissue with a #15 scalpel blade.  The skin margins were undermined to an appropriate distance in all directions utilizing iris scissors.
Transposition Flap Text: The defect edges were debeveled with a #15 scalpel blade.  Given the location of the defect and the proximity to free margins a transposition flap was deemed most appropriate.  Using a sterile surgical marker, an appropriate transposition flap was drawn incorporating the defect.    The area thus outlined was incised deep to adipose tissue with a #15 scalpel blade.  The skin margins were undermined to an appropriate distance in all directions utilizing iris scissors.
Muscle Hinge Flap Text: The defect edges were debeveled with a #15 scalpel blade.  Given the size, depth and location of the defect and the proximity to free margins a muscle hinge flap was deemed most appropriate.  Using a sterile surgical marker, an appropriate hinge flap was drawn incorporating the defect. The area thus outlined was incised with a #15 scalpel blade.  The skin margins were undermined to an appropriate distance in all directions utilizing iris scissors.
Mustarde Flap Text: The defect edges were debeveled with a #15 scalpel blade.  Given the size, depth and location of the defect and the proximity to free margins a Mustarde flap was deemed most appropriate.  Using a sterile surgical marker, an appropriate flap was drawn incorporating the defect. The area thus outlined was incised with a #15 scalpel blade.  The skin margins were undermined to an appropriate distance in all directions utilizing iris scissors.
Nasal Turnover Hinge Flap Text: The defect edges were debeveled with a #15 scalpel blade.  Given the size, depth, location of the defect and the defect being full thickness a nasal turnover hinge flap was deemed most appropriate.  Using a sterile surgical marker, an appropriate hinge flap was drawn incorporating the defect. The area thus outlined was incised with a #15 scalpel blade. The flap was designed to recreate the nasal mucosal lining and the alar rim. The skin margins were undermined to an appropriate distance in all directions utilizing iris scissors.
Nasalis-Muscle-Based Myocutaneous Island Pedicle Flap Text: Using a #15 blade, an incision was made around the donor flap to the level of the nasalis muscle. Wide lateral undermining was then performed in both the subcutaneous plane above the nasalis muscle, and in a submuscular plane just above periosteum. This allowed the formation of a free nasalis muscle axial pedicle (based on the angular artery) which was still attached to the actual cutaneous flap, increasing its mobility and vascular viability. Hemostasis was obtained with pinpoint electrocoagulation. The flap was mobilized into position and the pivotal anchor points positioned and stabilized with buried interrupted sutures. Subcutaneous and dermal tissues were closed in a multilayered fashion with sutures. Tissue redundancies were excised, and the epidermal edges were apposed without significant tension and sutured with sutures.
Orbicularis Oris Muscle Flap Text: The defect edges were debeveled with a #15 scalpel blade.  Given that the defect affected the competency of the oral sphincter an orbicularis oris muscle flap was deemed most appropriate to restore this competency and normal muscle function.  Using a sterile surgical marker, an appropriate flap was drawn incorporating the defect. The area thus outlined was incised with a #15 scalpel blade.
Melolabial Transposition Flap Text: The defect edges were debeveled with a #15 scalpel blade.  Given the location of the defect and the proximity to free margins a melolabial flap was deemed most appropriate.  Using a sterile surgical marker, an appropriate melolabial transposition flap was drawn incorporating the defect.    The area thus outlined was incised deep to adipose tissue with a #15 scalpel blade.  The skin margins were undermined to an appropriate distance in all directions utilizing iris scissors.
Rhombic Flap Text: The defect edges were debeveled with a #15 scalpel blade.  Given the location of the defect and the proximity to free margins a rhombic flap was deemed most appropriate.  Using a sterile surgical marker, an appropriate rhombic flap was drawn incorporating the defect.    The area thus outlined was incised deep to adipose tissue with a #15 scalpel blade.  The skin margins were undermined to an appropriate distance in all directions utilizing iris scissors.
Rhomboid Transposition Flap Text: The defect edges were debeveled with a #15 scalpel blade.  Given the location of the defect and the proximity to free margins a rhomboid transposition flap was deemed most appropriate.  Using a sterile surgical marker, an appropriate rhomboid flap was drawn incorporating the defect.    The area thus outlined was incised deep to adipose tissue with a #15 scalpel blade.  The skin margins were undermined to an appropriate distance in all directions utilizing iris scissors.
Bi-Rhombic Flap Text: The defect edges were debeveled with a #15 scalpel blade.  Given the location of the defect and the proximity to free margins a bi-rhombic flap was deemed most appropriate.  Using a sterile surgical marker, an appropriate rhombic flap was drawn incorporating the defect. The area thus outlined was incised deep to adipose tissue with a #15 scalpel blade.  The skin margins were undermined to an appropriate distance in all directions utilizing iris scissors.
Helical Rim Advancement Flap Text: The defect edges were debeveled with a #15 blade scalpel.  Given the location of the defect and the proximity to free margins (helical rim) a double helical rim advancement flap was deemed most appropriate.  Using a sterile surgical marker, the appropriate advancement flaps were drawn incorporating the defect and placing the expected incisions between the helical rim and antihelix where possible.  The area thus outlined was incised through and through with a #15 scalpel blade.  With a skin hook and iris scissors, the flaps were gently and sharply undermined and freed up.
Bilateral Helical Rim Advancement Flap Text: The defect edges were debeveled with a #15 blade scalpel.  Given the location of the defect and the proximity to free margins (helical rim) a bilateral helical rim advancement flap was deemed most appropriate.  Using a sterile surgical marker, the appropriate advancement flaps were drawn incorporating the defect and placing the expected incisions between the helical rim and antihelix where possible.  The area thus outlined was incised through and through with a #15 scalpel blade.  With a skin hook and iris scissors, the flaps were gently and sharply undermined and freed up.
Ear Star Wedge Flap Text: The defect edges were debeveled with a #15 blade scalpel.  Given the location of the defect and the proximity to free margins (helical rim) an ear star wedge flap was deemed most appropriate.  Using a sterile surgical marker, the appropriate flap was drawn incorporating the defect and placing the expected incisions between the helical rim and antihelix where possible.  The area thus outlined was incised through and through with a #15 scalpel blade.
Banner Transposition Flap Text: The defect edges were debeveled with a #15 scalpel blade.  Given the location of the defect and the proximity to free margins a Banner transposition flap was deemed most appropriate.  Using a sterile surgical marker, an appropriate flap drawn around the defect. The area thus outlined was incised deep to adipose tissue with a #15 scalpel blade.  The skin margins were undermined to an appropriate distance in all directions utilizing iris scissors.
Bilobed Flap Text: The defect edges were debeveled with a #15 scalpel blade.  Given the location of the defect and the proximity to free margins a bilobe flap was deemed most appropriate.  Using a sterile surgical marker, an appropriate bilobe flap drawn around the defect.    The area thus outlined was incised deep to adipose tissue with a #15 scalpel blade.  The skin margins were undermined to an appropriate distance in all directions utilizing iris scissors.
Bilobed Transposition Flap Text: The defect edges were debeveled with a #15 scalpel blade.  Given the location of the defect and the proximity to free margins a bilobed transposition flap was deemed most appropriate.  Using a sterile surgical marker, an appropriate bilobe flap drawn around the defect.    The area thus outlined was incised deep to adipose tissue with a #15 scalpel blade.  The skin margins were undermined to an appropriate distance in all directions utilizing iris scissors.
Trilobed Flap Text: The defect edges were debeveled with a #15 scalpel blade.  Given the location of the defect and the proximity to free margins a trilobed flap was deemed most appropriate.  Using a sterile surgical marker, an appropriate trilobed flap drawn around the defect.    The area thus outlined was incised deep to adipose tissue with a #15 scalpel blade.  The skin margins were undermined to an appropriate distance in all directions utilizing iris scissors.
Dorsal Nasal Flap Text: The defect edges were debeveled with a #15 scalpel blade.  Given the location of the defect and the proximity to free margins a dorsal nasal flap was deemed most appropriate.  Using a sterile surgical marker, an appropriate dorsal nasal flap was drawn around the defect.    The area thus outlined was incised deep to adipose tissue with a #15 scalpel blade.  The skin margins were undermined to an appropriate distance in all directions utilizing iris scissors.
Island Pedicle Flap Text: The defect edges were debeveled with a #15 scalpel blade.  Given the location of the defect, shape of the defect and the proximity to free margins an island pedicle advancement flap was deemed most appropriate.  Using a sterile surgical marker, an appropriate advancement flap was drawn incorporating the defect, outlining the appropriate donor tissue and placing the expected incisions within the relaxed skin tension lines where possible.    The area thus outlined was incised deep to adipose tissue with a #15 scalpel blade.  The skin margins were undermined to an appropriate distance in all directions around the primary defect and laterally outward around the island pedicle utilizing iris scissors.  There was minimal undermining beneath the pedicle flap.
Island Pedicle Flap With Canthal Suspension Text: The defect edges were debeveled with a #15 scalpel blade.  Given the location of the defect, shape of the defect and the proximity to free margins an island pedicle advancement flap was deemed most appropriate.  Using a sterile surgical marker, an appropriate advancement flap was drawn incorporating the defect, outlining the appropriate donor tissue and placing the expected incisions within the relaxed skin tension lines where possible. The area thus outlined was incised deep to adipose tissue with a #15 scalpel blade.  The skin margins were undermined to an appropriate distance in all directions around the primary defect and laterally outward around the island pedicle utilizing iris scissors.  There was minimal undermining beneath the pedicle flap. A suspension suture was placed in the canthal tendon to prevent tension and prevent ectropion.
Alar Island Pedicle Flap Text: The defect edges were debeveled with a #15 scalpel blade.  Given the location of the defect, shape of the defect and the proximity to the alar rim an island pedicle advancement flap was deemed most appropriate.  Using a sterile surgical marker, an appropriate advancement flap was drawn incorporating the defect, outlining the appropriate donor tissue and placing the expected incisions within the nasal ala running parallel to the alar rim. The area thus outlined was incised with a #15 scalpel blade.  The skin margins were undermined minimally to an appropriate distance in all directions around the primary defect and laterally outward around the island pedicle utilizing iris scissors.  There was minimal undermining beneath the pedicle flap.
Double Island Pedicle Flap Text: The defect edges were debeveled with a #15 scalpel blade.  Given the location of the defect, shape of the defect and the proximity to free margins a double island pedicle advancement flap was deemed most appropriate.  Using a sterile surgical marker, an appropriate advancement flap was drawn incorporating the defect, outlining the appropriate donor tissue and placing the expected incisions within the relaxed skin tension lines where possible.    The area thus outlined was incised deep to adipose tissue with a #15 scalpel blade.  The skin margins were undermined to an appropriate distance in all directions around the primary defect and laterally outward around the island pedicle utilizing iris scissors.  There was minimal undermining beneath the pedicle flap.
Island Pedicle Flap-Requiring Vessel Identification Text: The defect edges were debeveled with a #15 scalpel blade.  Given the location of the defect, shape of the defect and the proximity to free margins an island pedicle advancement flap was deemed most appropriate.  Using a sterile surgical marker, an appropriate advancement flap was drawn, based on the axial vessel mentioned above, incorporating the defect, outlining the appropriate donor tissue and placing the expected incisions within the relaxed skin tension lines where possible.    The area thus outlined was incised deep to adipose tissue with a #15 scalpel blade.  The skin margins were undermined to an appropriate distance in all directions around the primary defect and laterally outward around the island pedicle utilizing iris scissors.  There was minimal undermining beneath the pedicle flap.
Keystone Flap Text: The defect edges were debeveled with a #15 scalpel blade.  Given the location of the defect, shape of the defect a keystone flap was deemed most appropriate.  Using a sterile surgical marker, an appropriate keystone flap was drawn incorporating the defect, outlining the appropriate donor tissue and placing the expected incisions within the relaxed skin tension lines where possible. The area thus outlined was incised deep to adipose tissue with a #15 scalpel blade.  The skin margins were undermined to an appropriate distance in all directions around the primary defect and laterally outward around the flap utilizing iris scissors.
O-T Plasty Text: The defect edges were debeveled with a #15 scalpel blade.  Given the location of the defect, shape of the defect and the proximity to free margins an O-T plasty was deemed most appropriate.  Using a sterile surgical marker, an appropriate O-T plasty was drawn incorporating the defect and placing the expected incisions within the relaxed skin tension lines where possible.    The area thus outlined was incised deep to adipose tissue with a #15 scalpel blade.  The skin margins were undermined to an appropriate distance in all directions utilizing iris scissors.
O-Z Plasty Text: The defect edges were debeveled with a #15 scalpel blade.  Given the location of the defect, shape of the defect and the proximity to free margins an O-Z plasty (double transposition flap) was deemed most appropriate.  Using a sterile surgical marker, the appropriate transposition flaps were drawn incorporating the defect and placing the expected incisions within the relaxed skin tension lines where possible.    The area thus outlined was incised deep to adipose tissue with a #15 scalpel blade.  The skin margins were undermined to an appropriate distance in all directions utilizing iris scissors.  Hemostasis was achieved with electrocautery.  The flaps were then transposed into place, one clockwise and the other counterclockwise, and anchored with interrupted buried subcutaneous sutures.
Double O-Z Plasty Text: The defect edges were debeveled with a #15 scalpel blade.  Given the location of the defect, shape of the defect and the proximity to free margins a Double O-Z plasty (double transposition flap) was deemed most appropriate.  Using a sterile surgical marker, the appropriate transposition flaps were drawn incorporating the defect and placing the expected incisions within the relaxed skin tension lines where possible. The area thus outlined was incised deep to adipose tissue with a #15 scalpel blade.  The skin margins were undermined to an appropriate distance in all directions utilizing iris scissors.  Hemostasis was achieved with electrocautery.  The flaps were then transposed into place, one clockwise and the other counterclockwise, and anchored with interrupted buried subcutaneous sutures.
V-Y Plasty Text: The defect edges were debeveled with a #15 scalpel blade.  Given the location of the defect, shape of the defect and the proximity to free margins an V-Y advancement flap was deemed most appropriate.  Using a sterile surgical marker, an appropriate advancement flap was drawn incorporating the defect and placing the expected incisions within the relaxed skin tension lines where possible.    The area thus outlined was incised deep to adipose tissue with a #15 scalpel blade.  The skin margins were undermined to an appropriate distance in all directions utilizing iris scissors.
H Plasty Text: Given the location of the defect, shape of the defect and the proximity to free margins a H-plasty was deemed most appropriate for repair.  Using a sterile surgical marker, the appropriate advancement arms of the H-plasty were drawn incorporating the defect and placing the expected incisions within the relaxed skin tension lines where possible. The area thus outlined was incised deep to adipose tissue with a #15 scalpel blade. The skin margins were undermined to an appropriate distance in all directions utilizing iris scissors.  The opposing advancement arms were then advanced into place in opposite direction and anchored with interrupted buried subcutaneous sutures.
W Plasty Text: The lesion was extirpated to the level of the fat with a #15 scalpel blade.  Given the location of the defect, shape of the defect and the proximity to free margins a W-plasty was deemed most appropriate for repair.  Using a sterile surgical marker, the appropriate transposition arms of the W-plasty were drawn incorporating the defect and placing the expected incisions within the relaxed skin tension lines where possible.    The area thus outlined was incised deep to adipose tissue with a #15 scalpel blade.  The skin margins were undermined to an appropriate distance in all directions utilizing iris scissors.  The opposing transposition arms were then transposed into place in opposite direction and anchored with interrupted buried subcutaneous sutures.
Z Plasty Text: The lesion was extirpated to the level of the fat with a #15 scalpel blade.  Given the location of the defect, shape of the defect and the proximity to free margins a Z-plasty was deemed most appropriate for repair.  Using a sterile surgical marker, the appropriate transposition arms of the Z-plasty were drawn incorporating the defect and placing the expected incisions within the relaxed skin tension lines where possible.    The area thus outlined was incised deep to adipose tissue with a #15 scalpel blade.  The skin margins were undermined to an appropriate distance in all directions utilizing iris scissors.  The opposing transposition arms were then transposed into place in opposite direction and anchored with interrupted buried subcutaneous sutures.
Zygomaticofacial Flap Text: Given the location of the defect, shape of the defect and the proximity to free margins a zygomaticofacial flap was deemed most appropriate for repair.  Using a sterile surgical marker, the appropriate flap was drawn incorporating the defect and placing the expected incisions within the relaxed skin tension lines where possible. The area thus outlined was incised deep to adipose tissue with a #15 scalpel blade with preservation of a vascular pedicle.  The skin margins were undermined to an appropriate distance in all directions utilizing iris scissors.  The flap was then placed into the defect and anchored with interrupted buried subcutaneous sutures.
Cheek Interpolation Flap Text: A decision was made to reconstruct the defect utilizing an interpolation axial flap and a staged reconstruction.  A telfa template was made of the defect.  This telfa template was then used to outline the Cheek Interpolation flap.  The donor area for the pedicle flap was then injected with anesthesia.  The flap was excised through the skin and subcutaneous tissue down to the layer of the underlying musculature.  The interpolation flap was carefully excised within this deep plane to maintain its blood supply.  The edges of the donor site were undermined.   The donor site was closed in a primary fashion.  The pedicle was then rotated into position and sutured.  Once the tube was sutured into place, adequate blood supply was confirmed with blanching and refill.  The pedicle was then wrapped with xeroform gauze and dressed appropriately with a telfa and gauze bandage to ensure continued blood supply and protect the attached pedicle.
Cheek-To-Nose Interpolation Flap Text: A decision was made to reconstruct the defect utilizing an interpolation axial flap and a staged reconstruction.  A telfa template was made of the defect.  This telfa template was then used to outline the Cheek-To-Nose Interpolation flap.  The donor area for the pedicle flap was then injected with anesthesia.  The flap was excised through the skin and subcutaneous tissue down to the layer of the underlying musculature.  The interpolation flap was carefully excised within this deep plane to maintain its blood supply.  The edges of the donor site were undermined.   The donor site was closed in a primary fashion.  The pedicle was then rotated into position and sutured.  Once the tube was sutured into place, adequate blood supply was confirmed with blanching and refill.  The pedicle was then wrapped with xeroform gauze and dressed appropriately with a telfa and gauze bandage to ensure continued blood supply and protect the attached pedicle.
Interpolation Flap Text: A decision was made to reconstruct the defect utilizing an interpolation axial flap and a staged reconstruction.  A telfa template was made of the defect.  This telfa template was then used to outline the interpolation flap.  The donor area for the pedicle flap was then injected with anesthesia.  The flap was excised through the skin and subcutaneous tissue down to the layer of the underlying musculature.  The interpolation flap was carefully excised within this deep plane to maintain its blood supply.  The edges of the donor site were undermined.   The donor site was closed in a primary fashion.  The pedicle was then rotated into position and sutured.  Once the tube was sutured into place, adequate blood supply was confirmed with blanching and refill.  The pedicle was then wrapped with xeroform gauze and dressed appropriately with a telfa and gauze bandage to ensure continued blood supply and protect the attached pedicle.
Melolabial Interpolation Flap Text: A decision was made to reconstruct the defect utilizing an interpolation axial flap and a staged reconstruction.  A telfa template was made of the defect.  This telfa template was then used to outline the melolabial interpolation flap.  The donor area for the pedicle flap was then injected with anesthesia.  The flap was excised through the skin and subcutaneous tissue down to the layer of the underlying musculature.  The pedicle flap was carefully excised within this deep plane to maintain its blood supply.  The edges of the donor site were undermined.   The donor site was closed in a primary fashion.  The pedicle was then rotated into position and sutured.  Once the tube was sutured into place, adequate blood supply was confirmed with blanching and refill.  The pedicle was then wrapped with xeroform gauze and dressed appropriately with a telfa and gauze bandage to ensure continued blood supply and protect the attached pedicle.
Mastoid Interpolation Flap Text: A decision was made to reconstruct the defect utilizing an interpolation axial flap and a staged reconstruction.  A telfa template was made of the defect.  This telfa template was then used to outline the mastoid interpolation flap.  The donor area for the pedicle flap was then injected with anesthesia.  The flap was excised through the skin and subcutaneous tissue down to the layer of the underlying musculature.  The pedicle flap was carefully excised within this deep plane to maintain its blood supply.  The edges of the donor site were undermined.   The donor site was closed in a primary fashion.  The pedicle was then rotated into position and sutured.  Once the tube was sutured into place, adequate blood supply was confirmed with blanching and refill.  The pedicle was then wrapped with xeroform gauze and dressed appropriately with a telfa and gauze bandage to ensure continued blood supply and protect the attached pedicle.
Posterior Auricular Interpolation Flap Text: A decision was made to reconstruct the defect utilizing an interpolation axial flap and a staged reconstruction.  A telfa template was made of the defect.  This telfa template was then used to outline the posterior auricular interpolation flap.  The donor area for the pedicle flap was then injected with anesthesia.  The flap was excised through the skin and subcutaneous tissue down to the layer of the underlying musculature.  The pedicle flap was carefully excised within this deep plane to maintain its blood supply.  The edges of the donor site were undermined.   The donor site was closed in a primary fashion.  The pedicle was then rotated into position and sutured.  Once the tube was sutured into place, adequate blood supply was confirmed with blanching and refill.  The pedicle was then wrapped with xeroform gauze and dressed appropriately with a telfa and gauze bandage to ensure continued blood supply and protect the attached pedicle.
Paramedian Forehead Flap Text: A decision was made to reconstruct the defect utilizing an interpolation axial flap and a staged reconstruction.  A telfa template was made of the defect.  This telfa template was then used to outline the paramedian forehead pedicle flap.  The donor area for the pedicle flap was then injected with anesthesia.  The flap was excised through the skin and subcutaneous tissue down to the layer of the underlying musculature.  The pedicle flap was carefully excised within this deep plane to maintain its blood supply.  The edges of the donor site were undermined.   The donor site was closed in a primary fashion.  The pedicle was then rotated into position and sutured.  Once the tube was sutured into place, adequate blood supply was confirmed with blanching and refill.  The pedicle was then wrapped with xeroform gauze and dressed appropriately with a telfa and gauze bandage to ensure continued blood supply and protect the attached pedicle.
Lip Wedge Excision Repair Text: Given the location of the defect and the proximity to free margins a full thickness wedge repair was deemed most appropriate.  Using a sterile surgical marker, the appropriate repair was drawn incorporating the defect and placing the expected incisions perpendicular to the vermilion border.  The vermilion border was also meticulously outlined to ensure appropriate reapproximation during the repair.  The area thus outlined was incised through and through with a #15 scalpel blade.  The muscularis and dermis were reaproximated with deep sutures following hemostasis. Care was taken to realign the vermilion border before proceeding with the superficial closure.  Once the vermilion was realigned the superfical and mucosal closure was finished.
Ftsg Text: The defect edges were debeveled with a #15 scalpel blade.  Given the location of the defect, shape of the defect and the proximity to free margins a full thickness skin graft was deemed most appropriate.  Using a sterile surgical marker, the primary defect shape was transferred to the donor site. The area thus outlined was incised deep to adipose tissue with a #15 scalpel blade.  The harvested graft was then trimmed of adipose tissue until only dermis and epidermis was left.  The skin margins of the secondary defect were undermined to an appropriate distance in all directions utilizing iris scissors.  The secondary defect was closed with interrupted buried subcutaneous sutures.  The skin edges were then re-apposed with running  sutures.  The skin graft was then placed in the primary defect and oriented appropriately.
Split-Thickness Skin Graft Text: The defect edges were debeveled with a #15 scalpel blade.  Given the location of the defect, shape of the defect and the proximity to free margins a split thickness skin graft was deemed most appropriate.  Using a sterile surgical marker, the primary defect shape was transferred to the donor site. The split thickness graft was then harvested.  The skin graft was then placed in the primary defect and oriented appropriately.
Burow's Graft Text: The defect edges were debeveled with a #15 scalpel blade.  Given the location of the defect, shape of the defect, the proximity to free margins and the presence of a standing cone deformity a Burow's skin graft was deemed most appropriate. The standing cone was removed and this tissue was then trimmed to the shape of the primary defect. The adipose tissue was also removed until only dermis and epidermis were left.  The skin margins of the secondary defect were undermined to an appropriate distance in all directions utilizing iris scissors.  The secondary defect was closed with interrupted buried subcutaneous sutures.  The skin edges were then re-apposed with running  sutures.  The skin graft was then placed in the primary defect and oriented appropriately.
Cartilage Graft Text: The defect edges were debeveled with a #15 scalpel blade.  Given the location of the defect, shape of the defect, the fact the defect involved a full thickness cartilage defect a cartilage graft was deemed most appropriate.  An appropriate donor site was identified, cleansed, and anesthetized. The cartilage graft was then harvested and transferred to the recipient site, oriented appropriately and then sutured into place.  The secondary defect was then repaired using a primary closure.
Composite Graft Text: The defect edges were debeveled with a #15 scalpel blade.  Given the location of the defect, shape of the defect, the proximity to free margins and the fact the defect was full thickness a composite graft was deemed most appropriate.  The defect was outline and then transferred to the donor site.  A full thickness graft was then excised from the donor site. The graft was then placed in the primary defect, oriented appropriately and then sutured into place.  The secondary defect was then repaired using a primary closure.
Epidermal Autograft Text: The defect edges were debeveled with a #15 scalpel blade.  Given the location of the defect, shape of the defect and the proximity to free margins an epidermal autograft was deemed most appropriate.  Using a sterile surgical marker, the primary defect shape was transferred to the donor site. The epidermal graft was then harvested.  The skin graft was then placed in the primary defect and oriented appropriately.
Dermal Autograft Text: The defect edges were debeveled with a #15 scalpel blade.  Given the location of the defect, shape of the defect and the proximity to free margins a dermal autograft was deemed most appropriate.  Using a sterile surgical marker, the primary defect shape was transferred to the donor site. The area thus outlined was incised deep to adipose tissue with a #15 scalpel blade.  The harvested graft was then trimmed of adipose and epidermal tissue until only dermis was left.  The skin graft was then placed in the primary defect and oriented appropriately.
Skin Substitute Text: The defect edges were debeveled with a #15 scalpel blade.  Given the location of the defect, shape of the defect and the proximity to free margins a skin substitute graft was deemed most appropriate.  The graft material was trimmed to fit the size of the defect. The graft was then placed in the primary defect and oriented appropriately.
Tissue Cultured Epidermal Autograft Text: The defect edges were debeveled with a #15 scalpel blade.  Given the location of the defect, shape of the defect and the proximity to free margins a tissue cultured epidermal autograft was deemed most appropriate.  The graft was then trimmed to fit the size of the defect.  The graft was then placed in the primary defect and oriented appropriately.
Xenograft Text: The defect edges were debeveled with a #15 scalpel blade.  Given the location of the defect, shape of the defect and the proximity to free margins a xenograft was deemed most appropriate.  The graft was then trimmed to fit the size of the defect.  The graft was then placed in the primary defect and oriented appropriately.
Purse String (Intermediate) Text: Given the location of the defect and the characteristics of the surrounding skin a purse string intermediate closure was deemed most appropriate.  Undermining was performed circumfirentially around the surgical defect.  A purse string suture was then placed and tightened.
Purse String (Simple) Text: Given the location of the defect and the characteristics of the surrounding skin a purse string simple closure was deemed most appropriate.  Undermining was performed circumferentially around the surgical defect.  A purse string suture was then placed and tightened.
Partial Purse String (Intermediate) Text: Given the location of the defect and the characteristics of the surrounding skin an intermediate purse string closure was deemed most appropriate.  Undermining was performed circumferentially around the surgical defect.  A purse string suture was then placed and tightened. Wound tension of the circular defect prevented complete closure of the wound.
Partial Purse String (Simple) Text: Given the location of the defect and the characteristics of the surrounding skin a simple purse string closure was deemed most appropriate.  Undermining was performed circumferentially around the surgical defect.  A purse string suture was then placed and tightened. Wound tension of the circular defect prevented complete closure of the wound.
Complex Repair And Single Advancement Flap Text: The defect edges were debeveled with a #15 scalpel blade.  The primary defect was closed partially with a complex linear closure.  Given the location of the remaining defect, shape of the defect and the proximity to free margins a single advancement flap was deemed most appropriate for complete closure of the defect.  Using a sterile surgical marker, an appropriate advancement flap was drawn incorporating the defect and placing the expected incisions within the relaxed skin tension lines where possible.    The area thus outlined was incised deep to adipose tissue with a #15 scalpel blade.  The skin margins were undermined to an appropriate distance in all directions utilizing iris scissors.
Complex Repair And Double Advancement Flap Text: The defect edges were debeveled with a #15 scalpel blade.  The primary defect was closed partially with a complex linear closure.  Given the location of the remaining defect, shape of the defect and the proximity to free margins a double advancement flap was deemed most appropriate for complete closure of the defect.  Using a sterile surgical marker, an appropriate advancement flap was drawn incorporating the defect and placing the expected incisions within the relaxed skin tension lines where possible.    The area thus outlined was incised deep to adipose tissue with a #15 scalpel blade.  The skin margins were undermined to an appropriate distance in all directions utilizing iris scissors.
Complex Repair And Modified Advancement Flap Text: The defect edges were debeveled with a #15 scalpel blade.  The primary defect was closed partially with a complex linear closure.  Given the location of the remaining defect, shape of the defect and the proximity to free margins a modified advancement flap was deemed most appropriate for complete closure of the defect.  Using a sterile surgical marker, an appropriate advancement flap was drawn incorporating the defect and placing the expected incisions within the relaxed skin tension lines where possible.    The area thus outlined was incised deep to adipose tissue with a #15 scalpel blade.  The skin margins were undermined to an appropriate distance in all directions utilizing iris scissors.
Complex Repair And A-T Advancement Flap Text: The defect edges were debeveled with a #15 scalpel blade.  The primary defect was closed partially with a complex linear closure.  Given the location of the remaining defect, shape of the defect and the proximity to free margins an A-T advancement flap was deemed most appropriate for complete closure of the defect.  Using a sterile surgical marker, an appropriate advancement flap was drawn incorporating the defect and placing the expected incisions within the relaxed skin tension lines where possible.    The area thus outlined was incised deep to adipose tissue with a #15 scalpel blade.  The skin margins were undermined to an appropriate distance in all directions utilizing iris scissors.
Complex Repair And O-T Advancement Flap Text: The defect edges were debeveled with a #15 scalpel blade.  The primary defect was closed partially with a complex linear closure.  Given the location of the remaining defect, shape of the defect and the proximity to free margins an O-T advancement flap was deemed most appropriate for complete closure of the defect.  Using a sterile surgical marker, an appropriate advancement flap was drawn incorporating the defect and placing the expected incisions within the relaxed skin tension lines where possible.    The area thus outlined was incised deep to adipose tissue with a #15 scalpel blade.  The skin margins were undermined to an appropriate distance in all directions utilizing iris scissors.
Complex Repair And O-L Flap Text: The defect edges were debeveled with a #15 scalpel blade.  The primary defect was closed partially with a complex linear closure.  Given the location of the remaining defect, shape of the defect and the proximity to free margins an O-L flap was deemed most appropriate for complete closure of the defect.  Using a sterile surgical marker, an appropriate flap was drawn incorporating the defect and placing the expected incisions within the relaxed skin tension lines where possible.    The area thus outlined was incised deep to adipose tissue with a #15 scalpel blade.  The skin margins were undermined to an appropriate distance in all directions utilizing iris scissors.
Complex Repair And Bilobe Flap Text: The defect edges were debeveled with a #15 scalpel blade.  The primary defect was closed partially with a complex linear closure.  Given the location of the remaining defect, shape of the defect and the proximity to free margins a bilobe flap was deemed most appropriate for complete closure of the defect.  Using a sterile surgical marker, an appropriate advancement flap was drawn incorporating the defect and placing the expected incisions within the relaxed skin tension lines where possible.    The area thus outlined was incised deep to adipose tissue with a #15 scalpel blade.  The skin margins were undermined to an appropriate distance in all directions utilizing iris scissors.
Complex Repair And Melolabial Flap Text: The defect edges were debeveled with a #15 scalpel blade.  The primary defect was closed partially with a complex linear closure.  Given the location of the remaining defect, shape of the defect and the proximity to free margins a melolabial flap was deemed most appropriate for complete closure of the defect.  Using a sterile surgical marker, an appropriate advancement flap was drawn incorporating the defect and placing the expected incisions within the relaxed skin tension lines where possible.    The area thus outlined was incised deep to adipose tissue with a #15 scalpel blade.  The skin margins were undermined to an appropriate distance in all directions utilizing iris scissors.
Complex Repair And Rotation Flap Text: The defect edges were debeveled with a #15 scalpel blade.  The primary defect was closed partially with a complex linear closure.  Given the location of the remaining defect, shape of the defect and the proximity to free margins a rotation flap was deemed most appropriate for complete closure of the defect.  Using a sterile surgical marker, an appropriate advancement flap was drawn incorporating the defect and placing the expected incisions within the relaxed skin tension lines where possible.    The area thus outlined was incised deep to adipose tissue with a #15 scalpel blade.  The skin margins were undermined to an appropriate distance in all directions utilizing iris scissors.
Complex Repair And Rhombic Flap Text: The defect edges were debeveled with a #15 scalpel blade.  The primary defect was closed partially with a complex linear closure.  Given the location of the remaining defect, shape of the defect and the proximity to free margins a rhombic flap was deemed most appropriate for complete closure of the defect.  Using a sterile surgical marker, an appropriate advancement flap was drawn incorporating the defect and placing the expected incisions within the relaxed skin tension lines where possible.    The area thus outlined was incised deep to adipose tissue with a #15 scalpel blade.  The skin margins were undermined to an appropriate distance in all directions utilizing iris scissors.
Complex Repair And Transposition Flap Text: The defect edges were debeveled with a #15 scalpel blade.  The primary defect was closed partially with a complex linear closure.  Given the location of the remaining defect, shape of the defect and the proximity to free margins a transposition flap was deemed most appropriate for complete closure of the defect.  Using a sterile surgical marker, an appropriate advancement flap was drawn incorporating the defect and placing the expected incisions within the relaxed skin tension lines where possible.    The area thus outlined was incised deep to adipose tissue with a #15 scalpel blade.  The skin margins were undermined to an appropriate distance in all directions utilizing iris scissors.
Complex Repair And V-Y Plasty Text: The defect edges were debeveled with a #15 scalpel blade.  The primary defect was closed partially with a complex linear closure.  Given the location of the remaining defect, shape of the defect and the proximity to free margins a V-Y plasty was deemed most appropriate for complete closure of the defect.  Using a sterile surgical marker, an appropriate advancement flap was drawn incorporating the defect and placing the expected incisions within the relaxed skin tension lines where possible.    The area thus outlined was incised deep to adipose tissue with a #15 scalpel blade.  The skin margins were undermined to an appropriate distance in all directions utilizing iris scissors.
Complex Repair And M Plasty Text: The defect edges were debeveled with a #15 scalpel blade.  The primary defect was closed partially with a complex linear closure.  Given the location of the remaining defect, shape of the defect and the proximity to free margins an M plasty was deemed most appropriate for complete closure of the defect.  Using a sterile surgical marker, an appropriate advancement flap was drawn incorporating the defect and placing the expected incisions within the relaxed skin tension lines where possible.    The area thus outlined was incised deep to adipose tissue with a #15 scalpel blade.  The skin margins were undermined to an appropriate distance in all directions utilizing iris scissors.
Complex Repair And Double M Plasty Text: The defect edges were debeveled with a #15 scalpel blade.  The primary defect was closed partially with a complex linear closure.  Given the location of the remaining defect, shape of the defect and the proximity to free margins a double M plasty was deemed most appropriate for complete closure of the defect.  Using a sterile surgical marker, an appropriate advancement flap was drawn incorporating the defect and placing the expected incisions within the relaxed skin tension lines where possible.    The area thus outlined was incised deep to adipose tissue with a #15 scalpel blade.  The skin margins were undermined to an appropriate distance in all directions utilizing iris scissors.
Complex Repair And W Plasty Text: The defect edges were debeveled with a #15 scalpel blade.  The primary defect was closed partially with a complex linear closure.  Given the location of the remaining defect, shape of the defect and the proximity to free margins a W plasty was deemed most appropriate for complete closure of the defect.  Using a sterile surgical marker, an appropriate advancement flap was drawn incorporating the defect and placing the expected incisions within the relaxed skin tension lines where possible.    The area thus outlined was incised deep to adipose tissue with a #15 scalpel blade.  The skin margins were undermined to an appropriate distance in all directions utilizing iris scissors.
Complex Repair And Z Plasty Text: The defect edges were debeveled with a #15 scalpel blade.  The primary defect was closed partially with a complex linear closure.  Given the location of the remaining defect, shape of the defect and the proximity to free margins a Z plasty was deemed most appropriate for complete closure of the defect.  Using a sterile surgical marker, an appropriate advancement flap was drawn incorporating the defect and placing the expected incisions within the relaxed skin tension lines where possible.    The area thus outlined was incised deep to adipose tissue with a #15 scalpel blade.  The skin margins were undermined to an appropriate distance in all directions utilizing iris scissors.
Complex Repair And Dorsal Nasal Flap Text: The defect edges were debeveled with a #15 scalpel blade.  The primary defect was closed partially with a complex linear closure.  Given the location of the remaining defect, shape of the defect and the proximity to free margins a dorsal nasal flap was deemed most appropriate for complete closure of the defect.  Using a sterile surgical marker, an appropriate flap was drawn incorporating the defect and placing the expected incisions within the relaxed skin tension lines where possible.    The area thus outlined was incised deep to adipose tissue with a #15 scalpel blade.  The skin margins were undermined to an appropriate distance in all directions utilizing iris scissors.
Complex Repair And Ftsg Text: The defect edges were debeveled with a #15 scalpel blade.  The primary defect was closed partially with a complex linear closure.  Given the location of the defect, shape of the defect and the proximity to free margins a full thickness skin graft was deemed most appropriate to repair the remaining defect.  The graft was trimmed to fit the size of the remaining defect.  The graft was then placed in the primary defect, oriented appropriately, and sutured into place.
Complex Repair And Burow's Graft Text: The defect edges were debeveled with a #15 scalpel blade.  The primary defect was closed partially with a complex linear closure.  Given the location of the defect, shape of the defect, the proximity to free margins and the presence of a standing cone deformity a Burow's graft was deemed most appropriate to repair the remaining defect.  The graft was trimmed to fit the size of the remaining defect.  The graft was then placed in the primary defect, oriented appropriately, and sutured into place.
Complex Repair And Split-Thickness Skin Graft Text: The defect edges were debeveled with a #15 scalpel blade.  The primary defect was closed partially with a complex linear closure.  Given the location of the defect, shape of the defect and the proximity to free margins a split thickness skin graft was deemed most appropriate to repair the remaining defect.  The graft was trimmed to fit the size of the remaining defect.  The graft was then placed in the primary defect, oriented appropriately, and sutured into place.
Complex Repair And Epidermal Autograft Text: The defect edges were debeveled with a #15 scalpel blade.  The primary defect was closed partially with a complex linear closure.  Given the location of the defect, shape of the defect and the proximity to free margins an epidermal autograft was deemed most appropriate to repair the remaining defect.  The graft was trimmed to fit the size of the remaining defect.  The graft was then placed in the primary defect, oriented appropriately, and sutured into place.
Complex Repair And Dermal Autograft Text: The defect edges were debeveled with a #15 scalpel blade.  The primary defect was closed partially with a complex linear closure.  Given the location of the defect, shape of the defect and the proximity to free margins an dermal autograft was deemed most appropriate to repair the remaining defect.  The graft was trimmed to fit the size of the remaining defect.  The graft was then placed in the primary defect, oriented appropriately, and sutured into place.
Complex Repair And Tissue Cultured Epidermal Autograft Text: The defect edges were debeveled with a #15 scalpel blade.  The primary defect was closed partially with a complex linear closure.  Given the location of the defect, shape of the defect and the proximity to free margins an tissue cultured epidermal autograft was deemed most appropriate to repair the remaining defect.  The graft was trimmed to fit the size of the remaining defect.  The graft was then placed in the primary defect, oriented appropriately, and sutured into place.
Complex Repair And Xenograft Text: The defect edges were debeveled with a #15 scalpel blade.  The primary defect was closed partially with a complex linear closure.  Given the location of the defect, shape of the defect and the proximity to free margins a xenograft was deemed most appropriate to repair the remaining defect.  The graft was trimmed to fit the size of the remaining defect.  The graft was then placed in the primary defect, oriented appropriately, and sutured into place.
Complex Repair And Skin Substitute Graft Text: The defect edges were debeveled with a #15 scalpel blade.  The primary defect was closed partially with a complex linear closure.  Given the location of the remaining defect, shape of the defect and the proximity to free margins a skin substitute graft was deemed most appropriate to repair the remaining defect.  The graft was trimmed to fit the size of the remaining defect.  The graft was then placed in the primary defect, oriented appropriately, and sutured into place.
Path Notes (To The Dermatopathologist): Please check margins.
Consent was obtained from the patient. The risks and benefits to therapy were discussed in detail. Specifically, the risks of infection, scarring, bleeding, prolonged wound healing, incomplete removal, allergy to anesthesia, nerve injury and recurrence were addressed. Prior to the procedure, the treatment site was clearly identified and confirmed by the patient. All components of Universal Protocol/PAUSE Rule completed.
Post-Care Instructions: I reviewed with the patient in detail post-care instructions:\\n1. Apply bacitracin over the steri-strips.  \\n2. Cut non-stick pad (Telfa) to cover the steri-strips\\n3. Apply tape (hypafix) over the non-stick pad\\n4. Change once per day for 5 days\\n5. Shower with bandage on, change bandage after shower\\n\\nPatient is not to engage in any heavy lifting, exercise, hot tub, or swimming for the next 14 days. Should the patient develop any fevers, chills, bleeding, severe pain patient will contact the office immediately.
Home Suture Removal Text: Patient was provided a home suture removal kit and will remove their sutures at home.  If they have any questions or difficulties they will call the office.
Where Do You Want The Question To Include Opioid Counseling Located?: Case Summary Tab
Information: Selecting Yes will display possible errors in your note based on the variables you have selected. This validation is only offered as a suggestion for you. PLEASE NOTE THAT THE VALIDATION TEXT WILL BE REMOVED WHEN YOU FINALIZE YOUR NOTE. IF YOU WANT TO FAX A PRELIMINARY NOTE YOU WILL NEED TO TOGGLE THIS TO 'NO' IF YOU DO NOT WANT IT IN YOUR FAXED NOTE.

## 2022-01-12 ENCOUNTER — TELEPHONE (OUTPATIENT)
Dept: CARDIOLOGY | Facility: MEDICAL CENTER | Age: 77
End: 2022-01-12

## 2022-01-12 NOTE — TELEPHONE ENCOUNTER
SAMI Andrews   11/11/2021  3:55 PM PST Back to Top        Ascending aortic aneurysm is 3.9cm no significant change.      --------------------------------  Called pt [phone]  Relayed RT CT results. Pt requesting FV with RT. Advised pt that I will sent message to  pool to call pt regarding FV with RT. Pt verbalized understanding and is appreciative of call.           Route to  pool

## 2022-01-12 NOTE — TELEPHONE ENCOUNTER
RT    Pt would like a call back to discuss his recent CT.     Angelo - [phone]    Thank you,   Sue PERALTA

## 2022-01-27 ENCOUNTER — HOSPITAL ENCOUNTER (OUTPATIENT)
Dept: RADIOLOGY | Facility: MEDICAL CENTER | Age: 77
End: 2022-01-27
Attending: NURSE PRACTITIONER
Payer: MEDICARE

## 2022-01-27 DIAGNOSIS — M96.1 POSTLAMINECTOMY SYNDROME, CERVICAL REGION: ICD-10-CM

## 2022-02-03 ENCOUNTER — OFFICE VISIT (OUTPATIENT)
Dept: INTERNAL MEDICINE | Facility: OTHER | Age: 77
End: 2022-02-03
Payer: MEDICARE

## 2022-02-03 VITALS
DIASTOLIC BLOOD PRESSURE: 60 MMHG | OXYGEN SATURATION: 92 % | HEIGHT: 68 IN | TEMPERATURE: 98.1 F | HEART RATE: 77 BPM | SYSTOLIC BLOOD PRESSURE: 98 MMHG | WEIGHT: 246.5 LBS | BODY MASS INDEX: 37.36 KG/M2

## 2022-02-03 DIAGNOSIS — Z12.11 COLON CANCER SCREENING: ICD-10-CM

## 2022-02-03 PROCEDURE — 99999 PR NO CHARGE: CPT | Mod: GC | Performed by: STUDENT IN AN ORGANIZED HEALTH CARE EDUCATION/TRAINING PROGRAM

## 2022-02-03 PROCEDURE — G0439 PPPS, SUBSEQ VISIT: HCPCS | Mod: GC | Performed by: STUDENT IN AN ORGANIZED HEALTH CARE EDUCATION/TRAINING PROGRAM

## 2022-02-03 RX ORDER — BUPRENORPHINE HYDROCHLORIDE 75 UG/1
75 FILM, SOLUBLE BUCCAL
COMMUNITY
Start: 2022-01-31 | End: 2022-05-19

## 2022-02-03 RX ORDER — TADALAFIL 20 MG/1
TABLET ORAL
COMMUNITY
End: 2022-05-19

## 2022-02-03 ASSESSMENT — PATIENT HEALTH QUESTIONNAIRE - PHQ9
CLINICAL INTERPRETATION OF PHQ2 SCORE: 2
5. POOR APPETITE OR OVEREATING: 1 - SEVERAL DAYS
SUM OF ALL RESPONSES TO PHQ QUESTIONS 1-9: 6

## 2022-02-03 ASSESSMENT — FIBROSIS 4 INDEX: FIB4 SCORE: 1.94

## 2022-02-03 ASSESSMENT — ENCOUNTER SYMPTOMS: GENERAL WELL-BEING: GOOD

## 2022-02-03 ASSESSMENT — ACTIVITIES OF DAILY LIVING (ADL): BATHING_REQUIRES_ASSISTANCE: 0

## 2022-02-03 NOTE — PROGRESS NOTES
Chief Complaint   Patient presents with   • Medicare Annual Wellness     physical         HPI:  Angelo is a 76 y.o. here for Medicare Annual Wellness Visit    Has been having recent struggles with spouse, Emily's alzheimer progression. He was planning to get home respite care, but has had some struggles of schedule    Patient Active Problem List    Diagnosis Date Noted   • Angina of effort ([facility]) 01/19/2021   • Thoracic aortic aneurysm (TAA) ([facility]) 11/11/2019   • Lumbar back pain with radiculopathy affecting left lower extremity 12/19/2018   • Intractable low back pain 12/19/2018   • Asymptomatic bacteriuria 12/18/2018   • Elevated alkaline phosphatase level 12/17/2018   • BPH (benign prostatic hyperplasia) 12/17/2018   • Radiculopathy of sacral region 11/20/2018   • Chest wall injury 05/04/2018   • Caregiver burden 04/25/2018   • Obesity (BMI 35.0-39.9 without comorbidity) 04/06/2017   • Chronic right shoulder pain 04/06/2017   • Stress 04/06/2017   • Ascending aorta dilation (CMS-[facility]), mild, needs OP followup 03/30/2017   • Paraspinal abscess ([facility]) 03/30/2017   • Anemia 03/30/2017   • Healthcare maintenance 06/08/2016   • Acquired hypothyroidism 06/08/2016   • Erectile dysfunction 06/08/2016   • History of skin cancer 06/08/2016   • Right hip pain 06/08/2016   • Rosacea 06/08/2016   • Chronic midline low back pain without sciatica 06/08/2016   • Asthma 06/08/2016   • Seasonal allergies 06/08/2016   • GERD (gastroesophageal reflux disease) 06/08/2016   • Dyslipidemia 06/08/2016   • Essential hypertension 06/08/2016       Current Outpatient Medications   Medication Sig Dispense Refill   • BELBUCA 75 MCG FILM Take 75 mcg by mouth 1 time a day as needed.     • omeprazole (PRILOSEC OTC) 20 MG tablet PRILOSEC OTC 20 MG TBEC     • lisinopril (PRINIVIL) 10 MG Tab LISINOPRIL 10 MG TABS 90 Tablet 3   • metoprolol SR (TOPROL XL) 25 MG TABLET SR 24 HR Take 0.5 Tablets by mouth every day. 45 Tablet 3   • atorvastatin  (LIPITOR) 40 MG Tab TAKE 1 TABLET BY MOUTH EVERY EVENING FOR HIGH AMOUNTS OF FATS IN BLOOD 100 tablet 3   • amoxicillin (AMOXIL) 500 MG Cap      • aspirin (ASA) 81 MG Chew Tab chewable tablet Chew 1 Tab every day. 100 Tab 2   • tamsulosin (FLOMAX) 0.4 MG capsule TAKE ONE CAPSULE BY MOUTH EVERY DAY FOR BPH. GIVE 1/2 HOUR AFTER BREAKFAST 90 Cap 0   • gabapentin (NEURONTIN) 300 MG Cap Take 300 mg by mouth 4 times a day. Indications: twice a day  5   • ADVAIR DISKUS 100-50 MCG/DOSE AEROSOL POWDER, BREATH ACTIVATED INHALE 1 PUFF BY MOUTH TWICE A DAY *RINSE MOUTH AFTER USE* 3 Inhaler 0   • acetaminophen (TYLENOL) 500 MG Tab Take 1,000 mg by mouth 2 Times a Day.     • levothyroxine (SYNTHROID) 50 MCG Tab TAKE 1 TABLET BY MOUTH EVERY DAY 90 Tab 1   • fluticasone-salmeterol (ADVAIR DISKUS) 100-50 MCG/DOSE AEROSOL POWDER, BREATH ACTIVATED ADVAIR DISKUS 100-50 MCG/DOSE AEPB (Patient not taking: Reported on 2/3/2022)       No current facility-administered medications for this visit.        Patient is taking medications as noted in medication list.  Current supplements as per medication list.     Allergies: Patient has no known allergies.    Current social contact/activities:go out dinner with friends-family sport events    Is patient current with immunizations? No, due for Patient is up to date on all vaccines. Patient is interested in receiving TDAP today.    He  reports that he has never smoked. He has never used smokeless tobacco. He reports current alcohol use. He reports that he does not use drugs.  Counseling given: Not Answered        DPA/Advanced directive: Patient has Durable Power of  on file.     ROS:    Gait: Uses no assistive device   Ostomy: No   Other tubes: No   Amputations: No   Chronic oxygen use No   Last eye exam 02/03/2022 ars hearing aids: No   : Reports urinary leakage during the last 6 months that has not interfered at all with their daily activities or sleep.  creening:      Depression  Screening    Little interest or pleasure in doing things?  1 - several days  Feeling down, depressed, or hopeless? 1 - several days  Patient Health Questionnaire Score: 2    If depressive symptoms identified deferred to follow up visit unless specifically addressed in assessment and plan.    Interpretation of PHQ-9 Total Score   Score Severity   1-4 No Depression   5-9 Mild Depression   10-14 Moderate Depression   15-19 Moderately Severe Depression   20-27 Severe Depression    Screening for Cognitive Impairment    Three Minute Recall (captain, garden, picture)  3/3    Toi clock face with all 12 numbers and set the hands to show 5 past 8.  Yes    If cognitive concerns identified, deferred for follow up unless specifically addressed in assessment and plan.    Fall Risk Assessment    Has the patient had two or more falls in the last year or any fall with injury in the last year?  No  If fall risk identified, deferred for follow up unless specifically addressed in assessment and plan.    Safety Assessment    Throw rugs on floor.  No  Handrails on all stairs.  No  Good lighting in all hallways.  Yes  Difficulty hearing.  No  Patient counseled about all safety risks that were identified.    Functional Assessment ADLs    Are there any barriers preventing you from cooking for yourself or meeting nutritional needs?  No.    Are there any barriers preventing you from driving safely or obtaining transportation?  No.    Are there any barriers preventing you from using a telephone or calling for help?  No.    Are there any barriers preventing you from shopping?  No.    Are there any barriers preventing you from taking care of your own finances?  No.    Are there any barriers preventing you from managing your medications?  No.    Are there any barriers preventing you from showering, bathing or dressing yourself?  No.    Are you currently engaging in any exercise or physical activity?  No.     What is your perception of your  health?  Good.    Health Maintenance Summary          Overdue - Annual Wellness Visit (Once) Overdue - never done    No completion history exists for this topic.          Overdue - HEPATITIS C SCREENING (Once) Overdue - never done    No completion history exists for this topic.          Overdue - COVID-19 Vaccine (1) Overdue - never done    No completion history exists for this topic.          Overdue - IMM ZOSTER VACCINES (2 of 2) Overdue since 8/19/2019 06/24/2019  Imm Admin: Zoster Vaccine Recombinant (RZV) (SHINGRIX)          Overdue - IMM INFLUENZA (1) Overdue since 9/1/2021 09/16/2019  Imm Admin: Influenza Vaccine Adult HD    09/18/2018  Imm Admin: INFLUENZA TIV (IM)    10/01/2016  Imm Admin: INFLUENZA TIV (IM)    10/01/2016  Imm Admin: Influenza Vaccine Pediatric Split - Historical Data    10/01/2016  Imm Admin: Influenza Seasonal Injectable - Historical Data    Only the first 5 history entries have been loaded, but more history exists.          COLORECTAL CANCER SCREENING (COLONOSCOPY - Every 10 Years) Order placed this encounter    12/12/2019  OCCULT BLOOD FECES IMMUNOASSAY    12/06/2017  REFERRAL TO GI FOR COLONOSCOPY    10/08/2007  REFERRAL TO GI FOR COLONOSCOPY          IMM DTaP/Tdap/Td Vaccine (2 - Td or Tdap) Next due on 6/24/2029 06/24/2019  Imm Admin: Tdap Vaccine          IMM PNEUMOCOCCAL VACCINE: 65+ Years (Series Information) Completed    09/26/2018  Imm Admin: Pneumococcal polysaccharide vaccine (PPSV-23)    10/01/2015  Imm Admin: Pneumococcal Conjugate Vaccine (Prevnar/PCV-13)    12/24/2014  Imm Admin: Pneumococcal Conjugate Vaccine (Prevnar/PCV-13)    10/01/2010  Imm Admin: Pneumococcal Conjugate Vaccine (Prevnar/PCV-13)          IMM HEP B VACCINE (Series Information) Aged Out    No completion history exists for this topic.          IMM MENINGOCOCCAL VACCINE (MCV4) (Series Information) Aged Out    No completion history exists for this topic.                Patient Care Team:  Man JIMENEZ  AMOS Weir as PCP - General (Internal Medicine)  Jeovany Samson M.D. as PCP - [facility] Paneled  Jamie Giang M.D. as Consulting Physician (Orthopedic Surgery)  Juan Miguel Shannon M.D. as Consulting Physician (Urology)  Lyssa Jung M.D. as Consulting Physician (Internal Medicine Clinical Cardiac Electrophysiology)  Brandon Fowler M.D. as Medical Home Care Manager (Neurosurgery)  Macey Cifuentes (Inactive) as Patient Advocate - [facility] as Home Health Provider    Social History     Tobacco Use   • Smoking status: Never Smoker   • Smokeless tobacco: Never Used   Vaping Use   • Vaping Use: Never used   Substance Use Topics   • Alcohol use: Yes     Comment: 1/week    • Drug use: No     Comment: marijuana edibles (for pain control)     Family History   Problem Relation Age of Onset   • Hypertension Mother    • Dementia Mother    • Hypertension Father    • Lung Disease Father         asthma     He  has a past medical history of ASTHMA, Bronchitis (02/2018), Erectile dysfunction (6/8/2016), GERD (gastroesophageal reflux disease) (6/8/2016), Heart burn, History of skin cancer (6/8/2016), Hyperlipidemia (6/8/2016), Hypertension, Hypothyroidism (6/8/2016), Indigestion, Obesity (6/8/2016), Pain (04/19/2018), Preventative health care (6/8/2016), Rosacea (6/8/2016), and Seasonal allergies (6/8/2016).   Past Surgical History:   Procedure Laterality Date   • LUMBAR FUSION O-ARM N/A 11/19/2018    Procedure: LUMBAR FUSION O-ARM-  POSTERIOR L2-3 TLIF,;  Surgeon: Brandon Fowler M.D.;  Location: [facility];  Service: Neurosurgery   • LUMBAR LAMINECTOMY DISKECTOMY N/A 11/19/2018    Procedure: LUMBAR LAMINECTOMY DISKECTOMY-  L2-3 LAMI;  Surgeon: Brandon Fowler M.D.;  Location: [facility];  Service: Neurosurgery   • HARDWARE REMOVAL NEURO N/A 11/19/2018    Procedure: HARDWARE REMOVAL NEURO-  L4-5, L3 SCREW REPOSITIONING;  Surgeon: Brandon Fowler M.D.;  Location: SURGERY [facility];  Service:  Neurosurgery   • KYPHOPLASTY N/A 11/19/2018    Procedure: KYPHOPLASTY-  L2;  Surgeon: Brandon Fowler M.D.;  Location: SUR[facility]ERY [facility];  Service: Neurosurgery   • VENTRAL HERNIA REPAIR Left 5/3/2018    Procedure: VENTRAL HERNIA REPAIR FOR LUMBAR HERNIA/ LUNG HERNIA THROUGH CHEST WALL RECONSTRUCTION, MAJOR  ;  Surgeon: Phoenix Becker M.D.;  Location: SURGERY [facility];  Service: General   • IRRIGATION & DEBRIDEMENT NEURO  3/30/2017    Procedure: IRRIGATION & DEBRIDEMENT NEURO-LUMBAR WOUND;  Surgeon: Josiah Chakraborty M.D.;  Location: SURGERY [facility];  Service:    • FUSION, SPINE, LUMBAR, PLIF N/A 3/17/2017    Procedure: LUMBAR FUSION POSTERIOR L3-4 EXTENSION;  Surgeon: Josiah Chakraborty M.D.;  Location: SURGERY [facility];  Service:    • LUMBAR LAMINECTOMY DISKECTOMY N/A 3/17/2017    Procedure: LUMBAR LAMINECTOMY DISKECTOMY L3 & REDO L4;  Surgeon: Josiah Chakraborty M.D.;  Location: SURGERY [facility];  Service:    • HARDWARE REMOVAL NEURO N/A 3/17/2017    Procedure: HARDWARE REMOVAL NEURO L4-5;  Surgeon: Josiah Chakraborty M.D.;  Location: SURGERY [facility];  Service:    • FUSION, SPINE, LUMBAR, PLIF  9/9/2009    Performed by JOSIAH CHAKRABORTY at SURGERY [facility]   • LUMBAR LAMINECTOMY DISKECTOMY  9/9/2009    Performed by JOSIAH CHAKRABORTY at SURGERY [facility]   • KNEE ARTHROPLASTY TOTAL  5/26/2009    Performed by NAREN DANIELSON at SURGERY [facility]   • HIP ARTHROPLASTY TOTAL  1/21/2009    Performed by NAREN DANIELSON at SURGERY [facility]   • INGUINAL HERNIA REPAIR  9/2/08    Performed by PHOENIX BECKER at SURGERY SAME DAY [facility]   • INGUINAL HERNIA REPAIR  6/10/08    Performed by PHOENIX BECKER at SURGERY SAME DAY [facility] ORS   • HERNIA REPAIR  2008    HERNIA    • ROMAN BY LAPAROSCOPY  1982   • APPENDECTOMY  1953   • TONSILLECTOMY  1951   • OTHER      DISCECTOMY WITH HARWWARE   • OTHER      GASTRIC BYPASS AT 30 YEARS           Exam:     BP (!) 98/60 (BP  "Location: Left arm, Patient Position: Sitting, BP Cuff Size: Large adult long)   Pulse 77   Temp 36.7 °C (98.1 °F) (Temporal)   Ht 1.727 m (5' 8\")   Wt 112 kg (246 lb 8 oz)   SpO2 92%  Body mass index is 37.48 kg/m².    Hearing fair.    Dentition good  Alert, oriented in no acute distress.  Eye contact is good, speech goal directed, affect calm      Assessment and Plan. The following treatment and monitoring plan is recommended:       #wellness visit: Last: 02/03/22 Next: 2/3/23    #colon CA screening: Last: FoBT 2019, negative prior.  -colonoscopy ordered today    #AAA: dilated to 3.9cm, last imaging on 1/27/22    #Alcohol use: 1 standardized drinks/week, counseled appropriately on usage    #ASA: taking 81mg, counseled on risk/benefits of bleeding vs. MI/CVA prevention      #HTN screening: BP in clinic today is 98/60, is on NOT antihypertensives    #Lipids/diabetes:   -Last lipid panel on 11/2/21, on statin therapy    -atorvastatin 40mg PO qD    -repeat lipids in December 2022   -A1c screening in past has been normal.    #Prostate CA screening:   Discussed [facility] guidelines regarding utility of digital rectal examination, PSA blood antigen testing.      Services suggested: No services needed at this time  Health Care Screening recommendations as per orders if indicated.  Referrals offered: PT/OT/Nutrition counseling/Behavioral Health/Smoking cessation as per orders if indicated.    Discussion today about general wellness and lifestyle habits:    · Prevent falls and reduce trip hazards; Cautioned about securing or removing rugs.  · Have a working fire alarm and carbon monoxide detector;   · Engage in regular physical activity and social activities       Follow-up: No follow-ups on file.  "

## 2022-02-09 ENCOUNTER — APPOINTMENT (RX ONLY)
Dept: URBAN - METROPOLITAN AREA CLINIC 4 | Facility: CLINIC | Age: 77
Setting detail: DERMATOLOGY
End: 2022-02-09

## 2022-02-09 DIAGNOSIS — Z85.820 PERSONAL HISTORY OF MALIGNANT MELANOMA OF SKIN: ICD-10-CM

## 2022-02-09 DIAGNOSIS — D22 MELANOCYTIC NEVI: ICD-10-CM

## 2022-02-09 DIAGNOSIS — L57.8 OTHER SKIN CHANGES DUE TO CHRONIC EXPOSURE TO NONIONIZING RADIATION: ICD-10-CM

## 2022-02-09 DIAGNOSIS — L81.4 OTHER MELANIN HYPERPIGMENTATION: ICD-10-CM

## 2022-02-09 DIAGNOSIS — L82.1 OTHER SEBORRHEIC KERATOSIS: ICD-10-CM

## 2022-02-09 DIAGNOSIS — D18.0 HEMANGIOMA: ICD-10-CM

## 2022-02-09 PROBLEM — D18.01 HEMANGIOMA OF SKIN AND SUBCUTANEOUS TISSUE: Status: ACTIVE | Noted: 2022-02-09

## 2022-02-09 PROBLEM — D22.5 MELANOCYTIC NEVI OF TRUNK: Status: ACTIVE | Noted: 2022-02-09

## 2022-02-09 PROCEDURE — 99213 OFFICE O/P EST LOW 20 MIN: CPT

## 2022-02-09 PROCEDURE — ? COUNSELING

## 2022-02-09 ASSESSMENT — LOCATION DETAILED DESCRIPTION DERM
LOCATION DETAILED: RIGHT ANTERIOR THIGH
LOCATION DETAILED: RIGHT CHEEK
LOCATION DETAILED: LEFT CHEEK
LOCATION DETAILED: LEFT MID BACK
LOCATION DETAILED: LEFT UPPER BACK
LOCATION DETAILED: RIGHT DORSAL HAND
LOCATION DETAILED: LEFT ANTERIOR THIGH
LOCATION DETAILED: LEFT DORSAL FOREARM
LOCATION DETAILED: LEFT DORSAL HAND
LOCATION DETAILED: RIGHT UPPER BACK
LOCATION DETAILED: RIGHT DORSAL FOREARM

## 2022-02-09 ASSESSMENT — LOCATION SIMPLE DESCRIPTION DERM
LOCATION SIMPLE: RIGHT LOWER EXTREMITY
LOCATION SIMPLE: LEFT HAND
LOCATION SIMPLE: RIGHT CHEEK
LOCATION SIMPLE: RIGHT UPPER EXTREMITY
LOCATION SIMPLE: LEFT CHEEK
LOCATION SIMPLE: BACK
LOCATION SIMPLE: RIGHT HAND
LOCATION SIMPLE: LEFT LOWER EXTREMITY
LOCATION SIMPLE: LEFT UPPER EXTREMITY

## 2022-02-09 ASSESSMENT — LOCATION ZONE DERM
LOCATION ZONE: TRUNK
LOCATION ZONE: HAND
LOCATION ZONE: LEG
LOCATION ZONE: ARM
LOCATION ZONE: FACE

## 2022-02-09 NOTE — HPI: EVALUATION OF SKIN LESION(S)
What Type Of Note Output Would You Prefer (Optional)?: Standard Output
Hpi Title: Evaluation of Skin Lesions
Year Removed: 11/2021

## 2022-02-09 NOTE — PROCEDURE: MIPS QUALITY
Quality 137: Melanoma: Continuity Of Care - Recall System: Patient information entered into a recall system that includes: target date for the next exam specified AND a process to follow up with patients regarding missed or unscheduled appointments
Detail Level: Detailed
Quality 226: Preventive Care And Screening: Tobacco Use: Screening And Cessation Intervention: Patient screened for tobacco use and is an ex/non-smoker
Quality 138: Melanoma: Coordination Of Care: A treatment plan was communicated to the physicians providing continuing care within one month of diagnosis outlining: diagnosis, tumor thickness and a plan for surgery or alternate care.
Quality 111:Pneumonia Vaccination Status For Older Adults: Pneumococcal Vaccination Previously Received
Quality 130: Documentation Of Current Medications In The Medical Record: Current Medications Documented

## 2022-02-15 ENCOUNTER — HOSPITAL ENCOUNTER (OUTPATIENT)
Dept: RADIOLOGY | Facility: MEDICAL CENTER | Age: 77
End: 2022-02-15
Attending: NURSE PRACTITIONER
Payer: MEDICARE

## 2022-02-15 PROCEDURE — 72148 MRI LUMBAR SPINE W/O DYE: CPT

## 2022-02-19 DIAGNOSIS — E78.5 DYSLIPIDEMIA: ICD-10-CM

## 2022-02-23 RX ORDER — ATORVASTATIN CALCIUM 40 MG/1
40 TABLET, FILM COATED ORAL EVERY EVENING
Qty: 100 TABLET | Refills: 0 | Status: SHIPPED | OUTPATIENT
Start: 2022-02-23 | End: 2022-07-26

## 2022-03-18 ENCOUNTER — TELEPHONE (OUTPATIENT)
Dept: SCHEDULING | Facility: IMAGING CENTER | Age: 77
End: 2022-03-18

## 2022-03-18 NOTE — TELEPHONE ENCOUNTER
Outcome: Left Message     Please transfer to Patient Outreach Team at [phone] when patient returns call.

## 2022-04-06 ENCOUNTER — OFFICE VISIT (OUTPATIENT)
Dept: CARDIOLOGY | Facility: MEDICAL CENTER | Age: 77
End: 2022-04-06
Payer: MEDICARE

## 2022-04-06 VITALS
DIASTOLIC BLOOD PRESSURE: 74 MMHG | HEART RATE: 63 BPM | OXYGEN SATURATION: 95 % | WEIGHT: 249 LBS | SYSTOLIC BLOOD PRESSURE: 126 MMHG | HEIGHT: 68 IN | BODY MASS INDEX: 37.74 KG/M2 | RESPIRATION RATE: 14 BRPM

## 2022-04-06 DIAGNOSIS — E78.5 DYSLIPIDEMIA: ICD-10-CM

## 2022-04-06 DIAGNOSIS — I10 ESSENTIAL HYPERTENSION: ICD-10-CM

## 2022-04-06 DIAGNOSIS — I77.810 ASCENDING AORTA DILATION (HCC): ICD-10-CM

## 2022-04-06 PROCEDURE — 99213 OFFICE O/P EST LOW 20 MIN: CPT | Performed by: NURSE PRACTITIONER

## 2022-04-06 ASSESSMENT — ENCOUNTER SYMPTOMS
COUGH: 0
WHEEZING: 0
WEAKNESS: 0
ORTHOPNEA: 0
NAUSEA: 0
HEMOPTYSIS: 0
DIZZINESS: 0
SHORTNESS OF BREATH: 0
PND: 0
PALPITATIONS: 0
SPUTUM PRODUCTION: 0
NERVOUS/ANXIOUS: 0
VOMITING: 0
CLAUDICATION: 0

## 2022-04-06 ASSESSMENT — FIBROSIS 4 INDEX: FIB4 SCORE: 1.96

## 2022-05-19 ENCOUNTER — OFFICE VISIT (OUTPATIENT)
Dept: INTERNAL MEDICINE | Facility: OTHER | Age: 77
End: 2022-05-19
Payer: MEDICARE

## 2022-05-19 VITALS
SYSTOLIC BLOOD PRESSURE: 138 MMHG | DIASTOLIC BLOOD PRESSURE: 73 MMHG | BODY MASS INDEX: 37.74 KG/M2 | HEART RATE: 63 BPM | TEMPERATURE: 98.5 F | WEIGHT: 249 LBS | OXYGEN SATURATION: 94 % | HEIGHT: 68 IN

## 2022-05-19 DIAGNOSIS — M54.16 LUMBAR BACK PAIN WITH RADICULOPATHY AFFECTING LEFT LOWER EXTREMITY: ICD-10-CM

## 2022-05-19 DIAGNOSIS — I10 ESSENTIAL HYPERTENSION: ICD-10-CM

## 2022-05-19 DIAGNOSIS — R73.03 PREDIABETES: ICD-10-CM

## 2022-05-19 DIAGNOSIS — Z63.6 CAREGIVER BURDEN: ICD-10-CM

## 2022-05-19 PROBLEM — F43.9 STRESS: Status: RESOLVED | Noted: 2017-04-06 | Resolved: 2022-05-19

## 2022-05-19 PROBLEM — R74.8 ELEVATED ALKALINE PHOSPHATASE LEVEL: Status: RESOLVED | Noted: 2018-12-17 | Resolved: 2022-05-19

## 2022-05-19 PROBLEM — D64.9 ANEMIA: Status: RESOLVED | Noted: 2017-03-30 | Resolved: 2022-05-19

## 2022-05-19 PROBLEM — M25.511 CHRONIC RIGHT SHOULDER PAIN: Status: RESOLVED | Noted: 2017-04-06 | Resolved: 2022-05-19

## 2022-05-19 PROBLEM — M54.18 RADICULOPATHY OF SACRAL REGION: Status: RESOLVED | Noted: 2018-11-20 | Resolved: 2022-05-19

## 2022-05-19 PROBLEM — G89.29 CHRONIC RIGHT SHOULDER PAIN: Status: RESOLVED | Noted: 2017-04-06 | Resolved: 2022-05-19

## 2022-05-19 PROBLEM — M46.20 PARASPINAL ABSCESS (HCC): Status: RESOLVED | Noted: 2017-03-30 | Resolved: 2022-05-19

## 2022-05-19 PROBLEM — M54.59 INTRACTABLE LOW BACK PAIN: Status: RESOLVED | Noted: 2018-12-19 | Resolved: 2022-05-19

## 2022-05-19 PROBLEM — S29.9XXA CHEST WALL INJURY: Status: RESOLVED | Noted: 2018-05-04 | Resolved: 2022-05-19

## 2022-05-19 PROBLEM — R82.71 ASYMPTOMATIC BACTERIURIA: Status: RESOLVED | Noted: 2018-12-18 | Resolved: 2022-05-19

## 2022-05-19 PROCEDURE — 99214 OFFICE O/P EST MOD 30 MIN: CPT | Mod: GC | Performed by: STUDENT IN AN ORGANIZED HEALTH CARE EDUCATION/TRAINING PROGRAM

## 2022-05-19 RX ORDER — TAMSULOSIN HYDROCHLORIDE 0.4 MG/1
0.4 CAPSULE ORAL
COMMUNITY
Start: 2022-04-20 | End: 2023-08-21

## 2022-05-19 SDOH — SOCIAL STABILITY - SOCIAL INSECURITY: DEPENDENT RELATIVE NEEDING CARE AT HOME: Z63.6

## 2022-05-19 ASSESSMENT — ENCOUNTER SYMPTOMS
DIARRHEA: 0
SHORTNESS OF BREATH: 0
MYALGIAS: 0
NAUSEA: 0
DIZZINESS: 0
BACK PAIN: 1
PALPITATIONS: 0
ABDOMINAL PAIN: 0
WEAKNESS: 0
DOUBLE VISION: 0
COUGH: 0
VOMITING: 0
CONSTIPATION: 0
HEADACHES: 0
BLURRED VISION: 0
FOCAL WEAKNESS: 0
FEVER: 0
CHILLS: 0

## 2022-05-19 ASSESSMENT — FIBROSIS 4 INDEX: FIB4 SCORE: 1.96

## 2022-05-19 NOTE — PROGRESS NOTES
CC:   Chief Complaint   Patient presents with   • Follow-Up                                                                                                                                           HPI:   Angelo presents today with the following.    Contineus to have back pain, now worsened, in setting of having to do more activiteis in hleping spouse. Follows with pain medicine fredi, gets injections. [facility] to do nerve ablation this coming week. Anticipated to be office procedure opposed to same-day.    Has been feeling more stressors in taking care of spouse. Does have respite sitter on occasion.    There are no diagnoses linked to this encounter.    Patient Active Problem List    Diagnosis Date Noted   • Angina of effort (HCC) 01/19/2021   • Thoracic aortic aneurysm (TAA) ([facility]) 11/11/2019   • Lumbar back pain with radiculopathy affecting left lower extremity 12/19/2018   • BPH (benign prostatic hyperplasia) 12/17/2018   • Caregiver burden 04/25/2018   • Obesity (BMI 35.0-39.9 without comorbidity) 04/06/2017   • Ascending aorta dilation (CMS-HCC), mild, needs OP followup 03/30/2017   • Healthcare maintenance 06/08/2016   • Acquired hypothyroidism 06/08/2016   • Erectile dysfunction 06/08/2016   • History of skin cancer 06/08/2016   • Asthma 06/08/2016   • Seasonal allergies 06/08/2016   • GERD (gastroesophageal reflux disease) 06/08/2016   • Dyslipidemia 06/08/2016   • Essential hypertension 06/08/2016       Current Outpatient Medications   Medication Sig Dispense Refill   • tamsulosin (FLOMAX) 0.4 MG capsule Take 0.4 mg by mouth every day.     • atorvastatin (LIPITOR) 40 MG Tab Take 1 Tablet by mouth every evening. 100 Tablet 0   • fluticasone-salmeterol (ADVAIR) 100-50 MCG/DOSE AEROSOL POWDER, BREATH ACTIVATED ADVAIR DISKUS 100-50 MCG/DOSE AEPB     • omeprazole (PRILOSEC OTC) 20 MG tablet PRILOSEC OTC 20 MG TBEC     • lisinopril (PRINIVIL) 10 MG Tab LISINOPRIL 10 MG TABS 90 Tablet 3  "  • metoprolol SR (TOPROL XL) 25 MG TABLET SR 24 HR Take 0.5 Tablets by mouth every day. 45 Tablet 3   • amoxicillin (AMOXIL) 500 MG Cap      • aspirin (ASA) 81 MG Chew Tab chewable tablet Chew 1 Tab every day. 100 Tab 2   • gabapentin (NEURONTIN) 300 MG Cap Take 300 mg by mouth 4 times a day. Indications: twice a day  5   • acetaminophen (TYLENOL) 500 MG Tab Take 1,000 mg by mouth 2 Times a Day.     • levothyroxine (SYNTHROID) 50 MCG Tab TAKE 1 TABLET BY MOUTH EVERY DAY 90 Tab 1     No current facility-administered medications for this visit.         Allergies as of 05/19/2022   • (No Known Allergies)          /73 (BP Location: Right arm, Patient Position: Sitting, BP Cuff Size: Adult)   Pulse 63   Temp 36.9 °C (98.5 °F) (Temporal)   Ht 1.727 m (5' 8\")   Wt 113 kg (249 lb)   SpO2 94%   BMI 37.86 kg/m²     Review of Systems   Constitutional: Negative for chills and fever.   Eyes: Negative for blurred vision and double vision.   Respiratory: Negative for cough and shortness of breath.    Cardiovascular: Negative for chest pain and palpitations.   Gastrointestinal: Negative for abdominal pain, constipation, diarrhea, nausea and vomiting.   Genitourinary: Negative for dysuria, frequency and urgency.   Musculoskeletal: Positive for back pain. Negative for myalgias.   Neurological: Negative for dizziness, focal weakness, weakness and headaches.        Physical Exam:  Gen:  Alert and oriented, No apparent distress.  Neuro:  CN II-XII intact, no focal deficits  Lungs:  CTAB  CV:  RRR no m/g/r  Abd:  Soft, nontender, nondistended  Skin:  No acute rash/lesions  Ext:  Shoulder/elbow flexion/extension 5/5 bilaterally and symmetric; ambulates independently with steady gait      Assessment and Plan.   Angelo Magaña is a 77 y.o. male with the following issues:    Essential hypertension  Stable, BP within goals.  -CMP recheck  -lisinopril 10mg PO qD  -toprol XL 12.5mg PO qD  -Follow up 3 months    Lumbar back pain " with radiculopathy affecting left lower extremity  Continues to have low back pain. Follows with pain management.  -Follow up pain management as directed  -gabapentin 300mg PO QID (per pain)  -tylenol PO PRN    Caregiver burden  Exacerbation of stress related to spousal alzheimers. Pending discussion of placement in memory care unit.  -Follow up 3 months or sooner additional support, consideration of SW for further care coordination assistance

## 2022-05-20 NOTE — ASSESSMENT & PLAN NOTE
Continues to have low back pain. Follows with pain management.  -Follow up pain management as directed  -gabapentin 300mg PO QID (per pain)  -tylenol PO PRN

## 2022-05-20 NOTE — ASSESSMENT & PLAN NOTE
Stable, BP within goals.  -CMP recheck  -lisinopril 10mg PO qD  -toprol XL 12.5mg PO qD  -Follow up 3 months

## 2022-05-20 NOTE — ASSESSMENT & PLAN NOTE
Exacerbation of stress related to spousal alzheimers. Pending discussion of placement in memory care unit.  -Follow up 3 months or sooner additional support, consideration of SW for further care coordination assistance

## 2022-05-25 ENCOUNTER — APPOINTMENT (RX ONLY)
Dept: URBAN - METROPOLITAN AREA CLINIC 4 | Facility: CLINIC | Age: 77
Setting detail: DERMATOLOGY
End: 2022-05-25

## 2022-05-25 DIAGNOSIS — L81.4 OTHER MELANIN HYPERPIGMENTATION: ICD-10-CM

## 2022-05-25 DIAGNOSIS — D18.0 HEMANGIOMA: ICD-10-CM

## 2022-05-25 DIAGNOSIS — L82.1 OTHER SEBORRHEIC KERATOSIS: ICD-10-CM

## 2022-05-25 DIAGNOSIS — L57.8 OTHER SKIN CHANGES DUE TO CHRONIC EXPOSURE TO NONIONIZING RADIATION: ICD-10-CM

## 2022-05-25 DIAGNOSIS — D22 MELANOCYTIC NEVI: ICD-10-CM

## 2022-05-25 DIAGNOSIS — Z85.820 PERSONAL HISTORY OF MALIGNANT MELANOMA OF SKIN: ICD-10-CM

## 2022-05-25 PROBLEM — D48.5 NEOPLASM OF UNCERTAIN BEHAVIOR OF SKIN: Status: ACTIVE | Noted: 2022-05-25

## 2022-05-25 PROBLEM — D18.01 HEMANGIOMA OF SKIN AND SUBCUTANEOUS TISSUE: Status: ACTIVE | Noted: 2022-05-25

## 2022-05-25 PROBLEM — D22.5 MELANOCYTIC NEVI OF TRUNK: Status: ACTIVE | Noted: 2022-05-25

## 2022-05-25 PROCEDURE — ? BIOPSY BY SHAVE METHOD

## 2022-05-25 PROCEDURE — 99213 OFFICE O/P EST LOW 20 MIN: CPT | Mod: 25

## 2022-05-25 PROCEDURE — 69100 BIOPSY OF EXTERNAL EAR: CPT

## 2022-05-25 PROCEDURE — ? COUNSELING

## 2022-05-25 ASSESSMENT — LOCATION ZONE DERM
LOCATION ZONE: HAND
LOCATION ZONE: TRUNK
LOCATION ZONE: LEG
LOCATION ZONE: FACE
LOCATION ZONE: ARM

## 2022-05-25 ASSESSMENT — LOCATION SIMPLE DESCRIPTION DERM
LOCATION SIMPLE: LEFT LOWER EXTREMITY
LOCATION SIMPLE: LEFT CHEEK
LOCATION SIMPLE: RIGHT CHEEK
LOCATION SIMPLE: RIGHT HAND
LOCATION SIMPLE: RIGHT LOWER EXTREMITY
LOCATION SIMPLE: LEFT UPPER EXTREMITY
LOCATION SIMPLE: BACK
LOCATION SIMPLE: LEFT HAND
LOCATION SIMPLE: RIGHT UPPER EXTREMITY

## 2022-05-25 ASSESSMENT — LOCATION DETAILED DESCRIPTION DERM
LOCATION DETAILED: RIGHT UPPER BACK
LOCATION DETAILED: LEFT MID BACK
LOCATION DETAILED: LEFT DORSAL FOREARM
LOCATION DETAILED: LEFT ANTERIOR THIGH
LOCATION DETAILED: RIGHT DORSAL FOREARM
LOCATION DETAILED: LEFT UPPER BACK
LOCATION DETAILED: LEFT DORSAL HAND
LOCATION DETAILED: RIGHT CHEEK
LOCATION DETAILED: RIGHT DORSAL HAND
LOCATION DETAILED: LEFT CHEEK
LOCATION DETAILED: RIGHT ANTERIOR THIGH

## 2022-05-25 NOTE — HPI: EVALUATION OF SKIN LESION(S)
How Severe Are Your Spot(S)?: mild
Have Your Spot(S) Been Treated In The Past?: has not been treated
Hpi Title: Evaluation of Skin Lesions
Location: Lt.clavicle
Year Removed: 11/2021

## 2022-06-24 ENCOUNTER — TELEPHONE (OUTPATIENT)
Dept: INTERNAL MEDICINE | Facility: OTHER | Age: 77
End: 2022-06-24
Payer: MEDICARE

## 2022-06-24 RX ORDER — FLUTICASONE PROPIONATE AND SALMETEROL 100; 50 UG/1; UG/1
1 POWDER RESPIRATORY (INHALATION) 2 TIMES DAILY
Qty: 3 EACH | Refills: 3 | Status: SHIPPED | OUTPATIENT
Start: 2022-06-24 | End: 2022-12-14 | Stop reason: SDUPTHER

## 2022-06-24 NOTE — TELEPHONE ENCOUNTER
Pt called in requesting a refill for his Advair to be sent to [facility] pharmacy in [place] on [address]. He is out of it as of today.

## 2022-07-13 ENCOUNTER — APPOINTMENT (RX ONLY)
Dept: URBAN - METROPOLITAN AREA CLINIC 22 | Facility: CLINIC | Age: 77
Setting detail: DERMATOLOGY
End: 2022-07-13

## 2022-07-13 PROBLEM — C44.212 BASAL CELL CARCINOMA OF SKIN OF RIGHT EAR AND EXTERNAL AURICULAR CANAL: Status: ACTIVE | Noted: 2022-07-13

## 2022-07-13 PROCEDURE — 14061 TIS TRNFR E/N/E/L10.1-30SQCM: CPT

## 2022-07-13 PROCEDURE — 17312 MOHS ADDL STAGE: CPT

## 2022-07-13 PROCEDURE — ? MOHS SURGERY

## 2022-07-13 PROCEDURE — 17311 MOHS 1 STAGE H/N/HF/G: CPT

## 2022-07-13 NOTE — PROCEDURE: MIPS QUALITY
Quality 111:Pneumonia Vaccination Status For Older Adults: Pneumococcal vaccine administered on or after patient’s 60th birthday and before the end of the measurement period
Quality 226: Preventive Care And Screening: Tobacco Use: Screening And Cessation Intervention: Patient screened for tobacco use and is an ex/non-smoker
Quality 130: Documentation Of Current Medications In The Medical Record: Current Medications Documented
Quality 137: Melanoma: Continuity Of Care - Recall System: Patient information entered into a recall system that includes: target date for the next exam specified AND a process to follow up with patients regarding missed or unscheduled appointments
Detail Level: Detailed

## 2022-07-13 NOTE — HPI: PROCEDURE (MOHS)
Has The Growth Been Previously Biopsied?: has been previously biopsied
Year Removed: 11/2021
Location: Left clavicle

## 2022-07-13 NOTE — PROCEDURE: MOHS SURGERY
Mohs Case Number: [id number]
Previous Accession (Optional): [id number]
Biopsy Photograph Reviewed: Yes
Referring Physician (Optional): Edward Encinas MD
Consent Type: Consent 1 (Standard)
Eye Shield Used: No
Surgeon Performing Repair (Optional): Tuan Davidson M.D.
Initial Size Of Lesion: 0.5
Number Of Stages: 2
Primary Defect Length In Cm (Final Defect Size - Required For Flaps/Grafts): 1.6
Primary Defect Width In Cm (Final Defect Size - Required For Flaps/Grafts): 1.2
Repair Type: Flap
Oculoplastic Surgeon Procedure Text (A): After obtaining clear surgical margins the patient was sent to oculoplastics for surgical repair.  The patient understands they will receive post-surgical care and follow-up from the referring physician's office.
Otolaryngologist Procedure Text (A): After obtaining clear surgical margins the patient was sent to otolaryngology for surgical repair.  The patient understands they will receive post-surgical care and follow-up from the referring physician's office.
Plastic Surgeon Procedure Text (A): After obtaining clear surgical margins the patient was sent to plastics for surgical repair.  The patient understands they will receive post-surgical care and follow-up from the referring physician's office.
Mid-Level Procedure Text (A): After obtaining clear surgical margins the patient was sent to a mid-level provider for surgical repair.  The patient understands they will receive post-surgical care and follow-up from the mid-level provider.
Provider Procedure Text (A): After obtaining clear surgical margins the defect was repaired by another provider.
Asc Procedure Text (A): After obtaining clear surgical margins the patient was sent to an ASC for surgical repair.  The patient understands they will receive post-surgical care and follow-up from the ASC physician.
Simple / Intermediate / Complex Repair - Final Wound Length In Cm: 0
Suturegard Retention Suture: 2-0 Nylon
Retention Suture Bite Size: 3 mm
Length To Time In Minutes Device Was In Place: 10
Number Of Hemigard Strips Per Side: 1
Undermining Type: Entire Wound
Debridement Text: The wound edges were debrided prior to proceeding with the closure to facilitate wound healing.
Helical Rim Text: The closure involved the helical rim.
Vermilion Border Text: The closure involved the vermilion border.
Nostril Rim Text: The closure involved the nostril rim.
Retention Suture Text: Retention sutures were placed to support the closure and prevent dehiscence.
Flap Type: A-T Advancement Flap
Secondary Defect Length In Cm (Required For Flaps): 6.9
Secondary Defect Width In Cm (Required For Flaps): 1.3
Area H Indication Text: Tumors in this location are included in Area H (eyelids, eyebrows, nose, lips, chin, ear, pre-auricular, post-auricular, temple, genitalia, hands, feet, ankles and areola).  Tissue conservation is critical in these anatomic locations.
Area M Indication Text: Tumors in this location are included in Area M (cheek, forehead, scalp, neck, jawline and pretibial skin).  Mohs surgery is indicated for tumors in these anatomic locations.
Area L Indication Text: Tumors in this location are included in Area L (trunk and extremities).  Mohs surgery is indicated for larger tumors, or tumors with aggressive histologic features, in these anatomic locations.
Tumor Debulked?: curette
Depth Of Tumor Invasion (For Histology): epidermis
Perineural Invasion (For Histology - Be Specific If Possible): absent
Presence Of Scar Tissue (For Histology): present
Surgical Defect Length In Cm (Optional): 1.5
Surgical Defect Width In Cm (Optional): 1.1
Special Stains Stage 1 - Results: Base On Clearance Noted Above
Stage 2: Additional Anesthesia Type: 2% lidocaine with epinephrine and a 1:10 solution of 8.4% sodium bicarbonate
Stage 3: Additional Anesthesia Type: 1% lidocaine with epinephrine and a 1:10 solution of 8.4% sodium bicarbonate
Stage 4: Additional Anesthesia Type: 1% lidocaine with epinephrine
Include Tumor Staging In Mohs Note?: Please Select the Appropriate Response
Staging Info: By selecting yes to the question above you will include information on AJCC 8 tumor staging in your Mohs note. Information on tumor staging will be automatically added for SCCs on the head and neck. AJCC 8 includes tumor size, tumor depth, perineural involvement and bone invasion.
Tumor Depth: Less than 6mm from granular layer and no invasion beyond the subcutaneous fat
Medical Necessity Statement: Based on my medical judgement, Mohs surgery is the most appropriate treatment for this cancer compared to other treatments.
Alternatives Discussed Intro (Do Not Add Period): I discussed alternative treatments to Mohs surgery and specifically discussed the risks and benefits of
Consent 1/Introductory Paragraph: The rationale for Mohs was explained to the patient and consent was obtained. The risks, benefits and alternatives to therapy were discussed in detail. Specifically, the risks of infection, scarring, bleeding, prolonged wound healing, incomplete removal, allergy to anesthesia, nerve injury and recurrence were addressed. Prior to the procedure, the treatment site was clearly identified and confirmed by the patient. All components of Universal Protocol/PAUSE Rule completed.
Consent 2/Introductory Paragraph: Mohs surgery was explained to the patient and consent was obtained. The risks, benefits and alternatives to therapy were discussed in detail. Specifically, the risks of infection, scarring, bleeding, prolonged wound healing, incomplete removal, allergy to anesthesia, nerve injury and recurrence were addressed. Prior to the procedure, the treatment site was clearly identified and confirmed by the patient. All components of Universal Protocol/PAUSE Rule completed.
Consent 3/Introductory Paragraph: I gave the patient a chance to ask questions they had about the procedure.  Following this I explained the Mohs procedure and consent was obtained. The risks, benefits and alternatives to therapy were discussed in detail. Specifically, the risks of infection, scarring, bleeding, prolonged wound healing, incomplete removal, allergy to anesthesia, nerve injury and recurrence were addressed. Prior to the procedure, the treatment site was clearly identified and confirmed by the patient. All components of Universal Protocol/PAUSE Rule completed.
Consent (Temporal Branch)/Introductory Paragraph: The rationale for Mohs was explained to the patient and consent was obtained. The risks, benefits and alternatives to therapy were discussed in detail. Specifically, the risks of damage to the temporal branch of the facial nerve, infection, scarring, bleeding, prolonged wound healing, incomplete removal, allergy to anesthesia, and recurrence were addressed. Prior to the procedure, the treatment site was clearly identified and confirmed by the patient. All components of Universal Protocol/PAUSE Rule completed.
Consent (Marginal Mandibular)/Introductory Paragraph: The rationale for Mohs was explained to the patient and consent was obtained. The risks, benefits and alternatives to therapy were discussed in detail. Specifically, the risks of damage to the marginal mandibular branch of the facial nerve, infection, scarring, bleeding, prolonged wound healing, incomplete removal, allergy to anesthesia, and recurrence were addressed. Prior to the procedure, the treatment site was clearly identified and confirmed by the patient. All components of Universal Protocol/PAUSE Rule completed.
Consent (Spinal Accessory)/Introductory Paragraph: The rationale for Mohs was explained to the patient and consent was obtained. The risks, benefits and alternatives to therapy were discussed in detail. Specifically, the risks of damage to the spinal accessory nerve, infection, scarring, bleeding, prolonged wound healing, incomplete removal, allergy to anesthesia, and recurrence were addressed. Prior to the procedure, the treatment site was clearly identified and confirmed by the patient. All components of Universal Protocol/PAUSE Rule completed.
Consent (Near Eyelid Margin)/Introductory Paragraph: The rationale for Mohs was explained to the patient and consent was obtained. The risks, benefits and alternatives to therapy were discussed in detail. Specifically, the risks of ectropion or eyelid deformity, infection, scarring, bleeding, prolonged wound healing, incomplete removal, allergy to anesthesia, nerve injury and recurrence were addressed. Prior to the procedure, the treatment site was clearly identified and confirmed by the patient. All components of Universal Protocol/PAUSE Rule completed.
Consent (Ear)/Introductory Paragraph: The rationale for Mohs was explained to the patient and consent was obtained. The risks, benefits and alternatives to therapy were discussed in detail. Specifically, the risks of ear deformity, infection, scarring, bleeding, prolonged wound healing, incomplete removal, allergy to anesthesia, nerve injury and recurrence were addressed. Prior to the procedure, the treatment site was clearly identified and confirmed by the patient. All components of Universal Protocol/PAUSE Rule completed.
Consent (Nose)/Introductory Paragraph: The rationale for Mohs was explained to the patient and consent was obtained. The risks, benefits and alternatives to therapy were discussed in detail. Specifically, the risks of nasal deformity, changes in the flow of air through the nose, infection, scarring, bleeding, prolonged wound healing, incomplete removal, allergy to anesthesia, nerve injury and recurrence were addressed. Prior to the procedure, the treatment site was clearly identified and confirmed by the patient. All components of Universal Protocol/PAUSE Rule completed.
Consent (Lip)/Introductory Paragraph: The rationale for Mohs was explained to the patient and consent was obtained. The risks, benefits and alternatives to therapy were discussed in detail. Specifically, the risks of lip deformity, changes in the oral aperture, infection, scarring, bleeding, prolonged wound healing, incomplete removal, allergy to anesthesia, nerve injury and recurrence were addressed. Prior to the procedure, the treatment site was clearly identified and confirmed by the patient. All components of Universal Protocol/PAUSE Rule completed.
Consent (Scalp)/Introductory Paragraph: The rationale for Mohs was explained to the patient and consent was obtained. The risks, benefits and alternatives to therapy were discussed in detail. Specifically, the risks of changes in hair growth pattern secondary to repair, infection, scarring, bleeding, prolonged wound healing, incomplete removal, allergy to anesthesia, nerve injury and recurrence were addressed. Prior to the procedure, the treatment site was clearly identified and confirmed by the patient. All components of Universal Protocol/PAUSE Rule completed.
Detail Level: Detailed
Postop Diagnosis: same
Anesthesia Volume In Cc: 11.1
Additional Anesthesia Volume In Cc: 6
Hemostasis: Electrodesiccation
Estimated Blood Loss (Cc): minimal
Repair Anesthesia Method: local infiltration
Anesthesia Volume In Cc: 7
Repair Hemostasis (Optional): Electricator
Undermining Location (Optional): in the fascial plane
Brow Lift Text: A midfrontal incision was made medially to the defect to allow access to the tissues just superior to the left eyebrow. Following careful dissection inferiorly in a supraperiosteal plane to the level of the left eyebrow, several 3-0 monocryl sutures were used to resuspend the eyebrow orbicularis oculi muscular unit to the superior frontal bone periosteum. This resulted in an appropriate reapproximation of static eyebrow symmetry and correction of the left brow ptosis.
Deep Sutures: 4-0 Maxon
Epidermal Sutures: 5-0 Surgipro
Epidermal Closure: running cuticular
Suturegard Intro: Intraoperative tissue expansion was performed, utilizing the SUTUREGARD device, in order to reduce wound tension.
Suturegard Body: The suture ends were repeatedly re-tightened and re-clamped to achieve the desired tissue expansion.
Hemigard Intro: Due to skin fragility and wound tension, it was decided to use HEMIGARD adhesive retention suture devices to permit a linear closure. The skin was cleaned and dried for a 6cm distance away from the wound. Excessive hair, if present, was removed to allow for adhesion.
Hemigard Postcare Instructions: The HEMIGARD strips are to remain completely dry for at least 5-7 days.
Donor Site Anesthesia Type: same as repair anesthesia
Epidermal Closure Graft Donor Site (Optional): simple interrupted
Graft Donor Site Bandage (Optional-Leave Blank If You Don't Want In Note): Steri-strips and a pressure bandage were applied to the donor site.
Closure 2 Information: This tab is for additional flaps and grafts, including complex repair and grafts and complex repair and flaps. You can also specify a different location for the additional defect, if the location is the same you do not need to select a new one. We will insert the automated text for the repair you select below just as we do for solitary flaps and grafts. Please note that at this time if you select a location with a different insurance zone you will need to override the ICD10 and CPT if appropriate.
Closure 3 Information: This tab is for additional flaps and grafts above and beyond our usual structured repairs.  Please note if you enter information here it will not currently bill and you will need to add the billing information manually.
Wound Care: Petrolatum
Dressing: pressure dressing with telfa
Dressing (No Sutures): dry sterile dressing
Suture Removal: 14 days
Unna Boot Text: An Unna boot was placed to help immobilize the limb and facilitate more rapid healing.
Home Suture Removal Text: Patient was provided instructions on removing sutures and will remove their sutures at home.  If they have any questions or difficulties they will call the office.
Post-Care Instructions: I reviewed with the patient in detail post-care instructions. Patient is not to engage in any heavy lifting, exercise, or swimming for the next 14 days. Should the patient develop any fevers, chills, bleeding, severe pain patient will contact the office immediately.
Pain Refusal Text: I offered to prescribe pain medication but the patient refused to take this medication.
Mauc Instructions: By selecting yes to the question below the MAUC number will be added into the note.  This will be calculated automatically based on the diagnosis chosen, the size entered, the body zone selected (H,M,L) and the specific indications you chose. You will also have the option to override the Mohs AUC if you disagree with the automatically calculated number and this option is found in the Case Summary tab.
Where Do You Want The Question To Include Opioid Counseling Located?: Case Summary Tab
Eye Protection Verbiage: Before proceeding with the stage, a plastic scleral shield was inserted. The globe was anesthetized with a few drops of 1% lidocaine with 1:100,000 epinephrine. Then, an appropriate sized scleral shield was chosen and coated with lacrilube ointment. The shield was gently inserted and left in place for the duration of each stage. After the stage was completed, the shield was gently removed.
Mohs Method Verbiage: An incision at a 45 degree angle following the standard Mohs approach was done and the specimen was harvested as a microscopic controlled layer.
Surgeon/Pathologist Verbiage (Will Incorporate Name Of Surgeon From Intro If Not Blank): operated in two distinct and integrated capacities as the surgeon and pathologist.
Mohs Histo Method Verbiage: Each section was then chromacoded and processed in the Mohs lab using the Mohs protocol and submitted for frozen section.
Subsequent Stages Histo Method Verbiage: Using a similar technique to that described above, a thin layer of tissue was removed from all areas where tumor was visible on the previous stage.  The tissue was again oriented, mapped, dyed, and processed as above.
Mohs Rapid Report Verbiage: The area of clinically evident tumor was marked with skin marking ink and appropriately hatched.  The initial incision was made following the Mohs approach through the skin.  The specimen was taken to the lab, divided into the necessary number of pieces, chromacoded and processed according to the Mohs protocol.  This was repeated in successive stages until a tumor free defect was achieved.
Complex Repair Preamble Text (Leave Blank If You Do Not Want): Extensive wide undermining was performed.
Intermediate Repair Preamble Text (Leave Blank If You Do Not Want): Undermining was performed with blunt dissection.
Non-Graft Cartilage Fenestration Text: The cartilage was fenestrated with a 2mm punch biopsy to help facilitate healing.
Graft Cartilage Fenestration Text: The cartilage was fenestrated with a 2mm punch biopsy to help facilitate graft survival and healing.
Secondary Intention Text (Leave Blank If You Do Not Want): The defect will heal with secondary intention.
No Repair - Repaired With Adjacent Surgical Defect Text (Leave Blank If You Do Not Want): After obtaining clear surgical margins the defect was repaired concurrently with another surgical defect which was in close approximation.
Adjacent Tissue Transfer Text: The defect edges were debeveled with a #15 scalpel blade.  Given the location of the defect and the proximity to free margins an adjacent tissue transfer was deemed most appropriate.  Using a sterile surgical marker, an appropriate flap was drawn incorporating the defect and placing the expected incisions within the relaxed skin tension lines where possible.    The area thus outlined was incised deep to adipose tissue with a #15 scalpel blade.  The skin margins were undermined to an appropriate distance in all directions utilizing iris scissors.
Advancement Flap (Single) Text: The defect edges were debeveled with a #15 scalpel blade.  Given the location of the defect and the proximity to free margins a single advancement flap was deemed most appropriate.  Using a sterile surgical marker, an appropriate advancement flap was drawn incorporating the defect and placing the expected incisions within the relaxed skin tension lines where possible.    The area thus outlined was incised deep to adipose tissue with a #15 scalpel blade.  The skin margins were undermined to an appropriate distance in all directions utilizing iris scissors.
Advancement Flap (Double) Text: The defect edges were debeveled with a #15 scalpel blade.  Given the location of the defect and the proximity to free margins a double advancement flap was deemed most appropriate.  Using a sterile surgical marker, the appropriate advancement flaps were drawn incorporating the defect and placing the expected incisions within the relaxed skin tension lines where possible.    The area thus outlined was incised deep to adipose tissue with a #15 scalpel blade.  The skin margins were undermined to an appropriate distance in all directions utilizing iris scissors.
Burow's Advancement Flap Text: The defect edges were debeveled with a #15 scalpel blade.  Given the location of the defect and the proximity to free margins a Burow's advancement flap was deemed most appropriate.  Using a sterile surgical marker, the appropriate advancement flap was drawn incorporating the defect and placing the expected incisions within the relaxed skin tension lines where possible.    The area thus outlined was incised deep to adipose tissue with a #15 scalpel blade.  The skin margins were undermined to an appropriate distance in all directions utilizing iris scissors.
Chonodrocutaneous Helical Advancement Flap Text: The defect edges were debeveled with a #15 scalpel blade.  Given the location of the defect and the proximity to free margins a chondrocutaneous helical advancement flap was deemed most appropriate.  Using a sterile surgical marker, the appropriate advancement flap was drawn incorporating the defect and placing the expected incisions within the relaxed skin tension lines where possible.    The area thus outlined was incised deep to adipose tissue with a #15 scalpel blade.  The skin margins were undermined to an appropriate distance in all directions utilizing iris scissors.
Crescentic Advancement Flap Text: The defect edges were debeveled with a #15 scalpel blade.  Given the location of the defect and the proximity to free margins a crescentic advancement flap was deemed most appropriate.  Using a sterile surgical marker, the appropriate advancement flap was drawn incorporating the defect and placing the expected incisions within the relaxed skin tension lines where possible.    The area thus outlined was incised deep to adipose tissue with a #15 scalpel blade.  The skin margins were undermined to an appropriate distance in all directions utilizing iris scissors.
A-T Advancement Flap Text: The defect edges were debeveled with a #15 scalpel blade.  Given the location of the defect, shape of the defect and the proximity to free margins an A-T advancement flap was deemed most appropriate.  Using a sterile surgical marker, an appropriate advancement flap was drawn incorporating the defect and placing the expected incisions within the relaxed skin tension lines where possible.    The area thus outlined was incised deep to adipose tissue with a #15 scalpel blade.  The skin margins were undermined to an appropriate distance in all directions utilizing iris scissors.
O-T Advancement Flap Text: The defect edges were debeveled with a #15 scalpel blade.  Given the location of the defect, shape of the defect and the proximity to free margins an O-T advancement flap was deemed most appropriate.  Using a sterile surgical marker, an appropriate advancement flap was drawn incorporating the defect and placing the expected incisions within the relaxed skin tension lines where possible.    The area thus outlined was incised deep to adipose tissue with a #15 scalpel blade.  The skin margins were undermined to an appropriate distance in all directions utilizing iris scissors.
O-L Flap Text: The defect edges were debeveled with a #15 scalpel blade.  Given the location of the defect, shape of the defect and the proximity to free margins an O-L flap was deemed most appropriate.  Using a sterile surgical marker, an appropriate advancement flap was drawn incorporating the defect and placing the expected incisions within the relaxed skin tension lines where possible.    The area thus outlined was incised deep to adipose tissue with a #15 scalpel blade.  The skin margins were undermined to an appropriate distance in all directions utilizing iris scissors.
O-Z Flap Text: The defect edges were debeveled with a #15 scalpel blade.  Given the location of the defect, shape of the defect and the proximity to free margins an O-Z flap was deemed most appropriate.  Using a sterile surgical marker, an appropriate transposition flap was drawn incorporating the defect and placing the expected incisions within the relaxed skin tension lines where possible. The area thus outlined was incised deep to adipose tissue with a #15 scalpel blade.  The skin margins were undermined to an appropriate distance in all directions utilizing iris scissors.
Double O-Z Flap Text: The defect edges were debeveled with a #15 scalpel blade.  Given the location of the defect, shape of the defect and the proximity to free margins a Double O-Z flap was deemed most appropriate.  Using a sterile surgical marker, an appropriate transposition flap was drawn incorporating the defect and placing the expected incisions within the relaxed skin tension lines where possible. The area thus outlined was incised deep to adipose tissue with a #15 scalpel blade.  The skin margins were undermined to an appropriate distance in all directions utilizing iris scissors.
V-Y Flap Text: The defect edges were debeveled with a #15 scalpel blade.  Given the location of the defect, shape of the defect and the proximity to free margins a V-Y flap was deemed most appropriate.  Using a sterile surgical marker, an appropriate advancement flap was drawn incorporating the defect and placing the expected incisions within the relaxed skin tension lines where possible.    The area thus outlined was incised deep to adipose tissue with a #15 scalpel blade.  The skin margins were undermined to an appropriate distance in all directions utilizing iris scissors.
Advancement-Rotation Flap Text: The defect edges were debeveled with a #15 scalpel blade.  Given the location of the defect, shape of the defect and the proximity to free margins an advancement-rotation flap was deemed most appropriate.  Using a sterile surgical marker, an appropriate flap was drawn incorporating the defect and placing the expected incisions within the relaxed skin tension lines where possible. The area thus outlined was incised deep to adipose tissue with a #15 scalpel blade.  The skin margins were undermined to an appropriate distance in all directions utilizing iris scissors.
Mercedes Flap Text: The defect edges were debeveled with a #15 scalpel blade.  Given the location of the defect, shape of the defect and the proximity to free margins a Mercedes flap was deemed most appropriate.  Using a sterile surgical marker, an appropriate advancement flap was drawn incorporating the defect and placing the expected incisions within the relaxed skin tension lines where possible. The area thus outlined was incised deep to adipose tissue with a #15 scalpel blade.  The skin margins were undermined to an appropriate distance in all directions utilizing iris scissors.
Modified Advancement Flap Text: The defect edges were debeveled with a #15 scalpel blade.  Given the location of the defect, shape of the defect and the proximity to free margins a modified advancement flap was deemed most appropriate.  Using a sterile surgical marker, an appropriate advancement flap was drawn incorporating the defect and placing the expected incisions within the relaxed skin tension lines where possible.    The area thus outlined was incised deep to adipose tissue with a #15 scalpel blade.  The skin margins were undermined to an appropriate distance in all directions utilizing iris scissors.
Mucosal Advancement Flap Text: Given the location of the defect, shape of the defect and the proximity to free margins a mucosal advancement flap was deemed most appropriate. Incisions were made with a 15 blade scalpel in the appropriate fashion along the cutaneous vermilion border and the mucosal lip. The remaining actinically damaged mucosal tissue was excised.  The mucosal advancement flap was then elevated to the gingival sulcus with care taken to preserve the neurovascular structures and advanced into the primary defect. Care was taken to ensure that precise realignment of the vermilion border was achieved.
Peng Advancement Flap Text: The defect edges were debeveled with a #15 scalpel blade.  Given the location of the defect, shape of the defect and the proximity to free margins a Peng advancement flap was deemed most appropriate.  Using a sterile surgical marker, an appropriate advancement flap was drawn incorporating the defect and placing the expected incisions within the relaxed skin tension lines where possible. The area thus outlined was incised deep to adipose tissue with a #15 scalpel blade.  The skin margins were undermined to an appropriate distance in all directions utilizing iris scissors.
Hatchet Flap Text: The defect edges were debeveled with a #15 scalpel blade.  Given the location of the defect, shape of the defect and the proximity to free margins a hatchet flap was deemed most appropriate.  Using a sterile surgical marker, an appropriate hatchet flap was drawn incorporating the defect and placing the expected incisions within the relaxed skin tension lines where possible.    The area thus outlined was incised deep to adipose tissue with a #15 scalpel blade.  The skin margins were undermined to an appropriate distance in all directions utilizing iris scissors.
Rotation Flap Text: The defect edges were debeveled with a #15 scalpel blade.  Given the location of the defect, shape of the defect and the proximity to free margins a rotation flap was deemed most appropriate.  Using a sterile surgical marker, an appropriate rotation flap was drawn incorporating the defect and placing the expected incisions within the relaxed skin tension lines where possible.    The area thus outlined was incised deep to adipose tissue with a #15 scalpel blade.  The skin margins were undermined to an appropriate distance in all directions utilizing iris scissors.
Spiral Flap Text: The defect edges were debeveled with a #15 scalpel blade.  Given the location of the defect, shape of the defect and the proximity to free margins a spiral flap was deemed most appropriate.  Using a sterile surgical marker, an appropriate rotation flap was drawn incorporating the defect and placing the expected incisions within the relaxed skin tension lines where possible. The area thus outlined was incised deep to adipose tissue with a #15 scalpel blade.  The skin margins were undermined to an appropriate distance in all directions utilizing iris scissors.
Staged Advancement Flap Text: The defect edges were debeveled with a #15 scalpel blade.  Given the location of the defect, shape of the defect and the proximity to free margins a staged advancement flap was deemed most appropriate.  Using a sterile surgical marker, an appropriate advancement flap was drawn incorporating the defect and placing the expected incisions within the relaxed skin tension lines where possible. The area thus outlined was incised deep to adipose tissue with a #15 scalpel blade.  The skin margins were undermined to an appropriate distance in all directions utilizing iris scissors.
Star Wedge Flap Text: The defect edges were debeveled with a #15 scalpel blade.  Given the location of the defect, shape of the defect and the proximity to free margins a star wedge flap was deemed most appropriate.  Using a sterile surgical marker, an appropriate rotation flap was drawn incorporating the defect and placing the expected incisions within the relaxed skin tension lines where possible. The area thus outlined was incised deep to adipose tissue with a #15 scalpel blade.  The skin margins were undermined to an appropriate distance in all directions utilizing iris scissors.
Transposition Flap Text: The defect edges were debeveled with a #15 scalpel blade.  Given the location of the defect and the proximity to free margins a transposition flap was deemed most appropriate.  Using a sterile surgical marker, an appropriate transposition flap was drawn incorporating the defect.    The area thus outlined was incised deep to adipose tissue with a #15 scalpel blade.  The skin margins were undermined to an appropriate distance in all directions utilizing iris scissors.
Muscle Hinge Flap Text: The defect edges were debeveled with a #15 scalpel blade.  Given the size, depth and location of the defect and the proximity to free margins a muscle hinge flap was deemed most appropriate.  Using a sterile surgical marker, an appropriate hinge flap was drawn incorporating the defect. The area thus outlined was incised with a #15 scalpel blade.  The skin margins were undermined to an appropriate distance in all directions utilizing iris scissors.
Mustarde Flap Text: The defect edges were debeveled with a #15 scalpel blade.  Given the size, depth and location of the defect and the proximity to free margins a Mustarde flap was deemed most appropriate.  Using a sterile surgical marker, an appropriate flap was drawn incorporating the defect. The area thus outlined was incised with a #15 scalpel blade.  The skin margins were undermined to an appropriate distance in all directions utilizing iris scissors.
Nasal Turnover Hinge Flap Text: The defect edges were debeveled with a #15 scalpel blade.  Given the size, depth, location of the defect and the defect being full thickness a nasal turnover hinge flap was deemed most appropriate.  Using a sterile surgical marker, an appropriate hinge flap was drawn incorporating the defect. The area thus outlined was incised with a #15 scalpel blade. The flap was designed to recreate the nasal mucosal lining and the alar rim. The skin margins were undermined to an appropriate distance in all directions utilizing iris scissors.
Nasalis-Muscle-Based Myocutaneous Island Pedicle Flap Text: Using a #15 blade, an incision was made around the donor flap to the level of the nasalis muscle. Wide lateral undermining was then performed in both the subcutaneous plane above the nasalis muscle, and in a submuscular plane just above periosteum. This allowed the formation of a free nasalis muscle axial pedicle (based on the angular artery) which was still attached to the actual cutaneous flap, increasing its mobility and vascular viability. Hemostasis was obtained with pinpoint electrocoagulation. The flap was mobilized into position and the pivotal anchor points positioned and stabilized with buried interrupted sutures. Subcutaneous and dermal tissues were closed in a multilayered fashion with sutures. Tissue redundancies were excised, and the epidermal edges were apposed without significant tension and sutured with sutures.
Orbicularis Oris Muscle Flap Text: The defect edges were debeveled with a #15 scalpel blade.  Given that the defect affected the competency of the oral sphincter an orbicularis oris muscle flap was deemed most appropriate to restore this competency and normal muscle function.  Using a sterile surgical marker, an appropriate flap was drawn incorporating the defect. The area thus outlined was incised with a #15 scalpel blade.
Melolabial Transposition Flap Text: The defect edges were debeveled with a #15 scalpel blade.  Given the location of the defect and the proximity to free margins a melolabial flap was deemed most appropriate.  Using a sterile surgical marker, an appropriate melolabial transposition flap was drawn incorporating the defect.    The area thus outlined was incised deep to adipose tissue with a #15 scalpel blade.  The skin margins were undermined to an appropriate distance in all directions utilizing iris scissors.
Rhombic Flap Text: The defect edges were debeveled with a #15 scalpel blade.  Given the location of the defect and the proximity to free margins a rhombic flap was deemed most appropriate.  Using a sterile surgical marker, an appropriate rhombic flap was drawn incorporating the defect.    The area thus outlined was incised deep to adipose tissue with a #15 scalpel blade.  The skin margins were undermined to an appropriate distance in all directions utilizing iris scissors.
Rhomboid Transposition Flap Text: The defect edges were debeveled with a #15 scalpel blade.  Given the location of the defect and the proximity to free margins a rhomboid transposition flap was deemed most appropriate.  Using a sterile surgical marker, an appropriate rhomboid flap was drawn incorporating the defect.    The area thus outlined was incised deep to adipose tissue with a #15 scalpel blade.  The skin margins were undermined to an appropriate distance in all directions utilizing iris scissors.
Bi-Rhombic Flap Text: The defect edges were debeveled with a #15 scalpel blade.  Given the location of the defect and the proximity to free margins a bi-rhombic flap was deemed most appropriate.  Using a sterile surgical marker, an appropriate rhombic flap was drawn incorporating the defect. The area thus outlined was incised deep to adipose tissue with a #15 scalpel blade.  The skin margins were undermined to an appropriate distance in all directions utilizing iris scissors.
Helical Rim Advancement Flap Text: The defect edges were debeveled with a #15 blade scalpel.  Given the location of the defect and the proximity to free margins (helical rim) a double helical rim advancement flap was deemed most appropriate.  Using a sterile surgical marker, the appropriate advancement flaps were drawn incorporating the defect and placing the expected incisions between the helical rim and antihelix where possible.  The area thus outlined was incised through and through with a #15 scalpel blade.  With a skin hook and iris scissors, the flaps were gently and sharply undermined and freed up.
Bilateral Helical Rim Advancement Flap Text: The defect edges were debeveled with a #15 blade scalpel.  Given the location of the defect and the proximity to free margins (helical rim) a bilateral helical rim advancement flap was deemed most appropriate.  Using a sterile surgical marker, the appropriate advancement flaps were drawn incorporating the defect and placing the expected incisions between the helical rim and antihelix where possible.  The area thus outlined was incised through and through with a #15 scalpel blade.  With a skin hook and iris scissors, the flaps were gently and sharply undermined and freed up.
Ear Star Wedge Flap Text: The defect edges were debeveled with a #15 blade scalpel.  Given the location of the defect and the proximity to free margins (helical rim) an ear star wedge flap was deemed most appropriate.  Using a sterile surgical marker, the appropriate flap was drawn incorporating the defect and placing the expected incisions between the helical rim and antihelix where possible.  The area thus outlined was incised through and through with a #15 scalpel blade.
Banner Transposition Flap Text: The defect edges were debeveled with a #15 scalpel blade.  Given the location of the defect and the proximity to free margins a Banner transposition flap was deemed most appropriate.  Using a sterile surgical marker, an appropriate flap drawn around the defect. The area thus outlined was incised deep to adipose tissue with a #15 scalpel blade.  The skin margins were undermined to an appropriate distance in all directions utilizing iris scissors.
Bilobed Flap Text: The defect edges were debeveled with a #15 scalpel blade.  Given the location of the defect and the proximity to free margins a bilobe flap was deemed most appropriate.  Using a sterile surgical marker, an appropriate bilobe flap drawn around the defect.    The area thus outlined was incised deep to adipose tissue with a #15 scalpel blade.  The skin margins were undermined to an appropriate distance in all directions utilizing iris scissors.
Bilobed Transposition Flap Text: The defect edges were debeveled with a #15 scalpel blade.  Given the location of the defect and the proximity to free margins a bilobed transposition flap was deemed most appropriate.  Using a sterile surgical marker, an appropriate bilobe flap drawn around the defect.    The area thus outlined was incised deep to adipose tissue with a #15 scalpel blade.  The skin margins were undermined to an appropriate distance in all directions utilizing iris scissors.
Trilobed Flap Text: The defect edges were debeveled with a #15 scalpel blade.  Given the location of the defect and the proximity to free margins a trilobed flap was deemed most appropriate.  Using a sterile surgical marker, an appropriate trilobed flap drawn around the defect.    The area thus outlined was incised deep to adipose tissue with a #15 scalpel blade.  The skin margins were undermined to an appropriate distance in all directions utilizing iris scissors.
Dorsal Nasal Flap Text: The defect edges were debeveled with a #15 scalpel blade.  Given the location of the defect and the proximity to free margins a dorsal nasal flap was deemed most appropriate.  Using a sterile surgical marker, an appropriate dorsal nasal flap was drawn around the defect.    The area thus outlined was incised deep to adipose tissue with a #15 scalpel blade.  The skin margins were undermined to an appropriate distance in all directions utilizing iris scissors.
Island Pedicle Flap Text: The defect edges were debeveled with a #15 scalpel blade.  Given the location of the defect, shape of the defect and the proximity to free margins an island pedicle advancement flap was deemed most appropriate.  Using a sterile surgical marker, an appropriate advancement flap was drawn incorporating the defect, outlining the appropriate donor tissue and placing the expected incisions within the relaxed skin tension lines where possible.    The area thus outlined was incised deep to adipose tissue with a #15 scalpel blade.  The skin margins were undermined to an appropriate distance in all directions around the primary defect and laterally outward around the island pedicle utilizing iris scissors.  There was minimal undermining beneath the pedicle flap.
Island Pedicle Flap With Canthal Suspension Text: The defect edges were debeveled with a #15 scalpel blade.  Given the location of the defect, shape of the defect and the proximity to free margins an island pedicle advancement flap was deemed most appropriate.  Using a sterile surgical marker, an appropriate advancement flap was drawn incorporating the defect, outlining the appropriate donor tissue and placing the expected incisions within the relaxed skin tension lines where possible. The area thus outlined was incised deep to adipose tissue with a #15 scalpel blade.  The skin margins were undermined to an appropriate distance in all directions around the primary defect and laterally outward around the island pedicle utilizing iris scissors.  There was minimal undermining beneath the pedicle flap. A suspension suture was placed in the canthal tendon to prevent tension and prevent ectropion.
Alar Island Pedicle Flap Text: The defect edges were debeveled with a #15 scalpel blade.  Given the location of the defect, shape of the defect and the proximity to the alar rim an island pedicle advancement flap was deemed most appropriate.  Using a sterile surgical marker, an appropriate advancement flap was drawn incorporating the defect, outlining the appropriate donor tissue and placing the expected incisions within the nasal ala running parallel to the alar rim. The area thus outlined was incised with a #15 scalpel blade.  The skin margins were undermined minimally to an appropriate distance in all directions around the primary defect and laterally outward around the island pedicle utilizing iris scissors.  There was minimal undermining beneath the pedicle flap.
Double Island Pedicle Flap Text: The defect edges were debeveled with a #15 scalpel blade.  Given the location of the defect, shape of the defect and the proximity to free margins a double island pedicle advancement flap was deemed most appropriate.  Using a sterile surgical marker, an appropriate advancement flap was drawn incorporating the defect, outlining the appropriate donor tissue and placing the expected incisions within the relaxed skin tension lines where possible.    The area thus outlined was incised deep to adipose tissue with a #15 scalpel blade.  The skin margins were undermined to an appropriate distance in all directions around the primary defect and laterally outward around the island pedicle utilizing iris scissors.  There was minimal undermining beneath the pedicle flap.
Island Pedicle Flap-Requiring Vessel Identification Text: The defect edges were debeveled with a #15 scalpel blade.  Given the location of the defect, shape of the defect and the proximity to free margins an island pedicle advancement flap was deemed most appropriate.  Using a sterile surgical marker, an appropriate advancement flap was drawn, based on the axial vessel mentioned above, incorporating the defect, outlining the appropriate donor tissue and placing the expected incisions within the relaxed skin tension lines where possible.    The area thus outlined was incised deep to adipose tissue with a #15 scalpel blade.  The skin margins were undermined to an appropriate distance in all directions around the primary defect and laterally outward around the island pedicle utilizing iris scissors.  There was minimal undermining beneath the pedicle flap.
Keystone Flap Text: The defect edges were debeveled with a #15 scalpel blade.  Given the location of the defect, shape of the defect a keystone flap was deemed most appropriate.  Using a sterile surgical marker, an appropriate keystone flap was drawn incorporating the defect, outlining the appropriate donor tissue and placing the expected incisions within the relaxed skin tension lines where possible. The area thus outlined was incised deep to adipose tissue with a #15 scalpel blade.  The skin margins were undermined to an appropriate distance in all directions around the primary defect and laterally outward around the flap utilizing iris scissors.
O-T Plasty Text: The defect edges were debeveled with a #15 scalpel blade.  Given the location of the defect, shape of the defect and the proximity to free margins an O-T plasty was deemed most appropriate.  Using a sterile surgical marker, an appropriate O-T plasty was drawn incorporating the defect and placing the expected incisions within the relaxed skin tension lines where possible.    The area thus outlined was incised deep to adipose tissue with a #15 scalpel blade.  The skin margins were undermined to an appropriate distance in all directions utilizing iris scissors.
O-Z Plasty Text: The defect edges were debeveled with a #15 scalpel blade.  Given the location of the defect, shape of the defect and the proximity to free margins an O-Z plasty (double transposition flap) was deemed most appropriate.  Using a sterile surgical marker, the appropriate transposition flaps were drawn incorporating the defect and placing the expected incisions within the relaxed skin tension lines where possible.    The area thus outlined was incised deep to adipose tissue with a #15 scalpel blade.  The skin margins were undermined to an appropriate distance in all directions utilizing iris scissors.  Hemostasis was achieved with electrocautery.  The flaps were then transposed into place, one clockwise and the other counterclockwise, and anchored with interrupted buried subcutaneous sutures.
Double O-Z Plasty Text: The defect edges were debeveled with a #15 scalpel blade.  Given the location of the defect, shape of the defect and the proximity to free margins a Double O-Z plasty (double transposition flap) was deemed most appropriate.  Using a sterile surgical marker, the appropriate transposition flaps were drawn incorporating the defect and placing the expected incisions within the relaxed skin tension lines where possible. The area thus outlined was incised deep to adipose tissue with a #15 scalpel blade.  The skin margins were undermined to an appropriate distance in all directions utilizing iris scissors.  Hemostasis was achieved with electrocautery.  The flaps were then transposed into place, one clockwise and the other counterclockwise, and anchored with interrupted buried subcutaneous sutures.
V-Y Plasty Text: The defect edges were debeveled with a #15 scalpel blade.  Given the location of the defect, shape of the defect and the proximity to free margins an V-Y advancement flap was deemed most appropriate.  Using a sterile surgical marker, an appropriate advancement flap was drawn incorporating the defect and placing the expected incisions within the relaxed skin tension lines where possible.    The area thus outlined was incised deep to adipose tissue with a #15 scalpel blade.  The skin margins were undermined to an appropriate distance in all directions utilizing iris scissors.
H Plasty Text: Given the location of the defect, shape of the defect and the proximity to free margins a H-plasty was deemed most appropriate for repair.  Using a sterile surgical marker, the appropriate advancement arms of the H-plasty were drawn incorporating the defect and placing the expected incisions within the relaxed skin tension lines where possible. The area thus outlined was incised deep to adipose tissue with a #15 scalpel blade. The skin margins were undermined to an appropriate distance in all directions utilizing iris scissors.  The opposing advancement arms were then advanced into place in opposite direction and anchored with interrupted buried subcutaneous sutures.
W Plasty Text: The lesion was extirpated to the level of the fat with a #15 scalpel blade.  Given the location of the defect, shape of the defect and the proximity to free margins a W-plasty was deemed most appropriate for repair.  Using a sterile surgical marker, the appropriate transposition arms of the W-plasty were drawn incorporating the defect and placing the expected incisions within the relaxed skin tension lines where possible.    The area thus outlined was incised deep to adipose tissue with a #15 scalpel blade.  The skin margins were undermined to an appropriate distance in all directions utilizing iris scissors.  The opposing transposition arms were then transposed into place in opposite direction and anchored with interrupted buried subcutaneous sutures.
Z Plasty Text: The lesion was extirpated to the level of the fat with a #15 scalpel blade.  Given the location of the defect, shape of the defect and the proximity to free margins a Z-plasty was deemed most appropriate for repair.  Using a sterile surgical marker, the appropriate transposition arms of the Z-plasty were drawn incorporating the defect and placing the expected incisions within the relaxed skin tension lines where possible.    The area thus outlined was incised deep to adipose tissue with a #15 scalpel blade.  The skin margins were undermined to an appropriate distance in all directions utilizing iris scissors.  The opposing transposition arms were then transposed into place in opposite direction and anchored with interrupted buried subcutaneous sutures.
Zygomaticofacial Flap Text: Given the location of the defect, shape of the defect and the proximity to free margins a zygomaticofacial flap was deemed most appropriate for repair.  Using a sterile surgical marker, the appropriate flap was drawn incorporating the defect and placing the expected incisions within the relaxed skin tension lines where possible. The area thus outlined was incised deep to adipose tissue with a #15 scalpel blade with preservation of a vascular pedicle.  The skin margins were undermined to an appropriate distance in all directions utilizing iris scissors.  The flap was then placed into the defect and anchored with interrupted buried subcutaneous sutures.
Cheek Interpolation Flap Text: A decision was made to reconstruct the defect utilizing an interpolation axial flap and a staged reconstruction.  A telfa template was made of the defect.  This telfa template was then used to outline the Cheek Interpolation flap.  The donor area for the pedicle flap was then injected with anesthesia.  The flap was excised through the skin and subcutaneous tissue down to the layer of the underlying musculature.  The interpolation flap was carefully excised within this deep plane to maintain its blood supply.  The edges of the donor site were undermined.   The donor site was closed in a primary fashion.  The pedicle was then rotated into position and sutured.  Once the tube was sutured into place, adequate blood supply was confirmed with blanching and refill.  The pedicle was then wrapped with xeroform gauze and dressed appropriately with a telfa and gauze bandage to ensure continued blood supply and protect the attached pedicle.
Cheek-To-Nose Interpolation Flap Text: A decision was made to reconstruct the defect utilizing an interpolation axial flap and a staged reconstruction.  A telfa template was made of the defect.  This telfa template was then used to outline the Cheek-To-Nose Interpolation flap.  The donor area for the pedicle flap was then injected with anesthesia.  The flap was excised through the skin and subcutaneous tissue down to the layer of the underlying musculature.  The interpolation flap was carefully excised within this deep plane to maintain its blood supply.  The edges of the donor site were undermined.   The donor site was closed in a primary fashion.  The pedicle was then rotated into position and sutured.  Once the tube was sutured into place, adequate blood supply was confirmed with blanching and refill.  The pedicle was then wrapped with xeroform gauze and dressed appropriately with a telfa and gauze bandage to ensure continued blood supply and protect the attached pedicle.
Interpolation Flap Text: A decision was made to reconstruct the defect utilizing an interpolation axial flap and a staged reconstruction.  A telfa template was made of the defect.  This telfa template was then used to outline the interpolation flap.  The donor area for the pedicle flap was then injected with anesthesia.  The flap was excised through the skin and subcutaneous tissue down to the layer of the underlying musculature.  The interpolation flap was carefully excised within this deep plane to maintain its blood supply.  The edges of the donor site were undermined.   The donor site was closed in a primary fashion.  The pedicle was then rotated into position and sutured.  Once the tube was sutured into place, adequate blood supply was confirmed with blanching and refill.  The pedicle was then wrapped with xeroform gauze and dressed appropriately with a telfa and gauze bandage to ensure continued blood supply and protect the attached pedicle.
Melolabial Interpolation Flap Text: A decision was made to reconstruct the defect utilizing an interpolation axial flap and a staged reconstruction.  A telfa template was made of the defect.  This telfa template was then used to outline the melolabial interpolation flap.  The donor area for the pedicle flap was then injected with anesthesia.  The flap was excised through the skin and subcutaneous tissue down to the layer of the underlying musculature.  The pedicle flap was carefully excised within this deep plane to maintain its blood supply.  The edges of the donor site were undermined.   The donor site was closed in a primary fashion.  The pedicle was then rotated into position and sutured.  Once the tube was sutured into place, adequate blood supply was confirmed with blanching and refill.  The pedicle was then wrapped with xeroform gauze and dressed appropriately with a telfa and gauze bandage to ensure continued blood supply and protect the attached pedicle.
Mastoid Interpolation Flap Text: A decision was made to reconstruct the defect utilizing an interpolation axial flap and a staged reconstruction.  A telfa template was made of the defect.  This telfa template was then used to outline the mastoid interpolation flap.  The donor area for the pedicle flap was then injected with anesthesia.  The flap was excised through the skin and subcutaneous tissue down to the layer of the underlying musculature.  The pedicle flap was carefully excised within this deep plane to maintain its blood supply.  The edges of the donor site were undermined.   The donor site was closed in a primary fashion.  The pedicle was then rotated into position and sutured.  Once the tube was sutured into place, adequate blood supply was confirmed with blanching and refill.  The pedicle was then wrapped with xeroform gauze and dressed appropriately with a telfa and gauze bandage to ensure continued blood supply and protect the attached pedicle.
Posterior Auricular Interpolation Flap Text: A decision was made to reconstruct the defect utilizing an interpolation axial flap and a staged reconstruction.  A telfa template was made of the defect.  This telfa template was then used to outline the posterior auricular interpolation flap.  The donor area for the pedicle flap was then injected with anesthesia.  The flap was excised through the skin and subcutaneous tissue down to the layer of the underlying musculature.  The pedicle flap was carefully excised within this deep plane to maintain its blood supply.  The edges of the donor site were undermined.   The donor site was closed in a primary fashion.  The pedicle was then rotated into position and sutured.  Once the tube was sutured into place, adequate blood supply was confirmed with blanching and refill.  The pedicle was then wrapped with xeroform gauze and dressed appropriately with a telfa and gauze bandage to ensure continued blood supply and protect the attached pedicle.
Paramedian Forehead Flap Text: A decision was made to reconstruct the defect utilizing an interpolation axial flap and a staged reconstruction.  A telfa template was made of the defect.  This telfa template was then used to outline the paramedian forehead pedicle flap.  The donor area for the pedicle flap was then injected with anesthesia.  The flap was excised through the skin and subcutaneous tissue down to the layer of the underlying musculature.  The pedicle flap was carefully excised within this deep plane to maintain its blood supply.  The edges of the donor site were undermined.   The donor site was closed in a primary fashion.  The pedicle was then rotated into position and sutured.  Once the tube was sutured into place, adequate blood supply was confirmed with blanching and refill.  The pedicle was then wrapped with xeroform gauze and dressed appropriately with a telfa and gauze bandage to ensure continued blood supply and protect the attached pedicle.
Abbe Flap (Upper To Lower Lip) Text: The defect of the lower lip was assessed and measured.  Given the location and size of the defect, an Abbe flap was deemed most appropriate.  Using a sterile surgical marker, an appropriate Abbe flap was measured and drawn on the upper lip. Local anesthesia was then infiltrated.  A scalpel was then used to incise the upper lip through and through the skin, vermilion, muscle and mucosa, leaving the flap pedicled on the opposite side.  The flap was then rotated and transferred to the lower lip defect.  The flap was then sutured into place with a three layer technique, closing the orbicularis oris muscle layer with subcutaneous buried sutures, followed by a mucosal layer and an epidermal layer.
Abbe Flap (Lower To Upper Lip) Text: The defect of the upper lip was assessed and measured.  Given the location and size of the defect, an Abbe flap was deemed most appropriate.  Using a sterile surgical marker, an appropriate Abbe flap was measured and drawn on the lower lip. Local anesthesia was then infiltrated. A scalpel was then used to incise the upper lip through and through the skin, vermilion, muscle and mucosa, leaving the flap pedicled on the opposite side.  The flap was then rotated and transferred to the lower lip defect.  The flap was then sutured into place with a three layer technique, closing the orbicularis oris muscle layer with subcutaneous buried sutures, followed by a mucosal layer and an epidermal layer.
Estlander Flap (Upper To Lower Lip) Text: The defect of the lower lip was assessed and measured.  Given the location and size of the defect, an Estlander flap was deemed most appropriate.  Using a sterile surgical marker, an appropriate Estlander flap was measured and drawn on the upper lip. Local anesthesia was then infiltrated. A scalpel was then used to incise the lateral aspect of the flap, through skin, muscle and mucosa, leaving the flap pedicled medially.  The flap was then rotated and positioned to fill the lower lip defect.  The flap was then sutured into place with a three layer technique, closing the orbicularis oris muscle layer with subcutaneous buried sutures, followed by a mucosal layer and an epidermal layer.
Cheiloplasty (Less Than 50%) Text: A decision was made to reconstruct the defect with a  cheiloplasty.  The defect was undermined extensively.  Additional obicularis oris muscle was excised with a 15 blade scalpel.  The defect was converted into a full thickness wedge, of less than 50% of the vertical height of the lip, to facilite a better cosmetic result.  Small vessels were then tied off with 5-0 monocyrl. The obicularis oris, superficial fascia, adipose and dermis were then reapproximated.  After the deeper layers were approximated the epidermis was reapproximated with particular care given to realign the vermilion border.
Cheiloplasty (Complex) Text: A decision was made to reconstruct the defect with a  cheiloplasty.  The defect was undermined extensively.  Additional obicularis oris muscle was excised with a 15 blade scalpel.  The defect was converted into a full thickness wedge to facilite a better cosmetic result.  Small vessels were then tied off with 5-0 monocyrl. The obicularis oris, superficial fascia, adipose and dermis were then reapproximated.  After the deeper layers were approximated the epidermis was reapproximated with particular care given to realign the vermilion border.
Ear Wedge Repair Text: A wedge excision was completed by carrying down an excision through the full thickness of the ear and cartilage with an inward facing Burow's triangle. The wound was then closed in a layered fashion.
Full Thickness Lip Wedge Repair (Flap) Text: Given the location of the defect and the proximity to free margins a full thickness wedge repair was deemed most appropriate.  Using a sterile surgical marker, the appropriate repair was drawn incorporating the defect and placing the expected incisions perpendicular to the vermilion border.  The vermilion border was also meticulously outlined to ensure appropriate reapproximation during the repair.  The area thus outlined was incised through and through with a #15 scalpel blade.  The muscularis and dermis were reaproximated with deep sutures following hemostasis. Care was taken to realign the vermilion border before proceeding with the superficial closure.  Once the vermilion was realigned the superfical and mucosal closure was finished.
Ftsg Text: The defect edges were debeveled with a #15 scalpel blade.  Given the location of the defect, shape of the defect and the proximity to free margins a full thickness skin graft was deemed most appropriate.  Using a sterile surgical marker, the primary defect shape was transferred to the donor site. The area thus outlined was incised deep to adipose tissue with a #15 scalpel blade.  The harvested graft was then trimmed of adipose tissue until only dermis and epidermis was left.  The skin margins of the secondary defect were undermined to an appropriate distance in all directions utilizing iris scissors.  The secondary defect was closed with interrupted buried subcutaneous sutures.  The skin edges were then re-apposed with running  sutures.  The skin graft was then placed in the primary defect and oriented appropriately.
Split-Thickness Skin Graft Text: The defect edges were debeveled with a #15 scalpel blade.  Given the location of the defect, shape of the defect and the proximity to free margins a split thickness skin graft was deemed most appropriate.  Using a sterile surgical marker, the primary defect shape was transferred to the donor site. The split thickness graft was then harvested.  The skin graft was then placed in the primary defect and oriented appropriately.
Burow's Graft Text: The defect edges were debeveled with a #15 scalpel blade.  Given the location of the defect, shape of the defect, the proximity to free margins and the presence of a standing cone deformity a Burow's skin graft was deemed most appropriate. The standing cone was removed and this tissue was then trimmed to the shape of the primary defect. The adipose tissue was also removed until only dermis and epidermis were left.  The skin margins of the secondary defect were undermined to an appropriate distance in all directions utilizing iris scissors.  The secondary defect was closed with interrupted buried subcutaneous sutures.  The skin edges were then re-apposed with running  sutures.  The skin graft was then placed in the primary defect and oriented appropriately.
Cartilage Graft Text: The defect edges were debeveled with a #15 scalpel blade.  Given the location of the defect, shape of the defect, the fact the defect involved a full thickness cartilage defect a cartilage graft was deemed most appropriate.  An appropriate donor site was identified, cleansed, and anesthetized. The cartilage graft was then harvested and transferred to the recipient site, oriented appropriately and then sutured into place.  The secondary defect was then repaired using a primary closure.
Composite Graft Text: The defect edges were debeveled with a #15 scalpel blade.  Given the location of the defect, shape of the defect, the proximity to free margins and the fact the defect was full thickness a composite graft was deemed most appropriate.  The defect was outline and then transferred to the donor site.  A full thickness graft was then excised from the donor site. The graft was then placed in the primary defect, oriented appropriately and then sutured into place.  The secondary defect was then repaired using a primary closure.
Epidermal Autograft Text: The defect edges were debeveled with a #15 scalpel blade.  Given the location of the defect, shape of the defect and the proximity to free margins an epidermal autograft was deemed most appropriate.  Using a sterile surgical marker, the primary defect shape was transferred to the donor site. The epidermal graft was then harvested.  The skin graft was then placed in the primary defect and oriented appropriately.
Dermal Autograft Text: The defect edges were debeveled with a #15 scalpel blade.  Given the location of the defect, shape of the defect and the proximity to free margins a dermal autograft was deemed most appropriate.  Using a sterile surgical marker, the primary defect shape was transferred to the donor site. The area thus outlined was incised deep to adipose tissue with a #15 scalpel blade.  The harvested graft was then trimmed of adipose and epidermal tissue until only dermis was left.  The skin graft was then placed in the primary defect and oriented appropriately.
Skin Substitute Text: The defect edges were debeveled with a #15 scalpel blade.  Given the location of the defect, shape of the defect and the proximity to free margins a skin substitute graft was deemed most appropriate.  The graft material was trimmed to fit the size of the defect. The graft was then placed in the primary defect and oriented appropriately.
Tissue Cultured Epidermal Autograft Text: The defect edges were debeveled with a #15 scalpel blade.  Given the location of the defect, shape of the defect and the proximity to free margins a tissue cultured epidermal autograft was deemed most appropriate.  The graft was then trimmed to fit the size of the defect.  The graft was then placed in the primary defect and oriented appropriately.
Xenograft Text: The defect edges were debeveled with a #15 scalpel blade.  Given the location of the defect, shape of the defect and the proximity to free margins a xenograft was deemed most appropriate.  The graft was then trimmed to fit the size of the defect.  The graft was then placed in the primary defect and oriented appropriately.
Purse String (Simple) Text: Given the location of the defect and the characteristics of the surrounding skin a purse string closure was deemed most appropriate.  Undermining was performed circumfirentially around the surgical defect.  A purse string suture was then placed and tightened.
Purse String (Intermediate) Text: Given the location of the defect and the characteristics of the surrounding skin a purse string intermediate closure was deemed most appropriate.  Undermining was performed circumfirentially around the surgical defect.  A purse string suture was then placed and tightened.
Partial Purse String (Simple) Text: Given the location of the defect and the characteristics of the surrounding skin a simple purse string closure was deemed most appropriate.  Undermining was performed circumfirentially around the surgical defect.  A purse string suture was then placed and tightened. Wound tension only allowed a partial closure of the circular defect.
Partial Purse String (Intermediate) Text: Given the location of the defect and the characteristics of the surrounding skin an intermediate purse string closure was deemed most appropriate.  Undermining was performed circumfirentially around the surgical defect.  A purse string suture was then placed and tightened. Wound tension only allowed a partial closure of the circular defect.
Localized Dermabrasion With Wire Brush Text: The patient was draped in routine manner.  Localized dermabrasion using 3 x 17 mm wire brush was performed in routine manner to papillary dermis. This spot dermabrasion is being performed to complete skin cancer reconstruction. It also will eliminate the other sun damaged precancerous cells that are known to be part of the regional effect of a lifetime's worth of sun exposure. This localized dermabrasion is therapeutic and should not be considered cosmetic in any regard.
Tarsorrhaphy Text: A tarsorrhaphy was performed using Frost sutures.
Complex Repair And Flap Additional Text (Will Appearing After The Standard Complex Repair Text): The complex repair was not sufficient to completely close the primary defect. The remaining additional defect was repaired with the flap mentioned below.
Complex Repair And Graft Additional Text (Will Appearing After The Standard Complex Repair Text): The complex repair was not sufficient to completely close the primary defect. The remaining additional defect was repaired with the graft mentioned below.
Manual Repair Warning Statement: We plan on removing the manually selected variable below in favor of our much easier automatic structured text blocks found in the previous tab. We decided to do this to help make the flow better and give you the full power of structured data. Manual selection is never going to be ideal in our platform and I would encourage you to avoid using manual selection from this point on, especially since I will be sunsetting this feature. It is important that you do one of two things with the customized text below. First, you can save all of the text in a word file so you can have it for future reference. Second, transfer the text to the appropriate area in the Library tab. Lastly, if there is a flap or graft type which we do not have you need to let us know right away so I can add it in before the variable is hidden. No need to panic, we plan to give you roughly 6 months to make the change.
Same Histology In Subsequent Stages Text: The pattern and morphology of the tumor is as described in the first stage.
No Residual Tumor Seen Histology Text: There were no malignant cells seen in the sections examined.
Inflammation Suggestive Of Cancer Camouflage Histology Text: There was a dense lymphocytic infiltrate which prevented adequate histologic evaluation of adjacent structures.
Bcc Histology Text: There were numerous aggregates of basaloid cells.
Bcc Infiltrative Histology Text: There were numerous aggregates of basaloid cells demonstrating an infiltrative pattern.
Mart-1 - Positive Histology Text: MART-1 staining demonstrates areas of higher density and clustering of melanocytes with Pagetoid spread upwards within the epidermis. The surgical margins are positive for tumor cells.
Mart-1 - Negative Histology Text: MART-1 staining demonstrates a normal density and pattern of melanocytes along the dermal-epidermal junction. The surgical margins are negative for tumor cells.
Information: Selecting Yes will display possible errors in your note based on the variables you have selected. This validation is only offered as a suggestion for you. PLEASE NOTE THAT THE VALIDATION TEXT WILL BE REMOVED WHEN YOU FINALIZE YOUR NOTE. IF YOU WANT TO FAX A PRELIMINARY NOTE YOU WILL NEED TO TOGGLE THIS TO 'NO' IF YOU DO NOT WANT IT IN YOUR FAXED NOTE.

## 2022-07-28 ENCOUNTER — APPOINTMENT (RX ONLY)
Dept: URBAN - METROPOLITAN AREA CLINIC 22 | Facility: CLINIC | Age: 77
Setting detail: DERMATOLOGY
End: 2022-07-28

## 2022-07-28 DIAGNOSIS — Z48.817 ENCOUNTER FOR SURGICAL AFTERCARE FOLLOWING SURGERY ON THE SKIN AND SUBCUTANEOUS TISSUE: ICD-10-CM

## 2022-07-28 PROCEDURE — 99024 POSTOP FOLLOW-UP VISIT: CPT

## 2022-07-28 PROCEDURE — ? POST-OP WOUND EVALUATION

## 2022-07-28 ASSESSMENT — LOCATION ZONE DERM: LOCATION ZONE: EAR

## 2022-07-28 ASSESSMENT — LOCATION SIMPLE DESCRIPTION DERM: LOCATION SIMPLE: RIGHT EAR

## 2022-07-28 ASSESSMENT — LOCATION DETAILED DESCRIPTION DERM: LOCATION DETAILED: RIGHT SUPERIOR POSTERIOR HELIX

## 2022-07-28 NOTE — PROCEDURE: POST-OP WOUND EVALUATION
Detail Level: Detailed
Add 80203 Cpt? (Important Note: In 2017 The Use Of 70472 Is Being Tracked By Cms To Determine Future Global Period Reimbursement For Global Periods): yes
Wound Evaluated By (Optional): Tuan Davidson MD
Wound Diameter In Cm(Optional): 0
Wound Crusting?: clean
Sutures?: partially intact
Wound Edema?: mild
Wound Color?: pink
Wound Dehiscence?: dehiscence
Any New Or Residual Neoplasm?: No
Patient To Follow-Up With?: our clinic
Follow Up Units (Optional): 1
Follow Up Time Frame (Optional): months

## 2022-08-08 RX ORDER — LISINOPRIL 10 MG/1
TABLET ORAL
Qty: 90 TABLET | Refills: 3 | Status: SHIPPED | OUTPATIENT
Start: 2022-08-08 | End: 2022-09-16

## 2022-08-08 RX ORDER — LEVOTHYROXINE SODIUM 0.05 MG/1
50 TABLET ORAL
Qty: 90 TABLET | Refills: 3 | Status: SHIPPED | OUTPATIENT
Start: 2022-08-08 | End: 2023-07-27 | Stop reason: SDUPTHER

## 2022-08-08 NOTE — TELEPHONE ENCOUNTER
Last seen: 5/19/22 by Dr. chen   Next appt: none    Was the patient seen in the last year in this department? Yes   Does patient have an active prescription for medications requested? No   Received Request Via: Pharmacy

## 2022-08-10 NOTE — TELEPHONE ENCOUNTER
Is the patient due for a refill?  No    Was the patient seen the past year? Yes    Date of last office visit: 4/06/22    Does the patient have an upcoming appointment?  No    Provider to refill: -Filled by PCP-    Does the patients insurance require a 100 day supply?  Yes    lisinopril (PRINIVIL) 10 MG Tab 90 Tablet 3/3 8/8/2022     Sig: LISINOPRIL 10 MG TABS    Sent to pharmacy as: Lisinopril 10 MG Oral Tablet (PRINIVIL)    E-Prescribing Status: Receipt confirmed by pharmacy (8/8/2022  4:58 PM PDT)      Pharmacy    [id number] - [place], [place] - [address] [facility].     Order Information    Date Department Ordering/Authorizing   8/8/2022 [facility] INTERNAL MEDICINE Man Weir M.D.

## 2022-08-11 RX ORDER — LISINOPRIL 10 MG/1
TABLET ORAL
Qty: 90 TABLET | Refills: 3 | OUTPATIENT
Start: 2022-08-11

## 2022-08-31 ENCOUNTER — APPOINTMENT (RX ONLY)
Dept: URBAN - METROPOLITAN AREA CLINIC 22 | Facility: CLINIC | Age: 77
Setting detail: DERMATOLOGY
End: 2022-08-31

## 2022-08-31 DIAGNOSIS — Z48.817 ENCOUNTER FOR SURGICAL AFTERCARE FOLLOWING SURGERY ON THE SKIN AND SUBCUTANEOUS TISSUE: ICD-10-CM

## 2022-08-31 DIAGNOSIS — L71.8 OTHER ROSACEA: ICD-10-CM

## 2022-08-31 PROCEDURE — ? COUNSELING

## 2022-08-31 PROCEDURE — ? PRESCRIPTION

## 2022-08-31 PROCEDURE — ? POST-OP WOUND EVALUATION

## 2022-08-31 PROCEDURE — 99024 POSTOP FOLLOW-UP VISIT: CPT

## 2022-08-31 RX ORDER — MINOCYCLINE HYDROCHLORIDE 100 MG/1
CAPSULE ORAL QD
Qty: 60 | Refills: 3 | Status: ERX | COMMUNITY
Start: 2022-08-31

## 2022-08-31 RX ADMIN — MINOCYCLINE HYDROCHLORIDE: 100 CAPSULE ORAL at 00:00

## 2022-08-31 ASSESSMENT — LOCATION SIMPLE DESCRIPTION DERM
LOCATION SIMPLE: LEFT CHEEK
LOCATION SIMPLE: RIGHT EAR

## 2022-08-31 ASSESSMENT — LOCATION DETAILED DESCRIPTION DERM
LOCATION DETAILED: LEFT CENTRAL MALAR CHEEK
LOCATION DETAILED: RIGHT SUPERIOR POSTERIOR HELIX

## 2022-08-31 ASSESSMENT — LOCATION ZONE DERM
LOCATION ZONE: EAR
LOCATION ZONE: FACE

## 2022-08-31 NOTE — PROCEDURE: MIPS QUALITY
Quality 130: Documentation Of Current Medications In The Medical Record: Current Medications Documented
Quality 137: Melanoma: Continuity Of Care - Recall System: Patient information entered into a recall system that includes: target date for the next exam specified AND a process to follow up with patients regarding missed or unscheduled appointments
Detail Level: Detailed
Quality 226: Preventive Care And Screening: Tobacco Use: Screening And Cessation Intervention: Patient screened for tobacco use and is an ex/non-smoker
Quality 111:Pneumonia Vaccination Status For Older Adults: Pneumococcal vaccine administered on or after patient’s 60th birthday and before the end of the measurement period

## 2022-08-31 NOTE — PROCEDURE: POST-OP WOUND EVALUATION
Detail Level: Detailed
Add 13790 Cpt? (Important Note: In 2017 The Use Of 35459 Is Being Tracked By Cms To Determine Future Global Period Reimbursement For Global Periods): yes
Wound Evaluated By (Optional): Tuan Davidson MD
Wound Diameter In Cm(Optional): 0
Wound Crusting?: clean
Wound Color?: pink
Any New Or Residual Neoplasm?: No

## 2022-09-14 ENCOUNTER — HOSPITAL ENCOUNTER (OUTPATIENT)
Dept: RADIOLOGY | Facility: MEDICAL CENTER | Age: 77
End: 2022-09-14
Payer: MEDICARE

## 2022-09-14 ENCOUNTER — OFFICE VISIT (OUTPATIENT)
Dept: URGENT CARE | Facility: PHYSICIAN GROUP | Age: 77
End: 2022-09-14
Payer: MEDICARE

## 2022-09-14 VITALS
SYSTOLIC BLOOD PRESSURE: 98 MMHG | HEART RATE: 74 BPM | HEIGHT: 68 IN | WEIGHT: 223 LBS | BODY MASS INDEX: 33.8 KG/M2 | TEMPERATURE: 97.9 F | DIASTOLIC BLOOD PRESSURE: 54 MMHG | OXYGEN SATURATION: 96 % | RESPIRATION RATE: 16 BRPM

## 2022-09-14 DIAGNOSIS — K59.00 CONSTIPATION, UNSPECIFIED CONSTIPATION TYPE: ICD-10-CM

## 2022-09-14 PROCEDURE — 99214 OFFICE O/P EST MOD 30 MIN: CPT

## 2022-09-14 PROCEDURE — 74018 RADEX ABDOMEN 1 VIEW: CPT

## 2022-09-14 RX ORDER — MINOCYCLINE HYDROCHLORIDE 100 MG/1
100 CAPSULE ORAL 2 TIMES DAILY
COMMUNITY
End: 2023-08-21

## 2022-09-14 RX ORDER — DOCUSATE SODIUM 100 MG/1
100 CAPSULE, LIQUID FILLED ORAL 2 TIMES DAILY
Qty: 60 CAPSULE | Refills: 0 | Status: SHIPPED | OUTPATIENT
Start: 2022-09-14 | End: 2022-10-14

## 2022-09-14 ASSESSMENT — ENCOUNTER SYMPTOMS
FLANK PAIN: 0
CHILLS: 0
ABDOMINAL PAIN: 1
BLOOD IN STOOL: 0
DIARRHEA: 0
HEARTBURN: 0
VOMITING: 0
DIAPHORESIS: 0
WEIGHT LOSS: 0
NAUSEA: 1
MYALGIAS: 0
FEVER: 0
CONSTIPATION: 1

## 2022-09-14 ASSESSMENT — FIBROSIS 4 INDEX: FIB4 SCORE: 1.96

## 2022-09-14 NOTE — PROGRESS NOTES
Subjective:   Angelo Magaña is a 77 y.o. male who presents for Constipation (Onset 1.5 months/Having to do suppositories to purge bowels./), Headache (Onset 3 days), and Abdominal Pain (Onset 5 days)      HPI: This is a 77-year-old male who presents today with constipation.  This is a new problem.  Patient reports constipation for 5.5 weeks.  Patient reports taking Metamucil and over-the-counter laxatives for this.  Patient also reports increase in dietary fiber and oral hydration over the last 3 days.  He reports manual disimpaction every week for the last 5 weeks.  Patient reports last bowel movement last night that required manual disimpaction.  He further reports hard stool.  He reports generalized abdominal pain 6\10 that is described as pressure with associated distention.  Patient reports associated episodic nausea without emesis.  He does report history of gastric bypass surgery 40 years ago, previous hernia surgery x2.  He denies blood in stool, fevers, chills, body aches, history of bowel obstruction.      Review of Systems   Constitutional:  Negative for chills, diaphoresis, fever, malaise/fatigue and weight loss.   Gastrointestinal:  Positive for abdominal pain, constipation and nausea. Negative for blood in stool, diarrhea, heartburn, melena and vomiting.   Genitourinary:  Negative for dysuria, flank pain, frequency, hematuria and urgency.   Musculoskeletal:  Negative for myalgias.   All other systems reviewed and are negative.    Medications:    Current Outpatient Medications on File Prior to Visit   Medication Sig Dispense Refill    minocycline (MINOCIN) 100 MG Cap Take 100 mg by mouth 2 times a day.      lisinopril (PRINIVIL) 10 MG Tab LISINOPRIL 10 MG TABS 90 Tablet 3    levothyroxine (SYNTHROID) 50 MCG Tab Take 1 Tablet by mouth every day. 90 Tablet 3    atorvastatin (LIPITOR) 40 MG Tab TAKE 1 TABLET BY MOUTH EVERY DAY IN THE EVENING 100 Tablet 2    fluticasone-salmeterol (ADVAIR) 100-50 MCG/ACT  AEROSOL POWDER, BREATH ACTIVATED Inhale 1 Puff 2 times a day. 3 Each 3    tamsulosin (FLOMAX) 0.4 MG capsule Take 0.4 mg by mouth every day.      omeprazole (PRILOSEC OTC) 20 MG tablet PRILOSEC OTC 20 MG TBEC      metoprolol SR (TOPROL XL) 25 MG TABLET SR 24 HR Take 0.5 Tablets by mouth every day. 45 Tablet 3    aspirin (ASA) 81 MG Chew Tab chewable tablet Chew 1 Tab every day. 100 Tab 2    acetaminophen (TYLENOL) 500 MG Tab Take 1,000 mg by mouth 2 Times a Day.      amoxicillin (AMOXIL) 500 MG Cap  (Patient not taking: Reported on 9/14/2022)      gabapentin (NEURONTIN) 300 MG Cap Take 300 mg by mouth 4 times a day. Indications: twice a day (Patient not taking: Reported on 9/14/2022)  5     No current facility-administered medications on file prior to visit.        Allergies:   Patient has no known allergies.    Problem List:   Patient Active Problem List   Diagnosis    Healthcare maintenance    Acquired hypothyroidism    Erectile dysfunction    History of skin cancer    Asthma    Seasonal allergies    GERD (gastroesophageal reflux disease)    Dyslipidemia    Essential hypertension    Ascending aorta dilation (CMS-HCC), mild, needs OP followup    Obesity (BMI 35.0-39.9 without comorbidity)    Caregiver burden    BPH (benign prostatic hyperplasia)    Lumbar back pain with radiculopathy affecting left lower extremity    Thoracic aortic aneurysm (TAA) (HCC)    Angina of effort (HCC)        Surgical History:  Past Surgical History:   Procedure Laterality Date    LUMBAR FUSION O-ARM N/A 11/19/2018    Procedure: LUMBAR FUSION O-ARM-  POSTERIOR L2-3 TLIF,;  Surgeon: Brandon Fowler M.D.;  Location: SURGERY [facility];  Service: Neurosurgery    LUMBAR LAMINECTOMY DISKECTOMY N/A 11/19/2018    Procedure: LUMBAR LAMINECTOMY DISKECTOMY-  L2-3 LAMI;  Surgeon: Brandon Fowler M.D.;  Location: SURGERY [facility];  Service: Neurosurgery    HARDWARE REMOVAL NEURO N/A 11/19/2018    Procedure: HARDWARE REMOVAL NEURO-  L4-5,  L3 SCREW REPOSITIONING;  Surgeon: Brandon Fowler M.D.;  Location: SURGERY [facility];  Service: Neurosurgery    KYPHOPLASTY N/A 11/19/2018    Procedure: KYPHOPLASTY-  L2;  Surgeon: Brandon Fowler M.D.;  Location: SURGERY [facility];  Service: Neurosurgery    VENTRAL HERNIA REPAIR Left 5/3/2018    Procedure: VENTRAL HERNIA REPAIR FOR LUMBAR HERNIA/ LUNG HERNIA THROUGH CHEST WALL RECONSTRUCTION, MAJOR  ;  Surgeon: Phoenix Becker M.D.;  Location: SURGERY [facility];  Service: General    IRRIGATION & DEBRIDEMENT NEURO  3/30/2017    Procedure: IRRIGATION & DEBRIDEMENT NEURO-LUMBAR WOUND;  Surgeon: Josiah Chakraborty M.D.;  Location: SURGERY [facility];  Service:     FUSION, SPINE, LUMBAR, PLIF N/A 3/17/2017    Procedure: LUMBAR FUSION POSTERIOR L3-4 EXTENSION;  Surgeon: Josiah Chakraborty M.D.;  Location: SURGERY [facility];  Service:     LUMBAR LAMINECTOMY DISKECTOMY N/A 3/17/2017    Procedure: LUMBAR LAMINECTOMY DISKECTOMY L3 & REDO L4;  Surgeon: Josiah Chakraborty M.D.;  Location: SURGERY [facility];  Service:     HARDWARE REMOVAL NEURO N/A 3/17/2017    Procedure: HARDWARE REMOVAL NEURO L4-5;  Surgeon: Josiah Chakraborty M.D.;  Location: SURGERY [facility];  Service:     FUSION, SPINE, LUMBAR, PLIF  9/9/2009    Performed by JOSIAH CHAKRABORTY at SURGERY [facility]    LUMBAR LAMINECTOMY DISKECTOMY  9/9/2009    Performed by JOSIAH CHAKRABORTY at SURGERY [facility]    KNEE ARTHROPLASTY TOTAL  5/26/2009    Performed by NAREN DANIELSON at SURGERY [facility]    HIP ARTHROPLASTY TOTAL  1/21/2009    Performed by NAREN DANIELSON at SURGERY [facility]    INGUINAL HERNIA REPAIR  9/2/08    Performed by PHOENIX BECKER at SURGERY SAME DAY [facility]    INGUINAL HERNIA REPAIR  6/10/08    Performed by PHOENIX BECKER at SURGERY SAME DAY [facility]    HERNIA REPAIR  2008    HERNIA     ROMAN BY LAPAROSCOPY  1982    APPENDECTOMY  1953    TONSILLECTOMY  1951    OTHER      DISCECTOMY WITH HARWWARE  "   OTHER      GASTRIC BYPASS AT 30 YEARS       Past Social Hx:   Social History     Tobacco Use    Smoking status: Never    Smokeless tobacco: Never   Vaping Use    Vaping Use: Never used   Substance Use Topics    Alcohol use: Yes     Comment: 1/week     Drug use: No     Comment: marijuana edibles (for pain control)          Problem list, medications, and allergies reviewed by myself today in Epic.     Objective:     BP (!) 98/54 (BP Location: Right arm, Patient Position: Sitting, BP Cuff Size: Large adult)   Pulse 74   Temp 36.6 °C (97.9 °F) (Temporal)   Resp 16   Ht 1.727 m (5' 8\")   Wt 101 kg (223 lb)   SpO2 96%   BMI 33.91 kg/m²     Physical Exam  Vitals and nursing note reviewed.   Constitutional:       General: He is awake. He is not in acute distress.     Appearance: Normal appearance. He is well-developed and normal weight. He is not ill-appearing, toxic-appearing or diaphoretic.   HENT:      Head: Normocephalic and atraumatic.      Mouth/Throat:      Mouth: Mucous membranes are moist.      Pharynx: Oropharynx is clear. No oropharyngeal exudate or posterior oropharyngeal erythema.   Cardiovascular:      Rate and Rhythm: Normal rate and regular rhythm.      Pulses: Normal pulses.      Heart sounds: Normal heart sounds. No murmur heard.    No friction rub. No gallop.   Pulmonary:      Effort: Pulmonary effort is normal. No respiratory distress.      Breath sounds: Normal breath sounds. No stridor. No wheezing, rhonchi or rales.   Chest:      Chest wall: No tenderness.   Abdominal:      General: Abdomen is flat. Bowel sounds are normal. There is no distension.      Palpations: Abdomen is soft. There is no mass.      Tenderness: There is abdominal tenderness. There is no right CVA tenderness, left CVA tenderness, guarding or rebound.      Hernia: No hernia is present.   Musculoskeletal:      Cervical back: Normal range of motion and neck supple. No tenderness.   Lymphadenopathy:      Cervical: No cervical " adenopathy.   Skin:     General: Skin is warm and dry.      Capillary Refill: Capillary refill takes less than 2 seconds.   Neurological:      General: No focal deficit present.      Mental Status: He is alert and oriented to person, place, and time. Mental status is at baseline.      Cranial Nerves: No cranial nerve deficit.      Motor: No weakness.      Gait: Gait normal.   Psychiatric:         Mood and Affect: Mood normal.         Behavior: Behavior normal. Behavior is cooperative.         Thought Content: Thought content normal.         Judgment: Judgment normal.       Assessment/Plan:     Diagnosis and associated orders:   1. Constipation, unspecified constipation type  [id number] VIEW    docusate sodium (COLACE) 100 MG Cap      DX abdomen FINDINGS:  AP view of the abdomen demonstrates a nonobstructive bowel gas pattern. There is a small amount of scattered colonic stool. Multiple surgical clips project over the upper abdomen. Stimulator generator projects over the right abdomen extending superiorly.   Prior lumbar fusion and decompression is noted. A right hip arthroplasty is partially visualized.  IMPRESSION:  1.  No acute findings.  2.  Small amount of colonic stool.      Comments/MDM:   Pt is clinically stable at today's acute urgent care visit.  No acute distress noted. Appropriate for outpatient management at this time.     Acute problem.  Dx abdomen shows small amount of scattered colonic stool. I have advised patient to continue use of MiraLAX for acute episodes of constipation.  I have discussed dietary changes to include regular high-fiber and oral hydration to help with this.  Patient will be started on Colace 100 mg twice daily.  Advised patient to follow-up with PCP regarding this.  Patient is to return for any new or worsening signs or symptoms.  Patient agreeable and verbalizes good understanding today.           Discussed DDx, management options (risks,benefits, and alternatives to planned  treatment), natural progression and supportive care.  Expressed understanding and the treatment plan was agreed upon. Questions were encouraged and answered   Return to urgent care prn if new or worsening sx or if there is no improvement in condition prn.    Educated in Red flags and indications to immediately call 911 or present to the Emergency Department.   Advised the patient to follow-up with the primary care physician for recheck, reevaluation, and consideration of further management.    I personally reviewed prior external notes and test results pertinent to today's visit.  I have independently reviewed and interpreted all diagnostics ordered during this urgent care acute visit.   Time spent evaluating this patient was at least 31 minutes and includes preparing for visit, counseling/education, exam and evaluation, obtaining history, independent interpretation, ordering lab/test/procedures,medication management and documentation.Time does not include separately billable procedures noted .       Please note that this dictation was created using voice recognition software. I have made a reasonable attempt to correct obvious errors, but I expect that there are errors of grammar and possibly content that I did not discover before finalizing the note.    This note was electronically signed by KWESI Oviedo

## 2022-09-16 ENCOUNTER — OFFICE VISIT (OUTPATIENT)
Dept: INTERNAL MEDICINE | Facility: OTHER | Age: 77
End: 2022-09-16
Payer: MEDICARE

## 2022-09-16 VITALS
DIASTOLIC BLOOD PRESSURE: 60 MMHG | BODY MASS INDEX: 33.65 KG/M2 | TEMPERATURE: 98.2 F | SYSTOLIC BLOOD PRESSURE: 91 MMHG | WEIGHT: 222 LBS | HEART RATE: 84 BPM | HEIGHT: 68 IN | OXYGEN SATURATION: 96 %

## 2022-09-16 DIAGNOSIS — K59.09 OTHER CONSTIPATION: ICD-10-CM

## 2022-09-16 DIAGNOSIS — E03.9 ACQUIRED HYPOTHYROIDISM: ICD-10-CM

## 2022-09-16 DIAGNOSIS — I10 ESSENTIAL HYPERTENSION: ICD-10-CM

## 2022-09-16 PROCEDURE — 99214 OFFICE O/P EST MOD 30 MIN: CPT | Mod: GC | Performed by: STUDENT IN AN ORGANIZED HEALTH CARE EDUCATION/TRAINING PROGRAM

## 2022-09-16 RX ORDER — FLUTICASONE PROPIONATE AND SALMETEROL 100; 50 UG/1; UG/1
POWDER RESPIRATORY (INHALATION)
COMMUNITY
End: 2023-04-04

## 2022-09-16 RX ORDER — POLYETHYLENE GLYCOL 3350 17 G/17G
17 POWDER, FOR SOLUTION ORAL 2 TIMES DAILY
Qty: 850 G | Refills: 2 | Status: SHIPPED | OUTPATIENT
Start: 2022-09-16 | End: 2023-04-04

## 2022-09-16 ASSESSMENT — ENCOUNTER SYMPTOMS
HEADACHES: 0
CHILLS: 0
SHORTNESS OF BREATH: 0
TINGLING: 0
WEAKNESS: 0
BLOOD IN STOOL: 0
COUGH: 0
DOUBLE VISION: 0
DIZZINESS: 0
CONSTIPATION: 1
BLURRED VISION: 0
DIARRHEA: 0
PALPITATIONS: 0
FEVER: 0
NECK PAIN: 0
VOMITING: 0
BACK PAIN: 1
ABDOMINAL PAIN: 0
NAUSEA: 0

## 2022-09-16 ASSESSMENT — FIBROSIS 4 INDEX: FIB4 SCORE: 1.96

## 2022-09-16 NOTE — PATIENT INSTRUCTIONS
If no bowel movement by Monday morning, call me in office around 9am [phone], extension [phone], ask for my assistant Concetta.    Twice daily colace  Twice daily miralax  ONCE daily milk of magnesia    Get thyroid lab done in the next few weeks.

## 2022-09-16 NOTE — ASSESSMENT & PLAN NOTE
Constipation episodes lasting approximately 7 days between BM. Has had to do self disimpaction, with last BM 3 days prior to appointment.  -miralax BID  -docusate 100mg PO BID  -OTC milk of magnesia  -Follow up call if no improvement by Monday (3 days from current appointment)  -consider GI for flex sig if continues to have difficulty with BM beyond initial attempts to improve symptoms

## 2022-09-16 NOTE — PROGRESS NOTES
CC:   Chief Complaint   Patient presents with    Follow-Up     Trouble with bowel movements                                                                                                                                            HPI:   Angelo presents today with the following.    Since transitioning Emily to memory care, increased difficulty with the transition. Able to continue to visit, do some socialising with friends, movies/dinner.    Has been having consitpation, lasting up to a week at a time. Has had to do self disimpaction to relieve. Has been trialling docusate, miralax, metamucil; despite this continues to have difficulty passing stool. Last BM was 3 days prior to appointment.        Patient Active Problem List    Diagnosis Date Noted    Other constipation 09/16/2022    Angina of effort (HCC) 01/19/2021    Thoracic aortic aneurysm (TAA) (HCC) 11/11/2019    Lumbar back pain with radiculopathy affecting left lower extremity 12/19/2018    BPH (benign prostatic hyperplasia) 12/17/2018    Caregiver burden 04/25/2018    Obesity (BMI 35.0-39.9 without comorbidity) 04/06/2017    Ascending aorta dilation (CMS-HCC), mild, needs OP followup 03/30/2017    Acquired hypothyroidism 06/08/2016    Erectile dysfunction 06/08/2016    History of skin cancer 06/08/2016    Asthma 06/08/2016    Seasonal allergies 06/08/2016    GERD (gastroesophageal reflux disease) 06/08/2016    Dyslipidemia 06/08/2016    Essential hypertension 06/08/2016       Current Outpatient Medications   Medication Sig Dispense Refill    fluticasone-salmeterol (ADVAIR) 100-50 MCG/ACT AEROSOL POWDER, BREATH ACTIVATED Advair Diskus 100 mcg-50 mcg/dose powder for inhalation   INHALE 1 PUFF BY MOUTH 2 TIMES A DAY      polyethylene glycol 3350 (MIRALAX) 17 GM/SCOOP Powder Take 17 g by mouth 2 times a day. 850 g 2    minocycline (MINOCIN) 100 MG Cap Take 100 mg by mouth 2 times a day.      docusate sodium (COLACE) 100 MG Cap Take 1 Capsule by mouth 2  "times a day for 30 days. 60 Capsule 0    levothyroxine (SYNTHROID) 50 MCG Tab Take 1 Tablet by mouth every day. 90 Tablet 3    atorvastatin (LIPITOR) 40 MG Tab TAKE 1 TABLET BY MOUTH EVERY DAY IN THE EVENING 100 Tablet 2    fluticasone-salmeterol (ADVAIR) 100-50 MCG/ACT AEROSOL POWDER, BREATH ACTIVATED Inhale 1 Puff 2 times a day. 3 Each 3    tamsulosin (FLOMAX) 0.4 MG capsule Take 0.4 mg by mouth every day.      omeprazole (PRILOSEC OTC) 20 MG tablet PRILOSEC OTC 20 MG TBEC      metoprolol SR (TOPROL XL) 25 MG TABLET SR 24 HR Take 0.5 Tablets by mouth every day. 45 Tablet 3    aspirin (ASA) 81 MG Chew Tab chewable tablet Chew 1 Tab every day. 100 Tab 2    gabapentin (NEURONTIN) 300 MG Cap Take 300 mg by mouth 4 times a day. Indications: twice a day  5    acetaminophen (TYLENOL) 500 MG Tab Take 1,000 mg by mouth 2 Times a Day.       No current facility-administered medications for this visit.         Allergies as of 09/16/2022    (No Known Allergies)          BP (!) 91/60 (BP Location: Left arm, Patient Position: Sitting, BP Cuff Size: Adult)   Pulse 84   Temp 36.8 °C (98.2 °F) (Temporal)   Ht 1.727 m (5' 8\")   Wt 101 kg (222 lb)   SpO2 96%   BMI 33.75 kg/m²     Review of Systems   Constitutional:  Negative for chills and fever.   Eyes:  Negative for blurred vision and double vision.   Respiratory:  Negative for cough and shortness of breath.    Cardiovascular:  Negative for chest pain, palpitations and leg swelling.   Gastrointestinal:  Positive for constipation. Negative for abdominal pain, blood in stool, diarrhea, nausea and vomiting.   Genitourinary:  Negative for dysuria, frequency and urgency.   Musculoskeletal:  Positive for back pain. Negative for joint pain and neck pain.   Neurological:  Negative for dizziness, tingling, weakness and headaches.      Physical Exam:  Gen:  Alert and oriented, No apparent distress.  Neuro:  CN II-XII intact, no focal deficits  Lungs:  CTAB  CV:  RRR no " m/g/r  Abd:  Soft, nontender, nondistended   Skin:  No acute rash/lesions   Ext:  Shoulder/elbow flexion/extension 5/5 bilaterally and symmetric; ambulates independently gait      Assessment and Plan.   Angelo Magaña is a 77 y.o. male with the following issues:    Other constipation  Constipation episodes lasting approximately 7 days between BM. Has had to do self disimpaction, with last BM 3 days prior to appointment.  -miralax BID  -docusate 100mg PO BID  -OTC milk of magnesia  -Follow up call if no improvement by Monday (3 days from current appointment)  -consider GI for flex sig if continues to have difficulty with BM beyond initial attempts to improve symptoms    Essential hypertension  Asymptomatic hypotension in clinic.  -STOP lisinopril 10mg PO qD  -Toprol XL 12.5mg PO qD    Acquired hypothyroidism  Hypothyroid in past, borderline.  -TSH recheck  -synthroid 50mcg PO qAM       Follow up in 3 days for constipation relief, otherwise, regular follow up in 1-3 months for asymptomatic hypotension

## 2022-09-17 ENCOUNTER — HOSPITAL ENCOUNTER (OUTPATIENT)
Dept: LAB | Facility: MEDICAL CENTER | Age: 77
End: 2022-09-17
Attending: STUDENT IN AN ORGANIZED HEALTH CARE EDUCATION/TRAINING PROGRAM
Payer: MEDICARE

## 2022-09-17 DIAGNOSIS — E03.9 ACQUIRED HYPOTHYROIDISM: ICD-10-CM

## 2022-09-17 LAB — TSH SERPL DL<=0.005 MIU/L-ACNC: 2.61 UIU/ML (ref 0.38–5.33)

## 2022-09-17 PROCEDURE — 36415 COLL VENOUS BLD VENIPUNCTURE: CPT

## 2022-09-17 PROCEDURE — 84443 ASSAY THYROID STIM HORMONE: CPT

## 2022-09-21 ENCOUNTER — APPOINTMENT (RX ONLY)
Dept: URBAN - METROPOLITAN AREA CLINIC 4 | Facility: CLINIC | Age: 77
Setting detail: DERMATOLOGY
End: 2022-09-21

## 2022-09-21 DIAGNOSIS — D18.0 HEMANGIOMA: ICD-10-CM

## 2022-09-21 DIAGNOSIS — L57.8 OTHER SKIN CHANGES DUE TO CHRONIC EXPOSURE TO NONIONIZING RADIATION: ICD-10-CM

## 2022-09-21 DIAGNOSIS — L82.1 OTHER SEBORRHEIC KERATOSIS: ICD-10-CM

## 2022-09-21 DIAGNOSIS — L81.4 OTHER MELANIN HYPERPIGMENTATION: ICD-10-CM

## 2022-09-21 DIAGNOSIS — Z85.820 PERSONAL HISTORY OF MALIGNANT MELANOMA OF SKIN: ICD-10-CM

## 2022-09-21 DIAGNOSIS — D22 MELANOCYTIC NEVI: ICD-10-CM

## 2022-09-21 PROBLEM — D22.5 MELANOCYTIC NEVI OF TRUNK: Status: ACTIVE | Noted: 2022-09-21

## 2022-09-21 PROBLEM — D18.01 HEMANGIOMA OF SKIN AND SUBCUTANEOUS TISSUE: Status: ACTIVE | Noted: 2022-09-21

## 2022-09-21 PROCEDURE — ? COUNSELING

## 2022-09-21 PROCEDURE — 99213 OFFICE O/P EST LOW 20 MIN: CPT | Mod: 24

## 2022-09-21 ASSESSMENT — LOCATION DETAILED DESCRIPTION DERM
LOCATION DETAILED: LEFT ANTERIOR SHOULDER
LOCATION DETAILED: RIGHT UPPER BACK
LOCATION DETAILED: RIGHT CHEEK
LOCATION DETAILED: RIGHT DORSAL FOREARM
LOCATION DETAILED: LEFT CHEEK
LOCATION DETAILED: LEFT ANTERIOR THIGH
LOCATION DETAILED: LEFT CLAVICULAR SKIN
LOCATION DETAILED: RIGHT ANTERIOR THIGH
LOCATION DETAILED: LEFT DORSAL FOREARM
LOCATION DETAILED: LEFT MID BACK
LOCATION DETAILED: RIGHT DORSAL HAND
LOCATION DETAILED: LEFT UPPER BACK
LOCATION DETAILED: LEFT DORSAL HAND

## 2022-09-21 ASSESSMENT — LOCATION ZONE DERM
LOCATION ZONE: FACE
LOCATION ZONE: HAND
LOCATION ZONE: ARM
LOCATION ZONE: LEG
LOCATION ZONE: TRUNK

## 2022-09-21 ASSESSMENT — LOCATION SIMPLE DESCRIPTION DERM
LOCATION SIMPLE: LEFT LOWER EXTREMITY
LOCATION SIMPLE: RIGHT HAND
LOCATION SIMPLE: RIGHT UPPER EXTREMITY
LOCATION SIMPLE: LEFT CLAVICULAR SKIN
LOCATION SIMPLE: LEFT SHOULDER
LOCATION SIMPLE: RIGHT CHEEK
LOCATION SIMPLE: LEFT UPPER EXTREMITY
LOCATION SIMPLE: RIGHT LOWER EXTREMITY
LOCATION SIMPLE: LEFT HAND
LOCATION SIMPLE: LEFT CHEEK
LOCATION SIMPLE: BACK

## 2022-12-15 RX ORDER — FLUTICASONE PROPIONATE AND SALMETEROL 100; 50 UG/1; UG/1
1 POWDER RESPIRATORY (INHALATION) 2 TIMES DAILY
Qty: 3 EACH | Refills: 3 | Status: SHIPPED | OUTPATIENT
Start: 2022-12-15 | End: 2024-01-30 | Stop reason: SDUPTHER

## 2022-12-15 NOTE — TELEPHONE ENCOUNTER
Received request via: Patient    Was the patient seen in the last year in this department? Yes    Does the patient have an active prescription (recently filled or refills available) for medication(s) requested? No    Does the patient have senior living Plus and need 100 day supply (blood pressure, diabetes and cholesterol meds only)? Yes, quantity updated to 100 days and Medication is not for cholesterol, blood pressure or diabetes

## 2022-12-15 NOTE — TELEPHONE ENCOUNTER
Script for Advair updated to 100 days, as requested by insurance, and sent to [facility] [address]

## 2022-12-19 ENCOUNTER — APPOINTMENT (RX ONLY)
Dept: URBAN - METROPOLITAN AREA CLINIC 4 | Facility: CLINIC | Age: 77
Setting detail: DERMATOLOGY
End: 2022-12-19

## 2022-12-19 DIAGNOSIS — D18.0 HEMANGIOMA: ICD-10-CM

## 2022-12-19 DIAGNOSIS — Z85.828 PERSONAL HISTORY OF OTHER MALIGNANT NEOPLASM OF SKIN: ICD-10-CM

## 2022-12-19 DIAGNOSIS — L57.8 OTHER SKIN CHANGES DUE TO CHRONIC EXPOSURE TO NONIONIZING RADIATION: ICD-10-CM

## 2022-12-19 DIAGNOSIS — L82.1 OTHER SEBORRHEIC KERATOSIS: ICD-10-CM

## 2022-12-19 DIAGNOSIS — D22 MELANOCYTIC NEVI: ICD-10-CM

## 2022-12-19 DIAGNOSIS — Z85.820 PERSONAL HISTORY OF MALIGNANT MELANOMA OF SKIN: ICD-10-CM

## 2022-12-19 DIAGNOSIS — L81.4 OTHER MELANIN HYPERPIGMENTATION: ICD-10-CM

## 2022-12-19 PROBLEM — D18.01 HEMANGIOMA OF SKIN AND SUBCUTANEOUS TISSUE: Status: ACTIVE | Noted: 2022-12-19

## 2022-12-19 PROBLEM — D22.5 MELANOCYTIC NEVI OF TRUNK: Status: ACTIVE | Noted: 2022-12-19

## 2022-12-19 PROCEDURE — 99213 OFFICE O/P EST LOW 20 MIN: CPT

## 2022-12-19 PROCEDURE — ? COUNSELING

## 2022-12-19 ASSESSMENT — LOCATION SIMPLE DESCRIPTION DERM
LOCATION SIMPLE: RIGHT CHEEK
LOCATION SIMPLE: LEFT LOWER EXTREMITY
LOCATION SIMPLE: LEFT CLAVICULAR SKIN
LOCATION SIMPLE: LEFT UPPER EXTREMITY
LOCATION SIMPLE: LEFT CHEEK
LOCATION SIMPLE: RIGHT HAND
LOCATION SIMPLE: RIGHT EAR
LOCATION SIMPLE: BACK
LOCATION SIMPLE: RIGHT LOWER EXTREMITY
LOCATION SIMPLE: LEFT SHOULDER
LOCATION SIMPLE: LEFT HAND
LOCATION SIMPLE: RIGHT UPPER EXTREMITY

## 2022-12-19 ASSESSMENT — LOCATION DETAILED DESCRIPTION DERM
LOCATION DETAILED: RIGHT SUPERIOR POSTERIOR HELIX
LOCATION DETAILED: LEFT ANTERIOR THIGH
LOCATION DETAILED: RIGHT CHEEK
LOCATION DETAILED: LEFT UPPER BACK
LOCATION DETAILED: LEFT MID BACK
LOCATION DETAILED: RIGHT DORSAL HAND
LOCATION DETAILED: RIGHT DORSAL FOREARM
LOCATION DETAILED: LEFT ANTERIOR SHOULDER
LOCATION DETAILED: LEFT CHEEK
LOCATION DETAILED: RIGHT UPPER BACK
LOCATION DETAILED: LEFT DORSAL HAND
LOCATION DETAILED: RIGHT ANTERIOR THIGH
LOCATION DETAILED: LEFT DORSAL FOREARM
LOCATION DETAILED: LEFT CLAVICULAR SKIN

## 2022-12-19 ASSESSMENT — LOCATION ZONE DERM
LOCATION ZONE: TRUNK
LOCATION ZONE: FACE
LOCATION ZONE: HAND
LOCATION ZONE: LEG
LOCATION ZONE: EAR
LOCATION ZONE: ARM

## 2022-12-19 NOTE — HPI: EVALUATION OF SKIN LESION(S)
What Type Of Note Output Would You Prefer (Optional)?: Standard Output
Hpi Title: Evaluation of Skin Lesions
Location: Left clavicle
Year Removed: 11/2021

## 2022-12-20 NOTE — PROCEDURE: COUNSELING
Wife
Show Follow-Up Variable: Yes
Detail Level: Detailed
Quality 138: Melanoma: Coordination Of Care: A treatment plan was communicated to the physicians providing continuing care within one month of diagnosis outlining: diagnosis, tumor thickness and a plan for surgery or alternate care.
When Should The Patient Follow-Up For Their Next Full-Body Skin Exam?: 3 Months
Quality 137: Melanoma: Continuity Of Care - Recall System: Patient information entered into a recall system that includes: target date for the next exam specified AND a process to follow up with patients regarding missed or unscheduled appointments

## 2023-02-28 ENCOUNTER — OFFICE VISIT (OUTPATIENT)
Dept: URGENT CARE | Facility: PHYSICIAN GROUP | Age: 78
End: 2023-02-28
Payer: MEDICARE

## 2023-02-28 VITALS
DIASTOLIC BLOOD PRESSURE: 68 MMHG | TEMPERATURE: 98 F | BODY MASS INDEX: 32.14 KG/M2 | HEART RATE: 63 BPM | RESPIRATION RATE: 16 BRPM | HEIGHT: 68 IN | WEIGHT: 212.08 LBS | SYSTOLIC BLOOD PRESSURE: 112 MMHG | OXYGEN SATURATION: 97 %

## 2023-02-28 DIAGNOSIS — H92.02 LEFT EAR PAIN: ICD-10-CM

## 2023-02-28 PROCEDURE — 99212 OFFICE O/P EST SF 10 MIN: CPT | Performed by: FAMILY MEDICINE

## 2023-03-01 ASSESSMENT — ENCOUNTER SYMPTOMS
WEIGHT LOSS: 0
MYALGIAS: 0
NAUSEA: 0
VOMITING: 0
EYE DISCHARGE: 0
EYE REDNESS: 0

## 2023-04-04 ENCOUNTER — OFFICE VISIT (OUTPATIENT)
Dept: CARDIOLOGY | Facility: MEDICAL CENTER | Age: 78
End: 2023-04-04
Payer: MEDICARE

## 2023-04-04 VITALS
WEIGHT: 208 LBS | HEIGHT: 68 IN | SYSTOLIC BLOOD PRESSURE: 120 MMHG | DIASTOLIC BLOOD PRESSURE: 74 MMHG | RESPIRATION RATE: 16 BRPM | HEART RATE: 68 BPM | OXYGEN SATURATION: 98 % | BODY MASS INDEX: 31.52 KG/M2

## 2023-04-04 DIAGNOSIS — I10 ESSENTIAL HYPERTENSION: ICD-10-CM

## 2023-04-04 DIAGNOSIS — I25.10 CORONARY ARTERY DISEASE WITHOUT ANGINA PECTORIS, UNSPECIFIED VESSEL OR LESION TYPE, UNSPECIFIED WHETHER NATIVE OR TRANSPLANTED HEART: ICD-10-CM

## 2023-04-04 DIAGNOSIS — I71.21 ANEURYSM OF ASCENDING AORTA WITHOUT RUPTURE (HCC): ICD-10-CM

## 2023-04-04 DIAGNOSIS — I77.810 ASCENDING AORTA DILATION (HCC): ICD-10-CM

## 2023-04-04 DIAGNOSIS — E78.5 DYSLIPIDEMIA: ICD-10-CM

## 2023-04-04 PROCEDURE — 99214 OFFICE O/P EST MOD 30 MIN: CPT | Performed by: NURSE PRACTITIONER

## 2023-04-04 RX ORDER — DIAZEPAM 5 MG/1
TABLET ORAL
COMMUNITY
End: 2023-04-04

## 2023-04-04 RX ORDER — METOPROLOL SUCCINATE 25 MG/1
12.5 TABLET, EXTENDED RELEASE ORAL
Qty: 50 TABLET | Refills: 3 | Status: SHIPPED | OUTPATIENT
Start: 2023-04-04 | End: 2024-01-30 | Stop reason: SDUPTHER

## 2023-04-04 RX ORDER — TAMSULOSIN HYDROCHLORIDE 0.4 MG/1
CAPSULE ORAL
COMMUNITY
End: 2023-04-04

## 2023-04-04 RX ORDER — ATORVASTATIN CALCIUM 40 MG/1
40 TABLET, FILM COATED ORAL EVERY EVENING
Qty: 100 TABLET | Refills: 2 | Status: SHIPPED | OUTPATIENT
Start: 2023-04-04 | End: 2024-01-30 | Stop reason: SDUPTHER

## 2023-04-04 RX ORDER — BUPRENORPHINE HYDROCHLORIDE 75 UG/1
FILM, SOLUBLE BUCCAL
COMMUNITY
End: 2023-04-04

## 2023-04-04 ASSESSMENT — ENCOUNTER SYMPTOMS
ORTHOPNEA: 0
HEMOPTYSIS: 0
CLAUDICATION: 0
SPUTUM PRODUCTION: 0
SHORTNESS OF BREATH: 0
DIZZINESS: 0
PALPITATIONS: 0
WHEEZING: 0
NERVOUS/ANXIOUS: 0
WEAKNESS: 0
COUGH: 0
NAUSEA: 0
VOMITING: 0
PND: 0

## 2023-04-04 NOTE — PROGRESS NOTES
Chief Complaint   Patient presents with    Coronary Artery Disease     Follow up       Subjective:   Itz Magaña is a 78 y.o. male who presents today for annual visit.    He is followed by Dr. Chavez in our clinic, history of AAA 4.1cm, coronary arteriosclerosis, and dyslipidemia.    Lost 40 lbs since last seen 4/2022. Patient is doing well. He is working out at least 1 hour a day. Denies any chest pain, sob, dizziness or palpitations.     Tolerating medication well.     With his recent weight loss, he was taken off lisinopril due to hypotension.    Past Medical History:   Diagnosis Date    ASTHMA     scheduled inhaler use    Bronchitis 02/2018    Erectile dysfunction 6/8/2016    GERD (gastroesophageal reflux disease) 6/8/2016    Heart burn     History of skin cancer 6/8/2016    Hyperlipidemia 6/8/2016    Hypertension     Hypothyroidism 6/8/2016    Indigestion     Obesity 6/8/2016    Pain 04/19/2018    chronic back pain, 0/10    Preventative health care 6/8/2016    Rosacea 6/8/2016    Seasonal allergies 6/8/2016     Past Surgical History:   Procedure Laterality Date    LUMBAR FUSION O-ARM N/A 11/19/2018    Procedure: LUMBAR FUSION O-ARM-  POSTERIOR L2-3 TLIF,;  Surgeon: Brandon Fowler M.D.;  Location: [facility];  Service: Neurosurgery    LUMBAR LAMINECTOMY DISKECTOMY N/A 11/19/2018    Procedure: LUMBAR LAMINECTOMY DISKECTOMY-  L2-3 LAMI;  Surgeon: Brandon Fowler M.D.;  Location: [facility];  Service: Neurosurgery    HARDWARE REMOVAL NEURO N/A 11/19/2018    Procedure: HARDWARE REMOVAL NEURO-  L4-5, L3 SCREW REPOSITIONING;  Surgeon: Brandon Fowler M.D.;  Location: SURGERY [facility];  Service: Neurosurgery    KYPHOPLASTY N/A 11/19/2018    Procedure: KYPHOPLASTY-  L2;  Surgeon: Brandon Fowler M.D.;  Location: SURGERY [facility];  Service: Neurosurgery    VENTRAL HERNIA REPAIR Left 5/3/2018    Procedure: VENTRAL HERNIA REPAIR FOR LUMBAR HERNIA/ LUNG HERNIA THROUGH CHEST WALL  RECONSTRUCTION, MAJOR  ;  Surgeon: Phoenix Becker M.D.;  Location: SURGERY [facility];  Service: General    IRRIGATION & DEBRIDEMENT NEURO  3/30/2017    Procedure: IRRIGATION & DEBRIDEMENT NEURO-LUMBAR WOUND;  Surgeon: Josiah Chakraborty M.D.;  Location: SURGERY [facility];  Service:     FUSION, SPINE, LUMBAR, PLIF N/A 3/17/2017    Procedure: LUMBAR FUSION POSTERIOR L3-4 EXTENSION;  Surgeon: Josiah Chakraborty M.D.;  Location: SURGERY [facility];  Service:     LUMBAR LAMINECTOMY DISKECTOMY N/A 3/17/2017    Procedure: LUMBAR LAMINECTOMY DISKECTOMY L3 & REDO L4;  Surgeon: Josiah Chakraborty M.D.;  Location: SURGERY [facility];  Service:     HARDWARE REMOVAL NEURO N/A 3/17/2017    Procedure: HARDWARE REMOVAL NEURO L4-5;  Surgeon: Josiah Chakraborty M.D.;  Location: SURGERY [facility];  Service:     FUSION, SPINE, LUMBAR, PLIF  9/9/2009    Performed by JOSIAH CHAKRABORTY at SURGERY [facility] ORS    LUMBAR LAMINECTOMY DISKECTOMY  9/9/2009    Performed by JOSIAH CHAKRABORTY at SURGERY [facility]    KNEE ARTHROPLASTY TOTAL  5/26/2009    Performed by NAREN DANIELSON at SURGERY [facility]    HIP ARTHROPLASTY TOTAL  1/21/2009    Performed by NAREN DANIELSON at SURGERY [facility] ORS    INGUINAL HERNIA REPAIR  9/2/08    Performed by PHOENIX BECKER at SURGERY SAME DAY [facility] ORS    INGUINAL HERNIA REPAIR  6/10/08    Performed by PHOENIX BECKER at SURGERY SAME DAY [facility] ORS    HERNIA REPAIR  2008    HERNIA     ROMAN BY LAPAROSCOPY  1982    APPENDECTOMY  1953    TONSILLECTOMY  1951    OTHER      DISCECTOMY WITH HARWWARE    OTHER      GASTRIC BYPASS AT 30 YEARS     Family History   Problem Relation Age of Onset    Hypertension Mother     Dementia Mother     Hypertension Father     Lung Disease Father         asthma     Social History     Socioeconomic History    Marital status:      Spouse name: Not on file    Number of children: Not on file    Years of education: Not on file    Highest education  level: Not on file   Occupational History    Not on file   Tobacco Use    Smoking status: Never    Smokeless tobacco: Never   Vaping Use    Vaping Use: Never used   Substance and Sexual Activity    Alcohol use: Yes     Comment: 1/week     Drug use: No     Comment: marijuana edibles (for pain control)    Sexual activity: Not on file   Other Topics Concern     Service No    Blood Transfusions No    Caffeine Concern No    Occupational Exposure No    Hobby Hazards No    Sleep Concern No    Stress Concern No    Weight Concern Yes    Special Diet No    Back Care Yes    Exercise Yes    Bike Helmet No     Comment: does not ride bike     Seat Belt Yes    Self-Exams No   Social History Narrative    Not on file     Social Determinants of Health     Financial Resource Strain: Not on file   Food Insecurity: Not on file   Transportation Needs: Not on file   Physical Activity: Not on file   Stress: Not on file   Social Connections: Not on file   Intimate Partner Violence: Not on file   Housing Stability: Not on file     No Known Allergies  Outpatient Encounter Medications as of 4/4/2023   Medication Sig Dispense Refill    atorvastatin (LIPITOR) 40 MG Tab Take 1 Tablet by mouth every evening. 100 Tablet 2    metoprolol SR (TOPROL XL) 25 MG TABLET SR 24 HR Take 0.5 Tablets by mouth every day. 50 Tablet 3    fluticasone-salmeterol (ADVAIR) 100-50 MCG/ACT AEROSOL POWDER, BREATH ACTIVATED Inhale 1 Puff 2 times a day. 3 Each 3    levothyroxine (SYNTHROID) 50 MCG Tab Take 1 Tablet by mouth every day. 90 Tablet 3    tamsulosin (FLOMAX) 0.4 MG capsule Take 0.4 mg by mouth every day.      omeprazole (PRILOSEC OTC) 20 MG tablet PRILOSEC OTC 20 MG TBEC      aspirin (ASA) 81 MG Chew Tab chewable tablet Chew 1 Tab every day. 100 Tab 2    acetaminophen (TYLENOL) 500 MG Tab Take 1,000 mg by mouth 2 Times a Day.      [DISCONTINUED] Buprenorphine HCl (BELBUCA) 75 MCG FILM [place] 75 mcg buccal film   PLACE 1 FILM TWICE A DAY BY BUCCAL ROUTE  "AS DIRECTED FOR 30 DAYS. (Patient not taking: Reported on 4/4/2023)      [DISCONTINUED] diazePAM (VALIUM) 5 MG Tab diazepam 5 mg tablet   TAKE 1 TABLET BY MOUTH 1 HOUR BEFORE MRI MAY REPEAT 1 TIME (Patient not taking: Reported on 4/4/2023)      [DISCONTINUED] tamsulosin (FLOMAX) 0.4 MG capsule Flomax 0.4 mg capsule   Take 1 capsule twice a day by oral route for 90 days.      [DISCONTINUED] metoprolol SR (TOPROL XL) 25 MG TABLET SR 24 HR TAKE 1/2 TABLET BY MOUTH EVERY DAY 50 Tablet 1    [DISCONTINUED] fluticasone-salmeterol (ADVAIR) 100-50 MCG/ACT AEROSOL POWDER, BREATH ACTIVATED Advair Diskus 100 mcg-50 mcg/dose powder for inhalation   INHALE 1 PUFF BY MOUTH 2 TIMES A DAY      [DISCONTINUED] polyethylene glycol 3350 (MIRALAX) 17 GM/SCOOP Powder Take 17 g by mouth 2 times a day. (Patient not taking: Reported on 4/4/2023) 850 g 2    minocycline (MINOCIN) 100 MG Cap Take 100 mg by mouth 2 times a day.      [DISCONTINUED] atorvastatin (LIPITOR) 40 MG Tab TAKE 1 TABLET BY MOUTH EVERY DAY IN THE EVENING 100 Tablet 2    [DISCONTINUED] gabapentin (NEURONTIN) 300 MG Cap Take 300 mg by mouth 4 times a day. Indications: twice a day (Patient not taking: Reported on 4/4/2023)  5     No facility-administered encounter medications on file as of 4/4/2023.     Review of Systems   Constitutional:  Negative for malaise/fatigue.   Respiratory:  Negative for cough, hemoptysis, sputum production, shortness of breath and wheezing.    Cardiovascular:  Negative for chest pain, palpitations, orthopnea, claudication, leg swelling and PND.   Gastrointestinal:  Negative for nausea and vomiting.   Neurological:  Negative for dizziness and weakness.   Psychiatric/Behavioral:  The patient is not nervous/anxious.       Objective:   /74 (BP Location: Left arm, Patient Position: Sitting)   Pulse 68   Resp 16   Ht 1.727 m (5' 8\")   Wt 94.3 kg (208 lb)   SpO2 98%   BMI 31.63 kg/m²     Physical Exam  Constitutional:       General: He is not " in acute distress.     Appearance: He is well-developed. He is not diaphoretic.   HENT:      Head: Normocephalic and atraumatic.   Eyes:      Pupils: Pupils are equal, round, and reactive to light.   Neck:      Vascular: No JVD.   Cardiovascular:      Rate and Rhythm: Normal rate and regular rhythm.      Heart sounds: Normal heart sounds. No murmur heard.  Pulmonary:      Effort: Pulmonary effort is normal. No respiratory distress.      Breath sounds: Normal breath sounds.   Abdominal:      General: Bowel sounds are normal. There is no distension.      Palpations: Abdomen is soft.   Skin:     General: Skin is warm and dry.   Neurological:      Mental Status: He is alert and oriented to person, place, and time.   Psychiatric:         Behavior: Behavior normal.         Thought Content: Thought content normal.         Judgment: Judgment normal.         Treadmill Stress test   2012  1. The calculated left ventricular ejection fraction is 62%.   2.  There are no wall motion abnormalities.   3.  No evidence of infarct or reversible ischemia.    Echocardiography Laboratory  2/17/2021  No prior study is available for comparison.   Left ventricular ejection fraction is visually estimated to be 65%.  Normal regional wall motion.  No significant valve abnormalities.   Normal aortic root for body surface area. Ascending aorta diameter is   3.6 cm.    Lab Results   Component Value Date/Time    CHOLSTRLTOT 103 11/02/2021 12:39 PM    LDL 43 11/02/2021 12:39 PM    HDL 48 11/02/2021 12:39 PM    TRIGLYCERIDE 60 11/02/2021 12:39 PM       Lab Results   Component Value Date/Time    SODIUM 137 11/02/2021 12:39 PM    POTASSIUM 4.6 11/02/2021 12:39 PM    CHLORIDE 102 11/02/2021 12:39 PM    CO2 24 11/02/2021 12:39 PM    GLUCOSE 82 11/02/2021 12:39 PM    BUN 17 11/02/2021 12:39 PM    CREATININE 0.86 11/02/2021 12:39 PM    CREATININE 0.9 05/18/2009 04:20 PM     Lab Results   Component Value Date/Time    ALKPHOSPHAT 74 11/02/2021 12:39 PM     ASTSGOT 22 11/02/2021 12:39 PM    ALTSGPT 19 11/02/2021 12:39 PM    TBILIRUBIN 0.6 11/02/2021 12:39 PM          Assessment:     1. Ascending aorta dilation (HCC)        2. Essential hypertension  metoprolol SR (TOPROL XL) 25 MG TABLET SR 24 HR      3. Dyslipidemia  atorvastatin (LIPITOR) 40 MG Tab    Lipid Profile    Comp Metabolic Panel      4. Coronary artery disease without angina pectoris, unspecified vessel or lesion type, unspecified whether native or transplanted heart  atorvastatin (LIPITOR) 40 MG Tab      5. Aneurysm of ascending aorta without rupture (HCC)  CT-CTA COMPLETE THORACOABDOMINAL AORTA          Medical Decision Making:  Today's Assessment / Status / Plan:     1. Coronary arteriosclerosis   - echo showed normal regional wall motion. EF 65%  - continue asa therapy   - continue metoprolol 25mg XL and atorvastatin 40mg qd     2. Hypertension:  - stable   - continue metoprolol SR 25mg qd     3. Hyperlipidemia:  - LDL 51  - continue statin therapy     4. TAA:  - ascending aorta diameter is 3.6cm  - repeat CTA showed 3.1cm    Follow up in 1 year, sooner as needed

## 2023-04-08 ENCOUNTER — HOSPITAL ENCOUNTER (OUTPATIENT)
Dept: LAB | Facility: MEDICAL CENTER | Age: 78
End: 2023-04-08
Attending: NURSE PRACTITIONER
Payer: MEDICARE

## 2023-04-08 DIAGNOSIS — E78.5 DYSLIPIDEMIA: ICD-10-CM

## 2023-04-08 LAB
ALBUMIN SERPL BCP-MCNC: 3.9 G/DL (ref 3.2–4.9)
ALBUMIN/GLOB SERPL: 1.6 G/DL
ALP SERPL-CCNC: 97 U/L (ref 30–99)
ALT SERPL-CCNC: 15 U/L (ref 2–50)
ANION GAP SERPL CALC-SCNC: 12 MMOL/L (ref 7–16)
AST SERPL-CCNC: 23 U/L (ref 12–45)
BILIRUB SERPL-MCNC: 0.6 MG/DL (ref 0.1–1.5)
BUN SERPL-MCNC: 14 MG/DL (ref 8–22)
CALCIUM ALBUM COR SERPL-MCNC: 9.5 MG/DL (ref 8.5–10.5)
CALCIUM SERPL-MCNC: 9.4 MG/DL (ref 8.5–10.5)
CHLORIDE SERPL-SCNC: 109 MMOL/L (ref 96–112)
CHOLEST SERPL-MCNC: 110 MG/DL (ref 100–199)
CO2 SERPL-SCNC: 21 MMOL/L (ref 20–33)
CREAT SERPL-MCNC: 0.86 MG/DL (ref 0.5–1.4)
FASTING STATUS PATIENT QL REPORTED: NORMAL
GFR SERPLBLD CREATININE-BSD FMLA CKD-EPI: 90 ML/MIN/1.73 M 2
GLOBULIN SER CALC-MCNC: 2.5 G/DL (ref 1.9–3.5)
GLUCOSE SERPL-MCNC: 98 MG/DL (ref 65–99)
HDLC SERPL-MCNC: 55 MG/DL
LDLC SERPL CALC-MCNC: 50 MG/DL
POTASSIUM SERPL-SCNC: 5.1 MMOL/L (ref 3.6–5.5)
PROT SERPL-MCNC: 6.4 G/DL (ref 6–8.2)
SODIUM SERPL-SCNC: 142 MMOL/L (ref 135–145)
TRIGL SERPL-MCNC: 23 MG/DL (ref 0–149)

## 2023-04-08 PROCEDURE — 80053 COMPREHEN METABOLIC PANEL: CPT

## 2023-04-08 PROCEDURE — 80061 LIPID PANEL: CPT

## 2023-04-08 PROCEDURE — 36415 COLL VENOUS BLD VENIPUNCTURE: CPT

## 2023-04-12 ENCOUNTER — HOSPITAL ENCOUNTER (OUTPATIENT)
Dept: RADIOLOGY | Facility: MEDICAL CENTER | Age: 78
End: 2023-04-12
Attending: NURSE PRACTITIONER
Payer: MEDICARE

## 2023-04-12 DIAGNOSIS — I71.21 ANEURYSM OF ASCENDING AORTA WITHOUT RUPTURE (HCC): ICD-10-CM

## 2023-04-12 PROCEDURE — 700117 HCHG RX CONTRAST REV CODE 255: Performed by: NURSE PRACTITIONER

## 2023-04-12 PROCEDURE — 71275 CT ANGIOGRAPHY CHEST: CPT

## 2023-04-12 RX ADMIN — IOHEXOL 100 ML: 350 INJECTION, SOLUTION INTRAVENOUS at 14:45

## 2023-04-19 ENCOUNTER — APPOINTMENT (RX ONLY)
Dept: URBAN - METROPOLITAN AREA CLINIC 4 | Facility: CLINIC | Age: 78
Setting detail: DERMATOLOGY
End: 2023-04-19

## 2023-04-19 DIAGNOSIS — D18.0 HEMANGIOMA: ICD-10-CM

## 2023-04-19 DIAGNOSIS — L81.4 OTHER MELANIN HYPERPIGMENTATION: ICD-10-CM

## 2023-04-19 DIAGNOSIS — L57.8 OTHER SKIN CHANGES DUE TO CHRONIC EXPOSURE TO NONIONIZING RADIATION: ICD-10-CM

## 2023-04-19 DIAGNOSIS — Z85.820 PERSONAL HISTORY OF MALIGNANT MELANOMA OF SKIN: ICD-10-CM

## 2023-04-19 DIAGNOSIS — Z85.828 PERSONAL HISTORY OF OTHER MALIGNANT NEOPLASM OF SKIN: ICD-10-CM

## 2023-04-19 DIAGNOSIS — L82.1 OTHER SEBORRHEIC KERATOSIS: ICD-10-CM

## 2023-04-19 DIAGNOSIS — D22 MELANOCYTIC NEVI: ICD-10-CM

## 2023-04-19 PROBLEM — D18.01 HEMANGIOMA OF SKIN AND SUBCUTANEOUS TISSUE: Status: ACTIVE | Noted: 2023-04-19

## 2023-04-19 PROBLEM — D22.5 MELANOCYTIC NEVI OF TRUNK: Status: ACTIVE | Noted: 2023-04-19

## 2023-04-19 PROCEDURE — 99213 OFFICE O/P EST LOW 20 MIN: CPT

## 2023-04-19 PROCEDURE — ? COUNSELING

## 2023-04-19 ASSESSMENT — LOCATION DETAILED DESCRIPTION DERM
LOCATION DETAILED: LEFT ANTERIOR SHOULDER
LOCATION DETAILED: LEFT CLAVICULAR SKIN
LOCATION DETAILED: LEFT CHEEK
LOCATION DETAILED: RIGHT SUPERIOR POSTERIOR HELIX
LOCATION DETAILED: LEFT UPPER BACK
LOCATION DETAILED: RIGHT UPPER BACK
LOCATION DETAILED: RIGHT DORSAL FOREARM
LOCATION DETAILED: LEFT ANTERIOR THIGH
LOCATION DETAILED: RIGHT ANTERIOR THIGH
LOCATION DETAILED: RIGHT DORSAL HAND
LOCATION DETAILED: RIGHT CHEEK
LOCATION DETAILED: LEFT DORSAL HAND
LOCATION DETAILED: LEFT DORSAL FOREARM
LOCATION DETAILED: LEFT MID BACK

## 2023-04-19 ASSESSMENT — LOCATION SIMPLE DESCRIPTION DERM
LOCATION SIMPLE: LEFT UPPER EXTREMITY
LOCATION SIMPLE: BACK
LOCATION SIMPLE: LEFT CHEEK
LOCATION SIMPLE: LEFT HAND
LOCATION SIMPLE: LEFT SHOULDER
LOCATION SIMPLE: RIGHT UPPER EXTREMITY
LOCATION SIMPLE: RIGHT EAR
LOCATION SIMPLE: RIGHT HAND
LOCATION SIMPLE: LEFT CLAVICULAR SKIN
LOCATION SIMPLE: RIGHT CHEEK
LOCATION SIMPLE: RIGHT LOWER EXTREMITY
LOCATION SIMPLE: LEFT LOWER EXTREMITY

## 2023-04-19 ASSESSMENT — LOCATION ZONE DERM
LOCATION ZONE: LEG
LOCATION ZONE: TRUNK
LOCATION ZONE: HAND
LOCATION ZONE: ARM
LOCATION ZONE: EAR
LOCATION ZONE: FACE

## 2023-04-19 NOTE — HPI: EVALUATION OF SKIN LESION(S)
How Severe Are Your Spot(S)?: mild
Have Your Spot(S) Been Treated In The Past?: has not been treated
Hpi Title: Evaluation of Skin Lesions
Location: Left clavicle

## 2023-05-05 ENCOUNTER — TELEPHONE (OUTPATIENT)
Dept: HEALTH INFORMATION MANAGEMENT | Facility: OTHER | Age: 78
End: 2023-05-05
Payer: MEDICARE

## 2023-06-27 ENCOUNTER — APPOINTMENT (RX ONLY)
Dept: URBAN - METROPOLITAN AREA CLINIC 4 | Facility: CLINIC | Age: 78
Setting detail: DERMATOLOGY
End: 2023-06-27

## 2023-06-27 DIAGNOSIS — L57.8 OTHER SKIN CHANGES DUE TO CHRONIC EXPOSURE TO NONIONIZING RADIATION: ICD-10-CM

## 2023-06-27 DIAGNOSIS — L81.4 OTHER MELANIN HYPERPIGMENTATION: ICD-10-CM

## 2023-06-27 DIAGNOSIS — D18.0 HEMANGIOMA: ICD-10-CM

## 2023-06-27 DIAGNOSIS — B35.6 TINEA CRURIS: ICD-10-CM

## 2023-06-27 DIAGNOSIS — Z85.828 PERSONAL HISTORY OF OTHER MALIGNANT NEOPLASM OF SKIN: ICD-10-CM

## 2023-06-27 DIAGNOSIS — Z85.820 PERSONAL HISTORY OF MALIGNANT MELANOMA OF SKIN: ICD-10-CM

## 2023-06-27 DIAGNOSIS — D22 MELANOCYTIC NEVI: ICD-10-CM

## 2023-06-27 DIAGNOSIS — L82.1 OTHER SEBORRHEIC KERATOSIS: ICD-10-CM

## 2023-06-27 PROBLEM — D22.5 MELANOCYTIC NEVI OF TRUNK: Status: ACTIVE | Noted: 2023-06-27

## 2023-06-27 PROBLEM — D18.01 HEMANGIOMA OF SKIN AND SUBCUTANEOUS TISSUE: Status: ACTIVE | Noted: 2023-06-27

## 2023-06-27 PROCEDURE — 99213 OFFICE O/P EST LOW 20 MIN: CPT

## 2023-06-27 PROCEDURE — ? COUNSELING

## 2023-06-27 PROCEDURE — ? PRESCRIPTION

## 2023-06-27 RX ORDER — KETOCONAZOLE 20 MG/G
1 CREAM TOPICAL BID
Qty: 60 | Refills: 3 | Status: ERX | COMMUNITY
Start: 2023-06-27

## 2023-06-27 RX ADMIN — KETOCONAZOLE 1: 20 CREAM TOPICAL at 00:00

## 2023-06-27 ASSESSMENT — LOCATION SIMPLE DESCRIPTION DERM
LOCATION SIMPLE: GROIN
LOCATION SIMPLE: RIGHT EAR
LOCATION SIMPLE: BACK
LOCATION SIMPLE: RIGHT LOWER EXTREMITY
LOCATION SIMPLE: LEFT CHEEK
LOCATION SIMPLE: LEFT SHOULDER
LOCATION SIMPLE: LEFT CLAVICULAR SKIN
LOCATION SIMPLE: RIGHT CHEEK
LOCATION SIMPLE: LEFT LOWER EXTREMITY
LOCATION SIMPLE: LEFT THIGH
LOCATION SIMPLE: LEFT HAND
LOCATION SIMPLE: RIGHT HAND
LOCATION SIMPLE: LEFT UPPER EXTREMITY
LOCATION SIMPLE: RIGHT UPPER EXTREMITY

## 2023-06-27 ASSESSMENT — LOCATION DETAILED DESCRIPTION DERM
LOCATION DETAILED: RIGHT CHEEK
LOCATION DETAILED: RIGHT SUPERIOR POSTERIOR HELIX
LOCATION DETAILED: RIGHT UPPER BACK
LOCATION DETAILED: LEFT ANTERIOR THIGH
LOCATION DETAILED: LEFT CLAVICULAR SKIN
LOCATION DETAILED: LEFT MID BACK
LOCATION DETAILED: SUPRAPUBIC SKIN
LOCATION DETAILED: LEFT CHEEK
LOCATION DETAILED: LEFT DORSAL FOREARM
LOCATION DETAILED: RIGHT DORSAL FOREARM
LOCATION DETAILED: LEFT ANTERIOR SHOULDER
LOCATION DETAILED: LEFT UPPER BACK
LOCATION DETAILED: LEFT DORSAL HAND
LOCATION DETAILED: RIGHT DORSAL HAND
LOCATION DETAILED: LEFT ANTERIOR PROXIMAL THIGH
LOCATION DETAILED: RIGHT ANTERIOR THIGH

## 2023-06-27 ASSESSMENT — LOCATION ZONE DERM
LOCATION ZONE: TRUNK
LOCATION ZONE: FACE
LOCATION ZONE: EAR
LOCATION ZONE: ARM
LOCATION ZONE: HAND
LOCATION ZONE: LEG

## 2023-07-06 PROBLEM — E66.811 OBESITY (BMI 30.0-34.9): Status: ACTIVE | Noted: 2023-07-06

## 2023-07-06 PROBLEM — E66.9 OBESITY (BMI 30.0-34.9): Status: ACTIVE | Noted: 2023-07-06

## 2023-07-12 NOTE — THERAPY
"Occupational Therapy Treatment completed with focus on ADLs, ADL transfers and patient education.  Functional Status:  CGA for sit>Stand; min A for toilet transfer; max A for LB dressing; CGA for toileting; CGA for grooming while standing; SPV for doffing LSO; SBA for sit>supine  Plan of Care: Will benefit from Occupational Therapy 4 times per week  Discharge Recommendations:  Equipment Will Continue to Assess for Equipment Needs. Recommend home health or outpatient transitional care services for continued occupational therapy services    See \"Rehab Therapy-Acute\" Patient Summary Report for complete documentation.   Pt participated in OT tx session. Pt required increased time to complete functional mobs and transfers within room. Pt with heavy BUE reliance during standing tasks despite tx VC's to not bend forward while completing grooming at sink. Pt with heavy BUE reliance for BSC transfer. Pt was able to demonstrate doffing LSO with SPV. Pt would benefit from reinforcement of spinal precautions. Will continue to follow for acute OT services while in-house.   " Libtayo Pregnancy And Lactation Text: This medication is contraindicated in pregnancy and when breast feeding.

## 2023-07-27 ENCOUNTER — TELEPHONE (OUTPATIENT)
Dept: INTERNAL MEDICINE | Facility: OTHER | Age: 78
End: 2023-07-27
Payer: MEDICARE

## 2023-07-31 RX ORDER — LEVOTHYROXINE SODIUM 0.05 MG/1
50 TABLET ORAL
Qty: 30 TABLET | Refills: 2 | Status: SHIPPED | OUTPATIENT
Start: 2023-07-31 | End: 2023-10-28 | Stop reason: SDUPTHER

## 2023-08-21 ENCOUNTER — OFFICE VISIT (OUTPATIENT)
Dept: INTERNAL MEDICINE | Facility: OTHER | Age: 78
End: 2023-08-21
Payer: MEDICARE

## 2023-08-21 VITALS
BODY MASS INDEX: 30.65 KG/M2 | SYSTOLIC BLOOD PRESSURE: 128 MMHG | TEMPERATURE: 97.8 F | HEIGHT: 68 IN | OXYGEN SATURATION: 98 % | WEIGHT: 202.2 LBS | HEART RATE: 61 BPM | DIASTOLIC BLOOD PRESSURE: 80 MMHG

## 2023-08-21 DIAGNOSIS — E78.5 DYSLIPIDEMIA: ICD-10-CM

## 2023-08-21 DIAGNOSIS — R94.2 ABNORMAL RESULTS OF PULMONARY FUNCTION STUDIES: ICD-10-CM

## 2023-08-21 DIAGNOSIS — E03.9 ACQUIRED HYPOTHYROIDISM: ICD-10-CM

## 2023-08-21 DIAGNOSIS — J42 CHRONIC BRONCHITIS, UNSPECIFIED CHRONIC BRONCHITIS TYPE (HCC): ICD-10-CM

## 2023-08-21 PROBLEM — I20.89 ANGINA OF EFFORT (HCC): Status: RESOLVED | Noted: 2021-01-19 | Resolved: 2023-08-21

## 2023-08-21 PROCEDURE — 99214 OFFICE O/P EST MOD 30 MIN: CPT | Mod: GC

## 2023-08-21 PROCEDURE — 3079F DIAST BP 80-89 MM HG: CPT

## 2023-08-21 PROCEDURE — 3074F SYST BP LT 130 MM HG: CPT

## 2023-08-21 RX ORDER — ALBUTEROL SULFATE 90 UG/1
2 AEROSOL, METERED RESPIRATORY (INHALATION) EVERY 4 HOURS PRN
Qty: 1 EACH | Refills: 2 | Status: SHIPPED | OUTPATIENT
Start: 2023-08-21 | End: 2024-01-30 | Stop reason: SDUPTHER

## 2023-08-21 RX ORDER — INHALER, ASSIST DEVICES
1 SPACER (EA) MISCELLANEOUS ONCE
Qty: 1 EACH | Refills: 2 | Status: SHIPPED | OUTPATIENT
Start: 2023-08-21 | End: 2023-08-21

## 2023-08-21 ASSESSMENT — ENCOUNTER SYMPTOMS
NEUROLOGICAL NEGATIVE: 1
GASTROINTESTINAL NEGATIVE: 1
MUSCULOSKELETAL NEGATIVE: 1
EYES NEGATIVE: 1
CARDIOVASCULAR NEGATIVE: 1
PSYCHIATRIC NEGATIVE: 1
CONSTITUTIONAL NEGATIVE: 1

## 2023-08-21 NOTE — PROGRESS NOTES
"Teaching Physician Attestation      Level of Participation    I have personally interviewed and examined the patient.  In addition, I discussed with the resident physician the patient's history, exam, assessment and plan in detail.  Topics listed in my addendum were the focus of the visit.  Healthcare maintenance was not addressed this visit unless listed as a topic in my addendum.  I agree with the plan as written along with the following additions/modifications:    New Res Patient    PMH:  HTN, hypothyroidism, ? Obstructive lung disease (borderline pft in 2005 but no smoking, no sx as a child), aortic dilation followed by cardio, ? Bph, gerd, bmi 30, chronic back pain, prior hx constipation  -wife in assisted living    Htn managed by cardiology in the setting of thoracic aortic aneurysm  -at goal, no sx. continue metoprolol per cardiology, patient reports diet and exercise of weight loss, praised patient for his excellent work.    Question of obstructive lung disease  -On review, patient has a PFT from 2005 which shows mild possible obstructive lung disease with a borderline bronchodilator response.  He has no history of asthma as a child, no history of smoking.  When inquiring as to how this diagnosis developed, patient states that it was because his father had chronic bronchitis.  Asked whether patient had a genetic component, patient stated \"you are getting beyond me\".  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  He states he has not needed a rescue inhaler in 10 years, and has down titrated his Advair to once daily on his own, has no current respiratory issues whatsoever.  The overall clinical picture here is not clear.    -I discussed that this situation would benefit from a repeat pulmonary function test, given that it has been 18 years since his last PFT, to clarify the current status and determine how to safely manage his medications.  Emphasized the importance of carrying a rescue " inhaler, patient states he does not have a functioning rescue inhaler (states his previous prescription ) and stated he does not need 1.  I emphasized to the patient our desire to provide safe, evidence-based medical care, and that he was currently not on a guideline directed therapy for obstructive lung disease, could be dangerous without the presence of a rescue inhaler.  Ultimately, patient agreed to repeating a PFT and obtaining a rescue inhaler.    Plan  -PFTs  -Continue Advair once daily for now although atypical dosing  -Albuterol rescue inhaler with spacer provided  -Follow-up after PFTs    Check TSH for monitoring.    Repeat bp at/near goal sbp <120.  Praised pt for his efforts with diet/exercise.  Appreciate cardio support for ascending TAA.    Rtc 1 month.

## 2023-08-22 NOTE — PROGRESS NOTES
Chief Complaint   Patient presents with    Follow-Up     Patient here for re-establish       HISTORY OF PRESENT ILLNESS: Patient is a 78 y.o. male with past medical history of HTN, hypothyroidism,COPD? Obstructive lung disease (previous and most recent PFT last 2005 with FEV1/FVC ratio mildly reduced at 69%) , history of thoracic aortic dilatation, yearly follow up by cardiology, history of BPH, GERD.     Patient has no specific concerns during this visit, states that he knows that he needs to re-establish care(last seen by PCP 09/16/22)  for monitoring and regular maintenance for his prescription medications. The following were discussed during this visit    #Hypertension  -patient states he is compliant to metoprolol 12.5 mg daily, previously on lisinopril however with concern for asymptomatic hypotension in the past, hence was discontinued.No regular BP monitoring at home. States he regularly exercises/goes to the gym and has had planned weight loss  -regularly seen by cardiology, cardiology managing as he also has history of thoracic aortic aneurysm    #COPD?  -Upon reviewing patient's medications, patient states that he is on maintenance Advair once a day, previously twice a day however has titrated to once a day when he is.  He is also on rescue medication albuterol however this is not on file.  He he has had Albuterol for years however he has never had the need to use it.  Patient denies any shortness of breath, chest pain, denies any cough, no history of COPD exacerbations in the past.  Patient also denies any history of smoking, states that father had COPD and his COPD was due to genetic component.  Not known to have personal history of genetic disease/congenital disease.On further review, patient had pulmonary function test last 2005  and has no known history of asthma. 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 Has not been seen regularly by pulmonologist.       Past Medical History:   Diagnosis Date    Angina of effort (HCC) 1/19/2021    ASTHMA     scheduled inhaler use    Bronchitis 02/2018    Erectile dysfunction 6/8/2016    GERD (gastroesophageal reflux disease) 6/8/2016    Heart burn     History of skin cancer 6/8/2016    Hyperlipidemia 6/8/2016    Hypertension     Hypothyroidism 6/8/2016    Indigestion     Obesity 6/8/2016    Pain 04/19/2018    chronic back pain, 0/10    Preventative health care 6/8/2016    Rosacea 6/8/2016    Seasonal allergies 6/8/2016       Patient Active Problem List    Diagnosis Date Noted    Obesity (BMI 30.0-34.9) 07/06/2023    Other constipation 09/16/2022    Lumbar back pain with radiculopathy affecting left lower extremity 12/19/2018    BPH (benign prostatic hyperplasia) 12/17/2018    Caregiver burden 04/25/2018    BMI 32.0-32.9,adult 04/06/2017    Ascending aorta dilation (CMS-HCC), mild, needs OP followup 03/30/2017    Acquired hypothyroidism 06/08/2016    Erectile dysfunction 06/08/2016    History of skin cancer 06/08/2016    Asthma 06/08/2016    Seasonal allergies 06/08/2016    GERD (gastroesophageal reflux disease) 06/08/2016    Dyslipidemia 06/08/2016    Essential hypertension 06/08/2016       Patient has no known allergies.    Current Outpatient Medications   Medication Sig Dispense Refill    albuterol 108 (90 Base) MCG/ACT Aero Soln inhalation aerosol Inhale 2 Puffs every four hours as needed for Shortness of Breath. 1 Each 2    Spacer/Aero-Holding Chambers (AEROCHAMBER MV) Misc 1 Each one time for 1 dose. 1 Each 2    levothyroxine (SYNTHROID) 50 MCG Tab Take 1 Tablet by mouth every day. 30 Tablet 2    atorvastatin (LIPITOR) 40 MG Tab Take 1 Tablet by mouth every evening. 100 Tablet 2    metoprolol SR (TOPROL XL) 25 MG TABLET SR 24 HR Take 0.5 Tablets by mouth every day. 50 Tablet 3    fluticasone-salmeterol (ADVAIR) 100-50 MCG/ACT AEROSOL POWDER, BREATH  "ACTIVATED Inhale 1 Puff 2 times a day. 3 Each 3    omeprazole (PRILOSEC OTC) 20 MG tablet PRILOSEC OTC 20 MG TBEC      acetaminophen (TYLENOL) 500 MG Tab Take 1,000 mg by mouth 2 Times a Day.       No current facility-administered medications for this visit.       Social History     Tobacco Use    Smoking status: Never    Smokeless tobacco: Never   Vaping Use    Vaping Use: Never used   Substance Use Topics    Alcohol use: Yes     Comment: 1/week     Drug use: No     Comment: marijuana edibles (for pain control)       Family History   Problem Relation Age of Onset    Hypertension Mother     Dementia Mother     Hypertension Father     Lung Disease Father         asthma     Review of Systems   Constitutional: Negative.    HENT: Negative.     Eyes: Negative.    Cardiovascular: Negative.    Gastrointestinal: Negative.    Genitourinary: Negative.    Musculoskeletal: Negative.    Skin: Negative.    Neurological: Negative.    Endo/Heme/Allergies: Negative.    Psychiatric/Behavioral: Negative.         Exam:  /80 (BP Location: Right arm, Patient Position: Sitting, BP Cuff Size: Adult long)   Pulse 61   Temp 36.6 °C (97.8 °F) (Temporal)   Ht 1.727 m (5' 8\")   Wt 91.7 kg (202 lb 3.2 oz)   SpO2 98%  Body mass index is 30.74 kg/m².    Constitutional:  Not in acute distress, well appearing.  HEENT:   NC/AT  Cardiovascular: Regular rate and rhythm. No murmurs or gallops.      Lungs:   Clear to auscultation bilaterally. No wheezes or crackles. No respiratory distress.  Abdomen: Not distended, soft, not tender. No guarding or rigidity. No masses.  Extremities:  No cyanosis/clubbing/edema. No obvious deformities.  Skin:  Warm and dry.  No visible rashes.  Neurologic: Alert & oriented x 3, CN II-XII grossly intact, strength and sensation grossly intact.  No focal deficits noted.  Psychiatric:  Affect normal, mood normal, judgment normal.    Assessment/Plan:     1.COPD?   -Discussed with patient importance of PFT to assess " current respiratory status and on management of medications.   -Patient states that she has inhaler, however it was probably  he has not needed to use it, reinforced importance of rescue inhaler, patient prescribed with albuterol inhaler with spacer  - has agreed to do PFT's, pending  -According to patient's PFT results, we will discuss about appropriate goal directed medical therapy, however patient has had no history of COPD exacerbations in the past , no previous hospitalizations    2. Hypertension  -Currently on metoprolol 12.5 mg daily, as prescribed by cardiology in the setting of thoracic aortic aneurysm  -BP during clinic visit 120/80 (recheck from 146/80), previous BP last 2023 120 /70 , BP currently at goal   -Continue regular cardiology follow-up, continue metoprolol  -Encourage patient to continue regular exercise patient goes regularly to the gym, has been having healthy diet  -Encouraged to do regular BP monitoring at home    3.Ascending thoracic aortic dilation  -Most recent CTA thoracic abdominal aorta last 2023 with mild dilatation of the ascending thoracic aorta measuring 4 x 4.1 centimeters, which is unchanged from previous  -Continue current management/continue regular cardiology follow-up/surveillance monitoring per cardiology  - no known history of Marfan's    The following will be discussed during patient's subsequent visits    #Hypothyroidism-ordered TSH in preparation for patient's next visit, most recent TSH was back in , dyslipidemia, preventive care and other patients chronic medical conditions       All imaging results and lab results and consult notes are reviewed at this visit.  Followup: Return in about 4 weeks (around 2023).    Please note that this dictation was created using voice recognition software. I have made every reasonable attempt to correct obvious errors, but I expect that there are errors of grammar and possibly content that I did not discover  before finalizing the note.    NNACY YAP MD  PGY-1

## 2023-08-28 ENCOUNTER — APPOINTMENT (OUTPATIENT)
Dept: SLEEP MEDICINE | Facility: MEDICAL CENTER | Age: 78
End: 2023-08-28
Payer: MEDICARE

## 2023-08-30 ENCOUNTER — NON-PROVIDER VISIT (OUTPATIENT)
Dept: SLEEP MEDICINE | Facility: MEDICAL CENTER | Age: 78
End: 2023-08-30
Payer: MEDICARE

## 2023-08-30 VITALS — WEIGHT: 200 LBS | BODY MASS INDEX: 32.14 KG/M2 | HEIGHT: 66 IN

## 2023-08-30 DIAGNOSIS — J42 CHRONIC BRONCHITIS, UNSPECIFIED CHRONIC BRONCHITIS TYPE (HCC): ICD-10-CM

## 2023-08-30 DIAGNOSIS — R94.2 ABNORMAL RESULTS OF PULMONARY FUNCTION STUDIES: ICD-10-CM

## 2023-08-30 PROCEDURE — 94726 PLETHYSMOGRAPHY LUNG VOLUMES: CPT | Mod: 26 | Performed by: INTERNAL MEDICINE

## 2023-08-30 PROCEDURE — 94060 EVALUATION OF WHEEZING: CPT | Mod: 26 | Performed by: INTERNAL MEDICINE

## 2023-08-30 PROCEDURE — 94729 DIFFUSING CAPACITY: CPT | Performed by: INTERNAL MEDICINE

## 2023-08-30 PROCEDURE — 94060 EVALUATION OF WHEEZING: CPT | Performed by: INTERNAL MEDICINE

## 2023-08-30 PROCEDURE — 94729 DIFFUSING CAPACITY: CPT | Mod: 26 | Performed by: INTERNAL MEDICINE

## 2023-08-30 PROCEDURE — 94726 PLETHYSMOGRAPHY LUNG VOLUMES: CPT | Performed by: INTERNAL MEDICINE

## 2023-08-30 ASSESSMENT — PULMONARY FUNCTION TESTS
FEV1/FVC_PERCENT_CHANGE: 150
FEV1_PERCENT_PREDICTED: 82
FEV1/FVC: 65
FVC_LLN: 2.91
FEV1/FVC_PERCENT_PREDICTED: 86
FEV1_PREDICTED: 2.62
FEV1_PERCENT_CHANGE: 2
FEV1/FVC_PERCENT_LLN: 63
FEV1/FVC_PREDICTED: 76
FEV1/FVC: 65.56
FVC: 3.22
FEV1/FVC: 66
FEV1/FVC: 65
FEV1: 2.09
FEV1_PERCENT_CHANGE: 3
FEV1/FVC_PERCENT_CHANGE: 1
FVC_PREDICTED: 3.48
FEV1/FVC_PERCENT_PREDICTED: 86
FEV1_LLN: 2.19
FEV1/FVC_PERCENT_PREDICTED: 86
FVC_PERCENT_PREDICTED: 95
FEV1/FVC_PERCENT_PREDICTED: 75
FVC_PERCENT_PREDICTED: 92
FEV1: 2.17
FVC: 3.31
FEV1_PERCENT_PREDICTED: 79
FEV1/FVC_PERCENT_PREDICTED: 85

## 2023-08-30 NOTE — PROCEDURES
Technician: Fannie Figueroa RRT, CPFT  Good patient effort & cooperation.  The results of this test meet the ATS/ERS standards for acceptability & reproducibility.  Test was performed on the bSafe Body Plethysmograph-Elite DX system.  Predicted equations for Spirometry are GLI-2012, ITS for lung volumes, and GLI-2017 for DLCO.  The DLCO was uncorrected for Hgb.  A bronchodilator of Ventolin HFA -2puffs via spacer administered.  DLCO performed during dilation period.    Interpretation;    Baseline spirometry shows airflow obstruction with an FEV1/FVC ratio of 65 and an FEV1 just below the lower limits of normal at 2.09 L or 79% predicted.  No significant bronchodilator response.   Total lung capacity is within normal limits.  There is borderline air trapping.  Diffusion capacity is within normal limits.  Pulmonary function testing shows mild airflow obstruction.

## 2023-09-01 ENCOUNTER — HOSPITAL ENCOUNTER (OUTPATIENT)
Dept: LAB | Facility: MEDICAL CENTER | Age: 78
End: 2023-09-01
Payer: MEDICARE

## 2023-09-01 DIAGNOSIS — E03.9 ACQUIRED HYPOTHYROIDISM: ICD-10-CM

## 2023-09-01 LAB — TSH SERPL DL<=0.005 MIU/L-ACNC: 3.12 UIU/ML (ref 0.38–5.33)

## 2023-09-01 PROCEDURE — 36415 COLL VENOUS BLD VENIPUNCTURE: CPT

## 2023-09-01 PROCEDURE — 84443 ASSAY THYROID STIM HORMONE: CPT

## 2023-10-28 RX ORDER — LEVOTHYROXINE SODIUM 0.05 MG/1
50 TABLET ORAL
Qty: 90 TABLET | Refills: 0 | Status: SHIPPED | OUTPATIENT
Start: 2023-10-28 | End: 2024-01-31 | Stop reason: SDUPTHER

## 2023-11-10 ENCOUNTER — OFFICE VISIT (OUTPATIENT)
Dept: MEDICAL GROUP | Facility: PHYSICIAN GROUP | Age: 78
End: 2023-11-10
Payer: MEDICARE

## 2023-11-10 VITALS
DIASTOLIC BLOOD PRESSURE: 70 MMHG | HEART RATE: 60 BPM | SYSTOLIC BLOOD PRESSURE: 128 MMHG | OXYGEN SATURATION: 100 % | RESPIRATION RATE: 16 BRPM | HEIGHT: 68 IN | TEMPERATURE: 98.3 F | WEIGHT: 204 LBS | BODY MASS INDEX: 30.92 KG/M2

## 2023-11-10 DIAGNOSIS — J30.2 SEASONAL ALLERGIES: ICD-10-CM

## 2023-11-10 DIAGNOSIS — Z63.6 CAREGIVER BURDEN: ICD-10-CM

## 2023-11-10 DIAGNOSIS — Z76.89 ENCOUNTER TO ESTABLISH CARE WITH NEW DOCTOR: ICD-10-CM

## 2023-11-10 DIAGNOSIS — E66.9 OBESITY (BMI 30.0-34.9): ICD-10-CM

## 2023-11-10 DIAGNOSIS — N40.1 BENIGN PROSTATIC HYPERPLASIA WITH LOWER URINARY TRACT SYMPTOMS, SYMPTOM DETAILS UNSPECIFIED: ICD-10-CM

## 2023-11-10 DIAGNOSIS — I70.0 AORTIC ATHEROSCLEROSIS (HCC): ICD-10-CM

## 2023-11-10 DIAGNOSIS — G31.9 CEREBRAL ATROPHY (HCC): ICD-10-CM

## 2023-11-10 PROCEDURE — 99214 OFFICE O/P EST MOD 30 MIN: CPT | Performed by: FAMILY MEDICINE

## 2023-11-10 PROCEDURE — 3074F SYST BP LT 130 MM HG: CPT | Performed by: FAMILY MEDICINE

## 2023-11-10 PROCEDURE — 3078F DIAST BP <80 MM HG: CPT | Performed by: FAMILY MEDICINE

## 2023-11-10 RX ORDER — FLUTICASONE PROPIONATE 50 MCG
1 SPRAY, SUSPENSION (ML) NASAL DAILY
Qty: 16 G | Refills: 5 | Status: SHIPPED | OUTPATIENT
Start: 2023-11-10 | End: 2023-12-11

## 2023-11-10 SDOH — SOCIAL STABILITY - SOCIAL INSECURITY: DEPENDENT RELATIVE NEEDING CARE AT HOME: Z63.6

## 2023-11-10 NOTE — ASSESSMENT & PLAN NOTE
Patient is here today to establish care.  This clinic is closer to his home so more convenient for him.  He is current on his wellness exam as well as his labs and physical.  Having no particular issues on today's visit but wants to go over his health history.

## 2023-11-10 NOTE — PROGRESS NOTES
Subjective:     CC: To establish care.    HPI:   Angelo presents today with the following medical concerns:    Encounter to establish care with new doctor  Patient is here today to establish care.  This clinic is closer to his home so more convenient for him.  He is current on his wellness exam as well as his labs and physical.  Having no particular issues on today's visit but wants to go over his health history.    Aortic atherosclerosis (HCC)  This is a chronic problem.  Noted on previous x-ray studies.  His lipids are within normal limits.    BPH (benign prostatic hyperplasia)  This is a chronic problem.  He is having troubles with some outlet obstruction.  He is due to see the urologist next week to go over that.  He states he was tried on Flomax and it did not really help.  He had some type of procedure years ago.    Caregiver burden  This is a chronic problem.  His wife is currently in a memory center due to severe Alzheimer's with probable Parkinson's.  She has been there for the last 2 years.  He is having a lot of loneliness from it and feeling of guilt.  He does have good family support however.    Cerebral atrophy (HCC)  This is a chronic problem.  Noted on x-ray study.  Likely due to aging.    Seasonal allergies  This is a chronic problem.  Patient like a refill on his Flonase nasal spray as he uses it as needed for allergies.    Past Medical History:   Diagnosis Date    Angina of effort 1/19/2021    ASTHMA     scheduled inhaler use    Bronchitis 02/2018    Erectile dysfunction 6/8/2016    GERD (gastroesophageal reflux disease) 6/8/2016    Heart burn     History of skin cancer 6/8/2016    Hyperlipidemia 6/8/2016    Hypertension     Hypothyroidism 6/8/2016    Indigestion     Obesity 6/8/2016    Pain 04/19/2018    chronic back pain, 0/10    Preventative health care 6/8/2016    Rosacea 6/8/2016    Seasonal allergies 6/8/2016       Social History     Tobacco Use    Smoking status: Never    Smokeless  "tobacco: Never   Vaping Use    Vaping Use: Never used   Substance Use Topics    Alcohol use: Yes     Comment: 1/week     Drug use: No     Comment: marijuana edibles (for pain control)       Current Outpatient Medications Ordered in Epic   Medication Sig Dispense Refill    fluticasone (FLONASE) 50 MCG/ACT nasal spray Administer 1 Spray into affected nostril(S) every day. 16 g 5    levothyroxine (SYNTHROID) 50 MCG Tab TAKE 1 TABLET BY MOUTH EVERY DAY 90 Tablet 0    albuterol 108 (90 Base) MCG/ACT Aero Soln inhalation aerosol Inhale 2 Puffs every four hours as needed for Shortness of Breath. 1 Each 2    atorvastatin (LIPITOR) 40 MG Tab Take 1 Tablet by mouth every evening. 100 Tablet 2    metoprolol SR (TOPROL XL) 25 MG TABLET SR 24 HR Take 0.5 Tablets by mouth every day. 50 Tablet 3    fluticasone-salmeterol (ADVAIR) 100-50 MCG/ACT AEROSOL POWDER, BREATH ACTIVATED Inhale 1 Puff 2 times a day. 3 Each 3    omeprazole (PRILOSEC OTC) 20 MG tablet PRILOSEC OTC 20 MG TBEC      acetaminophen (TYLENOL) 500 MG Tab Take 1,000 mg by mouth 2 Times a Day.       No current [facility]-ordered facility-administered medications on file.       Allergies:  Patient has no known allergies.    Health Maintenance: Completed    ROS:  Gen: no fevers/chills, has had about a 50 pound weight decrease due to diet and exercise  Eyes: no changes in vision  ENT: no sore throat, no hearing loss, no bloody nose  Pulm: no sob, no cough  CV: no chest pain, no palpitations  GI: no nausea/vomiting, no diarrhea  : no dysuria  MSk: no myalgias  Skin: no rash  Neuro: no headaches, no numbness/tingling  Heme/Lymph: no easy bruising      Objective:       Exam:  /70 (BP Location: Left arm, Patient Position: Sitting, BP Cuff Size: Adult)   Pulse 60   Temp 36.8 °C (98.3 °F) (Temporal)   Resp 16   Ht 1.727 m (5' 8\")   Wt 92.5 kg (204 lb)   SpO2 100%   BMI 31.02 kg/m²  Body mass index is 31.02 kg/m².    Gen: Alert and oriented, No apparent " distress.  Ext: No clubbing, cyanosis, edema.  Psych: Patient is alert and cooperative.  No unusual thought process expressed.  Insight and judgment is good.    Labs: Good    Assessment & Plan:     78 y.o. male with the following -     1. Encounter to establish care with new doctor  Patient establish weight today.  We went over his health history and address some issues he had.  We will recheck him in 6 months with exam and for lab work.  He has not had a PSA done for several years.    2. Seasonal allergies  This is a chronic problem.  Flonase renewed.    3. Benign prostatic hyperplasia with lower urinary tract symptoms, symptom details unspecified  This is a chronic problem.  He is due to see his urologist soon.    4. Caregiver burden  This is a chronic problem.  We discussed the issue with his wife in the feelings that he is has.  Continue to be socially active particularly with his family.  Follow-up with me as needed.    5. Cerebral atrophy (HCC)  This is a chronic problem.  Noted on x-ray study.  Likely due to aging.    6. Aortic atherosclerosis (HCC)  This is a chronic problem.  Noted on x-ray study.  He is on a very good diet and his lipids are very good.    7. Obesity (BMI 30.0-34.9)  This is a chronic problem.  Patient has reduced his weight by diet and exercise.  - Patient identified as having weight management issue.  Appropriate orders and counseling given.      Return in about 6 months (around 5/10/2024) for Long, f/view labs.    Please note that this dictation was created using voice recognition software. I have made every reasonable attempt to correct obvious errors, but I expect that there are errors of grammar and possibly content that I did not discover before finalizing the note.

## 2023-11-10 NOTE — ASSESSMENT & PLAN NOTE
This is a chronic problem.  He is having troubles with some outlet obstruction.  He is due to see the urologist next week to go over that.  He states he was tried on Flomax and it did not really help.  He had some type of procedure years ago.

## 2023-11-10 NOTE — ASSESSMENT & PLAN NOTE
This is a chronic problem.  Noted on previous x-ray studies.  His lipids are within normal limits.

## 2023-11-10 NOTE — ASSESSMENT & PLAN NOTE
This is a chronic problem.  Patient like a refill on his Flonase nasal spray as he uses it as needed for allergies.

## 2023-11-10 NOTE — ASSESSMENT & PLAN NOTE
This is a chronic problem.  His wife is currently in a memory center due to severe Alzheimer's with probable Parkinson's.  She has been there for the last 2 years.  He is having a lot of loneliness from it and feeling of guilt.  He does have good family support however.

## 2023-11-13 ENCOUNTER — APPOINTMENT (OUTPATIENT)
Dept: INTERNAL MEDICINE | Facility: OTHER | Age: 78
End: 2023-11-13
Payer: MEDICARE

## 2023-11-27 ENCOUNTER — APPOINTMENT (RX ONLY)
Dept: URBAN - METROPOLITAN AREA CLINIC 4 | Facility: CLINIC | Age: 78
Setting detail: DERMATOLOGY
End: 2023-11-27

## 2023-11-27 DIAGNOSIS — D18.0 HEMANGIOMA: ICD-10-CM

## 2023-11-27 DIAGNOSIS — Z85.828 PERSONAL HISTORY OF OTHER MALIGNANT NEOPLASM OF SKIN: ICD-10-CM

## 2023-11-27 DIAGNOSIS — L57.8 OTHER SKIN CHANGES DUE TO CHRONIC EXPOSURE TO NONIONIZING RADIATION: ICD-10-CM

## 2023-11-27 DIAGNOSIS — L82.1 OTHER SEBORRHEIC KERATOSIS: ICD-10-CM

## 2023-11-27 DIAGNOSIS — L81.4 OTHER MELANIN HYPERPIGMENTATION: ICD-10-CM

## 2023-11-27 DIAGNOSIS — D22 MELANOCYTIC NEVI: ICD-10-CM

## 2023-11-27 DIAGNOSIS — Z85.820 PERSONAL HISTORY OF MALIGNANT MELANOMA OF SKIN: ICD-10-CM

## 2023-11-27 PROBLEM — D18.01 HEMANGIOMA OF SKIN AND SUBCUTANEOUS TISSUE: Status: ACTIVE | Noted: 2023-11-27

## 2023-11-27 PROBLEM — D22.5 MELANOCYTIC NEVI OF TRUNK: Status: ACTIVE | Noted: 2023-11-27

## 2023-11-27 PROCEDURE — 99213 OFFICE O/P EST LOW 20 MIN: CPT

## 2023-11-27 PROCEDURE — ? COUNSELING

## 2023-11-27 ASSESSMENT — LOCATION DETAILED DESCRIPTION DERM
LOCATION DETAILED: RIGHT ANTERIOR THIGH
LOCATION DETAILED: LEFT DORSAL HAND
LOCATION DETAILED: LEFT UPPER BACK
LOCATION DETAILED: RIGHT CHEEK
LOCATION DETAILED: LEFT ANTERIOR SHOULDER
LOCATION DETAILED: LEFT ANTERIOR THIGH
LOCATION DETAILED: LEFT DORSAL FOREARM
LOCATION DETAILED: RIGHT DORSAL HAND
LOCATION DETAILED: RIGHT DORSAL FOREARM
LOCATION DETAILED: LEFT CHEEK
LOCATION DETAILED: LEFT MID BACK
LOCATION DETAILED: LEFT CLAVICULAR SKIN
LOCATION DETAILED: RIGHT UPPER BACK
LOCATION DETAILED: RIGHT SUPERIOR POSTERIOR HELIX

## 2023-11-27 ASSESSMENT — LOCATION SIMPLE DESCRIPTION DERM
LOCATION SIMPLE: BACK
LOCATION SIMPLE: LEFT CHEEK
LOCATION SIMPLE: LEFT LOWER EXTREMITY
LOCATION SIMPLE: RIGHT LOWER EXTREMITY
LOCATION SIMPLE: LEFT HAND
LOCATION SIMPLE: LEFT CLAVICULAR SKIN
LOCATION SIMPLE: RIGHT EAR
LOCATION SIMPLE: RIGHT UPPER EXTREMITY
LOCATION SIMPLE: RIGHT HAND
LOCATION SIMPLE: LEFT SHOULDER
LOCATION SIMPLE: RIGHT CHEEK
LOCATION SIMPLE: LEFT UPPER EXTREMITY

## 2023-11-27 ASSESSMENT — LOCATION ZONE DERM
LOCATION ZONE: TRUNK
LOCATION ZONE: EAR
LOCATION ZONE: FACE
LOCATION ZONE: ARM
LOCATION ZONE: HAND
LOCATION ZONE: LEG

## 2023-12-11 RX ORDER — FLUTICASONE PROPIONATE 50 MCG
1 SPRAY, SUSPENSION (ML) NASAL DAILY
Qty: 48 ML | Refills: 2 | Status: SHIPPED | OUTPATIENT
Start: 2023-12-11 | End: 2024-01-30 | Stop reason: SDUPTHER

## 2024-01-30 DIAGNOSIS — E78.5 DYSLIPIDEMIA: ICD-10-CM

## 2024-01-30 DIAGNOSIS — I25.10 CORONARY ARTERY DISEASE WITHOUT ANGINA PECTORIS, UNSPECIFIED VESSEL OR LESION TYPE, UNSPECIFIED WHETHER NATIVE OR TRANSPLANTED HEART: ICD-10-CM

## 2024-01-30 DIAGNOSIS — I10 ESSENTIAL HYPERTENSION: ICD-10-CM

## 2024-01-31 RX ORDER — METOPROLOL SUCCINATE 25 MG/1
12.5 TABLET, EXTENDED RELEASE ORAL
Qty: 50 TABLET | Refills: 3 | Status: SHIPPED | OUTPATIENT
Start: 2024-01-31

## 2024-01-31 RX ORDER — LEVOTHYROXINE SODIUM 0.05 MG/1
50 TABLET ORAL
Qty: 90 TABLET | Refills: 3 | Status: SHIPPED | OUTPATIENT
Start: 2024-01-31 | End: 2025-01-25

## 2024-01-31 RX ORDER — FLUTICASONE PROPIONATE 50 MCG
1 SPRAY, SUSPENSION (ML) NASAL DAILY
Qty: 48 ML | Refills: 2 | Status: SHIPPED | OUTPATIENT
Start: 2024-01-31

## 2024-01-31 RX ORDER — ATORVASTATIN CALCIUM 40 MG/1
40 TABLET, FILM COATED ORAL EVERY EVENING
Qty: 100 TABLET | Refills: 2 | Status: SHIPPED | OUTPATIENT
Start: 2024-01-31

## 2024-01-31 RX ORDER — ALBUTEROL SULFATE 90 UG/1
2 AEROSOL, METERED RESPIRATORY (INHALATION) EVERY 4 HOURS PRN
Qty: 1 EACH | Refills: 2 | Status: SHIPPED | OUTPATIENT
Start: 2024-01-31

## 2024-01-31 RX ORDER — FLUTICASONE PROPIONATE AND SALMETEROL 100; 50 UG/1; UG/1
1 POWDER RESPIRATORY (INHALATION) 2 TIMES DAILY
Qty: 3 EACH | Refills: 3 | Status: SHIPPED | OUTPATIENT
Start: 2024-01-31

## 2024-01-31 NOTE — TELEPHONE ENCOUNTER
Received request via: Pharmacy    Was the patient seen in the last year in this department? Yes    Does the patient have an active prescription (recently filled or refills available) for medication(s) requested? No    Pharmacy Name: [facility]    Does the patient have group home Plus and need 100 day supply (blood pressure, diabetes and cholesterol meds only)? Medication is not for cholesterol, blood pressure or diabetes

## 2024-04-30 ENCOUNTER — OFFICE VISIT (OUTPATIENT)
Dept: MEDICAL GROUP | Facility: PHYSICIAN GROUP | Age: 79
End: 2024-04-30
Payer: MEDICARE

## 2024-04-30 ENCOUNTER — HOSPITAL ENCOUNTER (OUTPATIENT)
Dept: RADIOLOGY | Facility: MEDICAL CENTER | Age: 79
End: 2024-04-30
Attending: FAMILY MEDICINE
Payer: MEDICARE

## 2024-04-30 VITALS
DIASTOLIC BLOOD PRESSURE: 66 MMHG | HEART RATE: 66 BPM | HEIGHT: 67 IN | OXYGEN SATURATION: 96 % | TEMPERATURE: 98.9 F | WEIGHT: 207 LBS | BODY MASS INDEX: 32.49 KG/M2 | RESPIRATION RATE: 16 BRPM | SYSTOLIC BLOOD PRESSURE: 118 MMHG

## 2024-04-30 DIAGNOSIS — M25.551 CHRONIC RIGHT HIP PAIN: ICD-10-CM

## 2024-04-30 DIAGNOSIS — I70.0 AORTIC ATHEROSCLEROSIS (HCC): ICD-10-CM

## 2024-04-30 DIAGNOSIS — G89.29 CHRONIC RIGHT HIP PAIN: ICD-10-CM

## 2024-04-30 DIAGNOSIS — I77.810 ASCENDING AORTA DILATION (HCC): ICD-10-CM

## 2024-04-30 DIAGNOSIS — G31.9 CEREBRAL ATROPHY (HCC): ICD-10-CM

## 2024-04-30 PROBLEM — Z76.89 ENCOUNTER TO ESTABLISH CARE WITH NEW DOCTOR: Status: RESOLVED | Noted: 2023-11-10 | Resolved: 2024-04-30

## 2024-04-30 RX ORDER — METHYLPREDNISOLONE 4 MG/1
TABLET ORAL
Qty: 21 TABLET | Refills: 0 | Status: SHIPPED | OUTPATIENT
Start: 2024-04-30

## 2024-04-30 RX ORDER — ALFUZOSIN HYDROCHLORIDE 10 MG/1
10 TABLET, EXTENDED RELEASE ORAL
COMMUNITY
Start: 2024-02-06

## 2024-04-30 RX ORDER — MINOCYCLINE HYDROCHLORIDE 100 MG/1
CAPSULE ORAL
COMMUNITY
Start: 2024-04-21

## 2024-04-30 ASSESSMENT — PATIENT HEALTH QUESTIONNAIRE - PHQ9: CLINICAL INTERPRETATION OF PHQ2 SCORE: 0

## 2024-04-30 NOTE — ASSESSMENT & PLAN NOTE
This is now chronic problem.  Patient started to have more troubles with pain in his right lateral hip and is starting to radiate to the right groin.  He states he can do the stationary bicycle without much trouble but it does bother him when he is walking.  He has had this hip replaced 15 years ago.

## 2024-04-30 NOTE — PROGRESS NOTES
Subjective:     CC: Here for hip pain.      HPI:   Angelo presents today with The following medical concerns:    Chronic right hip pain  This is now chronic problem.  Patient started to have more troubles with pain in his right lateral hip and is starting to radiate to the right groin.  He states he can do the stationary bicycle without much trouble but it does bother him when he is walking.  He has had this hip replaced 15 years ago.    Aortic atherosclerosis (HCC)  This is a chronic problem.  Noted on previous x-ray study.  He is on a statin.    Ascending aorta dilation (CMS-HCC), mild, needs OP followup  This is a chronic problem.  Followed by cardiology.    Cerebral atrophy (HCC)  This is a chronic problem.  Likely due to aging.    Past Medical History:   Diagnosis Date    Angina of effort (HCC) 1/19/2021    ASTHMA     scheduled inhaler use    Bronchitis 02/2018    Erectile dysfunction 6/8/2016    GERD (gastroesophageal reflux disease) 6/8/2016    Heart burn     History of skin cancer 6/8/2016    Hyperlipidemia 6/8/2016    Hypertension     Hypothyroidism 6/8/2016    Indigestion     Obesity 6/8/2016    Pain 04/19/2018    chronic back pain, 0/10    Preventative health care 6/8/2016    Rosacea 6/8/2016    Seasonal allergies 6/8/2016       Social History     Tobacco Use    Smoking status: Never    Smokeless tobacco: Never   Vaping Use    Vaping Use: Never used   Substance Use Topics    Alcohol use: Yes     Comment: 1/week     Drug use: No     Comment: marijuana edibles (for pain control)       Current Outpatient Medications Ordered in Epic   Medication Sig Dispense Refill    minocycline (MINOCIN) 100 MG Cap TAKE ONE PILL TWICE A DAY WITH MEALS AND A FULL GLASS OF WATER. DO NOT LIE DOWN 1 HOUR AFTER TAKING.      alfuzosin (UROXATRAL) 10 MG SR tablet Take 10 mg by mouth every day.      methylPREDNISolone (MEDROL DOSEPAK) 4 MG Tablet Therapy Pack As directed on the packaging label. 21 Tablet 0    metoprolol SR (TOPROL  "XL) 25 MG TABLET SR 24 HR Take 0.5 Tablets by mouth every day. 50 Tablet 3    fluticasone (FLONASE) 50 MCG/ACT nasal spray Administer 1 Spray into affected nostril(S) every day. 48 mL 2    atorvastatin (LIPITOR) 40 MG Tab Take 1 Tablet by mouth every evening. 100 Tablet 2    albuterol 108 (90 Base) MCG/ACT Aero Soln inhalation aerosol Inhale 2 Puffs every four hours as needed for Shortness of Breath. 1 Each 2    fluticasone-salmeterol (ADVAIR) 100-50 MCG/ACT AEROSOL POWDER, BREATH ACTIVATED Inhale 1 Puff 2 times a day. 3 Each 3    levothyroxine (SYNTHROID) 50 MCG Tab Take 1 Tablet by mouth every day for 360 days. 90 Tablet 3    omeprazole (PRILOSEC OTC) 20 MG tablet PRILOSEC OTC 20 MG TBEC      acetaminophen (TYLENOL) 500 MG Tab Take 1,000 mg by mouth 2 Times a Day.       No current [facility]-ordered facility-administered medications on file.       Allergies:  Patient has no known allergies.    Health Maintenance: Completed    ROS:  Gen: no fevers/chills, no changes in weight  Eyes: no changes in vision  ENT: no sore throat, no hearing loss, no bloody nose  Pulm: no sob, no cough  CV: no chest pain, no palpitations  GI: no nausea/vomiting, no diarrhea  : no dysuria  MSk: no myalgias  Skin: no rash  Neuro: no headaches, no numbness/tingling  Heme/Lymph: no easy bruising      Objective:       Exam:  /66 (BP Location: Right arm, Patient Position: Sitting, BP Cuff Size: Adult)   Pulse 66   Temp 37.2 °C (98.9 °F) (Temporal)   Resp 16   Ht 1.702 m (5' 7\")   Wt 93.9 kg (207 lb)   SpO2 96%   BMI 32.42 kg/m²  Body mass index is 32.42 kg/m².    Gen: Alert and oriented, No apparent distress.  Lungs: Normal effort,  Ext: No clubbing, cyanosis, edema.  Patient does walk with a limp because of pain in the right hip.  On range of motion of the head of the femur he does have reproduction of his pain in the right lateral aspect radiating over to his groin.        Assessment & Plan:     79 y.o. male with the following - "     1. Chronic right hip pain  This is a chronic problem.  Will put him on a Medrol Dosepak and also get an x-ray.  If he does not respond to the Medrol then referring back to his orthopedist to see if his hip needs to be revised or if a steroid injection would be beneficial.  - DX-HIP-UNILATERAL-W/O PELVIS-2/3 VIEWS RIGHT; Future    2. Aortic atherosclerosis (HCC)  This is a chronic problem.  Continue his statin.    3. Ascending aorta dilation (HCC)  This is a chronic problem.  Continue care through cardiology clinic.    4. Cerebral atrophy (HCC)  This is a chronic problem.  Likely due to aging.      Return if symptoms worsen or fail to improve.    Please note that this dictation was created using voice recognition software. I have made every reasonable attempt to correct obvious errors, but I expect that there are errors of grammar and possibly content that I did not discover before finalizing the note.

## 2024-05-08 ENCOUNTER — TELEPHONE (OUTPATIENT)
Dept: MEDICAL GROUP | Facility: PHYSICIAN GROUP | Age: 79
End: 2024-05-08
Payer: MEDICARE

## 2024-05-08 DIAGNOSIS — G89.29 CHRONIC RIGHT HIP PAIN: ICD-10-CM

## 2024-05-08 DIAGNOSIS — M25.551 CHRONIC RIGHT HIP PAIN: ICD-10-CM

## 2024-05-08 NOTE — TELEPHONE ENCOUNTER
Patient called stating that the Z pack did not do any thing to him and his hip is not nay better. He was not sure what was the next step was.

## 2024-05-13 ENCOUNTER — TELEPHONE (OUTPATIENT)
Dept: HEALTH INFORMATION MANAGEMENT | Facility: OTHER | Age: 79
End: 2024-05-13

## 2024-05-30 ENCOUNTER — APPOINTMENT (RX ONLY)
Dept: URBAN - METROPOLITAN AREA CLINIC 4 | Facility: CLINIC | Age: 79
Setting detail: DERMATOLOGY
End: 2024-05-30

## 2024-05-30 DIAGNOSIS — Z85.828 PERSONAL HISTORY OF OTHER MALIGNANT NEOPLASM OF SKIN: ICD-10-CM

## 2024-05-30 DIAGNOSIS — L82.0 INFLAMED SEBORRHEIC KERATOSIS: ICD-10-CM

## 2024-05-30 DIAGNOSIS — L57.8 OTHER SKIN CHANGES DUE TO CHRONIC EXPOSURE TO NONIONIZING RADIATION: ICD-10-CM

## 2024-05-30 DIAGNOSIS — Z85.820 PERSONAL HISTORY OF MALIGNANT MELANOMA OF SKIN: ICD-10-CM

## 2024-05-30 DIAGNOSIS — D22 MELANOCYTIC NEVI: ICD-10-CM

## 2024-05-30 DIAGNOSIS — L81.4 OTHER MELANIN HYPERPIGMENTATION: ICD-10-CM

## 2024-05-30 DIAGNOSIS — L82.1 OTHER SEBORRHEIC KERATOSIS: ICD-10-CM

## 2024-05-30 DIAGNOSIS — D18.0 HEMANGIOMA: ICD-10-CM

## 2024-05-30 PROBLEM — D48.5 NEOPLASM OF UNCERTAIN BEHAVIOR OF SKIN: Status: ACTIVE | Noted: 2024-05-30

## 2024-05-30 PROBLEM — D18.01 HEMANGIOMA OF SKIN AND SUBCUTANEOUS TISSUE: Status: ACTIVE | Noted: 2024-05-30

## 2024-05-30 PROBLEM — D22.5 MELANOCYTIC NEVI OF TRUNK: Status: ACTIVE | Noted: 2024-05-30

## 2024-05-30 PROCEDURE — ? COUNSELING

## 2024-05-30 PROCEDURE — 11102 TANGNTL BX SKIN SINGLE LES: CPT | Mod: 59

## 2024-05-30 PROCEDURE — ? BIOPSY BY SHAVE METHOD

## 2024-05-30 PROCEDURE — ? LIQUID NITROGEN

## 2024-05-30 PROCEDURE — 99213 OFFICE O/P EST LOW 20 MIN: CPT | Mod: 25

## 2024-05-30 PROCEDURE — 17110 DESTRUCTION B9 LES UP TO 14: CPT

## 2024-05-30 ASSESSMENT — LOCATION DETAILED DESCRIPTION DERM
LOCATION DETAILED: LEFT MID BACK
LOCATION DETAILED: RIGHT ANTERIOR THIGH
LOCATION DETAILED: RIGHT DORSAL FOREARM
LOCATION DETAILED: RIGHT CHEEK
LOCATION DETAILED: RIGHT POPLITEAL SKIN
LOCATION DETAILED: LEFT CLAVICULAR SKIN
LOCATION DETAILED: RIGHT LATERAL FOREHEAD
LOCATION DETAILED: RIGHT LATERAL MALAR CHEEK
LOCATION DETAILED: LEFT ANTERIOR THIGH
LOCATION DETAILED: RIGHT DORSAL HAND
LOCATION DETAILED: LEFT INFERIOR LATERAL FOREHEAD
LOCATION DETAILED: RIGHT UPPER BACK
LOCATION DETAILED: LEFT ANTERIOR SHOULDER
LOCATION DETAILED: LEFT DORSAL HAND
LOCATION DETAILED: RIGHT INFERIOR LATERAL FOREHEAD
LOCATION DETAILED: LEFT CHEEK
LOCATION DETAILED: LEFT UPPER BACK
LOCATION DETAILED: RIGHT SUPERIOR CENTRAL MALAR CHEEK
LOCATION DETAILED: RIGHT SUPERIOR POSTERIOR HELIX
LOCATION DETAILED: LEFT DORSAL FOREARM

## 2024-05-30 ASSESSMENT — LOCATION ZONE DERM
LOCATION ZONE: FACE
LOCATION ZONE: EAR
LOCATION ZONE: HAND
LOCATION ZONE: ARM
LOCATION ZONE: LEG
LOCATION ZONE: TRUNK

## 2024-05-30 ASSESSMENT — LOCATION SIMPLE DESCRIPTION DERM
LOCATION SIMPLE: RIGHT LOWER EXTREMITY
LOCATION SIMPLE: LEFT CHEEK
LOCATION SIMPLE: RIGHT POPLITEAL SKIN
LOCATION SIMPLE: LEFT UPPER EXTREMITY
LOCATION SIMPLE: RIGHT EAR
LOCATION SIMPLE: LEFT HAND
LOCATION SIMPLE: LEFT LOWER EXTREMITY
LOCATION SIMPLE: RIGHT UPPER EXTREMITY
LOCATION SIMPLE: RIGHT CHEEK
LOCATION SIMPLE: LEFT FOREHEAD
LOCATION SIMPLE: RIGHT HAND
LOCATION SIMPLE: LEFT SHOULDER
LOCATION SIMPLE: LEFT CLAVICULAR SKIN
LOCATION SIMPLE: BACK
LOCATION SIMPLE: RIGHT FOREHEAD

## 2024-05-30 NOTE — HPI: MELANOMA F/U (HISTORY OF MALIGNANT MELANOMA)
What Is The Reason For Today's Visit?: History of Melanoma
What Stage Is The Melanoma?: other
Year Excised?: 2021

## 2024-05-30 NOTE — PROCEDURE: BIOPSY BY SHAVE METHOD
Detail Level: Detailed
Depth Of Biopsy: dermis
Was A Bandage Applied: Yes
Size Of Lesion In Cm: 0.8
X Size Of Lesion In Cm: 0
Biopsy Type: H and E
Biopsy Method: Dermablade
Anesthesia Type: 1% lidocaine with epinephrine
Anesthesia Volume In Cc: 0.5
Hemostasis: Drysol
Wound Care: Bacitracin
Dressing: bandage
Destruction After The Procedure: No
Type Of Destruction Used: Electrodesiccation
Curettage Text: The wound bed was treated with curettage after the biopsy was performed.
Cryotherapy Text: The wound bed was treated with cryotherapy after the biopsy was performed.
Electrodesiccation Text: The wound bed was treated with electrodesiccation after the biopsy was performed.
Electrodesiccation And Curettage Text: The wound bed was treated with electrodesiccation and curettage after the biopsy was performed.
Silver Nitrate Text: The wound bed was treated with silver nitrate after the biopsy was performed.
Lab: [id number]
Lab Facility: 
Consent: Written consent was obtained and risks were reviewed including but not limited to scarring, infection, bleeding, scabbing, incomplete removal, nerve damage and allergy to anesthesia.
Post-Care Instructions: I reviewed with the patient in detail post-care instructions. Patient is to keep the biopsy site dry overnight, and then apply bacitracin twice daily until healed. Patient may apply hydrogen peroxide soaks to remove any crusting.
Notification Instructions: Patient will be notified of biopsy results. However, patient instructed to call the office if not contacted within 2 weeks.
Billing Type: Third-Party Bill
Information: Selecting Yes will display possible errors in your note based on the variables you have selected. This validation is only offered as a suggestion for you. PLEASE NOTE THAT THE VALIDATION TEXT WILL BE REMOVED WHEN YOU FINALIZE YOUR NOTE. IF YOU WANT TO FAX A PRELIMINARY NOTE YOU WILL NEED TO TOGGLE THIS TO 'NO' IF YOU DO NOT WANT IT IN YOUR FAXED NOTE.

## 2024-05-30 NOTE — PROCEDURE: LIQUID NITROGEN
Post-Care Instructions: I reviewed with the patient in detail post-care instructions. Patient is to wear sunprotection, and avoid picking at any of the treated lesions. Pt may apply Vaseline to crusted or scabbing areas.
Show Topical Anesthesia Variable?: Yes
Aperture Size (Optional): C
Duration Of Freeze Thaw-Cycle (Seconds): 3
Number Of Freeze-Thaw Cycles: 1 freeze-thaw cycle
Include Z78.9 (Other Specified Conditions Influencing Health Status) As An Associated Diagnosis?: No
Detail Level: Detailed
Spray Paint Text: The liquid nitrogen was applied to the skin utilizing a spray paint frosting technique.
Medical Necessity Information: It is in your best interest to select a reason for this procedure from the list below. All of these items fulfill various CMS LCD requirements except the new and changing color options.
Consent: The patient's verbal consent was obtained including but not limited to risks of crusting, scabbing, blistering, scarring, darker or lighter pigmentary change, recurrence, incomplete removal and infection.
Medical Necessity Clause: This procedure was medically necessary because the lesions that were treated were:

## 2024-07-31 ENCOUNTER — APPOINTMENT (RX ONLY)
Dept: URBAN - METROPOLITAN AREA CLINIC 4 | Facility: CLINIC | Age: 79
Setting detail: DERMATOLOGY
End: 2024-07-31

## 2024-07-31 DIAGNOSIS — D22 MELANOCYTIC NEVI: ICD-10-CM

## 2024-07-31 DIAGNOSIS — L57.8 OTHER SKIN CHANGES DUE TO CHRONIC EXPOSURE TO NONIONIZING RADIATION: ICD-10-CM

## 2024-07-31 DIAGNOSIS — Z85.820 PERSONAL HISTORY OF MALIGNANT MELANOMA OF SKIN: ICD-10-CM

## 2024-07-31 DIAGNOSIS — L82.0 INFLAMED SEBORRHEIC KERATOSIS: ICD-10-CM

## 2024-07-31 DIAGNOSIS — D18.0 HEMANGIOMA: ICD-10-CM

## 2024-07-31 DIAGNOSIS — L57.0 ACTINIC KERATOSIS: ICD-10-CM | Status: RESOLVING

## 2024-07-31 DIAGNOSIS — Z85.828 PERSONAL HISTORY OF OTHER MALIGNANT NEOPLASM OF SKIN: ICD-10-CM

## 2024-07-31 PROBLEM — D22.5 MELANOCYTIC NEVI OF TRUNK: Status: ACTIVE | Noted: 2024-07-31

## 2024-07-31 PROBLEM — D18.01 HEMANGIOMA OF SKIN AND SUBCUTANEOUS TISSUE: Status: ACTIVE | Noted: 2024-07-31

## 2024-07-31 PROCEDURE — ? COUNSELING

## 2024-07-31 PROCEDURE — ? ADDITIONAL NOTES

## 2024-07-31 PROCEDURE — 99213 OFFICE O/P EST LOW 20 MIN: CPT | Mod: 25

## 2024-07-31 PROCEDURE — ? DIAGNOSIS COMMENT

## 2024-07-31 PROCEDURE — ? LIQUID NITROGEN

## 2024-07-31 PROCEDURE — 17110 DESTRUCTION B9 LES UP TO 14: CPT

## 2024-07-31 ASSESSMENT — LOCATION DETAILED DESCRIPTION DERM
LOCATION DETAILED: RIGHT SUPERIOR POSTERIOR HELIX
LOCATION DETAILED: LEFT CHEEK
LOCATION DETAILED: LEFT ANTERIOR SHOULDER
LOCATION DETAILED: RIGHT INFERIOR UPPER BACK
LOCATION DETAILED: RIGHT POSTERIOR SHOULDER
LOCATION DETAILED: RIGHT DORSAL FOREARM
LOCATION DETAILED: LEFT INFERIOR LATERAL UPPER BACK
LOCATION DETAILED: LEFT MID BACK
LOCATION DETAILED: LEFT LATERAL SHOULDER
LOCATION DETAILED: RIGHT LATERAL SHOULDER
LOCATION DETAILED: LEFT UPPER BACK
LOCATION DETAILED: LEFT MEDIAL UPPER BACK
LOCATION DETAILED: LEFT CLAVICULAR SKIN
LOCATION DETAILED: LEFT POSTERIOR SHOULDER
LOCATION DETAILED: RIGHT SUPERIOR LATERAL MIDBACK
LOCATION DETAILED: RIGHT MID-UPPER BACK
LOCATION DETAILED: LEFT DORSAL FOREARM
LOCATION DETAILED: LEFT MID-UPPER BACK

## 2024-07-31 ASSESSMENT — LOCATION ZONE DERM
LOCATION ZONE: TRUNK
LOCATION ZONE: ARM
LOCATION ZONE: EAR
LOCATION ZONE: FACE

## 2024-07-31 ASSESSMENT — LOCATION SIMPLE DESCRIPTION DERM
LOCATION SIMPLE: BACK
LOCATION SIMPLE: RIGHT EAR
LOCATION SIMPLE: RIGHT UPPER BACK
LOCATION SIMPLE: RIGHT SHOULDER
LOCATION SIMPLE: LEFT UPPER BACK
LOCATION SIMPLE: LEFT CHEEK
LOCATION SIMPLE: LEFT SHOULDER
LOCATION SIMPLE: RIGHT LOWER BACK
LOCATION SIMPLE: RIGHT UPPER EXTREMITY
LOCATION SIMPLE: LEFT UPPER EXTREMITY
LOCATION SIMPLE: LEFT CLAVICULAR SKIN

## 2024-07-31 NOTE — PROCEDURE: ADDITIONAL NOTES
Detail Level: Simple
Additional Notes: Biopsy proven lesion has resolved. No treatment necessary.
Render Risk Assessment In Note?: no

## 2024-07-31 NOTE — PROCEDURE: DIAGNOSIS COMMENT
Comment: biopsy proven, [id number]. -hypertrophic actinic keratosis, inflamed and crusted
Detail Level: Detailed
Render Risk Assessment In Note?: no

## 2024-07-31 NOTE — PROCEDURE: LIQUID NITROGEN
Detail Level: Detailed
Aperture Size (Optional): C
Show Topical Anesthesia Variable?: Yes
Include Z78.9 (Other Specified Conditions Influencing Health Status) As An Associated Diagnosis?: No
Duration Of Freeze Thaw-Cycle (Seconds): 3
Post-Care Instructions: I reviewed with the patient in detail post-care instructions. Patient is to wear sunprotection, and avoid picking at any of the treated lesions. Pt may apply Vaseline to crusted or scabbing areas.
Number Of Freeze-Thaw Cycles: 1 freeze-thaw cycle
Spray Paint Text: The liquid nitrogen was applied to the skin utilizing a spray paint frosting technique.
Medical Necessity Clause: This procedure was medically necessary because the lesions that were treated were:
Medical Necessity Information: It is in your best interest to select a reason for this procedure from the list below. All of these items fulfill various CMS LCD requirements except the new and changing color options.
Consent: The patient's verbal consent was obtained including but not limited to risks of crusting, scabbing, blistering, scarring, darker or lighter pigmentary change, recurrence, incomplete removal and infection.

## 2024-08-22 ENCOUNTER — OFFICE VISIT (OUTPATIENT)
Dept: MEDICAL GROUP | Facility: PHYSICIAN GROUP | Age: 79
End: 2024-08-22
Payer: MEDICARE

## 2024-08-22 ENCOUNTER — HOSPITAL ENCOUNTER (OUTPATIENT)
Dept: LAB | Facility: MEDICAL CENTER | Age: 79
End: 2024-08-22
Attending: FAMILY MEDICINE
Payer: MEDICARE

## 2024-08-22 VITALS
OXYGEN SATURATION: 98 % | BODY MASS INDEX: 31.22 KG/M2 | HEART RATE: 68 BPM | SYSTOLIC BLOOD PRESSURE: 128 MMHG | RESPIRATION RATE: 16 BRPM | TEMPERATURE: 98.8 F | DIASTOLIC BLOOD PRESSURE: 70 MMHG | WEIGHT: 206 LBS | HEIGHT: 68 IN

## 2024-08-22 DIAGNOSIS — I10 ESSENTIAL HYPERTENSION: ICD-10-CM

## 2024-08-22 DIAGNOSIS — Z63.6 CAREGIVER BURDEN: ICD-10-CM

## 2024-08-22 DIAGNOSIS — Z00.00 ADULT GENERAL MEDICAL EXAM: ICD-10-CM

## 2024-08-22 DIAGNOSIS — F32.0 CURRENT MILD EPISODE OF MAJOR DEPRESSIVE DISORDER, UNSPECIFIED WHETHER RECURRENT (HCC): ICD-10-CM

## 2024-08-22 DIAGNOSIS — E03.9 ACQUIRED HYPOTHYROIDISM: ICD-10-CM

## 2024-08-22 DIAGNOSIS — E66.9 OBESITY (BMI 30.0-34.9): ICD-10-CM

## 2024-08-22 DIAGNOSIS — Z12.5 PROSTATE CANCER SCREENING: ICD-10-CM

## 2024-08-22 LAB
ALBUMIN SERPL BCP-MCNC: 4.1 G/DL (ref 3.2–4.9)
ALBUMIN/GLOB SERPL: 2.1 G/DL
ALP SERPL-CCNC: 95 U/L (ref 30–99)
ALT SERPL-CCNC: 19 U/L (ref 2–50)
ANION GAP SERPL CALC-SCNC: 10 MMOL/L (ref 7–16)
APPEARANCE UR: CLEAR
AST SERPL-CCNC: 23 U/L (ref 12–45)
BASOPHILS # BLD AUTO: 0.6 % (ref 0–1.8)
BASOPHILS # BLD: 0.03 K/UL (ref 0–0.12)
BILIRUB SERPL-MCNC: 0.9 MG/DL (ref 0.1–1.5)
BILIRUB UR QL STRIP.AUTO: NEGATIVE
BUN SERPL-MCNC: 6 MG/DL (ref 8–22)
CALCIUM ALBUM COR SERPL-MCNC: 9.5 MG/DL (ref 8.5–10.5)
CALCIUM SERPL-MCNC: 9.6 MG/DL (ref 8.5–10.5)
CHLORIDE SERPL-SCNC: 106 MMOL/L (ref 96–112)
CHOLEST SERPL-MCNC: 106 MG/DL (ref 100–199)
CO2 SERPL-SCNC: 24 MMOL/L (ref 20–33)
COLOR UR: YELLOW
CREAT SERPL-MCNC: 0.71 MG/DL (ref 0.5–1.4)
EOSINOPHIL # BLD AUTO: 0.11 K/UL (ref 0–0.51)
EOSINOPHIL NFR BLD: 2.1 % (ref 0–6.9)
ERYTHROCYTE [DISTWIDTH] IN BLOOD BY AUTOMATED COUNT: 48.1 FL (ref 35.9–50)
GFR SERPLBLD CREATININE-BSD FMLA CKD-EPI: 94 ML/MIN/1.73 M 2
GLOBULIN SER CALC-MCNC: 2 G/DL (ref 1.9–3.5)
GLUCOSE SERPL-MCNC: 81 MG/DL (ref 65–99)
GLUCOSE UR STRIP.AUTO-MCNC: NEGATIVE MG/DL
HCT VFR BLD AUTO: 44.2 % (ref 42–52)
HDLC SERPL-MCNC: 60 MG/DL
HGB BLD-MCNC: 14.4 G/DL (ref 14–18)
IMM GRANULOCYTES # BLD AUTO: 0.02 K/UL (ref 0–0.11)
IMM GRANULOCYTES NFR BLD AUTO: 0.4 % (ref 0–0.9)
KETONES UR STRIP.AUTO-MCNC: NEGATIVE MG/DL
LDLC SERPL CALC-MCNC: 39 MG/DL
LEUKOCYTE ESTERASE UR QL STRIP.AUTO: NEGATIVE
LYMPHOCYTES # BLD AUTO: 0.99 K/UL (ref 1–4.8)
LYMPHOCYTES NFR BLD: 19.1 % (ref 22–41)
MCH RBC QN AUTO: 30.8 PG (ref 27–33)
MCHC RBC AUTO-ENTMCNC: 32.6 G/DL (ref 32.3–36.5)
MCV RBC AUTO: 94.4 FL (ref 81.4–97.8)
MICRO URNS: NORMAL
MONOCYTES # BLD AUTO: 0.44 K/UL (ref 0–0.85)
MONOCYTES NFR BLD AUTO: 8.5 % (ref 0–13.4)
NEUTROPHILS # BLD AUTO: 3.6 K/UL (ref 1.82–7.42)
NEUTROPHILS NFR BLD: 69.3 % (ref 44–72)
NITRITE UR QL STRIP.AUTO: NEGATIVE
NRBC # BLD AUTO: 0 K/UL
NRBC BLD-RTO: 0 /100 WBC (ref 0–0.2)
PH UR STRIP.AUTO: 7 [PH] (ref 5–8)
PLATELET # BLD AUTO: 194 K/UL (ref 164–446)
PMV BLD AUTO: 10.1 FL (ref 9–12.9)
POTASSIUM SERPL-SCNC: 4.1 MMOL/L (ref 3.6–5.5)
PROT SERPL-MCNC: 6.1 G/DL (ref 6–8.2)
PROT UR QL STRIP: NEGATIVE MG/DL
PSA SERPL-MCNC: 0.58 NG/ML (ref 0–4)
RBC # BLD AUTO: 4.68 M/UL (ref 4.7–6.1)
RBC UR QL AUTO: NEGATIVE
SODIUM SERPL-SCNC: 140 MMOL/L (ref 135–145)
SP GR UR STRIP.AUTO: 1.01
TRIGL SERPL-MCNC: 37 MG/DL (ref 0–149)
TSH SERPL DL<=0.005 MIU/L-ACNC: 2.5 UIU/ML (ref 0.38–5.33)
UROBILINOGEN UR STRIP.AUTO-MCNC: 1 MG/DL
VIT B12 SERPL-MCNC: 216 PG/ML (ref 211–911)
WBC # BLD AUTO: 5.2 K/UL (ref 4.8–10.8)

## 2024-08-22 PROCEDURE — 82607 VITAMIN B-12: CPT

## 2024-08-22 PROCEDURE — 84443 ASSAY THYROID STIM HORMONE: CPT

## 2024-08-22 PROCEDURE — 3078F DIAST BP <80 MM HG: CPT | Performed by: FAMILY MEDICINE

## 2024-08-22 PROCEDURE — 85025 COMPLETE CBC W/AUTO DIFF WBC: CPT

## 2024-08-22 PROCEDURE — 84153 ASSAY OF PSA TOTAL: CPT

## 2024-08-22 PROCEDURE — 81003 URINALYSIS AUTO W/O SCOPE: CPT

## 2024-08-22 PROCEDURE — 80053 COMPREHEN METABOLIC PANEL: CPT

## 2024-08-22 PROCEDURE — 99214 OFFICE O/P EST MOD 30 MIN: CPT | Performed by: FAMILY MEDICINE

## 2024-08-22 PROCEDURE — 80061 LIPID PANEL: CPT

## 2024-08-22 PROCEDURE — 3074F SYST BP LT 130 MM HG: CPT | Performed by: FAMILY MEDICINE

## 2024-08-22 PROCEDURE — 36415 COLL VENOUS BLD VENIPUNCTURE: CPT

## 2024-08-22 PROCEDURE — G2211 COMPLEX E/M VISIT ADD ON: HCPCS | Performed by: FAMILY MEDICINE

## 2024-08-22 RX ORDER — ESCITALOPRAM OXALATE 10 MG/1
10 TABLET ORAL DAILY
Qty: 90 TABLET | Refills: 1 | Status: SHIPPED | OUTPATIENT
Start: 2024-08-22

## 2024-08-22 SDOH — SOCIAL STABILITY - SOCIAL INSECURITY: DEPENDENT RELATIVE NEEDING CARE AT HOME: Z63.6

## 2024-08-22 NOTE — ASSESSMENT & PLAN NOTE
This is a chronic problem.  Patient see his wife every day.  She does have Alzheimer's.  She was on hospice but has been taken off of that.  Currently on palliative care.

## 2024-08-22 NOTE — ASSESSMENT & PLAN NOTE
This is a new problem.  Patient states he has developed some progression of his depression.  He had been handling on his own for quite a long time.  A lot of it is related to his wife's health conditions.  But recently he has had issues with his grandson who lives with him.  His grandson is going through a divorce but now dating other women.  He also brought in 2 dogs to the house that the patient really did not want to have.  The patient is having to care for him as the grandson is out with his other girlfriends.  This is causing a lot of strife.  He has had some suicidal ideations but no true intentions to hurt himself.  He has not been on antidepressants in the past.

## 2024-08-22 NOTE — PROGRESS NOTES
Subjective:     CC: Here for several issues.    HPI:   Angelo presents today with the following medical concerns:    Adult general medical exam  Patient is here today for medical exam and also several other issues.  He is also due for annual labs and those will be ordered.    Acquired hypothyroidism  This is a chronic problem.  He is on supplementation.  Labs to be ordered for follow-up.    Current mild episode of major depressive disorder (HCC)  This is a new problem.  Patient states he has developed some progression of his depression.  He had been handling on his own for quite a long time.  A lot of it is related to his wife's health conditions.  But recently he has had issues with his grandson who lives with him.  His grandson is going through a divorce but now dating other women.  He also brought in 2 dogs to the house that the patient really did not want to have.  The patient is having to care for him as the grandson is out with his other girlfriends.  This is causing a lot of strife.  He has had some suicidal ideations but no true intentions to hurt himself.  He has not been on antidepressants in the past.    Essential hypertension  This is a chronic stable condition.  Blood pressure well-controlled on current medications.    Caregiver burden  This is a chronic problem.  Patient see his wife every day.  She does have Alzheimer's.  She was on hospice but has been taken off of that.  Currently on palliative care.    Past Medical History:   Diagnosis Date    Angina of effort (HCC) 1/19/2021    ASTHMA     scheduled inhaler use    Bronchitis 02/2018    Erectile dysfunction 6/8/2016    GERD (gastroesophageal reflux disease) 6/8/2016    Heart burn     History of skin cancer 6/8/2016    Hyperlipidemia 6/8/2016    Hypertension     Hypothyroidism 6/8/2016    Indigestion     Obesity 6/8/2016    Pain 04/19/2018    chronic back pain, 0/10    Preventative health care 6/8/2016    Rosacea 6/8/2016    Seasonal allergies  6/8/2016       Social History     Tobacco Use    Smoking status: Never    Smokeless tobacco: Never   Vaping Use    Vaping status: Never Used   Substance Use Topics    Alcohol use: Yes     Comment: 1/week     Drug use: No     Comment: marijuana edibles (for pain control)       Current Outpatient Medications Ordered in Epic   Medication Sig Dispense Refill    escitalopram (LEXAPRO) 10 MG Tab Take 1 Tablet by mouth every day. 90 Tablet 1    minocycline (MINOCIN) 100 MG Cap TAKE ONE PILL TWICE A DAY WITH MEALS AND A FULL GLASS OF WATER. DO NOT LIE DOWN 1 HOUR AFTER TAKING.      alfuzosin (UROXATRAL) 10 MG SR tablet Take 10 mg by mouth every day.      metoprolol SR (TOPROL XL) 25 MG TABLET SR 24 HR Take 0.5 Tablets by mouth every day. 50 Tablet 3    fluticasone (FLONASE) 50 MCG/ACT nasal spray Administer 1 Spray into affected nostril(S) every day. 48 mL 2    atorvastatin (LIPITOR) 40 MG Tab Take 1 Tablet by mouth every evening. 100 Tablet 2    albuterol 108 (90 Base) MCG/ACT Aero Soln inhalation aerosol Inhale 2 Puffs every four hours as needed for Shortness of Breath. 1 Each 2    fluticasone-salmeterol (ADVAIR) 100-50 MCG/ACT AEROSOL POWDER, BREATH ACTIVATED Inhale 1 Puff 2 times a day. 3 Each 3    levothyroxine (SYNTHROID) 50 MCG Tab Take 1 Tablet by mouth every day for 360 days. 90 Tablet 3    omeprazole (PRILOSEC OTC) 20 MG tablet PRILOSEC OTC 20 MG TBEC      acetaminophen (TYLENOL) 500 MG Tab Take 1,000 mg by mouth 2 Times a Day.       No current [facility]-ordered facility-administered medications on file.       Allergies:  Patient has no known allergies.    Health Maintenance: Completed    ROS:  Gen: no fevers/chills, no changes in weight  Eyes: no changes in vision  ENT: no sore throat, no hearing loss, no bloody nose  Pulm: no sob, no cough  CV: no chest pain, no palpitations  GI: no nausea/vomiting, no diarrhea  : no dysuria  MSk: no myalgias  Skin: no rash  Neuro: no headaches, no numbness/tingling  Heme/Lymph: no  "easy bruising      Objective:       Exam:  /70 (BP Location: Left arm, Patient Position: Sitting, BP Cuff Size: Adult)   Pulse 68   Temp 37.1 °C (98.8 °F) (Temporal)   Resp 16   Ht 1.727 m (5' 8\")   Wt 93.4 kg (206 lb)   SpO2 98%   BMI 31.32 kg/m²  Body mass index is 31.32 kg/m².    Gen: Alert and oriented, No apparent distress.  Eyes:   Extraocular motions intact.  No scleral icterus seen.  Ears:    Ear canals and TMs are clear.  Neck: Neck is supple without lymphadenopathy.  Thyroid exam is normal.  Lungs: Normal effort, CTA bilaterally, no wheezes, rhonchi, or rales  CV: Regular rate and rhythm. No murmurs, rubs, or gallops.  No carotid bruits heard.  Abdomen: Soft, nontender, and organomegaly or masses.  Ext: No clubbing, cyanosis, edema.  Neuro: Cranial nerves II through VIII are gross intact.  No lateralized signs are seen.  Gait is normal.  Psych: Patient is alert and cooperative.  No unusual thrombus expressed.  No true suicidal ideations expressed.  Patient does not appear to be anxious.  He does not appear to be mildly depressed.    Labs: Ordered    Assessment & Plan:     79 y.o. male with the following -     1. Acquired hypothyroidism  This is a chronic problem.  Lab ordered.  - TSH WITH REFLEX TO FT4; Future    2. Essential hypertension  This is a chronic stable condition.  Continue current medications.  - Comp Metabolic Panel; Future  - Lipid Profile; Future  - URINALYSIS,CULTURE IF INDICATED; Future  - CBC WITH DIFFERENTIAL; Future    3. Adult general medical exam  Patient's medical exam was performed.  General health issues addressed.  Baseline labs ordered.  - Comp Metabolic Panel; Future  - Lipid Profile; Future  - URINALYSIS,CULTURE IF INDICATED; Future  - CBC WITH DIFFERENTIAL; Future  - ESTIMATED GFR; Future    4. Current mild episode of major depressive disorder, unspecified whether recurrent (HCC)  This is a new issue.  Will start him on Lexapro.  He is to report back in 1 to 2 " weeks.  I told him he can come in and see me at any time and if he has any progression of thoughts where he wants to hurt himself he is to let me know immediately.  Or he can seek urgent medical care.  - VITAMIN B12; Future    5. Prostate cancer screening  This is screening issue.  Lab work.  - PROSTATE SPECIFIC AG SCREENING; Future    6. Caregiver burden  This is a chronic problem.  We discussed issues with his wife and her care.    7. Obesity (BMI 30.0-34.9)  This is a chronic problem.  Continue to work on weight reduction.  - Patient identified as having weight management issue.  Appropriate orders and counseling given.      Return in about 3 months (around 11/22/2024) for Long.    Please note that this dictation was created using voice recognition software. I have made every reasonable attempt to correct obvious errors, but I expect that there are errors of grammar and possibly content that I did not discover before finalizing the note.         rest

## 2024-08-22 NOTE — ASSESSMENT & PLAN NOTE
Patient is here today for medical exam and also several other issues.  He is also due for annual labs and those will be ordered.

## 2024-12-09 ENCOUNTER — APPOINTMENT (OUTPATIENT)
Dept: URBAN - METROPOLITAN AREA CLINIC 4 | Facility: CLINIC | Age: 79
Setting detail: DERMATOLOGY
End: 2024-12-09

## 2024-12-09 DIAGNOSIS — D18.0 HEMANGIOMA: ICD-10-CM

## 2024-12-09 DIAGNOSIS — L57.8 OTHER SKIN CHANGES DUE TO CHRONIC EXPOSURE TO NONIONIZING RADIATION: ICD-10-CM

## 2024-12-09 DIAGNOSIS — Z85.820 PERSONAL HISTORY OF MALIGNANT MELANOMA OF SKIN: ICD-10-CM

## 2024-12-09 DIAGNOSIS — D22 MELANOCYTIC NEVI: ICD-10-CM

## 2024-12-09 DIAGNOSIS — L82.1 OTHER SEBORRHEIC KERATOSIS: ICD-10-CM

## 2024-12-09 DIAGNOSIS — Z85.828 PERSONAL HISTORY OF OTHER MALIGNANT NEOPLASM OF SKIN: ICD-10-CM

## 2024-12-09 PROBLEM — D22.5 MELANOCYTIC NEVI OF TRUNK: Status: ACTIVE | Noted: 2024-12-09

## 2024-12-09 PROBLEM — D18.01 HEMANGIOMA OF SKIN AND SUBCUTANEOUS TISSUE: Status: ACTIVE | Noted: 2024-12-09

## 2024-12-09 PROCEDURE — ? COUNSELING

## 2024-12-09 PROCEDURE — 99213 OFFICE O/P EST LOW 20 MIN: CPT

## 2024-12-09 ASSESSMENT — LOCATION DETAILED DESCRIPTION DERM
LOCATION DETAILED: RIGHT CENTRAL LATERAL NECK
LOCATION DETAILED: LEFT CLAVICULAR SKIN
LOCATION DETAILED: LEFT MID BACK
LOCATION DETAILED: RIGHT SUPERIOR POSTERIOR HELIX
LOCATION DETAILED: RIGHT INFERIOR LATERAL FOREHEAD
LOCATION DETAILED: LEFT SUPERIOR FOREHEAD
LOCATION DETAILED: LEFT CHEEK
LOCATION DETAILED: RIGHT CLAVICULAR SKIN
LOCATION DETAILED: RIGHT DORSAL FOREARM
LOCATION DETAILED: PERIUMBILICAL SKIN
LOCATION DETAILED: LEFT SUPERIOR MEDIAL UPPER BACK
LOCATION DETAILED: LEFT LATERAL SHOULDER
LOCATION DETAILED: INFERIOR THORACIC SPINE
LOCATION DETAILED: RIGHT MEDIAL SUPERIOR CHEST
LOCATION DETAILED: LEFT UPPER BACK
LOCATION DETAILED: LEFT DORSAL FOREARM

## 2024-12-09 ASSESSMENT — LOCATION ZONE DERM
LOCATION ZONE: TRUNK
LOCATION ZONE: FACE
LOCATION ZONE: EAR
LOCATION ZONE: ARM
LOCATION ZONE: NECK

## 2024-12-09 ASSESSMENT — LOCATION SIMPLE DESCRIPTION DERM
LOCATION SIMPLE: RIGHT EAR
LOCATION SIMPLE: LEFT SHOULDER
LOCATION SIMPLE: LEFT CHEEK
LOCATION SIMPLE: RIGHT CLAVICULAR SKIN
LOCATION SIMPLE: NECK
LOCATION SIMPLE: RIGHT UPPER EXTREMITY
LOCATION SIMPLE: ABDOMEN
LOCATION SIMPLE: LEFT UPPER BACK
LOCATION SIMPLE: LEFT UPPER EXTREMITY
LOCATION SIMPLE: LEFT FOREHEAD
LOCATION SIMPLE: LEFT CLAVICULAR SKIN
LOCATION SIMPLE: CHEST
LOCATION SIMPLE: RIGHT FOREHEAD
LOCATION SIMPLE: BACK
LOCATION SIMPLE: UPPER BACK

## 2024-12-19 ENCOUNTER — RX ONLY (RX ONLY)
Age: 79
End: 2024-12-19

## 2024-12-19 RX ORDER — DOXYCYCLINE HYCLATE 100 MG/1
1 CAPSULE CAPSULE, GELATIN COATED ORAL ONCE A DAY
Qty: 30 | Refills: 3 | Status: ERX | COMMUNITY
Start: 2024-12-19

## 2024-12-25 ENCOUNTER — PHARMACY VISIT (OUTPATIENT)
Dept: PHARMACY | Facility: MEDICAL CENTER | Age: 79
End: 2024-12-25
Payer: COMMERCIAL

## 2024-12-25 ENCOUNTER — OFFICE VISIT (OUTPATIENT)
Dept: URGENT CARE | Facility: PHYSICIAN GROUP | Age: 79
End: 2024-12-25
Payer: MEDICARE

## 2024-12-25 VITALS
HEART RATE: 101 BPM | RESPIRATION RATE: 16 BRPM | OXYGEN SATURATION: 97 % | DIASTOLIC BLOOD PRESSURE: 72 MMHG | BODY MASS INDEX: 34.01 KG/M2 | TEMPERATURE: 97 F | HEIGHT: 66 IN | WEIGHT: 211.64 LBS | SYSTOLIC BLOOD PRESSURE: 124 MMHG

## 2024-12-25 DIAGNOSIS — R05.1 ACUTE COUGH: ICD-10-CM

## 2024-12-25 DIAGNOSIS — J11.1 INFLUENZA-LIKE ILLNESS: ICD-10-CM

## 2024-12-25 LAB
FLUAV RNA SPEC QL NAA+PROBE: NEGATIVE
FLUBV RNA SPEC QL NAA+PROBE: NEGATIVE
RSV RNA SPEC QL NAA+PROBE: NEGATIVE
SARS-COV-2 RNA RESP QL NAA+PROBE: NEGATIVE

## 2024-12-25 PROCEDURE — 99213 OFFICE O/P EST LOW 20 MIN: CPT | Performed by: FAMILY MEDICINE

## 2024-12-25 PROCEDURE — RXMED WILLOW AMBULATORY MEDICATION CHARGE: Performed by: FAMILY MEDICINE

## 2024-12-25 PROCEDURE — 3078F DIAST BP <80 MM HG: CPT | Performed by: FAMILY MEDICINE

## 2024-12-25 PROCEDURE — 3074F SYST BP LT 130 MM HG: CPT | Performed by: FAMILY MEDICINE

## 2024-12-25 PROCEDURE — 0241U POCT CEPHEID COV-2, FLU A/B, RSV - PCR: CPT | Performed by: FAMILY MEDICINE

## 2024-12-25 RX ORDER — OSELTAMIVIR PHOSPHATE 75 MG/1
75 CAPSULE ORAL 2 TIMES DAILY
Qty: 10 CAPSULE | Refills: 0 | Status: SHIPPED | OUTPATIENT
Start: 2024-12-25 | End: 2024-12-30

## 2024-12-25 ASSESSMENT — ENCOUNTER SYMPTOMS
NAUSEA: 0
EYE REDNESS: 0
WEIGHT LOSS: 0
DIARRHEA: 0
VOMITING: 0
EYE DISCHARGE: 0

## 2024-12-25 ASSESSMENT — FIBROSIS 4 INDEX: FIB4 SCORE: 2.09

## 2024-12-25 NOTE — PROGRESS NOTES
"Subjective     Angelo Magaña is a 79 y.o. male who presents with Chills (Feels fatigue, body aches, chills, poss fever, sx started on Monday )            1.5 days fever/chills/night sweats, myalgia, fatigue, HA. Mild dry cough. SOB. No wheeze. PMH chronic bronchitis. No other aggravating or alleviating factors.          Review of Systems   Constitutional:  Negative for weight loss.   Eyes:  Negative for discharge and redness.   Gastrointestinal:  Negative for diarrhea, nausea and vomiting.   Musculoskeletal:  Negative for joint pain.   Skin:  Negative for itching and rash.              Objective     /72 (BP Location: Left arm, Patient Position: Sitting, BP Cuff Size: Large adult)   Pulse (!) 101   Temp 36.1 °C (97 °F) (Temporal)   Resp 16   Ht 1.676 m (5' 6\")   Wt 96 kg (211 lb 10.3 oz)   SpO2 97%   BMI 34.16 kg/m²      Physical Exam  Constitutional:       General: He is not in acute distress.     Appearance: He is well-developed.   HENT:      Head: Normocephalic and atraumatic.      Right Ear: Tympanic membrane normal.      Left Ear: Tympanic membrane normal.      Nose: Congestion present.      Mouth/Throat:      Mouth: Mucous membranes are moist.      Pharynx: Posterior oropharyngeal erythema present.   Eyes:      Conjunctiva/sclera: Conjunctivae normal.   Cardiovascular:      Rate and Rhythm: Regular rhythm. Tachycardia present.      Heart sounds: Normal heart sounds. No murmur heard.  Pulmonary:      Effort: Pulmonary effort is normal.      Breath sounds: Normal breath sounds. No wheezing.   Musculoskeletal:      Cervical back: Neck supple.   Lymphadenopathy:      Cervical: No cervical adenopathy.   Skin:     General: Skin is warm and dry.      Findings: No rash.   Neurological:      Mental Status: He is alert.                             Assessment & Plan   POCT PCR COVID-19, influenza, and RSV negative    1. Acute cough  POCT CEPHEID COV-2, FLU A/B, RSV - PCR      2. Influenza-like illness  " oseltamivir (TAMIFLU) 75 MG Cap        Differential diagnosis, natural history, supportive care, and indications for immediate follow-up were discussed.     Given prevalence of influenza in community and Itz's risk factors of chronic bronchitis and age over 65 we will treat empirically with Tamiflu.

## 2024-12-27 ENCOUNTER — OFFICE VISIT (OUTPATIENT)
Dept: URGENT CARE | Facility: PHYSICIAN GROUP | Age: 79
End: 2024-12-27
Payer: MEDICARE

## 2024-12-27 ENCOUNTER — HOSPITAL ENCOUNTER (OUTPATIENT)
Facility: MEDICAL CENTER | Age: 79
End: 2024-12-27
Attending: PHYSICIAN ASSISTANT
Payer: MEDICARE

## 2024-12-27 VITALS
WEIGHT: 207 LBS | HEART RATE: 78 BPM | DIASTOLIC BLOOD PRESSURE: 76 MMHG | RESPIRATION RATE: 14 BRPM | BODY MASS INDEX: 31.37 KG/M2 | SYSTOLIC BLOOD PRESSURE: 124 MMHG | TEMPERATURE: 97.5 F | HEIGHT: 68 IN | OXYGEN SATURATION: 96 %

## 2024-12-27 DIAGNOSIS — R31.9 URINARY TRACT INFECTION WITH HEMATURIA, SITE UNSPECIFIED: Primary | ICD-10-CM

## 2024-12-27 DIAGNOSIS — R50.9 FEVER AND CHILLS: ICD-10-CM

## 2024-12-27 DIAGNOSIS — R30.0 DYSURIA: ICD-10-CM

## 2024-12-27 DIAGNOSIS — N39.0 URINARY TRACT INFECTION WITH HEMATURIA, SITE UNSPECIFIED: ICD-10-CM

## 2024-12-27 DIAGNOSIS — N39.0 URINARY TRACT INFECTION WITH HEMATURIA, SITE UNSPECIFIED: Primary | ICD-10-CM

## 2024-12-27 DIAGNOSIS — R31.9 URINARY TRACT INFECTION WITH HEMATURIA, SITE UNSPECIFIED: ICD-10-CM

## 2024-12-27 LAB
APPEARANCE UR: NORMAL
BILIRUB UR STRIP-MCNC: NEGATIVE MG/DL
COLOR UR AUTO: NORMAL
GLUCOSE UR STRIP.AUTO-MCNC: NEGATIVE MG/DL
KETONES UR STRIP.AUTO-MCNC: NEGATIVE MG/DL
LEUKOCYTE ESTERASE UR QL STRIP.AUTO: NORMAL
NITRITE UR QL STRIP.AUTO: POSITIVE
PH UR STRIP.AUTO: 6 [PH] (ref 5–8)
PROT UR QL STRIP: 30 MG/DL
RBC UR QL AUTO: NORMAL
SP GR UR STRIP.AUTO: 1.01
UROBILINOGEN UR STRIP-MCNC: 8 MG/DL

## 2024-12-27 PROCEDURE — 3074F SYST BP LT 130 MM HG: CPT | Performed by: PHYSICIAN ASSISTANT

## 2024-12-27 PROCEDURE — 81002 URINALYSIS NONAUTO W/O SCOPE: CPT | Performed by: PHYSICIAN ASSISTANT

## 2024-12-27 PROCEDURE — 3078F DIAST BP <80 MM HG: CPT | Performed by: PHYSICIAN ASSISTANT

## 2024-12-27 PROCEDURE — 99213 OFFICE O/P EST LOW 20 MIN: CPT | Performed by: PHYSICIAN ASSISTANT

## 2024-12-27 PROCEDURE — 87086 URINE CULTURE/COLONY COUNT: CPT

## 2024-12-27 PROCEDURE — 87186 SC STD MICRODIL/AGAR DIL: CPT

## 2024-12-27 PROCEDURE — 87077 CULTURE AEROBIC IDENTIFY: CPT

## 2024-12-27 RX ORDER — NITROFURANTOIN 25; 75 MG/1; MG/1
100 CAPSULE ORAL 2 TIMES DAILY
Qty: 14 CAPSULE | Refills: 0 | Status: SHIPPED | OUTPATIENT
Start: 2024-12-27 | End: 2025-01-03

## 2024-12-27 ASSESSMENT — ENCOUNTER SYMPTOMS
FEVER: 1
FLANK PAIN: 0
MYALGIAS: 1
CHILLS: 1

## 2024-12-27 ASSESSMENT — FIBROSIS 4 INDEX: FIB4 SCORE: 2.09

## 2024-12-27 NOTE — PROGRESS NOTES
Subjective     Angelo Magaña is a 79 y.o. male who presents with Influenza (Still fighting the flu symptoms from previous visit/Difficulty urinating since 12/23/24 /P/t states they can urinate but its difficult to relax and let go. )    PMH:  has a past medical history of Angina of effort (HCC) (1/19/2021), ASTHMA, Bronchitis (02/2018), Erectile dysfunction (6/8/2016), GERD (gastroesophageal reflux disease) (6/8/2016), Heart burn, History of skin cancer (6/8/2016), Hyperlipidemia (6/8/2016), Hypertension, Hypothyroidism (6/8/2016), Indigestion, Obesity (6/8/2016), Pain (04/19/2018), Preventative health care (6/8/2016), Rosacea (6/8/2016), and Seasonal allergies (6/8/2016).  MEDS:   Current Outpatient Medications:     oseltamivir (TAMIFLU) 75 MG Cap, Take 1 Capsule by mouth 2 times a day for 5 days., Disp: 10 Capsule, Rfl: 0    minocycline (MINOCIN) 100 MG Cap, TAKE ONE PILL TWICE A DAY WITH MEALS AND A FULL GLASS OF WATER. DO NOT LIE DOWN 1 HOUR AFTER TAKING., Disp: , Rfl:     alfuzosin (UROXATRAL) 10 MG SR tablet, Take 10 mg by mouth every day., Disp: , Rfl:     metoprolol SR (TOPROL XL) 25 MG TABLET SR 24 HR, Take 0.5 Tablets by mouth every day., Disp: 50 Tablet, Rfl: 3    fluticasone (FLONASE) 50 MCG/ACT nasal spray, Administer 1 Spray into affected nostril(S) every day., Disp: 48 mL, Rfl: 2    atorvastatin (LIPITOR) 40 MG Tab, Take 1 Tablet by mouth every evening., Disp: 100 Tablet, Rfl: 2    albuterol 108 (90 Base) MCG/ACT Aero Soln inhalation aerosol, Inhale 2 Puffs every four hours as needed for Shortness of Breath., Disp: 1 Each, Rfl: 2    fluticasone-salmeterol (ADVAIR) 100-50 MCG/ACT AEROSOL POWDER, BREATH ACTIVATED, Inhale 1 Puff 2 times a day., Disp: 3 Each, Rfl: 3    levothyroxine (SYNTHROID) 50 MCG Tab, Take 1 Tablet by mouth every day for 360 days., Disp: 90 Tablet, Rfl: 3    omeprazole (PRILOSEC OTC) 20 MG tablet, PRILOSEC OTC 20 MG TBEC, Disp: , Rfl:     acetaminophen (TYLENOL) 500 MG Tab,  Take 1,000 mg by mouth 2 Times a Day., Disp: , Rfl:     escitalopram (LEXAPRO) 10 MG Tab, Take 1 Tablet by mouth every day., Disp: 90 Tablet, Rfl: 1  ALLERGIES: No Known Allergies  SURGHX:   Past Surgical History:   Procedure Laterality Date    LUMBAR FUSION O-ARM N/A 11/19/2018    Procedure: LUMBAR FUSION O-ARM-  POSTERIOR L2-3 TLIF,;  Surgeon: Brandon Fowler M.D.;  Location: SURGERY [facility];  Service: Neurosurgery    LUMBAR LAMINECTOMY DISKECTOMY N/A 11/19/2018    Procedure: LUMBAR LAMINECTOMY DISKECTOMY-  L2-3 LAMI;  Surgeon: Brandon Fowler M.D.;  Location: SURGERY [facility];  Service: Neurosurgery    HARDWARE REMOVAL NEURO N/A 11/19/2018    Procedure: HARDWARE REMOVAL NEURO-  L4-5, L3 SCREW REPOSITIONING;  Surgeon: Brandon Fowler M.D.;  Location: SURGERY [facility];  Service: Neurosurgery    KYPHOPLASTY N/A 11/19/2018    Procedure: KYPHOPLASTY-  L2;  Surgeon: Brandon Fowler M.D.;  Location: SURGERY [facility];  Service: Neurosurgery    VENTRAL HERNIA REPAIR Left 5/3/2018    Procedure: VENTRAL HERNIA REPAIR FOR LUMBAR HERNIA/ LUNG HERNIA THROUGH CHEST WALL RECONSTRUCTION, MAJOR  ;  Surgeon: Phoenix Medina M.D.;  Location: SURGERY [facility];  Service: General    IRRIGATION & DEBRIDEMENT NEURO  3/30/2017    Procedure: IRRIGATION & DEBRIDEMENT NEURO-LUMBAR WOUND;  Surgeon: Juan Miguel Chakraborty M.D.;  Location: SURGERY [facility];  Service:     FUSION, SPINE, LUMBAR, PLIF N/A 3/17/2017    Procedure: LUMBAR FUSION POSTERIOR L3-4 EXTENSION;  Surgeon: Juan Miguel Chakraborty M.D.;  Location: SURGERY [facility];  Service:     LUMBAR LAMINECTOMY DISKECTOMY N/A 3/17/2017    Procedure: LUMBAR LAMINECTOMY DISKECTOMY L3 & REDO L4;  Surgeon: Juan Miguel Chakraborty M.D.;  Location: SURGERY [facility];  Service:     HARDWARE REMOVAL NEURO N/A 3/17/2017    Procedure: HARDWARE REMOVAL NEURO L4-5;  Surgeon: Juan Miguel Chakraborty M.D.;  Location: SURGERY [facility];  Service:     FUSION, SPINE, LUMBAR,  "PLIF  9/9/2009    Performed by JOSIAH SOLITARIO at SURGERY [facility]    LUMBAR LAMINECTOMY DISKECTOMY  9/9/2009    Performed by JOSIAH SOLITARIO at SURGERY [facility]    KNEE ARTHROPLASTY TOTAL  5/26/2009    Performed by NAREN DANIELSON at SURGERY [facility]    HIP ARTHROPLASTY TOTAL  1/21/2009    Performed by NAREN DANIELSON at SURGERY [facility]    INGUINAL HERNIA REPAIR  9/2/08    Performed by MERLYN BECKER at SURGERY SAME DAY [facility]    INGUINAL HERNIA REPAIR  6/10/08    Performed by MERLYN BECKER at SURGERY SAME DAY [facility]    HERNIA REPAIR  2008    HERNIA     ROMAN BY LAPAROSCOPY  1982    APPENDECTOMY  1953    TONSILLECTOMY  1951    OTHER      DISCECTOMY WITH HARWWARE    OTHER      GASTRIC BYPASS AT 30 YEARS     SOCHX:  reports that he has never smoked. He has never used smokeless tobacco. He reports current alcohol use. He reports that he does not use drugs.  FH: Reviewed with patient, not pertinent to this visit.           Patient presents with:  Influenza: Still fighting the flu symptoms from previous visit, chills, body aches and a cough.  Patient is still taking Tamiflu which she feels has improved those symptoms.  Difficulty urinating since 12/23/24 with dysuria and one episode of penile discharge a few days ago, patient has not noticed again since.  P/t states they can urinate but its difficult to relax and let go but once he does go to the bathroom he states he does feel he is emptying his bladder completely.  No other complaints.        Review of Systems   Constitutional:  Positive for chills and fever.   Genitourinary:  Positive for dysuria. Negative for flank pain and hematuria.   Musculoskeletal:  Positive for myalgias.   All other systems reviewed and are negative.             Objective     /76 (BP Location: Left arm, Patient Position: Sitting, BP Cuff Size: Adult)   Pulse 78   Temp 36.4 °C (97.5 °F) (Temporal)   Resp 14   Ht 1.727 m (5' 8\")   Wt 93.9 kg " (207 lb)   SpO2 96%   BMI 31.47 kg/m²      Physical Exam  Vitals and nursing note reviewed.   Constitutional:       General: He is not in acute distress.     Appearance: Normal appearance. He is well-developed and normal weight. He is not toxic-appearing.   HENT:      Head: Normocephalic and atraumatic.      Nose: Nose normal.      Mouth/Throat:      Mouth: Mucous membranes are moist.   Eyes:      Extraocular Movements: Extraocular movements intact.      Conjunctiva/sclera: Conjunctivae normal.      Pupils: Pupils are equal, round, and reactive to light.   Cardiovascular:      Rate and Rhythm: Normal rate and regular rhythm.      Pulses: Normal pulses.      Heart sounds: Normal heart sounds.   Pulmonary:      Effort: Pulmonary effort is normal.      Breath sounds: Normal breath sounds.   Abdominal:      General: Bowel sounds are normal.      Palpations: Abdomen is soft.      Tenderness: There is no guarding or rebound.   Musculoskeletal:         General: Normal range of motion.      Cervical back: Normal range of motion and neck supple.   Skin:     General: Skin is warm and dry.      Capillary Refill: Capillary refill takes less than 2 seconds.   Neurological:      General: No focal deficit present.      Mental Status: He is alert and oriented to person, place, and time.      Gait: Gait normal.   Psychiatric:         Mood and Affect: Mood normal.         Behavior: Behavior is cooperative.                             Assessment & Plan        Assessment & Plan  Dysuria    Orders:    POCT Urinalysis    Fever and chills    Orders:    POCT Urinalysis          Results for orders placed or performed in visit on 12/27/24   POCT Urinalysis    Collection Time: 12/27/24  8:44 AM   Result Value Ref Range    POC Color NEDA Negative    POC Appearance TURID Negative    POC Glucose NEGATIVE Negative mg/dL    POC Bilirubin NEGATIVE Negative mg/dL    POC Ketones NEGATIVE Negative mg/dL    POC Specific Gravity 1.015 <1.005 - >1.030     POC Blood LARGE Negative    POC Urine PH 6.0 5.0 - 8.0    POC Protein 30 Negative mg/dL    POC Urobiligen 8.0 Negative (0.2) mg/dL    POC Nitrites POSITIVE Negative    POC Leukocyte Esterase LARGE Negative     *Note: Due to a large number of results and/or encounters for the requested time period, some results have not been displayed. A complete set of results can be found in Results Review.   PT HPI and PE are consistent with  acute uti with hematuria.  I will treat with macrobid based on pt most recent culture and change if need     Differential diagnosis, supportive care, and indications for immediate follow-up discussed with patient.  Instructed to return to clinic or nearest emergency department for any change in condition, further concerns, or worsening of symptoms.    I personally reviewed prior external notes and test results pertinent to today's visit.  I have independently reviewed and interpreted all diagnostics ordered during this urgent care visit.    PT should follow up with PCP in 1-2 days for re-evaluation if symptoms have not improved.      Discussed red flags and reasons to return to UC or ED.      Pt and/or family verbalized understanding of diagnosis and follow up instructions and was offered informational handout on diagnosis.  PT discharged.     Please note that this dictation was created using voice recognition software. I have made every reasonable attempt to correct obvious errors, but I expect that there may be errors of grammar and possibly content that I did not discover before finalizing the note.

## 2025-01-07 ENCOUNTER — HOSPITAL ENCOUNTER (OUTPATIENT)
Facility: MEDICAL CENTER | Age: 80
End: 2025-01-07
Payer: MEDICARE

## 2025-01-07 ENCOUNTER — OFFICE VISIT (OUTPATIENT)
Dept: URGENT CARE | Facility: PHYSICIAN GROUP | Age: 80
End: 2025-01-07
Payer: MEDICARE

## 2025-01-07 VITALS
SYSTOLIC BLOOD PRESSURE: 124 MMHG | BODY MASS INDEX: 31.07 KG/M2 | OXYGEN SATURATION: 97 % | TEMPERATURE: 97.9 F | DIASTOLIC BLOOD PRESSURE: 76 MMHG | RESPIRATION RATE: 17 BRPM | HEIGHT: 68 IN | WEIGHT: 205 LBS | HEART RATE: 80 BPM

## 2025-01-07 DIAGNOSIS — N30.00 ACUTE CYSTITIS WITHOUT HEMATURIA: ICD-10-CM

## 2025-01-07 LAB
APPEARANCE UR: NORMAL
BILIRUB UR STRIP-MCNC: NORMAL MG/DL
COLOR UR AUTO: NORMAL
GLUCOSE UR STRIP.AUTO-MCNC: NORMAL MG/DL
KETONES UR STRIP.AUTO-MCNC: NORMAL MG/DL
LEUKOCYTE ESTERASE UR QL STRIP.AUTO: NORMAL
NITRITE UR QL STRIP.AUTO: NORMAL
PH UR STRIP.AUTO: 6 [PH] (ref 5–8)
PROT UR QL STRIP: NORMAL MG/DL
RBC UR QL AUTO: NORMAL
SP GR UR STRIP.AUTO: 1.02
UROBILINOGEN UR STRIP-MCNC: 2 MG/DL

## 2025-01-07 PROCEDURE — 3078F DIAST BP <80 MM HG: CPT

## 2025-01-07 PROCEDURE — 87086 URINE CULTURE/COLONY COUNT: CPT

## 2025-01-07 PROCEDURE — 81002 URINALYSIS NONAUTO W/O SCOPE: CPT

## 2025-01-07 PROCEDURE — 3074F SYST BP LT 130 MM HG: CPT

## 2025-01-07 PROCEDURE — 87077 CULTURE AEROBIC IDENTIFY: CPT

## 2025-01-07 PROCEDURE — 87186 SC STD MICRODIL/AGAR DIL: CPT

## 2025-01-07 PROCEDURE — 99214 OFFICE O/P EST MOD 30 MIN: CPT

## 2025-01-07 RX ORDER — SULFAMETHOXAZOLE AND TRIMETHOPRIM 800; 160 MG/1; MG/1
1 TABLET ORAL EVERY 12 HOURS
Qty: 20 TABLET | Refills: 0 | Status: SHIPPED | OUTPATIENT
Start: 2025-01-07 | End: 2025-01-17

## 2025-01-07 ASSESSMENT — ENCOUNTER SYMPTOMS
COUGH: 1
FLANK PAIN: 0
MYALGIAS: 1
CHILLS: 1
FEVER: 0

## 2025-01-07 ASSESSMENT — FIBROSIS 4 INDEX: FIB4 SCORE: 2.09

## 2025-01-07 NOTE — PROGRESS NOTES
Subjective:     CHIEF COMPLAINT  Chief Complaint   Patient presents with    Painful Urination     Finished antibiotic a week ago, had relief but sx did not resolve completely.    Body Aches     Had flu a few weeks ago, still feeling fatigued and having body aches.       HPI  Angelo Magaña is a very pleasant 79 y.o. male who presents with painful urination, increased urinary urgency, and increased urinary frequency.  He was previously seen in the urgent care on 12/27/2024 with acute cystitis.  He was initiated on a 7-day course of Macrobid and had a urine culture obtained with positive growth of E. coli with sensitivity to Macrobid.  He had improvement in symptoms while on the antibiotic, but reports that his symptoms have since returned.  He denies any flank pain or known prostate issues.  He sees a urologist annually with his most recent visit in October with normal findings.    Additionally, the patient had been seen in the urgent care on 12/25/2024 with an influenza-like illness.  He had a viral panel performed with negative results for COVID, influenza, and RSV.  He reports that his symptoms have primarily resolved but he still experiences intermittent body aches and chills.  He has been able to tolerate activity such as going to the gym 5 days/week.  He has not had a fever.  He does report he has had a mild cough, but nothing remarkable.  He has a history of chronic bronchitis.      REVIEW OF SYSTEMS  Review of Systems   Constitutional:  Positive for chills and malaise/fatigue. Negative for fever.   Respiratory:  Positive for cough (Mild).    Genitourinary:  Positive for dysuria, frequency and urgency. Negative for flank pain.   Musculoskeletal:  Positive for myalgias.       PAST MEDICAL HISTORY  Patient Active Problem List    Diagnosis Date Noted    Current mild episode of major depressive disorder (HCC) 08/22/2024    Chronic right hip pain 04/30/2024    Adult general medical exam 11/10/2023    Cerebral  atrophy (HCC) 11/10/2023    Aortic atherosclerosis (HCC) 11/10/2023    Obesity (BMI 30.0-34.9) 07/06/2023    Other constipation 09/16/2022    Lumbar back pain with radiculopathy affecting left lower extremity 12/19/2018    BPH (benign prostatic hyperplasia) 12/17/2018    Caregiver burden 04/25/2018    Ascending aorta dilation (CMS-HCC), mild, needs OP followup 03/30/2017    Acquired hypothyroidism 06/08/2016    Erectile dysfunction 06/08/2016    History of skin cancer 06/08/2016    Asthma 06/08/2016    Seasonal allergies 06/08/2016    GERD (gastroesophageal reflux disease) 06/08/2016    Dyslipidemia 06/08/2016    Essential hypertension 06/08/2016       SURGICAL HISTORY   has a past surgical history that includes hip arthroplasty total (1/21/2009); knee arthroplasty total (5/26/2009); inguinal hernia repair (6/10/08); inguinal hernia repair (9/2/08); fusion, spine, lumbar, plif (9/9/2009); lumbar laminectomy diskectomy (9/9/2009); other; hernia repair (2008); other; fusion, spine, lumbar, plif (N/A, 3/17/2017); lumbar laminectomy diskectomy (N/A, 3/17/2017); hardware removal neuro (N/A, 3/17/2017); irrigation & debridement neuro (3/30/2017); appendectomy (1953); nancy by laparoscopy (1982); tonsillectomy (1951); ventral hernia repair (Left, 5/3/2018); lumbar fusion o-arm (N/A, 11/19/2018); lumbar laminectomy diskectomy (N/A, 11/19/2018); hardware removal neuro (N/A, 11/19/2018); and kyphoplasty (N/A, 11/19/2018).    ALLERGIES  No Known Allergies    CURRENT MEDICATIONS  Home Medications       Reviewed by Cyndi Beltre P.A.-C. (Physician Assistant) on 01/07/25 at 1148  Med List Status: <None>     Medication Last Dose Status   acetaminophen (TYLENOL) 500 MG Tab PRN Active   albuterol 108 (90 Base) MCG/ACT Aero Soln inhalation aerosol PRN Active   alfuzosin (UROXATRAL) 10 MG SR tablet Taking Active   atorvastatin (LIPITOR) 40 MG Tab Taking Active   escitalopram (LEXAPRO) 10 MG Tab Taking Active   fluticasone  "(FLONASE) 50 MCG/ACT nasal spray Taking Active   fluticasone-salmeterol (ADVAIR) 100-50 MCG/ACT AEROSOL POWDER, BREATH ACTIVATED Taking Active   levothyroxine (SYNTHROID) 50 MCG Tab Taking Active   metoprolol SR (TOPROL XL) 25 MG TABLET SR 24 HR Taking Active   minocycline (MINOCIN) 100 MG Cap Taking Active   omeprazole (PRILOSEC OTC) 20 MG tablet Taking Active                    SOCIAL HISTORY  Social History     Tobacco Use    Smoking status: Never    Smokeless tobacco: Never   Vaping Use    Vaping status: Never Used   Substance and Sexual Activity    Alcohol use: Yes     Comment: 1/week     Drug use: No     Comment: marijuana edibles (for pain control)    Sexual activity: Not Currently     Partners: Female       FAMILY HISTORY  Family History   Problem Relation Age of Onset    Hypertension Mother     Dementia Mother     Hypertension Father     Lung Disease Father         asthma          Objective:     VITAL SIGNS: /76 (BP Location: Left arm, Patient Position: Sitting, BP Cuff Size: Adult long)   Pulse 80   Temp 36.6 °C (97.9 °F) (Temporal)   Resp 17   Ht 1.727 m (5' 8\")   Wt 93 kg (205 lb)   SpO2 97%   BMI 31.17 kg/m²     PHYSICAL EXAM  Physical Exam  Vitals reviewed.   Constitutional:       General: He is not in acute distress.     Appearance: Normal appearance. He is not ill-appearing or toxic-appearing.   HENT:      Head: Normocephalic and atraumatic.      Nose: No congestion.      Mouth/Throat:      Mouth: Mucous membranes are moist.   Eyes:      Conjunctiva/sclera: Conjunctivae normal.      Pupils: Pupils are equal, round, and reactive to light.   Cardiovascular:      Rate and Rhythm: Normal rate and regular rhythm.      Heart sounds: Normal heart sounds.   Pulmonary:      Effort: Pulmonary effort is normal. No respiratory distress.      Breath sounds: Normal breath sounds. No stridor. No wheezing, rhonchi or rales.   Skin:     General: Skin is warm and dry.      Coloration: Skin is not pale. "   Neurological:      General: No focal deficit present.      Mental Status: He is alert and oriented to person, place, and time.   Psychiatric:         Mood and Affect: Mood normal.         Assessment/Plan:     1. Acute cystitis without hematuria  - POCT Urinalysis  - URINE CULTURE(NEW); Future  - sulfamethoxazole-trimethoprim (BACTRIM DS) 800-160 MG tablet; Take 1 Tablet by mouth every 12 hours for 10 days.  Dispense: 20 Tablet; Refill: 0  -Increase hydration  -Avoid intercourse until antibiotic has been completed and symptoms have fully resolved  -Urinate immediately after intercourse  -Return to clinic if symptoms worsen or fail to resolve    MDM/Comments:  I have prepared for this visit by personally reviewing the patient's prevous medical records, vitals, and labs including: most recent urine culture results from 12/27/24 with positive growth of E. Coli with sensitivity to Macrobid and Bactrim. Most recent GFR of 94 reviewed. Patient has stable vital signs and is non-toxic appearing.  Urinalysis obtained positive for blood and leukocytes.  Patient has been initiated on a 10-day course of Bactrim.  Urine has been sent for culture with results pending.  Patient's lungs are clear to auscultation bilaterally with a pulse oxygen of 97% on room air.  Residual body aches and chills are likely secondary to recent viral illness.  There is no evidence of a respiratory bacterial infection at this time.  Discussed supportive care with hydration, rest, Tylenol as needed. Patient demonstrated understanding of treatment plan at this time and will RTC if symptoms worsen or fail to resolve.       Differential diagnosis, natural history, supportive care, and indications for immediate follow-up discussed. All questions answered. Patient agrees with the plan of care.    Follow-up as needed if symptoms worsen or fail to improve to PCP, Urgent care or Emergency Room.    I have personally reviewed prior external notes and test  results pertinent to today's visit.  I have independently reviewed and interpreted all diagnostics ordered during this urgent care acute visit.   Discussed management options (risks,benefits, and alternatives to treatment). Pt expresses understanding and the treatment plan was agreed upon. Questions were encouraged and answered to pt's satisfaction.    Please note that this dictation was created using voice recognition software. I have made a reasonable attempt to correct obvious errors, but I expect that there are errors of grammar and possibly content that I did not discover before finalizing the note.

## 2025-01-22 ENCOUNTER — OFFICE VISIT (OUTPATIENT)
Dept: URGENT CARE | Facility: PHYSICIAN GROUP | Age: 80
End: 2025-01-22
Payer: MEDICARE

## 2025-01-22 VITALS
OXYGEN SATURATION: 97 % | RESPIRATION RATE: 14 BRPM | WEIGHT: 209.77 LBS | SYSTOLIC BLOOD PRESSURE: 128 MMHG | HEART RATE: 83 BPM | DIASTOLIC BLOOD PRESSURE: 76 MMHG | HEIGHT: 68 IN | TEMPERATURE: 98.8 F | BODY MASS INDEX: 31.79 KG/M2

## 2025-01-22 DIAGNOSIS — Z87.440 HISTORY OF UTI: ICD-10-CM

## 2025-01-22 LAB
APPEARANCE UR: CLEAR
BILIRUB UR STRIP-MCNC: NEGATIVE MG/DL
COLOR UR AUTO: YELLOW
GLUCOSE UR STRIP.AUTO-MCNC: NEGATIVE MG/DL
KETONES UR STRIP.AUTO-MCNC: NEGATIVE MG/DL
LEUKOCYTE ESTERASE UR QL STRIP.AUTO: NEGATIVE
NITRITE UR QL STRIP.AUTO: NEGATIVE
PH UR STRIP.AUTO: 7 [PH] (ref 5–8)
PROT UR QL STRIP: NEGATIVE MG/DL
RBC UR QL AUTO: NEGATIVE
SP GR UR STRIP.AUTO: 1.01
UROBILINOGEN UR STRIP-MCNC: 1 MG/DL

## 2025-01-22 PROCEDURE — 99213 OFFICE O/P EST LOW 20 MIN: CPT

## 2025-01-22 PROCEDURE — 3078F DIAST BP <80 MM HG: CPT

## 2025-01-22 PROCEDURE — 3074F SYST BP LT 130 MM HG: CPT

## 2025-01-22 PROCEDURE — 81002 URINALYSIS NONAUTO W/O SCOPE: CPT

## 2025-01-22 RX ORDER — LEVOTHYROXINE SODIUM 50 UG/1
50 TABLET ORAL
Qty: 100 TABLET | Refills: 2 | Status: SHIPPED | OUTPATIENT
Start: 2025-01-22 | End: 2025-11-18

## 2025-01-22 ASSESSMENT — ENCOUNTER SYMPTOMS
FEVER: 0
NAUSEA: 0
VOMITING: 0
DIARRHEA: 0
CHILLS: 0
FLANK PAIN: 0
ABDOMINAL PAIN: 0

## 2025-01-22 ASSESSMENT — FIBROSIS 4 INDEX: FIB4 SCORE: 2.09

## 2025-01-22 NOTE — TELEPHONE ENCOUNTER
Received request via: Pharmacy    Was the patient seen in the last year in this department? Yes    Does the patient have an active prescription (recently filled or refills available) for medication(s) requested? No    Pharmacy Name: [facility]/pharmacy #[id number] - [place], [place] - [address] [facility].     Does the patient have [facility] Plus and need 100-day supply? (This applies to ALL medications) Yes, quantity updated to 100 days

## 2025-01-22 NOTE — PROGRESS NOTES
"Subjective:   Angelo Magaña is a 79 y.o. male who presents for Follow-Up (uti follow up )    HPI  This is very pleasant patient presenting for UTI follow-up. He was seen 1/7/2025 and was prescribed Bactrim, tolerated well and finished full course. Last urine culture did show sensitivity to Bactrim. Patient states symptoms have resolved completely but is requesting a urine test. Denies fever/chills. Denies back pain/flank pain.     Review of Systems   Constitutional:  Negative for chills and fever.   Gastrointestinal:  Negative for abdominal pain, diarrhea, nausea and vomiting.   Genitourinary:  Negative for dysuria, flank pain, frequency, hematuria and urgency.   Skin:  Negative for rash.   All other systems reviewed and are negative.    Medications:    acetaminophen Tabs  albuterol Aers  alfuzosin  atorvastatin Tabs  escitalopram Tabs  fluticasone  fluticasone-salmeterol Aepb  levothyroxine Tabs  metoprolol SR Tb24  minocycline Caps  omeprazole    Allergies:  Patient has no known allergies.      Past Social Hx:  Angelo Magaña  reports that he has never smoked. He has never used smokeless tobacco. He reports current alcohol use. He reports that he does not use drugs.    Past Medical Hx:   Past Medical History:   Diagnosis Date    Angina of effort (HCC) 1/19/2021    ASTHMA     scheduled inhaler use    Bronchitis 02/2018    Erectile dysfunction 6/8/2016    GERD (gastroesophageal reflux disease) 6/8/2016    Heart burn     History of skin cancer 6/8/2016    Hyperlipidemia 6/8/2016    Hypertension     Hypothyroidism 6/8/2016    Indigestion     Obesity 6/8/2016    Pain 04/19/2018    chronic back pain, 0/10    Preventative health care 6/8/2016    Rosacea 6/8/2016    Seasonal allergies 6/8/2016      Problem list, medications, and allergies reviewed by myself today in Epic.     Objective:     /76   Pulse 83   Temp 37.1 °C (98.8 °F)   Resp 14   Ht 1.727 m (5' 8\")   Wt 95.1 kg (209 lb 12.3 oz)   SpO2 " 97%   BMI 31.90 kg/m²     Physical Exam  Vitals reviewed.   Constitutional:       Appearance: Normal appearance.   HENT:      Head: Normocephalic and atraumatic.      Right Ear: External ear normal.      Left Ear: External ear normal.      Nose: Nose normal.      Mouth/Throat:      Mouth: Mucous membranes are moist.   Eyes:      Conjunctiva/sclera: Conjunctivae normal.   Cardiovascular:      Rate and Rhythm: Normal rate.      Heart sounds: Normal heart sounds.   Pulmonary:      Effort: Pulmonary effort is normal.      Breath sounds: Normal breath sounds.   Skin:     General: Skin is warm and dry.      Capillary Refill: Capillary refill takes less than 2 seconds.   Neurological:      Mental Status: He is alert and oriented to person, place, and time.       Lab Results   Component Value Date/Time    POCCOLOR Yellow 01/22/2025 12:55 PM    POCAPPEAR Clear 01/22/2025 12:55 PM    POCLEUKEST Negative 01/22/2025 12:55 PM    POCNITRITE Negative 01/22/2025 12:55 PM    POCUROBILIGE 1.0 01/22/2025 12:55 PM    POCPROTEIN Negative 01/22/2025 12:55 PM    POCURPH 7.0 01/22/2025 12:55 PM    POCBLOOD Negative 01/22/2025 12:55 PM    POCSPGRV 1.015 01/22/2025 12:55 PM    POCKETONES Negative 01/22/2025 12:55 PM    POCBILIRUBIN Negative 01/22/2025 12:55 PM    POCGLUCUA Negative 01/22/2025 12:55 PM       Assessment/Plan:     Assessment & Plan  History of UTI    Orders:    POCT Urinalysis    - VSS. Physical exam benign. UA results discussed with patient during visit.   - Recommended adequate hydration and monitor for any signs and symptoms of UTI. RTC or follow-up with PCP as needed    Differential diagnosis, natural history, supportive care, and indications for immediate follow-up discussed. Advised the patient to follow-up with PCP for recheck, reevaluation, and consideration of further management.    Dylon Woody DNP, APRN, FNP-BC

## 2025-02-01 DIAGNOSIS — E78.5 DYSLIPIDEMIA: ICD-10-CM

## 2025-02-01 DIAGNOSIS — I25.10 CORONARY ARTERY DISEASE WITHOUT ANGINA PECTORIS, UNSPECIFIED VESSEL OR LESION TYPE, UNSPECIFIED WHETHER NATIVE OR TRANSPLANTED HEART: ICD-10-CM

## 2025-02-04 ENCOUNTER — HOSPITAL ENCOUNTER (OUTPATIENT)
Facility: MEDICAL CENTER | Age: 80
End: 2025-02-04
Attending: NURSE PRACTITIONER
Payer: MEDICARE

## 2025-02-04 ENCOUNTER — OFFICE VISIT (OUTPATIENT)
Dept: URGENT CARE | Facility: PHYSICIAN GROUP | Age: 80
End: 2025-02-04
Payer: MEDICARE

## 2025-02-04 VITALS
DIASTOLIC BLOOD PRESSURE: 64 MMHG | OXYGEN SATURATION: 98 % | SYSTOLIC BLOOD PRESSURE: 118 MMHG | WEIGHT: 203.93 LBS | TEMPERATURE: 97.9 F | BODY MASS INDEX: 31.01 KG/M2 | RESPIRATION RATE: 18 BRPM | HEART RATE: 60 BPM

## 2025-02-04 DIAGNOSIS — R30.0 BURNING WITH URINATION: ICD-10-CM

## 2025-02-04 DIAGNOSIS — N30.01 ACUTE CYSTITIS WITH HEMATURIA: ICD-10-CM

## 2025-02-04 LAB
APPEARANCE UR: NORMAL
BILIRUB UR STRIP-MCNC: NEGATIVE MG/DL
COLOR UR AUTO: YELLOW
GLUCOSE UR STRIP.AUTO-MCNC: NEGATIVE MG/DL
KETONES UR STRIP.AUTO-MCNC: NEGATIVE MG/DL
LEUKOCYTE ESTERASE UR QL STRIP.AUTO: NORMAL
NITRITE UR QL STRIP.AUTO: POSITIVE
PH UR STRIP.AUTO: 7 [PH] (ref 5–8)
PROT UR QL STRIP: NEGATIVE MG/DL
RBC UR QL AUTO: NORMAL
SP GR UR STRIP.AUTO: 1.01
UROBILINOGEN UR STRIP-MCNC: 4 MG/DL

## 2025-02-04 PROCEDURE — 87077 CULTURE AEROBIC IDENTIFY: CPT

## 2025-02-04 PROCEDURE — 3078F DIAST BP <80 MM HG: CPT | Performed by: NURSE PRACTITIONER

## 2025-02-04 PROCEDURE — 99213 OFFICE O/P EST LOW 20 MIN: CPT | Performed by: NURSE PRACTITIONER

## 2025-02-04 PROCEDURE — 81002 URINALYSIS NONAUTO W/O SCOPE: CPT | Performed by: NURSE PRACTITIONER

## 2025-02-04 PROCEDURE — 87491 CHLMYD TRACH DNA AMP PROBE: CPT

## 2025-02-04 PROCEDURE — 87086 URINE CULTURE/COLONY COUNT: CPT

## 2025-02-04 PROCEDURE — 3074F SYST BP LT 130 MM HG: CPT | Performed by: NURSE PRACTITIONER

## 2025-02-04 PROCEDURE — 87186 SC STD MICRODIL/AGAR DIL: CPT

## 2025-02-04 PROCEDURE — 87591 N.GONORRHOEAE DNA AMP PROB: CPT

## 2025-02-04 RX ORDER — ATORVASTATIN CALCIUM 40 MG/1
40 TABLET, FILM COATED ORAL EVERY EVENING
Qty: 100 TABLET | Refills: 2 | Status: SHIPPED | OUTPATIENT
Start: 2025-02-04

## 2025-02-04 RX ORDER — SULFAMETHOXAZOLE AND TRIMETHOPRIM 800; 160 MG/1; MG/1
1 TABLET ORAL 2 TIMES DAILY
Qty: 20 TABLET | Refills: 0 | Status: SHIPPED | OUTPATIENT
Start: 2025-02-04 | End: 2025-02-14

## 2025-02-04 ASSESSMENT — FIBROSIS 4 INDEX: FIB4 SCORE: 2.09

## 2025-02-04 ASSESSMENT — ENCOUNTER SYMPTOMS
DIARRHEA: 0
ABDOMINAL PAIN: 0
NAUSEA: 0
ORTHOPNEA: 0
FLANK PAIN: 0
HEADACHES: 0
BACK PAIN: 0
DIZZINESS: 0
CHILLS: 0
VOMITING: 0
FEVER: 0

## 2025-02-04 NOTE — TELEPHONE ENCOUNTER
Received request via: Pharmacy    Was the patient seen in the last year in this department? Yes    Does the patient have an active prescription (recently filled or refills available) for medication(s) requested? No    Pharmacy Name: [facility]/pharmacy #[id number] - [place], [place] - [address] [facility].     Does the patient have FDC Plus and need 100-day supply? (This applies to ALL medications) Yes, quantity updated to 100 days

## 2025-02-04 NOTE — PROGRESS NOTES
Subjective     Angelo Magaña is a 79 y.o. male who presents with UTI (Pt states he noticed white discharge and states that he noticed today that he has been having irritation and burning when urinating since this morning )            HPI  New problem.  Patient is a very pleasant 79-year-old male who presents with urinary tract symptoms of discharge, and burning with urination.  He also has had some frequency.  He denies seeing any blood in his urine, abdominal pain, back pain, or fever.  He has had no nausea with the symptoms.  He has not taken any medications for the symptoms.  He has had a urinary tract infection that was difficult to resolve around [place]ss[place] time however a 10-day course of Bactrim finally made it go away.    Patient has no known allergies.  Current Outpatient Medications on File Prior to Visit   Medication Sig Dispense Refill    atorvastatin (LIPITOR) 40 MG Tab TAKE 1 TABLET BY MOUTH EVERY DAY IN THE EVENING 100 Tablet 2    levothyroxine (SYNTHROID) 50 MCG Tab TAKE 1 TABLET BY MOUTH EVERY DAY  DAYS 100 Tablet 2    escitalopram (LEXAPRO) 10 MG Tab Take 1 Tablet by mouth every day. 90 Tablet 1    metoprolol SR (TOPROL XL) 25 MG TABLET SR 24 HR Take 0.5 Tablets by mouth every day. 50 Tablet 3    fluticasone (FLONASE) 50 MCG/ACT nasal spray Administer 1 Spray into affected nostril(S) every day. 48 mL 2    albuterol 108 (90 Base) MCG/ACT Aero Soln inhalation aerosol Inhale 2 Puffs every four hours as needed for Shortness of Breath. 1 Each 2    fluticasone-salmeterol (ADVAIR) 100-50 MCG/ACT AEROSOL POWDER, BREATH ACTIVATED Inhale 1 Puff 2 times a day. 3 Each 3    omeprazole (PRILOSEC OTC) 20 MG tablet PRILOSEC OTC 20 MG TBEC      acetaminophen (TYLENOL) 500 MG Tab Take 1,000 mg by mouth 2 Times a Day.      minocycline (MINOCIN) 100 MG Cap TAKE ONE PILL TWICE A DAY WITH MEALS AND A FULL GLASS OF WATER. DO NOT LIE DOWN 1 HOUR AFTER TAKING. (Patient not taking: Reported on 1/22/2025)       alfuzosin (UROXATRAL) 10 MG SR tablet Take 10 mg by mouth every day. (Patient not taking: Reported on 1/22/2025)       No current facility-administered medications on file prior to visit.     Social History     Socioeconomic History    Marital status:      Spouse name: Not on file    Number of children: Not on file    Years of education: Not on file    Highest education level: Not on file   Occupational History    Not on file   Tobacco Use    Smoking status: Never    Smokeless tobacco: Never   Vaping Use    Vaping status: Never Used   Substance and Sexual Activity    Alcohol use: Yes     Comment: 1/week     Drug use: No     Comment: marijuana edibles (for pain control)    Sexual activity: Not Currently     Partners: Female   Other Topics Concern     Service No    Blood Transfusions No    Caffeine Concern No    Occupational Exposure No    Hobby Hazards No    Sleep Concern No    Stress Concern No    Weight Concern Yes    Special Diet No    Back Care Yes    Exercise Yes    Bike Helmet No     Comment: does not ride bike     Seat Belt Yes    Self-Exams No   Social History Narrative    Not on file     Social Drivers of Health     Financial Resource Strain: Not on file   Food Insecurity: Not on file   Transportation Needs: Not on file   Physical Activity: Not on file   Stress: Not on file   Social Connections: Not on file   Intimate Partner Violence: Not on file   Housing Stability: Not on file     Breast Cancer-related family history is not on file.      Review of Systems   Constitutional:  Negative for chills and fever.   Cardiovascular:  Negative for chest pain and orthopnea.   Gastrointestinal:  Negative for abdominal pain, diarrhea, nausea and vomiting.   Genitourinary:  Positive for dysuria and frequency. Negative for flank pain, hematuria and urgency.        +discharge.   Musculoskeletal:  Negative for back pain.   Neurological:  Negative for dizziness and headaches.              Objective     BP  118/64 (BP Location: Left arm, Patient Position: Sitting, BP Cuff Size: Adult long)   Pulse 60   Temp 36.6 °C (97.9 °F) (Temporal)   Resp 18   Wt 92.5 kg (203 lb 14.8 oz)   SpO2 98%   BMI 31.01 kg/m²      Physical Exam  Vitals reviewed.   Constitutional:       General: He is not in acute distress.     Appearance: He is well-developed.   Cardiovascular:      Rate and Rhythm: Normal rate and regular rhythm.      Heart sounds: Normal heart sounds. No murmur heard.  Pulmonary:      Effort: Pulmonary effort is normal. No respiratory distress.      Breath sounds: Normal breath sounds.   Abdominal:      General: Bowel sounds are normal.      Palpations: Abdomen is soft.      Tenderness: There is no abdominal tenderness. There is no right CVA tenderness or left CVA tenderness.   Musculoskeletal:         General: Normal range of motion.      Comments: Moves all 4 extremities normally   Skin:     General: Skin is warm and dry.   Neurological:      Mental Status: He is alert and oriented to person, place, and time.   Psychiatric:         Behavior: Behavior normal.         Thought Content: Thought content normal.                             Assessment & Plan   1. Acute cystitis with hematuria  sulfamethoxazole-trimethoprim (BACTRIM DS) 800-160 MG tablet      2. Burning with urination  POCT Urinalysis    Chlamydia/GC, PCR (Urine)        Patient with positive leukocytes and nitrates in his urine as well as blood.  I have placed him on a 10-day course of Bactrim for his symptoms and we have done a GC chlamydia as well due to the fact that he has had some penile discharge.  He does have a significant other and both he and I agree it is probably reasonable to check.  I will call him with those results.  He is advised to push fluids.     Assessment & Plan  Burning with urination    Orders:    POCT Urinalysis

## 2025-02-05 ENCOUNTER — TELEPHONE (OUTPATIENT)
Dept: URGENT CARE | Facility: PHYSICIAN GROUP | Age: 80
End: 2025-02-05

## 2025-02-05 ENCOUNTER — OFFICE VISIT (OUTPATIENT)
Dept: MEDICAL GROUP | Facility: PHYSICIAN GROUP | Age: 80
End: 2025-02-05
Payer: MEDICARE

## 2025-02-05 VITALS
HEIGHT: 68 IN | DIASTOLIC BLOOD PRESSURE: 72 MMHG | HEART RATE: 74 BPM | TEMPERATURE: 99.8 F | WEIGHT: 201 LBS | SYSTOLIC BLOOD PRESSURE: 120 MMHG | RESPIRATION RATE: 16 BRPM | OXYGEN SATURATION: 96 % | BODY MASS INDEX: 30.46 KG/M2

## 2025-02-05 DIAGNOSIS — N30.90 CYSTITIS: ICD-10-CM

## 2025-02-05 LAB
C TRACH DNA SPEC QL NAA+PROBE: NEGATIVE
N GONORRHOEA DNA SPEC QL NAA+PROBE: NEGATIVE
SPECIMEN SOURCE: NORMAL

## 2025-02-05 PROCEDURE — 3078F DIAST BP <80 MM HG: CPT | Performed by: FAMILY MEDICINE

## 2025-02-05 PROCEDURE — 99213 OFFICE O/P EST LOW 20 MIN: CPT | Performed by: FAMILY MEDICINE

## 2025-02-05 PROCEDURE — 3074F SYST BP LT 130 MM HG: CPT | Performed by: FAMILY MEDICINE

## 2025-02-05 ASSESSMENT — PATIENT HEALTH QUESTIONNAIRE - PHQ9
6. FEELING BAD ABOUT YOURSELF - OR THAT YOU ARE A FAILURE OR HAVE LET YOURSELF OR YOUR FAMILY DOWN: NOT AL ALL
9. THOUGHTS THAT YOU WOULD BE BETTER OFF DEAD, OR OF HURTING YOURSELF: NOT AT ALL
3. TROUBLE FALLING OR STAYING ASLEEP OR SLEEPING TOO MUCH: NOT AT ALL
2. FEELING DOWN, DEPRESSED, IRRITABLE, OR HOPELESS: NOT AT ALL
5. POOR APPETITE OR OVEREATING: NOT AT ALL
8. MOVING OR SPEAKING SO SLOWLY THAT OTHER PEOPLE COULD HAVE NOTICED. OR THE OPPOSITE, BEING SO FIGETY OR RESTLESS THAT YOU HAVE BEEN MOVING AROUND A LOT MORE THAN USUAL: NOT AT ALL
4. FEELING TIRED OR HAVING LITTLE ENERGY: NOT AT ALL
SUM OF ALL RESPONSES TO PHQ QUESTIONS 1-9: 0
1. LITTLE INTEREST OR PLEASURE IN DOING THINGS: NOT AT ALL
7. TROUBLE CONCENTRATING ON THINGS, SUCH AS READING THE NEWSPAPER OR WATCHING TELEVISION: NOT AT ALL
SUM OF ALL RESPONSES TO PHQ9 QUESTIONS 1 AND 2: 0

## 2025-02-05 ASSESSMENT — FIBROSIS 4 INDEX: FIB4 SCORE: 2.09

## 2025-02-05 NOTE — PROGRESS NOTES
Subjective:     CC: Here to discuss recent UTIs.    HPI:   Angelo presents today with the following medical concerns:    Cystitis  This is a recurrent issue.  Patient was seen yesterday and had another urinalysis done.  Showed evidence of infection.  Cultures pending as well as a GC and chlamydia test.  This is his third cystitis since December.  He is PSA is low.  He states he saw his urologist last fall and everything looked good and by his report there was no residual urine in his bladder.  He is concerned on whether or not he can transmit something to his girlfriend with sex.    Past Medical History:   Diagnosis Date    Angina of effort (HCC) 1/19/2021    ASTHMA     scheduled inhaler use    Bronchitis 02/2018    Erectile dysfunction 6/8/2016    GERD (gastroesophageal reflux disease) 6/8/2016    Heart burn     History of skin cancer 6/8/2016    Hyperlipidemia 6/8/2016    Hypertension     Hypothyroidism 6/8/2016    Indigestion     Obesity 6/8/2016    Pain 04/19/2018    chronic back pain, 0/10    Preventative health care 6/8/2016    Rosacea 6/8/2016    Seasonal allergies 6/8/2016       Social History     Tobacco Use    Smoking status: Never    Smokeless tobacco: Never   Vaping Use    Vaping status: Never Used   Substance Use Topics    Alcohol use: Yes     Comment: 1/week     Drug use: No     Comment: marijuana edibles (for pain control)       Current Outpatient Medications Ordered in Epic   Medication Sig Dispense Refill    atorvastatin (LIPITOR) 40 MG Tab TAKE 1 TABLET BY MOUTH EVERY DAY IN THE EVENING 100 Tablet 2    sulfamethoxazole-trimethoprim (BACTRIM DS) 800-160 MG tablet Take 1 Tablet by mouth 2 times a day for 10 days. 20 Tablet 0    levothyroxine (SYNTHROID) 50 MCG Tab TAKE 1 TABLET BY MOUTH EVERY DAY  DAYS 100 Tablet 2    escitalopram (LEXAPRO) 10 MG Tab Take 1 Tablet by mouth every day. 90 Tablet 1    minocycline (MINOCIN) 100 MG Cap       alfuzosin (UROXATRAL) 10 MG SR tablet Take 10 mg by  "mouth every day.      metoprolol SR (TOPROL XL) 25 MG TABLET SR 24 HR Take 0.5 Tablets by mouth every day. 50 Tablet 3    fluticasone (FLONASE) 50 MCG/ACT nasal spray Administer 1 Spray into affected nostril(S) every day. 48 mL 2    albuterol 108 (90 Base) MCG/ACT Aero Soln inhalation aerosol Inhale 2 Puffs every four hours as needed for Shortness of Breath. 1 Each 2    fluticasone-salmeterol (ADVAIR) 100-50 MCG/ACT AEROSOL POWDER, BREATH ACTIVATED Inhale 1 Puff 2 times a day. 3 Each 3    omeprazole (PRILOSEC OTC) 20 MG tablet PRILOSEC OTC 20 MG TBEC      acetaminophen (TYLENOL) 500 MG Tab Take 1,000 mg by mouth 2 Times a Day.       No current [facility]-ordered facility-administered medications on file.       Allergies:  Patient has no known allergies.    Health Maintenance: Completed    ROS:  Gen: no fevers/chills, no changes in weight  Eyes: no changes in vision  ENT: no sore throat, no hearing loss, no bloody nose  Pulm: no sob, no cough  CV: no chest pain, no palpitations  GI: no nausea/vomiting, no diarrhea  MSk: no myalgias  Skin: no rash  Neuro: no headaches, no numbness/tingling  Heme/Lymph: no easy bruising      Objective:       Exam:  /72 (BP Location: Left arm, Patient Position: Sitting, BP Cuff Size: Adult)   Pulse 74   Temp 37.7 °C (99.8 °F) (Temporal)   Resp 16   Ht 1.727 m (5' 8\")   Wt 91.2 kg (201 lb)   SpO2 96%   BMI 30.56 kg/m²  Body mass index is 30.56 kg/m².    Gen: Alert and oriented, No apparent distress.  Lungs: Normal effort,   Ext: No clubbing, cyanosis, edema.      Labs: Results from urgent care point-of-care UA reviewed.  Urine culture and chlamydia GC test pending.    Assessment & Plan:     79 y.o. male with the following -     1. Cystitis  This is a new recurrent problem.  I told him it is a concern that he is had so many within a short amount of time.  Will wait and see what his urine culture shows.  He is already on Bactrim and he was told to continue on that for now.  He is to " do a urinalysis to check for cure at the end of his antibiotics.  And I told him I would recommend that he see his urologist as well.  He wants to wait until he sees what his repeat urinalysis shows.  In the meantime encouraged him to drink cranberry juice or take cranberry pills.  Also to urinate after intercourse.  - URINALYSIS,CULTURE IF INDICATED; Future      No follow-ups on file.    Please note that this dictation was created using voice recognition software. I have made every reasonable attempt to correct obvious errors, but I expect that there are errors of grammar and possibly content that I did not discover before finalizing the note.

## 2025-02-05 NOTE — ASSESSMENT & PLAN NOTE
This is a recurrent issue.  Patient was seen yesterday and had another urinalysis done.  Showed evidence of infection.  Cultures pending as well as a GC and chlamydia test.  This is his third cystitis since December.  He is PSA is low.  He states he saw his urologist last fall and everything looked good and by his report there was no residual urine in his bladder.  He is concerned on whether or not he can transmit something to his girlfriend with sex.

## 2025-02-14 ENCOUNTER — HOSPITAL ENCOUNTER (OUTPATIENT)
Facility: MEDICAL CENTER | Age: 80
End: 2025-02-14
Attending: FAMILY MEDICINE
Payer: MEDICARE

## 2025-02-14 DIAGNOSIS — N30.90 CYSTITIS: ICD-10-CM

## 2025-02-14 LAB
APPEARANCE UR: CLEAR
BACTERIA #/AREA URNS HPF: NORMAL /HPF
BILIRUB UR QL STRIP.AUTO: NEGATIVE
CASTS URNS QL MICRO: NORMAL /LPF (ref 0–2)
COLOR UR: YELLOW
EPITHELIAL CELLS 1715: NORMAL /HPF (ref 0–5)
GLUCOSE UR STRIP.AUTO-MCNC: NEGATIVE MG/DL
KETONES UR STRIP.AUTO-MCNC: NEGATIVE MG/DL
LEUKOCYTE ESTERASE UR QL STRIP.AUTO: ABNORMAL
MICRO URNS: ABNORMAL
NITRITE UR QL STRIP.AUTO: NEGATIVE
PH UR STRIP.AUTO: 6.5 [PH] (ref 5–8)
PROT UR QL STRIP: NEGATIVE MG/DL
RBC # URNS HPF: NORMAL /HPF (ref 0–2)
RBC UR QL AUTO: NEGATIVE
SP GR UR STRIP.AUTO: 1.01
UROBILINOGEN UR STRIP.AUTO-MCNC: 1 EU/DL
WBC #/AREA URNS HPF: NORMAL /HPF

## 2025-02-14 PROCEDURE — 81001 URINALYSIS AUTO W/SCOPE: CPT

## 2025-02-18 ENCOUNTER — RESULTS FOLLOW-UP (OUTPATIENT)
Dept: MEDICAL GROUP | Facility: PHYSICIAN GROUP | Age: 80
End: 2025-02-18
Payer: MEDICARE

## 2025-02-28 ENCOUNTER — HOSPITAL ENCOUNTER (OUTPATIENT)
Facility: MEDICAL CENTER | Age: 80
End: 2025-02-28
Payer: MEDICARE

## 2025-02-28 PROCEDURE — 87077 CULTURE AEROBIC IDENTIFY: CPT

## 2025-02-28 PROCEDURE — 87186 SC STD MICRODIL/AGAR DIL: CPT

## 2025-02-28 PROCEDURE — 87086 URINE CULTURE/COLONY COUNT: CPT

## 2025-03-03 RX ORDER — CEPHALEXIN 250 MG/1
1 CAPSULE ORAL 2 TIMES DAILY
Qty: 180 EACH | Refills: 2 | Status: SHIPPED | OUTPATIENT
Start: 2025-03-03

## 2025-03-05 ENCOUNTER — HOSPITAL ENCOUNTER (OUTPATIENT)
Dept: LAB | Facility: MEDICAL CENTER | Age: 80
End: 2025-03-05
Payer: MEDICARE

## 2025-03-05 LAB
BUN SERPL-MCNC: 10 MG/DL (ref 8–22)
CREAT SERPL-MCNC: 1.11 MG/DL (ref 0.5–1.4)
GFR SERPLBLD CREATININE-BSD FMLA CKD-EPI: 68 ML/MIN/1.73 M 2

## 2025-03-05 PROCEDURE — 82565 ASSAY OF CREATININE: CPT

## 2025-03-05 PROCEDURE — 84520 ASSAY OF UREA NITROGEN: CPT

## 2025-03-05 PROCEDURE — 36415 COLL VENOUS BLD VENIPUNCTURE: CPT

## 2025-03-07 DIAGNOSIS — I10 ESSENTIAL HYPERTENSION: ICD-10-CM

## 2025-03-07 RX ORDER — METOPROLOL SUCCINATE 25 MG/1
12.5 TABLET, EXTENDED RELEASE ORAL DAILY
Qty: 50 TABLET | Refills: 1 | Status: SHIPPED | OUTPATIENT
Start: 2025-03-07

## 2025-03-07 NOTE — TELEPHONE ENCOUNTER
Received request via: Pharmacy    Was the patient seen in the last year in this department? Yes    Does the patient have an active prescription (recently filled or refills available) for medication(s) requested? No    Pharmacy Name: [facility]/pharmacy #[id number] - [place], [place] - [address].      Does the patient have assisted Plus and need 100-day supply? (This applies to ALL medications) Yes, quantity updated to 100 days

## 2025-03-14 ENCOUNTER — HOSPITAL ENCOUNTER (OUTPATIENT)
Dept: RADIOLOGY | Facility: MEDICAL CENTER | Age: 80
End: 2025-03-14
Payer: MEDICARE

## 2025-03-14 DIAGNOSIS — N39.0 UTI (URINARY TRACT INFECTION), UNCOMPLICATED: ICD-10-CM

## 2025-03-14 PROCEDURE — 700117 HCHG RX CONTRAST REV CODE 255

## 2025-03-14 PROCEDURE — 74178 CT ABD&PLV WO CNTR FLWD CNTR: CPT

## 2025-03-14 RX ADMIN — IOHEXOL 100 ML: 350 INJECTION, SOLUTION INTRAVENOUS at 13:13

## 2025-03-24 ENCOUNTER — PATIENT MESSAGE (OUTPATIENT)
Dept: HEALTH INFORMATION MANAGEMENT | Facility: OTHER | Age: 80
End: 2025-03-24

## 2025-05-07 ENCOUNTER — OFFICE VISIT (OUTPATIENT)
Dept: MEDICAL GROUP | Facility: PHYSICIAN GROUP | Age: 80
End: 2025-05-07
Payer: MEDICARE

## 2025-05-07 VITALS
WEIGHT: 207 LBS | OXYGEN SATURATION: 99 % | DIASTOLIC BLOOD PRESSURE: 66 MMHG | TEMPERATURE: 99 F | HEART RATE: 60 BPM | HEIGHT: 68 IN | BODY MASS INDEX: 31.37 KG/M2 | RESPIRATION RATE: 16 BRPM | SYSTOLIC BLOOD PRESSURE: 124 MMHG

## 2025-05-07 DIAGNOSIS — H25.093 OTHER AGE-RELATED INCIPIENT CATARACT OF BOTH EYES: ICD-10-CM

## 2025-05-07 DIAGNOSIS — R20.0 NUMBNESS AND TINGLING OF BOTH FEET: ICD-10-CM

## 2025-05-07 DIAGNOSIS — F32.0 CURRENT MILD EPISODE OF MAJOR DEPRESSIVE DISORDER, UNSPECIFIED WHETHER RECURRENT (HCC): ICD-10-CM

## 2025-05-07 DIAGNOSIS — R20.2 NUMBNESS AND TINGLING OF BOTH FEET: ICD-10-CM

## 2025-05-07 PROBLEM — H25.9 AGE-RELATED CATARACT OF BOTH EYES: Status: ACTIVE | Noted: 2025-05-07

## 2025-05-07 PROBLEM — N30.90 CYSTITIS: Status: RESOLVED | Noted: 2025-02-05 | Resolved: 2025-05-07

## 2025-05-07 PROCEDURE — 3074F SYST BP LT 130 MM HG: CPT | Performed by: FAMILY MEDICINE

## 2025-05-07 PROCEDURE — 99213 OFFICE O/P EST LOW 20 MIN: CPT | Performed by: FAMILY MEDICINE

## 2025-05-07 PROCEDURE — 3078F DIAST BP <80 MM HG: CPT | Performed by: FAMILY MEDICINE

## 2025-05-07 ASSESSMENT — FIBROSIS 4 INDEX: FIB4 SCORE: 2.12

## 2025-05-07 NOTE — ASSESSMENT & PLAN NOTE
This is a chronic problem.  He continues to have intermittent episodes of feelings of depression.  His wife is in the memory center and really does not know him at all anymore.  He also is having some stress over the fact that he cannot take care of his home as he has 2 acres.  He is thinking about selling that and moving into a smaller house.

## 2025-05-07 NOTE — PROGRESS NOTES
Subjective:     CC: Here for several issues.    HPI:   Angelo presents today with the following medical concerns:    Numbness and tingling of both feet  This is a new problem.  Over the last several months patient has been having some tingling sensations with numbness of the top of both feet.  It has not been spreading.  He thought it might be due to his shoes so he loosened his laces but that has not helped.  When we did check his vitamin B12 last year it was very low normal and he has been taking supplement.  He is concerned about neuropathy.  He states with this new onset of odd sensation it feels like his balance is a little more off as well.    Age-related cataract of both eyes  This is a new problem.  Patient states he was found to have bilateral cataracts and is due to have surgery next month.    Current mild episode of major depressive disorder (HCC)  This is a chronic problem.  He continues to have intermittent episodes of feelings of depression.  His wife is in the memory center and really does not know him at all anymore.  He also is having some stress over the fact that he cannot take care of his home as he has 2 acres.  He is thinking about selling that and moving into a smaller house.    Past Medical History:   Diagnosis Date    Angina of effort (HCC) 1/19/2021    ASTHMA     scheduled inhaler use    Bronchitis 02/2018    Erectile dysfunction 6/8/2016    GERD (gastroesophageal reflux disease) 6/8/2016    Heart burn     History of skin cancer 6/8/2016    Hyperlipidemia 6/8/2016    Hypertension     Hypothyroidism 6/8/2016    Indigestion     Obesity 6/8/2016    Pain 04/19/2018    chronic back pain, 0/10    Preventative health care 6/8/2016    Rosacea 6/8/2016    Seasonal allergies 6/8/2016       Social History     Tobacco Use    Smoking status: Never    Smokeless tobacco: Never   Vaping Use    Vaping status: Never Used   Substance Use Topics    Alcohol use: Yes     Comment: 1/week     Drug use: No      "Comment: marijuana edibles (for pain control)       Current Outpatient Medications Ordered in Epic   Medication Sig Dispense Refill    metoprolol SR (TOPROL XL) 25 MG TABLET SR 24 HR TAKE 1/2 TABLET BY MOUTH DAILY 50 Tablet 1    ADVAIR DISKUS 100-50 MCG/ACT AEROSOL POWDER, BREATH ACTIVATED INHALE 1 PUFF BY MOUTH 2 TIMES A  Each 2    atorvastatin (LIPITOR) 40 MG Tab TAKE 1 TABLET BY MOUTH EVERY DAY IN THE EVENING 100 Tablet 2    levothyroxine (SYNTHROID) 50 MCG Tab TAKE 1 TABLET BY MOUTH EVERY DAY  DAYS 100 Tablet 2    minocycline (MINOCIN) 100 MG Cap       alfuzosin (UROXATRAL) 10 MG SR tablet Take 10 mg by mouth every day.      fluticasone (FLONASE) 50 MCG/ACT nasal spray Administer 1 Spray into affected nostril(S) every day. 48 mL 2    albuterol 108 (90 Base) MCG/ACT Aero Soln inhalation aerosol Inhale 2 Puffs every four hours as needed for Shortness of Breath. 1 Each 2    omeprazole (PRILOSEC OTC) 20 MG tablet PRILOSEC OTC 20 MG TBEC      acetaminophen (TYLENOL) 500 MG Tab Take 1,000 mg by mouth 2 Times a Day.       No current [facility]-ordered facility-administered medications on file.       Allergies:  Patient has no known allergies.    Health Maintenance: Completed    ROS:  Gen: no fevers/chills, no changes in weight  Eyes: no changes in vision  ENT: no sore throat, no hearing loss, no bloody nose  Pulm: no sob, no cough  CV: no chest pain, no palpitations  GI: no nausea/vomiting, no diarrhea  : no dysuria  MSk: no myalgias  Skin: no rash  Neuro: no headaches,   Heme/Lymph: no easy bruising      Objective:       Exam:  /66 (BP Location: Left arm, Patient Position: Sitting, BP Cuff Size: Adult)   Pulse 60   Temp 37.2 °C (99 °F) (Temporal)   Resp 16   Ht 1.727 m (5' 8\")   Wt 93.9 kg (207 lb)   SpO2 99%   BMI 31.47 kg/m²  Body mass index is 31.47 kg/m².    Gen: Alert and oriented, No apparent distress.  Neck: Neck is supple without lymphadenopathy.  Lungs: Normal effort,  Ext: No clubbing, " cyanosis, edema.  Neuro: Cranial nerves are grossly intact.  Gait appears normal.  Psych: Patient is alert and cooperative.  No unusual thought George expressed.  Insight and judgment is good.    Labs: Previous lab test reviewed    Assessment & Plan:     80 y.o. male with the following -     1. Numbness and tingling of both feet  This is a new problem over the last several months.  Referring to neurology for evaluation and baseline testing.  - Referral to Neurology    2. Other age-related incipient cataract of both eyes  This is a new problem.  Discussed upcoming surgery.    3. Current mild episode of major depressive disorder, unspecified whether recurrent (HCC)  This is a chronic problem.  Continue to monitor.  Will continue to support him as well.  Not needing medication at this time.  I did agree with him that moving into a smaller home that requires less maintenance is a very good idea for him.  This should take some other stress off of his shoulders.      Return if symptoms worsen or fail to improve.    Please note that this dictation was created using voice recognition software. I have made every reasonable attempt to correct obvious errors, but I expect that there are errors of grammar and possibly content that I did not discover before finalizing the note.

## 2025-05-07 NOTE — ASSESSMENT & PLAN NOTE
This is a new problem.  Patient states he was found to have bilateral cataracts and is due to have surgery next month.

## 2025-05-07 NOTE — ASSESSMENT & PLAN NOTE
This is a new problem.  Over the last several months patient has been having some tingling sensations with numbness of the top of both feet.  It has not been spreading.  He thought it might be due to his shoes so he loosened his laces but that has not helped.  When we did check his vitamin B12 last year it was very low normal and he has been taking supplement.  He is concerned about neuropathy.  He states with this new onset of odd sensation it feels like his balance is a little more off as well.

## 2025-06-16 ENCOUNTER — TELEPHONE (OUTPATIENT)
Dept: MEDICAL GROUP | Facility: PHYSICIAN GROUP | Age: 80
End: 2025-06-16
Payer: MEDICARE

## 2025-06-16 NOTE — TELEPHONE ENCOUNTER
1. Caller Name: Angelo Magaña                          Call Back Number [phone]        How would the patient prefer to be contacted with a response: Phone call do NOT leave a detailed message    Pt was seen 5/7/25 for feet tingling and numbness and he says it is getting worse.  Referral that was placed has a status of incomplete.  Pt states someone called and scheduled an EEG but it is not until Sept.  He states his feet are really starting to bother him and is asking if anything can be done sooner.

## 2025-06-19 ENCOUNTER — TELEPHONE (OUTPATIENT)
Dept: MEDICAL GROUP | Facility: PHYSICIAN GROUP | Age: 80
End: 2025-06-19
Payer: MEDICARE

## 2025-06-19 DIAGNOSIS — R20.0 NUMBNESS AND TINGLING OF BOTH FEET: Primary | ICD-10-CM

## 2025-06-19 DIAGNOSIS — R20.2 NUMBNESS AND TINGLING OF BOTH FEET: Primary | ICD-10-CM

## 2025-06-19 NOTE — TELEPHONE ENCOUNTER
Patient called stating he would like to try getting a referral outside of [facility] for Neurology to see if he can be seen sooner

## 2025-06-23 NOTE — Clinical Note
REFERRAL APPROVAL NOTICE         Sent on June 23, 2025                   Angelo Magaña  [address]  [place] [address]                   Dear Mr. Magaña,    After a careful review of the medical information and benefit coverage, [facility] has processed your referral. See below for additional details.    If applicable, you must be actively enrolled with your insurance for coverage of the authorized service. If you have any questions regarding your coverage, please contact your insurance directly.    REFERRAL INFORMATION   Referral #:  [id number]  Referred-To Provider    Referred-By Provider:  Neurology    Beau Thomas III, M.D.   [facility]      [address]  [place] [id number] [phone] [address] [address]  [place] [address]  [phone]    Referral Start Date:  06/19/2025  Referral End Date:   06/19/2026             SCHEDULING  If you do not already have an appointment, please call [phone] to make an appointment.     MORE INFORMATION  If you do not already have a [facility] account, sign up at: [email or web address]  You can access your medical information, make appointments, see lab results, billing information, and more.  If you have questions regarding this referral, please contact  the [facility] Referrals department at:             [phone]. Monday - Friday 8:00AM - 5:00PM.     Sincerely,    [facility]

## 2025-07-08 ENCOUNTER — OFFICE VISIT (OUTPATIENT)
Dept: URGENT CARE | Facility: PHYSICIAN GROUP | Age: 80
End: 2025-07-08
Payer: MEDICARE

## 2025-07-08 VITALS
RESPIRATION RATE: 18 BRPM | OXYGEN SATURATION: 98 % | DIASTOLIC BLOOD PRESSURE: 64 MMHG | WEIGHT: 202 LBS | HEIGHT: 68 IN | HEART RATE: 68 BPM | BODY MASS INDEX: 30.62 KG/M2 | SYSTOLIC BLOOD PRESSURE: 118 MMHG | TEMPERATURE: 97.5 F

## 2025-07-08 DIAGNOSIS — M54.50 LUMBAR BACK PAIN: ICD-10-CM

## 2025-07-08 DIAGNOSIS — R10.9 LEFT FLANK PAIN: Primary | ICD-10-CM

## 2025-07-08 LAB
APPEARANCE UR: CLEAR
BILIRUB UR STRIP-MCNC: NEGATIVE MG/DL
COLOR UR AUTO: YELLOW
GLUCOSE UR STRIP.AUTO-MCNC: NEGATIVE MG/DL
KETONES UR STRIP.AUTO-MCNC: NEGATIVE MG/DL
LEUKOCYTE ESTERASE UR QL STRIP.AUTO: NEGATIVE
NITRITE UR QL STRIP.AUTO: NEGATIVE
PH UR STRIP.AUTO: 7.5 [PH] (ref 5–8)
PROT UR QL STRIP: NEGATIVE MG/DL
RBC UR QL AUTO: NEGATIVE
SP GR UR STRIP.AUTO: 1.01
UROBILINOGEN UR STRIP-MCNC: 1 MG/DL

## 2025-07-08 PROCEDURE — 3074F SYST BP LT 130 MM HG: CPT

## 2025-07-08 PROCEDURE — 81002 URINALYSIS NONAUTO W/O SCOPE: CPT

## 2025-07-08 PROCEDURE — 99213 OFFICE O/P EST LOW 20 MIN: CPT

## 2025-07-08 PROCEDURE — 3078F DIAST BP <80 MM HG: CPT

## 2025-07-08 RX ORDER — LIDOCAINE 50 MG/G
1 PATCH TOPICAL EVERY 24 HOURS
Qty: 10 PATCH | Refills: 0 | Status: SHIPPED | OUTPATIENT
Start: 2025-07-08

## 2025-07-08 ASSESSMENT — ENCOUNTER SYMPTOMS
CHILLS: 0
BACK PAIN: 1
ABDOMINAL PAIN: 0
VOMITING: 0
FEVER: 0
NAUSEA: 0

## 2025-07-08 ASSESSMENT — FIBROSIS 4 INDEX: FIB4 SCORE: 2.12

## 2025-07-10 ENCOUNTER — HOSPITAL ENCOUNTER (OUTPATIENT)
Dept: LAB | Facility: MEDICAL CENTER | Age: 80
End: 2025-07-10
Attending: FAMILY MEDICINE
Payer: MEDICARE

## 2025-07-10 ENCOUNTER — OFFICE VISIT (OUTPATIENT)
Dept: MEDICAL GROUP | Facility: PHYSICIAN GROUP | Age: 80
End: 2025-07-10
Payer: MEDICARE

## 2025-07-10 VITALS
HEIGHT: 68 IN | RESPIRATION RATE: 16 BRPM | TEMPERATURE: 98.7 F | HEART RATE: 70 BPM | WEIGHT: 202 LBS | DIASTOLIC BLOOD PRESSURE: 76 MMHG | SYSTOLIC BLOOD PRESSURE: 128 MMHG | BODY MASS INDEX: 30.62 KG/M2 | OXYGEN SATURATION: 98 %

## 2025-07-10 DIAGNOSIS — R10.9 ACUTE FLANK PAIN: Primary | ICD-10-CM

## 2025-07-10 DIAGNOSIS — R07.89 ACUTE CHEST WALL PAIN: ICD-10-CM

## 2025-07-10 DIAGNOSIS — R10.9 ACUTE FLANK PAIN: ICD-10-CM

## 2025-07-10 LAB
APPEARANCE UR: CLEAR
BASOPHILS # BLD AUTO: 0.6 % (ref 0–1.8)
BASOPHILS # BLD: 0.03 K/UL (ref 0–0.12)
BILIRUB UR QL STRIP.AUTO: NEGATIVE
COLOR UR: YELLOW
EOSINOPHIL # BLD AUTO: 0.23 K/UL (ref 0–0.51)
EOSINOPHIL NFR BLD: 4.8 % (ref 0–6.9)
ERYTHROCYTE [DISTWIDTH] IN BLOOD BY AUTOMATED COUNT: 46.2 FL (ref 35.9–50)
GLUCOSE UR STRIP.AUTO-MCNC: NEGATIVE MG/DL
HCT VFR BLD AUTO: 39.8 % (ref 42–52)
HGB BLD-MCNC: 13 G/DL (ref 14–18)
IMM GRANULOCYTES # BLD AUTO: 0.01 K/UL (ref 0–0.11)
IMM GRANULOCYTES NFR BLD AUTO: 0.2 % (ref 0–0.9)
KETONES UR STRIP.AUTO-MCNC: NEGATIVE MG/DL
LEUKOCYTE ESTERASE UR QL STRIP.AUTO: NEGATIVE
LYMPHOCYTES # BLD AUTO: 1.08 K/UL (ref 1–4.8)
LYMPHOCYTES NFR BLD: 22.7 % (ref 22–41)
MCH RBC QN AUTO: 29.9 PG (ref 27–33)
MCHC RBC AUTO-ENTMCNC: 32.7 G/DL (ref 32.3–36.5)
MCV RBC AUTO: 91.5 FL (ref 81.4–97.8)
MICRO URNS: ABNORMAL
MONOCYTES # BLD AUTO: 0.35 K/UL (ref 0–0.85)
MONOCYTES NFR BLD AUTO: 7.4 % (ref 0–13.4)
NEUTROPHILS # BLD AUTO: 3.06 K/UL (ref 1.82–7.42)
NEUTROPHILS NFR BLD: 64.3 % (ref 44–72)
NITRITE UR QL STRIP.AUTO: NEGATIVE
NRBC # BLD AUTO: 0 K/UL
NRBC BLD-RTO: 0 /100 WBC (ref 0–0.2)
PH UR STRIP.AUTO: 7 [PH] (ref 5–8)
PLATELET # BLD AUTO: 185 K/UL (ref 164–446)
PMV BLD AUTO: 9.7 FL (ref 9–12.9)
PROT UR QL STRIP: NEGATIVE MG/DL
RBC # BLD AUTO: 4.35 M/UL (ref 4.7–6.1)
RBC UR QL AUTO: NEGATIVE
SP GR UR STRIP.AUTO: 1.01
UROBILINOGEN UR STRIP.AUTO-MCNC: 2 EU/DL
WBC # BLD AUTO: 4.8 K/UL (ref 4.8–10.8)

## 2025-07-10 PROCEDURE — 3074F SYST BP LT 130 MM HG: CPT | Performed by: FAMILY MEDICINE

## 2025-07-10 PROCEDURE — 85025 COMPLETE CBC W/AUTO DIFF WBC: CPT

## 2025-07-10 PROCEDURE — 36415 COLL VENOUS BLD VENIPUNCTURE: CPT

## 2025-07-10 PROCEDURE — 99213 OFFICE O/P EST LOW 20 MIN: CPT | Performed by: FAMILY MEDICINE

## 2025-07-10 PROCEDURE — 3078F DIAST BP <80 MM HG: CPT | Performed by: FAMILY MEDICINE

## 2025-07-10 PROCEDURE — 81003 URINALYSIS AUTO W/O SCOPE: CPT

## 2025-07-10 ASSESSMENT — FIBROSIS 4 INDEX: FIB4 SCORE: 2.12

## 2025-07-10 NOTE — PROGRESS NOTES
"Subjective:     CC: Here for left lateral chest wall pain.    HPI:   Angelo presents today with the following medical concern:    Acute chest wall pain  This is a new problem.  About 4 to 5 days ago patient started having pain to his left lower rib cage.  He pushed through and went to the gym on Monday and then on Tuesday he could barely get around because it was so painful.  He went to urgent care 2 days ago.  A urine dipstick was negative.  He was told to use Lidoderm patches and consider a pain clinic.  Patient does not think it is due to his back and is here for second evaluation.  He states sitting feels pretty good but when he stands he can feel pressure and discomfort to the left lateral side.  He is transitioning into a new home so is trying to sell his old home.  There is a lot of packing and moving going on.  He does not think he specifically strained it.  No dysuria.  No change in bowel movements.  He did have abdominal CT in March that did not show any kidney or spleen abnormalities.      Past Medical History[1]    Social History[2]    Current Medications and Prescriptions Ordered in Epic[3]    Allergies:  Patient has no known allergies.    Health Maintenance: Completed    ROS:  Gen: no fevers/chills, no changes in weight  Eyes: no changes in vision  ENT: no sore throat, no hearing loss, no bloody nose  Pulm: no sob, no cough  CV: no palpitations  GI: no nausea/vomiting, no diarrhea  : no dysuria  MSk: no myalgias  Skin: no rash  Neuro: no headaches, no numbness/tingling  Heme/Lymph: no easy bruising      Objective:       Exam:  /76 (BP Location: Left arm, Patient Position: Sitting, BP Cuff Size: Adult)   Pulse 70   Temp 37.1 °C (98.7 °F) (Temporal)   Resp 16   Ht 1.727 m (5' 8\")   Wt 91.6 kg (202 lb)   SpO2 98%   BMI 30.71 kg/m²  Body mass index is 30.71 kg/m².    Gen: Alert and oriented, No apparent distress.  Chest:   Patient's pain appears to be to the left lateral 12th rib area.  " There is no real pain in this kidney area or over the spleen.  No muscle spasm is felt.  Lungs: Normal effort,   CV: Regular rate .  Ext: No clubbing, cyanosis, edema.      Labs: Lab from urgent care reviewed additional lab ordered.    Assessment & Plan:     80 y.o. male with the following -     1. Acute flank pain (Primary)  This is an acute issue.  Will order some follow-up labs.  - CBC WITH DIFFERENTIAL; Future  - URINALYSIS,CULTURE IF INDICATED; Future    2. Acute chest wall pain  This is an acute problem.  At home he may have some inflammation to where the 12th rib attaches onto the 11th rib.  Since he feels like he is getting a little better taking ibuprofen and Tylenol we will let him to continue to do that.  If the lab is unremarkable and he still having symptoms into first the next week then we will get a rib x-ray done.      No follow-ups on file.    Please note that this dictation was created using voice recognition software. I have made every reasonable attempt to correct obvious errors, but I expect that there are errors of grammar and possibly content that I did not discover before finalizing the note.             [1]   Past Medical History:  Diagnosis Date    Angina of effort (HCC) 1/19/2021    ASTHMA     scheduled inhaler use    Bronchitis 02/2018    Erectile dysfunction 6/8/2016    GERD (gastroesophageal reflux disease) 6/8/2016    Heart burn     History of skin cancer 6/8/2016    Hyperlipidemia 6/8/2016    Hypertension     Hypothyroidism 6/8/2016    Indigestion     Obesity 6/8/2016    Pain 04/19/2018    chronic back pain, 0/10    Preventative health care 6/8/2016    Rosacea 6/8/2016    Seasonal allergies 6/8/2016   [2]   Social History  Tobacco Use    Smoking status: Never    Smokeless tobacco: Never   Vaping Use    Vaping status: Never Used   Substance Use Topics    Alcohol use: Yes     Comment: 1/week     Drug use: No     Comment: marijuana edibles (for pain control)   [3]   Current Outpatient  Medications Ordered in Epic   Medication Sig Dispense Refill    lidocaine (LIDODERM) 5 % Patch Place 1 Patch on the skin every 24 hours. 10 Patch 0    metoprolol SR (TOPROL XL) 25 MG TABLET SR 24 HR TAKE 1/2 TABLET BY MOUTH DAILY 50 Tablet 1    ADVAIR DISKUS 100-50 MCG/ACT AEROSOL POWDER, BREATH ACTIVATED INHALE 1 PUFF BY MOUTH 2 TIMES A  Each 2    atorvastatin (LIPITOR) 40 MG Tab TAKE 1 TABLET BY MOUTH EVERY DAY IN THE EVENING 100 Tablet 2    levothyroxine (SYNTHROID) 50 MCG Tab TAKE 1 TABLET BY MOUTH EVERY DAY  DAYS 100 Tablet 2    minocycline (MINOCIN) 100 MG Cap       alfuzosin (UROXATRAL) 10 MG SR tablet Take 10 mg by mouth every day.      fluticasone (FLONASE) 50 MCG/ACT nasal spray Administer 1 Spray into affected nostril(S) every day. 48 mL 2    albuterol 108 (90 Base) MCG/ACT Aero Soln inhalation aerosol Inhale 2 Puffs every four hours as needed for Shortness of Breath. 1 Each 2    omeprazole (PRILOSEC OTC) 20 MG tablet PRILOSEC OTC 20 MG TBEC      acetaminophen (TYLENOL) 500 MG Tab Take 1,000 mg by mouth 2 Times a Day.       No current [facility]-ordered facility-administered medications on file.

## 2025-07-10 NOTE — ASSESSMENT & PLAN NOTE
This is a new problem.  About 4 to 5 days ago patient started having pain to his left lower rib cage.  He pushed through and went to the gym on Monday and then on Tuesday he could barely get around because it was so painful.  He went to urgent care 2 days ago.  A urine dipstick was negative.  He was told to use Lidoderm patches and consider a pain clinic.  Patient does not think it is due to his back and is here for second evaluation.  He states sitting feels pretty good but when he stands he can feel pressure and discomfort to the left lateral side.  He is transitioning into a new home so is trying to sell his old home.  There is a lot of packing and moving going on.  He does not think he specifically strained it.  No dysuria.  No change in bowel movements.  He did have abdominal CT in March that did not show any kidney or spleen abnormalities.

## 2025-07-18 ENCOUNTER — HOSPITAL ENCOUNTER (OUTPATIENT)
Dept: LAB | Facility: MEDICAL CENTER | Age: 80
End: 2025-07-18
Payer: MEDICARE

## 2025-07-18 LAB
25(OH)D3 SERPL-MCNC: 35 NG/ML (ref 30–100)
FOLATE SERPL-MCNC: 12.3 NG/ML
VIT B12 SERPL-MCNC: 1558 PG/ML (ref 211–911)

## 2025-07-18 PROCEDURE — 82746 ASSAY OF FOLIC ACID SERUM: CPT

## 2025-07-18 PROCEDURE — 84446 ASSAY OF VITAMIN E: CPT

## 2025-07-18 PROCEDURE — 82525 ASSAY OF COPPER: CPT

## 2025-07-18 PROCEDURE — 36415 COLL VENOUS BLD VENIPUNCTURE: CPT

## 2025-07-18 PROCEDURE — 82306 VITAMIN D 25 HYDROXY: CPT

## 2025-07-18 PROCEDURE — 82607 VITAMIN B-12: CPT

## 2025-07-21 LAB — COPPER SERPL-MCNC: 77.1 UG/DL (ref 70–140)

## 2025-07-22 LAB
A-TOCOPHEROL VIT E SERPL-MCNC: 5.2 MG/L (ref 5.5–18)
BETA+GAMMA TOCOPHEROL SERPL-MCNC: 1.2 MG/L (ref 0–6)

## 2025-07-24 ENCOUNTER — APPOINTMENT (OUTPATIENT)
Dept: URBAN - METROPOLITAN AREA CLINIC 15 | Facility: CLINIC | Age: 80
Setting detail: DERMATOLOGY
End: 2025-07-24

## 2025-07-24 DIAGNOSIS — L57.8 OTHER SKIN CHANGES DUE TO CHRONIC EXPOSURE TO NONIONIZING RADIATION: ICD-10-CM

## 2025-07-24 DIAGNOSIS — L57.0 ACTINIC KERATOSIS: ICD-10-CM

## 2025-07-24 DIAGNOSIS — Z85.828 PERSONAL HISTORY OF OTHER MALIGNANT NEOPLASM OF SKIN: ICD-10-CM

## 2025-07-24 DIAGNOSIS — Z85.820 PERSONAL HISTORY OF MALIGNANT MELANOMA OF SKIN: ICD-10-CM

## 2025-07-24 DIAGNOSIS — D18.0 HEMANGIOMA: ICD-10-CM

## 2025-07-24 DIAGNOSIS — D22 MELANOCYTIC NEVI: ICD-10-CM

## 2025-07-24 DIAGNOSIS — L82.0 INFLAMED SEBORRHEIC KERATOSIS: ICD-10-CM

## 2025-07-24 DIAGNOSIS — L82.1 OTHER SEBORRHEIC KERATOSIS: ICD-10-CM

## 2025-07-24 PROBLEM — D22.5 MELANOCYTIC NEVI OF TRUNK: Status: ACTIVE | Noted: 2025-07-24

## 2025-07-24 PROBLEM — D18.01 HEMANGIOMA OF SKIN AND SUBCUTANEOUS TISSUE: Status: ACTIVE | Noted: 2025-07-24

## 2025-07-24 PROCEDURE — ? COUNSELING

## 2025-07-24 PROCEDURE — ? LIQUID NITROGEN

## 2025-07-24 ASSESSMENT — LOCATION ZONE DERM
LOCATION ZONE: TRUNK
LOCATION ZONE: ARM
LOCATION ZONE: FACE
LOCATION ZONE: EAR

## 2025-07-24 ASSESSMENT — LOCATION DETAILED DESCRIPTION DERM
LOCATION DETAILED: LEFT DORSAL FOREARM
LOCATION DETAILED: RIGHT SUPERIOR LATERAL MALAR CHEEK
LOCATION DETAILED: LEFT INFERIOR TEMPLE
LOCATION DETAILED: LEFT SUPERIOR HELIX
LOCATION DETAILED: LEFT LATERAL SHOULDER
LOCATION DETAILED: RIGHT UPPER BACK
LOCATION DETAILED: LEFT CLAVICULAR SKIN
LOCATION DETAILED: LEFT INFERIOR LATERAL MALAR CHEEK
LOCATION DETAILED: RIGHT INFERIOR LATERAL FOREHEAD
LOCATION DETAILED: RIGHT SUPERIOR POSTERIOR HELIX
LOCATION DETAILED: PERIUMBILICAL SKIN
LOCATION DETAILED: LEFT CHEEK
LOCATION DETAILED: LEFT MID BACK
LOCATION DETAILED: LEFT UPPER BACK
LOCATION DETAILED: RIGHT INFERIOR TEMPLE
LOCATION DETAILED: RIGHT DORSAL FOREARM
LOCATION DETAILED: LEFT CENTRAL MALAR CHEEK
LOCATION DETAILED: LEFT SUPERIOR MEDIAL UPPER BACK
LOCATION DETAILED: LEFT LATERAL EYEBROW
LOCATION DETAILED: RIGHT MID TEMPLE
LOCATION DETAILED: RIGHT MEDIAL SUPERIOR CHEST

## 2025-07-24 ASSESSMENT — LOCATION SIMPLE DESCRIPTION DERM
LOCATION SIMPLE: ABDOMEN
LOCATION SIMPLE: LEFT EYEBROW
LOCATION SIMPLE: CHEST
LOCATION SIMPLE: BACK
LOCATION SIMPLE: LEFT SHOULDER
LOCATION SIMPLE: LEFT UPPER BACK
LOCATION SIMPLE: LEFT CLAVICULAR SKIN
LOCATION SIMPLE: LEFT TEMPLE
LOCATION SIMPLE: LEFT UPPER EXTREMITY
LOCATION SIMPLE: RIGHT EAR
LOCATION SIMPLE: LEFT EAR
LOCATION SIMPLE: RIGHT CHEEK
LOCATION SIMPLE: LEFT CHEEK
LOCATION SIMPLE: RIGHT FOREHEAD
LOCATION SIMPLE: RIGHT TEMPLE
LOCATION SIMPLE: RIGHT UPPER EXTREMITY

## (undated) DEVICE — SYRINGE SAFETY 10 ML 18 GA X 1 1/2 BLUNT LL (100/BX 4BX/CA)

## (undated) DEVICE — PROTECTOR ULNA NERVE - (36PR/CA)

## (undated) DEVICE — CANISTER SUCTION 3000ML MECHANICAL FILTER AUTO SHUTOFF MEDI-VAC NONSTERILE LF DISP  (40EA/CA)

## (undated) DEVICE — SUTURE 1 VICRYL PLUS CTX - 36 INCH (36/BX)

## (undated) DEVICE — BLADE SURGICAL #15 - (50/BX 3BX/CA)

## (undated) DEVICE — ELECTRODE DUAL RETURN W/ CORD - (50/PK)

## (undated) DEVICE — SET EXTENSION WITH 2 PORTS (48EA/CA) ***PART #2C8610 IS A SUBSTITUTE*****

## (undated) DEVICE — KIT ANESTHESIA W/CIRCUIT & 3/LT BAG W/FILTER (20EA/CA)

## (undated) DEVICE — DRESSING XEROFORM 1X8 - (50/BX 4BX/CA)

## (undated) DEVICE — SYRINGE SAFETY 3 ML 18 GA X 1 1/2 BLUNT LL (100/BX 8BX/CA)

## (undated) DEVICE — DRAPE STRLE REG TOWEL 18X24 - (10/BX 4BX/CA)"

## (undated) DEVICE — DEPRESSOR TONGUE ADLT STERILE - 6 IN (100EA/BX)

## (undated) DEVICE — HEMOSTAT SURG ABSORBABLE - 2 X 3 IN SURGICEL (24EA/CA)

## (undated) DEVICE — SUTURE 0 VICRYL PLUS CT-2 - 8 X 18 INCH (12/BX)

## (undated) DEVICE — PIN PERCUTANEOUS STERILE 150MM

## (undated) DEVICE — GLOVE BIOGEL SZ 8 SURGICAL PF LTX - (50PR/BX 4BX/CA)

## (undated) DEVICE — SUTURE 3.5-0 ETHIBOND GREEN CT-2 TAPER (36PK/BX)

## (undated) DEVICE — SUCTION INSTRUMENT YANKAUER BULBOUS TIP W/O VENT (50EA/CA)

## (undated) DEVICE — KIT SURGIFLO W/OUT THROMBIN - (6EA/CA)

## (undated) DEVICE — GRAFT DELIVERY KIT

## (undated) DEVICE — CHLORAPREP 26 ML APPLICATOR - ORANGE TINT(25/CA)

## (undated) DEVICE — TUBE E-T HI-LO CUFF 8.0MM (10EA/PK)

## (undated) DEVICE — TUBE CONNECT SUCTION CLEAR 120 X 1/4" (50EA/CA)"

## (undated) DEVICE — LACTATED RINGERS INJ. 500 ML - (24EA/CA)

## (undated) DEVICE — GLOVE BIOGEL PI INDICATOR SZ 7.0 SURGICAL PF LF - (50/BX 4BX/CA)

## (undated) DEVICE — GOWN WARMING STANDARD FLEX - (30/CA)

## (undated) DEVICE — GLOVE BIOGEL PI ORTHO SZ 8.5 PF LF (40/BX)

## (undated) DEVICE — SPONGE XRAY 8X4 STERL. 12PL - (10EA/TY 80TY/CA)

## (undated) DEVICE — SURGIFOAM (12X7) - (12EA/CA)

## (undated) DEVICE — NEEDLE NON SAFETY HYPO 22 GA X 1 1/2 IN (100/BX)

## (undated) DEVICE — MIDAS LUBRICATOR DIFFUSER PACK (4EA/CA)

## (undated) DEVICE — DRAPE MAYO STAND - (30/CA)

## (undated) DEVICE — TOWELS CLOTH SURGICAL - (4/PK 20PK/CA)

## (undated) DEVICE — GLOVE BIOGEL PI ORTHO SZ 6 1/2 SURGICAL PF LF (40PR/BX)

## (undated) DEVICE — SLEEVE, VASO, THIGH, MED

## (undated) DEVICE — TUBE E-T HI-LO CUFF 7.0MM (10EA/PK)

## (undated) DEVICE — NEEDLE SPINAL NON-SAFETY 18 GA X 3 IN (25EA/BX)

## (undated) DEVICE — SYRINGE DISP. 12 CC LL - (100/BX)

## (undated) DEVICE — DRAPE LARGE 3 QUARTER - (20/CA)

## (undated) DEVICE — GUIDE TRACHE TUBE INTUBATING STYLET 5.0-10.0MM 14FR (20EA/PK)

## (undated) DEVICE — SUTURE 3-0 MONOCRYL SH (36PK/BX)

## (undated) DEVICE — CONTAINER SPECIMEN BAG OR - STERILE 4 OZ W/LID (100EA/CA)

## (undated) DEVICE — LEAD SET 6 DISP. EKG NIHON KOHDEN (100EA/CA) [9859].

## (undated) DEVICE — ASPIRATION KIT

## (undated) DEVICE — TAPE CLOTH MEDIPORE 6 INCH - (12RL/CA)

## (undated) DEVICE — SUTURE 2-0 VICRYL CT-2  8 X 18 INCH (12EA/BX)

## (undated) DEVICE — TUBING C&T SET FLYING LEADS DRAIN TUBING (10EA/BX)

## (undated) DEVICE — TRAY CATHETER FOLEY URINE METER W/STATLOCK 350ML (10EA/CA)

## (undated) DEVICE — ELECTRODE 850 FOAM ADHESIVE - HYDROGEL RADIOTRNSPRNT (50/PK)

## (undated) DEVICE — SENSOR SPO2 NEO LNCS ADHESIVE (20/BX) SEE USER NOTES

## (undated) DEVICE — BONE BIOPSY DEVICE

## (undated) DEVICE — TRAY SRGPRP PVP IOD WT PRP - (20/CA)

## (undated) DEVICE — SPHERE NAVIGATION STEALTH (5EA/TY 12TY/PK)

## (undated) DEVICE — PATTIES SURG X-RAYCOTTONOID - 1/2 X 3 IN (200/CA)

## (undated) DEVICE — SET LEADWIRE 5 LEAD BEDSIDE DISPOSABLE ECG (1SET OF 5/EA)

## (undated) DEVICE — GLOVE SZ 7 BIOGEL PI MICRO - PF LF (50PR/BX 4BX/CA)

## (undated) DEVICE — GLOVE SZ 6.5 BIOGEL PI MICRO - PF LF (50PR/BX)

## (undated) DEVICE — SODIUM CHL IRRIGATION 0.9% 1000ML (12EA/CA)

## (undated) DEVICE — LACTATED RINGERS INJ 1000 ML - (14EA/CA 60CA/PF)

## (undated) DEVICE — BONE MILL BM210

## (undated) DEVICE — STAPLER SKIN DISP - (6/BX 10BX/CA) VISISTAT

## (undated) DEVICE — KIT EVACUATER 3 SPRING PVC LF 1/8 DRAIN SIZE (10EA/CA)"

## (undated) DEVICE — DISSECT TOOL MIDAS 14BA50

## (undated) DEVICE — LEAD SET 6 DISP. EKG NIHON KOHDEN

## (undated) DEVICE — GLOVE BIOGEL INDICATOR SZ 9 SURGICAL PF LTX - (160/CA)

## (undated) DEVICE — GLOVE BIOGEL SZ 7.5 SURGICAL PF LTX - (50PR/BX 4BX/CA)

## (undated) DEVICE — CORDS BIPOLAR COAGULATION - 12FT STERILE DISP. (10EA/BX)

## (undated) DEVICE — DRESSING POST OP BORDER 4 X 10 (5EA/BX)

## (undated) DEVICE — PACK MAJOR BASIN - (2EA/CA)

## (undated) DEVICE — SUTURE 2-0 VICRYL PLUS CTX - 8 X 18 INCH (12/BX)

## (undated) DEVICE — SUTURE 1 VICRYL PLUS CTX - 8 X 18 INCH (12/BX)

## (undated) DEVICE — DRAPE CLEAR W/ELASTIC BAND RAD CARM 40 X40" (20EA/CA)"

## (undated) DEVICE — KIT ROOM DECONTAMINATION

## (undated) DEVICE — GOWN SURGEONS LARGE - (32/CA)

## (undated) DEVICE — PACK NEURO - (2EA/CA)

## (undated) DEVICE — GLOVE BIOGEL SZ 6.5 SURGICAL PF LTX (50PR/BX 4BX/CA)

## (undated) DEVICE — HEMOSTAT SURG ABSORBABLE - 4 X 8 IN SURGICEL (24EA/CA)

## (undated) DEVICE — MASK ANESTHESIA ADULT  - (100/CA)

## (undated) DEVICE — GOWN SURGEONS X-LARGE - DISP. (30/CA)

## (undated) DEVICE — PACK JACKSON TABLE KIT W/OUT - HR (6EA/CA)

## (undated) DEVICE — SPONGE GAUZE STER 4X4 8-PL - (2/PK 50PK/BX 12BX/CS)

## (undated) DEVICE — DRESSING NON-ADHERING 8 X 3 - (50/BX)

## (undated) DEVICE — SUTURE 4-0 NUROLON CR/8 TF - (12/BX) ETHICON

## (undated) DEVICE — TUBING CLEARLINK DUO-VENT - C-FLO (48EA/CA)

## (undated) DEVICE — TOOL DISSECT MATCH HEAD

## (undated) DEVICE — NEPTUNE 4 PORT MANIFOLD - (20/PK)

## (undated) DEVICE — DRAPE LAPAROTOMY T SHEET - (12EA/CA)

## (undated) DEVICE — HEADREST PRONEVIEW LARGE - (10/CA)

## (undated) DEVICE — SUTURE 2-0 VICRYL PLUS CT-1 - 8 X 18 INCH(12/BX)

## (undated) DEVICE — SYRINGE DISP. 60 CC LL - (30/BX, 12BX/CA)**WHEN THESE ARE GONE ORDER #500206**

## (undated) DEVICE — GLOVE BIOGEL INDICATOR SZ 7SURGICAL PF LTX - (50/BX 4BX/CA)

## (undated) DEVICE — BANDAID X-LARGE 2 X 4 IN LF (50EA/BX)

## (undated) DEVICE — HEAD HOLDER JUNIOR/ADULT

## (undated) DEVICE — ENDO PEANUT 5MM DEVICE (12EA/BX)

## (undated) DEVICE — Device

## (undated) DEVICE — GLOVE BIOGEL INDICATOR SZ 6 SURGICAL PF LTX -(50/BX)

## (undated) DEVICE — GLOVE BIOGEL PI INDICATOR SZ 8.0 SURGICAL PF LF -(50/BX 4BX/CA)

## (undated) DEVICE — ARMREST CRADLE FOAM - (2PR/PK 12PR/CA)

## (undated) DEVICE — SPONGE GAUZESTER 4 X 4 4PLY - (128PK/CA)

## (undated) DEVICE — DRAPE SRG LG BCK TBL DISP - 10/CA

## (undated) DEVICE — BLADE SURGICAL #11 - (50/BX)

## (undated) DEVICE — GLOVE BIOGEL SZ 6 PF LATEX - (50EA/BX 4BX/CA)

## (undated) DEVICE — STETHOSCOPE ESOPHAGEAL 18FR (50EA/CA)

## (undated) DEVICE — DRAPE 36X28IN RAD CARM BND BG - (25/CA) O

## (undated) DEVICE — CLOSURE SKIN STRIP 1/2 X 4 IN - (STERI STRIP) (50/BX 4BX/CA)

## (undated) DEVICE — INTRAOP NEURO IN OR 1:1 PER 15 MIN

## (undated) DEVICE — DRAPE CHEST/BREAST - (12EA/CA)

## (undated) DEVICE — PEN SKIN MARKER W/RULER - (50EA/BX)

## (undated) DEVICE — SUTURE GENERAL

## (undated) DEVICE — GLOVE BIOGEL PI INDICATOR SZ 6.5 SURGICAL PF LF - (50/BX 4BX/CA)

## (undated) DEVICE — STERI STRIP COMPOUND BENZOIN - TINCTURE 0.6ML WITH APPLICATOR (40EA/BX)

## (undated) DEVICE — SEALER BIPOLAR 2.3 AQUAMANTYS

## (undated) DEVICE — SYRINGE 10 ML CONTROL LL (25EA/BX 4BX/CA)

## (undated) DEVICE — SUTURE 5 TI-CRON HOS-14 - (36/BX)

## (undated) DEVICE — GLOVE BIOGEL INDICATOR SZ 7.5 SURGICAL PF LTX - (50PR/BX 4BX/CA)

## (undated) DEVICE — TRAY EXPANDERINFLATABLE BONE TAMP FF 1-STEP WITH CDS KYPHOPAK 15/3

## (undated) DEVICE — CATHETER FOLEY ROBINSON 16FR 16IN STRL (12EA/CA)

## (undated) DEVICE — ALCOHOL RUBBING 70% ISOPROPYL 16OZ - (12EA/BX)

## (undated) DEVICE — DEVICE SUTURING NON-SAFETY ENDO CLOSE (12/BX)

## (undated) DEVICE — APPLICATOR COTTON TIP 6 IN - STERILE (2EA/PK 100PK/BX)

## (undated) DEVICE — BOVIE BLADE COATED &INSULATED - 25/PK

## (undated) DEVICE — COVER MAYO STAND X-LG - (22EA/CA)

## (undated) DEVICE — HANDPIECE 10FT INTPLS SCT PLS IRRIGATION HAND CONTROL SET (6/PK)

## (undated) DEVICE — DRESSING LEUKOMED STERILE 11.75X4IN - (50/CA)

## (undated) DEVICE — DRAPE MICROSCOPE X-LONG (10EA/CA)

## (undated) DEVICE — PAD BABY LAP 4X18 W/O - RINGS PREWASHED 5/PK 40PK/CS

## (undated) DEVICE — SPONGE RADIOPAQUE CTN X-LG - STERILE (50PK/CA) MADE TO ORDER ITEM AND HAS A 4-6 WEEK LEAD TIME

## (undated) DEVICE — TUBE LUKI TRAP STERILE DISP. - 8CC (12/BX 4BX/CA)

## (undated) DEVICE — SUTURE 0 VICRYL PLUS CT-1 - 8 X 18 INCH (12/BX)

## (undated) DEVICE — GLOVE BIOGEL PI ORTHO SZ 6 SURGICAL PF LF (40PR/BX)

## (undated) DEVICE — DRAPE IOBAN II 23 IN X 33 IN - (10/BX)

## (undated) DEVICE — GLOVE SZ 8 BIOGEL PI MICRO - PF LF (50PR/BX)

## (undated) DEVICE — COVER LIGHT HANDLE FLEXIBLE - SOFT (2EA/PK 80PK/CA)